# Patient Record
Sex: FEMALE | Race: BLACK OR AFRICAN AMERICAN | NOT HISPANIC OR LATINO | URBAN - METROPOLITAN AREA
[De-identification: names, ages, dates, MRNs, and addresses within clinical notes are randomized per-mention and may not be internally consistent; named-entity substitution may affect disease eponyms.]

---

## 2017-04-23 ENCOUNTER — INPATIENT (INPATIENT)
Facility: HOSPITAL | Age: 62
LOS: 2 days | Discharge: HOME | End: 2017-04-26
Attending: STUDENT IN AN ORGANIZED HEALTH CARE EDUCATION/TRAINING PROGRAM | Admitting: STUDENT IN AN ORGANIZED HEALTH CARE EDUCATION/TRAINING PROGRAM
Payer: MEDICARE

## 2017-04-23 VITALS
SYSTOLIC BLOOD PRESSURE: 136 MMHG | RESPIRATION RATE: 16 BRPM | DIASTOLIC BLOOD PRESSURE: 80 MMHG | HEART RATE: 80 BPM | TEMPERATURE: 98 F | OXYGEN SATURATION: 100 %

## 2017-04-23 DIAGNOSIS — Z90.710 ACQUIRED ABSENCE OF BOTH CERVIX AND UTERUS: Chronic | ICD-10-CM

## 2017-04-23 DIAGNOSIS — K92.2 GASTROINTESTINAL HEMORRHAGE, UNSPECIFIED: ICD-10-CM

## 2017-04-23 DIAGNOSIS — Z98.890 OTHER SPECIFIED POSTPROCEDURAL STATES: Chronic | ICD-10-CM

## 2017-04-23 DIAGNOSIS — Z90.89 ACQUIRED ABSENCE OF OTHER ORGANS: Chronic | ICD-10-CM

## 2017-04-23 LAB
ALBUMIN SERPL ELPH-MCNC: 4 G/DL — SIGNIFICANT CHANGE UP (ref 3.3–5)
ALP SERPL-CCNC: 83 U/L — SIGNIFICANT CHANGE UP (ref 40–120)
ALT FLD-CCNC: 16 U/L — SIGNIFICANT CHANGE UP (ref 4–33)
APPEARANCE UR: CLEAR — SIGNIFICANT CHANGE UP
AST SERPL-CCNC: 17 U/L — SIGNIFICANT CHANGE UP (ref 4–32)
BACTERIA # UR AUTO: SIGNIFICANT CHANGE UP
BASOPHILS # BLD AUTO: 0.03 K/UL — SIGNIFICANT CHANGE UP (ref 0–0.2)
BASOPHILS NFR BLD AUTO: 0.5 % — SIGNIFICANT CHANGE UP (ref 0–2)
BILIRUB SERPL-MCNC: 0.2 MG/DL — SIGNIFICANT CHANGE UP (ref 0.2–1.2)
BILIRUB UR-MCNC: NEGATIVE — SIGNIFICANT CHANGE UP
BLD GP AB SCN SERPL QL: NEGATIVE — SIGNIFICANT CHANGE UP
BLOOD UR QL VISUAL: HIGH
BUN SERPL-MCNC: 16 MG/DL — SIGNIFICANT CHANGE UP (ref 7–23)
CALCIUM SERPL-MCNC: 8.8 MG/DL — SIGNIFICANT CHANGE UP (ref 8.4–10.5)
CHLORIDE SERPL-SCNC: 103 MMOL/L — SIGNIFICANT CHANGE UP (ref 98–107)
CO2 SERPL-SCNC: 25 MMOL/L — SIGNIFICANT CHANGE UP (ref 22–31)
COLOR SPEC: SIGNIFICANT CHANGE UP
CREAT SERPL-MCNC: 0.93 MG/DL — SIGNIFICANT CHANGE UP (ref 0.5–1.3)
EOSINOPHIL # BLD AUTO: 0.1 K/UL — SIGNIFICANT CHANGE UP (ref 0–0.5)
EOSINOPHIL NFR BLD AUTO: 1.7 % — SIGNIFICANT CHANGE UP (ref 0–6)
GLUCOSE SERPL-MCNC: 150 MG/DL — HIGH (ref 70–99)
GLUCOSE UR-MCNC: NEGATIVE — SIGNIFICANT CHANGE UP
HCT VFR BLD CALC: 32.4 % — LOW (ref 34.5–45)
HCT VFR BLD CALC: 32.6 % — LOW (ref 34.5–45)
HCT VFR BLD CALC: 34.2 % — LOW (ref 34.5–45)
HGB BLD-MCNC: 10 G/DL — LOW (ref 11.5–15.5)
HGB BLD-MCNC: 10.3 G/DL — LOW (ref 11.5–15.5)
HGB BLD-MCNC: 10.7 G/DL — LOW (ref 11.5–15.5)
IMM GRANULOCYTES NFR BLD AUTO: 0.3 % — SIGNIFICANT CHANGE UP (ref 0–1.5)
KETONES UR-MCNC: NEGATIVE — SIGNIFICANT CHANGE UP
LEUKOCYTE ESTERASE UR-ACNC: HIGH
LYMPHOCYTES # BLD AUTO: 2.4 K/UL — SIGNIFICANT CHANGE UP (ref 1–3.3)
LYMPHOCYTES # BLD AUTO: 40.3 % — SIGNIFICANT CHANGE UP (ref 13–44)
MCHC RBC-ENTMCNC: 28 PG — SIGNIFICANT CHANGE UP (ref 27–34)
MCHC RBC-ENTMCNC: 28.5 PG — SIGNIFICANT CHANGE UP (ref 27–34)
MCHC RBC-ENTMCNC: 28.8 PG — SIGNIFICANT CHANGE UP (ref 27–34)
MCHC RBC-ENTMCNC: 30.9 % — LOW (ref 32–36)
MCHC RBC-ENTMCNC: 31.3 % — LOW (ref 32–36)
MCHC RBC-ENTMCNC: 31.6 % — LOW (ref 32–36)
MCV RBC AUTO: 90.8 FL — SIGNIFICANT CHANGE UP (ref 80–100)
MCV RBC AUTO: 91 FL — SIGNIFICANT CHANGE UP (ref 80–100)
MCV RBC AUTO: 91.1 FL — SIGNIFICANT CHANGE UP (ref 80–100)
MONOCYTES # BLD AUTO: 0.46 K/UL — SIGNIFICANT CHANGE UP (ref 0–0.9)
MONOCYTES NFR BLD AUTO: 7.7 % — SIGNIFICANT CHANGE UP (ref 2–14)
MUCOUS THREADS # UR AUTO: SIGNIFICANT CHANGE UP
NEUTROPHILS # BLD AUTO: 2.95 K/UL — SIGNIFICANT CHANGE UP (ref 1.8–7.4)
NEUTROPHILS NFR BLD AUTO: 49.5 % — SIGNIFICANT CHANGE UP (ref 43–77)
NITRITE UR-MCNC: NEGATIVE — SIGNIFICANT CHANGE UP
OB PNL STL: POSITIVE — SIGNIFICANT CHANGE UP
PH UR: 6 — SIGNIFICANT CHANGE UP (ref 4.6–8)
PLATELET # BLD AUTO: 206 K/UL — SIGNIFICANT CHANGE UP (ref 150–400)
PLATELET # BLD AUTO: 207 K/UL — SIGNIFICANT CHANGE UP (ref 150–400)
PLATELET # BLD AUTO: 210 K/UL — SIGNIFICANT CHANGE UP (ref 150–400)
PMV BLD: 10 FL — SIGNIFICANT CHANGE UP (ref 7–13)
PMV BLD: 9.8 FL — SIGNIFICANT CHANGE UP (ref 7–13)
PMV BLD: 9.9 FL — SIGNIFICANT CHANGE UP (ref 7–13)
POTASSIUM SERPL-MCNC: 3.4 MMOL/L — LOW (ref 3.5–5.3)
POTASSIUM SERPL-SCNC: 3.4 MMOL/L — LOW (ref 3.5–5.3)
PROT SERPL-MCNC: 7.5 G/DL — SIGNIFICANT CHANGE UP (ref 6–8.3)
PROT UR-MCNC: 10 — SIGNIFICANT CHANGE UP
RBC # BLD: 3.57 M/UL — LOW (ref 3.8–5.2)
RBC # BLD: 3.58 M/UL — LOW (ref 3.8–5.2)
RBC # BLD: 3.76 M/UL — LOW (ref 3.8–5.2)
RBC # FLD: 14.4 % — SIGNIFICANT CHANGE UP (ref 10.3–14.5)
RBC # FLD: 14.4 % — SIGNIFICANT CHANGE UP (ref 10.3–14.5)
RBC # FLD: 14.5 % — SIGNIFICANT CHANGE UP (ref 10.3–14.5)
RBC CASTS # UR COMP ASSIST: SIGNIFICANT CHANGE UP (ref 0–?)
RH IG SCN BLD-IMP: POSITIVE — SIGNIFICANT CHANGE UP
RH IG SCN BLD-IMP: POSITIVE — SIGNIFICANT CHANGE UP
SODIUM SERPL-SCNC: 142 MMOL/L — SIGNIFICANT CHANGE UP (ref 135–145)
SP GR SPEC: 1.01 — SIGNIFICANT CHANGE UP (ref 1–1.03)
SQUAMOUS # UR AUTO: SIGNIFICANT CHANGE UP
UROBILINOGEN FLD QL: NORMAL E.U. — SIGNIFICANT CHANGE UP (ref 0.1–0.2)
WBC # BLD: 5.96 K/UL — SIGNIFICANT CHANGE UP (ref 3.8–10.5)
WBC # BLD: 6.72 K/UL — SIGNIFICANT CHANGE UP (ref 3.8–10.5)
WBC # BLD: 7.61 K/UL — SIGNIFICANT CHANGE UP (ref 3.8–10.5)
WBC # FLD AUTO: 5.96 K/UL — SIGNIFICANT CHANGE UP (ref 3.8–10.5)
WBC # FLD AUTO: 6.72 K/UL — SIGNIFICANT CHANGE UP (ref 3.8–10.5)
WBC # FLD AUTO: 7.61 K/UL — SIGNIFICANT CHANGE UP (ref 3.8–10.5)
WBC UR QL: SIGNIFICANT CHANGE UP (ref 0–?)

## 2017-04-23 PROCEDURE — 86077 PHYS BLOOD BANK SERV XMATCH: CPT

## 2017-04-23 RX ORDER — SOD SULF/SODIUM/NAHCO3/KCL/PEG
1000 SOLUTION, RECONSTITUTED, ORAL ORAL ONCE
Qty: 0 | Refills: 0 | Status: COMPLETED | OUTPATIENT
Start: 2017-04-24 | End: 2017-04-24

## 2017-04-23 RX ORDER — LISINOPRIL 2.5 MG/1
40 TABLET ORAL DAILY
Qty: 0 | Refills: 0 | Status: DISCONTINUED | OUTPATIENT
Start: 2017-04-23 | End: 2017-04-26

## 2017-04-23 RX ORDER — SODIUM CHLORIDE 9 MG/ML
1000 INJECTION INTRAMUSCULAR; INTRAVENOUS; SUBCUTANEOUS
Qty: 0 | Refills: 0 | Status: DISCONTINUED | OUTPATIENT
Start: 2017-04-23 | End: 2017-04-25

## 2017-04-23 RX ORDER — SOD SULF/SODIUM/NAHCO3/KCL/PEG
1000 SOLUTION, RECONSTITUTED, ORAL ORAL ONCE
Qty: 0 | Refills: 0 | Status: COMPLETED | OUTPATIENT
Start: 2017-04-23 | End: 2017-04-23

## 2017-04-23 RX ORDER — LEVOTHYROXINE SODIUM 125 MCG
200 TABLET ORAL DAILY
Qty: 0 | Refills: 0 | Status: DISCONTINUED | OUTPATIENT
Start: 2017-04-23 | End: 2017-04-26

## 2017-04-23 RX ORDER — ACETAMINOPHEN 500 MG
650 TABLET ORAL EVERY 6 HOURS
Qty: 0 | Refills: 0 | Status: DISCONTINUED | OUTPATIENT
Start: 2017-04-23 | End: 2017-04-26

## 2017-04-23 RX ORDER — PANTOPRAZOLE SODIUM 20 MG/1
40 TABLET, DELAYED RELEASE ORAL
Qty: 0 | Refills: 0 | Status: DISCONTINUED | OUTPATIENT
Start: 2017-04-23 | End: 2017-04-26

## 2017-04-23 RX ORDER — AMLODIPINE BESYLATE 2.5 MG/1
10 TABLET ORAL DAILY
Qty: 0 | Refills: 0 | Status: DISCONTINUED | OUTPATIENT
Start: 2017-04-23 | End: 2017-04-26

## 2017-04-23 RX ADMIN — SODIUM CHLORIDE 50 MILLILITER(S): 9 INJECTION INTRAMUSCULAR; INTRAVENOUS; SUBCUTANEOUS at 22:25

## 2017-04-23 RX ADMIN — Medication 1000 MILLILITER(S): at 22:24

## 2017-04-23 RX ADMIN — Medication 10 MILLIGRAM(S): at 22:24

## 2017-04-23 RX ADMIN — SODIUM CHLORIDE 110 MILLILITER(S): 9 INJECTION INTRAMUSCULAR; INTRAVENOUS; SUBCUTANEOUS at 23:58

## 2017-04-23 RX ADMIN — SODIUM CHLORIDE 50 MILLILITER(S): 9 INJECTION INTRAMUSCULAR; INTRAVENOUS; SUBCUTANEOUS at 18:01

## 2017-04-23 NOTE — ED ADULT NURSE NOTE - ED STAT RN HANDOFF DETAILS
Report given to Isabella RN- Pt in bed in no distress- assisted to the bathroom reported no dizziness- Pt stable awaiting transportation.

## 2017-04-23 NOTE — ED PROVIDER NOTE - MEDICAL DECISION MAKING DETAILS
Navi: Concern for diverticular bleed. No clinical e/o inflammation. Check CBC. May need to transfuse.

## 2017-04-23 NOTE — H&P ADULT. - HISTORY OF PRESENT ILLNESS
61F HTN, hypothyroidism, GERD recurrent LGIB 4x over the past 3 years, last LGIB was Feb 2016. She presents today w LGIB which she states is much less than how it has presented in the past, only 4x BM that did not fill the bowl with blood, + some clots. Her previous episodes were associated with syncope and multiple blood transfusions. She does not have any dizziness, palpitation or CP. She only has colicky abdominal pain immediately before having a BM.    Last colonoscopy  by Dr. Magallanes (GI) 5/16- diverticulosis and EGD showed gastritis. 61F HTN, hypothyroidism, GERD recurrent LGIB 4x over the past 3 years, last LGIB was Feb 2016. She presents today w LGIB which she states is much less than how it has presented in the past, only 4x BM that did not fill the bowl with blood, + some clots. Her previous episodes were associated with syncope and multiple blood transfusions. She does not have any dizziness, palpitation or CP. She only has colicky abdominal pain immediately before having a BM.    Last colonoscopy  by Dr. Magallanes (GI) 5/16- diverticulosis and EGD showed gastritis.    Of note, she has an older CTA which showed possible bleeding from the terminal ileum.

## 2017-04-23 NOTE — H&P ADULT. - PMH
Diverticulosis    HTN (hypertension)    Hypothyroidism    Lower gastrointestinal bleeding  4x 2014- 2016

## 2017-04-23 NOTE — PATIENT PROFILE ADULT. - NS TRANSFER PATIENT BELONGINGS
yellow metal earring, 5 yellow metal bracelets, yellow metal wedding band/Jewelry/Money (specify)/Cell Phone/PDA (specify)/Other belongings/Clothing

## 2017-04-23 NOTE — H&P ADULT. - ASSESSMENT
61F HTN, hypothyroidism, GERD recurrent LGIB 4x over the past 3 years now w reccurrent LGIB  hemoglobin and Hct not significantly changed since May 2016 as per her out side records- Hg 10 HCt 31  - will admit for observation  - if any sign of brisk bleeding will obtain CTA for localization  - trend H/H  - Clears, today , if bleeding stops will advance diet.  - consider GI consult  d/w attending

## 2017-04-23 NOTE — ED PROVIDER NOTE - OBJECTIVE STATEMENT
Navi: 61 F, diverticulosis (never inflammed), hypothyroid (on Synthroid), pernicious anemia, h/o admissions for transfusion using warm, antigen-washed blood. P/w BRBPR x 3 today, lightheadedness last night.

## 2017-04-23 NOTE — ED ADULT NURSE NOTE - OBJECTIVE STATEMENT
Met Pt in bed- aox3 PMH GI bleed, divertic- HTN- Pt reports 2 bloody bowel movement today and 2 more once she arrived to ED- Pt denies palpitations, chest pain + for lightheadedness x 2 weeks on and off- Pt abd round soft not tender- Pt reports taking an average of 4 aleves a day due to back pain- Pt + red blood per rectum, + clots- Pt CM reading NSR- IV lock inititated labs collected and sent- :Left in bed awating MD evaluation

## 2017-04-24 LAB
BUN SERPL-MCNC: 10 MG/DL — SIGNIFICANT CHANGE UP (ref 7–23)
CALCIUM SERPL-MCNC: 8 MG/DL — LOW (ref 8.4–10.5)
CHLORIDE SERPL-SCNC: 104 MMOL/L — SIGNIFICANT CHANGE UP (ref 98–107)
CO2 SERPL-SCNC: 27 MMOL/L — SIGNIFICANT CHANGE UP (ref 22–31)
CREAT SERPL-MCNC: 0.79 MG/DL — SIGNIFICANT CHANGE UP (ref 0.5–1.3)
GLUCOSE SERPL-MCNC: 168 MG/DL — HIGH (ref 70–99)
HCT VFR BLD CALC: 28.7 % — LOW (ref 34.5–45)
HCT VFR BLD CALC: 29.2 % — LOW (ref 34.5–45)
HCT VFR BLD CALC: 31.8 % — LOW (ref 34.5–45)
HCT VFR BLD CALC: 35.4 % — SIGNIFICANT CHANGE UP (ref 34.5–45)
HGB BLD-MCNC: 11.2 G/DL — LOW (ref 11.5–15.5)
HGB BLD-MCNC: 9.1 G/DL — LOW (ref 11.5–15.5)
HGB BLD-MCNC: 9.2 G/DL — LOW (ref 11.5–15.5)
HGB BLD-MCNC: 9.9 G/DL — LOW (ref 11.5–15.5)
MAGNESIUM SERPL-MCNC: 1.8 MG/DL — SIGNIFICANT CHANGE UP (ref 1.6–2.6)
MCHC RBC-ENTMCNC: 28.3 PG — SIGNIFICANT CHANGE UP (ref 27–34)
MCHC RBC-ENTMCNC: 28.7 PG — SIGNIFICANT CHANGE UP (ref 27–34)
MCHC RBC-ENTMCNC: 28.8 PG — SIGNIFICANT CHANGE UP (ref 27–34)
MCHC RBC-ENTMCNC: 28.8 PG — SIGNIFICANT CHANGE UP (ref 27–34)
MCHC RBC-ENTMCNC: 31.1 % — LOW (ref 32–36)
MCHC RBC-ENTMCNC: 31.5 % — LOW (ref 32–36)
MCHC RBC-ENTMCNC: 31.6 % — LOW (ref 32–36)
MCHC RBC-ENTMCNC: 31.7 % — LOW (ref 32–36)
MCV RBC AUTO: 90.8 FL — SIGNIFICANT CHANGE UP (ref 80–100)
MCV RBC AUTO: 90.9 FL — SIGNIFICANT CHANGE UP (ref 80–100)
MCV RBC AUTO: 91 FL — SIGNIFICANT CHANGE UP (ref 80–100)
MCV RBC AUTO: 91 FL — SIGNIFICANT CHANGE UP (ref 80–100)
PHOSPHATE SERPL-MCNC: 1.8 MG/DL — LOW (ref 2.5–4.5)
PLATELET # BLD AUTO: 180 K/UL — SIGNIFICANT CHANGE UP (ref 150–400)
PLATELET # BLD AUTO: 184 K/UL — SIGNIFICANT CHANGE UP (ref 150–400)
PLATELET # BLD AUTO: 191 K/UL — SIGNIFICANT CHANGE UP (ref 150–400)
PLATELET # BLD AUTO: 193 K/UL — SIGNIFICANT CHANGE UP (ref 150–400)
PMV BLD: 10 FL — SIGNIFICANT CHANGE UP (ref 7–13)
PMV BLD: 9.5 FL — SIGNIFICANT CHANGE UP (ref 7–13)
PMV BLD: 9.6 FL — SIGNIFICANT CHANGE UP (ref 7–13)
PMV BLD: 9.9 FL — SIGNIFICANT CHANGE UP (ref 7–13)
POTASSIUM SERPL-MCNC: 3.7 MMOL/L — SIGNIFICANT CHANGE UP (ref 3.5–5.3)
POTASSIUM SERPL-SCNC: 3.7 MMOL/L — SIGNIFICANT CHANGE UP (ref 3.5–5.3)
RBC # BLD: 3.16 M/UL — LOW (ref 3.8–5.2)
RBC # BLD: 3.21 M/UL — LOW (ref 3.8–5.2)
RBC # BLD: 3.5 M/UL — LOW (ref 3.8–5.2)
RBC # BLD: 3.89 M/UL — SIGNIFICANT CHANGE UP (ref 3.8–5.2)
RBC # FLD: 14.4 % — SIGNIFICANT CHANGE UP (ref 10.3–14.5)
RBC # FLD: 14.5 % — SIGNIFICANT CHANGE UP (ref 10.3–14.5)
RBC # FLD: 14.5 % — SIGNIFICANT CHANGE UP (ref 10.3–14.5)
RBC # FLD: 14.6 % — HIGH (ref 10.3–14.5)
SODIUM SERPL-SCNC: 141 MMOL/L — SIGNIFICANT CHANGE UP (ref 135–145)
WBC # BLD: 12.24 K/UL — HIGH (ref 3.8–10.5)
WBC # BLD: 6.89 K/UL — SIGNIFICANT CHANGE UP (ref 3.8–10.5)
WBC # BLD: 7.42 K/UL — SIGNIFICANT CHANGE UP (ref 3.8–10.5)
WBC # BLD: 7.45 K/UL — SIGNIFICANT CHANGE UP (ref 3.8–10.5)
WBC # FLD AUTO: 12.24 K/UL — HIGH (ref 3.8–10.5)
WBC # FLD AUTO: 6.89 K/UL — SIGNIFICANT CHANGE UP (ref 3.8–10.5)
WBC # FLD AUTO: 7.42 K/UL — SIGNIFICANT CHANGE UP (ref 3.8–10.5)
WBC # FLD AUTO: 7.45 K/UL — SIGNIFICANT CHANGE UP (ref 3.8–10.5)

## 2017-04-24 PROCEDURE — 45382 COLONOSCOPY W/CONTROL BLEED: CPT | Mod: GC

## 2017-04-24 PROCEDURE — 99222 1ST HOSP IP/OBS MODERATE 55: CPT | Mod: 25,GC

## 2017-04-24 PROCEDURE — 74174 CTA ABD&PLVS W/CONTRAST: CPT | Mod: 26

## 2017-04-24 RX ORDER — MAGNESIUM SULFATE 500 MG/ML
2 VIAL (ML) INJECTION ONCE
Qty: 0 | Refills: 0 | Status: COMPLETED | OUTPATIENT
Start: 2017-04-24 | End: 2017-04-24

## 2017-04-24 RX ORDER — POTASSIUM PHOSPHATE, MONOBASIC POTASSIUM PHOSPHATE, DIBASIC 236; 224 MG/ML; MG/ML
15 INJECTION, SOLUTION INTRAVENOUS ONCE
Qty: 0 | Refills: 0 | Status: COMPLETED | OUTPATIENT
Start: 2017-04-24 | End: 2017-04-24

## 2017-04-24 RX ADMIN — Medication 10 MILLIGRAM(S): at 10:32

## 2017-04-24 RX ADMIN — SODIUM CHLORIDE 110 MILLILITER(S): 9 INJECTION INTRAMUSCULAR; INTRAVENOUS; SUBCUTANEOUS at 23:52

## 2017-04-24 RX ADMIN — Medication 50 GRAM(S): at 11:30

## 2017-04-24 RX ADMIN — PANTOPRAZOLE SODIUM 40 MILLIGRAM(S): 20 TABLET, DELAYED RELEASE ORAL at 06:56

## 2017-04-24 RX ADMIN — Medication 200 MICROGRAM(S): at 06:56

## 2017-04-24 RX ADMIN — POTASSIUM PHOSPHATE, MONOBASIC POTASSIUM PHOSPHATE, DIBASIC 62.5 MILLIMOLE(S): 236; 224 INJECTION, SOLUTION INTRAVENOUS at 13:05

## 2017-04-24 RX ADMIN — Medication 1000 MILLILITER(S): at 08:36

## 2017-04-24 NOTE — PROVIDER CONTACT NOTE (OTHER) - RECOMMENDATIONS
Wait for transfusion to complete to give the Doculax and Moviprep.
Another CBC before giving patient Doculax and Moviprep
11pm CBC was drawn; waiting for results. BM was bright red and mostly liquid.
PRBC infusion completed. Post transfusion CBC sent

## 2017-04-24 NOTE — PROVIDER CONTACT NOTE (OTHER) - ASSESSMENT
Currently asymptomatic
Patient states she currently is not feeling weak or dizzy.
Pt complaining of slight dizziness
pt asymptomatic, no dizziness, or diaphoresis.
VS stable
Asymptomatic

## 2017-04-24 NOTE — PROVIDER CONTACT NOTE (OTHER) - REASON
CBC results
Dark red bleeding per rectum
Pt had multiple bloody BMs
Vital Signs
Bloody Bowel Movement
Blood transfusion

## 2017-04-24 NOTE — PROVIDER CONTACT NOTE (OTHER) - DATE AND TIME:
24-Apr-2017 01:50
24-Apr-2017 03:10
24-Apr-2017 03:42
24-Apr-2017 06:00
24-Apr-2017 15:00
23-Apr-2017 23:53

## 2017-04-24 NOTE — PROVIDER CONTACT NOTE (OTHER) - SITUATION
patient had two bloody BM. One was 250cc and the other was 700cc
Pt needs blood warmer for transfusion to complete.
2am CBC results are completed. Hemoglobin dropped from 10.3 ot 9.2 and Hemacrit dropped from 32.6 to 29.2
BP on right arm 118/62 HR 80; on Left arm 110/68 HR 80
Pt had multiple bloody BMs
patient had X3 episodes of dark red bleeding per rectum.

## 2017-04-24 NOTE — PROVIDER CONTACT NOTE (OTHER) - ACTION/TREATMENT ORDERED:
Ok to wait to give Doculax and Moviprep
Hold Doculax and Moviprep for now. Please check Vital signs and if stable can give.
Ok will order 2am CBC
Will come assess patient. Hold Moviprep and Dulcolax for now. Patient is going to get a CT Angio and then can receive Moviprep and Doculax when she returns. Will notify RN is plan changes.
Will continue to monitor
Will increase fluids and keep monitoring,

## 2017-04-25 ENCOUNTER — TRANSCRIPTION ENCOUNTER (OUTPATIENT)
Age: 62
End: 2017-04-25

## 2017-04-25 LAB
BUN SERPL-MCNC: 5 MG/DL — LOW (ref 7–23)
CALCIUM SERPL-MCNC: 7.8 MG/DL — LOW (ref 8.4–10.5)
CHLORIDE SERPL-SCNC: 108 MMOL/L — HIGH (ref 98–107)
CO2 SERPL-SCNC: 25 MMOL/L — SIGNIFICANT CHANGE UP (ref 22–31)
CREAT SERPL-MCNC: 0.78 MG/DL — SIGNIFICANT CHANGE UP (ref 0.5–1.3)
GLUCOSE SERPL-MCNC: 127 MG/DL — HIGH (ref 70–99)
HCT VFR BLD CALC: 29.8 % — LOW (ref 34.5–45)
HCT VFR BLD CALC: 30.2 % — LOW (ref 34.5–45)
HGB BLD-MCNC: 9.4 G/DL — LOW (ref 11.5–15.5)
HGB BLD-MCNC: 9.6 G/DL — LOW (ref 11.5–15.5)
MAGNESIUM SERPL-MCNC: 2.2 MG/DL — SIGNIFICANT CHANGE UP (ref 1.6–2.6)
MCHC RBC-ENTMCNC: 28.2 PG — SIGNIFICANT CHANGE UP (ref 27–34)
MCHC RBC-ENTMCNC: 28.4 PG — SIGNIFICANT CHANGE UP (ref 27–34)
MCHC RBC-ENTMCNC: 31.5 % — LOW (ref 32–36)
MCHC RBC-ENTMCNC: 31.8 % — LOW (ref 32–36)
MCV RBC AUTO: 89.3 FL — SIGNIFICANT CHANGE UP (ref 80–100)
MCV RBC AUTO: 89.5 FL — SIGNIFICANT CHANGE UP (ref 80–100)
PHOSPHATE SERPL-MCNC: 2 MG/DL — LOW (ref 2.5–4.5)
PLATELET # BLD AUTO: 172 K/UL — SIGNIFICANT CHANGE UP (ref 150–400)
PLATELET # BLD AUTO: 186 K/UL — SIGNIFICANT CHANGE UP (ref 150–400)
PMV BLD: 9.4 FL — SIGNIFICANT CHANGE UP (ref 7–13)
PMV BLD: 9.4 FL — SIGNIFICANT CHANGE UP (ref 7–13)
POTASSIUM SERPL-MCNC: 3.3 MMOL/L — LOW (ref 3.5–5.3)
POTASSIUM SERPL-SCNC: 3.3 MMOL/L — LOW (ref 3.5–5.3)
RBC # BLD: 3.33 M/UL — LOW (ref 3.8–5.2)
RBC # BLD: 3.38 M/UL — LOW (ref 3.8–5.2)
RBC # FLD: 14.6 % — HIGH (ref 10.3–14.5)
RBC # FLD: 14.6 % — HIGH (ref 10.3–14.5)
SODIUM SERPL-SCNC: 143 MMOL/L — SIGNIFICANT CHANGE UP (ref 135–145)
WBC # BLD: 8.13 K/UL — SIGNIFICANT CHANGE UP (ref 3.8–10.5)
WBC # BLD: 9.22 K/UL — SIGNIFICANT CHANGE UP (ref 3.8–10.5)
WBC # FLD AUTO: 8.13 K/UL — SIGNIFICANT CHANGE UP (ref 3.8–10.5)
WBC # FLD AUTO: 9.22 K/UL — SIGNIFICANT CHANGE UP (ref 3.8–10.5)

## 2017-04-25 PROCEDURE — 99232 SBSQ HOSP IP/OBS MODERATE 35: CPT | Mod: GC

## 2017-04-25 PROCEDURE — 99222 1ST HOSP IP/OBS MODERATE 55: CPT

## 2017-04-25 RX ORDER — POTASSIUM CHLORIDE 20 MEQ
10 PACKET (EA) ORAL
Qty: 0 | Refills: 0 | Status: COMPLETED | OUTPATIENT
Start: 2017-04-25 | End: 2017-04-25

## 2017-04-25 RX ORDER — POTASSIUM PHOSPHATE, MONOBASIC POTASSIUM PHOSPHATE, DIBASIC 236; 224 MG/ML; MG/ML
15 INJECTION, SOLUTION INTRAVENOUS ONCE
Qty: 0 | Refills: 0 | Status: COMPLETED | OUTPATIENT
Start: 2017-04-25 | End: 2017-04-25

## 2017-04-25 RX ADMIN — Medication 100 MILLIEQUIVALENT(S): at 08:50

## 2017-04-25 RX ADMIN — POTASSIUM PHOSPHATE, MONOBASIC POTASSIUM PHOSPHATE, DIBASIC 62.5 MILLIMOLE(S): 236; 224 INJECTION, SOLUTION INTRAVENOUS at 15:04

## 2017-04-25 RX ADMIN — Medication 100 MILLIEQUIVALENT(S): at 10:52

## 2017-04-25 RX ADMIN — PANTOPRAZOLE SODIUM 40 MILLIGRAM(S): 20 TABLET, DELAYED RELEASE ORAL at 07:42

## 2017-04-25 RX ADMIN — SODIUM CHLORIDE 110 MILLILITER(S): 9 INJECTION INTRAMUSCULAR; INTRAVENOUS; SUBCUTANEOUS at 05:22

## 2017-04-25 RX ADMIN — AMLODIPINE BESYLATE 10 MILLIGRAM(S): 2.5 TABLET ORAL at 05:21

## 2017-04-25 RX ADMIN — LISINOPRIL 40 MILLIGRAM(S): 2.5 TABLET ORAL at 05:22

## 2017-04-25 RX ADMIN — Medication 200 MICROGRAM(S): at 05:22

## 2017-04-25 RX ADMIN — Medication 100 MILLIEQUIVALENT(S): at 13:13

## 2017-04-25 NOTE — DISCHARGE NOTE ADULT - ADDITIONAL INSTRUCTIONS
FOLLOW-UP: Please follow up with your primary care physician in one week regarding your hospitalization   Please follow up with outpatient gastroenterologist Dr. Magallanes or Dr. Granados within one week of discharge regarding hospitalization.  Please call Dr. Beach at [phone] to make an appointment in one week

## 2017-04-25 NOTE — DISCHARGE NOTE ADULT - HOSPITAL COURSE
61F HTN, hypothyroidism, GERD recurrent LGIB 4x over the past 3 years, last LGIB was Feb 2016. She presents today w LGIB which she states is much less than how it has presented in the past, only 4x BM that did not fill the bowl with blood, + some clots. Her previous episodes were associated with syncope and multiple blood transfusions. She does not have any dizziness, palpitation or CP. She only has colicky abdominal pain immediately before having a BM.    Last colonoscopy  by Dr. Magallanes (GI) 5/16- diverticulosis and EGD showed gastritis.    Of note, she has an older CTA which showed possible bleeding from the terminal ileum.  Patient was admitted to surgical floor. Serial CBCs obtained.   4/24: CTA revealed No active gastrointestinal contrast extravasation. Small pericardial effusion.    4/24: Patient received 1 unit PRBC , responded appropriately. Colonoscopy preformed by GI and found The examined portion of the ileum was normal. Diverticulosis in the entire examined colon. There was evidence of recent bleeding from the diverticular  opening. Injected. Clips (MR conditional) were placed. Blood in the transverse colon. No specimens collected.  4/25: CBC and vital signs remained stable. Electrolytes repleted. Diet was advanced as tolerated  4/26: Vital signs remain stable. Patient CBC stable. Stable for discharge home with outpatient follow up with gastroenterologist and Dr. Beach.

## 2017-04-25 NOTE — DISCHARGE NOTE ADULT - CARE PROVIDERS DIRECT ADDRESSES
[email or web address]

## 2017-04-25 NOTE — DISCHARGE NOTE ADULT - PLAN OF CARE
You had a colonoscopy with GI ACTIVITY: No heavy lifting or straining. Otherwise, you may return to your usual level of physical activity. If you are taking narcotic pain medication (such as Percocet) DO NOT drive a car, operate machinery or make important decisions.  DIET: Return to your usual diet.  NOTIFY YOUR SURGEON IF: You have any bleeding that does not stop  FOLLOW-UP: Please follow up with your primary care physician in one week regarding your hospitalization   Please follow up with outpatient gastroenterologist Dr. Magallanes or Dr. Granados within one week of discharge regarding hospitalization.  Please call Dr. Beach at [phone] to make an appointment in one week

## 2017-04-25 NOTE — DISCHARGE NOTE ADULT - PATIENT PORTAL LINK FT
“You can access the [facility] Patient Portal, offered by [facility], by registering with the following website: [email or web address]”

## 2017-04-25 NOTE — DISCHARGE NOTE ADULT - CARE PLAN
Goal:	You had a colonoscopy with GI  Instructions for follow-up, activity and diet:	ACTIVITY: No heavy lifting or straining. Otherwise, you may return to your usual level of physical activity. If you are taking narcotic pain medication (such as Percocet) DO NOT drive a car, operate machinery or make important decisions.  DIET: Return to your usual diet.  NOTIFY YOUR SURGEON IF: You have any bleeding that does not stop  FOLLOW-UP: Please follow up with your primary care physician in one week regarding your hospitalization   Please follow up with outpatient gastroenterologist Dr. Magallanes or Dr. Granados within one week of discharge regarding hospitalization.  Please call Dr. Beach at [phone] to make an appointment in one week Principal Discharge DX:	Lower GI bleed  Goal:	You had a colonoscopy with GI  Instructions for follow-up, activity and diet:	ACTIVITY: No heavy lifting or straining. Otherwise, you may return to your usual level of physical activity. If you are taking narcotic pain medication (such as Percocet) DO NOT drive a car, operate machinery or make important decisions.  DIET: Return to your usual diet.  NOTIFY YOUR SURGEON IF: You have any bleeding that does not stop  FOLLOW-UP: Please follow up with your primary care physician in one week regarding your hospitalization   Please follow up with outpatient gastroenterologist Dr. Magallanes or Dr. Granados within one week of discharge regarding hospitalization.  Please call Dr. Beach at [phone] to make an appointment in one week Principal Discharge DX:	Lower GI bleed  Goal:	You had a colonoscopy with GI  Instructions for follow-up, activity and diet:	ACTIVITY: No heavy lifting or straining. Otherwise, you may return to your usual level of physical activity. If you are taking narcotic pain medication (such as Percocet) DO NOT drive a car, operate machinery or make important decisions.  DIET: Return to your usual diet.  NOTIFY YOUR SURGEON IF: You have any bleeding that does not stop  FOLLOW-UP: Please follow up with your primary care physician in one week regarding your hospitalization   Please follow up with outpatient gastroenterologist Dr. Magallanes or Dr. Granados within one week of discharge regarding hospitalization.  Please call Dr. Beach at [phone] to make an appointment in one week

## 2017-04-25 NOTE — DISCHARGE NOTE ADULT - CARE PROVIDER_API CALL
Breezy Beach), ColonRectal Surgery; Surgery  [facility] [address] [place] [id number]  Phone: [phone]  Fax: [phone]    Chino Granados), Gastroenterology; Internal Medicine  [address] [place] [address]  Phone: [phone]  Fax: [phone]

## 2017-04-25 NOTE — DISCHARGE NOTE ADULT - MEDICATION SUMMARY - MEDICATIONS TO STOP TAKING
I will STOP taking the medications listed below when I get home from the hospital:    Aleve 220 mg oral capsule  -- 1 cap(s) by mouth every 12 hours, As Needed for back pain  -- pt takes 400 to 800 mg per day for the past two weeks

## 2017-04-25 NOTE — DISCHARGE NOTE ADULT - MEDICATION SUMMARY - MEDICATIONS TO TAKE
I will START or STAY ON the medications listed below when I get home from the hospital:    Tylenol 325 mg oral tablet  -- 2 tab(s) by mouth every 4 hours  -- for back pain  -- Indication: For pain    ramipril 10 mg oral capsule  -- 1 cap(s) by mouth once a day  -- Indication: For HTN (hypertension)    Norvasc 10 mg oral tablet  -- 1 tab(s) by mouth once a day  -- Indication: For HTN (hypertension)    Lasix 20 mg oral tablet  -- 1 tab(s) by mouth once a day  -- for leg swelling?  -- Indication: For HTN (hypertension)    ferrous sulfate 325 mg (65 mg elemental iron) oral delayed release tablet  -- 1 tab(s) by mouth once a day  -- Indication: For Supplement    omeprazole 40 mg oral delayed release capsule  -- 1 cap(s) by mouth once a day  -- Indication: For protonix    Synthroid 200 mcg (0.2 mg) oral tablet  -- 1 tab(s) by mouth once a day  -- Indication: For Hypothyroidism    cyanocobalamin 1000 mcg oral tablet  -- 1 tab(s) by mouth once a day  -- Indication: For supplement

## 2017-04-25 NOTE — DISCHARGE NOTE ADULT - OTHER SIGNIFICANT FINDINGS
61F HTN, hypothyroidism, GERD recurrent LGIB 4x over the past 3 years, last LGIB was Feb 2016. She presents today w LGIB which she states is much less than how it has presented in the past, only 4x BM that did not fill the bowl with blood, + some clots. Her previous episodes were associated with syncope and multiple blood transfusions. She does not have any dizziness, palpitation or CP. She only has colicky abdominal pain immediately before having a BM.    Last colonoscopy  by Dr. Magallanes (GI) 5/16- diverticulosis and EGD showed gastritis.    Of note, she has an older CTA which showed possible bleeding from the terminal ileum.    Patient was admitted to surgical floor. Serial CBCs obtained.   4/24: Patient received 1 unit PRBC , responded appropriately. Colonoscopy preformed by GI and found The examined portion of the ileum was normal. Diverticulosis in the entire examined colon. There was evidence of recent bleeding from the diverticular  opening. Injected. Clips (MR conditional) were placed.  Blood in the transverse colon. No specimens collected.  4/25: CBC and vital signs remained stable. Electrolytes repleted. Diet was advanced as tolerated as above

## 2017-04-26 VITALS
OXYGEN SATURATION: 98 % | RESPIRATION RATE: 18 BRPM | HEART RATE: 79 BPM | SYSTOLIC BLOOD PRESSURE: 121 MMHG | TEMPERATURE: 98 F | DIASTOLIC BLOOD PRESSURE: 69 MMHG

## 2017-04-26 LAB
BUN SERPL-MCNC: 10 MG/DL — SIGNIFICANT CHANGE UP (ref 7–23)
CALCIUM SERPL-MCNC: 8.2 MG/DL — LOW (ref 8.4–10.5)
CHLORIDE SERPL-SCNC: 107 MMOL/L — SIGNIFICANT CHANGE UP (ref 98–107)
CO2 SERPL-SCNC: 23 MMOL/L — SIGNIFICANT CHANGE UP (ref 22–31)
CREAT SERPL-MCNC: 0.92 MG/DL — SIGNIFICANT CHANGE UP (ref 0.5–1.3)
GLUCOSE SERPL-MCNC: 142 MG/DL — HIGH (ref 70–99)
HCT VFR BLD CALC: 29.4 % — LOW (ref 34.5–45)
HGB BLD-MCNC: 9.3 G/DL — LOW (ref 11.5–15.5)
MAGNESIUM SERPL-MCNC: 2.1 MG/DL — SIGNIFICANT CHANGE UP (ref 1.6–2.6)
MCHC RBC-ENTMCNC: 28.9 PG — SIGNIFICANT CHANGE UP (ref 27–34)
MCHC RBC-ENTMCNC: 31.6 % — LOW (ref 32–36)
MCV RBC AUTO: 91.3 FL — SIGNIFICANT CHANGE UP (ref 80–100)
PHOSPHATE SERPL-MCNC: 2.3 MG/DL — LOW (ref 2.5–4.5)
PLATELET # BLD AUTO: 192 K/UL — SIGNIFICANT CHANGE UP (ref 150–400)
PMV BLD: 9.7 FL — SIGNIFICANT CHANGE UP (ref 7–13)
POTASSIUM SERPL-MCNC: 3.4 MMOL/L — LOW (ref 3.5–5.3)
POTASSIUM SERPL-SCNC: 3.4 MMOL/L — LOW (ref 3.5–5.3)
RBC # BLD: 3.22 M/UL — LOW (ref 3.8–5.2)
RBC # FLD: 14.7 % — HIGH (ref 10.3–14.5)
SODIUM SERPL-SCNC: 141 MMOL/L — SIGNIFICANT CHANGE UP (ref 135–145)
WBC # BLD: 6.83 K/UL — SIGNIFICANT CHANGE UP (ref 3.8–10.5)
WBC # FLD AUTO: 6.83 K/UL — SIGNIFICANT CHANGE UP (ref 3.8–10.5)

## 2017-04-26 PROCEDURE — 99232 SBSQ HOSP IP/OBS MODERATE 35: CPT | Mod: GC

## 2017-04-26 PROCEDURE — 99232 SBSQ HOSP IP/OBS MODERATE 35: CPT

## 2017-04-26 RX ORDER — POTASSIUM PHOSPHATE, MONOBASIC POTASSIUM PHOSPHATE, DIBASIC 236; 224 MG/ML; MG/ML
15 INJECTION, SOLUTION INTRAVENOUS ONCE
Qty: 0 | Refills: 0 | Status: COMPLETED | OUTPATIENT
Start: 2017-04-26 | End: 2017-04-26

## 2017-04-26 RX ORDER — POTASSIUM CHLORIDE 20 MEQ
40 PACKET (EA) ORAL ONCE
Qty: 0 | Refills: 0 | Status: COMPLETED | OUTPATIENT
Start: 2017-04-26 | End: 2017-04-26

## 2017-04-26 RX ADMIN — PANTOPRAZOLE SODIUM 40 MILLIGRAM(S): 20 TABLET, DELAYED RELEASE ORAL at 06:07

## 2017-04-26 RX ADMIN — POTASSIUM PHOSPHATE, MONOBASIC POTASSIUM PHOSPHATE, DIBASIC 62.5 MILLIMOLE(S): 236; 224 INJECTION, SOLUTION INTRAVENOUS at 08:58

## 2017-04-26 RX ADMIN — Medication 650 MILLIGRAM(S): at 07:30

## 2017-04-26 RX ADMIN — Medication 650 MILLIGRAM(S): at 07:00

## 2017-04-26 RX ADMIN — LISINOPRIL 40 MILLIGRAM(S): 2.5 TABLET ORAL at 05:18

## 2017-04-26 RX ADMIN — AMLODIPINE BESYLATE 10 MILLIGRAM(S): 2.5 TABLET ORAL at 05:18

## 2017-04-26 RX ADMIN — Medication 40 MILLIEQUIVALENT(S): at 10:34

## 2017-04-26 RX ADMIN — Medication 200 MICROGRAM(S): at 05:18

## 2017-05-04 ENCOUNTER — APPOINTMENT (OUTPATIENT)
Dept: COLORECTAL SURGERY | Facility: CLINIC | Age: 62
End: 2017-05-04

## 2017-05-04 RX ORDER — OMEPRAZOLE 20 MG/1
20 CAPSULE, DELAYED RELEASE ORAL
Qty: 90 | Refills: 0 | Status: DISCONTINUED | COMMUNITY
Start: 2017-03-09

## 2017-05-04 RX ORDER — FAMOTIDINE 20 MG/1
20 TABLET, FILM COATED ORAL
Refills: 0 | Status: ACTIVE | COMMUNITY

## 2017-05-04 RX ORDER — CHLORHEXIDINE GLUCONATE 4 %
1000 LIQUID (ML) TOPICAL
Qty: 90 | Refills: 0 | Status: ACTIVE | COMMUNITY
Start: 2017-03-09

## 2017-05-04 RX ORDER — DIPHENHYDRAMINE HCL 25 MG
CAPSULE ORAL
Refills: 0 | Status: ACTIVE | COMMUNITY

## 2017-05-04 RX ORDER — BUSPIRONE HYDROCHLORIDE 10 MG/1
10 TABLET ORAL
Qty: 270 | Refills: 0 | Status: ACTIVE | COMMUNITY
Start: 2016-09-01

## 2017-05-04 RX ORDER — SIMVASTATIN 20 MG/1
20 TABLET, FILM COATED ORAL
Qty: 90 | Refills: 0 | Status: DISCONTINUED | COMMUNITY
Start: 2017-03-09

## 2017-05-04 RX ORDER — METHYLPREDNISOLONE 4 MG/1
4 TABLET ORAL
Refills: 0 | Status: ACTIVE | COMMUNITY

## 2018-07-28 ENCOUNTER — INPATIENT (INPATIENT)
Facility: HOSPITAL | Age: 63
LOS: 1 days | Discharge: HOME | End: 2018-07-30
Attending: SURGERY | Admitting: SURGERY
Payer: MEDICARE

## 2018-07-28 VITALS
TEMPERATURE: 98 F | HEART RATE: 85 BPM | OXYGEN SATURATION: 100 % | DIASTOLIC BLOOD PRESSURE: 100 MMHG | RESPIRATION RATE: 16 BRPM | SYSTOLIC BLOOD PRESSURE: 187 MMHG

## 2018-07-28 DIAGNOSIS — Z90.89 ACQUIRED ABSENCE OF OTHER ORGANS: Chronic | ICD-10-CM

## 2018-07-28 DIAGNOSIS — Z98.890 OTHER SPECIFIED POSTPROCEDURAL STATES: Chronic | ICD-10-CM

## 2018-07-28 DIAGNOSIS — K92.2 GASTROINTESTINAL HEMORRHAGE, UNSPECIFIED: ICD-10-CM

## 2018-07-28 DIAGNOSIS — Z90.710 ACQUIRED ABSENCE OF BOTH CERVIX AND UTERUS: Chronic | ICD-10-CM

## 2018-07-28 LAB
ALBUMIN SERPL ELPH-MCNC: 4.2 G/DL — SIGNIFICANT CHANGE UP (ref 3.3–5)
ALP SERPL-CCNC: 95 U/L — SIGNIFICANT CHANGE UP (ref 40–120)
ALT FLD-CCNC: 27 U/L — SIGNIFICANT CHANGE UP (ref 4–33)
APTT BLD: 22.7 SEC — LOW (ref 27.5–37.4)
AST SERPL-CCNC: 28 U/L — SIGNIFICANT CHANGE UP (ref 4–32)
BASE EXCESS BLDA CALC-SCNC: -0.6 MMOL/L — SIGNIFICANT CHANGE UP
BASOPHILS # BLD AUTO: 0.04 K/UL — SIGNIFICANT CHANGE UP (ref 0–0.2)
BASOPHILS NFR BLD AUTO: 0.6 % — SIGNIFICANT CHANGE UP (ref 0–2)
BILIRUB SERPL-MCNC: 0.4 MG/DL — SIGNIFICANT CHANGE UP (ref 0.2–1.2)
BLD GP AB SCN SERPL QL: NEGATIVE — SIGNIFICANT CHANGE UP
BLD GP AB SCN SERPL QL: NEGATIVE — SIGNIFICANT CHANGE UP
BUN SERPL-MCNC: 11 MG/DL — SIGNIFICANT CHANGE UP (ref 7–23)
BUN SERPL-MCNC: 15 MG/DL — SIGNIFICANT CHANGE UP (ref 7–23)
CA-I BLDA-SCNC: 1.02 MMOL/L — LOW (ref 1.15–1.29)
CALCIUM SERPL-MCNC: 7.4 MG/DL — LOW (ref 8.4–10.5)
CALCIUM SERPL-MCNC: 8.9 MG/DL — SIGNIFICANT CHANGE UP (ref 8.4–10.5)
CHLORIDE SERPL-SCNC: 105 MMOL/L — SIGNIFICANT CHANGE UP (ref 98–107)
CHLORIDE SERPL-SCNC: 95 MMOL/L — LOW (ref 98–107)
CO2 SERPL-SCNC: 22 MMOL/L — SIGNIFICANT CHANGE UP (ref 22–31)
CO2 SERPL-SCNC: 24 MMOL/L — SIGNIFICANT CHANGE UP (ref 22–31)
CREAT SERPL-MCNC: 0.88 MG/DL — SIGNIFICANT CHANGE UP (ref 0.5–1.3)
CREAT SERPL-MCNC: 1.1 MG/DL — SIGNIFICANT CHANGE UP (ref 0.5–1.3)
EOSINOPHIL # BLD AUTO: 0.15 K/UL — SIGNIFICANT CHANGE UP (ref 0–0.5)
EOSINOPHIL NFR BLD AUTO: 2.3 % — SIGNIFICANT CHANGE UP (ref 0–6)
GLUCOSE BLDA-MCNC: 234 MG/DL — HIGH (ref 70–99)
GLUCOSE SERPL-MCNC: 235 MG/DL — HIGH (ref 70–99)
GLUCOSE SERPL-MCNC: 390 MG/DL — HIGH (ref 70–99)
HCO3 BLDA-SCNC: 24 MMOL/L — SIGNIFICANT CHANGE UP (ref 22–26)
HCT VFR BLD CALC: 29.8 % — LOW (ref 34.5–45)
HCT VFR BLD CALC: 31.1 % — LOW (ref 34.5–45)
HCT VFR BLD CALC: 33 % — LOW (ref 34.5–45)
HCT VFR BLD CALC: 34.6 % — SIGNIFICANT CHANGE UP (ref 34.5–45)
HCT VFR BLDA CALC: 33.5 % — LOW (ref 34.5–46.5)
HGB BLD-MCNC: 10.2 G/DL — LOW (ref 11.5–15.5)
HGB BLD-MCNC: 10.7 G/DL — LOW (ref 11.5–15.5)
HGB BLD-MCNC: 10.8 G/DL — LOW (ref 11.5–15.5)
HGB BLD-MCNC: 9.4 G/DL — LOW (ref 11.5–15.5)
HGB BLDA-MCNC: 10.9 G/DL — LOW (ref 11.5–15.5)
IMM GRANULOCYTES # BLD AUTO: 0.02 # — SIGNIFICANT CHANGE UP
IMM GRANULOCYTES NFR BLD AUTO: 0.3 % — SIGNIFICANT CHANGE UP (ref 0–1.5)
INR BLD: 1 — SIGNIFICANT CHANGE UP (ref 0.88–1.17)
LACTATE BLDA-SCNC: 2.4 MMOL/L — HIGH (ref 0.5–2)
LYMPHOCYTES # BLD AUTO: 2.84 K/UL — SIGNIFICANT CHANGE UP (ref 1–3.3)
LYMPHOCYTES # BLD AUTO: 43.6 % — SIGNIFICANT CHANGE UP (ref 13–44)
MAGNESIUM SERPL-MCNC: 1.5 MG/DL — LOW (ref 1.6–2.6)
MCHC RBC-ENTMCNC: 28 PG — SIGNIFICANT CHANGE UP (ref 27–34)
MCHC RBC-ENTMCNC: 28.1 PG — SIGNIFICANT CHANGE UP (ref 27–34)
MCHC RBC-ENTMCNC: 28.2 PG — SIGNIFICANT CHANGE UP (ref 27–34)
MCHC RBC-ENTMCNC: 28.8 PG — SIGNIFICANT CHANGE UP (ref 27–34)
MCHC RBC-ENTMCNC: 30.9 % — LOW (ref 32–36)
MCHC RBC-ENTMCNC: 31.5 % — LOW (ref 32–36)
MCHC RBC-ENTMCNC: 32.7 % — SIGNIFICANT CHANGE UP (ref 32–36)
MCHC RBC-ENTMCNC: 32.8 % — SIGNIFICANT CHANGE UP (ref 32–36)
MCV RBC AUTO: 85.7 FL — SIGNIFICANT CHANGE UP (ref 80–100)
MCV RBC AUTO: 87.9 FL — SIGNIFICANT CHANGE UP (ref 80–100)
MCV RBC AUTO: 89.5 FL — SIGNIFICANT CHANGE UP (ref 80–100)
MCV RBC AUTO: 90.6 FL — SIGNIFICANT CHANGE UP (ref 80–100)
MONOCYTES # BLD AUTO: 0.46 K/UL — SIGNIFICANT CHANGE UP (ref 0–0.9)
MONOCYTES NFR BLD AUTO: 7.1 % — SIGNIFICANT CHANGE UP (ref 2–14)
NEUTROPHILS # BLD AUTO: 3 K/UL — SIGNIFICANT CHANGE UP (ref 1.8–7.4)
NEUTROPHILS NFR BLD AUTO: 46.1 % — SIGNIFICANT CHANGE UP (ref 43–77)
NRBC # FLD: 0 — SIGNIFICANT CHANGE UP
PCO2 BLDA: 40 MMHG — SIGNIFICANT CHANGE UP (ref 32–48)
PH BLDA: 7.39 PH — SIGNIFICANT CHANGE UP (ref 7.35–7.45)
PHOSPHATE SERPL-MCNC: 2.2 MG/DL — LOW (ref 2.5–4.5)
PLATELET # BLD AUTO: 142 K/UL — LOW (ref 150–400)
PLATELET # BLD AUTO: 156 K/UL — SIGNIFICANT CHANGE UP (ref 150–400)
PLATELET # BLD AUTO: 203 K/UL — SIGNIFICANT CHANGE UP (ref 150–400)
PLATELET # BLD AUTO: 205 K/UL — SIGNIFICANT CHANGE UP (ref 150–400)
PMV BLD: 10 FL — SIGNIFICANT CHANGE UP (ref 7–13)
PMV BLD: 10 FL — SIGNIFICANT CHANGE UP (ref 7–13)
PMV BLD: 10.1 FL — SIGNIFICANT CHANGE UP (ref 7–13)
PMV BLD: 10.7 FL — SIGNIFICANT CHANGE UP (ref 7–13)
PO2 BLDA: 113 MMHG — HIGH (ref 83–108)
POTASSIUM BLDA-SCNC: 3.5 MMOL/L — SIGNIFICANT CHANGE UP (ref 3.4–4.5)
POTASSIUM SERPL-MCNC: 3.5 MMOL/L — SIGNIFICANT CHANGE UP (ref 3.5–5.3)
POTASSIUM SERPL-MCNC: 3.6 MMOL/L — SIGNIFICANT CHANGE UP (ref 3.5–5.3)
POTASSIUM SERPL-SCNC: 3.5 MMOL/L — SIGNIFICANT CHANGE UP (ref 3.5–5.3)
POTASSIUM SERPL-SCNC: 3.6 MMOL/L — SIGNIFICANT CHANGE UP (ref 3.5–5.3)
PROT SERPL-MCNC: 7.9 G/DL — SIGNIFICANT CHANGE UP (ref 6–8.3)
PROTHROM AB SERPL-ACNC: 11.1 SEC — SIGNIFICANT CHANGE UP (ref 9.8–13.1)
RBC # BLD: 3.33 M/UL — LOW (ref 3.8–5.2)
RBC # BLD: 3.54 M/UL — LOW (ref 3.8–5.2)
RBC # BLD: 3.82 M/UL — SIGNIFICANT CHANGE UP (ref 3.8–5.2)
RBC # BLD: 3.85 M/UL — SIGNIFICANT CHANGE UP (ref 3.8–5.2)
RBC # FLD: 14.3 % — SIGNIFICANT CHANGE UP (ref 10.3–14.5)
RBC # FLD: 14.6 % — HIGH (ref 10.3–14.5)
RBC # FLD: 14.6 % — HIGH (ref 10.3–14.5)
RBC # FLD: 14.7 % — HIGH (ref 10.3–14.5)
RH IG SCN BLD-IMP: POSITIVE — SIGNIFICANT CHANGE UP
RH IG SCN BLD-IMP: POSITIVE — SIGNIFICANT CHANGE UP
SAO2 % BLDA: 98.7 % — SIGNIFICANT CHANGE UP (ref 95–99)
SODIUM BLDA-SCNC: 141 MMOL/L — SIGNIFICANT CHANGE UP (ref 136–146)
SODIUM SERPL-SCNC: 136 MMOL/L — SIGNIFICANT CHANGE UP (ref 135–145)
SODIUM SERPL-SCNC: 141 MMOL/L — SIGNIFICANT CHANGE UP (ref 135–145)
WBC # BLD: 6.04 K/UL — SIGNIFICANT CHANGE UP (ref 3.8–10.5)
WBC # BLD: 6.51 K/UL — SIGNIFICANT CHANGE UP (ref 3.8–10.5)
WBC # BLD: 8.35 K/UL — SIGNIFICANT CHANGE UP (ref 3.8–10.5)
WBC # BLD: 9.26 K/UL — SIGNIFICANT CHANGE UP (ref 3.8–10.5)
WBC # FLD AUTO: 6.04 K/UL — SIGNIFICANT CHANGE UP (ref 3.8–10.5)
WBC # FLD AUTO: 6.51 K/UL — SIGNIFICANT CHANGE UP (ref 3.8–10.5)
WBC # FLD AUTO: 8.35 K/UL — SIGNIFICANT CHANGE UP (ref 3.8–10.5)
WBC # FLD AUTO: 9.26 K/UL — SIGNIFICANT CHANGE UP (ref 3.8–10.5)

## 2018-07-28 PROCEDURE — 76937 US GUIDE VASCULAR ACCESS: CPT | Mod: 26

## 2018-07-28 PROCEDURE — 36620 INSERTION CATHETER ARTERY: CPT

## 2018-07-28 PROCEDURE — 99291 CRITICAL CARE FIRST HOUR: CPT

## 2018-07-28 PROCEDURE — 74174 CTA ABD&PLVS W/CONTRAST: CPT | Mod: 26

## 2018-07-28 RX ORDER — LEVOTHYROXINE SODIUM 125 MCG
200 TABLET ORAL DAILY
Qty: 0 | Refills: 0 | Status: DISCONTINUED | OUTPATIENT
Start: 2018-07-28 | End: 2018-07-28

## 2018-07-28 RX ORDER — CHLORHEXIDINE GLUCONATE 213 G/1000ML
1 SOLUTION TOPICAL
Qty: 0 | Refills: 0 | Status: DISCONTINUED | OUTPATIENT
Start: 2018-07-28 | End: 2018-07-30

## 2018-07-28 RX ORDER — MAGNESIUM SULFATE 500 MG/ML
2 VIAL (ML) INJECTION ONCE
Qty: 0 | Refills: 0 | Status: COMPLETED | OUTPATIENT
Start: 2018-07-28 | End: 2018-07-28

## 2018-07-28 RX ORDER — SODIUM CHLORIDE 9 MG/ML
1000 INJECTION INTRAMUSCULAR; INTRAVENOUS; SUBCUTANEOUS ONCE
Qty: 0 | Refills: 0 | Status: COMPLETED | OUTPATIENT
Start: 2018-07-28 | End: 2018-07-28

## 2018-07-28 RX ORDER — LEVOTHYROXINE SODIUM 125 MCG
100 TABLET ORAL AT BEDTIME
Qty: 0 | Refills: 0 | Status: DISCONTINUED | OUTPATIENT
Start: 2018-07-28 | End: 2018-07-29

## 2018-07-28 RX ORDER — PANTOPRAZOLE SODIUM 20 MG/1
40 TABLET, DELAYED RELEASE ORAL
Qty: 0 | Refills: 0 | Status: DISCONTINUED | OUTPATIENT
Start: 2018-07-28 | End: 2018-07-28

## 2018-07-28 RX ORDER — PANTOPRAZOLE SODIUM 20 MG/1
40 TABLET, DELAYED RELEASE ORAL DAILY
Qty: 0 | Refills: 0 | Status: DISCONTINUED | OUTPATIENT
Start: 2018-07-28 | End: 2018-07-29

## 2018-07-28 RX ORDER — POTASSIUM PHOSPHATE, MONOBASIC POTASSIUM PHOSPHATE, DIBASIC 236; 224 MG/ML; MG/ML
15 INJECTION, SOLUTION INTRAVENOUS ONCE
Qty: 0 | Refills: 0 | Status: COMPLETED | OUTPATIENT
Start: 2018-07-28 | End: 2018-07-28

## 2018-07-28 RX ORDER — POTASSIUM PHOSPHATE, MONOBASIC POTASSIUM PHOSPHATE, DIBASIC 236; 224 MG/ML; MG/ML
30 INJECTION, SOLUTION INTRAVENOUS ONCE
Qty: 0 | Refills: 0 | Status: DISCONTINUED | OUTPATIENT
Start: 2018-07-28 | End: 2018-07-28

## 2018-07-28 RX ORDER — ONDANSETRON 8 MG/1
4 TABLET, FILM COATED ORAL ONCE
Qty: 0 | Refills: 0 | Status: COMPLETED | OUTPATIENT
Start: 2018-07-28 | End: 2018-07-28

## 2018-07-28 RX ORDER — SODIUM CHLORIDE 9 MG/ML
1000 INJECTION, SOLUTION INTRAVENOUS
Qty: 0 | Refills: 0 | Status: DISCONTINUED | OUTPATIENT
Start: 2018-07-28 | End: 2018-07-29

## 2018-07-28 RX ORDER — POTASSIUM CHLORIDE 20 MEQ
10 PACKET (EA) ORAL ONCE
Qty: 0 | Refills: 0 | Status: COMPLETED | OUTPATIENT
Start: 2018-07-28 | End: 2018-07-28

## 2018-07-28 RX ADMIN — Medication 50 GRAM(S): at 16:59

## 2018-07-28 RX ADMIN — SODIUM CHLORIDE 1000 MILLILITER(S): 9 INJECTION INTRAMUSCULAR; INTRAVENOUS; SUBCUTANEOUS at 09:00

## 2018-07-28 RX ADMIN — CHLORHEXIDINE GLUCONATE 1 APPLICATION(S): 213 SOLUTION TOPICAL at 22:32

## 2018-07-28 RX ADMIN — SODIUM CHLORIDE 120 MILLILITER(S): 9 INJECTION, SOLUTION INTRAVENOUS at 15:31

## 2018-07-28 RX ADMIN — PANTOPRAZOLE SODIUM 40 MILLIGRAM(S): 20 TABLET, DELAYED RELEASE ORAL at 16:28

## 2018-07-28 RX ADMIN — SODIUM CHLORIDE 120 MILLILITER(S): 9 INJECTION, SOLUTION INTRAVENOUS at 21:03

## 2018-07-28 RX ADMIN — Medication 100 MICROGRAM(S): at 21:37

## 2018-07-28 RX ADMIN — Medication 100 MILLIEQUIVALENT(S): at 16:59

## 2018-07-28 RX ADMIN — POTASSIUM PHOSPHATE, MONOBASIC POTASSIUM PHOSPHATE, DIBASIC 62.5 MILLIMOLE(S): 236; 224 INJECTION, SOLUTION INTRAVENOUS at 18:33

## 2018-07-28 RX ADMIN — ONDANSETRON 4 MILLIGRAM(S): 8 TABLET, FILM COATED ORAL at 12:19

## 2018-07-28 RX ADMIN — SODIUM CHLORIDE 500 MILLILITER(S): 9 INJECTION INTRAMUSCULAR; INTRAVENOUS; SUBCUTANEOUS at 08:13

## 2018-07-28 NOTE — H&P ADULT - ASSESSMENT
63 yo F admitted with lower GI bleed, briefly bradycardic and altered in ED s/p multiple transfusions, admitted to SICU. CT angio showing active hemorrhage in the descending colon.    PLAN:  -admit to SICU  -consult IR, GI for intervention  -cont resuscitation, fluids and blood products as needed  -close hemodynamic monitoring  -Echocardiogram when stable

## 2018-07-28 NOTE — ED ADULT NURSE NOTE - OBJECTIVE STATEMENT
Pt. with hx of diverticulitis had 4 episode of bloody diarrhea this morning. c/o dizziness and near syncope today.  Pt. with hx of blood transfusion.

## 2018-07-28 NOTE — ED PROVIDER NOTE - NS ED MD EM SELECTION
Spine appears normal, range of motion is not limited, no muscle or joint tenderness
[address] Critical Care - 30 to 74 minutes

## 2018-07-28 NOTE — ED PROVIDER NOTE - PROGRESS NOTE DETAILS
Walker PGY5: Patient with additional large bloody BMs with clots. Asymptomatic. Repeat CBC sent, CT read pending. Surgery consulted given continued large bloody BMs. SURINDER Rosa: Spoke with radiology, patient has acute GI bleed in proximal descending colon. PRBCs x 2  units ordered, spoke with surgery and informed of results, awaiting to speak to Dr. Beach. Pt currently stable. patient with 10th bedpan full of blood with clots. becoming altered, with bradycardia into low 40's  placed on NRB crash cart to room and defib pads to patient. 3rd IV placed, 2L bolus saline initiated while O neg bld obtained from Bld Bnk and mass transfuser set up. ICU team called to bedside, sgy cx recalled to bedside. after 1 unit of blood and 2 L ivf patient responding to resuscitation. HR improved to 80's, mentating well, d/w Dr Beltre, will transfer to ICU. due to antibodies will wait on further blood administration until cross matched unless status deteriorates

## 2018-07-28 NOTE — PROCEDURE NOTE - NSPROCDETAILS_GEN_ALL_CORE
positive blood return obtained via catheter/location identified, draped/prepped, sterile technique used, needle inserted/introduced/connected to a pressurized flush line/sutured in place/ultrasound guidance/Seldinger technique/all materials/supplies accounted for at end of procedure
connected to a pressurized flush line/hemostasis with direct pressure, dressing applied/ultrasound guidance/positive blood return obtained via catheter/Seldinger technique/all materials/supplies accounted for at end of procedure/location identified, draped/prepped, sterile technique used, needle inserted/introduced/sutured in place

## 2018-07-28 NOTE — ED PROVIDER NOTE - ATTENDING CONTRIBUTION TO CARE
63 y/o F with h/o HTN, hypothyroidism, previous diverticular bleed requiring transfusion (Surgeon Dr Beach) not on any antiplatelet or anticoagulant therapy here with BRBPR x 3 tonight.  Sxs began around 3am.  Pt had 3 episodes of jarek blood per rectum, another episode here in ER.  No fever, chills, cp, sob, abd pain.  Pt currently reporting lightheadedness.  Obese, lying comfortably in stretcher, awake and alert, nontoxic.  VSS, hypertensive.  NCAT, no conjunctival pallor.  Lungs cta bl.  Cards nl S1/S2, RRR, no MRG.  Abd soft ntnd, jarek blood per rectum.  Sxs concerning for lower gib, likely diverticular in origin.  Plan for labs, incl h/h, type to eval for emergent need to transfusion, CTA r/o active bleed, admit.

## 2018-07-28 NOTE — CONSULT NOTE ADULT - ASSESSMENT
ASSESSMENT:  62y female, with a PMH of HTN, hypothyroidism, GERD, recurrent lower GI bleeds >5 episodes over the past 3 years necessitating multiple transfusions in the past), last colonoscopy by Dr Magallanes (5/16) showed diverticulosis and EGD showed gastritis, presents with multiple bloody bowel movements x 1 day.   In the ED, the patient continued to have multiple bloody bowel movements. She was transfused 1L crystalloid and 1u pRBC. She briefly became bradycardic and altered per ED team. Transfused another unit of pRBC and admitted to the SICU.    PLAN:    Neurologic:     Respiratory:     Cardiovascular:   - Hold home meds Ramipril and Norvasc for now    Gastrointestinal/Nutrition:   - NPO    Renal/Genitourinary:   - LR @ 120  - Simpson, for close UOP monitoring  - hypophosphatemia, hypomagnesemia, and borderline hypokalemia noted. s/p repletion of K+, mag, and phos.   -     Hematologic: lower GI bleed  - s/p 1L crystalloid and 1 unit PRBC in ED, and 1 unit PRBC in SICU  - F/u results of repeat CBC  - Transfuse as needed    Infectious Disease:   - No acute concerns    Tubes/Lines/Drains:   Left radial a-line, Tatiana, PIV    Endocrine: hypothyroidism  - Continue home med Synthroid 200mg qd    Disposition:     --------------------------------------------------------------------------------------    Critical Care Diagnoses: ASSESSMENT:  62y female, with a history of recurrent lower GI bleeds secondary to diverticulosis, presents with multiple bloody bowel movements. In the ED, the patient continued to have multiple bloody bowel movements, and despite being transfused 1L crystalloid and 1u pRBC in the ED, she became bradycardic. SICU was consulted for hemodynamic instability in the setting of an active bleed.    PLAN:    Neurologic:   - Avoid narcotics, avoid sedation  - Monitor for changes in mental status    Respiratory:   - Breathing comfortably, satting 100% on room air, no acute concerns    Cardiovascular: HTN  - A-line for close hemodynamic monitoring  - Hold home meds Ramipril and Norvasc for now since pt only had partial response to the 2 transfused PRBCs  - Repeat CBC 9pm  - H/H stable and pt continues to be hypertensive, consider restarting home meds    Gastrointestinal/Nutrition: lower GI bleed  - NPO  - Consult GI, request EGD/colonoscopy    Renal/Genitourinary:   - LR @ 120  - Simpson, for close UOP monitoring  - hypophosphatemia, hypomagnesemia, and borderline hypokalemia noted. s/p repletion of K+, mag, and phos  - F/u repeat CMP    Hematologic: lower GI bleed  - s/p 1L crystalloid and 1 unit PRBC in ED, and 1 unit PRBC in SICU  - F/u results of repeat CBC  - Transfuse as needed    Infectious Disease:   - No acute concerns    Tubes/Lines/Drains:   Left radial a-line, Simpson, PIV    Endocrine: hypothyroidism  - Continue home med Synthroid 200mg qd    Disposition: SICU

## 2018-07-28 NOTE — H&P ADULT - ATTENDING COMMENTS
I have personally interviewed and examined this patient, reviewed pertinent labs and imaging, and discussed the case with colleagues, residents, and physician assistants on B Team rounds.    Plan  to SICU  serial cbc, transfuse as needed  GI consult  IR consult - active extrav on CTA, if persistent bleeding may require IR embolization  NPO  mechanical dvt prophylaxis    The Acute Care Surgery (B Team) Attending Group Practice:  Dr. Endy Villanueva, Dr. Maicol Hirsch, Dr. Tyree Braxton, Dr. Bridgett Adamson, Dr. Zion Torres    urgent issues - spectra [id number] or [id number]  nonurgent issues - [phone]  patient appointments or afterhours - [phone]

## 2018-07-28 NOTE — ED ADULT TRIAGE NOTE - CHIEF COMPLAINT QUOTE
Pt walk in c/o x2 episode of Bloody Stool since 330am today. PMhx of Diverticulitis. Denies N V Diarrhea. No Blood Thinners

## 2018-07-28 NOTE — ED PROVIDER NOTE - MEDICAL DECISION MAKING DETAILS
Pt with BRBPR. Concern for lower GIB, likely diverticular bleed. Plan: labs, coags, type and screen, CT angio, anticipate admission for possible transfusion/GI workup.

## 2018-07-28 NOTE — H&P ADULT - NSHPPHYSICALEXAM_GEN_ALL_CORE
GEN: NAD, resting quietly  PULM: symmetric chest rise bilaterally, no increased WOB  CV: regular rate, peripheral pulses intact  ABD: soft, NTND  RECTAL: scant melenic stool, no masses palpated  EXTR: no cyanosis or edema, moving all extremities

## 2018-07-28 NOTE — H&P ADULT - HISTORY OF PRESENT ILLNESS
61 yo F with a PMH of GERD, HTN, diverticulosis, and multiple previous lower GI bleeds presents with multiple bloody bowel movements since this morning. Patient reports 9 in total. She reported an episode of light-headedness at home which spontaneously resolved. She denies any abdominal pain, fever, chills, N/V. She reports regular bowel habits before this episode.     In the ED, the patient continued to have multiple bloody bowel movements. She was transfused 1L crystalloid and 1u pRBC. She briefly became bradycardic and altered per ED team. Transfused another unit of pRBC and admitted to the SICU.

## 2018-07-28 NOTE — ED PROVIDER NOTE - PHYSICAL EXAMINATION
Gen: Well appearing, well nourished, awake, alert, oriented to person, place, time/situation and in no apparent distress.  ENMT: Airway patent. Moist mucous membranes.  Cardiac: Normal rate, regular rhythm.  Heart sounds S1, S2.  Respiratory: Breath sounds clear and equal bilaterally. No wheezes/rales/rhonchi.  Abdomen: Abdomen soft, non-distended, non-tender, no guarding.  Musculoskeletal: Atraumatic. No vascular compromise. No CVAT.  Neuro: Alert, follows commands. Speech is clear, fluent, and appropriate. Moving all extremities spontaneously.  Skin: Skin normal color for race, warm, dry and intact. No evidence of rash.

## 2018-07-28 NOTE — H&P ADULT - NSHPLABSRESULTS_GEN_ALL_CORE
CBC (07-28 @ 15:12)                              10.8<L>                         9.26    )----------------(  156        --    % Neutrophils, --    % Lymphocytes, ANC: --                                  33.0<L>              CBC (07-28 @ 10:25)                              9.4<L>                         6.04    )----------------(  203        --    % Neutrophils, --    % Lymphocytes, ANC: --                                  29.8<L>                BMP (07-28 @ 15:12)             141     |  105     |  11    		Ca++ --      Ca 7.4<L>             ---------------------------------( 235<H>		Mg 1.5<L>             3.6     |  22      |  0.88  			Ph 2.2<L>  BMP (07-28 @ 06:45)             136     |  95<L>   |  15    		Ca++ --      Ca 8.9                ---------------------------------( 390<H>		Mg --                 3.5     |  24      |  1.10  			Ph --        LFTs (07-28 @ 06:45)      TPro 7.9 / Alb 4.2 / TBili 0.4 / DBili -- / AST 28 / ALT 27 / AlkPhos 95    Coags (07-28 @ 06:45)  aPTT 22.7<L> / INR 1.00 / PT 11.1    ABG (07-28 @ 15:12)     7.39 / 40 / 113<H> / 24 / -0.6 / 98.7%     Lactate: 2.4<H>        IMAGING:  Acute GI bleed within the proximal descending colon with active   extravasation of contrast.  Colonic diverticulosis without evidence of acute diverticulitis.    Small to moderate pericardial effusion minimally more prominent than on   CT examination 4/24/2017.

## 2018-07-28 NOTE — ED PROVIDER NOTE - OBJECTIVE STATEMENT
62F h/o diverticulosis and lower GIB p/w BRBPR x 3 starting 3 hours ago. +mild dizziness. No CP, SOB, syncope, abd pain, N/V, or urinary symptoms. Pt has required blood transfusions for GIB in the past.  Surgeon: [place]

## 2018-07-28 NOTE — CONSULT NOTE ADULT - SUBJECTIVE AND OBJECTIVE BOX
SICU Consultation Note  =====================================================  HPI: 62y female, with a PMH of HTN, hypothyroidism, GERD, recurrent lower GI bleeds >5 episodes over the past 3 years necessitating multiple transfusions in the past)       Surgery Information  ***  Case Duration:      EBL:       IV Fluids:       Blood Products:         Complications:        HISTORY  62y Female  Allergies:   PAST MEDICAL & SURGICAL HISTORY:  Lower gastrointestinal bleeding: 4x 2014- 2016  HTN (hypertension)  Diverticulosis  Hypothyroidism  H/O abdominal hysterectomy  History of back surgery  History of tonsillectomy    FAMILY HISTORY:  No pertinent family history in first degree relatives      SOCIAL HISTORY:  ***    ADVANCE DIRECTIVES: Presumed Full Code  ***    REVIEW OF SYSTEMS:  ***  General: Non-Contributory  Skin/Breast: Non-Contributory  Ophthalmologic: Non-Contributory  ENMT: Non-Contributory  Respiratory and Thorax: Non-Contributory  Cardiovascular: Non-Contributory  Gastrointestinal: Non-Contributory  Genitourinary: Non-Contributory  Musculoskeletal: Non-Contributory  Neurological: Non-Contributory  Psychiatric: Non-Contributory  Hematology/Lymphatics: Non-Contributory  Endocrine: Non-Contributory  Allergic/Immunologic: Non-Contributory    HOME MEDICATIONS:  ***    CURRENT MEDICATIONS:   --------------------------------------------------------------------------------------  Neurologic Medications    Respiratory Medications    Cardiovascular Medications    Gastrointestinal Medications  lactated ringers. 1000 milliLiter(s) IV Continuous <Continuous>  magnesium sulfate  IVPB 2 Gram(s) IV Intermittent once  pantoprazole  Injectable 40 milliGRAM(s) IV Push daily  potassium phosphate IVPB 30 milliMole(s) IV Intermittent once    Genitourinary Medications    Hematologic/Oncologic Medications    Antimicrobial/Immunologic Medications    Endocrine/Metabolic Medications  levothyroxine Injectable 100 MICROGram(s) IV Push at bedtime    Topical/Other Medications  chlorhexidine 4% Liquid 1 Application(s) Topical <User Schedule>    --------------------------------------------------------------------------------------    VITAL SIGNS, INS/OUTS (last 24 hours):  --------------------------------------------------------------------------------------  ((Insert SICU Vitals / Is+Os here)) ***  --------------------------------------------------------------------------------------    EXAM  NEUROLOGY  RASS:   	GCS:    Exam: Normal, NAD, alert, oriented x 3, no focal deficits. ***    HEENT  Exam: Normocephalic, atraumatic.  EOMI ***    RESPIRATORY  Exam: Lungs clear to auscultation, Normal expansion/effort.  ***  Mechanical Ventilation:     CARDIOVASCULAR  Exam: S1, S2.  Regular rate and rhythm.  Peripheral edema  ***    GI/NUTRITION  Exam: Abdomen soft, Non-tender, Non-distended.  Gastrostomy / Jejunostomy / Nasogastric tube in place.  Colostomy / Ileostomy.  ***  Wound:   ***  Current Diet:  NPO ***    VASCULAR  Exam: Extremities warm, pink, well-perfused.  ***    MUSCULOSKELETAL  Exam: All extremities moving spontaneously without limitations.  ***    SKIN:  Exam: Good skin turgor, no skin breakdown.  ***    METABOLIC/FLUIDS/ELECTROLYTES  lactated ringers. 1000 milliLiter(s) IV Continuous <Continuous>  magnesium sulfate  IVPB 2 Gram(s) IV Intermittent once  potassium phosphate IVPB 30 milliMole(s) IV Intermittent once      HEMATOLOGIC  [x] DVT Prophylaxis:   Transfusions:	[] PRBC	[] Platelets		[] FFP	[] Cryoprecipitate    INFECTIOUS DISEASE  Antimicrobials/Immunologic Medications:    Day #  	of    ***    Tubes/Lines/Drains  ***  [x] Peripheral IV  [] Central Venous Line     	[] R	[] L	[] IJ	[] Fem	[] SC	Date Placed:   [] Arterial Line		[] R	[] L	[] Fem	[] Rad	[] Ax	Date Placed:   [] PICC:         	[] Midline		[] Mediport  [] Urinary Catheter		Date Placed:     LABS  --------------------------------------------------------------------------------------  ((Insert SICU Labs here))***  --------------------------------------------------------------------------------------    OTHER LABS    IMAGING RESULTS  Echo:   CT:   Xray:     ASSESSMENT:  62y Female ***    PLAN:  ***  Neurologic:   Respiratory:   Cardiovascular:   Gastrointestinal/Nutrition:   Renal/Genitourinary:   Hematologic:   Infectious Disease:   Tubes/Lines/Drains:   Endocrine:   Disposition:     --------------------------------------------------------------------------------------    Critical Care Diagnoses: SICU Consultation Note  =====================================================  HPI: 62y female, with a PMH of HTN, hypothyroidism, GERD, recurrent lower GI bleeds >5 episodes over the past 3 years necessitating multiple transfusions in the past), last colonoscopy by Dr Magallanes (5/16) showed diverticulosis and EGD showed gastritis, presents with multiple bloody bowel movements since this morning. Patient reports 9 in total. She reported an episode of light-headedness at home which spontaneously resolved. She denies any abdominal pain, fever, chills, N/V. She reports regular bowel habits before this episode.     In the ED, the patient continued to have multiple bloody bowel movements. She was transfused 1L crystalloid and 1u pRBC. She briefly became bradycardic and altered per ED team. Transfused another unit of pRBC and admitted to the SICU.         Surgery Information  ***  Case Duration:      EBL:       IV Fluids:       Blood Products:         Complications:        HISTORY  62y Female  Allergies:   PAST MEDICAL & SURGICAL HISTORY:  Lower gastrointestinal bleeding: 4x 2014- 2016  HTN (hypertension)  Diverticulosis  Hypothyroidism  H/O abdominal hysterectomy  History of back surgery  History of tonsillectomy    FAMILY HISTORY:  No pertinent family history in first degree relatives      SOCIAL HISTORY:  ***    ADVANCE DIRECTIVES: Presumed Full Code  ***    REVIEW OF SYSTEMS:  ***  General: Non-Contributory  Skin/Breast: Non-Contributory  Ophthalmologic: Non-Contributory  ENMT: Non-Contributory  Respiratory and Thorax: Non-Contributory  Cardiovascular: Non-Contributory  Gastrointestinal: Non-Contributory  Genitourinary: Non-Contributory  Musculoskeletal: Non-Contributory  Neurological: Non-Contributory  Psychiatric: Non-Contributory  Hematology/Lymphatics: Non-Contributory  Endocrine: Non-Contributory  Allergic/Immunologic: Non-Contributory    HOME MEDICATIONS:  ***    CURRENT MEDICATIONS:   --------------------------------------------------------------------------------------  Neurologic Medications    Respiratory Medications    Cardiovascular Medications    Gastrointestinal Medications  lactated ringers. 1000 milliLiter(s) IV Continuous <Continuous>  magnesium sulfate  IVPB 2 Gram(s) IV Intermittent once  pantoprazole  Injectable 40 milliGRAM(s) IV Push daily  potassium phosphate IVPB 30 milliMole(s) IV Intermittent once    Genitourinary Medications    Hematologic/Oncologic Medications    Antimicrobial/Immunologic Medications    Endocrine/Metabolic Medications  levothyroxine Injectable 100 MICROGram(s) IV Push at bedtime    Topical/Other Medications  chlorhexidine 4% Liquid 1 Application(s) Topical <User Schedule>    --------------------------------------------------------------------------------------    VITAL SIGNS, INS/OUTS (last 24 hours):  --------------------------------------------------------------------------------------  ((Insert SICU Vitals / Is+Os here)) ***  --------------------------------------------------------------------------------------    EXAM  NEUROLOGY  RASS:   	GCS:    Exam: Normal, NAD, alert, oriented x 3, no focal deficits. ***    HEENT  Exam: Normocephalic, atraumatic.  EOMI ***    RESPIRATORY  Exam: Lungs clear to auscultation, Normal expansion/effort.  ***  Mechanical Ventilation:     CARDIOVASCULAR  Exam: S1, S2.  Regular rate and rhythm.  Peripheral edema  ***    GI/NUTRITION  Exam: Abdomen soft, Non-tender, Non-distended.  Gastrostomy / Jejunostomy / Nasogastric tube in place.  Colostomy / Ileostomy.  ***  Wound:   ***  Current Diet:  NPO ***    VASCULAR  Exam: Extremities warm, pink, well-perfused.  ***    MUSCULOSKELETAL  Exam: All extremities moving spontaneously without limitations.  ***    SKIN:  Exam: Good skin turgor, no skin breakdown.  ***    METABOLIC/FLUIDS/ELECTROLYTES  lactated ringers. 1000 milliLiter(s) IV Continuous <Continuous>  magnesium sulfate  IVPB 2 Gram(s) IV Intermittent once  potassium phosphate IVPB 30 milliMole(s) IV Intermittent once      HEMATOLOGIC  [x] DVT Prophylaxis:   Transfusions:	[] PRBC	[] Platelets		[] FFP	[] Cryoprecipitate    INFECTIOUS DISEASE  Antimicrobials/Immunologic Medications:    Day #  	of    ***    Tubes/Lines/Drains  ***  [x] Peripheral IV  [] Central Venous Line     	[] R	[] L	[] IJ	[] Fem	[] SC	Date Placed:   [] Arterial Line		[] R	[] L	[] Fem	[] Rad	[] Ax	Date Placed:   [] PICC:         	[] Midline		[] Mediport  [] Urinary Catheter		Date Placed:     LABS  --------------------------------------------------------------------------------------  ((Insert SICU Labs here))***  --------------------------------------------------------------------------------------    OTHER LABS    IMAGING RESULTS  Echo:   CT:   Xray:     ASSESSMENT:  62y Female ***    PLAN:  ***  Neurologic:   Respiratory:   Cardiovascular:   Gastrointestinal/Nutrition:   Renal/Genitourinary:   Hematologic:   Infectious Disease:   Tubes/Lines/Drains:   Endocrine:   Disposition:     --------------------------------------------------------------------------------------    Critical Care Diagnoses: SICU Consultation Note  =====================================================  HPI: 62y female, with a PMH of HTN, hypothyroidism, GERD, recurrent lower GI bleeds >5 episodes over the past 3 years necessitating multiple transfusions in the past), last colonoscopy by Dr Magallanes (5/16) showed diverticulosis and EGD showed gastritis, presents with multiple bloody bowel movements since this morning. Patient reports 9 in total. She reported an episode of light-headedness at home which spontaneously resolved. She denies any abdominal pain, fever, chills, N/V. She reports regular bowel habits before this episode.     In the ED, the patient continued to have multiple bloody bowel movements. She was transfused 1L crystalloid and 1u pRBC. She briefly became bradycardic and altered per ED team. Admitted to the SICU for hemodynamic monitoring.      PAST MEDICAL & SURGICAL HISTORY:  Lower gastrointestinal bleeding: 4x 2014- 2016  HTN (hypertension)  Diverticulosis  Hypothyroidism  H/O abdominal hysterectomy  History of back surgery  History of tonsillectomy    FAMILY HISTORY:  No pertinent family history in first degree relatives      ADVANCE DIRECTIVES: Presumed Full Code  ***    REVIEW OF SYSTEMS:  ***  General: In no acute distress. Fatigued. Denies fever, chills.  HEENT: Feels light-headed  Respiratory and Thorax: Denies shortness of breath.  Cardiovascular: Denies chest pain, palpitations  Gastrointestinal: Endorses crampy abdominal pain prior to bowel movement  Genitourinary: Non-Contributory  Musculoskeletal: Non-Contributory  Neurological: Non-Contributory  Hematology/Lymphatics: Non-Contributory  Endocrine: Pt reports hypothyroidism well controlled with home med synthroid      HOME MEDS: (confirmed with pt, 7/28)  Synthroid 200mcg PO qd  Cyanocobalamin 1000 mcg PO qd  Ferrous Sulfate 325mg PO qd  Ramipril 10mg PO qd  Norvasc 10mg PO qd      CURRENT MEDICATIONS:   Neurologic Medications    Respiratory Medications    Cardiovascular Medications    Gastrointestinal Medications  lactated ringers. 1000 milliLiter(s) IV Continuous <Continuous>  magnesium sulfate  IVPB 2 Gram(s) IV Intermittent once  pantoprazole  Injectable 40 milliGRAM(s) IV Push daily  potassium phosphate IVPB 30 milliMole(s) IV Intermittent once    Genitourinary Medications    Hematologic/Oncologic Medications    Antimicrobial/Immunologic Medications    Endocrine/Metabolic Medications  levothyroxine Injectable 100 MICROGram(s) IV Push at bedtime    Topical/Other Medications  chlorhexidine 4% Liquid 1 Application(s) Topical <User Schedule>    --------------------------------------------------------------------------------------  VITAL SIGNS (last 24hrs):  T(F): 97.3 (28 Jul 2018 16:00), Max: 97.8 (28 Jul 2018 05:17)  HR: 80 (28 Jul 2018 17:00) (44 - 87)  BP: 136/81 (28 Jul 2018 14:30) (114/97 - 187/100)  BP(mean): 92 (28 Jul 2018 14:30) (82 - 92)  RR: 20 (28 Jul 2018 17:00) (16 - 24)  SpO2: 100% (28 Jul 2018 17:00) (97% - 100%)  --------------------------------------------------------------------------------------    EXAM  NEUROLOGY  Exam: Normal, NAD, alert, oriented x 3, no focal deficits.     HEENT  Exam: Normocephalic, atraumatic.  EOMI     RESPIRATORY  Exam: Lungs clear to auscultation, Normal expansion/effort.  Sat 100% on room air.    CARDIOVASCULAR  Exam: Regular rate and rhythm.     GI/NUTRITION  Exam: Abdomen soft, Non-tender, Non-distended.      VASCULAR  Exam: Distal upper extremities cool, palms pale.    MUSCULOSKELETAL  Exam: All extremities moving spontaneously without limitations.      SKIN:  Exam: Good skin turgor, no skin breakdown.      METABOLIC/FLUIDS/ELECTROLYTES  lactated ringers. 1000 milliLiter(s) IV Continuous <Continuous>  magnesium sulfate  IVPB 2 Gram(s) IV Intermittent once  potassium phosphate IVPB 30 milliMole(s) IV Intermittent once      HEMATOLOGIC  [x] DVT Prophylaxis:   Transfusions:	[x] PRBC	[] Platelets		[] FFP	[] Cryoprecipitate        Tubes/Lines/Drains  ***  [x] Peripheral IV  [] Central Venous Line     	[] R	[] L	[] IJ	[] Fem	[] SC	Date Placed:   [x] Arterial Line		[] R	[x] L	[] Fem	[x] Rad	[] Ax	Date Placed:   [] PICC:         	[] Midline		[] Mediport  [] Urinary Catheter		Date Placed:

## 2018-07-29 LAB
APTT BLD: 25 SEC — LOW (ref 27.5–37.4)
BUN SERPL-MCNC: 8 MG/DL — SIGNIFICANT CHANGE UP (ref 7–23)
CA-I BLD-SCNC: 1 MMOL/L — LOW (ref 1.03–1.23)
CALCIUM SERPL-MCNC: 7.4 MG/DL — LOW (ref 8.4–10.5)
CHLORIDE SERPL-SCNC: 107 MMOL/L — SIGNIFICANT CHANGE UP (ref 98–107)
CO2 SERPL-SCNC: 26 MMOL/L — SIGNIFICANT CHANGE UP (ref 22–31)
CREAT SERPL-MCNC: 0.91 MG/DL — SIGNIFICANT CHANGE UP (ref 0.5–1.3)
GLUCOSE SERPL-MCNC: 166 MG/DL — HIGH (ref 70–99)
HCT VFR BLD CALC: 29.9 % — LOW (ref 34.5–45)
HGB BLD-MCNC: 9.7 G/DL — LOW (ref 11.5–15.5)
INR BLD: 1.08 — SIGNIFICANT CHANGE UP (ref 0.88–1.17)
LACTATE SERPL-SCNC: 1.4 MMOL/L — SIGNIFICANT CHANGE UP (ref 0.5–2)
MAGNESIUM SERPL-MCNC: 2 MG/DL — SIGNIFICANT CHANGE UP (ref 1.6–2.6)
MCHC RBC-ENTMCNC: 28 PG — SIGNIFICANT CHANGE UP (ref 27–34)
MCHC RBC-ENTMCNC: 32.4 % — SIGNIFICANT CHANGE UP (ref 32–36)
MCV RBC AUTO: 86.4 FL — SIGNIFICANT CHANGE UP (ref 80–100)
NRBC # FLD: 0 — SIGNIFICANT CHANGE UP
PHOSPHATE SERPL-MCNC: 1.8 MG/DL — LOW (ref 2.5–4.5)
PLATELET # BLD AUTO: 140 K/UL — LOW (ref 150–400)
PMV BLD: 10.3 FL — SIGNIFICANT CHANGE UP (ref 7–13)
POTASSIUM SERPL-MCNC: 3.4 MMOL/L — LOW (ref 3.5–5.3)
POTASSIUM SERPL-SCNC: 3.4 MMOL/L — LOW (ref 3.5–5.3)
PROTHROM AB SERPL-ACNC: 12.4 SEC — SIGNIFICANT CHANGE UP (ref 9.8–13.1)
RBC # BLD: 3.46 M/UL — LOW (ref 3.8–5.2)
RBC # FLD: 15 % — HIGH (ref 10.3–14.5)
SODIUM SERPL-SCNC: 142 MMOL/L — SIGNIFICANT CHANGE UP (ref 135–145)
WBC # BLD: 7.14 K/UL — SIGNIFICANT CHANGE UP (ref 3.8–10.5)
WBC # FLD AUTO: 7.14 K/UL — SIGNIFICANT CHANGE UP (ref 3.8–10.5)

## 2018-07-29 PROCEDURE — 99233 SBSQ HOSP IP/OBS HIGH 50: CPT | Mod: GC

## 2018-07-29 RX ORDER — LISINOPRIL 2.5 MG/1
40 TABLET ORAL DAILY
Qty: 0 | Refills: 0 | Status: DISCONTINUED | OUTPATIENT
Start: 2018-07-29 | End: 2018-07-30

## 2018-07-29 RX ORDER — POTASSIUM CHLORIDE 20 MEQ
10 PACKET (EA) ORAL
Qty: 0 | Refills: 0 | Status: COMPLETED | OUTPATIENT
Start: 2018-07-29 | End: 2018-07-29

## 2018-07-29 RX ORDER — AMLODIPINE BESYLATE 2.5 MG/1
10 TABLET ORAL DAILY
Qty: 0 | Refills: 0 | Status: DISCONTINUED | OUTPATIENT
Start: 2018-07-29 | End: 2018-07-30

## 2018-07-29 RX ORDER — PANTOPRAZOLE SODIUM 20 MG/1
40 TABLET, DELAYED RELEASE ORAL
Qty: 0 | Refills: 0 | Status: DISCONTINUED | OUTPATIENT
Start: 2018-07-29 | End: 2018-07-30

## 2018-07-29 RX ORDER — LEVOTHYROXINE SODIUM 125 MCG
200 TABLET ORAL DAILY
Qty: 0 | Refills: 0 | Status: DISCONTINUED | OUTPATIENT
Start: 2018-07-29 | End: 2018-07-30

## 2018-07-29 RX ORDER — ENOXAPARIN SODIUM 100 MG/ML
40 INJECTION SUBCUTANEOUS DAILY
Qty: 0 | Refills: 0 | Status: DISCONTINUED | OUTPATIENT
Start: 2018-07-29 | End: 2018-07-30

## 2018-07-29 RX ADMIN — CHLORHEXIDINE GLUCONATE 1 APPLICATION(S): 213 SOLUTION TOPICAL at 21:38

## 2018-07-29 RX ADMIN — AMLODIPINE BESYLATE 10 MILLIGRAM(S): 2.5 TABLET ORAL at 12:03

## 2018-07-29 RX ADMIN — Medication 100 MILLIEQUIVALENT(S): at 08:30

## 2018-07-29 RX ADMIN — Medication 100 MILLIEQUIVALENT(S): at 06:00

## 2018-07-29 RX ADMIN — Medication 100 MILLIEQUIVALENT(S): at 09:34

## 2018-07-29 RX ADMIN — Medication 63.75 MILLIMOLE(S): at 06:14

## 2018-07-29 RX ADMIN — ENOXAPARIN SODIUM 40 MILLIGRAM(S): 100 INJECTION SUBCUTANEOUS at 12:03

## 2018-07-29 RX ADMIN — Medication 200 MICROGRAM(S): at 12:03

## 2018-07-29 RX ADMIN — PANTOPRAZOLE SODIUM 40 MILLIGRAM(S): 20 TABLET, DELAYED RELEASE ORAL at 12:03

## 2018-07-29 RX ADMIN — LISINOPRIL 40 MILLIGRAM(S): 2.5 TABLET ORAL at 12:00

## 2018-07-29 NOTE — PROGRESS NOTE ADULT - ATTENDING COMMENTS
Current issues:  K92.1 Hematochezia   - no further bloody BMs overnight.  HD stable.  H/H stable  - awaiting GI eval  - advancing to [facility]  D62 Acute posthemorrhagic anemia   - as above  K21.9 Gastroesophageal reflux disease, esophagitis presence not specified   - on protonix  I10 Essential hypertension   - holding in setting of acute GI bleed  Z91.89 At risk for malnutrition   - advancing to [facility]

## 2018-07-29 NOTE — CONSULT NOTE ADULT - SUBJECTIVE AND OBJECTIVE BOX
Chief Complaint:  Patient is a 62y old  Female who presents with a chief complaint of Lower GI bleed (28 Jul 2018 15:47)      HPI:  63yo F with PMH of HTN, hypothyroidism, GERD, diverticulosis, recurrent lower GI bleeds >5 episodes over the past 3 years necessitating multiple transfusions in the past) now presenting with BRBpR starting on Friday morning. Last bloody BM was yesterday.     As per EMR, last colonoscopy by Dr Magallanes (5/16/18) showed diverticulosis and EGD showed gastritis. As per patient she has 9 episodes of bloody BMs at home. She reported an episode of light-headedness at home which spontaneously resolved. She denies any abdominal pain, fever, chills, N/V.     As per EMR, patient had colonscopy done 4/2017 for hematochezia.     Impression:          - The examined portion of the ileum was normal.                       - Diverticulosis in the entire examined colon. There was                        evidence of recent bleeding from the diverticular                        opening. Injected. Clips (MR conditional) were placed.                       - Blood in the transverse colon.                       - No specimens collected.    In the ED, the patient continued to have multiple bloody bowel movements. Vitals stable. Hb of 10.7 on admission which went to 9.4 then transfused 1 unit and went to 10.8. Patient's baseline Hb has been 9-10 based on labs in 4/2017.     Last bloody BM was yesterday. Vitals have remained stable and Hb stable.      Allergies:  No Known Allergies      Home Medications:    Hospital Medications:  amLODIPine   Tablet 10 milliGRAM(s) Oral daily  chlorhexidine 4% Liquid 1 Application(s) Topical <User Schedule>  enoxaparin Injectable 40 milliGRAM(s) SubCutaneous daily  levothyroxine 200 MICROGram(s) Oral daily  lisinopril 40 milliGRAM(s) Oral daily  pantoprazole    Tablet 40 milliGRAM(s) Oral before breakfast      PMHX/PSHX:  Lower gastrointestinal bleeding  HTN (hypertension)  Diverticulosis  Pernicious anemia  Hypothyroidism  H/O abdominal hysterectomy  History of back surgery  History of tonsillectomy  No significant past surgical history      Family history:  No pertinent family history in first degree relatives      Social History:     ROS:     General:  No wt loss, fevers, chills, night sweats, fatigue,   Eyes:  Good vision, no reported pain  ENT:  No sore throat, pain, runny nose, dysphagia  CV:  No pain, palpitations, hypo/hypertension  Resp:  No dyspnea, cough, tachypnea, wheezing  GI:  See HPI  :  No pain, bleeding, incontinence, nocturia  Muscle:  No pain, weakness  Neuro:  No weakness, tingling, memory problems  Psych:  No fatigue, insomnia, mood problems, depression  Endocrine:  No polyuria, polydipsia, cold/heat intolerance  Heme:  No petechiae, ecchymosis, easy bruisability  Skin:  No rash, edema      PHYSICAL EXAM:     GENERAL:  Appears stated age, well-groomed, well-nourished, no distress  HEENT:  NC/AT,  conjunctivae clear and pink,  no JVD  CHEST:  Full & symmetric excursion, no increased effort, breath sounds clear  HEART:  Regular rhythm, S1, S2, no murmur/rub/S3/S4, no abdominal bruit, no edema  ABDOMEN:  Soft, non-tender, non-distended, normoactive bowel sounds,  no masses ,  EXTREMITIES:  no cyanosis,clubbing or edema  SKIN:  No rash/erythema/ecchymoses/petechiae/wounds/abscess/warm/dry  NEURO:  Alert, oriented    Vital Signs:  Vital Signs Last 24 Hrs  T(C): 36.9 (29 Jul 2018 16:00), Max: 36.9 (29 Jul 2018 12:00)  T(F): 98.4 (29 Jul 2018 16:00), Max: 98.5 (29 Jul 2018 12:00)  HR: 86 (29 Jul 2018 18:00) (69 - 86)  BP: 136/77 (29 Jul 2018 08:00) (125/70 - 138/78)  BP(mean): 92 (29 Jul 2018 08:00) (82 - 93)  RR: 23 (29 Jul 2018 18:00) (3 - 28)  SpO2: 100% (29 Jul 2018 18:00) (99% - 100%)  Daily     Daily     LABS:                        9.7    7.14  )-----------( 140      ( 29 Jul 2018 03:00 )             29.9     07-29    142  |  107  |  8   ----------------------------<  166<H>  3.4<L>   |  26  |  0.91    Ca    7.4<L>      29 Jul 2018 03:00  Phos  1.8     07-29  Mg     2.0     07-29    TPro  7.9  /  Alb  4.2  /  TBili  0.4  /  DBili  x   /  AST  28  /  ALT  27  /  AlkPhos  95  07-28    LIVER FUNCTIONS - ( 28 Jul 2018 06:45 )  Alb: 4.2 g/dL / Pro: 7.9 g/dL / ALK PHOS: 95 u/L / ALT: 27 u/L / AST: 28 u/L / GGT: x           PT/INR - ( 29 Jul 2018 03:00 )   PT: 12.4 SEC;   INR: 1.08          PTT - ( 29 Jul 2018 03:00 )  PTT:25.0 SEC        Imaging: Chief Complaint:  Patient is a 62y old  Female who presents with a chief complaint of Lower GI bleed (28 Jul 2018 15:47)      HPI:  63yo F with PMH of HTN, hypothyroidism, GERD, diverticulosis, recurrent lower GI bleeds >5 episodes over the past 3 years necessitating multiple transfusions in the past) now presenting with BRBpR starting on Friday morning. Last bloody BM was Saturday at 2PM.     As per patient, she was hospitalized at an OSH last year for GI bleed and had an EGD and colonsocopy by Dr Magallanes (in early 2017) showed diverticulosis and EGD showed gastritis. Following this, she had another hospitalization for GI bleed at [facility] and had another colonoscopy which showed pan diverticulosis with two suspicious bleeding diverticula in the transverse colon s/p 2 clips. As per patient, most of her prior GI bleeds have resolved on their own with no intervention needed during colonoscopy.    Prior to this admission, as per patient she has 9 episodes of bloody BMs at home. She reported an episode of light-headedness at home which spontaneously resolved. She denies any abdominal pain, fever, chills, N/V.     As per patient, she saw Dr. Beach last year for discussion for possible colonic resection. Given the location of her diverticulosis, he told her he would need to resect all of her colon with temporary ostomy. Patient says that she is interested in the surgery because she is tired of her repeated GI bleeds. She is not interested in a colonoscopy at this time because she feels it would be low-yield and is not a permanent fix.    In the ED, the patient continued to have multiple bloody bowel movements. Vitals stable. Hb of 10.7 on admission which went to 9.4 then transfused 1 unit and went to 10.8. Patient's baseline Hb has been 9-10 based on labs in 4/2017. CTAP was done on admission which showed active extravasation of contrast from the proximal descending colon.  Last bloody BM was Saturday at 2PM. Vitals have remained stable and Hb stable.      Allergies:  No Known Allergies      Home Medications:    Hospital Medications:  amLODIPine   Tablet 10 milliGRAM(s) Oral daily  chlorhexidine 4% Liquid 1 Application(s) Topical <User Schedule>  enoxaparin Injectable 40 milliGRAM(s) SubCutaneous daily  levothyroxine 200 MICROGram(s) Oral daily  lisinopril 40 milliGRAM(s) Oral daily  pantoprazole    Tablet 40 milliGRAM(s) Oral before breakfast      PMHX/PSHX:  Lower gastrointestinal bleeding  HTN (hypertension)  Diverticulosis  Pernicious anemia  Hypothyroidism  H/O abdominal hysterectomy  History of back surgery  History of tonsillectomy  No significant past surgical history      Family history:  No pertinent family history in first degree relatives      Social History:     ROS:     General:  No wt loss, fevers, chills, night sweats, fatigue,   Eyes:  Good vision, no reported pain  ENT:  No sore throat, pain, runny nose, dysphagia  CV:  No pain, palpitations, hypo/hypertension  Resp:  No dyspnea, cough, tachypnea, wheezing  GI:  See HPI  :  No pain, bleeding, incontinence, nocturia  Muscle:  No pain, weakness  Neuro:  No weakness, tingling, memory problems  Psych:  No fatigue, insomnia, mood problems, depression  Endocrine:  No polyuria, polydipsia, cold/heat intolerance  Heme:  No petechiae, ecchymosis, easy bruisability  Skin:  No rash, edema      PHYSICAL EXAM:     GENERAL:  Appears stated age, well-groomed, well-nourished, no distress  HEENT:  NC/AT,  conjunctivae clear and pink,  no JVD  CHEST:  Full & symmetric excursion, no increased effort, breath sounds clear  HEART:  Regular rhythm, S1, S2, no murmur/rub/S3/S4, no abdominal bruit, no edema  ABDOMEN:  Soft, non-tender, non-distended, normoactive bowel sounds,  no masses ,  EXTREMITIES:  no cyanosis,clubbing or edema  SKIN:  No rash/erythema/ecchymoses/petechiae/wounds/abscess/warm/dry  NEURO:  Alert, oriented    Vital Signs:  Vital Signs Last 24 Hrs  T(C): 36.9 (29 Jul 2018 16:00), Max: 36.9 (29 Jul 2018 12:00)  T(F): 98.4 (29 Jul 2018 16:00), Max: 98.5 (29 Jul 2018 12:00)  HR: 86 (29 Jul 2018 18:00) (69 - 86)  BP: 136/77 (29 Jul 2018 08:00) (125/70 - 138/78)  BP(mean): 92 (29 Jul 2018 08:00) (82 - 93)  RR: 23 (29 Jul 2018 18:00) (3 - 28)  SpO2: 100% (29 Jul 2018 18:00) (99% - 100%)  Daily     Daily     LABS:                        9.7    7.14  )-----------( 140      ( 29 Jul 2018 03:00 )             29.9     07-29    142  |  107  |  8   ----------------------------<  166<H>  3.4<L>   |  26  |  0.91    Ca    7.4<L>      29 Jul 2018 03:00  Phos  1.8     07-29  Mg     2.0     07-29    TPro  7.9  /  Alb  4.2  /  TBili  0.4  /  DBili  x   /  AST  28  /  ALT  27  /  AlkPhos  95  07-28    LIVER FUNCTIONS - ( 28 Jul 2018 06:45 )  Alb: 4.2 g/dL / Pro: 7.9 g/dL / ALK PHOS: 95 u/L / ALT: 27 u/L / AST: 28 u/L / GGT: x           PT/INR - ( 29 Jul 2018 03:00 )   PT: 12.4 SEC;   INR: 1.08          PTT - ( 29 Jul 2018 03:00 )  PTT:25.0 SEC        Imaging:      < from: CT Angio Abdomen and Pelvis w/ IV Cont (07.28.18 @ 09:53) >    FINDINGS:    LOWER CHEST: Small to moderate pericardial effusion, minimally more   prominent from April 24, 2017. Cardiomegaly. Bibasilar atelectasis.    LIVER: Within normal limits.  BILE DUCTS: Normalcaliber.  GALLBLADDER: Within normal limits.  SPLEEN: Within normal limits.  PANCREAS: Within normal limits.  ADRENALS: Within normal limits.  KIDNEYS/URETERS: Nonobstructing 4 mm left lower pole calculus (2, 44). No   hydronephrosis.    BLADDER: Within normal limits.  REPRODUCTIVE ORGANS: Status post hysterectomy. No adnexal masses.    BOWEL: On the arterial phase there is extravasation of contrast into the   proximal descending colon, with pooling of contrast on the delayed portal   venous phase (5, 45).  No bowel obstruction.   Colonic diverticulosis without evidence of acute diverticulitis.  PERITONEUM: No ascites.  VESSELS:  Atherosclerotic calcifications of the aorta and its branches.  RETROPERITONEUM: No lymphadenopathy.    ABDOMINAL WALL: Small fat-containing periumbilical hernia..  BONES: Status post L4-S1 spinal fixation with orthopedic hardware in   place.    IMPRESSION:   Acute GI bleed within the proximal descending colon with active   extravasation of contrast.  Colonic diverticulosis without evidence of acute diverticulitis.    Small to moderate pericardial effusion minimally more prominent than on   CT examination 4/24/2017.    < end of copied text >

## 2018-07-29 NOTE — CONSULT NOTE ADULT - ATTENDING COMMENTS
Agree with above. Plan for surgical management.
The patient is a critical care patient with hemodynamic and metabolic instability in SICU.  I have personally interviewed and examined this patient, reviewed labs and x-rays, discussed with other consultants, House staff and PA's.  I spent  60   minutes  in total providing critical care for the diagnoses, assessment and plan above.  These diagnoses are unrelated to the surgical procedure noted above.  Time involved in performance of separately billable procedures was not counted toward my critical care time.  There is no overlap.    Current Issues:  K92.1 Hematochezia   - localized to proximal descending colon on CTA.    - IR aware.  plan for ongoing monitoring/resuscitation for now, consult IR if persists beyond 4 units PRBCs  D62 Acute posthemorrhagic anemia   - as above  R00.1 Bradycardia   - while in ER had transient episode of bradycardia and reported altered mental status. likely vasovagal event, worsened by acute anemia.  Patient has otherwise remained stable hemodynamically  K21.9 Gastroesophageal reflux disease, esophagitis presence not specified   - continue home PPI.  Unlikely is contributing to current current bleeding   I10 Essential hypertension   - holding anti-hypertensives in setting of acute GI bleed  Z91.89 At risk for malnutrition   - NPO in setting of GI bleed

## 2018-07-29 NOTE — CHART NOTE - NSCHARTNOTEFT_GEN_A_CORE
61 yo F admitted with lower GI bleed, briefly bradycardic and altered in ED s/p multiple transfusions, admitted to SICU. CT angio showing active hemorrhage in the descending colon.   HD#: 1d    INTERVAL EVENTS:   No bloody BMs  Transferred to floor from SICU    ---------------------------------------------------------------------------------------------   VITALS  T(C): 36.7 (07-29-18 @ 20:00), Max: 36.9 (07-29-18 @ 12:00)  HR: 82 (07-29-18 @ 20:00) (69 - 86)  BP: 138/74 (07-29-18 @ 20:00) (125/70 - 138/78)  RR: 23 (07-29-18 @ 20:00) (3 - 28)  SpO2: 100% (07-29-18 @ 20:00) (99% - 100%)  CAPILLARY BLOOD GLUCOSE          Is/Os    07-28 @ 07:01  -  07-29 @ 07:00  --------------------------------------------------------  IN:    IV PiggyBack: 500 mL    lactated ringers.: 2040 mL    PRBCs (Packed Red Blood Cells): 300 mL  Total IN: 2840 mL    OUT:    Voided: 2725 mL  Total OUT: 2725 mL    Total NET: 115 mL      07-29 @ 07:01  -  07-29 @ 22:20  --------------------------------------------------------  IN:    IV PiggyBack: 300 mL    lactated ringers.: 300 mL  Total IN: 600 mL    OUT:    Voided: 1200 mL  Total OUT: 1200 mL    Total NET: -600 mL  ---------------------------------------------------------------------------------------------   MEDICATIONS (STANDING): amLODIPine   Tablet 10 milliGRAM(s) Oral daily  enoxaparin Injectable 40 milliGRAM(s) SubCutaneous daily  levothyroxine 200 MICROGram(s) Oral daily  lisinopril 40 milliGRAM(s) Oral daily  pantoprazole    Tablet 40 milliGRAM(s) Oral before breakfast    ---------------------------------------------------------------------------------------------   LABS  CBC (07-29 @ 03:00)                              9.7<L>                         7.14    )----------------(  140<L>     --    % Neutrophils, --    % Lymphocytes, ANC: --                                  29.9<L>  CBC (07-28 @ 21:35)                              10.2<L>                         8.35    )----------------(  142<L>     --    % Neutrophils, --    % Lymphocytes, ANC: --                                  31.1<L>    BMP (07-29 @ 03:00)             142     |  107     |  8     		Ca++ 1.00<L>  Ca 7.4<L>             ---------------------------------( 166<H>		Mg 2.0                3.4<L>  |  26      |  0.91  			Ph 1.8<L>  BMP (07-28 @ 15:12)             141     |  105     |  11    		Ca++ --      Ca 7.4<L>             ---------------------------------( 235<H>		Mg 1.5<L>             3.6     |  22      |  0.88  			Ph 2.2<L>      Coags (07-29 @ 03:00)  aPTT 25.0<L> / INR 1.08 / PT 12.4      ABG (07-29 @ 03:00)      /  /  /  /  / %     Lactate:  1.4  ABG (07-28 @ 15:12)     7.39 / 40 / 113<H> / 24 / -0.6 / 98.7%     Lactate: 2.4<H>   ---------------------------------------------------------------------------------------------       ASSESSMENT  61 yo F admitted with lower GI bleed, briefly bradycardic and altered in ED s/p multiple transfusions, admitted to SICU. CT angio showing active hemorrhage in the descending colon. Pt stable and now transferred to floor.    PLAN  - c/w cld  - Pain control with PO medications.    - Continue home medications  - OOB, ambulate as tolerated  - VTE ppx 63 yo F admitted with lower GI bleed, briefly bradycardic and altered in ED s/p multiple transfusions, admitted to SICU. CT angio showing active hemorrhage in the descending colon.   HD#: 1d    INTERVAL EVENTS:   No bloody BMs reported  Transferred to floor from SICU    SUBJECTIVE:  Pt seen and examined at bedside.  Denies nausea, vomiting.   Denies abdominal or cp, or sob, or dizziness.  No longer reporting bloody BMs.    PHYSICAL EXAM:  General: A&Ox3  HEENT: EOMI, NCAT  Neck: supple  Resp: good effort  Abdomen: soft, nt, mildly distended, no guarding/rebound tenderness, no masses palpated.  Extremities: moving all extremities    ---------------------------------------------------------------------------------------------   VITALS  T(C): 36.7 (07-29-18 @ 20:00), Max: 36.9 (07-29-18 @ 12:00)  HR: 82 (07-29-18 @ 20:00) (69 - 86)  BP: 138/74 (07-29-18 @ 20:00) (125/70 - 138/78)  RR: 23 (07-29-18 @ 20:00) (3 - 28)  SpO2: 100% (07-29-18 @ 20:00) (99% - 100%)  CAPILLARY BLOOD GLUCOSE          Is/Os    07-28 @ 07:01  -  07-29 @ 07:00  --------------------------------------------------------  IN:    IV PiggyBack: 500 mL    lactated ringers.: 2040 mL    PRBCs (Packed Red Blood Cells): 300 mL  Total IN: 2840 mL    OUT:    Voided: 2725 mL  Total OUT: 2725 mL    Total NET: 115 mL      07-29 @ 07:01  -  07-29 @ 22:20  --------------------------------------------------------  IN:    IV PiggyBack: 300 mL    lactated ringers.: 300 mL  Total IN: 600 mL    OUT:    Voided: 1200 mL  Total OUT: 1200 mL    Total NET: -600 mL  ---------------------------------------------------------------------------------------------   MEDICATIONS (STANDING): amLODIPine   Tablet 10 milliGRAM(s) Oral daily  enoxaparin Injectable 40 milliGRAM(s) SubCutaneous daily  levothyroxine 200 MICROGram(s) Oral daily  lisinopril 40 milliGRAM(s) Oral daily  pantoprazole    Tablet 40 milliGRAM(s) Oral before breakfast    ---------------------------------------------------------------------------------------------   LABS  CBC (07-29 @ 03:00)                              9.7<L>                         7.14    )----------------(  140<L>     --    % Neutrophils, --    % Lymphocytes, ANC: --                                  29.9<L>  CBC (07-28 @ 21:35)                              10.2<L>                         8.35    )----------------(  142<L>     --    % Neutrophils, --    % Lymphocytes, ANC: --                                  31.1<L>    BMP (07-29 @ 03:00)             142     |  107     |  8     		Ca++ 1.00<L>  Ca 7.4<L>             ---------------------------------( 166<H>		Mg 2.0                3.4<L>  |  26      |  0.91  			Ph 1.8<L>  BMP (07-28 @ 15:12)             141     |  105     |  11    		Ca++ --      Ca 7.4<L>             ---------------------------------( 235<H>		Mg 1.5<L>             3.6     |  22      |  0.88  			Ph 2.2<L>      Coags (07-29 @ 03:00)  aPTT 25.0<L> / INR 1.08 / PT 12.4      ABG (07-29 @ 03:00)      /  /  /  /  / %     Lactate:  1.4  ABG (07-28 @ 15:12)     7.39 / 40 / 113<H> / 24 / -0.6 / 98.7%     Lactate: 2.4<H>   ---------------------------------------------------------------------------------------------       ASSESSMENT  63 yo F admitted with lower GI bleed, briefly bradycardic and altered in ED s/p multiple transfusions, admitted to SICU. CT angio showing active hemorrhage in the descending colon. Pt stable and now transferred to floor.    PLAN  - c/w cld  - Pain control with PO medications.    - Continue home medications  - OOB, ambulate as tolerated  - VTE ppx

## 2018-07-29 NOTE — PROGRESS NOTE ADULT - ASSESSMENT
63 yo F admitted with lower GI bleed, briefly bradycardic and altered in ED s/p multiple transfusions, admitted to SICU. CT angio showing active hemorrhage in the descending colon. Pt now stable in SICU w/o further episodes of bleeding.     PLAN:  -consult IR, GI for intervention  -cont resuscitation, fluids and blood products as needed  -close hemodynamic monitoring  -Echocardiogram when stable  - care per sicu    B surgery  [id number]

## 2018-07-29 NOTE — CONSULT NOTE ADULT - ASSESSMENT
63yo F with PMH of HTN, hypothyroidism, GERD, diverticulosis, recurrent lower GI bleeds >5 episodes over the past 3 years necessitating multiple transfusions in the past) now presenting with BRBpR starting on Friday morning. Last bloody BM was yesterday.    1)BRBpR  DDx: Diverticulosis vs. hemorrhoids vs. AVM vs. polyp vs. malignancy  Hb stable during admission at baseline of 9-10. 1 u PRBC given.  2) GERD  3) Hypothryoidsm  4) HTN    - 61yo F with PMH of HTN, hypothyroidism, GERD, diverticulosis, recurrent lower GI bleeds >5 episodes over the past 3 years necessitating multiple transfusions in the past) now presenting with BRBpR starting on Friday morning. Last bloody BM was yesterday.    1)BRBpR  DDx: Diverticulosis (most likely) vs. hemorrhoids vs. AVM vs. polyp vs. malignancy  CTAP showing active extravasation at the proximal descending colon  Hb stable during admission at baseline of 9-10. 1 u PRBC given.  Last bloody BM on saturday at 2:30PM.  2) GERD  3) Hypothryoidsm  4) HTN    -patient has had no additional episodes of GI bleed since Saturday. She is not interested in a colonoscopy at this time given it's low yield  - would recommend Surgery consult (Dr. Beach) for discussion of possible surgical resection (patient is interested)  - daily CBC and transfuse for Hb <7  - monitor for additional bloody BMs  - rest of plan as per primary team  - please call back if patient continues to bleed or for any additional questions    We will sign off.

## 2018-07-29 NOTE — PROGRESS NOTE ADULT - ASSESSMENT
ASSESSMENT:  62y female, with a history of recurrent lower GI bleeds secondary to diverticulosis, presents with multiple bloody bowel movements. In the ED, the patient continued to have multiple bloody bowel movements, and despite being transfused 1L crystalloid and 1u pRBC in the ED, she became bradycardic. SICU was consulted for hemodynamic instability in the setting of an active bleed. No wwill stable H/H     PLAN:    Neurologic:   - Avoid narcotics, avoid sedation  - Monitor for changes in mental status    Respiratory:   - Breathing comfortably, satting 100% on room air, no acute concerns    Cardiovascular: HTN  - A-line for close hemodynamic monitoring  - Hold home meds Ramipril and Norvasc for now since pt only had partial response to the 2 transfused PRBCs  - H/H stable and pt continues to be hypertensive, consider restarting home meds    Gastrointestinal/Nutrition: lower GI bleed  - NPO  - Consult GI, request EGD/colonoscopy    Renal/Genitourinary:   - LR @ 120  - Simpson, for close UOP monitoring  - hypophosphatemia, hypomagnesemia, and borderline hypokalemia noted. s/p repletion of K+, mag, and phos  - F/u repeat CMP    Hematologic: lower GI bleed  - F/u results of repeat CBC  - Transfuse PRBC    Infectious Disease:   - No acute concerns    Tubes/Lines/Drains:   Left radial a-line, Simpson, PIV    Endocrine: hypothyroidism  - Continue home med Synthroid 200mg qd    Disposition: SICU ASSESSMENT:  62y female, with a history of recurrent lower GI bleeds secondary to diverticulosis, presents with multiple bloody bowel movements. In the ED, the patient continued to have multiple bloody bowel movements, and despite being transfused 1L crystalloid and 1u pRBC in the ED, she became bradycardic. SICU was consulted for hemodynamic instability in the setting of an active bleed. Now with stable H/H     PLAN:    Neurologic:   - Avoid narcotics, avoid sedation  - Monitor for changes in mental status    Respiratory:   - Breathing comfortably, satting 100% on room air, no acute concerns    Cardiovascular: HTN  - A-line for close hemodynamic monitoring  - Hold home meds Ramipril and Norvasc for now since pt only had partial response to the 2 transfused PRBCs  - H/H stable and pt continues to be hypertensive, consider restarting home meds    Gastrointestinal/Nutrition: lower GI bleed  - clear liquid diet  - continue Protonix  - Consult GI, request EGD/colonoscopy    Renal/Genitourinary:   - D/c IVF given pt on CLD  - Tatiana, for close UOP monitoring  - hypophosphatemia, hypomagnesemia, and borderline hypokalemia noted. s/p repletion of K+, mag, and phos  - F/u CMP    Hematologic: lower GI bleed  - F/u results of repeat CBC  - Transfuse PRBC    Infectious Disease:   - No acute concerns    Tubes/Lines/Drains:   PIV  - Will remove L radial arterial line today    Endocrine: hypothyroidism  - Continue home med Synthroid 200mg qd    Disposition: SICU

## 2018-07-30 ENCOUNTER — TRANSCRIPTION ENCOUNTER (OUTPATIENT)
Age: 63
End: 2018-07-30

## 2018-07-30 VITALS
DIASTOLIC BLOOD PRESSURE: 72 MMHG | TEMPERATURE: 98 F | HEART RATE: 75 BPM | SYSTOLIC BLOOD PRESSURE: 105 MMHG | RESPIRATION RATE: 16 BRPM | OXYGEN SATURATION: 98 %

## 2018-07-30 LAB
BUN SERPL-MCNC: 9 MG/DL — SIGNIFICANT CHANGE UP (ref 7–23)
CALCIUM SERPL-MCNC: 7.8 MG/DL — LOW (ref 8.4–10.5)
CHLORIDE SERPL-SCNC: 98 MMOL/L — SIGNIFICANT CHANGE UP (ref 98–107)
CO2 SERPL-SCNC: 27 MMOL/L — SIGNIFICANT CHANGE UP (ref 22–31)
CREAT SERPL-MCNC: 0.99 MG/DL — SIGNIFICANT CHANGE UP (ref 0.5–1.3)
GLUCOSE SERPL-MCNC: 278 MG/DL — HIGH (ref 70–99)
HCT VFR BLD CALC: 31.9 % — LOW (ref 34.5–45)
HGB BLD-MCNC: 10.4 G/DL — LOW (ref 11.5–15.5)
MAGNESIUM SERPL-MCNC: 2 MG/DL — SIGNIFICANT CHANGE UP (ref 1.6–2.6)
MCHC RBC-ENTMCNC: 28.3 PG — SIGNIFICANT CHANGE UP (ref 27–34)
MCHC RBC-ENTMCNC: 32.6 % — SIGNIFICANT CHANGE UP (ref 32–36)
MCV RBC AUTO: 86.7 FL — SIGNIFICANT CHANGE UP (ref 80–100)
NRBC # FLD: 0 — SIGNIFICANT CHANGE UP
PHOSPHATE SERPL-MCNC: 1.7 MG/DL — LOW (ref 2.5–4.5)
PLATELET # BLD AUTO: 160 K/UL — SIGNIFICANT CHANGE UP (ref 150–400)
PMV BLD: 10.5 FL — SIGNIFICANT CHANGE UP (ref 7–13)
POTASSIUM SERPL-MCNC: 3.3 MMOL/L — LOW (ref 3.5–5.3)
POTASSIUM SERPL-SCNC: 3.3 MMOL/L — LOW (ref 3.5–5.3)
RBC # BLD: 3.68 M/UL — LOW (ref 3.8–5.2)
RBC # FLD: 14.9 % — HIGH (ref 10.3–14.5)
SODIUM SERPL-SCNC: 138 MMOL/L — SIGNIFICANT CHANGE UP (ref 135–145)
WBC # BLD: 7.9 K/UL — SIGNIFICANT CHANGE UP (ref 3.8–10.5)
WBC # FLD AUTO: 7.9 K/UL — SIGNIFICANT CHANGE UP (ref 3.8–10.5)

## 2018-07-30 PROCEDURE — 99222 1ST HOSP IP/OBS MODERATE 55: CPT | Mod: GC

## 2018-07-30 RX ORDER — SODIUM,POTASSIUM PHOSPHATES 278-250MG
1 POWDER IN PACKET (EA) ORAL ONCE
Qty: 0 | Refills: 0 | Status: COMPLETED | OUTPATIENT
Start: 2018-07-30 | End: 2018-07-30

## 2018-07-30 RX ORDER — POTASSIUM CHLORIDE 20 MEQ
40 PACKET (EA) ORAL EVERY 4 HOURS
Qty: 0 | Refills: 0 | Status: COMPLETED | OUTPATIENT
Start: 2018-07-30 | End: 2018-07-30

## 2018-07-30 RX ADMIN — Medication 1 TABLET(S): at 13:18

## 2018-07-30 RX ADMIN — Medication 40 MILLIEQUIVALENT(S): at 13:18

## 2018-07-30 RX ADMIN — Medication 40 MILLIEQUIVALENT(S): at 17:07

## 2018-07-30 RX ADMIN — AMLODIPINE BESYLATE 10 MILLIGRAM(S): 2.5 TABLET ORAL at 06:07

## 2018-07-30 RX ADMIN — Medication 200 MICROGRAM(S): at 06:07

## 2018-07-30 RX ADMIN — PANTOPRAZOLE SODIUM 40 MILLIGRAM(S): 20 TABLET, DELAYED RELEASE ORAL at 06:07

## 2018-07-30 RX ADMIN — LISINOPRIL 40 MILLIGRAM(S): 2.5 TABLET ORAL at 06:07

## 2018-07-30 NOTE — DISCHARGE NOTE ADULT - CARE PROVIDER_API CALL
Breezy Beach), ColonRectal Surgery; Surgery  [facility]  [address] [place] [address]  Phone: [phone]  Fax: [phone]    Bridgett Adamson (MD), Surgery  [address] [facility] [address], [place] [address]  Phone: [phone]  Fax: [phone]

## 2018-07-30 NOTE — DISCHARGE NOTE ADULT - PLAN OF CARE
Hemoglobin and hematocrit monitored Please follow up with Colorectal surgeon, Dr. Beach within one week of discharge.   DIET: Return to your usual diet.  NOTIFY YOUR SURGEON IF: You have any bleeding that does not stop, any pus draining from your wound(s), any fever (over 100.4 F) or chills, persistent nausea/vomiting, persistent diarrhea, or if your pain is not controlled on your discharge pain medications.  FOLLOW-UP: Please follow up with your primary care physician in one week regarding your hospitalization

## 2018-07-30 NOTE — DISCHARGE NOTE ADULT - CARE PLAN
Principal Discharge DX:	Acute GI bleeding  Goal:	Hemoglobin and hematocrit monitored  Assessment and plan of treatment:	Please follow up with Colorectal surgeon, Dr. Beach within one week of discharge.   DIET: Return to your usual diet.  NOTIFY YOUR SURGEON IF: You have any bleeding that does not stop, any pus draining from your wound(s), any fever (over 100.4 F) or chills, persistent nausea/vomiting, persistent diarrhea, or if your pain is not controlled on your discharge pain medications.  FOLLOW-UP: Please follow up with your primary care physician in one week regarding your hospitalization

## 2018-07-30 NOTE — DISCHARGE NOTE ADULT - PATIENT PORTAL LINK FT
You can access the [facility] Patient Portal, offered by [facility], by registering with the following website: [email or web address]

## 2018-07-30 NOTE — DISCHARGE NOTE ADULT - MEDICATION SUMMARY - MEDICATIONS TO TAKE
I will START or STAY ON the medications listed below when I get home from the hospital:    Tylenol 325 mg oral tablet  -- 2 tab(s) by mouth every 4 hours  -- for back pain  -- Indication: For As needed for mild pain    ramipril 10 mg oral capsule  -- 1 cap(s) by mouth once a day  -- Indication: For HTN    Norvasc 10 mg oral tablet  -- 1 tab(s) by mouth once a day  -- Indication: For HTN    Lasix 20 mg oral tablet  -- 1 tab(s) by mouth once a day  -- for leg swelling?  -- Indication: For HTN    ferrous sulfate 325 mg (65 mg elemental iron) oral delayed release tablet  -- 1 tab(s) by mouth once a day  -- Indication: For Supplement    omeprazole 40 mg oral delayed release capsule  -- 1 cap(s) by mouth once a day  -- Indication: For protonix    Synthroid 200 mcg (0.2 mg) oral tablet  -- 1 tab(s) by mouth once a day  -- Indication: For hypothyroid    cyanocobalamin 1000 mcg oral tablet  -- 1 tab(s) by mouth once a day  -- Indication: For supplement

## 2018-07-30 NOTE — PROGRESS NOTE ADULT - ASSESSMENT
61 yo F admitted with lower GI bleed, briefly bradycardic and altered in ED s/p multiple transfusions, admitted to SICU. CT angio showing active hemorrhage in the descending colon. Pt now stable in on floor w/o further episodes of bleeding.     PLAN:  -consult IR, GI for intervention  -cont resuscitation, fluids and blood products as needed  -close hemodynamic monitoring  - Abd pain improved, dispo pending GI clearance       B surgery  [id number]

## 2018-07-30 NOTE — DISCHARGE NOTE ADULT - HOSPITAL COURSE
2 yo F with a PMH of GERD, HTN, diverticulosis, and multiple previous lower GI bleeds presents with multiple bloody bowel movements since this morning. Patient reports 9 in total. She reported an episode of light-headedness at home which spontaneously resolved. She denies any abdominal pain, fever, chills, N/V. She reports regular bowel habits before this episode.     In the ED, the patient continued to have multiple bloody bowel movements. She was transfused 1L crystalloid and 1u pRBC. She briefly became bradycardic and altered per ED team. Transfused another unit of pRBC and admitted to the SICU.    7/28 CTA of the abdomen and pelvis showed Acute GI bleed within the proximal descending colon with active extravasation of contrast. Colonic diverticulosis without evidence of acute diverticulitis.Small to moderate pericardial effusion minimally more prominent than on CT examination 4/24/2017.    Serial CBCs preformed. Hemoglobin and hematocrit remained stable. Gastroenterology consulted and recommending outpatient surgical follow up. Patient has had no acute bleeding episodes since Saturday and will be low yield for colonoscopy.  Patient transferred from to SICU to surgical floor once deemed stable. During hospital course patients diet was slowly advanced as tolerated.  At this time, pt is tolerating a regular diet, ambulating and voiding.  Pt is stable for discharge as per the attending at this time.

## 2018-07-30 NOTE — PROGRESS NOTE ADULT - SUBJECTIVE AND OBJECTIVE BOX
GENERAL SURGERY DAILY PROGRESS NOTE:     Subjective:  Pt seen and examined during morning rounds. Pt improved this morning. Did not require any tranfusions since 2 overnight. Denies pain, denies n/v. Reports last colonoscopy in May and showed diverticulosis.     Objective:  NAD, awake and alert  Respirations nonlabored  Abdomen soft, nontender, nondistended  No guarding or rebound tenderness      MEDICATIONS  (STANDING):  amLODIPine   Tablet 10 milliGRAM(s) Oral daily  chlorhexidine 4% Liquid 1 Application(s) Topical <User Schedule>  enoxaparin Injectable 40 milliGRAM(s) SubCutaneous daily  levothyroxine 200 MICROGram(s) Oral daily  lisinopril 40 milliGRAM(s) Oral daily  pantoprazole    Tablet 40 milliGRAM(s) Oral before breakfast    MEDICATIONS  (PRN):      Vital Signs Last 24 Hrs  T(C): 36.9 (29 Jul 2018 12:00), Max: 36.9 (29 Jul 2018 12:00)  T(F): 98.5 (29 Jul 2018 12:00), Max: 98.5 (29 Jul 2018 12:00)  HR: 86 (29 Jul 2018 12:00) (44 - 86)  BP: 136/77 (29 Jul 2018 08:00) (114/97 - 138/78)  BP(mean): 92 (29 Jul 2018 08:00) (82 - 93)  RR: 24 (29 Jul 2018 12:00) (3 - 26)  SpO2: 99% (29 Jul 2018 12:00) (97% - 100%)    I&O's Detail    28 Jul 2018 07:01  -  29 Jul 2018 07:00  --------------------------------------------------------  IN:    IV PiggyBack: 500 mL    lactated ringers.: 2040 mL    PRBCs (Packed Red Blood Cells): 300 mL  Total IN: 2840 mL    OUT:    Voided: 2725 mL  Total OUT: 2725 mL    Total NET: 115 mL      29 Jul 2018 07:01  -  29 Jul 2018 12:11  --------------------------------------------------------  IN:    IV PiggyBack: 300 mL    lactated ringers.: 300 mL  Total IN: 600 mL    OUT:    Voided: 1200 mL  Total OUT: 1200 mL    Total NET: -600 mL          Daily     Daily     LABS:                        9.7    7.14  )-----------( 140      ( 29 Jul 2018 03:00 )             29.9     07-29    142  |  107  |  8   ----------------------------<  166<H>  3.4<L>   |  26  |  0.91    Ca    7.4<L>      29 Jul 2018 03:00  Phos  1.8     07-29  Mg     2.0     07-29    TPro  7.9  /  Alb  4.2  /  TBili  0.4  /  DBili  x   /  AST  28  /  ALT  27  /  AlkPhos  95  07-28    PT/INR - ( 29 Jul 2018 03:00 )   PT: 12.4 SEC;   INR: 1.08          PTT - ( 29 Jul 2018 03:00 )  PTT:25.0 SEC      RADIOLOGY & ADDITIONAL STUDIES:
SUBJECTIVE:    Doing well.   No overnight events.   Denies abd pain denies NV     OBJECTIVE:     NAD, awake and alert  Respirations nonlabored  Abdomen soft, nontender, nondistended  No guarding, no rebound.       ** VITAL SIGNS / I&O's **    T(C): 36.9 (07-30-18 @ 13:33), Max: 37.1 (07-30-18 @ 10:10)  T(F): 98.4 (07-30-18 @ 13:33), Max: 98.8 (07-30-18 @ 10:10)  HR: 75 (07-30-18 @ 13:33) (74 - 88)  BP: 105/72 (07-30-18 @ 13:33) (105/72 - 153/98)  RR: 16 (07-30-18 @ 13:33) (16 - 25)  SpO2: 98% (07-30-18 @ 13:33) (96% - 100%)      29 Jul 2018 07:01  -  30 Jul 2018 07:00  --------------------------------------------------------  IN:    IV PiggyBack: 300 mL    lactated ringers.: 300 mL    Oral Fluid: 80 mL  Total IN: 680 mL    OUT:    Voided: 1725 mL  Total OUT: 1725 mL    Total NET: -1045 mL      30 Jul 2018 07:01  -  30 Jul 2018 14:55  --------------------------------------------------------  IN:  Total IN: 0 mL    OUT:    Voided: 350 mL  Total OUT: 350 mL    Total NET: -350 mL                ** LABS **                 10.4   7.90   )----------(  160       ( 30 Jul 2018 06:30 )               31.9      138    |  98     |  9      ----------------------------<  278        ( 30 Jul 2018 06:30 )  3.3     |  27     |  0.99     Ca    7.8        ( 30 Jul 2018 06:30 )  Phos  1.7       ( 30 Jul 2018 06:30 )  Mg     2.0       ( 30 Jul 2018 06:30 )          CAPILLARY BLOOD GLUCOSE
SICU AM Progress  Note  =====================================================      Overnight Events:    From yesterday pt received a  total of  1 L crystalloid , 1 PRBC( in ED) , 1 PRBC ( in SICU) .  Repeat H/H :  10.8/33 ----> 10.2/ 31.1. Pt had no more blood y bowel movements overnight .         HPI: 62y female, with a PMH of HTN, hypothyroidism, GERD, recurrent lower GI bleeds >5 episodes over the past 3 years necessitating multiple transfusions in the past), last colonoscopy by Dr Magallanes (5/16) showed diverticulosis and EGD showed gastritis, presents with multiple bloody bowel movements since this morning. Patient reports 9 in total. She reported an episode of light-headedness at home which spontaneously resolved. She denies any abdominal pain, fever, chills, N/V. She reports regular bowel habits before this episode.     In the ED, the patient continued to have multiple bloody bowel movements. She was transfused 1L crystalloid and 1u pRBC. She briefly became bradycardic and altered per ED team. Admitted to the SICU for hemodynamic monitoring.      PAST MEDICAL & SURGICAL HISTORY:  Lower gastrointestinal bleeding: 4x 2014- 2016  HTN (hypertension)  Diverticulosis  Hypothyroidism  H/O abdominal hysterectomy  History of back surgery  History of tonsillectomy    FAMILY HISTORY:  No pertinent family history in first degree relatives      ADVANCE DIRECTIVES: Presumed Full Code  ***    REVIEW OF SYSTEMS:  ***  General: In no acute distress. Fatigued. Denies fever, chills.  HEENT: Feels light-headed  Respiratory and Thorax: Denies shortness of breath.  Cardiovascular: Denies chest pain, palpitations  Gastrointestinal: Endorses crampy abdominal pain prior to bowel movement  Genitourinary: Non-Contributory  Musculoskeletal: Non-Contributory  Neurological: Non-Contributory  Hematology/Lymphatics: Non-Contributory  Endocrine: Pt reports hypothyroidism well controlled with home med synthroid      HOME MEDS: (confirmed with pt, 7/28)  Synthroid 200mcg PO qd  Cyanocobalamin 1000 mcg PO qd  Ferrous Sulfate 325mg PO qd  Ramipril 10mg PO qd  Norvasc 10mg PO qd      CURRENT MEDICATIONS:   Neurologic Medications    Respiratory Medications    Cardiovascular Medications    Gastrointestinal Medications  lactated ringers. 1000 milliLiter(s) IV Continuous <Continuous>  magnesium sulfate  IVPB 2 Gram(s) IV Intermittent once  pantoprazole  Injectable 40 milliGRAM(s) IV Push daily  potassium phosphate IVPB 30 milliMole(s) IV Intermittent once    Genitourinary Medications    Hematologic/Oncologic Medications    Antimicrobial/Immunologic Medications    Endocrine/Metabolic Medications  levothyroxine Injectable 100 MICROGram(s) IV Push at bedtime    Topical/Other Medications  chlorhexidine 4% Liquid 1 Application(s) Topical <User Schedule>    --------------------------------------------------------------------------------------  VITAL SIGNS (last 24hrs):    T(C): 36.1 (07-29-18 @ 00:00), Max: 36.6 (07-28-18 @ 05:17)  HR: 75 (07-29-18 @ 00:00) (44 - 87)  BP: 132/78 (07-28-18 @ 19:00) (114/97 - 187/100)  BP(mean): 90 (07-28-18 @ 19:00) (82 - 92)  ABP: 109/85 (07-29-18 @ 00:00) (107/78 - 165/79)  ABP(mean): 95 (07-29-18 @ 00:00) (92 - 117)  RR: 15 (07-29-18 @ 00:00) (15 - 24)  SpO2: 100% (07-29-18 @ 00:00) (97% - 100%)  Wt(kg): --  CVP(mm Hg): --  CI: --  CAPILLARY BLOOD GLUCOSE       N/A      07-28 @ 07:01  -  07-29 @ 00:50  --------------------------------------------------------  IN:    IV PiggyBack: 150 mL    lactated ringers.: 1200 mL    PRBCs (Packed Red Blood Cells): 300 mL  Total IN: 1650 mL    OUT:    Voided: 1325 mL  Total OUT: 1325 mL    Total NET: 325 mL          --------------------------------------------------------------------------------------    EXAM  NEUROLOGY  Exam: Normal, NAD, alert, oriented x 3, no focal deficits.     HEENT  Exam: Normocephalic, atraumatic.  EOMI     RESPIRATORY  Exam: Lungs clear to auscultation, Normal expansion/effort.  Sat 100% on room air.    CARDIOVASCULAR  Exam: Regular rate and rhythm.     GI/NUTRITION  Exam: Abdomen soft, Non-tender, Non-distended.      VASCULAR  Exam: Distal upper extremities cool, palms pale.    MUSCULOSKELETAL  Exam: All extremities moving spontaneously without limitations.      SKIN:  Exam: Good skin turgor, no skin breakdown.      METABOLIC/FLUIDS/ELECTROLYTES  lactated ringers. 1000 milliLiter(s) IV Continuous <Continuous>  magnesium sulfate  IVPB 2 Gram(s) IV Intermittent once  potassium phosphate IVPB 30 milliMole(s) IV Intermittent once      HEMATOLOGIC  [x] DVT Prophylaxis:   Transfusions:	[x] PRBC	[] Platelets		[] FFP	[] Cryoprecipitate        Tubes/Lines/Drains  ***  [x] Peripheral IV  [] Central Venous Line     	[] R	[] L	[] IJ	[] Fem	[] SC	Date Placed:   [x] Arterial Line		[] R	[x] L	[] Fem	[x] Rad	[] Ax	Date Placed:   [] PICC:         	[] Midline		[] Mediport  [] Urinary Catheter		Date Placed:

## 2018-08-28 ENCOUNTER — APPOINTMENT (OUTPATIENT)
Dept: COLORECTAL SURGERY | Facility: CLINIC | Age: 63
End: 2018-08-28
Payer: MEDICARE

## 2018-08-28 PROCEDURE — 99213 OFFICE O/P EST LOW 20 MIN: CPT

## 2018-08-28 RX ORDER — GLIMEPIRIDE 2 MG/1
2 TABLET ORAL
Qty: 90 | Refills: 0 | Status: DISCONTINUED | COMMUNITY
Start: 2018-05-10

## 2018-08-28 RX ORDER — TRAZODONE HYDROCHLORIDE 100 MG/1
100 TABLET ORAL
Qty: 90 | Refills: 0 | Status: DISCONTINUED | COMMUNITY
Start: 2018-08-06

## 2018-08-28 RX ORDER — SIMVASTATIN 40 MG/1
40 TABLET, FILM COATED ORAL
Qty: 90 | Refills: 0 | Status: ACTIVE | COMMUNITY
Start: 2018-05-10

## 2018-08-28 RX ORDER — METFORMIN HYDROCHLORIDE 500 MG/1
500 TABLET, COATED ORAL
Qty: 180 | Refills: 0 | Status: DISCONTINUED | COMMUNITY
Start: 2018-05-10

## 2018-09-11 ENCOUNTER — APPOINTMENT (OUTPATIENT)
Dept: COLORECTAL SURGERY | Facility: CLINIC | Age: 63
End: 2018-09-11
Payer: MEDICARE

## 2018-09-11 PROCEDURE — 99213 OFFICE O/P EST LOW 20 MIN: CPT

## 2019-03-15 ENCOUNTER — TRANSCRIPTION ENCOUNTER (OUTPATIENT)
Age: 64
End: 2019-03-15

## 2019-03-15 ENCOUNTER — INPATIENT (INPATIENT)
Facility: HOSPITAL | Age: 64
LOS: 3 days | Discharge: HOME | End: 2019-03-19
Attending: UROLOGY | Admitting: UROLOGY
Payer: MEDICARE

## 2019-03-15 VITALS
RESPIRATION RATE: 20 BRPM | DIASTOLIC BLOOD PRESSURE: 115 MMHG | SYSTOLIC BLOOD PRESSURE: 187 MMHG | TEMPERATURE: 98 F | HEART RATE: 56 BPM | OXYGEN SATURATION: 100 %

## 2019-03-15 DIAGNOSIS — A41.9 SEPSIS, UNSPECIFIED ORGANISM: ICD-10-CM

## 2019-03-15 DIAGNOSIS — Z90.89 ACQUIRED ABSENCE OF OTHER ORGANS: Chronic | ICD-10-CM

## 2019-03-15 DIAGNOSIS — Z98.890 OTHER SPECIFIED POSTPROCEDURAL STATES: Chronic | ICD-10-CM

## 2019-03-15 DIAGNOSIS — Z90.710 ACQUIRED ABSENCE OF BOTH CERVIX AND UTERUS: Chronic | ICD-10-CM

## 2019-03-15 LAB
ALBUMIN SERPL ELPH-MCNC: 4.7 G/DL — SIGNIFICANT CHANGE UP (ref 3.3–5)
ALP SERPL-CCNC: 98 U/L — SIGNIFICANT CHANGE UP (ref 40–120)
ALT FLD-CCNC: 29 U/L — SIGNIFICANT CHANGE UP (ref 4–33)
ANION GAP SERPL CALC-SCNC: 17 MMO/L — HIGH (ref 7–14)
APPEARANCE UR: CLEAR — SIGNIFICANT CHANGE UP
APTT BLD: 21.4 SEC — LOW (ref 27.5–36.3)
AST SERPL-CCNC: 40 U/L — HIGH (ref 4–32)
BACTERIA # UR AUTO: SIGNIFICANT CHANGE UP
BASE EXCESS BLDV CALC-SCNC: 2.2 MMOL/L — SIGNIFICANT CHANGE UP
BASOPHILS # BLD AUTO: 0.04 K/UL — SIGNIFICANT CHANGE UP (ref 0–0.2)
BASOPHILS NFR BLD AUTO: 0.3 % — SIGNIFICANT CHANGE UP (ref 0–2)
BILIRUB SERPL-MCNC: 0.7 MG/DL — SIGNIFICANT CHANGE UP (ref 0.2–1.2)
BILIRUB UR-MCNC: NEGATIVE — SIGNIFICANT CHANGE UP
BLOOD GAS VENOUS - CREATININE: 1.53 MG/DL — HIGH (ref 0.5–1.3)
BLOOD UR QL VISUAL: HIGH
BUN SERPL-MCNC: 14 MG/DL — SIGNIFICANT CHANGE UP (ref 7–23)
CALCIUM SERPL-MCNC: 10.2 MG/DL — SIGNIFICANT CHANGE UP (ref 8.4–10.5)
CHLORIDE BLDV-SCNC: 102 MMOL/L — SIGNIFICANT CHANGE UP (ref 96–108)
CHLORIDE SERPL-SCNC: 95 MMOL/L — LOW (ref 98–107)
CO2 SERPL-SCNC: 23 MMOL/L — SIGNIFICANT CHANGE UP (ref 22–31)
COLOR SPEC: SIGNIFICANT CHANGE UP
CREAT SERPL-MCNC: 1.29 MG/DL — SIGNIFICANT CHANGE UP (ref 0.5–1.3)
EOSINOPHIL # BLD AUTO: 0.02 K/UL — SIGNIFICANT CHANGE UP (ref 0–0.5)
EOSINOPHIL NFR BLD AUTO: 0.2 % — SIGNIFICANT CHANGE UP (ref 0–6)
GAS PNL BLDV: 133 MMOL/L — LOW (ref 136–146)
GLUCOSE BLDV-MCNC: 240 — HIGH (ref 70–99)
GLUCOSE SERPL-MCNC: 256 MG/DL — HIGH (ref 70–99)
GLUCOSE UR-MCNC: 300 — HIGH
HCO3 BLDV-SCNC: 24 MMOL/L — SIGNIFICANT CHANGE UP (ref 20–27)
HCT VFR BLD CALC: 40.6 % — SIGNIFICANT CHANGE UP (ref 34.5–45)
HCT VFR BLDV CALC: 37.4 % — SIGNIFICANT CHANGE UP (ref 34.5–45)
HGB BLD-MCNC: 12.6 G/DL — SIGNIFICANT CHANGE UP (ref 11.5–15.5)
HGB BLDV-MCNC: 12.1 G/DL — SIGNIFICANT CHANGE UP (ref 11.5–15.5)
HYALINE CASTS # UR AUTO: NEGATIVE — SIGNIFICANT CHANGE UP
IMM GRANULOCYTES NFR BLD AUTO: 0.5 % — SIGNIFICANT CHANGE UP (ref 0–1.5)
INR BLD: 1.09 — SIGNIFICANT CHANGE UP (ref 0.88–1.17)
KETONES UR-MCNC: NEGATIVE — SIGNIFICANT CHANGE UP
LACTATE BLDV-MCNC: 5.4 MMOL/L — CRITICAL HIGH (ref 0.5–2)
LEUKOCYTE ESTERASE UR-ACNC: NEGATIVE — SIGNIFICANT CHANGE UP
LIDOCAIN IGE QN: 20.4 U/L — SIGNIFICANT CHANGE UP (ref 7–60)
LYMPHOCYTES # BLD AUTO: 1.08 K/UL — SIGNIFICANT CHANGE UP (ref 1–3.3)
LYMPHOCYTES # BLD AUTO: 8.5 % — LOW (ref 13–44)
MCHC RBC-ENTMCNC: 27.9 PG — SIGNIFICANT CHANGE UP (ref 27–34)
MCHC RBC-ENTMCNC: 31 % — LOW (ref 32–36)
MCV RBC AUTO: 89.8 FL — SIGNIFICANT CHANGE UP (ref 80–100)
MONOCYTES # BLD AUTO: 0.13 K/UL — SIGNIFICANT CHANGE UP (ref 0–0.9)
MONOCYTES NFR BLD AUTO: 1 % — LOW (ref 2–14)
NEUTROPHILS # BLD AUTO: 11.35 K/UL — HIGH (ref 1.8–7.4)
NEUTROPHILS NFR BLD AUTO: 89.5 % — HIGH (ref 43–77)
NITRITE UR-MCNC: NEGATIVE — SIGNIFICANT CHANGE UP
NRBC # FLD: 0 K/UL — LOW (ref 25–125)
PCO2 BLDV: 62 MMHG — HIGH (ref 41–51)
PH BLDV: 7.28 PH — LOW (ref 7.32–7.43)
PH UR: 7 — SIGNIFICANT CHANGE UP (ref 5–8)
PLATELET # BLD AUTO: 221 K/UL — SIGNIFICANT CHANGE UP (ref 150–400)
PMV BLD: 9.7 FL — SIGNIFICANT CHANGE UP (ref 7–13)
PO2 BLDV: < 24 MMHG — LOW (ref 35–40)
POTASSIUM BLDV-SCNC: 3.1 MMOL/L — LOW (ref 3.4–4.5)
POTASSIUM SERPL-MCNC: 5 MMOL/L — SIGNIFICANT CHANGE UP (ref 3.5–5.3)
POTASSIUM SERPL-SCNC: 5 MMOL/L — SIGNIFICANT CHANGE UP (ref 3.5–5.3)
PROT SERPL-MCNC: 8.7 G/DL — HIGH (ref 6–8.3)
PROT UR-MCNC: 20 — SIGNIFICANT CHANGE UP
PROTHROM AB SERPL-ACNC: 12.5 SEC — SIGNIFICANT CHANGE UP (ref 9.8–13.1)
RBC # BLD: 4.52 M/UL — SIGNIFICANT CHANGE UP (ref 3.8–5.2)
RBC # FLD: 13.8 % — SIGNIFICANT CHANGE UP (ref 10.3–14.5)
RBC CASTS # UR COMP ASSIST: HIGH (ref 0–?)
SAO2 % BLDV: 19.3 % — LOW (ref 60–85)
SODIUM SERPL-SCNC: 135 MMOL/L — SIGNIFICANT CHANGE UP (ref 135–145)
SP GR SPEC: 1.01 — SIGNIFICANT CHANGE UP (ref 1–1.04)
SQUAMOUS # UR AUTO: SIGNIFICANT CHANGE UP
UROBILINOGEN FLD QL: NORMAL — SIGNIFICANT CHANGE UP
WBC # BLD: 12.68 K/UL — HIGH (ref 3.8–10.5)
WBC # FLD AUTO: 12.68 K/UL — HIGH (ref 3.8–10.5)
WBC UR QL: HIGH (ref 0–?)

## 2019-03-15 PROCEDURE — 99223 1ST HOSP IP/OBS HIGH 75: CPT | Mod: 57

## 2019-03-15 PROCEDURE — 74177 CT ABD & PELVIS W/CONTRAST: CPT | Mod: 26

## 2019-03-15 RX ORDER — SODIUM CHLORIDE 9 MG/ML
1000 INJECTION INTRAMUSCULAR; INTRAVENOUS; SUBCUTANEOUS ONCE
Qty: 0 | Refills: 0 | Status: COMPLETED | OUTPATIENT
Start: 2019-03-15 | End: 2019-03-15

## 2019-03-15 RX ORDER — MORPHINE SULFATE 50 MG/1
4 CAPSULE, EXTENDED RELEASE ORAL ONCE
Qty: 0 | Refills: 0 | Status: DISCONTINUED | OUTPATIENT
Start: 2019-03-15 | End: 2019-03-15

## 2019-03-15 RX ORDER — LEVOTHYROXINE SODIUM 125 MCG
200 TABLET ORAL DAILY
Qty: 0 | Refills: 0 | Status: DISCONTINUED | OUTPATIENT
Start: 2019-03-15 | End: 2019-03-17

## 2019-03-15 RX ORDER — ONDANSETRON 8 MG/1
4 TABLET, FILM COATED ORAL ONCE
Qty: 0 | Refills: 0 | Status: COMPLETED | OUTPATIENT
Start: 2019-03-15 | End: 2019-03-15

## 2019-03-15 RX ORDER — METOCLOPRAMIDE HCL 10 MG
10 TABLET ORAL ONCE
Qty: 0 | Refills: 0 | Status: DISCONTINUED | OUTPATIENT
Start: 2019-03-16 | End: 2019-03-17

## 2019-03-15 RX ORDER — ONDANSETRON 8 MG/1
4 TABLET, FILM COATED ORAL ONCE
Qty: 0 | Refills: 0 | Status: DISCONTINUED | OUTPATIENT
Start: 2019-03-16 | End: 2019-03-17

## 2019-03-15 RX ORDER — HYDROMORPHONE HYDROCHLORIDE 2 MG/ML
0.5 INJECTION INTRAMUSCULAR; INTRAVENOUS; SUBCUTANEOUS ONCE
Qty: 0 | Refills: 0 | Status: DISCONTINUED | OUTPATIENT
Start: 2019-03-15 | End: 2019-03-15

## 2019-03-15 RX ORDER — HYDROMORPHONE HYDROCHLORIDE 2 MG/ML
0.5 INJECTION INTRAMUSCULAR; INTRAVENOUS; SUBCUTANEOUS
Qty: 0 | Refills: 0 | Status: DISCONTINUED | OUTPATIENT
Start: 2019-03-16 | End: 2019-03-17

## 2019-03-15 RX ORDER — HYDROMORPHONE HYDROCHLORIDE 2 MG/ML
1 INJECTION INTRAMUSCULAR; INTRAVENOUS; SUBCUTANEOUS
Qty: 0 | Refills: 0 | Status: DISCONTINUED | OUTPATIENT
Start: 2019-03-16 | End: 2019-03-17

## 2019-03-15 RX ORDER — CEFTRIAXONE 500 MG/1
1 INJECTION, POWDER, FOR SOLUTION INTRAMUSCULAR; INTRAVENOUS ONCE
Qty: 0 | Refills: 0 | Status: COMPLETED | OUTPATIENT
Start: 2019-03-15 | End: 2019-03-15

## 2019-03-15 RX ORDER — KETOROLAC TROMETHAMINE 30 MG/ML
30 SYRINGE (ML) INJECTION ONCE
Qty: 0 | Refills: 0 | Status: DISCONTINUED | OUTPATIENT
Start: 2019-03-15 | End: 2019-03-15

## 2019-03-15 RX ORDER — ACETAMINOPHEN 500 MG
1000 TABLET ORAL ONCE
Qty: 0 | Refills: 0 | Status: COMPLETED | OUTPATIENT
Start: 2019-03-15 | End: 2019-03-15

## 2019-03-15 RX ADMIN — SODIUM CHLORIDE 1000 MILLILITER(S): 9 INJECTION INTRAMUSCULAR; INTRAVENOUS; SUBCUTANEOUS at 22:49

## 2019-03-15 RX ADMIN — CEFTRIAXONE 100 GRAM(S): 500 INJECTION, POWDER, FOR SOLUTION INTRAMUSCULAR; INTRAVENOUS at 21:15

## 2019-03-15 RX ADMIN — ONDANSETRON 4 MILLIGRAM(S): 8 TABLET, FILM COATED ORAL at 15:29

## 2019-03-15 RX ADMIN — SODIUM CHLORIDE 1000 MILLILITER(S): 9 INJECTION INTRAMUSCULAR; INTRAVENOUS; SUBCUTANEOUS at 21:12

## 2019-03-15 RX ADMIN — Medication 400 MILLIGRAM(S): at 22:31

## 2019-03-15 RX ADMIN — SODIUM CHLORIDE 1000 MILLILITER(S): 9 INJECTION INTRAMUSCULAR; INTRAVENOUS; SUBCUTANEOUS at 19:11

## 2019-03-15 RX ADMIN — SODIUM CHLORIDE 1000 MILLILITER(S): 9 INJECTION INTRAMUSCULAR; INTRAVENOUS; SUBCUTANEOUS at 15:35

## 2019-03-15 RX ADMIN — Medication 30 MILLIGRAM(S): at 19:10

## 2019-03-15 RX ADMIN — ONDANSETRON 4 MILLIGRAM(S): 8 TABLET, FILM COATED ORAL at 22:31

## 2019-03-15 RX ADMIN — MORPHINE SULFATE 4 MILLIGRAM(S): 50 CAPSULE, EXTENDED RELEASE ORAL at 15:26

## 2019-03-15 RX ADMIN — MORPHINE SULFATE 4 MILLIGRAM(S): 50 CAPSULE, EXTENDED RELEASE ORAL at 16:53

## 2019-03-15 RX ADMIN — HYDROMORPHONE HYDROCHLORIDE 0.5 MILLIGRAM(S): 2 INJECTION INTRAMUSCULAR; INTRAVENOUS; SUBCUTANEOUS at 18:05

## 2019-03-15 NOTE — ED PROVIDER NOTE - NEURO NEGATIVE STATEMENT, MLM
- - -
no loss of consciousness, no gait abnormality, no headache, no sensory deficits, and no weakness.

## 2019-03-15 NOTE — ED PROVIDER NOTE - ATTENDING CONTRIBUTION TO CARE
63F h/o diverticulitis with h/o diverticular bleed, HTN, prior hysterectomy presents with LLQ abd pain. Pain since last night, now more severe. No rectal bleeding. Reports vomiting x2. Has not passed gas since last night. On exam uncomfortable appearing in pain, not toxic, mmm, rrr, lungs clear, abd + LLQ ttp, no rebound or guarding, 2+ pulses, no edema, no rash, alert, speech clear. Plan for labs, UA, CT abd w kidney stone. Plan for pain control.

## 2019-03-15 NOTE — ED PROVIDER NOTE - CLINICAL SUMMARY MEDICAL DECISION MAKING FREE TEXT BOX
63 year old female with a PMHx of HTN, diverticulitis complicated by lower GI bleed PSHx of [place] pw LLQ pain that began this morning at 10:30.  Assessment: LLQ tenderness associated with vomiting  Plan: CT abd/pelvis r/o diverticulitis flair

## 2019-03-15 NOTE — H&P ADULT - NSICDXPASTSURGICALHX_GEN_ALL_CORE_FT
PAST SURGICAL HISTORY:  H/O abdominal hysterectomy     History of back surgery     History of tonsillectomy

## 2019-03-15 NOTE — ED ADULT NURSE NOTE - OBJECTIVE STATEMENT
Patient endorsing LLQ abdominal pain since 1000 after walking to window. Denies eating. Denies hx of pain. 2 episodes NBNB emesis. Last BM today. Denies dysuria, fevers/chills. Hx Diverticulosis. IV placed #20g RAC. Family at bedside

## 2019-03-15 NOTE — ED PROVIDER NOTE - OBJECTIVE STATEMENT
63 year old female with a PMHx of HTN, diverticulitis complicated by lower GI bleed PSHx of [place] pw LLQ pain that began this morning at 10:30. Pain is constant, no relief with Tylenol (last at 10am), severe, associated with nausea and 2 episodes of NB/NB vomiting and subjective chills. Endorses that she had soft bowel movements. Previous episodes of diverticulitis had no pain but painless hematochezia requiring transfusion. Denies CP, SOB, dysuria, hematuria, melena, hematochezia, diarrhea.

## 2019-03-15 NOTE — H&P ADULT - NSICDXPROBLEM_GEN_ALL_CORE_FT
PROBLEM DIAGNOSES  Problem: Ureteral stone with hydronephrosis  Assessment and Plan: -Emergent OR for ureteral stent placement  -NPO  -Continue IVF  -Continue Ceftriaxone  -Follow up urine and blood cultures collected in the ED  -Strict I+Os  -analgesia prn  -antiemetics prn  -DVT prophylaxis  -Encourage OOB when able  -Incetive spirometry  -Supportive care

## 2019-03-15 NOTE — ED ADULT NURSE REASSESSMENT NOTE - NS ED NURSE REASSESS COMMENT FT1
Pt awaiting CT with IV contrast, report received from RN break coverage. New #20g placed into right hand, PT/INR unable to be obtained, MD aware. Patient pending CT, given additional 4mg of morphine for pain relief.

## 2019-03-15 NOTE — ED ADULT NURSE NOTE - NSIMPLEMENTINTERV_GEN_ALL_ED
Implemented All Universal Safety Interventions:  [place] to call system. Call bell, personal items and telephone within reach. Instruct patient to call for assistance. Room bathroom lighting operational. Non-slip footwear when patient is off stretcher. Physically safe environment: no spills, clutter or unnecessary equipment. Stretcher in lowest position, wheels locked, appropriate side rails in place.

## 2019-03-15 NOTE — H&P ADULT - ASSESSMENT
Admit for septic stone    -OR 63 year old female with a medical history significant for Hypothyroid, Diverticulitis, h/o GIB HTN, presenting with LLQ pain, nausea and vomiting, tachycardic and febrile with a lactate of 5.4 and worsening lethargy, with a 4mm Left ureteral stone and hydro, admitted for a septic stone.

## 2019-03-15 NOTE — H&P ADULT - NSHPLABSRESULTS_GEN_ALL_CORE
12.6   12.68 )-----------( 221      ( 15 Mar 2019 15:34 )             40.6     03-15    135  |  95<L>  |  14  ----------------------------<  256<H>  5.0   |  23  |  1.29    Ca    10.2      15 Mar 2019 15:34    TPro  8.7<H>  /  Alb  4.7  /  TBili  0.7  /  DBili  x   /  AST  40<H>  /  ALT  29  /  AlkPhos  98  03-15    Blood Gas Venous - Lactate: 5.4: Please note updated reference range. mmol/L (03.15.19 @ 20:23)    Urinalysis (03.15.19 @ 18:00)    Color: LIGHT YELLOW    Urine Appearance: CLEAR    Glucose: 300    Bilirubin: NEGATIVE    Ketone - Urine: NEGATIVE    Specific Gravity: 1.013    Blood: MODERATE    pH - Urine: 7.0    Protein, Urine: 20    Urobilinogen: NORMAL    Nitrite: NEGATIVE    Leukocyte Esterase Concentration: NEGATIVE    Red Blood Cell - Urine: 26-50    White Blood Cell - Urine: 11-25    Hyaline Casts: NEGATIVE    Bacteria: FEW    Squamous Epithelial: OCC      < end of copied text >      < from: CT Abdomen and Pelvis w/ IV Cont (03.15.19 @ 18:40) >    IMPRESSION:    A 4 mm proximal left ureteral calculus with mild associated   hydronephrosis.     An additional punctate nonobstructing right renal calculus.

## 2019-03-15 NOTE — H&P ADULT - HISTORY OF PRESENT ILLNESS
This is a 63 year old female with a medical history significant for Hypothyroid, Diverticulitis, h/o GIB presenting with septic stone This is a 63 year old female with a medical history significant for Hypothyroid, Diverticulitis, h/o GIB HTN, presenting with one day of left lower quadrant abdominal pain and nausea.  Patient reports symptoms occurred acutely and pain is sharp.  Pain is localized to the left lower quadrant, denies flank or back pain.  Reports nausea throughout the day and 2 episodes of emesis.  She denies fevers or chills at home.  Denies urinary symptoms including dysuria, hematuria, increase in urgency or frequency or retention.  Denies prior history of stones.  In the ED, she is tachycardic to 115, febrile to 102.8 rectally with a WBC of 12.6, lactate of 5.4, creatinine is 1.29 and UA is negative.  CT reveals a 4mm L ureteral stone with mild hydro.

## 2019-03-15 NOTE — H&P ADULT - NSHPPHYSICALEXAM_GEN_ALL_CORE
Vital Signs Last 24 Hrs  T(C): 38 (15 Mar 2019 22:33), Max: 39.3 (15 Mar 2019 21:29)  T(F): 100.4 (15 Mar 2019 22:33), Max: 102.8 (15 Mar 2019 21:29)  HR: 115 (15 Mar 2019 22:33) (56 - 115)  BP: 187/89 (15 Mar 2019 22:33) (134/66 - 187/115)  BP(mean): --  RR: 22 (15 Mar 2019 22:33) (20 - 22)  SpO2: 99% (15 Mar 2019 22:33) (99% - 100%)  GENERAL: ill appearing, A+Ox3 initially, becoming more lethargic  LUNGS: clear  HEART: regular  ABDOMEN: distended, soft, minimal LLQ tenderness, no flank tenderness, no CVAT  EXTREMITIES: no tenderness, no edema

## 2019-03-15 NOTE — H&P ADULT - NSHPSOCIALHISTORY_GEN_ALL_CORE
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

## 2019-03-15 NOTE — H&P ADULT - ATTENDING COMMENTS
Patient seen and examined. Agree with assessment and plan.  Patient with left obstructive pyelonephritis.    NPO  Emergent left stent placement  IV anabiotics.

## 2019-03-16 DIAGNOSIS — N13.2 HYDRONEPHROSIS WITH RENAL AND URETERAL CALCULOUS OBSTRUCTION: ICD-10-CM

## 2019-03-16 LAB
ALBUMIN SERPL ELPH-MCNC: 3.2 G/DL — LOW (ref 3.3–5)
ALP SERPL-CCNC: 63 U/L — SIGNIFICANT CHANGE UP (ref 40–120)
ALT FLD-CCNC: 19 U/L — SIGNIFICANT CHANGE UP (ref 4–33)
ANION GAP SERPL CALC-SCNC: 18 MMO/L — HIGH (ref 7–14)
AST SERPL-CCNC: 26 U/L — SIGNIFICANT CHANGE UP (ref 4–32)
BASOPHILS # BLD AUTO: 0.03 K/UL — SIGNIFICANT CHANGE UP (ref 0–0.2)
BASOPHILS NFR BLD AUTO: 0.2 % — SIGNIFICANT CHANGE UP (ref 0–2)
BASOPHILS NFR SPEC: 0 % — SIGNIFICANT CHANGE UP (ref 0–2)
BILIRUB SERPL-MCNC: 0.5 MG/DL — SIGNIFICANT CHANGE UP (ref 0.2–1.2)
BLASTS # FLD: 0 % — SIGNIFICANT CHANGE UP (ref 0–0)
BUN SERPL-MCNC: 16 MG/DL — SIGNIFICANT CHANGE UP (ref 7–23)
CALCIUM SERPL-MCNC: 7.7 MG/DL — LOW (ref 8.4–10.5)
CHLORIDE SERPL-SCNC: 101 MMOL/L — SIGNIFICANT CHANGE UP (ref 98–107)
CO2 SERPL-SCNC: 19 MMOL/L — LOW (ref 22–31)
CREAT SERPL-MCNC: 2.25 MG/DL — HIGH (ref 0.5–1.3)
EOSINOPHIL # BLD AUTO: 0.02 K/UL — SIGNIFICANT CHANGE UP (ref 0–0.5)
EOSINOPHIL NFR BLD AUTO: 0.1 % — SIGNIFICANT CHANGE UP (ref 0–6)
EOSINOPHIL NFR FLD: 0.9 % — SIGNIFICANT CHANGE UP (ref 0–6)
GLUCOSE SERPL-MCNC: 184 MG/DL — HIGH (ref 70–99)
HCT VFR BLD CALC: 29.5 % — LOW (ref 34.5–45)
HGB BLD-MCNC: 9.1 G/DL — LOW (ref 11.5–15.5)
IMM GRANULOCYTES NFR BLD AUTO: 1.6 % — HIGH (ref 0–1.5)
LACTATE SERPL-SCNC: 5.9 MMOL/L — CRITICAL HIGH (ref 0.5–2)
LYMPHOCYTES # BLD AUTO: 0.66 K/UL — LOW (ref 1–3.3)
LYMPHOCYTES # BLD AUTO: 4.5 % — LOW (ref 13–44)
LYMPHOCYTES NFR SPEC AUTO: 4.3 % — LOW (ref 13–44)
MAGNESIUM SERPL-MCNC: 1.2 MG/DL — LOW (ref 1.6–2.6)
MCHC RBC-ENTMCNC: 28.5 PG — SIGNIFICANT CHANGE UP (ref 27–34)
MCHC RBC-ENTMCNC: 30.8 % — LOW (ref 32–36)
MCV RBC AUTO: 92.5 FL — SIGNIFICANT CHANGE UP (ref 80–100)
METAMYELOCYTES # FLD: 4.3 % — HIGH (ref 0–1)
MONOCYTES # BLD AUTO: 0.32 K/UL — SIGNIFICANT CHANGE UP (ref 0–0.9)
MONOCYTES NFR BLD AUTO: 2.2 % — SIGNIFICANT CHANGE UP (ref 2–14)
MONOCYTES NFR BLD: 0.9 % — LOW (ref 2–9)
MYELOCYTES NFR BLD: 1.7 % — HIGH (ref 0–0)
NEUTROPHIL AB SER-ACNC: 66.1 % — SIGNIFICANT CHANGE UP (ref 43–77)
NEUTROPHILS # BLD AUTO: 13.26 K/UL — HIGH (ref 1.8–7.4)
NEUTROPHILS NFR BLD AUTO: 91.4 % — HIGH (ref 43–77)
NEUTS BAND # BLD: 20.9 % — HIGH (ref 0–6)
NRBC # FLD: 0 K/UL — LOW (ref 25–125)
OTHER - HEMATOLOGY %: 0 — SIGNIFICANT CHANGE UP
PHOSPHATE SERPL-MCNC: 1.6 MG/DL — LOW (ref 2.5–4.5)
PLATELET # BLD AUTO: 144 K/UL — LOW (ref 150–400)
PLATELET COUNT - ESTIMATE: SIGNIFICANT CHANGE UP
PMV BLD: 10.4 FL — SIGNIFICANT CHANGE UP (ref 7–13)
POTASSIUM SERPL-MCNC: 3.2 MMOL/L — LOW (ref 3.5–5.3)
POTASSIUM SERPL-SCNC: 3.2 MMOL/L — LOW (ref 3.5–5.3)
PROMYELOCYTES # FLD: 0 % — SIGNIFICANT CHANGE UP (ref 0–0)
PROT SERPL-MCNC: 6.2 G/DL — SIGNIFICANT CHANGE UP (ref 6–8.3)
RBC # BLD: 3.19 M/UL — LOW (ref 3.8–5.2)
RBC # FLD: 14.3 % — SIGNIFICANT CHANGE UP (ref 10.3–14.5)
SODIUM SERPL-SCNC: 138 MMOL/L — SIGNIFICANT CHANGE UP (ref 135–145)
SPECIMEN SOURCE: SIGNIFICANT CHANGE UP
SPECIMEN SOURCE: SIGNIFICANT CHANGE UP
VARIANT LYMPHS # BLD: 0.9 % — SIGNIFICANT CHANGE UP
WBC # BLD: 14.52 K/UL — HIGH (ref 3.8–10.5)
WBC # FLD AUTO: 14.52 K/UL — HIGH (ref 3.8–10.5)

## 2019-03-16 PROCEDURE — 99291 CRITICAL CARE FIRST HOUR: CPT

## 2019-03-16 PROCEDURE — 99231 SBSQ HOSP IP/OBS SF/LOW 25: CPT | Mod: 25

## 2019-03-16 PROCEDURE — 52332 CYSTOSCOPY AND TREATMENT: CPT | Mod: LT

## 2019-03-16 RX ORDER — SODIUM CHLORIDE 9 MG/ML
1000 INJECTION, SOLUTION INTRAVENOUS
Qty: 0 | Refills: 0 | Status: DISCONTINUED | OUTPATIENT
Start: 2019-03-16 | End: 2019-03-17

## 2019-03-16 RX ORDER — ACETAMINOPHEN 500 MG
650 TABLET ORAL EVERY 6 HOURS
Qty: 0 | Refills: 0 | Status: DISCONTINUED | OUTPATIENT
Start: 2019-03-16 | End: 2019-03-18

## 2019-03-16 RX ORDER — HYDROMORPHONE HYDROCHLORIDE 2 MG/ML
0.5 INJECTION INTRAMUSCULAR; INTRAVENOUS; SUBCUTANEOUS
Qty: 0 | Refills: 0 | Status: DISCONTINUED | OUTPATIENT
Start: 2019-03-16 | End: 2019-03-18

## 2019-03-16 RX ORDER — MAGNESIUM SULFATE 500 MG/ML
2 VIAL (ML) INJECTION ONCE
Qty: 0 | Refills: 0 | Status: COMPLETED | OUTPATIENT
Start: 2019-03-16 | End: 2019-03-16

## 2019-03-16 RX ORDER — POTASSIUM PHOSPHATE, MONOBASIC POTASSIUM PHOSPHATE, DIBASIC 236; 224 MG/ML; MG/ML
15 INJECTION, SOLUTION INTRAVENOUS ONCE
Qty: 0 | Refills: 0 | Status: COMPLETED | OUTPATIENT
Start: 2019-03-16 | End: 2019-03-16

## 2019-03-16 RX ORDER — CEFTRIAXONE 500 MG/1
1 INJECTION, POWDER, FOR SOLUTION INTRAMUSCULAR; INTRAVENOUS EVERY 24 HOURS
Qty: 0 | Refills: 0 | Status: DISCONTINUED | OUTPATIENT
Start: 2019-03-16 | End: 2019-03-19

## 2019-03-16 RX ORDER — ACETAMINOPHEN 500 MG
1000 TABLET ORAL ONCE
Qty: 0 | Refills: 0 | Status: COMPLETED | OUTPATIENT
Start: 2019-03-16 | End: 2019-03-16

## 2019-03-16 RX ORDER — HEPARIN SODIUM 5000 [USP'U]/ML
5000 INJECTION INTRAVENOUS; SUBCUTANEOUS EVERY 8 HOURS
Qty: 0 | Refills: 0 | Status: DISCONTINUED | OUTPATIENT
Start: 2019-03-16 | End: 2019-03-19

## 2019-03-16 RX ORDER — SODIUM CHLORIDE 9 MG/ML
1000 INJECTION, SOLUTION INTRAVENOUS ONCE
Qty: 0 | Refills: 0 | Status: COMPLETED | OUTPATIENT
Start: 2019-03-16 | End: 2019-03-16

## 2019-03-16 RX ADMIN — SODIUM CHLORIDE 125 MILLILITER(S): 9 INJECTION, SOLUTION INTRAVENOUS at 04:30

## 2019-03-16 RX ADMIN — POTASSIUM PHOSPHATE, MONOBASIC POTASSIUM PHOSPHATE, DIBASIC 62.5 MILLIMOLE(S): 236; 224 INJECTION, SOLUTION INTRAVENOUS at 08:18

## 2019-03-16 RX ADMIN — SODIUM CHLORIDE 125 MILLILITER(S): 9 INJECTION, SOLUTION INTRAVENOUS at 00:35

## 2019-03-16 RX ADMIN — HEPARIN SODIUM 5000 UNIT(S): 5000 INJECTION INTRAVENOUS; SUBCUTANEOUS at 06:58

## 2019-03-16 RX ADMIN — HYDROMORPHONE HYDROCHLORIDE 0.5 MILLIGRAM(S): 2 INJECTION INTRAMUSCULAR; INTRAVENOUS; SUBCUTANEOUS at 10:20

## 2019-03-16 RX ADMIN — HYDROMORPHONE HYDROCHLORIDE 0.5 MILLIGRAM(S): 2 INJECTION INTRAMUSCULAR; INTRAVENOUS; SUBCUTANEOUS at 10:35

## 2019-03-16 RX ADMIN — HYDROMORPHONE HYDROCHLORIDE 0.5 MILLIGRAM(S): 2 INJECTION INTRAMUSCULAR; INTRAVENOUS; SUBCUTANEOUS at 22:00

## 2019-03-16 RX ADMIN — HYDROMORPHONE HYDROCHLORIDE 0.5 MILLIGRAM(S): 2 INJECTION INTRAMUSCULAR; INTRAVENOUS; SUBCUTANEOUS at 05:01

## 2019-03-16 RX ADMIN — Medication 1000 MILLIGRAM(S): at 18:37

## 2019-03-16 RX ADMIN — HEPARIN SODIUM 5000 UNIT(S): 5000 INJECTION INTRAVENOUS; SUBCUTANEOUS at 14:19

## 2019-03-16 RX ADMIN — Medication 200 MICROGRAM(S): at 06:59

## 2019-03-16 RX ADMIN — Medication 50 GRAM(S): at 07:26

## 2019-03-16 RX ADMIN — HYDROMORPHONE HYDROCHLORIDE 0.5 MILLIGRAM(S): 2 INJECTION INTRAMUSCULAR; INTRAVENOUS; SUBCUTANEOUS at 04:41

## 2019-03-16 RX ADMIN — HEPARIN SODIUM 5000 UNIT(S): 5000 INJECTION INTRAVENOUS; SUBCUTANEOUS at 22:00

## 2019-03-16 RX ADMIN — Medication 400 MILLIGRAM(S): at 17:56

## 2019-03-16 RX ADMIN — SODIUM CHLORIDE 1000 MILLILITER(S): 9 INJECTION, SOLUTION INTRAVENOUS at 03:05

## 2019-03-16 RX ADMIN — HYDROMORPHONE HYDROCHLORIDE 0.5 MILLIGRAM(S): 2 INJECTION INTRAMUSCULAR; INTRAVENOUS; SUBCUTANEOUS at 23:04

## 2019-03-16 RX ADMIN — CEFTRIAXONE 100 GRAM(S): 500 INJECTION, POWDER, FOR SOLUTION INTRAMUSCULAR; INTRAVENOUS at 21:00

## 2019-03-16 NOTE — CONSULT NOTE ADULT - SUBJECTIVE AND OBJECTIVE BOX
SICU Consultation Note  =====================================================  HPI: 63y female  with medical history significant for Hypothyroid, Diverticulitis, h/o GIB HTN, presenting with one day of left lower quadrant abdominal pain and nausea.  Patient reports symptoms occurred acutely and pain is sharp.  Pain is localized to the left lower quadrant, denies flank or back pain.  Reports nausea throughout the day and 2 episodes of emesis.  She denies fevers or chills at home.  Denies urinary symptoms including dysuria, hematuria, increase in urgency or frequency or retention.  Denies prior history of stones.  In the ED, she is tachycardic to 115, febrile to 102.8 rectally with a WBC of 12.6, lactate of 5.4, creatinine is 1.29 and UA is negative.  CT reveals a 4mm L ureteral stone with mild hydronephrosis. S/p L ureteral stent placement, hypotensive in OR no pressors required.       Surgery Information  Left ureteral stent placement   Case Duration:   30 mins   EBL:   5 cc    IV Fluids:  1L     Blood Products:         Complications:  Hypotensive 80s/40s, responded well to IVF      HISTORY  63yFemale  Allergies:   PAST MEDICAL & SURGICAL HISTORY:  Lower gastrointestinal bleeding: 4x 2014- 2016  HTN (hypertension)  Diverticulosis  Hypothyroidism  H/O abdominal hysterectomy  History of back surgery  History of tonsillectomy    FAMILY HISTORY:  No pertinent family history in first degree relatives    ADVANCE DIRECTIVES: Presumed Full Code     REVIEW OF SYSTEMS:   General: Non-Contributory  Skin/Breast: Non-Contributory  Ophthalmologic: Non-Contributory  ENMT: Non-Contributory  Respiratory and Thorax: Non-Contributory  Cardiovascular: Non-Contributory  Gastrointestinal: Non-Contributory  Genitourinary: Non-Contributory  Musculoskeletal: Non-Contributory  Neurological: Non-Contributory  Psychiatric: Non-Contributory  Hematology/Lymphatics: Non-Contributory  Endocrine: Non-Contributory  Allergic/Immunologic: Non-Contributory      CURRENT MEDICATIONS:   --------------------------------------------------------------------------------------  Neurologic Medications  acetaminophen   Tablet .. 650 milliGRAM(s) Oral every 6 hours PRN Temp greater or equal to 38C (100.4F), Mild Pain (1 - 3)  HYDROmorphone  Injectable 0.5 milliGRAM(s) IV Push every 10 minutes PRN Moderate Pain (4 - 6)  HYDROmorphone  Injectable 1 milliGRAM(s) IV Push every 10 minutes PRN Severe Pain (7 - 10)  metoclopramide Injectable 10 milliGRAM(s) IV Push once PRN Nausea and/or Vomiting  ondansetron Injectable 4 milliGRAM(s) IV Push once PRN Nausea and/or Vomiting    Respiratory Medications    Cardiovascular Medications    Gastrointestinal Medications  lactated ringers. 1000 milliLiter(s) IV Continuous <Continuous>    Genitourinary Medications    Hematologic/Oncologic Medications  heparin  Injectable 5000 Unit(s) SubCutaneous every 8 hours    Antimicrobial/Immunologic Medications  cefTRIAXone   IVPB 1 Gram(s) IV Intermittent every 24 hours    Endocrine/Metabolic Medications  levothyroxine 200 MICROGram(s) Oral daily    Topical/Other Medications    --------------------------------------------------------------------------------------    VITAL SIGNS, INS/OUTS (last 24 hours):  --------------------------------------------------------------------------------------  T(C): 37.6 (19 @ 00:25), Max: 39.3 (03-15-19 @ 21:29)  HR: 101 (19 @ 01:00) (56 - 115)  BP: 107/57 (03-16-19 @ 00:45) (107/57 - 187/115)  BP(mean): 68 (19 @ 00:45) (68 - 77)  RR: 16 (19 @ 01:00) (16 - 26)  SpO2: 95% (19 @ 01:00) (95% - 100%)  CAPILLARY BLOOD GLUCOSE  --------------------------------------------------------------------------------------    EXAM:  NEUROLOGY  Exam: Normal, NAD, alert, oriented x 3, no focal deficits. PERRLA     RESPIRATORY  Exam: Lungs clear to auscultation, Normal expansion/effort.     CARDIOVASCULAR  Exam: S1, S2.  Regular rate and rhythm.  Peripheral edema   Cardiac Rhythm: Normal Sinus Rhythm    GI/NUTRITION  Exam: Abdomen soft, Non-tender, Non-distended.    Current Diet:  NPO    METABOLIC/FLUIDS/ELECTROLYTES  lactated ringers. 1000 milliLiter(s) IV Continuous <Continuous>      HEMATOLOGIC  [x] DVT Prophylaxis: heparin  Injectable 5000 Unit(s) SubCutaneous every 8 hours    Transfusions:	[] PRBC	[] Platelets		[] FFP	[] Cryoprecipitate    INFECTIOUS DISEASE  Antimicrobials/Immunologic Medications:  cefTRIAXone   IVPB 1 Gram(s) IV Intermittent every 24 hours    Day #  of   ****    Tubes/Lines/Drains  ***  [x] Peripheral IV  [] Central Venous Line     	[] R	[] L	[] IJ	[] Fem	[] SC	Date Placed:   [] Arterial Line		[] R	[] L	[] Fem	[] Rad	[] Ax	Date Placed:   [] PICC:         	[] Midline		[] Mediport  [x] Urinary Catheter		Date Placed:     VASCULAR  Exam: Extremities warm, pink, well-perfused.     MUSCULOSKELETAL  Exam: All extremities moving spontaneously without limitations.      SKIN:  Exam: Good skin turgor, no skin breakdown.        LABS  --------------------------------------------------------------------------------------  CBC (03-15 @ 15:34)                              12.6                           12.68<H>  )----------------(  221        89.5<H>% Neutrophils, 8.5<L>% Lymphocytes, ANC: 11.35<H>                              40.6      BMP (03-15 @ 15:34)             135     |  95<L>   |  14    		Ca++ --      Ca 10.2               ---------------------------------( 256<H>		Mg --                 5.0     |  23      |  1.29  			Ph --        LFTs (03-15 @ 15:34)      TPro 8.7<H> / Alb 4.7 / TBili 0.7 / DBili -- / AST 40<H> / ALT 29 / AlkPhos 98    Coags (03-15 @ 22:30)  aPTT 21.4<L> / INR 1.09 / PT 12.5      ABG (03-15 @ 20:23)      /  /  /  /  / %     Lactate:   5.4<HH>    VBG (03-15 @ 20:23)     7.28<L> / 62<H> / < 24<L> / 24 / 2.2 / 19.3<L>%    Urinalysis (03-15 @ 18:00):     Color: LIGHT YELLOW / Appearance: CLEAR / S.013 / pH: 7.0 / Gluc: 300<H> / Ketones: NEGATIVE / Bili: NEGATIVE / Urobili: NORMAL / Protein :20 / Nitrites: NEGATIVE / Leuk.Est: NEGATIVE / RBC: 26-50<H> / WBC: 11-25<H> / Sq Epi: OCC / Non Sq Epi:  / Bacteria FEW         --------------------------------------------------------------------------------------    OTHER LABS    IMAGING RESULTS

## 2019-03-16 NOTE — PROVIDER CONTACT NOTE (OTHER) - ACTION/TREATMENT ORDERED:
Dr. Karen VITAL notified. 2 liters nasal canula applied, pt denies shortness of breathe at this time. resp  rate of 22 sat 100 percent. per team no labs needed at this time. continue to monitor.

## 2019-03-16 NOTE — PROGRESS NOTE ADULT - SUBJECTIVE AND OBJECTIVE BOX
Note    Post op Check    s/p L ureteral stent placement  EBL 0ml  Hypotensive and tachycardic per and postoperatively   SICU consult requested    Pt seen and examined without complaints, reporting pain is better controlled, denies further nausea, denies fevers/chills.    Vital Signs Last 24 Hrs  T(C): 37.6 (16 Mar 2019 00:25), Max: 39.3 (15 Mar 2019 21:29)  T(F): 99.7 (16 Mar 2019 00:25), Max: 102.8 (15 Mar 2019 21:29)  HR: 105 (16 Mar 2019 03:15) (56 - 115)  BP: 94/53 (16 Mar 2019 03:15) (88/52 - 187/115)  BP(mean): 62 (16 Mar 2019 03:15) (55 - 77)  RR: 20 (16 Mar 2019 03:15) (16 - 26)  SpO2: 98% (16 Mar 2019 03:15) (94% - 100%)    I&O's Summary    15 Mar 2019 07:01  -  16 Mar 2019 03:35  --------------------------------------------------------  IN: 1375 mL / OUT: 255 mL / NET: 1120 mL    Perla: clear yellow urine, 255    PHYSICAL EXAM:   Constitutional: awake and alert, oriented x3, no acute distress    Respiratory: clear    Cardiovascular: regular    Gastrointestinal: soft, nontender    Genitourinary: perla in place, draining well, clear yellow    Extremities: venodynes in place

## 2019-03-16 NOTE — CONSULT NOTE ADULT - ASSESSMENT
63y Female    PLAN:   Neurologic:   Tylenol q6 PRN for pain  IVP dilaudid PRN for pain    Respiratory:   Satting well on RA    Cardiovascular:   BP stable   Continue to monitor in PACU  No pressor requirements at this time    Gastrointestinal/Nutrition:   NPO   Zofran PRN nausea    Renal/Genitourinary:   Replete electrolytes as needed  Simpson   Strict Is and Os    Hematologic:   H/H stable   follow up morning labs    Infectious Disease:   +UTI   Ceftriaxone  f/u urine cultures    Endocrine:   No acute issues    Disposition: Monitor in PACU overnight, Floor in am    --------------------------------------------------------------------------------------    Critical Care Diagnoses: Hypotension, urosepsis 63y Female s/p cystoscopy, placement of L ureteral stent now with urosepsis    PLAN:   Neurologic:   Tylenol q6 PRN for pain  IVP dilaudid PRN for pain    Respiratory:   Satting well on RA    Cardiovascular:   BP stable, was intermittently hypotensive  Continue to monitor in PACU  No pressor requirements at this time  -lactate not clearing, currently > 5    Gastrointestinal/Nutrition:   NPO   Zofran PRN nausea    Renal/Genitourinary:   Replete electrolytes as needed  Simpson   Strict Is and Os    Hematologic:   H/H stable   follow up morning labs    Infectious Disease:   +UTI   Ceftriaxone  f/u urine cultures    Endocrine:   No acute issues    Disposition: Monitor in PACU overnight, Floor in am    --------------------------------------------------------------------------------------    Critical Care Diagnoses: Hypotension, urosepsis

## 2019-03-16 NOTE — CONSULT NOTE ADULT - ATTENDING COMMENTS
Critical Care Dx    Urosepsis  -s/p ureteral stent placement  -IV abx per urology  -trend lactate - persistent elevation i/s/o resuscitation     Hypothyroidism  -restart home meds    Hypokalemia  -replete K, Magnesium  -trends labs    Acute Kidney Injury, postobstructive   -trend u/o, creatinine  HTN  -hold home meds for now and await normal BP    The patient is a critical care patient with life threatening hemodynamic instability in SICU.  I have personally interviewed and examined the patient, reviewed data and laboratory tests/x-rays and all pertinent electronic images.  The SICU team has a constant risk benefit analyzes discussion with the primary team, all consultants, House Staff and PA's on all decisions.  These diagnoses are unrelated to the surgical procedure noted.  Time involved in performance of separately billable procedures was not counted toward my critical care time. There is no overlap.    I spent 65 minutes in total providing critical care for the diagnoses, assessment and plan above.      I was physically present for the key portions of the evaluation and management (E/M) service provided.  I agree with the above history, physical, and plan which I have reviewed and edited where appropriate.     Zion Torres MD  Acute and Critical Care Surgery Critical Care Dx    Sepsis associated hypotension: Urosepsis  -s/p ureteral stent placement  -IV abx per urology  -trend lactate - persistent elevation i/s/o resuscitation     Hypothyroidism  -restart home meds    Hypokalemia  -replete K, Magnesium  -trends labs    Acute Kidney Injury, postobstructive   -trend u/o, creatinine  HTN  -hold home meds for now and await normal BP    The patient is a critical care patient with life threatening hemodynamic instability in SICU.  I have personally interviewed and examined the patient, reviewed data and laboratory tests/x-rays and all pertinent electronic images.  The SICU team has a constant risk benefit analyzes discussion with the primary team, all consultants, House Staff and PA's on all decisions.  These diagnoses are unrelated to the surgical procedure noted.  Time involved in performance of separately billable procedures was not counted toward my critical care time. There is no overlap.    I spent 65 minutes in total providing critical care for the diagnoses, assessment and plan above.      I was physically present for the key portions of the evaluation and management (E/M) service provided.  I agree with the above history, physical, and plan which I have reviewed and edited where appropriate.     Zion Torres MD  Acute and Critical Care Surgery

## 2019-03-16 NOTE — PROGRESS NOTE ADULT - ASSESSMENT
63 year old female s/p L ureteral stent placement for a septic stone, hypotensive and tachycardic postop, SICU consulted, currently stable.     -Strict I&O's  -Analgesia prn  -Antiemetics prn  -Continue IVF, s/p recent 1L bolus  -Continue Ceftriaxone  -DVT prophylaxis  -Incentive spirometry  -Appreciate SICU and PACU management  -OOB  -Regular diet  -Follow up urine and blood cultures  -AM labs

## 2019-03-17 LAB
ANION GAP SERPL CALC-SCNC: 16 MMO/L — HIGH (ref 7–14)
BUN SERPL-MCNC: 25 MG/DL — HIGH (ref 7–23)
CA-I BLD-SCNC: 0.97 MMOL/L — LOW (ref 1.03–1.23)
CALCIUM SERPL-MCNC: 7.7 MG/DL — LOW (ref 8.4–10.5)
CHLORIDE SERPL-SCNC: 102 MMOL/L — SIGNIFICANT CHANGE UP (ref 98–107)
CO2 SERPL-SCNC: 17 MMOL/L — LOW (ref 22–31)
CREAT SERPL-MCNC: 2.33 MG/DL — HIGH (ref 0.5–1.3)
GLUCOSE SERPL-MCNC: 95 MG/DL — SIGNIFICANT CHANGE UP (ref 70–99)
HCT VFR BLD CALC: 34.7 % — SIGNIFICANT CHANGE UP (ref 34.5–45)
HGB BLD-MCNC: 10.6 G/DL — LOW (ref 11.5–15.5)
LACTATE SERPL-SCNC: 3 MMOL/L — HIGH (ref 0.5–2)
MAGNESIUM SERPL-MCNC: 1.6 MG/DL — SIGNIFICANT CHANGE UP (ref 1.6–2.6)
MCHC RBC-ENTMCNC: 28.4 PG — SIGNIFICANT CHANGE UP (ref 27–34)
MCHC RBC-ENTMCNC: 30.5 % — LOW (ref 32–36)
MCV RBC AUTO: 93 FL — SIGNIFICANT CHANGE UP (ref 80–100)
NRBC # FLD: 0 K/UL — LOW (ref 25–125)
PHOSPHATE SERPL-MCNC: 3.4 MG/DL — SIGNIFICANT CHANGE UP (ref 2.5–4.5)
PLATELET # BLD AUTO: 150 K/UL — SIGNIFICANT CHANGE UP (ref 150–400)
PMV BLD: 10.3 FL — SIGNIFICANT CHANGE UP (ref 7–13)
POTASSIUM SERPL-MCNC: 4.4 MMOL/L — SIGNIFICANT CHANGE UP (ref 3.5–5.3)
POTASSIUM SERPL-SCNC: 4.4 MMOL/L — SIGNIFICANT CHANGE UP (ref 3.5–5.3)
RBC # BLD: 3.73 M/UL — LOW (ref 3.8–5.2)
RBC # FLD: 14.7 % — HIGH (ref 10.3–14.5)
SODIUM SERPL-SCNC: 135 MMOL/L — SIGNIFICANT CHANGE UP (ref 135–145)
SPECIMEN SOURCE: SIGNIFICANT CHANGE UP
SPECIMEN SOURCE: SIGNIFICANT CHANGE UP
WBC # BLD: 22.83 K/UL — HIGH (ref 3.8–10.5)
WBC # FLD AUTO: 22.83 K/UL — HIGH (ref 3.8–10.5)

## 2019-03-17 PROCEDURE — 99232 SBSQ HOSP IP/OBS MODERATE 35: CPT | Mod: GC

## 2019-03-17 PROCEDURE — 99231 SBSQ HOSP IP/OBS SF/LOW 25: CPT

## 2019-03-17 RX ORDER — MAGNESIUM SULFATE 500 MG/ML
1 VIAL (ML) INJECTION ONCE
Qty: 0 | Refills: 0 | Status: DISCONTINUED | OUTPATIENT
Start: 2019-03-17 | End: 2019-03-17

## 2019-03-17 RX ORDER — SODIUM CHLORIDE 9 MG/ML
1000 INJECTION INTRAMUSCULAR; INTRAVENOUS; SUBCUTANEOUS
Qty: 0 | Refills: 0 | Status: DISCONTINUED | OUTPATIENT
Start: 2019-03-17 | End: 2019-03-19

## 2019-03-17 RX ORDER — MAGNESIUM SULFATE 500 MG/ML
2 VIAL (ML) INJECTION ONCE
Qty: 0 | Refills: 0 | Status: COMPLETED | OUTPATIENT
Start: 2019-03-17 | End: 2019-03-17

## 2019-03-17 RX ORDER — LEVOTHYROXINE SODIUM 125 MCG
100 TABLET ORAL AT BEDTIME
Qty: 0 | Refills: 0 | Status: DISCONTINUED | OUTPATIENT
Start: 2019-03-17 | End: 2019-03-18

## 2019-03-17 RX ADMIN — HYDROMORPHONE HYDROCHLORIDE 0.5 MILLIGRAM(S): 2 INJECTION INTRAMUSCULAR; INTRAVENOUS; SUBCUTANEOUS at 13:40

## 2019-03-17 RX ADMIN — HYDROMORPHONE HYDROCHLORIDE 0.5 MILLIGRAM(S): 2 INJECTION INTRAMUSCULAR; INTRAVENOUS; SUBCUTANEOUS at 07:15

## 2019-03-17 RX ADMIN — HYDROMORPHONE HYDROCHLORIDE 0.5 MILLIGRAM(S): 2 INJECTION INTRAMUSCULAR; INTRAVENOUS; SUBCUTANEOUS at 04:00

## 2019-03-17 RX ADMIN — HEPARIN SODIUM 5000 UNIT(S): 5000 INJECTION INTRAVENOUS; SUBCUTANEOUS at 20:52

## 2019-03-17 RX ADMIN — HYDROMORPHONE HYDROCHLORIDE 0.5 MILLIGRAM(S): 2 INJECTION INTRAMUSCULAR; INTRAVENOUS; SUBCUTANEOUS at 03:45

## 2019-03-17 RX ADMIN — HYDROMORPHONE HYDROCHLORIDE 0.5 MILLIGRAM(S): 2 INJECTION INTRAMUSCULAR; INTRAVENOUS; SUBCUTANEOUS at 13:24

## 2019-03-17 RX ADMIN — HYDROMORPHONE HYDROCHLORIDE 0.5 MILLIGRAM(S): 2 INJECTION INTRAMUSCULAR; INTRAVENOUS; SUBCUTANEOUS at 20:52

## 2019-03-17 RX ADMIN — HYDROMORPHONE HYDROCHLORIDE 0.5 MILLIGRAM(S): 2 INJECTION INTRAMUSCULAR; INTRAVENOUS; SUBCUTANEOUS at 21:23

## 2019-03-17 RX ADMIN — Medication 50 GRAM(S): at 05:17

## 2019-03-17 RX ADMIN — HEPARIN SODIUM 5000 UNIT(S): 5000 INJECTION INTRAVENOUS; SUBCUTANEOUS at 05:17

## 2019-03-17 RX ADMIN — HEPARIN SODIUM 5000 UNIT(S): 5000 INJECTION INTRAVENOUS; SUBCUTANEOUS at 13:25

## 2019-03-17 RX ADMIN — HYDROMORPHONE HYDROCHLORIDE 0.5 MILLIGRAM(S): 2 INJECTION INTRAMUSCULAR; INTRAVENOUS; SUBCUTANEOUS at 07:30

## 2019-03-17 RX ADMIN — CEFTRIAXONE 100 GRAM(S): 500 INJECTION, POWDER, FOR SOLUTION INTRAMUSCULAR; INTRAVENOUS at 20:53

## 2019-03-17 RX ADMIN — Medication 200 MICROGRAM(S): at 05:17

## 2019-03-17 NOTE — PROGRESS NOTE ADULT - ASSESSMENT
63 year old female s/p L ureteral stent placement for a septic stone, hypotensive and tachycardic postop, SICU consulted, currently stable.     - Check renal u/s to evaluate for left hydronephrosis.  - Monitor BP  - Trend fevers  - Continue ABX  - Follow up blood and urine cultures  - OOB and ambulate  - Likely to be transferred to floor today.

## 2019-03-17 NOTE — PROGRESS NOTE ADULT - ASSESSMENT
63y Female PMH HTN, hypothyroid, diverticulitis, hx of GIB now s/p cystoscopy, placement of L ureteral stent now with urosepsis    PLAN:   Neurologic:   Tylenol q6 PRN for pain  IVP dilaudid PRN for pain    Respiratory:   Satting well on RA    Cardiovascular:   BP stable,  No pressor requirements at this time  -Cont. to monitor lactate    Gastrointestinal/Nutrition:   Tolerating regular diet    Renal/Genitourinary:   Replete electrolytes as needed  Simpson   Strict Is and Os    Hematologic:   H/H stable   follow up morning labs    Infectious Disease:   -Cont CTX  -F/U urine cx.  Blood cultures negative.  -Febrile and tachycardic.  Tylenol PRN for fevers.    Endocrine:   Cont synthroid    Disposition: SICU 63y Female PMH HTN, hypothyroid, diverticulitis, hx of GIB now s/p cystoscopy, placement of L ureteral stent now with urosepsis    PLAN:   Neurologic:   - Tylenol Q6 PRN  - .5-dilaudid PRN for pain    Respiratory:   - WAI  - satting well RA    Cardiovascular:   BP stable,  No pressor requirements at this time  -Cont. to monitor lactate- downtrending     Gastrointestinal/Nutrition:   - NPO this am, may require further     Renal/Genitourinary:   Replete electrolytes as needed  Simpson   Strict Is and Os  f/u renal u/s   trend Bun/Cr    Hematologic:   H/H stable   follow up morning labs    Infectious Disease:   -Cont CTX  -F/U urine cx.  Blood cultures negative.  - Tylenol PRN for fevers.    Endocrine:   Cont synthroid    Disposition: Floors

## 2019-03-17 NOTE — PROGRESS NOTE ADULT - SUBJECTIVE AND OBJECTIVE BOX
Subjective:  Patient well this AM. Endorses colic pain on left side. BP stable without pressors.    Objectives:  T(C): 36.9 (19 @ 04:00), Max: 37.3 (19 @ 21:00)  HR: 92 (19 @ 07:00) (90 - 98)  BP: 87/58 (19 @ 07:00) (87/58 - 128/75)  RR: 17 (19 @ 07:00) (14 - 26)  SpO2: 99% (19 @ 07:00) (95% - 100%)  Wt(kg): --     @ 07:01  -   @ 07:00  --------------------------------------------------------  IN:    IV PiggyBack: 350 mL    lactated ringers.: 2250 mL    Oral Fluid: 1010 mL  Total IN: 3610 mL    OUT:    Indwelling Catheter - Urethral: 1680 mL  Total OUT: 1680 mL    Total NET: 1930 mL       @ 07:01  -   @ 07:42  --------------------------------------------------------  IN:  Total IN: 0 mL    OUT:    Indwelling Catheter - Urethral: 135 mL  Total OUT: 135 mL    Total NET: -135 mL          Physcial Exam  GENERAL: NAD, well-developed  ABDOMEN: Soft, Nontender, Nondistended  GENITOURINARY: perla draining clear yellow urine.  PSYCH: AAOx3    LABS:                        10.6   22.83 )-----------( 150      ( 17 Mar 2019 02:40 )             34.7         135  |  102  |  25<H>  ----------------------------<  95  4.4   |  17<L>  |  2.33<H>    Ca    7.7<L>      17 Mar 2019 02:40  Phos  3.4     03-17  Mg     1.6     03-17    TPro  6.2  /  Alb  3.2<L>  /  TBili  0.5  /  DBili  x   /  AST  26  /  ALT  19  /  AlkPhos  63  03-16    CAPILLARY BLOOD GLUCOSE        PT/INR - ( 15 Mar 2019 22:30 )   PT: 12.5 SEC;   INR: 1.09          PTT - ( 15 Mar 2019 22:30 )  PTT:21.4 SEC  CAPILLARY BLOOD GLUCOSE        Urinalysis Basic - ( 15 Mar 2019 18:00 )    Color: LIGHT YELLOW / Appearance: CLEAR / S.013 / pH: 7.0  Gluc: 300 / Ketone: NEGATIVE  / Bili: NEGATIVE / Urobili: NORMAL   Blood: MODERATE / Protein: 20 / Nitrite: NEGATIVE   Leuk Esterase: NEGATIVE / RBC: 26-50 / WBC 11-25   Sq Epi: OCC / Non Sq Epi: x / Bacteria: FEW

## 2019-03-17 NOTE — PROGRESS NOTE ADULT - SUBJECTIVE AND OBJECTIVE BOX
HISTORY  63y Female PMH HTN, hypothyroid, diverticulitis, hx of GIB now s/p cystoscopy, placement of L ureteral stent now with urosepsis    24 HOUR EVENTS:    Stable and tolerating PO diet, but spiking fevers with some increase in respiratory rate.  Dilaudid given for abdominal pain.    SUBJECTIVE/ROS:  [ ] A ten-point review of systems was otherwise negative except as noted.  [ ] Due to altered mental status/intubation, subjective information were not able to be obtained from the patient. History was obtained, to the extent possible, from review of the chart and collateral sources of information.      NEURO  RASS:     GCS:     [facility] ICU:  Exam:   Meds: acetaminophen   Tablet .. 650 milliGRAM(s) Oral every 6 hours PRN Temp greater or equal to 38C (100.4F), Mild Pain (1 - 3)  HYDROmorphone  Injectable 0.5 milliGRAM(s) IV Push every 2 hours PRN Severe Pain (7 - 10)  HYDROmorphone  Injectable 0.5 milliGRAM(s) IV Push every 10 minutes PRN Moderate Pain (4 - 6)  HYDROmorphone  Injectable 1 milliGRAM(s) IV Push every 10 minutes PRN Severe Pain (7 - 10)  metoclopramide Injectable 10 milliGRAM(s) IV Push once PRN Nausea and/or Vomiting  ondansetron Injectable 4 milliGRAM(s) IV Push once PRN Nausea and/or Vomiting    [x] Adequacy of sedation and pain control has been assessed and adjusted      RESPIRATORY  RR: 16 (03-17-19 @ 00:00) (14 - 26)  SpO2: 100% (03-17-19 @ 00:00) (94% - 100%)  Wt(kg): --  Exam: unlabored, clear to auscultation bilaterally  Mechanical Ventilation:   ABG - ( 16 Mar 2019 07:25 )  pH: x     /  pCO2: x     /  pO2: x     / HCO3: x     / Base Excess: x     /  SaO2: x       Lactate: 5.9              [ ] Extubation Readiness Assessed  Meds:       CARDIOVASCULAR  HR: 93 (03-17-19 @ 00:00) (93 - 109)  BP: 98/55 (03-17-19 @ 00:00) (83/50 - 151/81)  BP(mean): 64 (03-17-19 @ 00:00) (55 - 97)  ABP: --  ABP(mean): --  Wt(kg): --  CVP(cm H2O): --  VBG - ( 15 Mar 2019 20:23 )  pH: 7.28  /  pCO2: 62    /  pO2: < 24  / HCO3: 24    / Base Excess: 2.2   /  SaO2: 19.3   Lactate: 5.4              Lactate, Blood: 5.9 mmol/L (03-16 @ 07:25)    Exam:   Cardiac Rhythm: SR  Perfusion     [ x]Adequate   [ ]Inadequate  Mentation   [x ]Normal       [ ]Reduced  Extremities  [x ]Warm         [ ]Cool  Volume Status [ ]Hypervolemic [ x]Euvolemic [ ]Hypovolemic  Meds:       GI/NUTRITION  Exam:  Diet:  Meds:     GENITOURINARY  I&O's Detail    03-15 @ 07:01  -  03-16 @ 07:00  --------------------------------------------------------  IN:    Lactated Ringers IV Bolus: 1000 mL    lactated ringers.: 1000 mL    Oral Fluid: 180 mL  Total IN: 2180 mL    OUT:    Indwelling Catheter - Urethral: 415 mL  Total OUT: 415 mL    Total NET: 1765 mL      03-16 @ 07:01  -  03-17 @ 00:38  --------------------------------------------------------  IN:    IV PiggyBack: 300 mL    lactated ringers.: 2125 mL    Oral Fluid: 770 mL  Total IN: 3195 mL    OUT:    Indwelling Catheter - Urethral: 1165 mL  Total OUT: 1165 mL    Total NET: 2030 mL          03-16    138  |  101  |  16  ----------------------------<  184<H>  3.2<L>   |  19<L>  |  2.25<H>    Ca    7.7<L>      16 Mar 2019 05:00  Phos  1.6     03-16  Mg     1.2     03-16    TPro  6.2  /  Alb  3.2<L>  /  TBili  0.5  /  DBili  x   /  AST  26  /  ALT  19  /  AlkPhos  63  03-16    [ ] Simpson catheter, indication: N/A  Meds: lactated ringers. 1000 milliLiter(s) IV Continuous <Continuous>        HEMATOLOGIC  Meds: heparin  Injectable 5000 Unit(s) SubCutaneous every 8 hours    [x] VTE Prophylaxis                        9.1    14.52 )-----------( 144      ( 16 Mar 2019 05:00 )             29.5     PT/INR - ( 15 Mar 2019 22:30 )   PT: 12.5 SEC;   INR: 1.09          PTT - ( 15 Mar 2019 22:30 )  PTT:21.4 SEC  Transfusion     [ ] PRBC   [ ] Platelets   [ ] FFP   [ ] Cryoprecipitate      INFECTIOUS DISEASES  T(C): 36.7 (03-16-19 @ 23:00), Max: 38.9 (03-16-19 @ 17:30)  Wt(kg): --  WBC Count: 14.52 K/uL (03-16 @ 05:00)    Recent Cultures:  Specimen Source: BLOOD PERIPHERAL, 03-15 @ 23:55; Results --; Gram Stain: --; Organism: --  Specimen Source: BLOOD VENOUS, 03-15 @ 23:01; Results --; Gram Stain: --; Organism: --    Meds: cefTRIAXone   IVPB 1 Gram(s) IV Intermittent every 24 hours        ENDOCRINE  Capillary Blood Glucose    Meds: levothyroxine 200 MICROGram(s) Oral daily        ACCESS DEVICES:  [ ] Peripheral IV  [ ] Central Venous Line	[ ] R	[ ] L	[ ] IJ	[ ] Fem	[ ] SC	Placed:   [ ] Arterial Line		[ ] R	[ ] L	[ ] Fem	[ ] Rad	[ ] Ax	Placed:   [ ] PICC:					[ ] Mediport  [ ] Urinary Catheter, Date Placed:   [ ] Necessity of urinary, arterial, and venous catheters discussed    OTHER MEDICATIONS:      CODE STATUS:     IMAGING:

## 2019-03-18 DIAGNOSIS — E87.6 HYPOKALEMIA: ICD-10-CM

## 2019-03-18 DIAGNOSIS — N17.9 ACUTE KIDNEY FAILURE, UNSPECIFIED: ICD-10-CM

## 2019-03-18 DIAGNOSIS — Z29.9 ENCOUNTER FOR PROPHYLACTIC MEASURES, UNSPECIFIED: ICD-10-CM

## 2019-03-18 LAB
-  AMIKACIN: SIGNIFICANT CHANGE UP
-  AMIKACIN: SIGNIFICANT CHANGE UP
-  AMPICILLIN/SULBACTAM: SIGNIFICANT CHANGE UP
-  AMPICILLIN/SULBACTAM: SIGNIFICANT CHANGE UP
-  AMPICILLIN: SIGNIFICANT CHANGE UP
-  AMPICILLIN: SIGNIFICANT CHANGE UP
-  AZTREONAM: SIGNIFICANT CHANGE UP
-  AZTREONAM: SIGNIFICANT CHANGE UP
-  CEFAZOLIN: SIGNIFICANT CHANGE UP
-  CEFAZOLIN: SIGNIFICANT CHANGE UP
-  CEFEPIME: SIGNIFICANT CHANGE UP
-  CEFEPIME: SIGNIFICANT CHANGE UP
-  CEFOXITIN: SIGNIFICANT CHANGE UP
-  CEFOXITIN: SIGNIFICANT CHANGE UP
-  CEFTAZIDIME: SIGNIFICANT CHANGE UP
-  CEFTAZIDIME: SIGNIFICANT CHANGE UP
-  CEFTRIAXONE: SIGNIFICANT CHANGE UP
-  CEFTRIAXONE: SIGNIFICANT CHANGE UP
-  CIPROFLOXACIN: SIGNIFICANT CHANGE UP
-  CIPROFLOXACIN: SIGNIFICANT CHANGE UP
-  ERTAPENEM: SIGNIFICANT CHANGE UP
-  ERTAPENEM: SIGNIFICANT CHANGE UP
-  GENTAMICIN: SIGNIFICANT CHANGE UP
-  GENTAMICIN: SIGNIFICANT CHANGE UP
-  IMIPENEM: SIGNIFICANT CHANGE UP
-  IMIPENEM: SIGNIFICANT CHANGE UP
-  LEVOFLOXACIN: SIGNIFICANT CHANGE UP
-  LEVOFLOXACIN: SIGNIFICANT CHANGE UP
-  MEROPENEM: SIGNIFICANT CHANGE UP
-  MEROPENEM: SIGNIFICANT CHANGE UP
-  NITROFURANTOIN: SIGNIFICANT CHANGE UP
-  PIPERACILLIN/TAZOBACTAM: SIGNIFICANT CHANGE UP
-  PIPERACILLIN/TAZOBACTAM: SIGNIFICANT CHANGE UP
-  TIGECYCLINE: SIGNIFICANT CHANGE UP
-  TIGECYCLINE: SIGNIFICANT CHANGE UP
-  TOBRAMYCIN: SIGNIFICANT CHANGE UP
-  TOBRAMYCIN: SIGNIFICANT CHANGE UP
-  TRIMETHOPRIM/SULFAMETHOXAZOLE: SIGNIFICANT CHANGE UP
-  TRIMETHOPRIM/SULFAMETHOXAZOLE: SIGNIFICANT CHANGE UP
ANION GAP SERPL CALC-SCNC: 12 MMO/L — SIGNIFICANT CHANGE UP (ref 7–14)
BACTERIA UR CULT: SIGNIFICANT CHANGE UP
BACTERIA UR CULT: SIGNIFICANT CHANGE UP
BASE EXCESS BLDV CALC-SCNC: 5.4 MMOL/L — SIGNIFICANT CHANGE UP
BLOOD GAS VENOUS - CREATININE: SIGNIFICANT CHANGE UP MG/DL (ref 0.5–1.3)
BUN SERPL-MCNC: 20 MG/DL — SIGNIFICANT CHANGE UP (ref 7–23)
CALCIUM SERPL-MCNC: 8.1 MG/DL — LOW (ref 8.4–10.5)
CHLORIDE BLDV-SCNC: 98 MMOL/L — SIGNIFICANT CHANGE UP (ref 96–108)
CHLORIDE SERPL-SCNC: 105 MMOL/L — SIGNIFICANT CHANGE UP (ref 98–107)
CO2 SERPL-SCNC: 22 MMOL/L — SIGNIFICANT CHANGE UP (ref 22–31)
CREAT SERPL-MCNC: 1.57 MG/DL — HIGH (ref 0.5–1.3)
GAS PNL BLDV: 135 MMOL/L — LOW (ref 136–146)
GLUCOSE BLDV-MCNC: 264 — HIGH (ref 70–99)
GLUCOSE SERPL-MCNC: 102 MG/DL — HIGH (ref 70–99)
HCO3 BLDV-SCNC: 27 MMOL/L — SIGNIFICANT CHANGE UP (ref 20–27)
HCT VFR BLD CALC: 31.1 % — LOW (ref 34.5–45)
HCT VFR BLDV CALC: 36.6 % — SIGNIFICANT CHANGE UP (ref 34.5–45)
HGB BLD-MCNC: 9.7 G/DL — LOW (ref 11.5–15.5)
HGB BLDV-MCNC: 11.9 G/DL — SIGNIFICANT CHANGE UP (ref 11.5–15.5)
LACTATE BLDV-MCNC: 2.9 MMOL/L — HIGH (ref 0.5–2)
MCHC RBC-ENTMCNC: 28 PG — SIGNIFICANT CHANGE UP (ref 27–34)
MCHC RBC-ENTMCNC: 31.2 % — LOW (ref 32–36)
MCV RBC AUTO: 89.6 FL — SIGNIFICANT CHANGE UP (ref 80–100)
METHOD TYPE: SIGNIFICANT CHANGE UP
METHOD TYPE: SIGNIFICANT CHANGE UP
NRBC # FLD: 0 K/UL — LOW (ref 25–125)
ORGANISM # SPEC MICROSCOPIC CNT: SIGNIFICANT CHANGE UP
PCO2 BLDV: 53 MMHG — HIGH (ref 41–51)
PH BLDV: 7.38 PH — SIGNIFICANT CHANGE UP (ref 7.32–7.43)
PLATELET # BLD AUTO: 152 K/UL — SIGNIFICANT CHANGE UP (ref 150–400)
PMV BLD: 10.9 FL — SIGNIFICANT CHANGE UP (ref 7–13)
PO2 BLDV: 23 MMHG — LOW (ref 35–40)
POTASSIUM BLDV-SCNC: 4.9 MMOL/L — HIGH (ref 3.4–4.5)
POTASSIUM SERPL-MCNC: 3.4 MMOL/L — LOW (ref 3.5–5.3)
POTASSIUM SERPL-SCNC: 3.4 MMOL/L — LOW (ref 3.5–5.3)
RBC # BLD: 3.47 M/UL — LOW (ref 3.8–5.2)
RBC # FLD: 14.6 % — HIGH (ref 10.3–14.5)
SAO2 % BLDV: 35.8 % — LOW (ref 60–85)
SODIUM SERPL-SCNC: 139 MMOL/L — SIGNIFICANT CHANGE UP (ref 135–145)
WBC # BLD: 22.6 K/UL — HIGH (ref 3.8–10.5)
WBC # FLD AUTO: 22.6 K/UL — HIGH (ref 3.8–10.5)

## 2019-03-18 PROCEDURE — 99223 1ST HOSP IP/OBS HIGH 75: CPT

## 2019-03-18 PROCEDURE — 76770 US EXAM ABDO BACK WALL COMP: CPT | Mod: 26

## 2019-03-18 RX ORDER — LEVOTHYROXINE SODIUM 125 MCG
200 TABLET ORAL DAILY
Qty: 0 | Refills: 0 | Status: DISCONTINUED | OUTPATIENT
Start: 2019-03-18 | End: 2019-03-19

## 2019-03-18 RX ORDER — MAGNESIUM SULFATE 500 MG/ML
2 VIAL (ML) INJECTION ONCE
Qty: 0 | Refills: 0 | Status: COMPLETED | OUTPATIENT
Start: 2019-03-18 | End: 2019-03-18

## 2019-03-18 RX ORDER — ACETAMINOPHEN 500 MG
1000 TABLET ORAL ONCE
Qty: 0 | Refills: 0 | Status: DISCONTINUED | OUTPATIENT
Start: 2019-03-18 | End: 2019-03-18

## 2019-03-18 RX ORDER — OXYCODONE HYDROCHLORIDE 5 MG/1
5 TABLET ORAL EVERY 6 HOURS
Qty: 0 | Refills: 0 | Status: DISCONTINUED | OUTPATIENT
Start: 2019-03-18 | End: 2019-03-19

## 2019-03-18 RX ORDER — POTASSIUM CHLORIDE 20 MEQ
40 PACKET (EA) ORAL ONCE
Qty: 0 | Refills: 0 | Status: COMPLETED | OUTPATIENT
Start: 2019-03-18 | End: 2019-03-18

## 2019-03-18 RX ORDER — ACETAMINOPHEN 500 MG
650 TABLET ORAL EVERY 6 HOURS
Qty: 0 | Refills: 0 | Status: DISCONTINUED | OUTPATIENT
Start: 2019-03-18 | End: 2019-03-19

## 2019-03-18 RX ORDER — MORPHINE SULFATE 50 MG/1
2 CAPSULE, EXTENDED RELEASE ORAL EVERY 4 HOURS
Qty: 0 | Refills: 0 | Status: DISCONTINUED | OUTPATIENT
Start: 2019-03-18 | End: 2019-03-19

## 2019-03-18 RX ADMIN — MORPHINE SULFATE 2 MILLIGRAM(S): 50 CAPSULE, EXTENDED RELEASE ORAL at 14:03

## 2019-03-18 RX ADMIN — MORPHINE SULFATE 2 MILLIGRAM(S): 50 CAPSULE, EXTENDED RELEASE ORAL at 13:48

## 2019-03-18 RX ADMIN — SODIUM CHLORIDE 75 MILLILITER(S): 9 INJECTION INTRAMUSCULAR; INTRAVENOUS; SUBCUTANEOUS at 01:54

## 2019-03-18 RX ADMIN — Medication 200 MICROGRAM(S): at 06:59

## 2019-03-18 RX ADMIN — HYDROMORPHONE HYDROCHLORIDE 0.5 MILLIGRAM(S): 2 INJECTION INTRAMUSCULAR; INTRAVENOUS; SUBCUTANEOUS at 01:53

## 2019-03-18 RX ADMIN — Medication 40 MILLIEQUIVALENT(S): at 07:18

## 2019-03-18 RX ADMIN — CEFTRIAXONE 100 GRAM(S): 500 INJECTION, POWDER, FOR SOLUTION INTRAMUSCULAR; INTRAVENOUS at 20:27

## 2019-03-18 RX ADMIN — HEPARIN SODIUM 5000 UNIT(S): 5000 INJECTION INTRAVENOUS; SUBCUTANEOUS at 20:27

## 2019-03-18 RX ADMIN — Medication 50 GRAM(S): at 08:05

## 2019-03-18 RX ADMIN — Medication 650 MILLIGRAM(S): at 20:57

## 2019-03-18 RX ADMIN — Medication 650 MILLIGRAM(S): at 20:16

## 2019-03-18 RX ADMIN — HEPARIN SODIUM 5000 UNIT(S): 5000 INJECTION INTRAVENOUS; SUBCUTANEOUS at 06:03

## 2019-03-18 RX ADMIN — HYDROMORPHONE HYDROCHLORIDE 0.5 MILLIGRAM(S): 2 INJECTION INTRAMUSCULAR; INTRAVENOUS; SUBCUTANEOUS at 02:27

## 2019-03-18 NOTE — CONSULT NOTE ADULT - SUBJECTIVE AND OBJECTIVE BOX
CHIEF COMPLAINT: Patient is a 63y old  Female who presents with a chief complaint of Septic stone (18 Mar 2019 07:32)    HPI: This is a 63 year old female with a medical history significant for Hypothyroid, Diverticulitis, h/o GIB HTN, presenting with one day of left lower quadrant abdominal pain and nausea.  Patient reports symptoms occurred acutely and pain is sharp.  Pain is localized to the left lower quadrant, denies flank or back pain.  Reports nausea throughout the day and 2 episodes of emesis.  She denies fevers or chills at home.  Denies urinary symptoms including dysuria, hematuria, increase in urgency or frequency or retention.  Denies prior history of stones.  In the ED, she is tachycardic to 115, febrile to 102.8 rectally with a WBC of 12.6, lactate of 5.4, creatinine is 1.29 and UA is negative.  CT reveals a 4mm L ureteral stone with mild hydro. (15 Mar 2019 22:44)    Patient was admitted to the SICU post-op for left ureteral stent placement for septic shock (hypotensive and tachycardic). Patient is now back on the floors. Patient reports feeling better and her chills has lessened. She denies any lightheadedness or SOB.       Allergies: No Known Allergies    HOME MEDICATIONS: [x] Reviewed    MEDICATIONS  (STANDING):  cefTRIAXone   IVPB 1 Gram(s) IV Intermittent every 24 hours  heparin  Injectable 5000 Unit(s) SubCutaneous every 8 hours  levothyroxine 200 MICROGram(s) Oral daily  sodium chloride 0.9%. 1000 milliLiter(s) (75 mL/Hr) IV Continuous <Continuous>    MEDICATIONS  (PRN):  acetaminophen   Tablet .. 650 milliGRAM(s) Oral every 6 hours PRN Mild Pain (1 - 3)  morphine  - Injectable 2 milliGRAM(s) IV Push every 4 hours PRN Severe Pain (7 - 10)  oxyCODONE    IR 5 milliGRAM(s) Oral every 6 hours PRN Moderate Pain (4 - 6)      PAST MEDICAL & SURGICAL HISTORY:  Lower gastrointestinal bleeding: 4x 2014- 2016  HTN (hypertension)  Diverticulosis  Hypothyroidism  H/O abdominal hysterectomy  History of back surgery  History of tonsillectomy    SOCIAL HISTORY:  Lives with daughter Michelle: [phone]  Director of emergency shelter  Denies smoking, alcohol, drug use    FAMILY HISTORY:  No pertinent family history in first degree relatives  [x] No pertinent family history in first degree relatives     REVIEW OF SYSTEMS:  [x] All other ROS negative     Vital Signs Last 24 Hrs  T(C): 37.1 (18 Mar 2019 13:53), Max: 37.3 (17 Mar 2019 20:45)  T(F): 98.7 (18 Mar 2019 13:53), Max: 99.1 (17 Mar 2019 20:45)  HR: 90 (18 Mar 2019 13:53) (62 - 100)  BP: 170/68 (18 Mar 2019 13:53) (127/74 - 170/68)  BP(mean): --  RR: 17 (18 Mar 2019 13:53) (16 - 17)  SpO2: 99% (18 Mar 2019 13:53) (96% - 100%)    PHYSICAL EXAM:  GENERAL: NAD, well-developed  HEAD:  Atraumatic, Normocephalic  EYES: EOMI, PERRLA, conjunctiva and sclera clear  ENMT: Moist mucous membranes  NECK: Supple, No JVD  RESPIRATORY: Clear to auscultation bilaterally; No rales, rhonchi, wheezing, or rubs  CARDIOVASCULAR: Regular rate and rhythm; No murmurs, rubs, or gallops  GASTROINTESTINAL: Soft, Nontender, Nondistended; Bowel sounds present  GENITOURINARY: perla  EXTREMITIES:  2+ Peripheral Pulses, No clubbing, cyanosis, or edema  SKIN: Dressing C/D/I    LABS:                        9.7    22.60 )-----------( 152      ( 18 Mar 2019 05:58 )             31.1     03-18    139  |  105  |  20  ----------------------------<  102<H>  3.4<L>   |  22  |  1.57<H>    Ca    8.1<L>      18 Mar 2019 05:58  Phos  3.4     03-17  Mg     1.6     03-17      Culture - Urine (03.16.19 @ 03:38)    -  Ampicillin: R >16 BLANKA    -  Ampicillin/Sulbactam: I 16/8 BLANKA    -  Amikacin: S <=8 BLANKA    -  Aztreonam: S <=4 BLANKA    -  Cefazolin: S <=2 BLANKA    -  Cefepime: S <=2 BLANKA    -  Cefoxitin: S <=4 BLANKA    -  Ceftazidime: S <=1 BLANKA    -  Ceftriaxone: S <=1 BLANKA    -  Ciprofloxacin: S <=0.5 BLANKA    -  Ertapenem: S <=0.5 BLANKA    -  Gentamicin: S <=1 BLANKA    -  Imipenem: S <=1 BLANKA    -  Levofloxacin: S <=1 BLANKA    -  Meropenem: S <=1 BLANKA    -  Piperacillin/Tazobactam: S <=8 BLANKA    -  Tigecycline: S <=1 BLANKA    -  Tobramycin: S <=2 BLANKA    -  Trimethoprim/Sulfamethoxazole: S <=0.5/9.5 BLANKA    Culture - Urine:   COLONY COUNT: MODERATE    Specimen Source: KIDNEY    Organism Identification: Escherichia coli    Organism: Escherichia coli    Method Type: NEGATIVE BLANKA 43      RADIOLOGY & ADDITIONAL STUDIES:  Imaging:   Personally Reviewed:  [x] YES   < from: CT Abdomen and Pelvis w/ IV Cont (03.15.19 @ 18:40) >  IMPRESSION:  A 4 mm proximal left ureteral calculus with mild associated   hydronephrosis.   An additional punctate nonobstructing right renal calculus.  < end of copied text >    [ ] Consultant(s) Notes Reviewed  [x] Care Discussed with Consultants/Other Providers: Urology PA - discussed repletion of potassium

## 2019-03-18 NOTE — PROGRESS NOTE ADULT - ASSESSMENT
63 year old female s/p L ureteral stent placement for a septic stone, hypotensive and tachycardic postop, SICU consulted, currently stable, creatinine much improved    - Check renal u/s to evaluate for left hydronephrosis  - NPO, IVF pending results of u/s  - Monitor VS  - Continue ceftriaxone  - Continue perla, pending results of US  - hypokalemia, replete potassium and magnesium  - Follow up final blood and urine cultures  - OOB and ambulate

## 2019-03-18 NOTE — CONSULT NOTE ADULT - ASSESSMENT
63F h/o Hypothyroid, Diverticulitis, GIB, HTN , presenting with one day of left lower quadrant abdominal pain and nausea 2/2 ureteral stone now s/p cystoscopy with placement of L ureteral stent course complication by sepsis 2/2 UTI with HELEN.    Sepsis [A41.9]  -patient with on sepsis (HR>90 and WBC>11) but overall clinically improving; holding BP and mentating well  -lactate trending down, will continue to trend  -c/w ceftriaxone IV   -BCx negative x2 from 3/15  -c/w IVF with NS @ 75cc/hr    Ureteral stone with hydronephrosis [N13.2]  -s/p left ureteral stent placement by urology  -plan per urology  -Pain well controlled; continue management and pain control per urology recs with morphine IV prn    Hypokalemia [E87.6]  -will replete potassium chloride and monitor electrolytes    HELEN (acute kidney injury) [N17.9]  -creatinine slowly trending back down  -will f/u renal ultrasound prior to d/c'ing perla    HTN (hypertension) [I10]  -holding ramipril given HELEN and ongoing sepsis    Hypothyroidism [E03.9]  -c/w synthroid    Preventive measure [Z29.9]  -will continue with heparin subq tid for DVT prophylaxis

## 2019-03-18 NOTE — PROGRESS NOTE ADULT - ATTENDING COMMENTS
Patient seen and examined. Agree with assessment and plan.  Continue IV Abx  Follow cultures
Patient seen and examined. Agree with assessment and plan.  Followup culture  repeat renal US
Patient seen and examined. Agree with assessment and plan.  Trend temperature  Creatinine improved  Repeat renal US
Critical Care Dx    Sepsis associated hypotension: Urosepsis  -s/p ureteral stent placement  -IV abx per urology  -trend lactate - currently trending down    Hypothyroidism  -restart home meds    Acute Kidney Injury, postobstructive   -trend u/o, creatinine  HTN  -home meds for now     I have personally interviewed and examined the patient, reviewed data and laboratory tests/x-rays and all pertinent electronic images.  The SICU team has a constant risk benefit analyzes discussion with the primary team, all consultants, House Staff and PA's on all decisions.  These diagnoses are unrelated to the surgical procedure noted.  Time involved in performance of separately billable procedures was not counted toward my critical care time. There is no overlap.    I spent 35 minutes in total providing critical care for the diagnoses, assessment and plan above.      I was physically present for the key portions of the evaluation and management (E/M) service provided.  I agree with the above history, physical, and plan which I have reviewed and edited where appropriate.     Zion Torres MD  Acute and Critical Care Surgery .

## 2019-03-18 NOTE — PROGRESS NOTE ADULT - SUBJECTIVE AND OBJECTIVE BOX
Overnight events:  Pt remained afebrile    Subjective:  Pt c/o slight right flank pain this am, no N/V    Objective:    Vital signs  T(C): , Max: 37.3 (03-17-19 @ 20:45)  HR: 87 (03-18-19 @ 05:56)  BP: 134/75 (03-18-19 @ 05:56)  SpO2: 99% (03-18-19 @ 05:56)  Wt(kg): --    Output   Perla: 900            Gen: NAD  Abd: soft, nontender  : perla secured    Labs                        9.7    22.60 )-----------( 152      ( 18 Mar 2019 05:58 )             31.1     18 Mar 2019 05:58    139    |  105    |  20     ----------------------------<  102    3.4     |  22     |  1.57     Ca    8.1        18 Mar 2019 05:58  Phos  3.4       17 Mar 2019 02:40  Mg     1.6       17 Mar 2019 02:40        Urine Cx:   Culture - Urine (03.16.19 @ 03:38)    Culture - Urine:   EC^Escherichia coli  COLONY COUNT: MODERATE    Specimen Source: KIDNEY  Culture - Urine (03.15.19 @ 18:24)    Culture - Urine:   EC^Escherichia coli  COLONY COUNT: > = 100,000 CFU/ML  STCN^Staphylococcus sp.,coag neg  COLONY COUNT: 10,000-49,000 CFU/mL    Specimen Source: URINE MIDSTREAM    Blood Cx:   Culture - Blood (03.15.19 @ 23:55)    Culture - Blood:   NO ORGANISMS ISOLATED  NO ORGANISMS ISOLATED AT 48 HRS.    Specimen Source: BLOOD PERIPHERAL

## 2019-03-19 ENCOUNTER — TRANSCRIPTION ENCOUNTER (OUTPATIENT)
Age: 64
End: 2019-03-19

## 2019-03-19 VITALS
DIASTOLIC BLOOD PRESSURE: 83 MMHG | RESPIRATION RATE: 18 BRPM | OXYGEN SATURATION: 99 % | TEMPERATURE: 98 F | HEART RATE: 87 BPM | SYSTOLIC BLOOD PRESSURE: 146 MMHG

## 2019-03-19 LAB
ANION GAP SERPL CALC-SCNC: 14 MMO/L — SIGNIFICANT CHANGE UP (ref 7–14)
BUN SERPL-MCNC: 10 MG/DL — SIGNIFICANT CHANGE UP (ref 7–23)
CALCIUM SERPL-MCNC: 8.4 MG/DL — SIGNIFICANT CHANGE UP (ref 8.4–10.5)
CHLORIDE SERPL-SCNC: 101 MMOL/L — SIGNIFICANT CHANGE UP (ref 98–107)
CO2 SERPL-SCNC: 23 MMOL/L — SIGNIFICANT CHANGE UP (ref 22–31)
CREAT SERPL-MCNC: 1.08 MG/DL — SIGNIFICANT CHANGE UP (ref 0.5–1.3)
GLUCOSE SERPL-MCNC: 185 MG/DL — HIGH (ref 70–99)
HCT VFR BLD CALC: 34.9 % — SIGNIFICANT CHANGE UP (ref 34.5–45)
HGB BLD-MCNC: 11 G/DL — LOW (ref 11.5–15.5)
MAGNESIUM SERPL-MCNC: 2.1 MG/DL — SIGNIFICANT CHANGE UP (ref 1.6–2.6)
MCHC RBC-ENTMCNC: 28.1 PG — SIGNIFICANT CHANGE UP (ref 27–34)
MCHC RBC-ENTMCNC: 31.5 % — LOW (ref 32–36)
MCV RBC AUTO: 89.3 FL — SIGNIFICANT CHANGE UP (ref 80–100)
NRBC # FLD: 0 K/UL — LOW (ref 25–125)
PLATELET # BLD AUTO: 169 K/UL — SIGNIFICANT CHANGE UP (ref 150–400)
PMV BLD: 11.1 FL — SIGNIFICANT CHANGE UP (ref 7–13)
POTASSIUM SERPL-MCNC: 3.7 MMOL/L — SIGNIFICANT CHANGE UP (ref 3.5–5.3)
POTASSIUM SERPL-SCNC: 3.7 MMOL/L — SIGNIFICANT CHANGE UP (ref 3.5–5.3)
RBC # BLD: 3.91 M/UL — SIGNIFICANT CHANGE UP (ref 3.8–5.2)
RBC # FLD: 14.5 % — SIGNIFICANT CHANGE UP (ref 10.3–14.5)
SODIUM SERPL-SCNC: 138 MMOL/L — SIGNIFICANT CHANGE UP (ref 135–145)
WBC # BLD: 17.37 K/UL — HIGH (ref 3.8–10.5)
WBC # FLD AUTO: 17.37 K/UL — HIGH (ref 3.8–10.5)

## 2019-03-19 PROCEDURE — 99233 SBSQ HOSP IP/OBS HIGH 50: CPT

## 2019-03-19 RX ORDER — OXYCODONE HYDROCHLORIDE 5 MG/1
10 TABLET ORAL EVERY 4 HOURS
Qty: 0 | Refills: 0 | Status: DISCONTINUED | OUTPATIENT
Start: 2019-03-19 | End: 2019-03-19

## 2019-03-19 RX ORDER — OXYCODONE HYDROCHLORIDE 5 MG/1
1 TABLET ORAL
Qty: 12 | Refills: 0
Start: 2019-03-19 | End: 2019-03-21

## 2019-03-19 RX ORDER — AZTREONAM 2 G
1 VIAL (EA) INJECTION
Qty: 14 | Refills: 0
Start: 2019-03-19 | End: 2019-03-25

## 2019-03-19 RX ADMIN — MORPHINE SULFATE 2 MILLIGRAM(S): 50 CAPSULE, EXTENDED RELEASE ORAL at 04:48

## 2019-03-19 RX ADMIN — OXYCODONE HYDROCHLORIDE 5 MILLIGRAM(S): 5 TABLET ORAL at 11:57

## 2019-03-19 RX ADMIN — HEPARIN SODIUM 5000 UNIT(S): 5000 INJECTION INTRAVENOUS; SUBCUTANEOUS at 04:48

## 2019-03-19 RX ADMIN — Medication 200 MICROGRAM(S): at 04:47

## 2019-03-19 RX ADMIN — OXYCODONE HYDROCHLORIDE 5 MILLIGRAM(S): 5 TABLET ORAL at 12:20

## 2019-03-19 RX ADMIN — MORPHINE SULFATE 2 MILLIGRAM(S): 50 CAPSULE, EXTENDED RELEASE ORAL at 05:30

## 2019-03-19 NOTE — DISCHARGE NOTE PROVIDER - CARE PROVIDER_API CALL
Bridger Boucher)  Urology  [address] [place] [address], [place] [address]  Phone: [phone]  Fax: [phone]  Follow Up Time: 2 weeks

## 2019-03-19 NOTE — DISCHARGE NOTE PROVIDER - HOSPITAL COURSE
Pt admitted with 4mmL. prox stone, fever; went to OR for stent; sent to SICU for observation; progressed well; growing E. coli/S. aureus in urine, blood neg; transferred to floor on ceftriaxone; home today on bactrim; f/u in office

## 2019-03-19 NOTE — DISCHARGE NOTE PROVIDER - NSDCCPCAREPLAN_GEN_ALL_CORE_FT
PRINCIPAL DISCHARGE DIAGNOSIS  Diagnosis: Sepsis  Assessment and Plan of Treatment: Drink plenty of fluids; take tylenol, motrin for pain; oxycodone for more severe pain; avoid constipation with stool softener, fiber; call office for f/u appt to remove stent/stone      SECONDARY DISCHARGE DIAGNOSES  Diagnosis: Renal stone  Assessment and Plan of Treatment:

## 2019-03-19 NOTE — PROGRESS NOTE ADULT - ASSESSMENT
63 year old female s/p L ureteral stent placement for a septic stone, hypotensive and tachycardic postop, SICU consulted, currently stable, creatinine much improved      check cultures  cont Ceftriaxone  change pain meds   poss home later

## 2019-03-19 NOTE — DISCHARGE NOTE NURSING/CASE MANAGEMENT/SOCIAL WORK - NSDCPNINST_GEN_ALL_CORE
Call MD with any signs of infection i.e. fever/shaking chills, cloudy foul-smelling urine, or with signs of bleeding, or persistent nausea, or with increased unrelieved flank pain. Continue to drink plenty of fluids and avoid straining as well as constipation which may be a side effect from taking narcotic pain meds. Follow-up with MD in office for post-op check as well as with PMD as advised for continuity of care.

## 2019-03-19 NOTE — PROGRESS NOTE ADULT - SUBJECTIVE AND OBJECTIVE BOX
CHIEF COMPLAINT: f/u septic stone    SUBJECTIVE / OVERNIGHT EVENTS: Patient seen and examined. No acute events overnight. Pain well controlled and patient without any complaints.    MEDICATIONS  (STANDING):  cefTRIAXone   IVPB 1 Gram(s) IV Intermittent every 24 hours  heparin  Injectable 5000 Unit(s) SubCutaneous every 8 hours  levothyroxine 200 MICROGram(s) Oral daily  sodium chloride 0.9%. 1000 milliLiter(s) (75 mL/Hr) IV Continuous <Continuous>    MEDICATIONS  (PRN):  acetaminophen   Tablet .. 650 milliGRAM(s) Oral every 6 hours PRN Mild Pain (1 - 3)  oxyCODONE    IR 5 milliGRAM(s) Oral every 6 hours PRN Moderate Pain (4 - 6)  oxyCODONE    IR 10 milliGRAM(s) Oral every 4 hours PRN Severe Pain (7 - 10)    VITALS:  T(F): 98.2 (03-19-19 @ 10:02), Max: 99.2 (03-18-19 @ 17:26)  HR: 87 (03-19-19 @ 10:02) (87 - 97)  BP: 146/83 (03-19-19 @ 10:02) (141/75 - 173/90)  RR: 18 (03-19-19 @ 10:02) (17 - 18)  SpO2: 99% (03-19-19 @ 10:02)    PHYSICAL EXAM:  GENERAL: NAD, well-developed  HEAD:  Atraumatic, Normocephalic  EYES: EOMI, PERRLA, conjunctiva and sclera clear  ENMT: Moist mucous membranes  NECK: Supple, No JVD  RESPIRATORY: Clear to auscultation bilaterally; No rales, rhonchi, wheezing, or rubs  CARDIOVASCULAR: Regular rate and rhythm; No murmurs, rubs, or gallops  GASTROINTESTINAL: Soft, Nontender, Nondistended; Bowel sounds present  EXTREMITIES:  2+ Peripheral Pulses, No clubbing, cyanosis, or edema  SKIN: Dressing C/D/I    LABS:              11.0                 138  | 23   | 10           17.37 >-----------< 169     ------------------------< 185                   34.9                 3.7  | 101  | 1.08                                         Ca 8.4   Mg 2.1   Ph x        MICROBIOLOGY:  Blood culture  NO ORGANISMS ISOLATED  NO ORGANISMS ISOLATED AT 72 HRS.03-15 @ 23:55    Blood culture  NO ORGANISMS ISOLATED  NO ORGANISMS ISOLATED AT 72 HRS.03-15 @ 23:01    Urine Culture  COLONY COUNT: [id number] @ 03:38  Urine Culture  COLONY COUNT: > = 100,000 CFU/ML03-15 @ 18:24    Culture - Urine (03.16.19 @ 03:38)    -  Ampicillin: R >16 BLANKA    -  Ampicillin/Sulbactam: I 16/8 BLANKA    -  Amikacin: S <=8 BLANKA    -  Aztreonam: S <=4 BLANKA    -  Cefazolin: S <=2 BLANKA    -  Cefepime: S <=2 BLANKA    -  Cefoxitin: S <=4 BLANKA    -  Ceftazidime: S <=1 BLANKA    -  Ceftriaxone: S <=1 BLANKA    -  Ciprofloxacin: S <=0.5 BLANKA    -  Ertapenem: S <=0.5 BLANKA    -  Gentamicin: S <=1 BLANKA    -  Imipenem: S <=1 BLANKA    -  Levofloxacin: S <=1 BLANKA    -  Meropenem: S <=1 BLANKA    -  Piperacillin/Tazobactam: S <=8 BLANKA    -  Tigecycline: S <=1 BLANKA    -  Tobramycin: S <=2 BLANKA    -  Trimethoprim/Sulfamethoxazole: S <=0.5/9.5 BLANKA    Culture - Urine:   COLONY COUNT: MODERATE    Specimen Source: KIDNEY    Organism Identification: Escherichia coli    Organism: Escherichia coli    Method Type: NEGATIVE BLANKA 43    RADIOLOGY & ADDITIONAL TESTS:  Imaging Personally Reviewed: [x] Yes  < from: US Kidney and Bladder (03.18.19 @ 14:57) >  IMPRESSION:     Normal renal ultrasound.    < end of copied text >    [ ] Consultant(s) Notes Reviewed:  [x] Care Discussed with Consultants/Other Providers: Urology PA - discussed disposition

## 2019-03-19 NOTE — PROGRESS NOTE ADULT - ASSESSMENT
63F h/o Hypothyroid, Diverticulitis, GIB, HTN , presenting with one day of left lower quadrant abdominal pain and nausea 2/2 ureteral stone now s/p cystoscopy with placement of L ureteral stent course complication by sepsis 2/2 UTI with HELEN.    Sepsis [A41.9]  -patient with on sepsis (HR>90 and WBC>11) but overall clinically improving; holding BP and mentating well  -lactate trending down, will continue to trend  -per urology can d/c on bactrim  -BCx negative x2 from 3/15    Ureteral stone with hydronephrosis [N13.2]  -s/p left ureteral stent placement by urology  -plan per urology  -Pain well controlled; continue management and pain control per urology recs with morphine IV prn    Hypokalemia [E87.6]  -repleted    HELEN (acute kidney injury) [N17.9]  -creatinine back to normal   -renal US reviewed and WNL    HTN (hypertension) [I10]  -can resume home dose ramipril    Hypothyroidism [E03.9]  -c/w synthroid    Preventive measure [Z29.9]  -will continue with heparin subq tid for DVT prophylaxis

## 2019-03-19 NOTE — DISCHARGE NOTE NURSING/CASE MANAGEMENT/SOCIAL WORK - NSDCDPATPORTLINK_GEN_ALL_CORE
You can access the [email or web address] Patient Portal, offered by [facility], by registering with the following website: [email or web address]

## 2019-03-20 LAB
BACTERIA BLD CULT: SIGNIFICANT CHANGE UP
BACTERIA BLD CULT: SIGNIFICANT CHANGE UP

## 2019-04-04 ENCOUNTER — APPOINTMENT (OUTPATIENT)
Dept: UROLOGY | Facility: CLINIC | Age: 64
End: 2019-04-04
Payer: MEDICARE

## 2019-04-04 VITALS
SYSTOLIC BLOOD PRESSURE: 156 MMHG | RESPIRATION RATE: 16 BRPM | DIASTOLIC BLOOD PRESSURE: 78 MMHG | HEART RATE: 86 BPM | TEMPERATURE: 98.2 F

## 2019-04-04 PROCEDURE — 99214 OFFICE O/P EST MOD 30 MIN: CPT

## 2019-04-04 NOTE — ADDENDUM
[FreeTextEntry1] : Entered by Abeba Pete, acting as scribe for Dr. Boucher. \par \par The documentation recorded by the scribe accurately reflects the service I personally performed and the decisions made by me.\par

## 2019-04-04 NOTE — LETTER BODY
[FreeTextEntry1] : Venice Ruggiero MD. \par [address]\[place] [address]\par [phone]\par \par Dear Dr. Ruggiero, \par \par Reason for Visit: Left ureteral stone. \par \par This is a 63 -year-old woman with chronic  hematochezia requiring blood transfusions and diverticulitis, presenting with a left ureteral stone. Patient was previously evaluated by me at [facility] last month. She was found to have a 4 mm septic stone. Her workup demonstrated E.coli on urine culture. She then underwent a left stent placement. Patient returns today for follow up, accompanied by her daughter. Since her discharge, she complains of pain with urinary urgency. Patient also reports minimal gross hematuria. She has some confusion occasionally. Patient denies any dysuria or urinary incontinence. The patient denies any aggravating or relieving factors. The patient denies any interference of function. The patient is entirely asymptomatic. All other review of systems are negative. She has no cancer in her family medical history. She has previous surgical history. Past medical history, family history and social history were inquired and were non contributory to current condition. The patient does not use tobacco or drink alcohol. Medications and allergies were reviewed.\par \par On examination, the patient is a healthy-appearing woman in no acute distress. She is alert and oriented and follows commands. She  has normal mood and affect. She is normocephalic. Oral no thrush. Neck is supple. Respirations are unlabored. Abdomen is soft and nontender. Liver is nonpalpable. Bladder is nonpalpable. No CVA tenderness. Neurologically she is grossly intact. No peripheral edema. Skin without gross abnormality.\par \par Assessment: Left ureteral stone. \par \par I counseled the patient. She is doing well since discharge. Her pain and mild hematuria is normal, consistent with stent. She will obtain urine culture today to evaluate for infection. Patient is scheduled for a left ureteroscopy, laser lithotripsy, stone extraction, and stent placement to remove her left ureteral stone.  I counseled the patient regarding the procedure. The risks and benefits were discussed. Alternatives were given. I answered the patient questions. The patient will take the necessary preparations for the procedure. She will obtain PTT, BMP, CBC, and PT and INR today to ensure stability. The patient will follow-up as directed and will contact me with any questions or concerns. Thank you for the opportunity to participate in the care of this patient. I'll keep you updated on her progress.\par \par Plan: PTT. BMP. CBC. PT and INR. Urine culture. Left ureteroscopy, laser lithotripsy, stone extraction, and stent placement. Followup as directed.

## 2019-04-05 LAB
ANION GAP SERPL CALC-SCNC: 12 MMOL/L
APTT BLD: 32.2 SEC
BACTERIA UR CULT: NORMAL
BASOPHILS # BLD AUTO: 0.08 K/UL
BASOPHILS NFR BLD AUTO: 1.4 %
BUN SERPL-MCNC: 20 MG/DL
CALCIUM SERPL-MCNC: 9.1 MG/DL
CHLORIDE SERPL-SCNC: 102 MMOL/L
CO2 SERPL-SCNC: 27 MMOL/L
CREAT SERPL-MCNC: 1.26 MG/DL
EOSINOPHIL # BLD AUTO: 0.17 K/UL
EOSINOPHIL NFR BLD AUTO: 3 %
GLUCOSE SERPL-MCNC: 183 MG/DL
HCT VFR BLD CALC: 33.7 %
HGB BLD-MCNC: 9.9 G/DL
IMM GRANULOCYTES NFR BLD AUTO: 0.2 %
INR PPP: 1.03 RATIO
LYMPHOCYTES # BLD AUTO: 2.34 K/UL
LYMPHOCYTES NFR BLD AUTO: 41.6 %
MAN DIFF?: NORMAL
MCHC RBC-ENTMCNC: 28.1 PG
MCHC RBC-ENTMCNC: 29.4 GM/DL
MCV RBC AUTO: 95.7 FL
MONOCYTES # BLD AUTO: 0.47 K/UL
MONOCYTES NFR BLD AUTO: 8.4 %
NEUTROPHILS # BLD AUTO: 2.55 K/UL
NEUTROPHILS NFR BLD AUTO: 45.4 %
PLATELET # BLD AUTO: 421 K/UL
POTASSIUM SERPL-SCNC: 3.8 MMOL/L
PT BLD: 11.7 SEC
RBC # BLD: 3.52 M/UL
RBC # FLD: 14.4 %
SODIUM SERPL-SCNC: 141 MMOL/L
WBC # FLD AUTO: 5.62 K/UL

## 2019-04-25 ENCOUNTER — OUTPATIENT (OUTPATIENT)
Dept: OUTPATIENT SERVICES | Facility: HOSPITAL | Age: 64
LOS: 1 days | End: 2019-04-25
Payer: COMMERCIAL

## 2019-04-25 VITALS
WEIGHT: 205.03 LBS | RESPIRATION RATE: 16 BRPM | OXYGEN SATURATION: 100 % | DIASTOLIC BLOOD PRESSURE: 88 MMHG | HEART RATE: 75 BPM | TEMPERATURE: 98 F | SYSTOLIC BLOOD PRESSURE: 150 MMHG | HEIGHT: 63.5 IN

## 2019-04-25 DIAGNOSIS — K57.31 DIVERTICULOSIS OF LARGE INTESTINE WITHOUT PERFORATION OR ABSCESS WITH BLEEDING: ICD-10-CM

## 2019-04-25 DIAGNOSIS — N20.1 CALCULUS OF URETER: ICD-10-CM

## 2019-04-25 DIAGNOSIS — Z01.818 ENCOUNTER FOR OTHER PREPROCEDURAL EXAMINATION: ICD-10-CM

## 2019-04-25 DIAGNOSIS — N20.0 CALCULUS OF KIDNEY: ICD-10-CM

## 2019-04-25 DIAGNOSIS — Z90.89 ACQUIRED ABSENCE OF OTHER ORGANS: Chronic | ICD-10-CM

## 2019-04-25 DIAGNOSIS — K92.2 GASTROINTESTINAL HEMORRHAGE, UNSPECIFIED: ICD-10-CM

## 2019-04-25 DIAGNOSIS — G47.33 OBSTRUCTIVE SLEEP APNEA (ADULT) (PEDIATRIC): ICD-10-CM

## 2019-04-25 DIAGNOSIS — Z90.710 ACQUIRED ABSENCE OF BOTH CERVIX AND UTERUS: Chronic | ICD-10-CM

## 2019-04-25 DIAGNOSIS — E11.9 TYPE 2 DIABETES MELLITUS WITHOUT COMPLICATIONS: ICD-10-CM

## 2019-04-25 DIAGNOSIS — Z98.890 OTHER SPECIFIED POSTPROCEDURAL STATES: Chronic | ICD-10-CM

## 2019-04-25 DIAGNOSIS — I10 ESSENTIAL (PRIMARY) HYPERTENSION: ICD-10-CM

## 2019-04-25 DIAGNOSIS — Z00.00 ENCOUNTER FOR GENERAL ADULT MEDICAL EXAMINATION WITHOUT ABNORMAL FINDINGS: ICD-10-CM

## 2019-04-25 LAB — HBA1C BLD-MCNC: 8.2 % — HIGH (ref 4–5.6)

## 2019-04-25 RX ORDER — RAMIPRIL 5 MG
1 CAPSULE ORAL
Qty: 0 | Refills: 0 | COMMUNITY

## 2019-04-25 NOTE — H&P PST ADULT - HISTORY OF PRESENT ILLNESS
This is a 63 year old female with a medical history significant for Hypothyroid, Diverticulitis, h/o GIB HTN, presenting with one day of left lower quadrant abdominal pain and nausea.  Patient reports symptoms occurred acutely and pain is sharp.  Pain is localized to the left lower quadrant, denies flank or back pain.  Reports nausea throughout the day and 2 episodes of emesis.  She denies fevers or chills at home.  Denies urinary symptoms including dysuria, hematuria, increase in urgency or frequency or retention.  Denies prior history of stones.  In the ED, she is tachycardic to 115, febrile to 102.8 rectally with a WBC of 12.6, lactate of 5.4, creatinine is 1.29 and UA is negative.  CT reveals a 4mm L ureteral stone with mild hydro. This is a 63 year old female, R.N., with PMH: + SOLOMON: non-compliant with CPAP, HTN, HLD, Type 2 Diabetes, Hypothyroidism, Pernicious Anemia. s/p Multiple episodes Diverticulosis with Hemorrhage: last episode 5/2018.  Current dx: Left Ureteral Stone. s/p  (3/2019): placement Left Ureteral Stent. Now scheduled: Cystoscopy/ Left Ureteroscopy with Stone Extraction/ laser Lithotripsy/ Left Stent Placement.

## 2019-04-25 NOTE — H&P PST ADULT - NSICDXPROBLEM_GEN_ALL_CORE_FT
PROBLEM DIAGNOSES  Problem: Left ureteral stone  Assessment and Plan: Cystoscopy/ Left Ureteroscopy with Stone Extraction/ Laser Lithotripsy/ Left Stent Placement    * preop Medical evaluation done: on chart    Problem: SOLOMON (obstructive sleep apnea)  Assessment and Plan: Observe SOLOMON precautions    Problem: HTN (hypertension)  Assessment and Plan: Continue meds    Problem: Type 2 diabetes mellitus  Assessment and Plan: Continue monitoring. Fingerstick glucose preop day of surgery

## 2019-04-25 NOTE — H&P PST ADULT - NSICDXPASTMEDICALHX_GEN_ALL_CORE_FT
PAST MEDICAL HISTORY:  Diverticulosis     HTN (hypertension) -holding ramipril given HELEN and ongoing sepsis    Hypothyroidism -c/w synthroid    Lower gastrointestinal bleeding 4x 2014- 2016 PAST MEDICAL HISTORY:  Diverticulosis     Fibroid uterus '94  surgically treated    Heart murmur mild    HTN (hypertension)     Hypothyroidism     Left ureteral stone     Lower gastrointestinal bleeding Multipel times since '2014: last episode 5/2018    Lumbar herniated disc ' 2010  surgically treated    Multiparity     SOLOMON (obstructive sleep apnea) non-compliant with CPAP    Pernicious anemia     Type 2 diabetes mellitus

## 2019-04-25 NOTE — H&P PST ADULT - NSICDXPASTSURGICALHX_GEN_ALL_CORE_FT
PAST SURGICAL HISTORY:  H/O abdominal hysterectomy     History of back surgery     History of tonsillectomy PAST SURGICAL HISTORY:  History of tonsillectomy age 14    S/P lumbar laminectomy ' 2010  with Fusion    S/P WESTLEY (total abdominal hysterectomy) ' 94  Laproscopic.  benign

## 2019-04-26 PROBLEM — E03.9 HYPOTHYROIDISM, UNSPECIFIED: Chronic | Status: ACTIVE | Noted: 2017-04-23

## 2019-04-26 PROBLEM — I10 ESSENTIAL (PRIMARY) HYPERTENSION: Chronic | Status: ACTIVE | Noted: 2017-04-23

## 2019-04-26 PROBLEM — M51.26 OTHER INTERVERTEBRAL DISC DISPLACEMENT, LUMBAR REGION: Chronic | Status: ACTIVE | Noted: 2019-04-25

## 2019-04-26 PROBLEM — D25.9 LEIOMYOMA OF UTERUS, UNSPECIFIED: Chronic | Status: ACTIVE | Noted: 2019-04-25

## 2019-04-26 PROBLEM — G47.33 OBSTRUCTIVE SLEEP APNEA (ADULT) (PEDIATRIC): Chronic | Status: ACTIVE | Noted: 2019-04-25

## 2019-04-26 PROBLEM — R01.1 CARDIAC MURMUR, UNSPECIFIED: Chronic | Status: ACTIVE | Noted: 2019-04-25

## 2019-04-26 PROBLEM — K92.2 GASTROINTESTINAL HEMORRHAGE, UNSPECIFIED: Chronic | Status: ACTIVE | Noted: 2017-04-23

## 2019-04-26 PROBLEM — K57.90 DIVERTICULOSIS OF INTESTINE, PART UNSPECIFIED, WITHOUT PERFORATION OR ABSCESS WITHOUT BLEEDING: Chronic | Status: ACTIVE | Noted: 2017-04-23

## 2019-05-08 ENCOUNTER — APPOINTMENT (OUTPATIENT)
Dept: UROLOGY | Facility: HOSPITAL | Age: 64
End: 2019-05-08

## 2019-05-29 PROBLEM — Z64.1 PROBLEMS RELATED TO MULTIPARITY: Chronic | Status: ACTIVE | Noted: 2019-04-25

## 2019-05-29 PROBLEM — D51.0 VITAMIN B12 DEFICIENCY ANEMIA DUE TO INTRINSIC FACTOR DEFICIENCY: Chronic | Status: ACTIVE | Noted: 2019-04-25

## 2019-05-29 PROBLEM — E11.9 TYPE 2 DIABETES MELLITUS WITHOUT COMPLICATIONS: Chronic | Status: ACTIVE | Noted: 2019-04-25

## 2019-05-29 PROBLEM — N20.1 CALCULUS OF URETER: Chronic | Status: ACTIVE | Noted: 2019-04-25

## 2019-06-05 ENCOUNTER — OUTPATIENT (OUTPATIENT)
Dept: OUTPATIENT SERVICES | Facility: HOSPITAL | Age: 64
LOS: 1 days | End: 2019-06-05
Payer: COMMERCIAL

## 2019-06-05 VITALS
SYSTOLIC BLOOD PRESSURE: 168 MMHG | HEART RATE: 80 BPM | WEIGHT: 203.93 LBS | RESPIRATION RATE: 43 BRPM | OXYGEN SATURATION: 99 % | HEIGHT: 63.5 IN | TEMPERATURE: 98 F | DIASTOLIC BLOOD PRESSURE: 90 MMHG

## 2019-06-05 DIAGNOSIS — Z98.890 OTHER SPECIFIED POSTPROCEDURAL STATES: Chronic | ICD-10-CM

## 2019-06-05 DIAGNOSIS — Z01.818 ENCOUNTER FOR OTHER PREPROCEDURAL EXAMINATION: ICD-10-CM

## 2019-06-05 DIAGNOSIS — Z00.00 ENCOUNTER FOR GENERAL ADULT MEDICAL EXAMINATION WITHOUT ABNORMAL FINDINGS: ICD-10-CM

## 2019-06-05 DIAGNOSIS — K57.31 DIVERTICULOSIS OF LARGE INTESTINE WITHOUT PERFORATION OR ABSCESS WITH BLEEDING: ICD-10-CM

## 2019-06-05 DIAGNOSIS — Z90.710 ACQUIRED ABSENCE OF BOTH CERVIX AND UTERUS: Chronic | ICD-10-CM

## 2019-06-05 DIAGNOSIS — G47.33 OBSTRUCTIVE SLEEP APNEA (ADULT) (PEDIATRIC): ICD-10-CM

## 2019-06-05 DIAGNOSIS — I10 ESSENTIAL (PRIMARY) HYPERTENSION: ICD-10-CM

## 2019-06-05 DIAGNOSIS — Z90.89 ACQUIRED ABSENCE OF OTHER ORGANS: Chronic | ICD-10-CM

## 2019-06-05 DIAGNOSIS — K92.2 GASTROINTESTINAL HEMORRHAGE, UNSPECIFIED: ICD-10-CM

## 2019-06-05 DIAGNOSIS — N20.1 CALCULUS OF URETER: ICD-10-CM

## 2019-06-05 DIAGNOSIS — N20.0 CALCULUS OF KIDNEY: ICD-10-CM

## 2019-06-05 DIAGNOSIS — E11.9 TYPE 2 DIABETES MELLITUS WITHOUT COMPLICATIONS: ICD-10-CM

## 2019-06-05 DIAGNOSIS — D51.0 VITAMIN B12 DEFICIENCY ANEMIA DUE TO INTRINSIC FACTOR DEFICIENCY: ICD-10-CM

## 2019-06-05 PROCEDURE — G0463: CPT

## 2019-06-05 PROCEDURE — 87186 SC STD MICRODIL/AGAR DIL: CPT

## 2019-06-05 PROCEDURE — 87086 URINE CULTURE/COLONY COUNT: CPT

## 2019-06-05 RX ORDER — CEFAZOLIN SODIUM 1 G
2000 VIAL (EA) INJECTION ONCE
Refills: 0 | Status: DISCONTINUED | OUTPATIENT
Start: 2019-06-12 | End: 2019-06-27

## 2019-06-05 NOTE — H&P PST ADULT - HISTORY OF PRESENT ILLNESS
Mrs. Chinchilla is a 63 year old woman with PMH HTN, HLD, Hypothyroid, recently dx with T2DM, Pernicious anemia, diverticulosis, GI bleed most recent 5/18, and left kidney stone s/p stent placement who is now scheduled for left ureteroscopy, laser lithotripsy, stone extraction and stent placement.  She denies hematuria at this time but does c/o frequency and nocturia since perla removal in February 2019. Mrs. Chinchilla is a 63 year old woman with PMH HTN, HLD, Hypothyroid, recently dx with T2DM, SOLOMON not consistent with using CPAP, Pernicious anemia, diverticulosis, GI bleed most recent 5/18, and left kidney stone s/p stent placement who is now scheduled for left ureteroscopy, laser lithotripsy, stone extraction and stent placement.  She denies hematuria at this time but does c/o frequency and nocturia since perla removal in February 2019.

## 2019-06-05 NOTE — H&P PST ADULT - NSICDXPASTSURGICALHX_GEN_ALL_CORE_FT
PAST SURGICAL HISTORY:  History of tonsillectomy age 14    S/P lumbar laminectomy ' 2010  with Fusion    S/P WESTLEY (total abdominal hysterectomy) ' 94  Laproscopic.  benign

## 2019-06-05 NOTE — H&P PST ADULT - NSICDXPASTMEDICALHX_GEN_ALL_CORE_FT
PAST MEDICAL HISTORY:  Diverticulosis     Fibroid uterus '94  surgically treated    Heart murmur mild    HTN (hypertension)     Hypothyroidism     Left ureteral stone     Lower gastrointestinal bleeding Multipel times since '2014: last episode 5/2018    Lumbar herniated disc ' 2010  surgically treated    Multiparity     SOLOMON (obstructive sleep apnea) non-compliant with CPAP    Pernicious anemia     Type 2 diabetes mellitus

## 2019-06-05 NOTE — H&P PST ADULT - NSICDXPROBLEM_GEN_ALL_CORE_FT
PROBLEM DIAGNOSES  Problem: Calculus of left ureter  Assessment and Plan: Scheduled for Left ureteroscopy, laser lithotripsy, stone extraction, stent placement.  Urine cx pending.  Preop instructions given.    Problem: HTN (hypertension)  Assessment and Plan: Instructed to continue BP medications as prescribed, to take DOS with small sips of water.  Medical eval done 5/23/19    Problem: Type 2 diabetes mellitus  Assessment and Plan: A1c 8.2 (4/12)  FS upon admission to [facility].  Labs pending from PCP  Glimeperide last dose 6/11 and Metformin last dose 6/10 pm dose    Problem: SOLOMON (obstructive sleep apnea)  Assessment and Plan: SOLOMON precautions.  OR notified    Problem: Pernicious anemia  Assessment and Plan: Awaiting CBC results from PCP

## 2019-06-05 NOTE — H&P PST ADULT - NEGATIVE ALLERGY TYPES
no reactions to medicines/no reactions to food/no reactions to animals/no outdoor environmental allergies

## 2019-06-05 NOTE — H&P PST ADULT - ATTENDING COMMENTS
Patient with previous urosepsis and left ureteral calculus s/p left ureteral stent placement. Patient wishes to proceed with left ureteroscopy, laser lithotripsy, stone extraction, stent placement. Informed consent was obtained. Alternatives were given. Risks including but not limited to bleeding, infection, risk of anesthesia, pain, failure to cure, negative ureteroscopy, stone migration, need for future procedure, and injury to surrounding structures were discussed. Patient wishes to proceed with procedure. Patient has been taking bactrim for the positive urine culture.

## 2019-06-07 LAB
-  AMIKACIN: SIGNIFICANT CHANGE UP
-  AMPICILLIN/SULBACTAM: SIGNIFICANT CHANGE UP
-  AMPICILLIN: SIGNIFICANT CHANGE UP
-  AZTREONAM: SIGNIFICANT CHANGE UP
-  CEFEPIME: SIGNIFICANT CHANGE UP
-  CEFOXITIN: SIGNIFICANT CHANGE UP
-  CEFTRIAXONE: SIGNIFICANT CHANGE UP
-  CIPROFLOXACIN: SIGNIFICANT CHANGE UP
-  ERTAPENEM: SIGNIFICANT CHANGE UP
-  GENTAMICIN: SIGNIFICANT CHANGE UP
-  IMIPENEM: SIGNIFICANT CHANGE UP
-  LEVOFLOXACIN: SIGNIFICANT CHANGE UP
-  MEROPENEM: SIGNIFICANT CHANGE UP
-  NITROFURANTOIN: SIGNIFICANT CHANGE UP
-  PIPERACILLIN/TAZOBACTAM: SIGNIFICANT CHANGE UP
-  TIGECYCLINE: SIGNIFICANT CHANGE UP
-  TOBRAMYCIN: SIGNIFICANT CHANGE UP
-  TRIMETHOPRIM/SULFAMETHOXAZOLE: SIGNIFICANT CHANGE UP
CULTURE RESULTS: SIGNIFICANT CHANGE UP
METHOD TYPE: SIGNIFICANT CHANGE UP
ORGANISM # SPEC MICROSCOPIC CNT: SIGNIFICANT CHANGE UP
ORGANISM # SPEC MICROSCOPIC CNT: SIGNIFICANT CHANGE UP
SPECIMEN SOURCE: SIGNIFICANT CHANGE UP

## 2019-06-08 RX ORDER — SULFAMETHOXAZOLE AND TRIMETHOPRIM 800; 160 MG/1; MG/1
800-160 TABLET ORAL
Qty: 10 | Refills: 0 | Status: ACTIVE | COMMUNITY
Start: 2019-06-08 | End: 1900-01-01

## 2019-06-11 ENCOUNTER — TRANSCRIPTION ENCOUNTER (OUTPATIENT)
Age: 64
End: 2019-06-11

## 2019-06-12 ENCOUNTER — APPOINTMENT (OUTPATIENT)
Dept: UROLOGY | Facility: HOSPITAL | Age: 64
End: 2019-06-12

## 2019-06-12 ENCOUNTER — OUTPATIENT (OUTPATIENT)
Dept: OUTPATIENT SERVICES | Facility: HOSPITAL | Age: 64
LOS: 1 days | End: 2019-06-12
Payer: COMMERCIAL

## 2019-06-12 ENCOUNTER — RESULT REVIEW (OUTPATIENT)
Age: 64
End: 2019-06-12

## 2019-06-12 VITALS
RESPIRATION RATE: 18 BRPM | HEART RATE: 69 BPM | TEMPERATURE: 97 F | OXYGEN SATURATION: 100 % | DIASTOLIC BLOOD PRESSURE: 93 MMHG | SYSTOLIC BLOOD PRESSURE: 170 MMHG

## 2019-06-12 VITALS
SYSTOLIC BLOOD PRESSURE: 137 MMHG | HEART RATE: 77 BPM | WEIGHT: 203.93 LBS | DIASTOLIC BLOOD PRESSURE: 84 MMHG | OXYGEN SATURATION: 99 % | HEIGHT: 63 IN | RESPIRATION RATE: 19 BRPM | TEMPERATURE: 98 F

## 2019-06-12 DIAGNOSIS — Z00.00 ENCOUNTER FOR GENERAL ADULT MEDICAL EXAMINATION WITHOUT ABNORMAL FINDINGS: ICD-10-CM

## 2019-06-12 DIAGNOSIS — N20.0 CALCULUS OF KIDNEY: ICD-10-CM

## 2019-06-12 DIAGNOSIS — K57.31 DIVERTICULOSIS OF LARGE INTESTINE WITHOUT PERFORATION OR ABSCESS WITH BLEEDING: ICD-10-CM

## 2019-06-12 DIAGNOSIS — Z90.710 ACQUIRED ABSENCE OF BOTH CERVIX AND UTERUS: Chronic | ICD-10-CM

## 2019-06-12 DIAGNOSIS — I10 ESSENTIAL (PRIMARY) HYPERTENSION: ICD-10-CM

## 2019-06-12 DIAGNOSIS — E11.9 TYPE 2 DIABETES MELLITUS WITHOUT COMPLICATIONS: ICD-10-CM

## 2019-06-12 DIAGNOSIS — Z90.89 ACQUIRED ABSENCE OF OTHER ORGANS: Chronic | ICD-10-CM

## 2019-06-12 DIAGNOSIS — Z98.890 OTHER SPECIFIED POSTPROCEDURAL STATES: Chronic | ICD-10-CM

## 2019-06-12 DIAGNOSIS — N20.1 CALCULUS OF URETER: ICD-10-CM

## 2019-06-12 DIAGNOSIS — K92.2 GASTROINTESTINAL HEMORRHAGE, UNSPECIFIED: ICD-10-CM

## 2019-06-12 LAB — GLUCOSE BLDC GLUCOMTR-MCNC: 72 MG/DL — SIGNIFICANT CHANGE UP (ref 70–99)

## 2019-06-12 PROCEDURE — 88300 SURGICAL PATH GROSS: CPT

## 2019-06-12 PROCEDURE — 74420 UROGRAPHY RTRGR +-KUB: CPT | Mod: 26

## 2019-06-12 PROCEDURE — 83036 HEMOGLOBIN GLYCOSYLATED A1C: CPT

## 2019-06-12 PROCEDURE — G0463: CPT

## 2019-06-12 PROCEDURE — 88300 SURGICAL PATH GROSS: CPT | Mod: 26

## 2019-06-12 PROCEDURE — 52352 CYSTOURETERO W/STONE REMOVE: CPT | Mod: LT

## 2019-06-12 RX ORDER — SODIUM CHLORIDE 9 MG/ML
1000 INJECTION, SOLUTION INTRAVENOUS
Refills: 0 | Status: DISCONTINUED | OUTPATIENT
Start: 2019-06-12 | End: 2019-06-27

## 2019-06-12 RX ORDER — CEFAZOLIN SODIUM 1 G
2000 VIAL (EA) INJECTION ONCE
Refills: 0 | Status: DISCONTINUED | OUTPATIENT
Start: 2019-06-12 | End: 2019-06-12

## 2019-06-12 RX ORDER — SODIUM CHLORIDE 9 MG/ML
3 INJECTION INTRAMUSCULAR; INTRAVENOUS; SUBCUTANEOUS EVERY 8 HOURS
Refills: 0 | Status: DISCONTINUED | OUTPATIENT
Start: 2019-06-12 | End: 2019-06-12

## 2019-06-12 RX ORDER — TAMSULOSIN HYDROCHLORIDE 0.4 MG/1
1 CAPSULE ORAL
Qty: 30 | Refills: 0
Start: 2019-06-12 | End: 2019-07-11

## 2019-06-12 RX ORDER — LABETALOL HCL 100 MG
10 TABLET ORAL ONCE
Refills: 0 | Status: DISCONTINUED | OUTPATIENT
Start: 2019-06-12 | End: 2019-06-12

## 2019-06-12 RX ORDER — ONDANSETRON 8 MG/1
4 TABLET, FILM COATED ORAL ONCE
Refills: 0 | Status: COMPLETED | OUTPATIENT
Start: 2019-06-12 | End: 2019-06-12

## 2019-06-12 RX ORDER — HYDROMORPHONE HYDROCHLORIDE 2 MG/ML
0.25 INJECTION INTRAMUSCULAR; INTRAVENOUS; SUBCUTANEOUS
Refills: 0 | Status: DISCONTINUED | OUTPATIENT
Start: 2019-06-12 | End: 2019-06-12

## 2019-06-12 RX ORDER — LIDOCAINE HCL 20 MG/ML
0.2 VIAL (ML) INJECTION ONCE
Refills: 0 | Status: DISCONTINUED | OUTPATIENT
Start: 2019-06-12 | End: 2019-06-12

## 2019-06-12 RX ADMIN — ONDANSETRON 4 MILLIGRAM(S): 8 TABLET, FILM COATED ORAL at 09:17

## 2019-06-12 RX ADMIN — SODIUM CHLORIDE 125 MILLILITER(S): 9 INJECTION, SOLUTION INTRAVENOUS at 09:19

## 2019-06-12 NOTE — ASU PATIENT PROFILE, ADULT - PMH
Diverticulosis    Fibroid uterus  '94  surgically treated  Heart murmur  mild  HTN (hypertension)    Hypothyroidism    Left ureteral stone    Lower gastrointestinal bleeding  Multipel times since '2014: last episode 5/2018  Lumbar herniated disc  ' 2010  surgically treated  Multiparity    SOLOMON (obstructive sleep apnea)  non-compliant with CPAP  Pernicious anemia    Type 2 diabetes mellitus

## 2019-06-12 NOTE — ASU DISCHARGE PLAN (ADULT/PEDIATRIC) - CARE PROVIDER_API CALL
Bridger Boucher)  Urology  [address] [place] [address], [place] [address]  Phone: [phone]  Fax: [phone]  Follow Up Time:

## 2019-06-12 NOTE — BRIEF OPERATIVE NOTE - NSICDXBRIEFPOSTOP_GEN_ALL_CORE_FT
POST-OP DIAGNOSIS:  Left nephrolithiasis 12-Jun-2019 09:27:39  Colten Hyman  Left nephrolithiasis 12-Jun-2019 09:27:21  Colten Hyman

## 2019-06-12 NOTE — PRE-ANESTHESIA EVALUATION ADULT - NSANTHAIRWAYFT_ENT_ALL_CORE
Has had prior surgeries with no airway issues  FROM  TMD> 3FB Has had prior surgeries with no airway issues  upper denture partial  right central lower incisor loose  FROM  TMD> 3FB

## 2019-06-12 NOTE — ASU DISCHARGE PLAN (ADULT/PEDIATRIC) - ASU DC SPECIAL INSTRUCTIONSFT
You may have intermittent pink tinged urine and slight flank pain when you urinate.  This is normal and due to the stent in your ureter.   If your urine becomes bright red or with clots, please call the office. Take flomax daily for stent discomfort.     The stent is temporary, and must be eventually removed or exchanged. The stent may cause problems with infection and kidney damage if left in place greater than 3 months.    Please call the office for stent removal in one week. You will have an office cystoscopy. Please call [phone] for appointment    Take Antibiotic prescribed for 2 additional days. If you have remaining from prior prescription no need to  this new one.

## 2019-06-12 NOTE — ASU PATIENT PROFILE, ADULT - PSH
History of tonsillectomy  age 14  S/P lumbar laminectomy  ' 2010  with Fusion  S/P WESTLEY (total abdominal hysterectomy)  ' 89  Laproscopic.  benign

## 2019-06-17 LAB — COMPN STONE: SIGNIFICANT CHANGE UP

## 2019-06-20 ENCOUNTER — OUTPATIENT (OUTPATIENT)
Dept: OUTPATIENT SERVICES | Facility: HOSPITAL | Age: 64
LOS: 1 days | End: 2019-06-20
Payer: COMMERCIAL

## 2019-06-20 ENCOUNTER — APPOINTMENT (OUTPATIENT)
Dept: UROLOGY | Facility: CLINIC | Age: 64
End: 2019-06-20
Payer: MEDICARE

## 2019-06-20 VITALS
SYSTOLIC BLOOD PRESSURE: 190 MMHG | TEMPERATURE: 98.2 F | RESPIRATION RATE: 16 BRPM | DIASTOLIC BLOOD PRESSURE: 100 MMHG | HEART RATE: 93 BPM

## 2019-06-20 DIAGNOSIS — Z90.89 ACQUIRED ABSENCE OF OTHER ORGANS: Chronic | ICD-10-CM

## 2019-06-20 DIAGNOSIS — R35.0 FREQUENCY OF MICTURITION: ICD-10-CM

## 2019-06-20 DIAGNOSIS — Z98.890 OTHER SPECIFIED POSTPROCEDURAL STATES: Chronic | ICD-10-CM

## 2019-06-20 DIAGNOSIS — Z90.710 ACQUIRED ABSENCE OF BOTH CERVIX AND UTERUS: Chronic | ICD-10-CM

## 2019-06-20 PROCEDURE — C2617: CPT

## 2019-06-20 PROCEDURE — C1769: CPT

## 2019-06-20 PROCEDURE — 52352 CYSTOURETERO W/STONE REMOVE: CPT | Mod: LT

## 2019-06-20 PROCEDURE — 82365 CALCULUS SPECTROSCOPY: CPT

## 2019-06-20 PROCEDURE — 52310 CYSTOSCOPY AND TREATMENT: CPT

## 2019-06-20 PROCEDURE — 76000 FLUOROSCOPY <1 HR PHYS/QHP: CPT

## 2019-06-20 PROCEDURE — C1889: CPT

## 2019-06-20 PROCEDURE — 82962 GLUCOSE BLOOD TEST: CPT

## 2019-06-20 PROCEDURE — 52332 CYSTOSCOPY AND TREATMENT: CPT | Mod: LT

## 2019-06-20 PROCEDURE — C1758: CPT

## 2019-06-21 LAB — SURGICAL PATHOLOGY STUDY: SIGNIFICANT CHANGE UP

## 2019-06-22 DIAGNOSIS — N20.1 CALCULUS OF URETER: ICD-10-CM

## 2019-07-22 ENCOUNTER — OUTPATIENT (OUTPATIENT)
Dept: OUTPATIENT SERVICES | Facility: HOSPITAL | Age: 64
LOS: 1 days | End: 2019-07-22
Payer: COMMERCIAL

## 2019-07-22 ENCOUNTER — APPOINTMENT (OUTPATIENT)
Dept: UROLOGY | Facility: CLINIC | Age: 64
End: 2019-07-22
Payer: MEDICARE

## 2019-07-22 DIAGNOSIS — Z90.89 ACQUIRED ABSENCE OF OTHER ORGANS: Chronic | ICD-10-CM

## 2019-07-22 DIAGNOSIS — R35.0 FREQUENCY OF MICTURITION: ICD-10-CM

## 2019-07-22 DIAGNOSIS — Z98.890 OTHER SPECIFIED POSTPROCEDURAL STATES: Chronic | ICD-10-CM

## 2019-07-22 DIAGNOSIS — Z90.710 ACQUIRED ABSENCE OF BOTH CERVIX AND UTERUS: Chronic | ICD-10-CM

## 2019-07-22 PROCEDURE — 76775 US EXAM ABDO BACK WALL LIM: CPT

## 2019-07-22 PROCEDURE — 76775 US EXAM ABDO BACK WALL LIM: CPT | Mod: 26

## 2019-07-22 PROCEDURE — 99213 OFFICE O/P EST LOW 20 MIN: CPT | Mod: 25

## 2019-07-22 NOTE — ADDENDUM
[FreeTextEntry1] : Entered by Jose oLpez, acting as scribe for Dr. Bridger Boucher.\par \par The documentation recorded by the scribe accurately reflects the service I personally performed and the decisions made by me.

## 2019-07-22 NOTE — LETTER BODY
[FreeTextEntry1] : Venice Ruggiero MD. \par [address]\[place] [address]\par [phone]\par \par Dear Dr. Ruggiero, \par \par Reason for Visit: Left ureteral stone. \par \par This is a 63 year-old woman with chronic hematochezia requiring blood transfusions and diverticulitis, presenting with a left ureteral stone. She was found to have a 4 mm septic stone. Her workup demonstrated E.coli on urine culture. She then underwent a left stent placement. Patient returns today for follow up,renal ultrasound. Since her last visit, she denies any changes in health. She complains of pain in her right side, which may be muscular in nature. Patient denies any dysuria or urinary incontinence. The patient is entirely asymptomatic. All other review of systems are negative. She has no cancer in her family medical history. She has previous surgical history. Past medical history, family history and social history were inquired and were non contributory to current condition. The patient does not use tobacco or drink alcohol. Medications and allergies were reviewed.\par \par On examination, the patient is a healthy-appearing woman in no acute distress. She is alert and oriented and follows commands. She has normal mood and affect. She is normocephalic. Oral no thrush. Neck is supple. Respirations are unlabored. Abdomen is soft and nontender. Liver is nonpalpable. Bladder is nonpalpable. No CVA tenderness. Neurologically she is grossly intact. No peripheral edema. Skin without gross abnormality.\par \par I personally reviewed renal ultrasound with the patient today. Images demonstrated no evidence of stones, solid masses or hydronephrosis. \par \par Assessment: Left ureteral stone, resolved. \par \par I counseled the patient. She is doing well. Her renal ultrasound today demonstrated no renal stones or blockage. I encouraged patient to maintain a low-protein low-sodium diet. I also encouraged hydration to prevent stone formation. I recommend she follow up in 6 months for repeat renal ultrasound to ensure stability. Risks and alternatives were discussed. I answered the patient questions. The patient will follow-up as directed and will contact me with any questions or concerns. Thank you for the opportunity to participate in the care of this patient. I'll keep you updated on her progress.\par \par Plan: Stone diet. Follow up in 6 months for renal ultrasound.

## 2019-07-24 DIAGNOSIS — N20.1 CALCULUS OF URETER: ICD-10-CM

## 2020-01-13 ENCOUNTER — OUTPATIENT (OUTPATIENT)
Dept: OUTPATIENT SERVICES | Facility: HOSPITAL | Age: 65
LOS: 1 days | End: 2020-01-13
Payer: MEDICARE

## 2020-01-13 ENCOUNTER — APPOINTMENT (OUTPATIENT)
Dept: UROLOGY | Facility: CLINIC | Age: 65
End: 2020-01-13
Payer: SELF-PAY

## 2020-01-13 DIAGNOSIS — Z90.89 ACQUIRED ABSENCE OF OTHER ORGANS: Chronic | ICD-10-CM

## 2020-01-13 DIAGNOSIS — Z98.890 OTHER SPECIFIED POSTPROCEDURAL STATES: Chronic | ICD-10-CM

## 2020-01-13 DIAGNOSIS — Z90.710 ACQUIRED ABSENCE OF BOTH CERVIX AND UTERUS: Chronic | ICD-10-CM

## 2020-01-13 PROCEDURE — 76775 US EXAM ABDO BACK WALL LIM: CPT

## 2020-01-13 PROCEDURE — 99214 OFFICE O/P EST MOD 30 MIN: CPT | Mod: 25

## 2020-01-13 PROCEDURE — 76775 US EXAM ABDO BACK WALL LIM: CPT | Mod: 26

## 2020-01-13 RX ORDER — TROSPIUM CHLORIDE 60 MG/1
60 CAPSULE, EXTENDED RELEASE ORAL DAILY
Qty: 30 | Refills: 3 | Status: ACTIVE | COMMUNITY
Start: 2020-01-13 | End: 1900-01-01

## 2020-01-13 NOTE — ADDENDUM
[FreeTextEntry1] : Entered by Jorge Luis Edwards, acting as scribe for Dr. Bridger Boucher.\par \par The documentation recorded by the scribe accurately reflects the service I personally performed and the decisions made by me.

## 2020-01-13 NOTE — LETTER BODY
[FreeTextEntry1] : Venice Ruggiero MD. \par [address]\[place] [address]\par [phone]\par \par Dear Dr. Ruggiero, \par \par Reason for Visit: Left ureteral stone. Urinary frequency.\par \par This is a 63 year-old woman with diverticulitis and chronic hematochezia requiring blood transfusions, presenting for previous left ureteral stone. She was found to have a 4 mm septic stone and underwent a left stent placement. Her workup demonstrated E.coli on urine culture. Her ultrasound last year was unremarkable. Patient returns today for follow up renal ultrasound. Since her last visit, she reports urinary frequency every 1-2 hours and stress incontinence. She wears incontinence pads. She continues to complain of pain in her right side, which may be muscular in nature. She denies history of Glaucoma. Patient denies any dysuria or urinary incontinence.  All other review of systems are negative. Past medical history, surgical history, family history and social history were unchanged. Medications and allergies were reviewed.\par \par On examination, the patient is a healthy-appearing woman in no acute distress. She is alert and oriented and follows commands. She has normal mood and affect. She is normocephalic. Oral no thrush. Neck is supple. Respirations are unlabored. Abdomen is soft and nontender. Liver is nonpalpable. Bladder is nonpalpable. No CVA tenderness. Neurologically she is grossly intact. No peripheral edema. Skin without gross abnormality.\par \par I personally reviewed renal ultrasound with the patient today. Images demonstrated a right  simple cyst measuring 1.0 cm. Both kidneys are normal in size and echogenicity without stones,  hydronephrosis or solid masses visualized. \par \par Assessment: Left ureteral stone, resolved. Urinary frequency. Stress incontinence.\par \par I counseled the patient. Ultrasound today was unremarkable for any stones or hydronephrosis. In terms of her new onset urinary frequency and stress incontinence, I discussed the various etiologies of her symptoms. I counseled the patient on her treatment options. I recommend she try Trospium Chloride 60 mg to relieve her symptoms. I discussed the potential side effects of the medication. I counseled the patient on its use and side effects. If the patient develops any side effects, the patient will discontinue the medication and contact me. If her symptoms persist, I recommended the patient consider consulting my partner, Dr. Gayle, for further evaluation. Risks and alternatives were discussed. I answered the patient questions. The patient will follow-up as directed and will contact me with any questions or concerns. Thank you for the opportunity to participate in the care of this patient. I'll keep you updated on her progress.\par \par Plan: Trial of Trospium Chloride 60 mg. Follow up in 1 year.

## 2020-01-13 NOTE — PACU DISCHARGE NOTE - NAUSEA/VOMITING:
S: Ysabel Ruth 61 y.o. female  here for establishment of care. Chief complaint is cough x 2 weeks. She is a smoker  Seen @  10 days prior to assessment for bronchitis and treated with Doxycycline. Minimal improvement. Still tightness and cough. No other associated symptoms  O: VS: /61   Pulse 68   Temp 97.4 °F (36.3 °C) (Oral)   Ht 5' 8\" (1.727 m)   Wt 96 lb 9.6 oz (43.8 kg)   SpO2 96%   Breastfeeding? No   BMI 14.69 kg/m²    General: NAD   CV:  RRR, no gallops, rubs, or murmurs   Resp: CTAB no R/R/W. Decreased breath sounds   Abd:  Soft, nontender, no masses    Ext:  no C/C/E    Assessment / Plan:      Valencia Maravilla was seen today for new patient. Diagnoses and all orders for this visit:    Establishing care with new doctor, encounter for         Bronchitis: Zpak; PRN albuterol inhaler      No follow-ups on file. F/U 2 weeks to complete establishment of care    Attending Physician Statement  I have discussed the case, including pertinent history and exam findings with the resident. I agree with the documented assessment and plan.          Amaya Huertas MD None

## 2020-01-22 DIAGNOSIS — N20.0 CALCULUS OF KIDNEY: ICD-10-CM

## 2020-01-22 DIAGNOSIS — K92.2 GASTROINTESTINAL HEMORRHAGE, UNSPECIFIED: ICD-10-CM

## 2020-01-22 DIAGNOSIS — K57.31 DIVERTICULOSIS OF LARGE INTESTINE WITHOUT PERFORATION OR ABSCESS WITH BLEEDING: ICD-10-CM

## 2020-07-13 NOTE — ED PROVIDER NOTE - CARDIOVASCULAR NEGATIVE STATEMENT, MLM
Dr. Colleen Santiago Pt is currently at lab and would like Lipid panel ordered. Please advise. Order pending. Last lipid panel was 6/18/2019. Kimberli informed Dr. Colleen Santiago starts at 1pm and we will call her once she is in.  Borck Rdz stsara she will draw lipid and wi normal rate and rhythm, no chest pain and no edema.

## 2021-01-07 ENCOUNTER — APPOINTMENT (OUTPATIENT)
Dept: UROLOGY | Facility: CLINIC | Age: 66
End: 2021-01-07
Payer: MEDICARE

## 2021-01-07 ENCOUNTER — OUTPATIENT (OUTPATIENT)
Dept: OUTPATIENT SERVICES | Facility: HOSPITAL | Age: 66
LOS: 1 days | End: 2021-01-07
Payer: COMMERCIAL

## 2021-01-07 DIAGNOSIS — Z90.710 ACQUIRED ABSENCE OF BOTH CERVIX AND UTERUS: Chronic | ICD-10-CM

## 2021-01-07 DIAGNOSIS — Z90.89 ACQUIRED ABSENCE OF OTHER ORGANS: Chronic | ICD-10-CM

## 2021-01-07 DIAGNOSIS — R35.0 FREQUENCY OF MICTURITION: ICD-10-CM

## 2021-01-07 DIAGNOSIS — Z98.890 OTHER SPECIFIED POSTPROCEDURAL STATES: Chronic | ICD-10-CM

## 2021-01-07 PROCEDURE — 76775 US EXAM ABDO BACK WALL LIM: CPT | Mod: 26

## 2021-01-07 PROCEDURE — 99072 ADDL SUPL MATRL&STAF TM PHE: CPT

## 2021-01-07 PROCEDURE — 99214 OFFICE O/P EST MOD 30 MIN: CPT | Mod: 25

## 2021-01-07 PROCEDURE — 76775 US EXAM ABDO BACK WALL LIM: CPT

## 2021-01-07 PROCEDURE — 99213 OFFICE O/P EST LOW 20 MIN: CPT | Mod: 25

## 2021-01-07 RX ORDER — OXYBUTYNIN CHLORIDE 5 MG/1
5 TABLET, EXTENDED RELEASE ORAL
Qty: 30 | Refills: 3 | Status: ACTIVE | COMMUNITY
Start: 2021-01-07 | End: 1900-01-01

## 2021-01-07 RX ORDER — UNDERPADS 23" X 36"
EACH MISCELLANEOUS
Qty: 1 | Refills: 0 | Status: ACTIVE | OUTPATIENT
Start: 2021-01-07

## 2021-01-07 NOTE — ADDENDUM
[FreeTextEntry1] : Entered by Low Cox, acting as scribe for Dr. Bridger Boucher.\par \par The documentation recorded by the scribe accurately reflects the service I personally performed and the decisions made by me.\par

## 2021-01-07 NOTE — LETTER BODY
[FreeTextEntry1] : Venice Ruggiero MD. \par [address]\[place] [address]\par [phone]\par \par Dear Dr. Ruggiero, \par \par Reason for Visit: Previous left ureteral stone. Urinary frequency. Urge incontinence.\par \par This is a 65 year-old woman with diverticulosis, chronic hematochezia requiring blood transfusions, and previous left ureteral stone, presenting with urinary frequency and  urge incontinence. She was previously found to have a 4 mm septic stone and underwent a left stent placement. Her workup demonstrated E.coli on urine culture. Her ultrasound in July 2019 was unremarkable. Her renal ultrasound in January 2020 demonstrated a right simple cyst, but was otherwise unremarkable. She returns today for follow-up renal ultrasound. Since her last visit, the patient reports trying Trospium chloride 60 mg. However, the medication did not improve her urinary frequency or her urge incontinence. The patient reports discontinuing the medication. She continues to wear incontinence pads. She denies any history of glaucoma. Patient denies any dysuria or hematuria. All other review of systems are negative. Past medical history, surgical history, family history and social history were unchanged. Medications and allergies were reviewed.\par \par On examination, the patient is a healthy-appearing woman in no acute distress. She is alert and oriented and follows commands. She has normal mood and affect. She is normocephalic. Oral no thrush. Neck is supple. Respirations are unlabored. Abdomen is soft and nontender. Liver is nonpalpable. Bladder is nonpalpable. No CVA tenderness. Neurologically she is grossly intact. No peripheral edema. Skin without gross abnormality.\par \par I personally reviewed ultrasound images with the patient today and images demonstrated duplicated collecting system in the right kidney. Both kidneys are normal in size and echogenicity without stones, hydronephrosis or solid masses visualized.\par \par Assessment: Urinary frequency. Urge incontinence.\par \par I counseled the patient. I reassured the patient. Her renal ultrasound today demonstrated no evidence of stones or hydronephrosis. In terms of her urinary frequency and urge incontinence, I recommended the patient begin a trial of Oxybutynin 5 mg. She denies any history of glaucoma. I discussed the potential side effects of the medication. I counseled the patient on its use and side effects. If the patient develops any side effects, the patient will discontinue the medication and contact me. Risks and alternatives were discussed. I answered the patient questions. The patient will follow-up as directed and will contact me with any questions or concerns. Thank you for the opportunity to participate in the care of Ms. CASTRO. I will keep you updated on her progress.\par \par Plan: Trial of Oxybutynin 5 mg. Follow-up as directed.

## 2021-01-12 DIAGNOSIS — N20.1 CALCULUS OF URETER: ICD-10-CM

## 2021-01-12 DIAGNOSIS — K92.2 GASTROINTESTINAL HEMORRHAGE, UNSPECIFIED: ICD-10-CM

## 2021-01-12 DIAGNOSIS — N20.0 CALCULUS OF KIDNEY: ICD-10-CM

## 2021-01-12 DIAGNOSIS — K57.31 DIVERTICULOSIS OF LARGE INTESTINE WITHOUT PERFORATION OR ABSCESS WITH BLEEDING: ICD-10-CM

## 2021-01-21 ENCOUNTER — RX CHANGE (OUTPATIENT)
Age: 66
End: 2021-01-21

## 2021-03-25 ENCOUNTER — INPATIENT (INPATIENT)
Facility: HOSPITAL | Age: 66
LOS: 9 days | Discharge: HOME | End: 2021-04-04
Attending: STUDENT IN AN ORGANIZED HEALTH CARE EDUCATION/TRAINING PROGRAM | Admitting: STUDENT IN AN ORGANIZED HEALTH CARE EDUCATION/TRAINING PROGRAM
Payer: MEDICARE

## 2021-03-25 VITALS
OXYGEN SATURATION: 100 % | DIASTOLIC BLOOD PRESSURE: 93 MMHG | HEIGHT: 63 IN | HEART RATE: 96 BPM | TEMPERATURE: 98 F | RESPIRATION RATE: 18 BRPM | SYSTOLIC BLOOD PRESSURE: 158 MMHG

## 2021-03-25 DIAGNOSIS — Z90.89 ACQUIRED ABSENCE OF OTHER ORGANS: Chronic | ICD-10-CM

## 2021-03-25 DIAGNOSIS — Z90.710 ACQUIRED ABSENCE OF BOTH CERVIX AND UTERUS: Chronic | ICD-10-CM

## 2021-03-25 DIAGNOSIS — Z98.890 OTHER SPECIFIED POSTPROCEDURAL STATES: Chronic | ICD-10-CM

## 2021-03-25 DIAGNOSIS — K92.2 GASTROINTESTINAL HEMORRHAGE, UNSPECIFIED: ICD-10-CM

## 2021-03-25 LAB
ALBUMIN SERPL ELPH-MCNC: 4.3 G/DL — SIGNIFICANT CHANGE UP (ref 3.3–5)
ALP SERPL-CCNC: 65 U/L — SIGNIFICANT CHANGE UP (ref 40–120)
ALT FLD-CCNC: 26 U/L — SIGNIFICANT CHANGE UP (ref 4–33)
ANION GAP SERPL CALC-SCNC: 15 MMOL/L — HIGH (ref 7–14)
APTT BLD: 32.5 SEC — SIGNIFICANT CHANGE UP (ref 27–36.3)
AST SERPL-CCNC: 28 U/L — SIGNIFICANT CHANGE UP (ref 4–32)
BASOPHILS # BLD AUTO: 0.05 K/UL — SIGNIFICANT CHANGE UP (ref 0–0.2)
BASOPHILS NFR BLD AUTO: 0.8 % — SIGNIFICANT CHANGE UP (ref 0–2)
BILIRUB SERPL-MCNC: 0.4 MG/DL — SIGNIFICANT CHANGE UP (ref 0.2–1.2)
BLD GP AB SCN SERPL QL: NEGATIVE — SIGNIFICANT CHANGE UP
BUN SERPL-MCNC: 28 MG/DL — HIGH (ref 7–23)
CALCIUM SERPL-MCNC: 8.7 MG/DL — SIGNIFICANT CHANGE UP (ref 8.4–10.5)
CHLORIDE SERPL-SCNC: 101 MMOL/L — SIGNIFICANT CHANGE UP (ref 98–107)
CO2 SERPL-SCNC: 24 MMOL/L — SIGNIFICANT CHANGE UP (ref 22–31)
CREAT SERPL-MCNC: 1.34 MG/DL — HIGH (ref 0.5–1.3)
EOSINOPHIL # BLD AUTO: 0.08 K/UL — SIGNIFICANT CHANGE UP (ref 0–0.5)
EOSINOPHIL NFR BLD AUTO: 1.3 % — SIGNIFICANT CHANGE UP (ref 0–6)
GLUCOSE BLDC GLUCOMTR-MCNC: 125 MG/DL — HIGH (ref 70–99)
GLUCOSE BLDC GLUCOMTR-MCNC: 165 MG/DL — HIGH (ref 70–99)
GLUCOSE BLDC GLUCOMTR-MCNC: 226 MG/DL — HIGH (ref 70–99)
GLUCOSE SERPL-MCNC: 298 MG/DL — HIGH (ref 70–99)
HCT VFR BLD CALC: 26 % — LOW (ref 34.5–45)
HCT VFR BLD CALC: 28.3 % — LOW (ref 34.5–45)
HCT VFR BLD CALC: 29.2 % — LOW (ref 34.5–45)
HCT VFR BLD CALC: 32.7 % — LOW (ref 34.5–45)
HGB BLD-MCNC: 10.7 G/DL — LOW (ref 11.5–15.5)
HGB BLD-MCNC: 8.3 G/DL — LOW (ref 11.5–15.5)
HGB BLD-MCNC: 9.1 G/DL — LOW (ref 11.5–15.5)
HGB BLD-MCNC: 9.3 G/DL — LOW (ref 11.5–15.5)
IANC: 3.56 K/UL — SIGNIFICANT CHANGE UP (ref 1.5–8.5)
IMM GRANULOCYTES NFR BLD AUTO: 0.3 % — SIGNIFICANT CHANGE UP (ref 0–1.5)
INR BLD: 1.09 RATIO — SIGNIFICANT CHANGE UP (ref 0.88–1.16)
LYMPHOCYTES # BLD AUTO: 2.25 K/UL — SIGNIFICANT CHANGE UP (ref 1–3.3)
LYMPHOCYTES # BLD AUTO: 35.2 % — SIGNIFICANT CHANGE UP (ref 13–44)
MCHC RBC-ENTMCNC: 27.6 PG — SIGNIFICANT CHANGE UP (ref 27–34)
MCHC RBC-ENTMCNC: 27.8 PG — SIGNIFICANT CHANGE UP (ref 27–34)
MCHC RBC-ENTMCNC: 28.6 PG — SIGNIFICANT CHANGE UP (ref 27–34)
MCHC RBC-ENTMCNC: 30.4 PG — SIGNIFICANT CHANGE UP (ref 27–34)
MCHC RBC-ENTMCNC: 31.8 GM/DL — LOW (ref 32–36)
MCHC RBC-ENTMCNC: 31.9 GM/DL — LOW (ref 32–36)
MCHC RBC-ENTMCNC: 32.2 GM/DL — SIGNIFICANT CHANGE UP (ref 32–36)
MCHC RBC-ENTMCNC: 32.7 GM/DL — SIGNIFICANT CHANGE UP (ref 32–36)
MCV RBC AUTO: 84.3 FL — SIGNIFICANT CHANGE UP (ref 80–100)
MCV RBC AUTO: 87.2 FL — SIGNIFICANT CHANGE UP (ref 80–100)
MCV RBC AUTO: 89.7 FL — SIGNIFICANT CHANGE UP (ref 80–100)
MCV RBC AUTO: 94.6 FL — SIGNIFICANT CHANGE UP (ref 80–100)
MONOCYTES # BLD AUTO: 0.43 K/UL — SIGNIFICANT CHANGE UP (ref 0–0.9)
MONOCYTES NFR BLD AUTO: 6.7 % — SIGNIFICANT CHANGE UP (ref 2–14)
NEUTROPHILS # BLD AUTO: 3.56 K/UL — SIGNIFICANT CHANGE UP (ref 1.8–7.4)
NEUTROPHILS NFR BLD AUTO: 55.7 % — SIGNIFICANT CHANGE UP (ref 43–77)
NRBC # BLD: 0 /100 WBCS — SIGNIFICANT CHANGE UP
NRBC # FLD: 0 K/UL — SIGNIFICANT CHANGE UP
PLATELET # BLD AUTO: 108 K/UL — LOW (ref 150–400)
PLATELET # BLD AUTO: 121 K/UL — LOW (ref 150–400)
PLATELET # BLD AUTO: 148 K/UL — LOW (ref 150–400)
PLATELET # BLD AUTO: 198 K/UL — SIGNIFICANT CHANGE UP (ref 150–400)
POTASSIUM SERPL-MCNC: 3.5 MMOL/L — SIGNIFICANT CHANGE UP (ref 3.5–5.3)
POTASSIUM SERPL-SCNC: 3.5 MMOL/L — SIGNIFICANT CHANGE UP (ref 3.5–5.3)
PROT SERPL-MCNC: 7.3 G/DL — SIGNIFICANT CHANGE UP (ref 6–8.3)
PROTHROM AB SERPL-ACNC: 12.4 SEC — SIGNIFICANT CHANGE UP (ref 10.6–13.6)
RBC # BLD: 2.9 M/UL — LOW (ref 3.8–5.2)
RBC # BLD: 2.99 M/UL — LOW (ref 3.8–5.2)
RBC # BLD: 3.35 M/UL — LOW (ref 3.8–5.2)
RBC # BLD: 3.88 M/UL — SIGNIFICANT CHANGE UP (ref 3.8–5.2)
RBC # FLD: 13.9 % — SIGNIFICANT CHANGE UP (ref 10.3–14.5)
RBC # FLD: 15.9 % — HIGH (ref 10.3–14.5)
RBC # FLD: 16.9 % — HIGH (ref 10.3–14.5)
RBC # FLD: 17.3 % — HIGH (ref 10.3–14.5)
RH IG SCN BLD-IMP: POSITIVE — SIGNIFICANT CHANGE UP
SARS-COV-2 RNA SPEC QL NAA+PROBE: SIGNIFICANT CHANGE UP
SODIUM SERPL-SCNC: 140 MMOL/L — SIGNIFICANT CHANGE UP (ref 135–145)
WBC # BLD: 6.05 K/UL — SIGNIFICANT CHANGE UP (ref 3.8–10.5)
WBC # BLD: 6.12 K/UL — SIGNIFICANT CHANGE UP (ref 3.8–10.5)
WBC # BLD: 6.39 K/UL — SIGNIFICANT CHANGE UP (ref 3.8–10.5)
WBC # BLD: 7.14 K/UL — SIGNIFICANT CHANGE UP (ref 3.8–10.5)
WBC # FLD AUTO: 6.05 K/UL — SIGNIFICANT CHANGE UP (ref 3.8–10.5)
WBC # FLD AUTO: 6.12 K/UL — SIGNIFICANT CHANGE UP (ref 3.8–10.5)
WBC # FLD AUTO: 6.39 K/UL — SIGNIFICANT CHANGE UP (ref 3.8–10.5)
WBC # FLD AUTO: 7.14 K/UL — SIGNIFICANT CHANGE UP (ref 3.8–10.5)

## 2021-03-25 PROCEDURE — 99285 EMERGENCY DEPT VISIT HI MDM: CPT

## 2021-03-25 PROCEDURE — 99223 1ST HOSP IP/OBS HIGH 75: CPT | Mod: GC

## 2021-03-25 PROCEDURE — 99222 1ST HOSP IP/OBS MODERATE 55: CPT | Mod: GC

## 2021-03-25 PROCEDURE — 99291 CRITICAL CARE FIRST HOUR: CPT | Mod: 25

## 2021-03-25 PROCEDURE — 74174 CTA ABD&PLVS W/CONTRAST: CPT | Mod: 26

## 2021-03-25 RX ORDER — DIPHENHYDRAMINE HCL 50 MG
25 CAPSULE ORAL ONCE
Refills: 0 | Status: COMPLETED | OUTPATIENT
Start: 2021-03-25 | End: 2021-03-25

## 2021-03-25 RX ORDER — SODIUM CHLORIDE 9 MG/ML
1000 INJECTION, SOLUTION INTRAVENOUS
Refills: 0 | Status: DISCONTINUED | OUTPATIENT
Start: 2021-03-25 | End: 2021-03-25

## 2021-03-25 RX ORDER — GLIMEPIRIDE 1 MG
1 TABLET ORAL
Qty: 0 | Refills: 0 | DISCHARGE

## 2021-03-25 RX ORDER — PANTOPRAZOLE SODIUM 20 MG/1
40 TABLET, DELAYED RELEASE ORAL
Refills: 0 | Status: DISCONTINUED | OUTPATIENT
Start: 2021-03-26 | End: 2021-03-29

## 2021-03-25 RX ORDER — GLUCAGON INJECTION, SOLUTION 0.5 MG/.1ML
1 INJECTION, SOLUTION SUBCUTANEOUS ONCE
Refills: 0 | Status: DISCONTINUED | OUTPATIENT
Start: 2021-03-25 | End: 2021-04-04

## 2021-03-25 RX ORDER — SODIUM CHLORIDE 9 MG/ML
1000 INJECTION, SOLUTION INTRAVENOUS
Refills: 0 | Status: DISCONTINUED | OUTPATIENT
Start: 2021-03-25 | End: 2021-03-26

## 2021-03-25 RX ORDER — DEXTROSE 50 % IN WATER 50 %
25 SYRINGE (ML) INTRAVENOUS ONCE
Refills: 0 | Status: DISCONTINUED | OUTPATIENT
Start: 2021-03-25 | End: 2021-04-04

## 2021-03-25 RX ORDER — DEXTROSE 50 % IN WATER 50 %
15 SYRINGE (ML) INTRAVENOUS ONCE
Refills: 0 | Status: DISCONTINUED | OUTPATIENT
Start: 2021-03-25 | End: 2021-03-25

## 2021-03-25 RX ORDER — LEVOTHYROXINE SODIUM 125 MCG
140 TABLET ORAL AT BEDTIME
Refills: 0 | Status: DISCONTINUED | OUTPATIENT
Start: 2021-03-26 | End: 2021-03-29

## 2021-03-25 RX ORDER — INSULIN LISPRO 100/ML
VIAL (ML) SUBCUTANEOUS EVERY 6 HOURS
Refills: 0 | Status: DISCONTINUED | OUTPATIENT
Start: 2021-03-25 | End: 2021-03-29

## 2021-03-25 RX ORDER — DEXTROSE 50 % IN WATER 50 %
25 SYRINGE (ML) INTRAVENOUS ONCE
Refills: 0 | Status: DISCONTINUED | OUTPATIENT
Start: 2021-03-25 | End: 2021-03-29

## 2021-03-25 RX ORDER — METFORMIN HYDROCHLORIDE 850 MG/1
1 TABLET ORAL
Qty: 0 | Refills: 0 | DISCHARGE

## 2021-03-25 RX ORDER — SODIUM CHLORIDE 9 MG/ML
1000 INJECTION INTRAMUSCULAR; INTRAVENOUS; SUBCUTANEOUS ONCE
Refills: 0 | Status: COMPLETED | OUTPATIENT
Start: 2021-03-25 | End: 2021-03-25

## 2021-03-25 RX ORDER — INFLUENZA VIRUS VACCINE 15; 15; 15; 15 UG/.5ML; UG/.5ML; UG/.5ML; UG/.5ML
0.5 SUSPENSION INTRAMUSCULAR ONCE
Refills: 0 | Status: DISCONTINUED | OUTPATIENT
Start: 2021-03-25 | End: 2021-04-04

## 2021-03-25 RX ORDER — DEXTROSE 50 % IN WATER 50 %
12.5 SYRINGE (ML) INTRAVENOUS ONCE
Refills: 0 | Status: DISCONTINUED | OUTPATIENT
Start: 2021-03-25 | End: 2021-03-29

## 2021-03-25 RX ADMIN — Medication 2: at 12:33

## 2021-03-25 RX ADMIN — SODIUM CHLORIDE 125 MILLILITER(S): 9 INJECTION, SOLUTION INTRAVENOUS at 16:35

## 2021-03-25 RX ADMIN — Medication 1: at 17:38

## 2021-03-25 RX ADMIN — Medication 25 MILLIGRAM(S): at 16:40

## 2021-03-25 RX ADMIN — SODIUM CHLORIDE 1000 MILLILITER(S): 9 INJECTION INTRAMUSCULAR; INTRAVENOUS; SUBCUTANEOUS at 07:45

## 2021-03-25 NOTE — H&P ADULT - ATTENDING COMMENTS
Diverticular GI bleed  -SICU admission  -transfuse PRBC  -IR evaluation  -GI consult  -serial hct  -will continue to monitor closely

## 2021-03-25 NOTE — CONSULT NOTE ADULT - ATTENDING COMMENTS
Recurrent episode hematochezia. Stereotypical relative to prior 4 episodes attributed to diverticular hemorrhage. Current CT-active extravasation proximal transverse colon region. Consistent with recurrent diverticular hemorrhage. Monitor serial CBC in conjunction with IV hydration and resuscitation.. In event of rebleed advised IR evaluation and intervention as indicated.
gastrointestinal hemorrhage with anemia, hypovolemia and hypotension requiring active blood resuscitation and coordination of care between surgical, GI and interventional radiology services

## 2021-03-25 NOTE — ED ADULT NURSE NOTE - OBJECTIVE STATEMENT
Received patient to room 11, A&OX4, ambulatory. Pt. c/o having 2 episodes of BRBPR that started this morning. Patient state she has a history of diverticulosis and states that she has no discomfort but is experiencing lightheadedness. Patient denies fevers, chills, chest pain, SOB, abdominal pain, n/v. Respirations appear unlabored. 20 G IV established to L AC. Labs drawn and sent. Comfort measures in place. Awaiting further orders. Will continue to monitor.

## 2021-03-25 NOTE — CONSULT NOTE ADULT - ASSESSMENT
65 year old female with PMH of diverticulosis w/ multiple admissions for BRBPR (previously seen by Dr. Beach), herniated lumbar disc s/p lumbar laminectomy 2010, HTN, hypothyroidism, HLD, pernicious anemia, anxiety, GERD, prediabetes, SOLOMON, and ureteral stone presents to the emergency department for bright red blood per rectum since 3am on 3/25. SICU consulted for active diverticular bleed.     -diverticular bleed   does not require SICU at this time   continue to monitor H/H, transfuse if hemoglobin <7  vitals stable at this time, continue to monitor  as per surgery team, consider IR or GI intervention at this time   no antihypertensives at this time   Protonix PO    -type 2 diabetes   insulin sliding scale   d/c glipizide     -hypothyroidism  check TFT then consider starting home synthroid, as patient has not seen         , hypothyroidism, HLD, pernicious anemia, anxiety, GERD, prediabetes, SOLOMON, and ureteral stone       65 year old female with PMH of diverticulosis w/ multiple admissions for BRBPR (previously seen by Dr. Beach), herniated lumbar disc s/p lumbar laminectomy 2010, HTN, hypothyroidism, HLD, pernicious anemia, anxiety, GERD, prediabetes, SOLOMON, and ureteral stone presents to the emergency department for bright red blood per rectum since 3am on 3/25. SICU consulted for active diverticular bleed.      -diverticular bleed   does not require SICU at this time   continue to monitor H/H, transfuse if hemoglobin <7  vitals stable at this time, continue to monitor  as per surgery team, consider IR or GI intervention at this time   no antihypertensives at this time   Protonix PO    -type 2 diabetes   insulin sliding scale   d/c glipizide     -hypothyroidism  check TFT then consider starting home synthroid, as patient has not seen         , hypothyroidism, HLD, pernicious anemia, anxiety, GERD, prediabetes, SOLOMON, and ureteral stone       65 year old female with PMH of diverticulosis w/ multiple admissions for BRBPR (previously seen by Dr. Beach), herniated lumbar disc s/p lumbar laminectomy 2010, HTN, hypothyroidism, HLD, pernicious anemia, anxiety, GERD, prediabetes, SOLOMON, and ureteral stone presents to the emergency department for bright red blood per rectum since 3am on 3/25. SICU consulted for active diverticular bleed at hepatic flexure and proximal transverse colon seen on CTA. IR consulted for source control, SICU consulted for hemodynamic monitoring.    Neurologic:  -AOx3, no sedation   -pain control IV tylenol PRN    Respiratory  -no active issues  -continue to monitor   -h/o SOLOMON- patient has CPAP at home, but has not needed in years     Cardiovascular  -2U pRBC in ED, hypotensive in ED bp 95/53  -cardiac monitor  -hold antihypertensives for now   -continue home simvastatin     Gastrointestinal/nutrition:  -diverticular bleed with active extravasation  -IR consulted, recommend resuscitation with 4U pRBC and repeat CBC  -surgery consulted, no intervention at this time as patient has pandiverticulosis and would require total colectomy   -Protonix PO  -NPO for possible intervention for source control      /Renal   -s/p 1L NS bolus in ED  -LR at 125ml/hr   -monitor electrolytes  -monitor I and O's      Hematologic  -H/H  9.1/28.3 CBC q6hrs  -continue to monitor H/H, transfuse if hgb<7   -no anticoagulation  -SCD for DVT prophylaxis    Infectious disease  -afebrile   -WBC 6.39  -will monitor for fevers    Endocrine   -hypothyroidism-check TSH, consider starting home synthroid as patient has not seen PCP since 2018   -type 2 DM- A1C, insulin sliding scale, d/c glipizide     Dispo  SICU

## 2021-03-25 NOTE — PROGRESS NOTE ADULT - SUBJECTIVE AND OBJECTIVE BOX
Patient with ongoing lower GI bleed and found to have active extravasation on CTA. IR consulted, patient hemodynamically stable will hold off on angiogram and embolization and GI planning for scope in am.  N mentating well, no pain  resp on room air  cv, hemodynamically stable, monitor q6 cbc, no signs of hemodynamic instability, transfused 4 units prbc thus far  gi npo, plan for scope in am  gu/renal monitor uop and electrolytes  heme transfused 4 units with partial response, localization and therapeutic intervention with gi and colonoscopy in am  id no changes  endo no changes    The patient is a critical care patient with life threatening hemodynamic and metabolic instability in SICU.  I have personally interviewed when possible and examined the patient, reviewed data and laboratory tests/x-rays and all pertinent electronic images.  I was physically present for the key portions of the evaluation and management (E/M) service provided.   The SICU team has a constant risk benefit analyzes discussion with the primary team, all consultants, House Staff and PA's on all decisions.  These diagnoses are unrelated to the surgical procedure noted above.  I meet with family if needed to get further history, discuss the case and make care decisions for this patient who might not be able to participate.  Time involved in performance of separately billable procedures was not counted toward my critical care time. There is no overlap.  I spent 55-75 minutes ( 0800Hrs-0915Hrs in AM/ 1600Hrs-1715Hrs in PM, or other time indicated) of critical care time for the diagnoses, assessment, plan and interventions.  This time excludes time spent on separate procedures and teaching.

## 2021-03-25 NOTE — ED ADULT NURSE REASSESSMENT NOTE - NS ED NURSE REASSESS COMMENT FT1
pt awake and alert x 4 . first unit of PRBc released awaiting for blood.  Pt denies sob or chest pain . Pt reports no abdominal pain.  pt states feels having light headiness and reports red blood after using bathroom.

## 2021-03-25 NOTE — CONSULT NOTE ADULT - ASSESSMENT
ASSESSMENT:  66 y/o female with history of diverticulosis (s/p multiple admissions, no operative intervention as they have pandiverticulosis and would require total abdominal colectomy), herniated lumbar disc (s/p plate), SOLOMON, prediabetes (on oral agent) presents with BRBPR last night concerning for recurrent diverticular bleed. She is getting 2U PRBC in ED and is pending CTA abdomen and pelvis.      PLAN:    - Plan to follow imaging

## 2021-03-25 NOTE — ED ADULT NURSE REASSESSMENT NOTE - NS ED NURSE REASSESS COMMENT FT1
first unit of blood given  . pt noted to have a bloody bowel movement.  pt co feeling light headed. NSR on cardiac monitor.

## 2021-03-25 NOTE — CONSULT NOTE ADULT - SUBJECTIVE AND OBJECTIVE BOX
Chief Complaint:  Patient is a 65y old  Female who presents with a chief complaint of Diverticular Bleed (25 Mar 2021 11:33)      HPI:  64yo F with PMH of HTN, hypothyroidism, GERD, diverticulosis, recurrent lower GI bleeds >5 episodes [id number] necessitating multiple transfusions in the past) now presenting with BRBpR starting this morning.      As per patient, she was hospitalized at 2017  for GI bleed and had an EGD and colonsocopy by Dr Magallanes (in early 2017) showed diverticulosis and EGD showed gastritis. Following this, she had another hospitalization for GI bleed at [facility] and had another colonoscopy which showed pan diverticulosis with two suspicious bleeding diverticula in the transverse colon s/p 2 clips. As per patient, most of her prior GI bleeds have resolved on their own with no intervention needed during colonoscopy. Has seen Dr. Beach (colorectal surgery) as outpatient, and recommendation given pandiverticulosis was for total colectomy requiring ostomy, however patient refused at the time.     She now presents w/ 1d of painless hematochezia, 7+ times since early this morning, blood + clots, no pain. No nausea/vomiting. No antiplatelet or anticoagulation.     On presentation to the ED, patient initially HDS, with , Hg 9.1 (near baseline from 2019). Patient underwent CTA A/P showing active extravasation in proximal transverse colon. Seen by IR, recommended transfusions and resusitation. Patient then became hypotensive (SBP 50s). Seen by SICU and admitted to SICU. Now s/p 2u pRBC w/ improved blood pressures.     Last colonoscopy 2019.         Allergies:  No Known Allergies  oxycodone (Other)      Home Medications:    Hospital Medications:  dextrose 50% Injectable 25 Gram(s) IV Push once  dextrose 50% Injectable 12.5 Gram(s) IV Push once  dextrose 50% Injectable 25 Gram(s) IV Push once  glucagon  Injectable 1 milliGRAM(s) IntraMuscular once  insulin lispro (ADMELOG) corrective regimen sliding scale   SubCutaneous every 6 hours  lactated ringers. 1000 milliLiter(s) IV Continuous <Continuous>      PMHX/PSHX:  Heart murmur    Multiparity    Lumbar herniated disc    Fibroid uterus    Left ureteral stone    Type 2 diabetes mellitus    SOLOMON (obstructive sleep apnea)    Pernicious anemia    Lower gastrointestinal bleeding    HTN (hypertension)    Diverticulosis    Pernicious anemia    Hypothyroidism    S/P lumbar laminectomy    S/P WESTLEY (total abdominal hysterectomy)    H/O abdominal hysterectomy    History of back surgery    History of tonsillectomy    No significant past surgical history        Family history:  Family history of throat cancer    Family history of gastric cancer    No pertinent family history in first degree relatives        Denies family history of colon cancer/polyps, stomach cancer/polyps, pancreatic cancer/masses, liver cancer/disease, ovarian cancer and endometrial cancer.    Social History:   Tob: Denies  EtOH: Denies  Illicit Drugs: Denies    ROS:     General:  No wt loss, fevers, chills, night sweats, fatigue  Eyes:  Good vision, no reported pain  ENT:  No sore throat, pain, runny nose, dysphagia  CV:  No pain, palpitations, hypo/hypertension  Pulm:  No dyspnea, cough, tachypnea, wheezing  GI:  see HPI  :  No pain, bleeding, incontinence, nocturia  Muscle:  No pain, weakness  Neuro:  No weakness, tingling, memory problems  Psych:  No fatigue, insomnia, mood problems, depression  Endocrine:  No polyuria, polydipsia, cold/heat intolerance  Heme:  No petechiae, ecchymosis, easy bruisability  Skin:  No rash, tattoos, scars, edema    PHYSICAL EXAM:     GENERAL:  No acute distress  HEENT:  NCAT, no scleral icterus   CHEST:  no respiratory distress  HEART:  Regular rate and rhythm  ABDOMEN:  Soft, non-tender, non-distended, normoactive bowel sounds,  no masses, no hepato-splenomegaly, no signs of chronic liver disease  EXTREMITIES: No edema  SKIN:  No rash/erythema/ecchymoses/petechiae/wounds/abscess/warm/dry  NEURO:  Alert and oriented x 3, no asterixis    Vital Signs:  Vital Signs Last 24 Hrs  T(C): 36.3 (25 Mar 2021 12:43), Max: 36.7 (25 Mar 2021 10:05)  T(F): 97.4 (25 Mar 2021 12:43), Max: 98.1 (25 Mar 2021 12:37)  HR: 81 (25 Mar 2021 12:43) (50 - 98)  BP: 145/87 (25 Mar 2021 12:43) (66/54 - 177/100)  BP(mean): --  RR: 18 (25 Mar 2021 12:09) (15 - 18)  SpO2: 100% (25 Mar 2021 12:09) (100% - 100%)  Daily Height in cm: 160.02 (25 Mar 2021 05:24)    Daily     LABS:                        9.1    6.39  )-----------( 198      ( 25 Mar 2021 06:56 )             28.3     Mean Cell Volume: 94.6 fL (03-25-21 @ 06:56)    03-25    140  |  101  |  28<H>  ----------------------------<  298<H>  3.5   |  24  |  1.34<H>    Ca    8.7      25 Mar 2021 06:56    TPro  7.3  /  Alb  4.3  /  TBili  0.4  /  DBili  x   /  AST  28  /  ALT  26  /  AlkPhos  65  03-25    LIVER FUNCTIONS - ( 25 Mar 2021 06:56 )  Alb: 4.3 g/dL / Pro: 7.3 g/dL / ALK PHOS: 65 U/L / ALT: 26 U/L / AST: 28 U/L / GGT: x           PT/INR - ( 25 Mar 2021 06:56 )   PT: 12.4 sec;   INR: 1.09 ratio         PTT - ( 25 Mar 2021 06:56 )  PTT:32.5 sec                            9.1    6.39  )-----------( 198      ( 25 Mar 2021 06:56 )             28.3       Imaging:  CT A{  FINDINGS:  LOWER CHEST: Small pericardial effusion, without significant change. Left lower lobe linear atelectasis.    LIVER:Within normal limits.  BILE DUCTS: Normal caliber.  GALLBLADDER: Within normal limits.  SPLEEN: Within normal limits.  PANCREAS: Within normal limits.  ADRENALS: Within normal limits.  KIDNEYS/URETERS: Subcentimeter parenchymal hypodensities, too small to characterize. No hydronephrosis. 2 mm nonobstructing stone in the upper pole of right kidney.    BLADDER: Within normal limits.  REPRODUCTIVE ORGANS: Hysterectomy.    BOWEL: Small fat-containing hiatal hernia. No bowel obstruction. Appendix is normal. Colonic diverticulosis without diverticulitis. There is intraluminal contrast extravasation within the proximal transverse colon which increases in size on the delayed images and is consistent with active bleeding (605-28, 607-27).  PERITONEUM:No ascites.  VESSELS: Within normal limits.  RETROPERITONEUM/LYMPH NODES: No lymphadenopathy.  ABDOMINAL WALL: Small fat-containing umbilical hernia.  BONES: Degenerative changes. L4-S1 posterior fusion hardware.    IMPRESSION:    Small pericardial effusion, unchanged.  Active bleeding at the hepatic flexure and proximal transverse colon.

## 2021-03-25 NOTE — ED ADULT TRIAGE NOTE - CHIEF COMPLAINT QUOTE
pt c/o having BRBPR starting 1 hour ago. pt denies abdominal pain ,n/v/d, fevers, chills, dizziness ,lightheadedness, chest pain, sob. PMH diverticulitis

## 2021-03-25 NOTE — CONSULT NOTE ADULT - SUBJECTIVE AND OBJECTIVE BOX
CC: Patient is a 65y old  Female who presents with a chief complaint of     HPI:      PMH  Heart murmur  Multiparity  Lumbar herniated disc  Fibroid uterus  ureteral stone  Type 2 diabetes mellitus  SOLOMON (obstructive sleep apnea)  Pernicious anemia  Lower gastrointestinal bleeding  HTN (hypertension)  Diverticulosis  Pernicious anemia  Hypothyroidism      PSH  S/P lumbar laminectomy  S/P WESTLEY (total abdominal hysterectomy)  H/O abdominal hysterectomy  History of back surgery  History of tonsillectomy  No significant past surgical history      MEDS    Home Medications:  ALPRAZolam 0.25 mg oral tablet: 1 tab(s) orally 3 times a day, As Needed (25 Mar 2021 11:17)  ferrous sulfate 325 mg (65 mg elemental iron) oral delayed release tablet: 1 tab(s) orally once a day (25 Mar 2021 11:17)  glipiZIDE 10 mg oral tablet: 1 tab(s) orally 2 times a day (25 Mar 2021 11:17)  NexIUM 20 mg oral delayed release capsule: 1 cap(s) orally once a day (25 Mar 2021 11:17)  ramipril 10 mg oral capsule: 1 cap(s) orally 2 times a day (25 Mar 2021 11:17)  simvastatin 40 mg oral tablet: 1 tab(s) orally once a day (at bedtime) (25 Mar 2021 11:17)  Synthroid 200 mcg (0.2 mg) oral tablet: 1 tab(s) orally once a day (25 Mar 2021 11:17)    Allergies    No Known Allergies    Intolerances    oxycodone (Other)      Social  Social History:        Physical Exam  T(C): 36.7 (03-25-21 @ 10:05), Max: 36.7 (03-25-21 @ 10:05)  HR: 88 (03-25-21 @ 10:05) (75 - 96)  BP: 172/94 (03-25-21 @ 10:05) (153/86 - 177/100)  RR: 18 (03-25-21 @ 10:05) (15 - 18)  SpO2: 100% (03-25-21 @ 10:05) (100% - 100%)  Wt(kg): --  Tmax: T(C): , Max: 36.7 (03-25-21 @ 10:05)  Wt(kg): --    Gen: NAD  HEENT: normocephalic, atraumatic, no scleral icterus  CV: S1, S2, RRR  Pulm: CTA B/L  Abd: Soft, ND, NTP, no rebound, no guarding, no palpable organomegaly/masses  Ext: warm, no edema, palp dp/pt      Labs:                        9.1    6.39  )-----------( 198      ( 25 Mar 2021 06:56 )             28.3     03-25    140  |  101  |  28<H>  ----------------------------<  298<H>  3.5   |  24  |  1.34<H>    Ca    8.7      25 Mar 2021 06:56    TPro  7.3  /  Alb  4.3  /  TBili  0.4  /  DBili  x   /  AST  28  /  ALT  26  /  AlkPhos  65  03-25    PT/INR - ( 25 Mar 2021 06:56 )   PT: 12.4 sec;   INR: 1.09 ratio         PTT - ( 25 Mar 2021 06:56 )  PTT:32.5 sec      Imaging  EXAM:  CT ANGIO ABD PELV (W)AW IC        PROCEDURE DATE:  Mar 25 2021     INTERPRETATION:  CLINICAL INFORMATION: History of colonic diverticulosis. Gastrointestinal bleeding.  ADDITIONAL CLINICAL INFORMATION: Other, Non-specified  COMPARISON: CT abdomen/pelvis from 3/15/2019.    CONTRAST/COMPLICATIONS:  IV Contrast: Omnipaque 350  90 cc administered   10 cc discarded  Oral Contrast: NONE  Complications: None reported at time of study completion    PROCEDURE:  CT of the Abdomen and Pelvis was performed.  Precontrast, Arterial and Delayed phases were performed.  Sagittal and coronal reformats were performed.    FINDINGS:  LOWER CHEST: Small pericardial effusion, without significant change. Left lower lobe linear atelectasis.    LIVER: Within normal limits.  BILE DUCTS: Normal caliber.  GALLBLADDER: Within normal limits.  SPLEEN: Within normal limits.  PANCREAS: Within normal limits.  ADRENALS: Within normal limits.  KIDNEYS/URETERS: Subcentimeter parenchymal hypodensities, too small to characterize. No hydronephrosis. 2 mm nonobstructing stone in the upper pole of right kidney.    BLADDER: Within normal limits.  REPRODUCTIVE ORGANS: Hysterectomy.    BOWEL: Small fat-containing hiatal hernia. No bowel obstruction. Appendix is normal. Colonic diverticulosis without diverticulitis. There is intraluminal contrast extravasation within the proximal transverse colon which increases in size on the delayed images and is consistent with active bleeding (605-28, 607-27).  PERITONEUM: No ascites.  VESSELS: Within normal limits.  RETROPERITONEUM/LYMPH NODES: No lymphadenopathy.  ABDOMINAL WALL: Small fat-containing umbilical hernia.  BONES: Degenerative changes. L4-S1 posterior fusion hardware.    IMPRESSION:  Small pericardial effusion, unchanged.  Active bleeding at the hepatic flexure and proximal transverse colon.  These findings were discussed with Dr. Mayo at 3/25/2021 10:25 AM by Dr. Cuellar of Radiology with read back confirmation.      PAIGE CUELLAR MD; Resident Radiology  This document has been electronically signed.  ERIN WALTER MD; Attending Radiologist  This document has been electronically signed. Mar 25 2021 10:47AM   CC: Patient is a 65y old  Female who presents with a chief complaint of bright red blood per rectum since 3am.    HPI: 65 year old female with PMH of diverticulosis w/ multiple admissions for BRBPR (previously seen by Dr. Beach), herniated lumbar disc s/p lumbar laminectomy 2010, HTN, hypothyroidism, HLD, pernicious anemia, anxiety, GERD, prediabetes, SOLOMON, and ureteral stone presents to the emergency department for bright red blood per rectum since 3am on 3/25. Patient states she woke up this morning to urinate and smelt blood. Patient states she was hemorrhaging from my rectum", with BRPBR and quarter-sized clots. Associated lightheadedness when she sits up, that only occurred once this morning. Has had further episode of BRBPR this am. Denies chest pain, palpitations, melena, hematemesis, hemoptysis, hematuria, nausea, diarrhea, constipation, abdominal pain, changes in bowel habits, LOC.   Patient does not take any blood thinners.   Of note, patient has been admitted several other times for diverticulosis with BRBRP, most recently in 2018. Patient states that she most recently saw Dr. Beach in 2018, and was told that no further intervention was needed at that time, as she had not had any futher bleeding, and that should she need surgery, she would need a total colectomy and ostomy placement. Patient states that she has not had any further episodes of BRBPR since last admission.   In the ED, patient is being actively resuscitated with 2U pRBC.       PMH  Heart murmur  Multiparity  Lumbar herniated disc  Fibroid uterus  ureteral stone  Type 2 diabetes mellitus  SOLOMON (obstructive sleep apnea)  Pernicious anemia  Lower gastrointestinal bleeding  HTN (hypertension)  Diverticulosis  Pernicious anemia  Hypothyroidism  HLD  GERD    PSH  S/P lumbar laminectomy  S/P WESTLEY (total abdominal hysterectomy)  s/p ureteral stent placement  History of tonsillectomy    MEDS    Home Medications:  ALPRAZolam 0.25 mg oral tablet: 1 tab(s) orally 3 times a day, As Needed (25 Mar 2021 11:17)  ferrous sulfate 325 mg (65 mg elemental iron) oral delayed release tablet: 1 tab(s) orally once a day (25 Mar 2021 11:17)  glipiZIDE 10 mg oral tablet: 1 tab(s) orally 2 times a day (25 Mar 2021 11:17)  NexIUM 20 mg oral delayed release capsule: 1 cap(s) orally once a day (25 Mar 2021 11:17)  ramipril 10 mg oral capsule: 1 cap(s) orally 2 times a day (25 Mar 2021 11:17)  simvastatin 40 mg oral tablet: 1 tab(s) orally once a day (at bedtime) (25 Mar 2021 11:17)  Synthroid 200 mcg (0.2 mg) oral tablet: 1 tab(s) orally once a day (25 Mar 2021 11:17)    Allergies  No Known Allergies    Intolerances  oxycodone (Other)- itching       Social  Social History:  Lives with daughter and son in [place]. Has 5 children. Denies smoking, alcohol use, drug use. Is a retired nurse.    Physical Exam  T(C): 36.7 (03-25-21 @ 10:05), Max: 36.7 (03-25-21 @ 10:05)  HR: 88 (03-25-21 @ 10:05) (75 - 96)  BP: 172/94 (03-25-21 @ 10:05) (153/86 - 177/100)  RR: 18 (03-25-21 @ 10:05) (15 - 18)  SpO2: 100% (03-25-21 @ 10:05) (100% - 100%)  Wt(kg): --  Tmax: T(C): , Max: 36.7 (03-25-21 @ 10:05)  Wt(kg): --    Gen: no acute distress, well nourished, well groomed  HEENT: normocephalic, atraumatic, PERRL, EOMI, no discharge from nose, mucosa moist  CV: S1, S2 present with RRR, no murmur, gallops or rubs   Pulm: breath sounds equal b/l, no rales, rhonchi, wheeze  Abd: Soft, ND, NTP, no rebound, no guarding, no palpable organomegaly/masses  Ext: warm, no edema, palable dp/pt b/l, no calf tenderness b/l  Neuro: CN II-XII grossly intact, sensation grossly intact, motor grossly intact      Labs:                        9.1    6.39  )-----------( 198      ( 25 Mar 2021 06:56 )             28.3     03-25    140  |  101  |  28<H>  ----------------------------<  298<H>  3.5   |  24  |  1.34<H>    Ca    8.7      25 Mar 2021 06:56    TPro  7.3  /  Alb  4.3  /  TBili  0.4  /  DBili  x   /  AST  28  /  ALT  26  /  AlkPhos  65  03-25    PT/INR - ( 25 Mar 2021 06:56 )   PT: 12.4 sec;   INR: 1.09 ratio         PTT - ( 25 Mar 2021 06:56 )  PTT:32.5 sec      Imaging  EXAM:  CT ANGIO ABD PELV (W)AW IC        PROCEDURE DATE:  Mar 25 2021     INTERPRETATION:  CLINICAL INFORMATION: History of colonic diverticulosis. Gastrointestinal bleeding.  ADDITIONAL CLINICAL INFORMATION: Other, Non-specified  COMPARISON: CT abdomen/pelvis from 3/15/2019.    CONTRAST/COMPLICATIONS:  IV Contrast: Omnipaque 350  90 cc administered   10 cc discarded  Oral Contrast: NONE  Complications: None reported at time of study completion    PROCEDURE:  CT of the Abdomen and Pelvis was performed.  Precontrast, Arterial and Delayed phases were performed.  Sagittal and coronal reformats were performed.    FINDINGS:  LOWER CHEST: Small pericardial effusion, without significant change. Left lower lobe linear atelectasis.    LIVER: Within normal limits.  BILE DUCTS: Normal caliber.  GALLBLADDER: Within normal limits.  SPLEEN: Within normal limits.  PANCREAS: Within normal limits.  ADRENALS: Within normal limits.  KIDNEYS/URETERS: Subcentimeter parenchymal hypodensities, too small to characterize. No hydronephrosis. 2 mm nonobstructing stone in the upper pole of right kidney.    BLADDER: Within normal limits.  REPRODUCTIVE ORGANS: Hysterectomy.    BOWEL: Small fat-containing hiatal hernia. No bowel obstruction. Appendix is normal. Colonic diverticulosis without diverticulitis. There is intraluminal contrast extravasation within the proximal transverse colon which increases in size on the delayed images and is consistent with active bleeding (605-28, 607-27).  PERITONEUM: No ascites.  VESSELS: Within normal limits.  RETROPERITONEUM/LYMPH NODES: No lymphadenopathy.  ABDOMINAL WALL: Small fat-containing umbilical hernia.  BONES: Degenerative changes. L4-S1 posterior fusion hardware.    IMPRESSION:  Small pericardial effusion, unchanged.  Active bleeding at the hepatic flexure and proximal transverse colon.  These findings were discussed with Dr. Mayo at 3/25/2021 10:25 AM by Dr. Cuellar of Radiology with read back confirmation.      PAIGE CUELLAR MD; Resident Radiology  This document has been electronically signed.  ERIN WALTER MD; Attending Radiologist  This document has been electronically signed. Mar 25 2021 10:47AM   CC: Patient is a 65y old  Female who presents with a chief complaint of bright red blood per rectum since 3am.    HPI: 65 year old female with PMH of diverticulosis w/ multiple admissions for BRBPR (previously seen by Dr. Beach), herniated lumbar disc s/p lumbar laminectomy 2010, HTN, hypothyroidism, HLD, pernicious anemia, anxiety, GERD, prediabetes, SOLOMON, and ureteral stone presents to the emergency department for bright red blood per rectum since 3am on 3/25. Patient states she woke up this morning to urinate and smelt blood. Patient states she was hemorrhaging from my rectum", with BRPBR and quarter-sized clots. Associated lightheadedness when she sits up, that only occurred once this morning. Has had further episode of BRBPR this am. Denies chest pain, palpitations, melena, hematemesis, hemoptysis, hematuria, nausea, diarrhea, constipation, abdominal pain, changes in bowel habits, LOC.   Patient does not take any blood thinners.   Of note, patient has been admitted several other times for diverticulosis with BRBRP, most recently in 2018. Patient states that she most recently saw Dr. Beach in 2018, and was told that no further intervention was needed at that time, as she had not had any futher bleeding, and that should she need surgery, she would need a total colectomy and ostomy placement. Patient states that she has not had any further episodes of BRBPR since last admission.   In the ED, patient is being actively resuscitated with 2U pRBC. BP now 95/53, HR 82      PMH  Heart murmur  Multiparity  Lumbar herniated disc  Fibroid uterus  ureteral stone  Type 2 diabetes mellitus  SOLOMON (obstructive sleep apnea)  Pernicious anemia  Lower gastrointestinal bleeding  HTN (hypertension)  Diverticulosis  Pernicious anemia  Hypothyroidism  HLD  GERD    PSH  S/P lumbar laminectomy  S/P WESTLEY (total abdominal hysterectomy)  s/p ureteral stent placement  History of tonsillectomy    MEDS    Home Medications:  ALPRAZolam 0.25 mg oral tablet: 1 tab(s) orally 3 times a day, As Needed (25 Mar 2021 11:17)  ferrous sulfate 325 mg (65 mg elemental iron) oral delayed release tablet: 1 tab(s) orally once a day (25 Mar 2021 11:17)  glipiZIDE 10 mg oral tablet: 1 tab(s) orally 2 times a day (25 Mar 2021 11:17)  NexIUM 20 mg oral delayed release capsule: 1 cap(s) orally once a day (25 Mar 2021 11:17)  ramipril 10 mg oral capsule: 1 cap(s) orally 2 times a day (25 Mar 2021 11:17)  simvastatin 40 mg oral tablet: 1 tab(s) orally once a day (at bedtime) (25 Mar 2021 11:17)  Synthroid 200 mcg (0.2 mg) oral tablet: 1 tab(s) orally once a day (25 Mar 2021 11:17)    Allergies  No Known Allergies    Intolerances  oxycodone (Other)- itching       Social  Social History:  Lives with daughter and son in [place]. Has 5 children. Denies smoking, alcohol use, drug use. Is a retired nurse.    Physical Exam  T(C): 36.7 (03-25-21 @ 10:05), Max: 36.7 (03-25-21 @ 10:05)  HR: 88 (03-25-21 @ 10:05) (75 - 96)  BP: 172/94 (03-25-21 @ 10:05) (153/86 - 177/100)  RR: 18 (03-25-21 @ 10:05) (15 - 18)  SpO2: 100% (03-25-21 @ 10:05) (100% - 100%)  Wt(kg): --  Tmax: T(C): , Max: 36.7 (03-25-21 @ 10:05)  Wt(kg): --    Gen: no acute distress, well nourished, well groomed  HEENT: normocephalic, atraumatic, PERRL, EOMI, no discharge from nose, mucosa moist  CV: S1, S2 present with RRR, no murmur, gallops or rubs   Pulm: breath sounds equal b/l, no rales, rhonchi, wheeze  Abd: Soft, ND, NTP, no rebound, no guarding, no palpable organomegaly/masses  Ext: warm, no edema, palable dp/pt b/l, no calf tenderness b/l  Neuro: CN II-XII grossly intact, sensation grossly intact, motor grossly intact      Labs:                        9.1    6.39  )-----------( 198      ( 25 Mar 2021 06:56 )             28.3     03-25    140  |  101  |  28<H>  ----------------------------<  298<H>  3.5   |  24  |  1.34<H>    Ca    8.7      25 Mar 2021 06:56    TPro  7.3  /  Alb  4.3  /  TBili  0.4  /  DBili  x   /  AST  28  /  ALT  26  /  AlkPhos  65  03-25    PT/INR - ( 25 Mar 2021 06:56 )   PT: 12.4 sec;   INR: 1.09 ratio         PTT - ( 25 Mar 2021 06:56 )  PTT:32.5 sec      Imaging  EXAM:  CT ANGIO ABD PELV (W)AW IC        PROCEDURE DATE:  Mar 25 2021     INTERPRETATION:  CLINICAL INFORMATION: History of colonic diverticulosis. Gastrointestinal bleeding.  ADDITIONAL CLINICAL INFORMATION: Other, Non-specified  COMPARISON: CT abdomen/pelvis from 3/15/2019.    CONTRAST/COMPLICATIONS:  IV Contrast: Omnipaque 350  90 cc administered   10 cc discarded  Oral Contrast: NONE  Complications: None reported at time of study completion    PROCEDURE:  CT of the Abdomen and Pelvis was performed.  Precontrast, Arterial and Delayed phases were performed.  Sagittal and coronal reformats were performed.    FINDINGS:  LOWER CHEST: Small pericardial effusion, without significant change. Left lower lobe linear atelectasis.    LIVER: Within normal limits.  BILE DUCTS: Normal caliber.  GALLBLADDER: Within normal limits.  SPLEEN: Within normal limits.  PANCREAS: Within normal limits.  ADRENALS: Within normal limits.  KIDNEYS/URETERS: Subcentimeter parenchymal hypodensities, too small to characterize. No hydronephrosis. 2 mm nonobstructing stone in the upper pole of right kidney.    BLADDER: Within normal limits.  REPRODUCTIVE ORGANS: Hysterectomy.    BOWEL: Small fat-containing hiatal hernia. No bowel obstruction. Appendix is normal. Colonic diverticulosis without diverticulitis. There is intraluminal contrast extravasation within the proximal transverse colon which increases in size on the delayed images and is consistent with active bleeding (605-28, 607-27).  PERITONEUM: No ascites.  VESSELS: Within normal limits.  RETROPERITONEUM/LYMPH NODES: No lymphadenopathy.  ABDOMINAL WALL: Small fat-containing umbilical hernia.  BONES: Degenerative changes. L4-S1 posterior fusion hardware.    IMPRESSION:  Small pericardial effusion, unchanged.  Active bleeding at the hepatic flexure and proximal transverse colon.  These findings were discussed with Dr. Mayo at 3/25/2021 10:25 AM by Dr. Cuellar of Radiology with read back confirmation.      PAIGE CUELLAR MD; Resident Radiology  This document has been electronically signed.  ERIN WALTER MD; Attending Radiologist  This document has been electronically signed. Mar 25 2021 10:47AM

## 2021-03-25 NOTE — H&P ADULT - NSICDXFAMILYHX_GEN_ALL_CORE_FT
FAMILY HISTORY:  Family history of gastric cancer, Mother  Family history of throat cancer, Sister

## 2021-03-25 NOTE — H&P ADULT - NSHPLABSRESULTS_GEN_ALL_CORE
9.1    6.39  )-----------( 198      ( 25 Mar 2021 06:56 )             28.3     03-25    140  |  101  |  28<H>  ----------------------------<  298<H>  3.5   |  24  |  1.34<H>    Ca    8.7      25 Mar 2021 06:56    TPro  7.3  /  Alb  4.3  /  TBili  0.4  /  DBili  x   /  AST  28  /  ALT  26  /  AlkPhos  65  03-25    PT/INR - ( 25 Mar 2021 06:56 )   PT: 12.4 sec;   INR: 1.09 ratio         PTT - ( 25 Mar 2021 06:56 )  PTT:32.5 sec    CAPILLARY BLOOD GLUCOSE        LIVER FUNCTIONS - ( 25 Mar 2021 06:56 )  Alb: 4.3 g/dL / Pro: 7.3 g/dL / ALK PHOS: 65 U/L / ALT: 26 U/L / AST: 28 U/L / GGT: x        IMAGING:    < from: CT Angio Abdomen and Pelvis w/ IV Cont (03.25.21 @ 09:24) >    FINDINGS:  LOWER CHEST: Small pericardial effusion, without significant change. Left lower lobe linear atelectasis.    LIVER:Within normal limits.  BILE DUCTS: Normal caliber.  GALLBLADDER: Within normal limits.  SPLEEN: Within normal limits.  PANCREAS: Within normal limits.  ADRENALS: Within normal limits.  KIDNEYS/URETERS: Subcentimeter parenchymal hypodensities, too small to characterize. No hydronephrosis. 2 mm nonobstructing stone in the upper pole of right kidney.    BLADDER: Within normal limits.  REPRODUCTIVE ORGANS: Hysterectomy.    BOWEL: Small fat-containing hiatal hernia. No bowel obstruction. Appendix is normal. Colonic diverticulosis without diverticulitis. There is intraluminal contrast extravasation within the proximal transverse colon which increases in size on the delayed images and is consistent with active bleeding (605-28, 607-27).  PERITONEUM:No ascites.  VESSELS: Within normal limits.  RETROPERITONEUM/LYMPH NODES: No lymphadenopathy.  ABDOMINAL WALL: Small fat-containing umbilical hernia.  BONES: Degenerative changes. L4-S1 posterior fusion hardware.    IMPRESSION:    Small pericardial effusion, unchanged.  Active bleeding at the hepatic flexure and proximal transverse colon.    < end of copied text >

## 2021-03-25 NOTE — CONSULT NOTE ADULT - SUBJECTIVE AND OBJECTIVE BOX
Interventional Radiology    HPI: 64 y/o female with history of diverticulosis (s/p multiple admissions, no operative intervention as they have pandiverticulosis and would require total abdominal colectomy), herniated lumbar disc (s/p plate), SOLOMON, prediabetes (on oral agent) presents with BRBPR last night. She says this episode is similar to her previous diverticular bleeds. She feel slightly lightheaded but denies fever, chills, abdominal pain, N/V, weakness, or numbness. She is still having bloody bowel movements in ED and planned to get 2U PRBC. She also had CTA abdomen showing active extravasation at hepatic flexure.     Allergies:   Medications (Abx/Cardiac/Anticoagulation/Blood Products)      Data:  160  T(C): 36.7  HR: 88  BP: 172/94  RR: 18  SpO2: 100%    -WBC 6.39 / HgB 9.1 / Hct 28.3 / Plt 198  -Na 140 / Cl 101 / BUN 28 / Glucose 298  -K 3.5 / CO2 24 / Cr 1.34  -ALT 26 / Alk Phos 65 / T.Bili 0.4  -INR 1.09 / PTT 32.5      Radiology:     Assessment/Plan:     --CTA images reviewed   --Hgb at 9.1, receving pRBC as per SICU.   - Trend CBC, transfuse PRN  -Patient HD stable at this time  -No acute IR intervention at this time  -Please page back if pt HD unstable.

## 2021-03-25 NOTE — ED PROVIDER NOTE - PROGRESS NOTE DETAILS
Stephen, PGY2 - Received pt as sign-out. H/o diverticular bleeds requiring transfusion (no surgical interventions), follows with surgery Dr. Beach. Pt reevaluated at bedside, bedpan with large amount of bright blood. Pt states it is her 5th episode since 03:00. HDS, c/o some lightheadedness otherwise asymptomatic. Pt ordered for 2u prbc, d/w surgery, to eval pt, anticipate conservative tx. Stephen, PGY2 - Radiology reporting active GIB in transverse colon, surgery aware, who d/w SICU, SICU to eval pt. Pt reevaluated at bedside, mentating well, HDS, prbc unit in progress. Stephen, PGY2 - Pt rejected by [facility], d/w surgery, can admit to floors at this time. Stephen, PGY2 - Pt became hypotensive to SBP 50, slightly altered. Improved to SBP>100 s/p stat ivf bolus, placed on NRB. D/w SICU/surgery, pt accepted to SICU. 2nd unit prbc now in progress.

## 2021-03-25 NOTE — CONSULT NOTE ADULT - SUBJECTIVE AND OBJECTIVE BOX
General Surgery Consult Note  Attending: Dr. Beach  Service: A Team Surgery    HPI:  64 y/o female with history of diverticulosis (s/p multiple admissions, no operative intervention as they have pandiverticulosis and would require total abdominal colectomy), herniated lumbar disc (s/p plate), SOLOMON, prediabetes (on oral agent) presents with BRBPR last night. She says this episode is similar to her previous diverticular bleeds. She feel slightly lightheaded but denies fever, chills, abdominal pain, N/V, weakness, or numbness. She is still having bloody bowel movements in ED and planned to get 2U PRBC. She is pending CTA abdomen to localize bleeding.       PAST MEDICAL & SURGICAL HISTORY:  Heart murmur  mild    Multiparity    Lumbar herniated disc  &#x27; 2010  surgically treated    Fibroid uterus  &#x27;94  surgically treated    Left ureteral stone    Type 2 diabetes mellitus    SOLOMON (obstructive sleep apnea)  non-compliant with CPAP    Pernicious anemia    Lower gastrointestinal bleeding  Multipel times since &#x27;2014: last episode 5/2018    HTN (hypertension)    Diverticulosis    Hypothyroidism    S/P lumbar laminectomy  &#x27; 2010  with Fusion    S/P WESTLEY (total abdominal hysterectomy)  &#x27; 94  Laproscopic.  benign    History of tonsillectomy  age 14        ALLERGIES:  Allergies    No Known Allergies    Intolerances    oxycodone (Other)      SOCIAL HISTORY:  Denies tobacco, alcohol, or illicit drug use  Lives with family  Independent    FAMILY HISTORY:  Family history of throat cancer  Sister    Family history of gastric cancer  Mother        PHYSICAL EXAM:  General: NAD, resting comfortably  HEENT: NC/AT, normal hearing  Pulmonary: normal resp effort  Cardiovascular: RRR  Abdominal: soft, ND/NT  Extremities: WWP, normal strength, no clubbing/cyanosis/edema  Neuro: A/O x 3, normal sensation, no focal deficits  Pulses: palpable distal pulses    VITAL SIGNS:  Vital Signs Last 24 Hrs  T(C): 36.6 (25 Mar 2021 05:24), Max: 36.6 (25 Mar 2021 05:24)  T(F): 97.8 (25 Mar 2021 05:24), Max: 97.8 (25 Mar 2021 05:24)  HR: 84 (25 Mar 2021 07:50) (84 - 96)  BP: 153/86 (25 Mar 2021 07:50) (153/86 - 158/93)  BP(mean): --  RR: 15 (25 Mar 2021 07:50) (15 - 18)  SpO2: 100% (25 Mar 2021 07:50) (100% - 100%)    I&O's Summary      LABS:                        9.1    6.39  )-----------( 198      ( 25 Mar 2021 06:56 )             28.3     03-25    140  |  101  |  28<H>  ----------------------------<  298<H>  3.5   |  24  |  1.34<H>    Ca    8.7      25 Mar 2021 06:56    TPro  7.3  /  Alb  4.3  /  TBili  0.4  /  DBili  x   /  AST  28  /  ALT  26  /  AlkPhos  65  03-25    PT/INR - ( 25 Mar 2021 06:56 )   PT: 12.4 sec;   INR: 1.09 ratio         PTT - ( 25 Mar 2021 06:56 )  PTT:32.5 sec    CAPILLARY BLOOD GLUCOSE        LIVER FUNCTIONS - ( 25 Mar 2021 06:56 )  Alb: 4.3 g/dL / Pro: 7.3 g/dL / ALK PHOS: 65 U/L / ALT: 26 U/L / AST: 28 U/L / GGT: x             CULTURES:      RADIOLOGY & ADDITIONAL STUDIES:    CTA Abdomen and Pelvis pending

## 2021-03-25 NOTE — ED PROVIDER NOTE - OBJECTIVE STATEMENT
65 to with history of diverticulosis and HTN presenting with 3 episodes of BRBPR (2 at home and one in the ED) starting this evening.  There is no associated pain but there is some lightheadedness.  Pt reprots having 3 other episodes of diverticular bleeds that have required transfusions and admission (all have stopped spontaneously and not required intervention).  Follows with Dr Beach (has not seen him this year due to COVID).

## 2021-03-25 NOTE — H&P ADULT - NSHPPHYSICALEXAM_GEN_ALL_CORE
General: NAD, resting comfortably  HEENT: NC/AT, normal hearing  Pulmonary: normal resp effort  Cardiovascular: RRR  Abdominal: soft, ND/NT  Extremities: WWP, normal strength, no clubbing/cyanosis/edema  Neuro: A/O x 3, normal sensation, no focal deficits  Pulses: palpable distal pulses

## 2021-03-25 NOTE — H&P ADULT - HISTORY OF PRESENT ILLNESS
66 y/o female with history of diverticulosis (s/p multiple admissions, no operative intervention as they have pandiverticulosis and would require total abdominal colectomy), herniated lumbar disc (s/p plate), SOLOMON, prediabetes (on oral agent) presents with BRBPR last night. She says this episode is similar to her previous diverticular bleeds. She feel slightly lightheaded but denies fever, chills, abdominal pain, N/V, weakness, or numbness. She is still having bloody bowel movements in ED and planned to get 2U PRBC. She also had CTA abdomen showing active extravasation at hepatic flexure.

## 2021-03-25 NOTE — CONSULT NOTE ADULT - ASSESSMENT
Impression:  64 yo F w/ PMHx HTN, hypothyroid, GERD and recurrent episodes of hematochezia (attributed to diverticular bleed, last colonoscopy 2019) p/w painless hematochezia, CTA showing active extravasation in proximal transverse colon, course complicated by hypotension    # hematochezia - suspect diverticular bleed given previous endoscopic evaluation confirming pandiverticulosis, and CTA showing active extravasation. Patient requires resuscitation, and likely will benefit from IR intervention for embolization if ongoing bleeding. If unable to perform embolization, and hemodynamic stability, can plan for colonoscopy tomorrow    Recommendations:  - trend CBC, transfuse prn  - NPO  - f/u IR recommendations  - colorectal surgery recs  - no plans for colonoscopy at this point, will f/u IR intervention    GI will continue to follow.     Carrington Guardado, PGY4  Gastroenterology Fellow  Available on Microsoft Teams  [phone] ([employer] Short Range Pager)  [phone] (Long Range Pager)    After 5pm, please contact the on-call GI fellow. [phone]

## 2021-03-25 NOTE — H&P ADULT - ASSESSMENT
ASSESSMENT:  64 y/o female with history of diverticulosis (s/p multiple admissions, no operative intervention as they have pandiverticulosis and would require total abdominal colectomy), herniated lumbar disc (s/p plate), SOLOMON, prediabetes (on oral agent) presents with BRBPR last night concerning for recurrent diverticular bleed. She is getting 2U PRBC in ED and CTA shows active extravasation at the hepatic flexure.       PLAN:    - Discussed with Dr. Beach  - Admit to Dr. Beach and A Team surgery  - SICU consult for active diverticular bleed  - Consult GI and IR for possible intervention  - CBC q6h, transfuse PRBC as needed  - Monitor for bloody bowel movements  - Hold DVT ppx   - Continue home synthroid and ppi IV, restart other home meds as able      [id number]

## 2021-03-25 NOTE — ED PROVIDER NOTE - CLINICAL SUMMARY MEDICAL DECISION MAKING FREE TEXT BOX
64 yo female with history of diverticulosis and HTN presenting with lower GI bleeding with BRBPR.  Confirmation with triage nurse was a large amount of gross blood in the toilet.  Presentation is consistent with a diverticular bleed.  Will get CT scan to see if significant bleeding source can be identified.  Will type and screen patient at this time as well to prepare for likely transfusion.  Surgery consult as patient is followed by Dr Beach.

## 2021-03-26 LAB
ANION GAP SERPL CALC-SCNC: 9 MMOL/L — SIGNIFICANT CHANGE UP (ref 7–14)
APTT BLD: 27.8 SEC — SIGNIFICANT CHANGE UP (ref 27–36.3)
APTT BLD: 29.4 SEC — SIGNIFICANT CHANGE UP (ref 27–36.3)
BLD GP AB SCN SERPL QL: NEGATIVE — SIGNIFICANT CHANGE UP
BUN SERPL-MCNC: 23 MG/DL — SIGNIFICANT CHANGE UP (ref 7–23)
CALCIUM SERPL-MCNC: 7.9 MG/DL — LOW (ref 8.4–10.5)
CHLORIDE SERPL-SCNC: 107 MMOL/L — SIGNIFICANT CHANGE UP (ref 98–107)
CO2 SERPL-SCNC: 26 MMOL/L — SIGNIFICANT CHANGE UP (ref 22–31)
COVID-19 SPIKE DOMAIN AB INTERP: POSITIVE
COVID-19 SPIKE DOMAIN ANTIBODY RESULT: 8.81 U/ML — HIGH
CREAT SERPL-MCNC: 1.15 MG/DL — SIGNIFICANT CHANGE UP (ref 0.5–1.3)
GLUCOSE BLDC GLUCOMTR-MCNC: 107 MG/DL — HIGH (ref 70–99)
GLUCOSE BLDC GLUCOMTR-MCNC: 134 MG/DL — HIGH (ref 70–99)
GLUCOSE BLDC GLUCOMTR-MCNC: 150 MG/DL — HIGH (ref 70–99)
GLUCOSE BLDC GLUCOMTR-MCNC: 152 MG/DL — HIGH (ref 70–99)
GLUCOSE SERPL-MCNC: 126 MG/DL — HIGH (ref 70–99)
HCT VFR BLD CALC: 29 % — LOW (ref 34.5–45)
HCT VFR BLD CALC: 31.1 % — LOW (ref 34.5–45)
HCT VFR BLD CALC: 32.3 % — LOW (ref 34.5–45)
HGB BLD-MCNC: 10.3 G/DL — LOW (ref 11.5–15.5)
HGB BLD-MCNC: 10.8 G/DL — LOW (ref 11.5–15.5)
HGB BLD-MCNC: 9.5 G/DL — LOW (ref 11.5–15.5)
INR BLD: 1.18 RATIO — HIGH (ref 0.88–1.16)
INR BLD: 1.21 RATIO — HIGH (ref 0.88–1.16)
MAGNESIUM SERPL-MCNC: 1.7 MG/DL — SIGNIFICANT CHANGE UP (ref 1.6–2.6)
MCHC RBC-ENTMCNC: 27.7 PG — SIGNIFICANT CHANGE UP (ref 27–34)
MCHC RBC-ENTMCNC: 27.9 PG — SIGNIFICANT CHANGE UP (ref 27–34)
MCHC RBC-ENTMCNC: 28.7 PG — SIGNIFICANT CHANGE UP (ref 27–34)
MCHC RBC-ENTMCNC: 32.8 GM/DL — SIGNIFICANT CHANGE UP (ref 32–36)
MCHC RBC-ENTMCNC: 33.1 GM/DL — SIGNIFICANT CHANGE UP (ref 32–36)
MCHC RBC-ENTMCNC: 33.4 GM/DL — SIGNIFICANT CHANGE UP (ref 32–36)
MCV RBC AUTO: 83.6 FL — SIGNIFICANT CHANGE UP (ref 80–100)
MCV RBC AUTO: 85 FL — SIGNIFICANT CHANGE UP (ref 80–100)
MCV RBC AUTO: 85.9 FL — SIGNIFICANT CHANGE UP (ref 80–100)
NRBC # BLD: 0 /100 WBCS — SIGNIFICANT CHANGE UP
NRBC # FLD: 0 K/UL — SIGNIFICANT CHANGE UP
PHOSPHATE SERPL-MCNC: 2.1 MG/DL — LOW (ref 2.5–4.5)
PLATELET # BLD AUTO: 115 K/UL — LOW (ref 150–400)
PLATELET # BLD AUTO: 126 K/UL — LOW (ref 150–400)
PLATELET # BLD AUTO: 127 K/UL — LOW (ref 150–400)
POTASSIUM SERPL-MCNC: 3.1 MMOL/L — LOW (ref 3.5–5.3)
POTASSIUM SERPL-SCNC: 3.1 MMOL/L — LOW (ref 3.5–5.3)
PROTHROM AB SERPL-ACNC: 13.4 SEC — SIGNIFICANT CHANGE UP (ref 10.6–13.6)
PROTHROM AB SERPL-ACNC: 13.7 SEC — HIGH (ref 10.6–13.6)
RBC # BLD: 3.41 M/UL — LOW (ref 3.8–5.2)
RBC # BLD: 3.72 M/UL — LOW (ref 3.8–5.2)
RBC # BLD: 3.76 M/UL — LOW (ref 3.8–5.2)
RBC # FLD: 17.2 % — HIGH (ref 10.3–14.5)
RBC # FLD: 17.4 % — HIGH (ref 10.3–14.5)
RBC # FLD: 17.6 % — HIGH (ref 10.3–14.5)
RH IG SCN BLD-IMP: POSITIVE — SIGNIFICANT CHANGE UP
SARS-COV-2 IGG+IGM SERPL QL IA: 8.81 U/ML — HIGH
SARS-COV-2 IGG+IGM SERPL QL IA: POSITIVE
SODIUM SERPL-SCNC: 142 MMOL/L — SIGNIFICANT CHANGE UP (ref 135–145)
WBC # BLD: 7.13 K/UL — SIGNIFICANT CHANGE UP (ref 3.8–10.5)
WBC # BLD: 7.45 K/UL — SIGNIFICANT CHANGE UP (ref 3.8–10.5)
WBC # BLD: 8.29 K/UL — SIGNIFICANT CHANGE UP (ref 3.8–10.5)
WBC # FLD AUTO: 7.13 K/UL — SIGNIFICANT CHANGE UP (ref 3.8–10.5)
WBC # FLD AUTO: 7.45 K/UL — SIGNIFICANT CHANGE UP (ref 3.8–10.5)
WBC # FLD AUTO: 8.29 K/UL — SIGNIFICANT CHANGE UP (ref 3.8–10.5)

## 2021-03-26 PROCEDURE — 99232 SBSQ HOSP IP/OBS MODERATE 35: CPT | Mod: GC

## 2021-03-26 PROCEDURE — 99231 SBSQ HOSP IP/OBS SF/LOW 25: CPT | Mod: GC

## 2021-03-26 RX ORDER — MAGNESIUM SULFATE 500 MG/ML
2 VIAL (ML) INJECTION ONCE
Refills: 0 | Status: COMPLETED | OUTPATIENT
Start: 2021-03-26 | End: 2021-03-26

## 2021-03-26 RX ORDER — POTASSIUM PHOSPHATE, MONOBASIC POTASSIUM PHOSPHATE, DIBASIC 236; 224 MG/ML; MG/ML
30 INJECTION, SOLUTION INTRAVENOUS ONCE
Refills: 0 | Status: COMPLETED | OUTPATIENT
Start: 2021-03-26 | End: 2021-03-26

## 2021-03-26 RX ORDER — SODIUM CHLORIDE 9 MG/ML
1000 INJECTION, SOLUTION INTRAVENOUS
Refills: 0 | Status: DISCONTINUED | OUTPATIENT
Start: 2021-03-26 | End: 2021-03-27

## 2021-03-26 RX ORDER — CALCIUM GLUCONATE 100 MG/ML
2 VIAL (ML) INTRAVENOUS ONCE
Refills: 0 | Status: COMPLETED | OUTPATIENT
Start: 2021-03-26 | End: 2021-03-26

## 2021-03-26 RX ADMIN — PANTOPRAZOLE SODIUM 40 MILLIGRAM(S): 20 TABLET, DELAYED RELEASE ORAL at 18:24

## 2021-03-26 RX ADMIN — SODIUM CHLORIDE 125 MILLILITER(S): 9 INJECTION, SOLUTION INTRAVENOUS at 03:02

## 2021-03-26 RX ADMIN — Medication 50 GRAM(S): at 03:02

## 2021-03-26 RX ADMIN — Medication 140 MICROGRAM(S): at 22:42

## 2021-03-26 RX ADMIN — PANTOPRAZOLE SODIUM 40 MILLIGRAM(S): 20 TABLET, DELAYED RELEASE ORAL at 06:07

## 2021-03-26 RX ADMIN — Medication 200 GRAM(S): at 03:02

## 2021-03-26 RX ADMIN — POTASSIUM PHOSPHATE, MONOBASIC POTASSIUM PHOSPHATE, DIBASIC 83.33 MILLIMOLE(S): 236; 224 INJECTION, SOLUTION INTRAVENOUS at 03:55

## 2021-03-26 RX ADMIN — SODIUM CHLORIDE 75 MILLILITER(S): 9 INJECTION, SOLUTION INTRAVENOUS at 18:24

## 2021-03-26 RX ADMIN — Medication 1: at 13:23

## 2021-03-26 NOTE — PROGRESS NOTE ADULT - ASSESSMENT
65F with history of diverticulosis (s/p multiple admissions, no operative intervention as they have pandiverticulosis and would require total abdominal colectomy), herniated lumbar disc (s/p plate), SOLOMON, prediabetes (on oral agent) presents with BRBPR last night concerning for recurrent diverticular bleed. CTA shows active extravasation at the hepatic flexure sp 4uPRBCs over 24hr.       PLAN:    - Consult GI and IR for possible intervention  - CBC q6h, transfuse PRBC as needed  - Monitor for bloody bowel movements  - Hold DVT ppx   - Continue home synthroid and ppi IV, restart other home meds as able  - Care per SICU    Surgery Team A  [id number] 65F with history of diverticulosis (s/p multiple admissions, no operative intervention as they have pandiverticulosis and would require total abdominal colectomy), herniated lumbar disc (s/p plate), SOLOMON, prediabetes (on oral agent) presents with BRBPR concerning for recurrent diverticular bleed and CTA showing active extravasation at the hepatic flexure sp 4uPRBCs, currently hemodynamically stable in the SICU.      PLAN:  - Appreciate GI consult: no plan for intervention if patient HD stable  - IR consult: no plan for intervention unless patient becomes unstable  - CBC q6h, transfuse PRBC as needed  - Monitor for bloody bowel movements  - Insert perla for strict I&Os  - Hold DVT ppx   - Continue home synthroid and ppi IV, restart other home meds as able  - Care per SICU    Surgery Team A  [id number]

## 2021-03-26 NOTE — PROGRESS NOTE ADULT - SUBJECTIVE AND OBJECTIVE BOX
Surgery Progress Note    SUBJECTIVE: No acute events overnight. Over 24hrs, pt sp 4uPRBCs. Pt seen and examined at bedside. Patient comfortable. No nausea, vomiting, diarrhea. Pain is controlled. +Flatus/+BM. Tolerating diet.    Vital Signs Last 24 Hrs  T(C): 36.3 (26 Mar 2021 00:00), Max: 36.7 (25 Mar 2021 10:05)  T(F): 97.4 (26 Mar 2021 00:00), Max: 98.1 (25 Mar 2021 12:37)  HR: 67 (26 Mar 2021 00:00) (50 - 98)  BP: 135/80 (26 Mar 2021 00:00) (66/54 - 177/100)  BP(mean): 91 (26 Mar 2021 00:00) (91 - 109)  RR: 16 (26 Mar 2021 00:00) (15 - 25)  SpO2: 100% (26 Mar 2021 00:00) (99% - 100%)    Physical Exam:  General Appearance: Appears well, in no acute distress, awake, alert, and oriented x3.  Respiratory: No labored breathing  CV: Pulse regularly present  Abdomen: Soft, nontender, nondistended w/o rebound tenderness or guarding.   Extremities: Warm and well perfused, moving spontaneously    LABS:                        10.7   7.14  )-----------( 121      ( 25 Mar 2021 23:06 )             32.7     03-25    140  |  101  |  28<H>  ----------------------------<  298<H>  3.5   |  24  |  1.34<H>    Ca    8.7      25 Mar 2021 06:56    TPro  7.3  /  Alb  4.3  /  TBili  0.4  /  DBili  x   /  AST  28  /  ALT  26  /  AlkPhos  65  03-25    PT/INR - ( 25 Mar 2021 06:56 )   PT: 12.4 sec;   INR: 1.09 ratio         PTT - ( 25 Mar 2021 06:56 )  PTT:32.5 sec      INs and OUTs:    03-25-21 @ 07:01  -  03-26-21 @ 00:40  --------------------------------------------------------  IN: 1725 mL / OUT: 0 mL / NET: 1725 mL        Medications:  MEDICATIONS  (STANDING):  dextrose 50% Injectable 25 Gram(s) IV Push once  dextrose 50% Injectable 12.5 Gram(s) IV Push once  dextrose 50% Injectable 25 Gram(s) IV Push once  glucagon  Injectable 1 milliGRAM(s) IntraMuscular once  influenza   Vaccine 0.5 milliLiter(s) IntraMuscular once  insulin lispro (ADMELOG) corrective regimen sliding scale   SubCutaneous every 6 hours  lactated ringers. 1000 milliLiter(s) (125 mL/Hr) IV Continuous <Continuous>  levothyroxine Injectable 140 MICROGram(s) IV Push at bedtime  pantoprazole  Injectable 40 milliGRAM(s) IV Push two times a day    MEDICATIONS  (PRN):   Surgery Progress Note    24hr:   - Transfused total 4u pRBC  - GI consulted  - IR consulted    SUBJECTIVE: No acute events overnight. Pt seen and examined at bedside. Patient comfortable. Reports on small bloody BM overnight. No nausea, vomiting, diarrhea. Pain is controlled. +Flatus/+BM. Tolerating diet.    Vital Signs Last 24 Hrs  T(C): 36.3 (26 Mar 2021 00:00), Max: 36.7 (25 Mar 2021 10:05)  T(F): 97.4 (26 Mar 2021 00:00), Max: 98.1 (25 Mar 2021 12:37)  HR: 67 (26 Mar 2021 00:00) (50 - 98)  BP: 135/80 (26 Mar 2021 00:00) (66/54 - 177/100)  BP(mean): 91 (26 Mar 2021 00:00) (91 - 109)  RR: 16 (26 Mar 2021 00:00) (15 - 25)  SpO2: 100% (26 Mar 2021 00:00) (99% - 100%)    Physical Exam:  General Appearance: Appears well, in no acute distress, awake, alert, and oriented x3.  Respiratory: No labored breathing  CV: Pulse regularly present  Abdomen: Soft, nontender, nondistended w/o rebound tenderness or guarding.   Extremities: Warm and well perfused, moving spontaneously    LABS:                        10.7   7.14  )-----------( 121      ( 25 Mar 2021 23:06 )             32.7     03-25    140  |  101  |  28<H>  ----------------------------<  298<H>  3.5   |  24  |  1.34<H>    Ca    8.7      25 Mar 2021 06:56    TPro  7.3  /  Alb  4.3  /  TBili  0.4  /  DBili  x   /  AST  28  /  ALT  26  /  AlkPhos  65  03-25    PT/INR - ( 25 Mar 2021 06:56 )   PT: 12.4 sec;   INR: 1.09 ratio         PTT - ( 25 Mar 2021 06:56 )  PTT:32.5 sec      INs and OUTs:    03-25-21 @ 07:01  -  03-26-21 @ 00:40  --------------------------------------------------------  IN: 1725 mL / OUT: 0 mL / NET: 1725 mL

## 2021-03-26 NOTE — CHART NOTE - NSCHARTNOTEFT_GEN_A_CORE
SICU to Floor Transfer note    65F with recurrent diverticular bleed s/p 4uPRBCs, currently hemodynamically stable transferred to the floor from SICU.    Subjective:  Patient resting comfortably in bed and denies any pain at this moment. The patient reports that she has not had any addition BRBPR. She did not endorse any nausea, vomiting, shortness of breath or chest pain.      Vital Signs Last 24 Hrs  T(C): 36.4 (26 Mar 2021 13:24), Max: 36.6 (26 Mar 2021 11:15)  T(F): 97.5 (26 Mar 2021 13:24), Max: 97.9 (26 Mar 2021 11:15)  HR: 70 (26 Mar 2021 13:24) (65 - 87)  BP: 153/94 (26 Mar 2021 13:24) (121/63 - 164/94)  BP(mean): 115 (26 Mar 2021 10:00) (76 - 115)  RR: 17 (26 Mar 2021 13:24) (11 - 25)  SpO2: 99% (26 Mar 2021 13:24) (99% - 100%)    I&O's Detail    25 Mar 2021 07:01  -  26 Mar 2021 07:00  --------------------------------------------------------  IN:    IV PiggyBack: 650 mL    Lactated Ringers: 2125 mL    PRBCs (Packed Red Blood Cells): 600 mL  Total IN: 3375 mL    OUT:    Indwelling Catheter - Urethral (mL): 700 mL    Voided (mL): 200 mL  Total OUT: 900 mL    Total NET: 2475 mL      26 Mar 2021 07:01  -  26 Mar 2021 15:52  --------------------------------------------------------  IN:    Lactated Ringers: 375 mL  Total IN: 375 mL    OUT:    Indwelling Catheter - Urethral (mL): 1375 mL  Total OUT: 1375 mL    Total NET: -1000 mL      Physical Exam:  General: NAD, resting comfortably in bed  Pulmonary: Nonlabored breathing, no respiratory distress  Abdominal: soft, appropriately tender, nondistended  Extremities: WWP      LABS:                        10.8   7.45  )-----------( 126      ( 26 Mar 2021 14:40 )             32.3     03-26    142  |  107  |  23  ----------------------------<  126<H>  3.1<L>   |  26  |  1.15    Ca    7.9<L>      26 Mar 2021 01:32  Phos  2.1     03-26  Mg     1.7     03-26    TPro  7.3  /  Alb  4.3  /  TBili  0.4  /  DBili  x   /  AST  28  /  ALT  26  /  AlkPhos  65  03-25    PT/INR - ( 26 Mar 2021 01:32 )   PT: 13.4 sec;   INR: 1.18 ratio         PTT - ( 26 Mar 2021 01:32 )  PTT:27.8 sec      Assessment:  65F with history of diverticulosis (s/p multiple admissions, no operative intervention as they have pandiverticulosis and would require total abdominal colectomy), presented to the ED with BRBPR concerning for recurrent diverticular bleed and CTA showing active extravasation at the hepatic flexure sp 4uPRBCs, currently hemodynamically stable in the SICU and suitable for transfer to the floor.      Plan:  - Advanced to CLD from NPO  - IVF LR at 75ml/hr  - Pain control as needed, following examination.  - Monitor urine output from [facility]  - OOB and ambulating as tolerated  - Will consult IR if patient becomes hemodynamically unstable    A Team  #[id number]

## 2021-03-26 NOTE — PROGRESS NOTE ADULT - ASSESSMENT
65 year old female with PMH of diverticulosis w/ multiple admissions for BRBPR (previously seen by Dr. Beach), herniated lumbar disc s/p lumbar laminectomy 2010, HTN, hypothyroidism, HLD, pernicious anemia, anxiety, GERD, prediabetes, SOLOMON, and ureteral stone presents to the emergency department for bright red blood per rectum since 3am on 3/25. SICU consulted for active diverticular bleed at hepatic flexure and proximal transverse colon seen on CTA. IR consulted for source control, SICU consulted for hemodynamic monitoring.    Neurologic:  -AOx3, no sedation   -pain control IV tylenol PRN    Respiratory  -no active issues  -continue to monitor   -h/o SOLOMON- patient has CPAP at home, but has not needed in years     Cardiovascular  -hypotensive in ED bp 95/53, s/p 4u PRBC now normotensive  -cardiac monitor  -hold antihypertensives for now   -continue home simvastatin     Gastrointestinal/nutrition:  -diverticular bleed with active extravasation  -IR consulted, recommend resuscitation with 4U pRBC and repeat CBC with trend from 8.3/26 to 9.3/29.2 after 2u PRBC  -repeat CBC at night, 9.3/29.2 to 10.7/32.7  -surgery consulted, no intervention at this time as patient has pandiverticulosis and would require total colectomy   -IR, nothing to do until hemodymi  - GI may do scop later today  -Protonix PO  -NPO for possible intervention for source control      /Renal   -s/p 1L NS bolus in ED  -LR at 125ml/hr   -monitor electrolytes  -monitor I and O's      Hematologic  -H/H  9.1/28.3 CBC q6hrs  -continue to monitor H/H, transfuse if hgb<7   -no anticoagulation  -SCD for DVT prophylaxis    Infectious disease  -afebrile   -WBC 6.39  -will monitor for fevers    Endocrine   -hypothyroidism-check TSH, consider starting home synthroid as patient has not seen PCP since 2018   -type 2 DM- A1C, insulin sliding scale, d/c glipizide     Dispo  SICU        65 year old female with PMH of diverticulosis w/ multiple admissions for BRBPR (previously seen by Dr. Beach), herniated lumbar disc s/p lumbar laminectomy 2010, HTN, hypothyroidism, HLD, pernicious anemia, anxiety, GERD, prediabetes, SOLOMON, and ureteral stone presents to the emergency department for bright red blood per rectum since 3am on 3/25. SICU consulted for active diverticular bleed at hepatic flexure and proximal transverse colon seen on CTA. IR consulted for source control, SICU consulted for hemodynamic monitoring.    Neurologic:  -AOx3, no sedation   -pain control IV tylenol PRN    Respiratory  -no active issues  -continue to monitor   -h/o SOLOMON- patient has CPAP at home, but has not needed in years     Cardiovascular  -hypotensive in ED bp 95/53, s/p 4u PRBC now normotensive  -cardiac monitor  -hold antihypertensives for now   -continue home simvastatin     Gastrointestinal/nutrition:  -diverticular bleed with active extravasation  -IR consulted, recommend resuscitation with 4U pRBC and repeat CBC with trend from 8.3/26 to 9.3/29.2 after 2u PRBC  -repeat CBC at night, 9.3/29.2 to 10.7/32.7  -IR, NTD unless hemodynamically unstable  -surgery consulted, no intervention at this time as patient has pandiverticulosis and would require total colectomy   -GI may do scoep later today  -Protonix PO  -NPO for possible intervention for source control      /Renal   -s/p 1L NS bolus in ED  -LR at 125ml/hr   -monitor electrolytes  -monitor I and O's      Hematologic  -last H&H 9.3/29.2 to 10.7/32.7  -continue to monitor H/H, transfuse if hgb<7   -no anticoagulation  -SCD for DVT prophylaxis    Infectious disease  -afebrile   -WBC 6.39  -will monitor for fevers    Endocrine   -hypothyroidism-check TSH, consider starting home synthroid as patient has not seen PCP since 2018   -type 2 DM- A1C, insulin sliding scale, d/c glipizide     Dispo  SICU

## 2021-03-26 NOTE — PROGRESS NOTE ADULT - SUBJECTIVE AND OBJECTIVE BOX
Chief Complaint:  Patient is a 65y old  Female who presents with a chief complaint of Diverticular Bleed (26 Mar 2021 00:49)      Interval Events:   - overnight one bowel movements, less blood  - Hg stable since transfusions 5pm yesterday, Hg stable since then, BP stable    Allergies:  No Known Allergies  oxycodone (Other)      Hospital Medications:  dextrose 50% Injectable 25 Gram(s) IV Push once  dextrose 50% Injectable 12.5 Gram(s) IV Push once  dextrose 50% Injectable 25 Gram(s) IV Push once  glucagon  Injectable 1 milliGRAM(s) IntraMuscular once  influenza   Vaccine 0.5 milliLiter(s) IntraMuscular once  insulin lispro (ADMELOG) corrective regimen sliding scale   SubCutaneous every 6 hours  lactated ringers. 1000 milliLiter(s) IV Continuous <Continuous>  levothyroxine Injectable 140 MICROGram(s) IV Push at bedtime  pantoprazole  Injectable 40 milliGRAM(s) IV Push two times a day        PHYSICAL EXAM:   Vital Signs:  Vital Signs Last 24 Hrs  T(C): 36.4 (26 Mar 2021 07:00), Max: 36.7 (25 Mar 2021 12:37)  T(F): 97.6 (26 Mar 2021 07:00), Max: 98.1 (25 Mar 2021 12:37)  HR: 72 (26 Mar 2021 10:00) (50 - 98)  BP: 161/105 (26 Mar 2021 10:00) (66/54 - 164/94)  BP(mean): 115 (26 Mar 2021 10:00) (76 - 115)  RR: 14 (26 Mar 2021 10:00) (11 - 25)  SpO2: 100% (26 Mar 2021 10:00) (99% - 100%)  Daily     Daily     GENERAL:  No acute distress  HEENT:  NCAT, no scleral icterus   CHEST:  no respiratory distress  HEART:  Regular rate and rhythm  ABDOMEN:  Soft, non-tender, non-distended, normoactive bowel sounds,  no masses, no hepato-splenomegaly, no signs of chronic liver disease  EXTREMITIES: No edema  SKIN:  No rash/erythema/ecchymoses/petechiae/wounds/abscess/warm/dry  NEURO:  Alert and oriented x 3, no asterixis    LABS:                        10.3   7.13  )-----------( 115      ( 26 Mar 2021 01:32 )             31.1     Mean Cell Volume: 83.6 fL (03-26-21 @ 01:32)    03-26    142  |  107  |  23  ----------------------------<  126<H>  3.1<L>   |  26  |  1.15    Ca    7.9<L>      26 Mar 2021 01:32  Phos  2.1     03-26  Mg     1.7     03-26    TPro  7.3  /  Alb  4.3  /  TBili  0.4  /  DBili  x   /  AST  28  /  ALT  26  /  AlkPhos  65  03-25    LIVER FUNCTIONS - ( 25 Mar 2021 06:56 )  Alb: 4.3 g/dL / Pro: 7.3 g/dL / ALK PHOS: 65 U/L / ALT: 26 U/L / AST: 28 U/L / GGT: x           PT/INR - ( 26 Mar 2021 01:32 )   PT: 13.4 sec;   INR: 1.18 ratio         PTT - ( 26 Mar 2021 01:32 )  PTT:27.8 sec          Imaging:

## 2021-03-26 NOTE — PROGRESS NOTE ADULT - SUBJECTIVE AND OBJECTIVE BOX
SICU Daily Progress Note:  --------------------------------------------------------------    HPI: 65 year old female with PMH of diverticulosis w/ multiple admissions for BRBPR (previously seen by Dr. Beach), herniated lumbar disc s/p lumbar laminectomy 2010, HTN, hypothyroidism, HLD, pernicious anemia, anxiety, GERD, prediabetes, SOLOMON, and ureteral stone presents to the emergency department for bright red blood per rectum since 3am on 3/25. Patient states she woke up this morning to urinate and smelt blood. Patient states she was hemorrhaging from my rectum", with BRPBR and quarter-sized clots. Associated lightheadedness when she sits up, that only occurred once this morning. Has had further episode of BRBPR this am. Denies chest pain, palpitations, melena, hematemesis, hemoptysis, hematuria, nausea, diarrhea, constipation, abdominal pain, changes in bowel habits, LOC.   Patient does not take any blood thinners.   Of note, patient has been admitted several other times for diverticulosis with BRBRP, most recently in 2018. Patient states that she most recently saw Dr. Beach in 2018, and was told that no further intervention was needed at that time, as she had not had any futher bleeding, and that should she need surgery, she would need a total colectomy and ostomy placement. Patient states that she has not had any further episodes of BRBPR since last admission.   In the ED, patient is being actively resuscitated with 2U pRBC. BP now 95/53, HR 82    MEDICATIONS  (STANDING):  dextrose 50% Injectable 25 Gram(s) IV Push once  dextrose 50% Injectable 12.5 Gram(s) IV Push once  dextrose 50% Injectable 25 Gram(s) IV Push once  glucagon  Injectable 1 milliGRAM(s) IntraMuscular once  influenza   Vaccine 0.5 milliLiter(s) IntraMuscular once  insulin lispro (ADMELOG) corrective regimen sliding scale   SubCutaneous every 6 hours  lactated ringers. 1000 milliLiter(s) (125 mL/Hr) IV Continuous <Continuous>  levothyroxine Injectable 140 MICROGram(s) IV Push at bedtime  pantoprazole  Injectable 40 milliGRAM(s) IV Push two times a day    MEDICATIONS  (PRN):    ICU Vital Signs Last 24 Hrs  T(C): 36.3 (26 Mar 2021 00:00), Max: 36.7 (25 Mar 2021 10:05)  T(F): 97.4 (26 Mar 2021 00:00), Max: 98.1 (25 Mar 2021 12:37)  HR: 67 (26 Mar 2021 00:00) (50 - 98)  BP: 135/80 (26 Mar 2021 00:00) (66/54 - 177/100)  BP(mean): 91 (26 Mar 2021 00:00) (91 - 109)  ABP: --  ABP(mean): --  RR: 16 (26 Mar 2021 00:00) (15 - 25)  SpO2: 100% (26 Mar 2021 00:00) (99% - 100%)    I&O's Detail    25 Mar 2021 07:01  -  26 Mar 2021 01:00  --------------------------------------------------------  IN:    Lactated Ringers: 1125 mL    PRBCs (Packed Red Blood Cells): 600 mL  Total IN: 1725 mL    OUT:  Total OUT: 0 mL    Total NET: 1725 mL      Gen: no acute distress, well nourished, well groomed  HEENT: normocephalic, atraumatic, PERRL, EOMI, no discharge from nose, mucosa moist  CV: S1, S2 present with RRR, no murmur, gallops or rubs   Pulm: breath sounds equal b/l, no rales, rhonchi, wheeze  Abd: Soft, ND, NTP, no rebound, no guarding, no palpable organomegaly/masses  Ext: warm, no edema, palable dp/pt b/l, no calf tenderness b/l  Neuro: CN II-XII grossly intact, sensation grossly intact, motor grossly intact    Labs  CBC (03-25 @ 23:06)                              10.7<L>                         7.14    )----------------(  121<L>     --    % Neutrophils, --    % Lymphocytes, ANC: --                                  32.7<L>  CBC (03-25 @ 18:50)                              9.3<L>                         6.05    )----------------(  108<L>     --    % Neutrophils, --    % Lymphocytes, ANC: --                                  29.2<L>    BMP (03-25 @ 06:56)             140     |  101     |  28<H> 		Ca++ --      Ca 8.7                ---------------------------------( 298<H>		Mg --                 3.5     |  24      |  1.34<H>			Ph --        LFTs (03-25 @ 06:56)      TPro 7.3 / Alb 4.3 / TBili 0.4 / DBili -- / AST 28 / ALT 26 / AlkPhos 65    Coags (03-25 @ 06:56)  aPTT 32.5 / INR 1.09 / PT 12.4

## 2021-03-26 NOTE — PROGRESS NOTE ADULT - ASSESSMENT
Impression:  64 yo F w/ PMHx HTN, hypothyroid, GERD and recurrent episodes of hematochezia (attributed to diverticular bleed, last colonoscopy 2019) p/w painless hematochezia, CTA showing active extravasation in proximal transverse colon, course complicated by hypotension    # hematochezia - suspect diverticular bleed given previous endoscopic evaluation confirming pandiverticulosis, and CTA showing active extravasation. Decreased bowel movements, suspect bleeding has stopped. At this time will continue to monitor, if recurrence of bleeding will recommend CTA and IR consult. No plans for endoscopic evaluation at this time. Should follow up with surgery as otupatient    Recommendations:  - trend CBC, transfuse prn  - clear liquid diet, advance as tolerating if Hg stable  - f/u IR recommendations  - colorectal surgery recs  - no plans for colonoscopy at this point    GI will sign off, please reconsult as necessary    Carrington Guardado, PGY4  Gastroenterology Fellow  Available on [employer] Teams  [phone] ([place] Short Range Pager)  [phone] (Long Range Pager)    After 5pm, please contact the on-call GI fellow. [phone]

## 2021-03-27 LAB
ANION GAP SERPL CALC-SCNC: 11 MMOL/L — SIGNIFICANT CHANGE UP (ref 7–14)
BUN SERPL-MCNC: 22 MG/DL — SIGNIFICANT CHANGE UP (ref 7–23)
CALCIUM SERPL-MCNC: 7.8 MG/DL — LOW (ref 8.4–10.5)
CHLORIDE SERPL-SCNC: 106 MMOL/L — SIGNIFICANT CHANGE UP (ref 98–107)
CO2 SERPL-SCNC: 25 MMOL/L — SIGNIFICANT CHANGE UP (ref 22–31)
CREAT SERPL-MCNC: 1.23 MG/DL — SIGNIFICANT CHANGE UP (ref 0.5–1.3)
GLUCOSE BLDC GLUCOMTR-MCNC: 111 MG/DL — HIGH (ref 70–99)
GLUCOSE BLDC GLUCOMTR-MCNC: 119 MG/DL — HIGH (ref 70–99)
GLUCOSE BLDC GLUCOMTR-MCNC: 147 MG/DL — HIGH (ref 70–99)
GLUCOSE BLDC GLUCOMTR-MCNC: 180 MG/DL — HIGH (ref 70–99)
GLUCOSE SERPL-MCNC: 167 MG/DL — HIGH (ref 70–99)
HCT VFR BLD CALC: 23.4 % — LOW (ref 34.5–45)
HGB BLD-MCNC: 7.7 G/DL — LOW (ref 11.5–15.5)
MAGNESIUM SERPL-MCNC: 1.8 MG/DL — SIGNIFICANT CHANGE UP (ref 1.6–2.6)
MCHC RBC-ENTMCNC: 28.5 PG — SIGNIFICANT CHANGE UP (ref 27–34)
MCHC RBC-ENTMCNC: 32.9 GM/DL — SIGNIFICANT CHANGE UP (ref 32–36)
MCV RBC AUTO: 86.7 FL — SIGNIFICANT CHANGE UP (ref 80–100)
NRBC # BLD: 0 /100 WBCS — SIGNIFICANT CHANGE UP
NRBC # FLD: 0 K/UL — SIGNIFICANT CHANGE UP
PHOSPHATE SERPL-MCNC: 2.1 MG/DL — LOW (ref 2.5–4.5)
PLATELET # BLD AUTO: 111 K/UL — LOW (ref 150–400)
POTASSIUM SERPL-MCNC: 3.3 MMOL/L — LOW (ref 3.5–5.3)
POTASSIUM SERPL-SCNC: 3.3 MMOL/L — LOW (ref 3.5–5.3)
RBC # BLD: 2.7 M/UL — LOW (ref 3.8–5.2)
RBC # FLD: 17.3 % — HIGH (ref 10.3–14.5)
SODIUM SERPL-SCNC: 142 MMOL/L — SIGNIFICANT CHANGE UP (ref 135–145)
WBC # BLD: 6.53 K/UL — SIGNIFICANT CHANGE UP (ref 3.8–10.5)
WBC # FLD AUTO: 6.53 K/UL — SIGNIFICANT CHANGE UP (ref 3.8–10.5)

## 2021-03-27 PROCEDURE — 99232 SBSQ HOSP IP/OBS MODERATE 35: CPT | Mod: GC

## 2021-03-27 RX ORDER — MAGNESIUM SULFATE 500 MG/ML
2 VIAL (ML) INJECTION ONCE
Refills: 0 | Status: COMPLETED | OUTPATIENT
Start: 2021-03-27 | End: 2021-03-27

## 2021-03-27 RX ORDER — POTASSIUM CHLORIDE 20 MEQ
40 PACKET (EA) ORAL ONCE
Refills: 0 | Status: COMPLETED | OUTPATIENT
Start: 2021-03-27 | End: 2021-03-27

## 2021-03-27 RX ORDER — SODIUM CHLORIDE 9 MG/ML
1000 INJECTION, SOLUTION INTRAVENOUS
Refills: 0 | Status: DISCONTINUED | OUTPATIENT
Start: 2021-03-27 | End: 2021-03-28

## 2021-03-27 RX ORDER — ACETAMINOPHEN 500 MG
650 TABLET ORAL ONCE
Refills: 0 | Status: COMPLETED | OUTPATIENT
Start: 2021-03-27 | End: 2021-03-27

## 2021-03-27 RX ADMIN — Medication 50 GRAM(S): at 08:29

## 2021-03-27 RX ADMIN — PANTOPRAZOLE SODIUM 40 MILLIGRAM(S): 20 TABLET, DELAYED RELEASE ORAL at 18:44

## 2021-03-27 RX ADMIN — PANTOPRAZOLE SODIUM 40 MILLIGRAM(S): 20 TABLET, DELAYED RELEASE ORAL at 06:55

## 2021-03-27 RX ADMIN — Medication 650 MILLIGRAM(S): at 18:43

## 2021-03-27 RX ADMIN — Medication 650 MILLIGRAM(S): at 19:30

## 2021-03-27 RX ADMIN — Medication 40 MILLIEQUIVALENT(S): at 18:44

## 2021-03-27 RX ADMIN — Medication 140 MICROGRAM(S): at 21:36

## 2021-03-27 RX ADMIN — Medication 1: at 05:56

## 2021-03-27 NOTE — PROGRESS NOTE ADULT - ASSESSMENT
65F with history of diverticulosis (s/p multiple admissions, no operative intervention as they have pandiverticulosis and would require total abdominal colectomy), herniated lumbar disc (s/p plate), SOLOMON, prediabetes (on oral agent) presents with BRBPR concerning for recurrent diverticular bleed and CTA showing active extravasation at the hepatic flexure sp 4uPRBCs, transferred to floors from SICU. Endorsing 3 bloody BMs, stable vitals, H/H overnight.    PLAN:  - Diet: Clears  - Appreciate GI consult: no plan for intervention if patient HD stable  - IR consult: no plan for intervention unless patient becomes unstable  - transfuse PRBC as needed  - Monitor for bloody bowel movements  - Insert perla for strict I&Os  - Hold DVT ppx   - Continue home synthroid and ppi IV, restart other home meds as able    Surgery Team A  [id number]   65F with history of diverticulosis (s/p multiple admissions, no operative intervention as they have pandiverticulosis and would require total abdominal colectomy), herniated lumbar disc (s/p plate), SOLOMON, prediabetes (on oral agent) presents with BRBPR concerning for recurrent diverticular bleed and CTA showing active extravasation at the hepatic flexure sp 4uPRBCs, transferred to floors from SICU. Endorsing 3 bloody BMs, hemodynamically stable with downtrending Hct but no current active bleeding.    PLAN:  - Diet: NPO/IVF  - Will transfuse 2u pRBC, 1u FFP and 1u plt   - If patient has any further bloody BMs, will call IR for angiogram and possible embolization  - Appreciate GI consult: no plan for intervention if patient HD stable  - Monitor for bloody bowel movements  - Strict I&Os  - Hold DVT ppx   - Continue home synthroid and ppi IV, restart other home meds as able    Surgery Team A  [phone]

## 2021-03-27 NOTE — PROGRESS NOTE ADULT - SUBJECTIVE AND OBJECTIVE BOX
Surgery Progress Note    SUBJECTIVE: No acute events overnight. Over 24hrs, pt endorsed 3 episodes of dark, bloody bowel movements. Pt seen and examined at bedside. Patient comfortable. No nausea, vomiting, or pain. +Flatus/+BM. Tolerating clears.    Vital Signs Last 24 Hrs  T(C): 36.6 (26 Mar 2021 21:26), Max: 36.9 (26 Mar 2021 20:50)  T(F): 97.9 (26 Mar 2021 21:26), Max: 98.4 (26 Mar 2021 20:50)  HR: 82 (26 Mar 2021 22:32) (65 - 85)  BP: 122/74 (26 Mar 2021 22:32) (121/63 - 170/107)  BP(mean): 101 (26 Mar 2021 20:50) (76 - 115)  RR: 18 (26 Mar 2021 21:26) (11 - 19)  SpO2: 100% (26 Mar 2021 21:26) (98% - 100%)    Physical Exam:  General Appearance: Appears well, in no acute distress, awake, alert, and oriented x3.  Respiratory: No labored breathing  CV: Pulse regularly present  Abdomen: Soft, nontender, nondistended w/o rebound tenderness or guarding.   Extremities: Warm and well perfused, moving spontaneously    LABS:                        9.5    8.29  )-----------( 127      ( 26 Mar 2021 22:31 )             29.0     03-26    142  |  107  |  23  ----------------------------<  126<H>  3.1<L>   |  26  |  1.15    Ca    7.9<L>      26 Mar 2021 01:32  Phos  2.1     03-26  Mg     1.7     03-26    TPro  7.3  /  Alb  4.3  /  TBili  0.4  /  DBili  x   /  AST  28  /  ALT  26  /  AlkPhos  65  03-25    PT/INR - ( 26 Mar 2021 22:31 )   PT: 13.7 sec;   INR: 1.21 ratio         PTT - ( 26 Mar 2021 22:31 )  PTT:29.4 sec      INs and OUTs:    03-25-21 @ 07:01  -  03-26-21 @ 07:00  --------------------------------------------------------  IN: 3375 mL / OUT: 900 mL / NET: 2475 mL    03-26-21 @ 07:01  -  03-27-21 @ 00:27  --------------------------------------------------------  IN: 1190 mL / OUT: 2275 mL / NET: -1085 mL        Medications:  MEDICATIONS  (STANDING):  dextrose 50% Injectable 25 Gram(s) IV Push once  dextrose 50% Injectable 12.5 Gram(s) IV Push once  dextrose 50% Injectable 25 Gram(s) IV Push once  glucagon  Injectable 1 milliGRAM(s) IntraMuscular once  influenza   Vaccine 0.5 milliLiter(s) IntraMuscular once  insulin lispro (ADMELOG) corrective regimen sliding scale   SubCutaneous every 6 hours  lactated ringers. 1000 milliLiter(s) (75 mL/Hr) IV Continuous <Continuous>  levothyroxine Injectable 140 MICROGram(s) IV Push at bedtime  pantoprazole  Injectable 40 milliGRAM(s) IV Push two times a day    MEDICATIONS  (PRN):   Surgery Progress Note      Overnight events: pt endorsed 3 episodes of dark, bloody bowel movements. Stat labs showed Hct downtrended 10.8/32->9.5/29->7.7/23.4    SUBJECTIVE: Pt seen and examined at bedside. Patient comfortable. No nausea, vomiting, or pain. Denies further bloody BM, chest pain or dizziness.    Vital Signs Last 24 Hrs  T(C): 36.6 (26 Mar 2021 21:26), Max: 36.9 (26 Mar 2021 20:50)  T(F): 97.9 (26 Mar 2021 21:26), Max: 98.4 (26 Mar 2021 20:50)  HR: 82 (26 Mar 2021 22:32) (65 - 85)  BP: 122/74 (26 Mar 2021 22:32) (121/63 - 170/107)  BP(mean): 101 (26 Mar 2021 20:50) (76 - 115)  RR: 18 (26 Mar 2021 21:26) (11 - 19)  SpO2: 100% (26 Mar 2021 21:26) (98% - 100%)    Physical Exam:  General Appearance: Appears well, in no acute distress, awake, alert, and oriented x3.  Respiratory: No labored breathing  Abdomen: Soft, nontender, nondistended w/o rebound tenderness or guarding.   Extremities: Warm and well perfused, moving spontaneously    LABS:                        9.5    8.29  )-----------( 127      ( 26 Mar 2021 22:31 )             29.0     03-26    142  |  107  |  23  ----------------------------<  126<H>  3.1<L>   |  26  |  1.15    Ca    7.9<L>      26 Mar 2021 01:32  Phos  2.1     03-26  Mg     1.7     03-26    TPro  7.3  /  Alb  4.3  /  TBili  0.4  /  DBili  x   /  AST  28  /  ALT  26  /  AlkPhos  65  03-25    PT/INR - ( 26 Mar 2021 22:31 )   PT: 13.7 sec;   INR: 1.21 ratio         PTT - ( 26 Mar 2021 22:31 )  PTT:29.4 sec      INs and OUTs:    03-25-21 @ 07:01 - 03-26-21 @ 07:00  --------------------------------------------------------  IN: 3375 mL / OUT: 900 mL / NET: 2475 mL    03-26-21 @ 07:01  -  03-27-21 @ 00:27  --------------------------------------------------------  IN: 1190 mL / OUT: 2275 mL / NET: -1085 mL

## 2021-03-28 LAB
ANION GAP SERPL CALC-SCNC: 11 MMOL/L — SIGNIFICANT CHANGE UP (ref 7–14)
ANION GAP SERPL CALC-SCNC: 8 MMOL/L — SIGNIFICANT CHANGE UP (ref 7–14)
BLD GP AB SCN SERPL QL: NEGATIVE — SIGNIFICANT CHANGE UP
BUN SERPL-MCNC: 16 MG/DL — SIGNIFICANT CHANGE UP (ref 7–23)
BUN SERPL-MCNC: 17 MG/DL — SIGNIFICANT CHANGE UP (ref 7–23)
CALCIUM SERPL-MCNC: 7.5 MG/DL — LOW (ref 8.4–10.5)
CALCIUM SERPL-MCNC: 7.9 MG/DL — LOW (ref 8.4–10.5)
CHLORIDE SERPL-SCNC: 106 MMOL/L — SIGNIFICANT CHANGE UP (ref 98–107)
CHLORIDE SERPL-SCNC: 108 MMOL/L — HIGH (ref 98–107)
CO2 SERPL-SCNC: 24 MMOL/L — SIGNIFICANT CHANGE UP (ref 22–31)
CO2 SERPL-SCNC: 26 MMOL/L — SIGNIFICANT CHANGE UP (ref 22–31)
CREAT SERPL-MCNC: 1.02 MG/DL — SIGNIFICANT CHANGE UP (ref 0.5–1.3)
CREAT SERPL-MCNC: 1.05 MG/DL — SIGNIFICANT CHANGE UP (ref 0.5–1.3)
GLUCOSE BLDC GLUCOMTR-MCNC: 110 MG/DL — HIGH (ref 70–99)
GLUCOSE BLDC GLUCOMTR-MCNC: 125 MG/DL — HIGH (ref 70–99)
GLUCOSE BLDC GLUCOMTR-MCNC: 129 MG/DL — HIGH (ref 70–99)
GLUCOSE SERPL-MCNC: 114 MG/DL — HIGH (ref 70–99)
GLUCOSE SERPL-MCNC: 138 MG/DL — HIGH (ref 70–99)
HCT VFR BLD CALC: 24.7 % — LOW (ref 34.5–45)
HCT VFR BLD CALC: 26.4 % — LOW (ref 34.5–45)
HCT VFR BLD CALC: 29.8 % — LOW (ref 34.5–45)
HGB BLD-MCNC: 10.1 G/DL — LOW (ref 11.5–15.5)
HGB BLD-MCNC: 8.1 G/DL — LOW (ref 11.5–15.5)
HGB BLD-MCNC: 8.9 G/DL — LOW (ref 11.5–15.5)
MAGNESIUM SERPL-MCNC: 2.1 MG/DL — SIGNIFICANT CHANGE UP (ref 1.6–2.6)
MCHC RBC-ENTMCNC: 29.3 PG — SIGNIFICANT CHANGE UP (ref 27–34)
MCHC RBC-ENTMCNC: 29.4 PG — SIGNIFICANT CHANGE UP (ref 27–34)
MCHC RBC-ENTMCNC: 29.4 PG — SIGNIFICANT CHANGE UP (ref 27–34)
MCHC RBC-ENTMCNC: 32.8 GM/DL — SIGNIFICANT CHANGE UP (ref 32–36)
MCHC RBC-ENTMCNC: 33.7 GM/DL — SIGNIFICANT CHANGE UP (ref 32–36)
MCHC RBC-ENTMCNC: 33.9 GM/DL — SIGNIFICANT CHANGE UP (ref 32–36)
MCV RBC AUTO: 86.9 FL — SIGNIFICANT CHANGE UP (ref 80–100)
MCV RBC AUTO: 87.1 FL — SIGNIFICANT CHANGE UP (ref 80–100)
MCV RBC AUTO: 89.5 FL — SIGNIFICANT CHANGE UP (ref 80–100)
NRBC # BLD: 0 /100 WBCS — SIGNIFICANT CHANGE UP
NRBC # FLD: 0 K/UL — SIGNIFICANT CHANGE UP
PHOSPHATE SERPL-MCNC: 1.9 MG/DL — LOW (ref 2.5–4.5)
PLATELET # BLD AUTO: 122 K/UL — LOW (ref 150–400)
PLATELET # BLD AUTO: 128 K/UL — LOW (ref 150–400)
PLATELET # BLD AUTO: 94 K/UL — LOW (ref 150–400)
POTASSIUM SERPL-MCNC: 3.3 MMOL/L — LOW (ref 3.5–5.3)
POTASSIUM SERPL-MCNC: 3.5 MMOL/L — SIGNIFICANT CHANGE UP (ref 3.5–5.3)
POTASSIUM SERPL-SCNC: 3.3 MMOL/L — LOW (ref 3.5–5.3)
POTASSIUM SERPL-SCNC: 3.5 MMOL/L — SIGNIFICANT CHANGE UP (ref 3.5–5.3)
RBC # BLD: 2.76 M/UL — LOW (ref 3.8–5.2)
RBC # BLD: 3.03 M/UL — LOW (ref 3.8–5.2)
RBC # BLD: 3.43 M/UL — LOW (ref 3.8–5.2)
RBC # FLD: 15.6 % — HIGH (ref 10.3–14.5)
RH IG SCN BLD-IMP: POSITIVE — SIGNIFICANT CHANGE UP
SODIUM SERPL-SCNC: 141 MMOL/L — SIGNIFICANT CHANGE UP (ref 135–145)
SODIUM SERPL-SCNC: 142 MMOL/L — SIGNIFICANT CHANGE UP (ref 135–145)
WBC # BLD: 10.32 K/UL — SIGNIFICANT CHANGE UP (ref 3.8–10.5)
WBC # BLD: 6.12 K/UL — SIGNIFICANT CHANGE UP (ref 3.8–10.5)
WBC # BLD: 7.58 K/UL — SIGNIFICANT CHANGE UP (ref 3.8–10.5)
WBC # FLD AUTO: 10.32 K/UL — SIGNIFICANT CHANGE UP (ref 3.8–10.5)
WBC # FLD AUTO: 6.12 K/UL — SIGNIFICANT CHANGE UP (ref 3.8–10.5)
WBC # FLD AUTO: 7.58 K/UL — SIGNIFICANT CHANGE UP (ref 3.8–10.5)

## 2021-03-28 PROCEDURE — 75726 ARTERY X-RAYS ABDOMEN: CPT | Mod: 26

## 2021-03-28 PROCEDURE — 75774 ARTERY X-RAY EACH VESSEL: CPT | Mod: 26,59,76

## 2021-03-28 PROCEDURE — 36247 INS CATH ABD/L-EXT ART 3RD: CPT | Mod: LT

## 2021-03-28 PROCEDURE — 36248 INS CATH ABD/L-EXT ART ADDL: CPT

## 2021-03-28 PROCEDURE — 76937 US GUIDE VASCULAR ACCESS: CPT | Mod: 26

## 2021-03-28 PROCEDURE — 99231 SBSQ HOSP IP/OBS SF/LOW 25: CPT | Mod: GC

## 2021-03-28 RX ORDER — SODIUM CHLORIDE 9 MG/ML
1000 INJECTION, SOLUTION INTRAVENOUS
Refills: 0 | Status: DISCONTINUED | OUTPATIENT
Start: 2021-03-28 | End: 2021-03-29

## 2021-03-28 RX ORDER — SODIUM,POTASSIUM PHOSPHATES 278-250MG
1 POWDER IN PACKET (EA) ORAL ONCE
Refills: 0 | Status: COMPLETED | OUTPATIENT
Start: 2021-03-28 | End: 2021-03-28

## 2021-03-28 RX ORDER — POTASSIUM CHLORIDE 20 MEQ
20 PACKET (EA) ORAL ONCE
Refills: 0 | Status: COMPLETED | OUTPATIENT
Start: 2021-03-28 | End: 2021-03-28

## 2021-03-28 RX ADMIN — Medication 1 PACKET(S): at 12:46

## 2021-03-28 RX ADMIN — PANTOPRAZOLE SODIUM 40 MILLIGRAM(S): 20 TABLET, DELAYED RELEASE ORAL at 18:50

## 2021-03-28 RX ADMIN — PANTOPRAZOLE SODIUM 40 MILLIGRAM(S): 20 TABLET, DELAYED RELEASE ORAL at 05:10

## 2021-03-28 RX ADMIN — SODIUM CHLORIDE 30 MILLILITER(S): 9 INJECTION, SOLUTION INTRAVENOUS at 03:33

## 2021-03-28 RX ADMIN — Medication 63.75 MILLIMOLE(S): at 03:32

## 2021-03-28 RX ADMIN — Medication 140 MICROGRAM(S): at 21:37

## 2021-03-28 RX ADMIN — SODIUM CHLORIDE 120 MILLILITER(S): 9 INJECTION, SOLUTION INTRAVENOUS at 18:52

## 2021-03-28 RX ADMIN — Medication 20 MILLIEQUIVALENT(S): at 12:46

## 2021-03-28 NOTE — PROGRESS NOTE ADULT - ASSESSMENT
65F with history of diverticulosis (s/p multiple admissions, no operative intervention as they have pandiverticulosis and would require total abdominal colectomy), herniated lumbar disc (s/p plate), SOLOMON, prediabetes (on oral agent) presents with BRBPR concerning for recurrent diverticular bleed and CTA showing active extravasation at the hepatic flexure sp 4uPRBCs, transferred to floors from SICU. Endorsing 3 bloody BMs, hemodynamically stable with downtrending Hct but no current active bleeding.    PLAN:  - Diet: NPO/IVF  - Will transfuse 2u pRBC, 1u FFP and 1u plt   - If patient has any further bloody BMs, will call IR for angiogram and possible embolization  - Appreciate GI consult: no plan for intervention if patient HD stable  - Monitor for bloody bowel movements  - Strict I&Os  - Hold DVT ppx   - Continue home synthroid and ppi IV, restart other home meds as able    Surgery Team A  [id number]   65F with history of diverticulosis (s/p multiple admissions, no operative intervention as they have pandiverticulosis and would require total abdominal colectomy), herniated lumbar disc (s/p plate), SOLOMON, prediabetes (on oral agent) presents with BRBPR concerning for recurrent diverticular bleed and CTA showing active extravasation at the hepatic flexure sp 4uPRBCs, transferred to floors from SICU. Currently with no bloody BM overnight.     PLAN:  - Diet: Advance to CLD.   - F/u AM CBC s/p transfusion   - If patient has any further bloody BMs, will call IR for angiogram and possible embolization  - Appreciate GI consult: no plan for intervention if patient HD stable  - Monitor for bloody bowel movements  - Strict I&Os  - Hold DVT ppx   - Continue home synthroid and ppi IV, restart other home meds as able    Surgery Team A  [id number]

## 2021-03-28 NOTE — CHART NOTE - NSCHARTNOTEFT_GEN_A_CORE
IR Pre-Procedure Note    Patient Age:   65y    Patient Gender:   Female    Procedure (including site / side if known): IR angioembolization    Diagnosis / Indication: Patient is a 65y old  Female who presents with a chief complaint of Diverticular Bleed (28 Mar 2021 03:29)      Interventional Radiology Attending Physician: Dr. Collins    Ordering Attending Physician: Dr. Breezy Beach    PAST MEDICAL & SURGICAL HISTORY:  Heart murmur  mild    Multiparity    Lumbar herniated disc  &#x27; 2010  surgically treated    Fibroid uterus  &#x27;94  surgically treated    Left ureteral stone    Type 2 diabetes mellitus    SOLOMON (obstructive sleep apnea)  non-compliant with CPAP    Pernicious anemia    Lower gastrointestinal bleeding  Multipel times since &#x27;2014: last episode 5/2018    HTN (hypertension)    Diverticulosis    Hypothyroidism    S/P lumbar laminectomy  &#x27; 2010  with Fusion    S/P WESTLEY (total abdominal hysterectomy)  &#x27; 94  Laproscopic.  benign    History of tonsillectomy  age 14         Pertinent Labs:   CBC Full  -  ( 28 Mar 2021 03:38 )  WBC Count : 6.12 K/uL  RBC Count : 3.03 M/uL  Hemoglobin : 8.9 g/dL  Hematocrit : 26.4 %  Platelet Count - Automated : 122 K/uL  Mean Cell Volume : 87.1 fL  Mean Cell Hemoglobin : 29.4 pg  Mean Cell Hemoglobin Concentration : 33.7 gm/dL  Auto Neutrophil # : x  Auto Lymphocyte # : x  Auto Monocyte # : x  Auto Eosinophil # : x  Auto Basophil # : x  Auto Neutrophil % : x  Auto Lymphocyte % : x  Auto Monocyte % : x  Auto Eosinophil % : x  Auto Basophil % : x    03-28    142  |  108<H>  |  17  ----------------------------<  114<H>  3.5   |  26  |  1.05    Ca    7.9<L>      28 Mar 2021 03:38  Phos  1.9     03-28  Mg     2.1     03-28      PT/INR - ( 26 Mar 2021 22:31 )   PT: 13.7 sec;   INR: 1.21 ratio         PTT - ( 26 Mar 2021 22:31 )  PTT:29.4 sec    Patient / Family aware of procedure:   [ x ] Y   [  ] SHERMAN Rowley PGY-3  A Surgery Team

## 2021-03-28 NOTE — CHART NOTE - NSCHARTNOTEFT_GEN_A_CORE
Interventional Radiology Pre-Procedure Note    Procedure: Visceral angiogram and embolization.     Diagnosis/Indication: Patient is a 65y old Female who presents with GI bleed, diverticular source identified on prior CT. Patient stopped bleeding however with repeat episode of bright red blood per rectum overnight and this morning.     PAST MEDICAL & SURGICAL HISTORY:  Heart murmur  mild    Multiparity    Lumbar herniated disc  &#x27; 2010  surgically treated    Fibroid uterus  &#x27;94  surgically treated    Left ureteral stone    Type 2 diabetes mellitus    SOLOMON (obstructive sleep apnea)  non-compliant with CPAP    Pernicious anemia    Lower gastrointestinal bleeding  Multipel times since &#x27;2014: last episode 5/2018    HTN (hypertension)    Diverticulosis    Hypothyroidism    S/P lumbar laminectomy  &#x27; 2010  with Fusion    S/P WESTLEY (total abdominal hysterectomy)  &#x27; 94  Laproscopic.  benign    History of tonsillectomy  age 14         CBC Full  -  ( 28 Mar 2021 03:38 )  WBC Count : 6.12 K/uL  RBC Count : 3.03 M/uL  Hemoglobin : 8.9 g/dL  Hematocrit : 26.4 %  Platelet Count - Automated : 122 K/uL  Mean Cell Volume : 87.1 fL  Mean Cell Hemoglobin : 29.4 pg  Mean Cell Hemoglobin Concentration : 33.7 gm/dL      03-28    142  |  108<H>  |  17  ----------------------------<  114<H>  3.5   |  26  |  1.05    Ca    7.9<L>      28 Mar 2021 03:38  Phos  1.9     03-28  Mg     2.1     03-28      PT/INR - ( 26 Mar 2021 22:31 )   PT: 13.7 sec;   INR: 1.21 ratio         PTT - ( 26 Mar 2021 22:31 )  PTT:29.4 sec    Patient is a candidate for visceral angiogram and embolization.     IR consent to be obtained prior to procedure.

## 2021-03-28 NOTE — CHART NOTE - NSCHARTNOTEFT_GEN_A_CORE
POST-PROCEDURE NOTE    Subjective:  Patient is s/p IR angiogram. Patient seen and examined at bedside. Patient endorses feeling comfortable; denies abdominal pain, nausea, vomiting, bloody bowel movements, shortness of breath. Recovering appropriately.     Vital Signs Last 24 Hrs  T(C): 36.3 (28 Mar 2021 19:09), Max: 37.1 (28 Mar 2021 16:20)  T(F): 97.4 (28 Mar 2021 19:09), Max: 98.8 (28 Mar 2021 16:20)  HR: 98 (28 Mar 2021 19:09) (67 - 98)  BP: 145/85 (28 Mar 2021 19:09) (97/72 - 155/75)  BP(mean): 95 (28 Mar 2021 18:30) (78 - 96)  RR: 19 (28 Mar 2021 19:09) (15 - 22)  SpO2: 98% (28 Mar 2021 19:09) (95% - 100%)  I&O's Detail    27 Mar 2021 07:01  -  28 Mar 2021 07:00  --------------------------------------------------------  IN:    IV PiggyBack: 250 mL    Lactated Ringers: 180 mL    Lactated Ringers: 240 mL    Lactated Ringers: 600 mL    Plasma: 300 mL    Platelets - Single Donor: 200 mL    PRBCs (Packed Red Blood Cells): 600 mL  Total IN: 2370 mL    OUT:    Indwelling Catheter - Urethral (mL): 2750 mL    Oral Fluid: 0 mL  Total OUT: 2750 mL    Total NET: -380 mL      28 Mar 2021 07:01  -  28 Mar 2021 20:19  --------------------------------------------------------  IN:    Lactated Ringers: 360 mL  Total IN: 360 mL    OUT:    Indwelling Catheter - Urethral (mL): 1275 mL  Total OUT: 1275 mL    Total NET: -915 mL          PAST MEDICAL & SURGICAL HISTORY:  Heart murmur  mild    Multiparity    Lumbar herniated disc  &#x27; 2010  surgically treated    Fibroid uterus  &#x27;94  surgically treated    Left ureteral stone    Type 2 diabetes mellitus    SOLOMON (obstructive sleep apnea)  non-compliant with CPAP    Pernicious anemia    Lower gastrointestinal bleeding  Multipel times since &#x27;2014: last episode 5/2018    HTN (hypertension)    Diverticulosis    Hypothyroidism    S/P lumbar laminectomy  &#x27; 2010  with Fusion    S/P WESTLEY (total abdominal hysterectomy)  &#x27; 94  Laproscopic.  benign    History of tonsillectomy  age 14          Physical Exam:  General: NAD, resting comfortably in bed, awake, alert, and oriented x3  Pulmonary: Nonlabored breathing, no respiratory distress  Cardiovascular: NSR  Abdominal: soft, NT/ND, no guarding or rebound tenderness.   Extremities: WWP, moving spontaneously. R groin dressing clean, dry, and intact.      LABS:                        10.1   10.32 )-----------( 94       ( 28 Mar 2021 19:15 )             29.8     03-28    141  |  106  |  16  ----------------------------<  138<H>  3.3<L>   |  24  |  1.02    Ca    7.5<L>      28 Mar 2021 13:38  Phos  1.9     03-28  Mg     2.1     03-28      PT/INR - ( 26 Mar 2021 22:31 )   PT: 13.7 sec;   INR: 1.21 ratio         PTT - ( 26 Mar 2021 22:31 )  PTT:29.4 sec  CAPILLARY BLOOD GLUCOSE  124 (28 Mar 2021 12:32)      POCT Blood Glucose.: 129 mg/dL (28 Mar 2021 18:24)  POCT Blood Glucose.: 125 mg/dL (28 Mar 2021 05:09)  POCT Blood Glucose.: 111 mg/dL (27 Mar 2021 23:28)      Radiology and Additional Studies:    Assessment:  65F hx multiple admissions for pandiverticulosis, herniated lumbar disc (s/p plate), SOLOMON, prediabetes (on oral agent) pw BRBPR cf recurrent diverticular bleed and CTA showing active extravasation at the hepatic flexure sp 4uPRBCs who is now several hours post-op from an IR angiogram (3/28). No active bleeding found in SMA or middle colic artery and its branches. No embolization was performed. Patient received 2uPRBCs. Hg responded appropriately. Recovering appropriately on floors.    Plan:  - Diet: NPO  - post-transfusion CBC: Hg 10.1 < 8.1  - Appreciate GI consult: no plan for intervention if patient HD stable  - Monitor for bloody bowel movements  - Strict I&Os  - Hold DVT ppx   - Continue home synthroid and ppi IV, restart other home meds as able    Surgery Team A  [id number]  - Pain control as needed  - DVT ppx  - OOB and ambulating as tolerated  - F/u AM labs POST-PROCEDURE NOTE    Subjective:  Patient is s/p IR angiogram. Patient seen and examined at bedside. Patient endorses feeling comfortable; denies abdominal pain, nausea, vomiting, bloody bowel movements, shortness of breath. Recovering appropriately.     Vital Signs Last 24 Hrs  T(C): 36.3 (28 Mar 2021 19:09), Max: 37.1 (28 Mar 2021 16:20)  T(F): 97.4 (28 Mar 2021 19:09), Max: 98.8 (28 Mar 2021 16:20)  HR: 98 (28 Mar 2021 19:09) (67 - 98)  BP: 145/85 (28 Mar 2021 19:09) (97/72 - 155/75)  BP(mean): 95 (28 Mar 2021 18:30) (78 - 96)  RR: 19 (28 Mar 2021 19:09) (15 - 22)  SpO2: 98% (28 Mar 2021 19:09) (95% - 100%)  I&O's Detail    27 Mar 2021 07:01  -  28 Mar 2021 07:00  --------------------------------------------------------  IN:    IV PiggyBack: 250 mL    Lactated Ringers: 180 mL    Lactated Ringers: 240 mL    Lactated Ringers: 600 mL    Plasma: 300 mL    Platelets - Single Donor: 200 mL    PRBCs (Packed Red Blood Cells): 600 mL  Total IN: 2370 mL    OUT:    Indwelling Catheter - Urethral (mL): 2750 mL    Oral Fluid: 0 mL  Total OUT: 2750 mL    Total NET: -380 mL      28 Mar 2021 07:01  -  28 Mar 2021 20:19  --------------------------------------------------------  IN:    Lactated Ringers: 360 mL  Total IN: 360 mL    OUT:    Indwelling Catheter - Urethral (mL): 1275 mL  Total OUT: 1275 mL    Total NET: -915 mL          PAST MEDICAL & SURGICAL HISTORY:  Heart murmur  mild    Multiparity    Lumbar herniated disc  &#x27; 2010  surgically treated    Fibroid uterus  &#x27;94  surgically treated    Left ureteral stone    Type 2 diabetes mellitus    SOLOMON (obstructive sleep apnea)  non-compliant with CPAP    Pernicious anemia    Lower gastrointestinal bleeding  Multipel times since &#x27;2014: last episode 5/2018    HTN (hypertension)    Diverticulosis    Hypothyroidism    S/P lumbar laminectomy  &#x27; 2010  with Fusion    S/P WESTLEY (total abdominal hysterectomy)  &#x27; 94  Laproscopic.  benign    History of tonsillectomy  age 14          Physical Exam:  General: NAD, resting comfortably in bed, awake, alert, and oriented x3  Pulmonary: Nonlabored breathing, no respiratory distress  Cardiovascular: NSR  Abdominal: soft, NT/ND, no guarding or rebound tenderness.   Extremities: WWP, moving spontaneously. R groin dressing clean, dry, and intact.      LABS:                        10.1   10.32 )-----------( 94       ( 28 Mar 2021 19:15 )             29.8     03-28    141  |  106  |  16  ----------------------------<  138<H>  3.3<L>   |  24  |  1.02    Ca    7.5<L>      28 Mar 2021 13:38  Phos  1.9     03-28  Mg     2.1     03-28      PT/INR - ( 26 Mar 2021 22:31 )   PT: 13.7 sec;   INR: 1.21 ratio         PTT - ( 26 Mar 2021 22:31 )  PTT:29.4 sec  CAPILLARY BLOOD GLUCOSE  124 (28 Mar 2021 12:32)      POCT Blood Glucose.: 129 mg/dL (28 Mar 2021 18:24)  POCT Blood Glucose.: 125 mg/dL (28 Mar 2021 05:09)  POCT Blood Glucose.: 111 mg/dL (27 Mar 2021 23:28)      Radiology and Additional Studies:    Assessment:  65F hx multiple admissions for pandiverticulosis, herniated lumbar disc (s/p plate), SOLOMON, prediabetes (on oral agent) pw BRBPR cf recurrent diverticular bleed and CTA showing active extravasation at the hepatic flexure sp 4uPRBCs who is now several hours post-op from an IR angiogram (3/28). No active bleeding found in SMA or middle colic artery and its branches. No embolization was performed. Patient received 2uPRBCs. Hg responded appropriately. Recovering appropriately on floors.    Plan:  - Diet: NPO  - post-transfusion CBC: Hg 10.1 < 8.1  - Appreciate GI consult: no plan for intervention if patient HD stable  - Monitor for bloody bowel movements  - Strict I&Os  - Hold DVT ppx   - Continue home synthroid and ppi IV, restart other home meds as able    Surgery Team A  [id number]

## 2021-03-28 NOTE — PROGRESS NOTE ADULT - SUBJECTIVE AND OBJECTIVE BOX
SURGERY: B Team  Pager: [phone]    INTERVAL EVENTS/SUBJECTIVE: Pt seen and examined at bedside. Patient comfortable. No nausea, vomiting, or pain. Denies further bloody BM, chest pain or dizziness. Patient received 2U of PRBC, 1U FFP, 1U platelets overnight.     ______________________________________________  OBJECTIVE:   T(C): 36.6 (03-28-21 @ 00:10), Max: 36.8 (03-27-21 @ 14:30)  HR: 71 (03-28-21 @ 00:10) (71 - 82)  BP: 130/82 (03-28-21 @ 00:10) (124/73 - 175/93)  RR: 18 (03-28-21 @ 00:10) (17 - 18)  SpO2: 100% (03-28-21 @ 00:10) (93% - 100%)  Wt(kg): --  CAPILLARY BLOOD GLUCOSE      POCT Blood Glucose.: 111 mg/dL (27 Mar 2021 23:28)  POCT Blood Glucose.: 119 mg/dL (27 Mar 2021 17:42)  POCT Blood Glucose.: 147 mg/dL (27 Mar 2021 12:23)  POCT Blood Glucose.: 180 mg/dL (27 Mar 2021 05:33)    I&O's Detail    26 Mar 2021 07:01  -  27 Mar 2021 07:00  --------------------------------------------------------  IN:    Lactated Ringers: 500 mL    Lactated Ringers: 1050 mL    Oral Fluid: 240 mL  Total IN: 1790 mL    OUT:    Indwelling Catheter - Urethral (mL): 2676 mL    IV PiggyBack: 0 mL  Total OUT: 2676 mL    Total NET: -886 mL      27 Mar 2021 07:01  -  28 Mar 2021 03:29  --------------------------------------------------------  IN:    Lactated Ringers: 600 mL    Lactated Ringers: 120 mL    Plasma: 300 mL    Platelets - Single Donor: 200 mL    PRBCs (Packed Red Blood Cells): 600 mL  Total IN: 1820 mL    OUT:    Indwelling Catheter - Urethral (mL): 2000 mL    IV PiggyBack: 0 mL    Oral Fluid: 0 mL  Total OUT: 2000 mL    Total NET: -180 mL          Physical Exam:  General Appearance: Appears well, in no acute distress, awake, alert, and oriented x3.  Respiratory: No labored breathing  Abdomen: Soft, nontender, nondistended w/o rebound tenderness or guarding.   Extremities: Warm and well perfused, moving spontaneously  ______________________________________________  LABS:  CBC Full  -  ( 27 Mar 2021 05:55 )  WBC Count : 6.53 K/uL  RBC Count : 2.70 M/uL  Hemoglobin : 7.7 g/dL  Hematocrit : 23.4 %  Platelet Count - Automated : 111 K/uL  Mean Cell Volume : 86.7 fL  Mean Cell Hemoglobin : 28.5 pg  Mean Cell Hemoglobin Concentration : 32.9 gm/dL  Auto Neutrophil # : x  Auto Lymphocyte # : x  Auto Monocyte # : x  Auto Eosinophil # : x  Auto Basophil # : x  Auto Neutrophil % : x  Auto Lymphocyte % : x  Auto Monocyte % : x  Auto Eosinophil % : x  Auto Basophil % : x    03-27    142  |  106  |  22  ----------------------------<  167<H>  3.3<L>   |  25  |  1.23    Ca    7.8<L>      27 Mar 2021 05:55  Phos  2.1     03-27  Mg     1.8     03-27      _____________________________________________  RADIOLOGY:     SURGERY: B Team  Pager: [phone]    INTERVAL EVENTS/SUBJECTIVE: Pt seen and examined at bedside. Patient comfortable. No nausea, vomiting, or pain. Denies further bloody BM, chest pain or dizziness. Patient received 2U of PRBC, 1U FFP, 1U platelets overnight. Patient Hb/Hct responsive to multiple blood transfusion with Hb/Hct at 8.9/26.4. Patient hemodynamically stable.    ______________________________________________  OBJECTIVE:   T(C): 36.6 (03-28-21 @ 00:10), Max: 36.8 (03-27-21 @ 14:30)  HR: 71 (03-28-21 @ 00:10) (71 - 82)  BP: 130/82 (03-28-21 @ 00:10) (124/73 - 175/93)  RR: 18 (03-28-21 @ 00:10) (17 - 18)  SpO2: 100% (03-28-21 @ 00:10) (93% - 100%)  Wt(kg): --  CAPILLARY BLOOD GLUCOSE      POCT Blood Glucose.: 111 mg/dL (27 Mar 2021 23:28)  POCT Blood Glucose.: 119 mg/dL (27 Mar 2021 17:42)  POCT Blood Glucose.: 147 mg/dL (27 Mar 2021 12:23)  POCT Blood Glucose.: 180 mg/dL (27 Mar 2021 05:33)    I&O's Detail    26 Mar 2021 07:01  -  27 Mar 2021 07:00  --------------------------------------------------------  IN:    Lactated Ringers: 500 mL    Lactated Ringers: 1050 mL    Oral Fluid: 240 mL  Total IN: 1790 mL    OUT:    Indwelling Catheter - Urethral (mL): 2676 mL    IV PiggyBack: 0 mL  Total OUT: 2676 mL    Total NET: -886 mL      27 Mar 2021 07:01  -  28 Mar 2021 03:29  --------------------------------------------------------  IN:    Lactated Ringers: 600 mL    Lactated Ringers: 120 mL    Plasma: 300 mL    Platelets - Single Donor: 200 mL    PRBCs (Packed Red Blood Cells): 600 mL  Total IN: 1820 mL    OUT:    Indwelling Catheter - Urethral (mL): 2000 mL    IV PiggyBack: 0 mL    Oral Fluid: 0 mL  Total OUT: 2000 mL    Total NET: -180 mL          Physical Exam:  General Appearance: Appears well, in no acute distress, awake, alert, and oriented x3.  Respiratory: No labored breathing  Abdomen: Soft, nontender, nondistended w/o rebound tenderness or guarding.   Extremities: Warm and well perfused, moving spontaneously  ______________________________________________  LABS:  CBC Full  -  ( 27 Mar 2021 05:55 )  WBC Count : 6.53 K/uL  RBC Count : 2.70 M/uL  Hemoglobin : 7.7 g/dL  Hematocrit : 23.4 %  Platelet Count - Automated : 111 K/uL  Mean Cell Volume : 86.7 fL  Mean Cell Hemoglobin : 28.5 pg  Mean Cell Hemoglobin Concentration : 32.9 gm/dL  Auto Neutrophil # : x  Auto Lymphocyte # : x  Auto Monocyte # : x  Auto Eosinophil # : x  Auto Basophil # : x  Auto Neutrophil % : x  Auto Lymphocyte % : x  Auto Monocyte % : x  Auto Eosinophil % : x  Auto Basophil % : x    03-27    142  |  106  |  22  ----------------------------<  167<H>  3.3<L>   |  25  |  1.23    Ca    7.8<L>      27 Mar 2021 05:55  Phos  2.1     03-27  Mg     1.8     03-27      _____________________________________________  RADIOLOGY:     SURGERY: B Team  Pager: [phone]    INTERVAL EVENTS/SUBJECTIVE: Pt seen and examined at bedside. Patient comfortable. No nausea, vomiting, or pain. Denies further bloody BM, chest pain or dizziness. Patient received 2U of PRBC, 1U FFP, 1U platelets overnight. Patient Hb/Hct responsive to multiple blood transfusion with Hb/Hct at 8.9/26.4. Patient hemodynamically stable. No further bloody BM overnight.     ______________________________________________  OBJECTIVE:   T(C): 36.6 (03-28-21 @ 00:10), Max: 36.8 (03-27-21 @ 14:30)  HR: 71 (03-28-21 @ 00:10) (71 - 82)  BP: 130/82 (03-28-21 @ 00:10) (124/73 - 175/93)  RR: 18 (03-28-21 @ 00:10) (17 - 18)  SpO2: 100% (03-28-21 @ 00:10) (93% - 100%)  Wt(kg): --  CAPILLARY BLOOD GLUCOSE      POCT Blood Glucose.: 111 mg/dL (27 Mar 2021 23:28)  POCT Blood Glucose.: 119 mg/dL (27 Mar 2021 17:42)  POCT Blood Glucose.: 147 mg/dL (27 Mar 2021 12:23)  POCT Blood Glucose.: 180 mg/dL (27 Mar 2021 05:33)    I&O's Detail    26 Mar 2021 07:01  -  27 Mar 2021 07:00  --------------------------------------------------------  IN:    Lactated Ringers: 500 mL    Lactated Ringers: 1050 mL    Oral Fluid: 240 mL  Total IN: 1790 mL    OUT:    Indwelling Catheter - Urethral (mL): 2676 mL    IV PiggyBack: 0 mL  Total OUT: 2676 mL    Total NET: -886 mL      27 Mar 2021 07:01  -  28 Mar 2021 03:29  --------------------------------------------------------  IN:    Lactated Ringers: 600 mL    Lactated Ringers: 120 mL    Plasma: 300 mL    Platelets - Single Donor: 200 mL    PRBCs (Packed Red Blood Cells): 600 mL  Total IN: 1820 mL    OUT:    Indwelling Catheter - Urethral (mL): 2000 mL    IV PiggyBack: 0 mL    Oral Fluid: 0 mL  Total OUT: 2000 mL    Total NET: -180 mL          Physical Exam:  General Appearance: Appears well, in no acute distress, awake, alert, and oriented x3.  Respiratory: No labored breathing  Abdomen: Soft, nontender, nondistended w/o rebound tenderness or guarding.   Extremities: Warm and well perfused, moving spontaneously  ______________________________________________  LABS:  CBC Full  -  ( 27 Mar 2021 05:55 )  WBC Count : 6.53 K/uL  RBC Count : 2.70 M/uL  Hemoglobin : 7.7 g/dL  Hematocrit : 23.4 %  Platelet Count - Automated : 111 K/uL  Mean Cell Volume : 86.7 fL  Mean Cell Hemoglobin : 28.5 pg  Mean Cell Hemoglobin Concentration : 32.9 gm/dL  Auto Neutrophil # : x  Auto Lymphocyte # : x  Auto Monocyte # : x  Auto Eosinophil # : x  Auto Basophil # : x  Auto Neutrophil % : x  Auto Lymphocyte % : x  Auto Monocyte % : x  Auto Eosinophil % : x  Auto Basophil % : x    03-27    142  |  106  |  22  ----------------------------<  167<H>  3.3<L>   |  25  |  1.23    Ca    7.8<L>      27 Mar 2021 05:55  Phos  2.1     03-27  Mg     1.8     03-27      _____________________________________________  RADIOLOGY:

## 2021-03-29 LAB
ANION GAP SERPL CALC-SCNC: 10 MMOL/L — SIGNIFICANT CHANGE UP (ref 7–14)
ANION GAP SERPL CALC-SCNC: 13 MMOL/L — SIGNIFICANT CHANGE UP (ref 7–14)
APTT BLD: 28.2 SEC — SIGNIFICANT CHANGE UP (ref 27–36.3)
BLD GP AB SCN SERPL QL: NEGATIVE — SIGNIFICANT CHANGE UP
BUN SERPL-MCNC: 16 MG/DL — SIGNIFICANT CHANGE UP (ref 7–23)
BUN SERPL-MCNC: 17 MG/DL — SIGNIFICANT CHANGE UP (ref 7–23)
CALCIUM SERPL-MCNC: 7.1 MG/DL — LOW (ref 8.4–10.5)
CALCIUM SERPL-MCNC: 7.6 MG/DL — LOW (ref 8.4–10.5)
CHLORIDE SERPL-SCNC: 105 MMOL/L — SIGNIFICANT CHANGE UP (ref 98–107)
CHLORIDE SERPL-SCNC: 106 MMOL/L — SIGNIFICANT CHANGE UP (ref 98–107)
CO2 SERPL-SCNC: 22 MMOL/L — SIGNIFICANT CHANGE UP (ref 22–31)
CO2 SERPL-SCNC: 24 MMOL/L — SIGNIFICANT CHANGE UP (ref 22–31)
CREAT SERPL-MCNC: 1.04 MG/DL — SIGNIFICANT CHANGE UP (ref 0.5–1.3)
CREAT SERPL-MCNC: 1.06 MG/DL — SIGNIFICANT CHANGE UP (ref 0.5–1.3)
GLUCOSE BLDC GLUCOMTR-MCNC: 102 MG/DL — HIGH (ref 70–99)
GLUCOSE BLDC GLUCOMTR-MCNC: 116 MG/DL — HIGH (ref 70–99)
GLUCOSE BLDC GLUCOMTR-MCNC: 117 MG/DL — HIGH (ref 70–99)
GLUCOSE BLDC GLUCOMTR-MCNC: 219 MG/DL — HIGH (ref 70–99)
GLUCOSE BLDC GLUCOMTR-MCNC: 81 MG/DL — SIGNIFICANT CHANGE UP (ref 70–99)
GLUCOSE SERPL-MCNC: 108 MG/DL — HIGH (ref 70–99)
GLUCOSE SERPL-MCNC: 158 MG/DL — HIGH (ref 70–99)
HCT VFR BLD CALC: 21 % — CRITICAL LOW (ref 34.5–45)
HCT VFR BLD CALC: 28.4 % — LOW (ref 34.5–45)
HGB BLD-MCNC: 7 G/DL — CRITICAL LOW (ref 11.5–15.5)
HGB BLD-MCNC: 9.4 G/DL — LOW (ref 11.5–15.5)
INR BLD: 1.24 RATIO — HIGH (ref 0.88–1.16)
LACTATE SERPL-SCNC: 2.1 MMOL/L — HIGH (ref 0.5–2)
MAGNESIUM SERPL-MCNC: 1.8 MG/DL — SIGNIFICANT CHANGE UP (ref 1.6–2.6)
MAGNESIUM SERPL-MCNC: 1.8 MG/DL — SIGNIFICANT CHANGE UP (ref 1.6–2.6)
MCHC RBC-ENTMCNC: 28.8 PG — SIGNIFICANT CHANGE UP (ref 27–34)
MCHC RBC-ENTMCNC: 29.2 PG — SIGNIFICANT CHANGE UP (ref 27–34)
MCHC RBC-ENTMCNC: 33.1 GM/DL — SIGNIFICANT CHANGE UP (ref 32–36)
MCHC RBC-ENTMCNC: 33.3 GM/DL — SIGNIFICANT CHANGE UP (ref 32–36)
MCV RBC AUTO: 87.1 FL — SIGNIFICANT CHANGE UP (ref 80–100)
MCV RBC AUTO: 87.5 FL — SIGNIFICANT CHANGE UP (ref 80–100)
NRBC # BLD: 0 /100 WBCS — SIGNIFICANT CHANGE UP
NRBC # BLD: 0 /100 WBCS — SIGNIFICANT CHANGE UP
NRBC # FLD: 0 K/UL — SIGNIFICANT CHANGE UP
NRBC # FLD: 0 K/UL — SIGNIFICANT CHANGE UP
PHOSPHATE SERPL-MCNC: 2 MG/DL — LOW (ref 2.5–4.5)
PHOSPHATE SERPL-MCNC: 2.8 MG/DL — SIGNIFICANT CHANGE UP (ref 2.5–4.5)
PLATELET # BLD AUTO: 103 K/UL — LOW (ref 150–400)
PLATELET # BLD AUTO: 111 K/UL — LOW (ref 150–400)
POTASSIUM SERPL-MCNC: 3.3 MMOL/L — LOW (ref 3.5–5.3)
POTASSIUM SERPL-MCNC: 3.6 MMOL/L — SIGNIFICANT CHANGE UP (ref 3.5–5.3)
POTASSIUM SERPL-SCNC: 3.3 MMOL/L — LOW (ref 3.5–5.3)
POTASSIUM SERPL-SCNC: 3.6 MMOL/L — SIGNIFICANT CHANGE UP (ref 3.5–5.3)
PROTHROM AB SERPL-ACNC: 14.1 SEC — HIGH (ref 10.6–13.6)
RBC # BLD: 2.4 M/UL — LOW (ref 3.8–5.2)
RBC # BLD: 3.26 M/UL — LOW (ref 3.8–5.2)
RBC # FLD: 16.3 % — HIGH (ref 10.3–14.5)
RBC # FLD: 16.3 % — HIGH (ref 10.3–14.5)
RH IG SCN BLD-IMP: POSITIVE — SIGNIFICANT CHANGE UP
SODIUM SERPL-SCNC: 140 MMOL/L — SIGNIFICANT CHANGE UP (ref 135–145)
SODIUM SERPL-SCNC: 140 MMOL/L — SIGNIFICANT CHANGE UP (ref 135–145)
WBC # BLD: 7.75 K/UL — SIGNIFICANT CHANGE UP (ref 3.8–10.5)
WBC # BLD: 8.68 K/UL — SIGNIFICANT CHANGE UP (ref 3.8–10.5)
WBC # FLD AUTO: 7.75 K/UL — SIGNIFICANT CHANGE UP (ref 3.8–10.5)
WBC # FLD AUTO: 8.68 K/UL — SIGNIFICANT CHANGE UP (ref 3.8–10.5)

## 2021-03-29 PROCEDURE — 99231 SBSQ HOSP IP/OBS SF/LOW 25: CPT

## 2021-03-29 RX ORDER — MAGNESIUM SULFATE 500 MG/ML
2 VIAL (ML) INJECTION ONCE
Refills: 0 | Status: COMPLETED | OUTPATIENT
Start: 2021-03-29 | End: 2021-03-29

## 2021-03-29 RX ORDER — INSULIN LISPRO 100/ML
VIAL (ML) SUBCUTANEOUS
Refills: 0 | Status: DISCONTINUED | OUTPATIENT
Start: 2021-03-29 | End: 2021-03-30

## 2021-03-29 RX ORDER — POTASSIUM CHLORIDE 20 MEQ
10 PACKET (EA) ORAL
Refills: 0 | Status: COMPLETED | OUTPATIENT
Start: 2021-03-29 | End: 2021-03-29

## 2021-03-29 RX ORDER — HEPARIN SODIUM 5000 [USP'U]/ML
5000 INJECTION INTRAVENOUS; SUBCUTANEOUS EVERY 8 HOURS
Refills: 0 | Status: DISCONTINUED | OUTPATIENT
Start: 2021-03-29 | End: 2021-03-30

## 2021-03-29 RX ORDER — POTASSIUM PHOSPHATE, MONOBASIC POTASSIUM PHOSPHATE, DIBASIC 236; 224 MG/ML; MG/ML
30 INJECTION, SOLUTION INTRAVENOUS ONCE
Refills: 0 | Status: COMPLETED | OUTPATIENT
Start: 2021-03-29 | End: 2021-03-29

## 2021-03-29 RX ORDER — INSULIN LISPRO 100/ML
VIAL (ML) SUBCUTANEOUS AT BEDTIME
Refills: 0 | Status: DISCONTINUED | OUTPATIENT
Start: 2021-03-29 | End: 2021-03-30

## 2021-03-29 RX ORDER — LEVOTHYROXINE SODIUM 125 MCG
200 TABLET ORAL DAILY
Refills: 0 | Status: DISCONTINUED | OUTPATIENT
Start: 2021-03-29 | End: 2021-04-04

## 2021-03-29 RX ADMIN — PANTOPRAZOLE SODIUM 40 MILLIGRAM(S): 20 TABLET, DELAYED RELEASE ORAL at 05:00

## 2021-03-29 RX ADMIN — Medication 100 MILLIEQUIVALENT(S): at 20:46

## 2021-03-29 RX ADMIN — Medication 100 MILLIEQUIVALENT(S): at 18:00

## 2021-03-29 RX ADMIN — Medication 100 MILLIEQUIVALENT(S): at 19:00

## 2021-03-29 RX ADMIN — POTASSIUM PHOSPHATE, MONOBASIC POTASSIUM PHOSPHATE, DIBASIC 83.33 MILLIMOLE(S): 236; 224 INJECTION, SOLUTION INTRAVENOUS at 11:43

## 2021-03-29 RX ADMIN — Medication 50 GRAM(S): at 09:08

## 2021-03-29 RX ADMIN — HEPARIN SODIUM 5000 UNIT(S): 5000 INJECTION INTRAVENOUS; SUBCUTANEOUS at 13:42

## 2021-03-29 RX ADMIN — HEPARIN SODIUM 5000 UNIT(S): 5000 INJECTION INTRAVENOUS; SUBCUTANEOUS at 22:26

## 2021-03-29 NOTE — PROGRESS NOTE ADULT - ASSESSMENT
65F hx multiple admissions for pandiverticulosis, herniated lumbar disc (s/p plate), SOLOMON, prediabetes (on oral agent) pw BRBPR cf recurrent diverticular bleed and CTA showing active extravasation at the hepatic flexure sp 4uPRBCs s/p IR angiogram (3/28). No active bleeding found in SMA or middle colic artery and its branches. No embolization was performed.  - Pt stable following angio yesterday in IR  - Rt groin puncture site stable  - Monitor for rectal bleeding    [id number]

## 2021-03-29 NOTE — PROGRESS NOTE ADULT - SUBJECTIVE AND OBJECTIVE BOX
65y Female s/p diverticular bleed angio on 3/28/21 in Interventional Radiology.     Patient seen and examined bedside resting comfortably. No complaints offered. No episodes of bleeding overnight.    T(F): 98.2 (03-29-21 @ 06:00), Max: 98.8 (03-28-21 @ 16:20)  HR: 88 (03-29-21 @ 06:00) (75 - 99)  BP: 158/87 (03-29-21 @ 06:00) (97/72 - 158/87)  RR: 16 (03-29-21 @ 06:00) (15 - 22)  SpO2: 100% (03-29-21 @ 06:00) (95% - 100%)  Wt(kg): --    LABS:                        9.4    7.75  )-----------( 111      ( 29 Mar 2021 07:12 )             28.4     03-29    140  |  106  |  17  ----------------------------<  108<H>  3.3<L>   |  24  |  1.04    Ca    7.6<L>      29 Mar 2021 07:12  Phos  2.0     03-29  Mg     1.8     03-29        I&O's Detail    28 Mar 2021 07:01  -  29 Mar 2021 07:00  --------------------------------------------------------  IN:    Lactated Ringers: 1680 mL  Total IN: 1680 mL    OUT:    Indwelling Catheter - Urethral (mL): 2225 mL    Oral Fluid: 0 mL  Total OUT: 2225 mL    Total NET: -545 mL      29 Mar 2021 07:01  -  29 Mar 2021 09:34  --------------------------------------------------------  IN:  Total IN: 0 mL    OUT:    Indwelling Catheter - Urethral (mL): 550 mL  Total OUT: 550 mL    Total NET: -550 mL      PHYSICAL EXAM:  General: Nontoxic, in NAD  Rt groin puncture site without hematoma

## 2021-03-29 NOTE — CHART NOTE - NSCHARTNOTEFT_GEN_A_CORE
-------------------------------------------------  Interventional Radiology Event Note  -------------------------------------------------    Procedure: mesenteric angiogram    Contacted by: surgery resident Asa    Event Description: Called by surgery to request repeat mesenteric angiogram. Patient with ongoing GI bleeding, POD#1 from negative mesenteric angiogram after CTA 3 days prior had showed active bleeding from a transverse colon/hepatic flexure diverticulum. Patient with 4 bloody bowel movements today and drop in Hb by >2g.Repeat angiogram requested to spare total colectomy which per surgery resident would be the next step.    Review of chart shows patient currently hemodynamically stable on surgical floor.    Vitals    T(F): 98.4 (29 Mar 2021 22:30), Max: 98.9 (29 Mar 2021 17:25)  HR: 92 (29 Mar 2021 22:30) (79 - 99)  BP: 106/62 (29 Mar 2021 22:30) (74/50 - 158/87)  RR: 17 (29 Mar 2021 22:30) (16 - 18)  SpO2: 96% (29 Mar 2021 22:30) (96% - 100%)    Recommendations:  -would plan to perform repeat angiogram while patient is actively bleeding  -agreed with surgery to defer angiogram to when more definitive evidence of active bleeding is present--while patient clearly was bleeding as recently as several hours ago it is unclear if patient is actively bleeding right now  -per surgery resident they plan to contact GI for potential colonoscopy and will recontact IR tonight if patient develops clinical evidence of active bleeding (acute drop in BP, acute tachycardia, large BRBPR)  -above discussed with surgery resident Asa  -Please call interventional radiology at [phone] during normal business hours, or [phone] during call hours and weekends with any questions, concerns or     Tramaine Baptiste MD, RPVI  Chief Resident, Interventional Radiology  [facility]: [phone]  [facility]: [phone]

## 2021-03-29 NOTE — PROGRESS NOTE ADULT - SUBJECTIVE AND OBJECTIVE BOX
Surgery Progress Note    SUBJECTIVE: No acute events overnight. Over 24hrs, pt sp IR angiogram. Pt seen and examined at bedside. Patient comfortable. No nausea, vomiting, diarrhea. Denies pain.    Vital Signs Last 24 Hrs  T(C): 36.7 (28 Mar 2021 21:45), Max: 37.1 (28 Mar 2021 16:20)  T(F): 98 (28 Mar 2021 21:45), Max: 98.8 (28 Mar 2021 16:20)  HR: 76 (28 Mar 2021 21:45) (67 - 98)  BP: 142/85 (28 Mar 2021 21:45) (97/72 - 155/75)  BP(mean): 95 (28 Mar 2021 18:30) (78 - 96)  RR: 18 (28 Mar 2021 21:45) (15 - 22)  SpO2: 98% (28 Mar 2021 21:45) (95% - 100%)    Physical Exam:  General Appearance: Appears well, in no acute distress, awake, alert, and oriented x3.  Respiratory: No labored breathing  Abdomen: Soft, nontender, nondistended w/o rebound tenderness or guarding. R groin dressing c/d/i.  Extremities: Warm and well perfused, moving spontaneously    LABS:                        10.1   10.32 )-----------( 94       ( 28 Mar 2021 19:15 )             29.8     03-28    141  |  106  |  16  ----------------------------<  138<H>  3.3<L>   |  24  |  1.02    Ca    7.5<L>      28 Mar 2021 13:38  Phos  1.9     03-28  Mg     2.1     03-28            INs and OUTs:    03-27-21 @ 07:01  -  03-28-21 @ 07:00  --------------------------------------------------------  IN: 2370 mL / OUT: 2750 mL / NET: -380 mL    03-28-21 @ 07:01  -  03-29-21 @ 00:05  --------------------------------------------------------  IN: 720 mL / OUT: 1625 mL / NET: -905 mL        Medications:  MEDICATIONS  (STANDING):  dextrose 50% Injectable 25 Gram(s) IV Push once  dextrose 50% Injectable 12.5 Gram(s) IV Push once  dextrose 50% Injectable 25 Gram(s) IV Push once  glucagon  Injectable 1 milliGRAM(s) IntraMuscular once  influenza   Vaccine 0.5 milliLiter(s) IntraMuscular once  insulin lispro (ADMELOG) corrective regimen sliding scale   SubCutaneous every 6 hours  lactated ringers. 1000 milliLiter(s) (120 mL/Hr) IV Continuous <Continuous>  levothyroxine Injectable 140 MICROGram(s) IV Push at bedtime  pantoprazole  Injectable 40 milliGRAM(s) IV Push two times a day    MEDICATIONS  (PRN):   Surgery Progress Note    SUBJECTIVE: No acute events overnight. Over 24hrs, pt sp IR angiogram. Pt seen and examined at bedside. Patient comfortable. No nausea, vomiting, diarrhea. Denies pain.    Vital Signs Last 24 Hrs  T(C): 36.8 (29 Mar 2021 06:00), Max: 37.1 (28 Mar 2021 16:20)  T(F): 98.2 (29 Mar 2021 06:00), Max: 98.8 (28 Mar 2021 16:20)  HR: 88 (29 Mar 2021 06:00) (75 - 99)  BP: 158/87 (29 Mar 2021 06:00) (97/72 - 158/87)  BP(mean): 95 (28 Mar 2021 18:30) (78 - 96)  RR: 16 (29 Mar 2021 06:00) (15 - 22)  SpO2: 100% (29 Mar 2021 06:00) (95% - 100%)    Physical Exam:  General Appearance: Appears well, in no acute distress, awake, alert, and oriented x3.  Respiratory: No labored breathing  Abdomen: Soft, nontender, nondistended w/o rebound tenderness or guarding. R groin dressing c/d/i.  Extremities: Warm and well perfused, moving spontaneously    LABS:                               9.4    7.75  )-----------( 111      ( 29 Mar 2021 07:12 )             28.4       03-28    141  |  106  |  16  ----------------------------<  138<H>  3.3<L>   |  24  |  1.02    Ca    7.5<L>      28 Mar 2021 13:38  Phos  1.9     03-28  Mg     2.1     03-28                  CAPILLARY BLOOD GLUCOSE  124 (28 Mar 2021 12:32)      POCT Blood Glucose.: 116 mg/dL (29 Mar 2021 07:01)      INs and OUTs:    28 Mar 2021 07:01  -  29 Mar 2021 07:00  --------------------------------------------------------  IN:    Lactated Ringers: 1680 mL  Total IN: 1680 mL    OUT:    Indwelling Catheter - Urethral (mL): 2225 mL    Oral Fluid: 0 mL  Total OUT: 2225 mL    Total NET: -545 mL      Medications:  MEDICATIONS  (STANDING):  dextrose 50% Injectable 25 Gram(s) IV Push once  dextrose 50% Injectable 12.5 Gram(s) IV Push once  dextrose 50% Injectable 25 Gram(s) IV Push once  glucagon  Injectable 1 milliGRAM(s) IntraMuscular once  influenza   Vaccine 0.5 milliLiter(s) IntraMuscular once  insulin lispro (ADMELOG) corrective regimen sliding scale   SubCutaneous every 6 hours  lactated ringers. 1000 milliLiter(s) (120 mL/Hr) IV Continuous <Continuous>  levothyroxine Injectable 140 MICROGram(s) IV Push at bedtime  pantoprazole  Injectable 40 milliGRAM(s) IV Push two times a day    MEDICATIONS  (PRN):

## 2021-03-29 NOTE — CHART NOTE - NSCHARTNOTEFT_GEN_A_CORE
Patient seen and examined at approx 19:45, s/p episode of symptomatic hypotension. Patient c/o 4 bloody BMs today, states first 3 episodes in the late afternoon were dark, but the most recent episode a few minutes ago was bright red. At the time she was also feeling lightheaded and subjectively had palpitations. Vitals significant for hypotension to 74/50, with normal heart rate. BP repeated on opposite side and normotensive SBP>120. On my exam pt no longer having lightheadedness or palpitations, no abdominal pain or tenderness.    STAT labs ordered and significant for drop in H/H 9.4/28.4 to 7.0/21.0  2U prbc ordered    IR called and discussed potential to capture pt while she is actively bleeding given that when she had an IR angiogram yesterday, she had not had a bloody BM in>24 hours prior to that procedure.     D/w colorectal fellow  Will continue to monitor closely Patient seen and examined at approx 19:45, s/p episode of symptomatic hypotension. Patient c/o 4 bloody BMs today, states first 3 episodes in the late afternoon were dark, but the most recent episode a few minutes ago was bright red. At the time she was also feeling lightheaded and subjectively had palpitations. Vitals significant for hypotension to 74/50, with normal heart rate. BP repeated on opposite side and normotensive SBP>120. On my exam pt no longer having lightheadedness or palpitations, no abdominal pain or tenderness.    STAT labs ordered and significant for drop in H/H 9.4/28.4 to 7.0/21.0  2U prbc ordered    IR called and discussed potential to capture pt while she is actively bleeding given that when she had an IR angiogram yesterday, she had not had a bloody BM in>24 hours prior to that procedure. IR unsure if timing to re-attempt embolization is optimal right now given hemodynamic stability, lack of bloody BM between 19:45 - now, and high likelihood of another negative angiogram. Recommending GI followup. IR available if pt continues to bleed or becomes hemodynamically unstable.     D/w colorectal fellow  GI called  Will continue to monitor closely Patient seen and examined at approx 19:45, s/p episode of symptomatic hypotension. Patient c/o 4 bloody BMs today, states first 3 episodes in the late afternoon were dark, but the most recent episode a few minutes ago was bright red. At the time she was also feeling lightheaded and subjectively had palpitations. Vitals significant for hypotension to 74/50, with normal heart rate. BP repeated on opposite side and normotensive SBP>120. On my exam pt no longer having lightheadedness or palpitations, no abdominal pain or tenderness.    STAT labs ordered and significant for drop in H/H 9.4/28.4 to 7.0/21.0  2U prbc ordered    IR called and discussed potential to capture pt while she is actively bleeding given that when she had an IR angiogram yesterday, she had not had a bloody BM in>24 hours prior to that procedure. IR unsure if timing to re-attempt embolization is optimal right now given hemodynamic stability, lack of bloody BM between 19:45 - now, and high likelihood of another negative angiogram. Recommending GI followup. IR available if pt continues to bleed or becomes hemodynamically unstable.     D/w colorectal fellow  GI called: Will not perform endoscopy overnight, do not recommend initiating prep now, but suggest keeping pt NPO, and will discuss with day team who will evaluate in AM, and possibly plan for rapid prep and C-scope tomorrow afternoon.   Will continue to monitor closely

## 2021-03-29 NOTE — PROGRESS NOTE ADULT - ASSESSMENT
65F hx multiple admissions for pandiverticulosis, herniated lumbar disc (s/p plate), SOLOMON, prediabetes (on oral agent) pw BRBPR cf recurrent diverticular bleed and CTA showing active extravasation at the hepatic flexure sp 4uPRBCs who is now several hours post-op from an IR angiogram (3/28). No active bleeding found in SMA or middle colic artery and its branches. No embolization was performed. Patient received 2uPRBCs. Hg responded appropriately. Recovering appropriately on floors.    Plan:  - Diet: NPO  - post-transfusion CBC: Hg 10.1 < 8.1  - Appreciate GI consult: no plan for intervention if patient HD stable  - Monitor for bloody bowel movements  - Strict I&Os  - Hold DVT ppx   - Continue home synthroid and ppi IV, restart other home meds as able    Surgery Team A  [id number] 65F hx multiple admissions for pandiverticulosis, herniated lumbar disc (s/p plate), SOLOMON, prediabetes (on oral agent) pw BRBPR cf recurrent diverticular bleed and CTA showing active extravasation at the hepatic flexure sp 4uPRBCs who is now several hours post-op from an IR angiogram (3/28). No active bleeding found in SMA or middle colic artery and its branches. No embolization was performed. Patient received 2uPRBCs. Hg responded appropriately. Recovering appropriately on floors.    Plan:  - Diet: NPO advance to [facility] today  - post-transfusion CBC: Hg 10.1 <- 8.1  - F/U with AM labs  - Appreciate GI consult: no plan for intervention if patient HD stable  - Monitor for bloody bowel movements  - Strict I&Os  - Hold DVT ppx   - Continue home synthroid and ppi IV, restart other home meds as able    Surgery Team A  [id number] 65F hx multiple admissions for pandiverticulosis, herniated lumbar disc (s/p plate), SOLOMON, prediabetes (on oral agent) pw BRBPR cf recurrent diverticular bleed and CTA showing active extravasation at the hepatic flexure sp 4uPRBCs who is now several hours post-op from an IR angiogram (3/28). No active bleeding found in SMA or middle colic artery and its branches. No embolization was performed. Patient received 2uPRBCs. Hg responded appropriately. Recovering appropriately on floors.    Plan:  - Diet: NPO advance to [facility] today pending eval H/H  - post-transfusion CBC: Hg 10.1 <- 8.1  - F/U with AM labs  - Appreciate GI consult: no plan for intervention if patient HD stable  - Monitor for bloody bowel movements  - Strict I&Os  - Hold DVT ppx   - Continue home synthroid and ppi IV, restart other home meds as able    Surgery Team A  [id number] 65F hx multiple admissions for pandiverticulosis, herniated lumbar disc (s/p plate), SOLOMON, prediabetes (on oral agent) pw BRBPR cf recurrent diverticular bleed and CTA showing active extravasation at the hepatic flexure sp 4uPRBCs who is now several hours post-op from an IR angiogram (3/28). No active bleeding found in SMA or middle colic artery and its branches. No embolization was performed. Patient received 2uPRBCs. Hg responded appropriately. Recovering appropriately on floors.    Plan:  - Diet: NPO advance to [facility] today pending eval H/H  - post-transfusion CBC: Hg 10.1 <- 8.1  - F/U with AM labs  - D/C Simpson and then TOV  - Appreciate GI consult: no plan for intervention if patient HD stable  - Monitor for bloody bowel movements  - Strict I&Os  - Hold DVT ppx will consider restart pending H/H  - Continue home synthroid and ppi IV, restart other home meds as able    Surgery Team A  [id number]

## 2021-03-30 LAB
ANION GAP SERPL CALC-SCNC: 9 MMOL/L — SIGNIFICANT CHANGE UP (ref 7–14)
BLD GP AB SCN SERPL QL: NEGATIVE — SIGNIFICANT CHANGE UP
BUN SERPL-MCNC: 18 MG/DL — SIGNIFICANT CHANGE UP (ref 7–23)
CALCIUM SERPL-MCNC: 7.3 MG/DL — LOW (ref 8.4–10.5)
CHLORIDE SERPL-SCNC: 106 MMOL/L — SIGNIFICANT CHANGE UP (ref 98–107)
CO2 SERPL-SCNC: 25 MMOL/L — SIGNIFICANT CHANGE UP (ref 22–31)
CREAT SERPL-MCNC: 1.13 MG/DL — SIGNIFICANT CHANGE UP (ref 0.5–1.3)
GLUCOSE BLDC GLUCOMTR-MCNC: 100 MG/DL — HIGH (ref 70–99)
GLUCOSE BLDC GLUCOMTR-MCNC: 123 MG/DL — HIGH (ref 70–99)
GLUCOSE BLDC GLUCOMTR-MCNC: 140 MG/DL — HIGH (ref 70–99)
GLUCOSE BLDC GLUCOMTR-MCNC: 151 MG/DL — HIGH (ref 70–99)
GLUCOSE SERPL-MCNC: 146 MG/DL — HIGH (ref 70–99)
HCT VFR BLD CALC: 27.1 % — LOW (ref 34.5–45)
HCT VFR BLD CALC: 28.5 % — LOW (ref 34.5–45)
HGB BLD-MCNC: 9.2 G/DL — LOW (ref 11.5–15.5)
HGB BLD-MCNC: 9.7 G/DL — LOW (ref 11.5–15.5)
MAGNESIUM SERPL-MCNC: 1.9 MG/DL — SIGNIFICANT CHANGE UP (ref 1.6–2.6)
MCHC RBC-ENTMCNC: 29.7 PG — SIGNIFICANT CHANGE UP (ref 27–34)
MCHC RBC-ENTMCNC: 29.8 PG — SIGNIFICANT CHANGE UP (ref 27–34)
MCHC RBC-ENTMCNC: 33.9 GM/DL — SIGNIFICANT CHANGE UP (ref 32–36)
MCHC RBC-ENTMCNC: 34 GM/DL — SIGNIFICANT CHANGE UP (ref 32–36)
MCV RBC AUTO: 87.4 FL — SIGNIFICANT CHANGE UP (ref 80–100)
MCV RBC AUTO: 87.4 FL — SIGNIFICANT CHANGE UP (ref 80–100)
NRBC # BLD: 0 /100 WBCS — SIGNIFICANT CHANGE UP
NRBC # BLD: 0 /100 WBCS — SIGNIFICANT CHANGE UP
NRBC # FLD: 0 K/UL — SIGNIFICANT CHANGE UP
NRBC # FLD: 0 K/UL — SIGNIFICANT CHANGE UP
PHOSPHATE SERPL-MCNC: 2.1 MG/DL — LOW (ref 2.5–4.5)
PLATELET # BLD AUTO: 100 K/UL — LOW (ref 150–400)
PLATELET # BLD AUTO: 81 K/UL — LOW (ref 150–400)
POTASSIUM SERPL-MCNC: 3.4 MMOL/L — LOW (ref 3.5–5.3)
POTASSIUM SERPL-SCNC: 3.4 MMOL/L — LOW (ref 3.5–5.3)
RBC # BLD: 3.1 M/UL — LOW (ref 3.8–5.2)
RBC # BLD: 3.26 M/UL — LOW (ref 3.8–5.2)
RBC # FLD: 14.7 % — HIGH (ref 10.3–14.5)
RBC # FLD: 14.8 % — HIGH (ref 10.3–14.5)
RH IG SCN BLD-IMP: POSITIVE — SIGNIFICANT CHANGE UP
SARS-COV-2 RNA SPEC QL NAA+PROBE: SIGNIFICANT CHANGE UP
SODIUM SERPL-SCNC: 140 MMOL/L — SIGNIFICANT CHANGE UP (ref 135–145)
WBC # BLD: 7.32 K/UL — SIGNIFICANT CHANGE UP (ref 3.8–10.5)
WBC # BLD: 8.02 K/UL — SIGNIFICANT CHANGE UP (ref 3.8–10.5)
WBC # FLD AUTO: 7.32 K/UL — SIGNIFICANT CHANGE UP (ref 3.8–10.5)
WBC # FLD AUTO: 8.02 K/UL — SIGNIFICANT CHANGE UP (ref 3.8–10.5)

## 2021-03-30 PROCEDURE — 99232 SBSQ HOSP IP/OBS MODERATE 35: CPT | Mod: GC

## 2021-03-30 PROCEDURE — 99222 1ST HOSP IP/OBS MODERATE 55: CPT | Mod: 25

## 2021-03-30 RX ORDER — SOD SULF/SODIUM/NAHCO3/KCL/PEG
1000 SOLUTION, RECONSTITUTED, ORAL ORAL ONCE
Refills: 0 | Status: COMPLETED | OUTPATIENT
Start: 2021-03-31 | End: 2021-03-31

## 2021-03-30 RX ORDER — INSULIN LISPRO 100/ML
VIAL (ML) SUBCUTANEOUS EVERY 6 HOURS
Refills: 0 | Status: DISCONTINUED | OUTPATIENT
Start: 2021-03-30 | End: 2021-04-02

## 2021-03-30 RX ORDER — SOD SULF/SODIUM/NAHCO3/KCL/PEG
1000 SOLUTION, RECONSTITUTED, ORAL ORAL ONCE
Refills: 0 | Status: COMPLETED | OUTPATIENT
Start: 2021-03-30 | End: 2021-03-30

## 2021-03-30 RX ORDER — SODIUM CHLORIDE 9 MG/ML
1000 INJECTION, SOLUTION INTRAVENOUS
Refills: 0 | Status: DISCONTINUED | OUTPATIENT
Start: 2021-03-30 | End: 2021-04-01

## 2021-03-30 RX ORDER — POTASSIUM PHOSPHATE, MONOBASIC POTASSIUM PHOSPHATE, DIBASIC 236; 224 MG/ML; MG/ML
30 INJECTION, SOLUTION INTRAVENOUS ONCE
Refills: 0 | Status: COMPLETED | OUTPATIENT
Start: 2021-03-30 | End: 2021-03-30

## 2021-03-30 RX ORDER — POTASSIUM CHLORIDE 20 MEQ
10 PACKET (EA) ORAL
Refills: 0 | Status: COMPLETED | OUTPATIENT
Start: 2021-03-30 | End: 2021-03-30

## 2021-03-30 RX ADMIN — POTASSIUM PHOSPHATE, MONOBASIC POTASSIUM PHOSPHATE, DIBASIC 83.33 MILLIMOLE(S): 236; 224 INJECTION, SOLUTION INTRAVENOUS at 15:54

## 2021-03-30 RX ADMIN — Medication 100 MILLIEQUIVALENT(S): at 19:33

## 2021-03-30 RX ADMIN — Medication 100 MILLIEQUIVALENT(S): at 15:54

## 2021-03-30 RX ADMIN — SODIUM CHLORIDE 140 MILLILITER(S): 9 INJECTION, SOLUTION INTRAVENOUS at 15:55

## 2021-03-30 RX ADMIN — Medication 1000 MILLILITER(S): at 23:47

## 2021-03-30 RX ADMIN — Medication 100 MILLIEQUIVALENT(S): at 17:23

## 2021-03-30 NOTE — PROGRESS NOTE ADULT - ASSESSMENT
65F hx multiple admissions for pandiverticulosis, herniated lumbar disc (s/p plate), SOLOMON, prediabetes (on oral agent) pw BRBPR cf recurrent diverticular bleed and CTA showing active extravasation at the hepatic flexure sp IR angiogram (3/28). No active bleeding found in SMA or middle colic artery and its branches. No embolization was performed. Pt with conintued bloody bowel movements and non-responsive to multiple transfusions.  SICU consulted for hemodynamic monitoring in the setting of GI bleed    NEURO:  Pain well controlled, can give tylenol PRN    RESP:  Satting well on RA, No active issues    CV:  Hypotensive o/n but currently HDS  Holding home ramipril    GI  NPO for IR procedure tomorrow  Moviprep if patient needs operative intervention    :  - LR @ 140  - Monitor urine output, replete lytes as needed    Endo  SSI    Heme:  Holding AC in setting of GIB  Transfuse as needed for Hgb<7    ID:  No need for Abx at this time    DISPO/Lines  SICU 65F hx multiple admissions for pandiverticulosis, herniated lumbar disc (s/p plate), SOLOMON, prediabetes (on oral agent) pw BRBPR cf recurrent diverticular bleed and CTA showing active extravasation at the hepatic flexure sp IR angiogram (3/28). No active bleeding found in SMA or middle colic artery and its branches. No embolization was performed. Pt with conintued bloody bowel movements and non-responsive to multiple transfusions.  SICU consulted for hemodynamic monitoring in the setting of GI bleed    - Will accept and transfer pt to SICU    NEURO:  Pain well controlled, can give tylenol PRN    RESP:  Satting well on RA, No active issues    CV:  Hypotensive o/n but currently HDS  Holding home ramipril    GI  NPO for IR procedure tomorrow  Moviprep if patient needs operative intervention    :  - LR @ 140  - Monitor urine output, replete lytes as needed    Endo  SSI    Heme:  Holding AC in setting of GIB  Transfuse as needed for Hgb<7    ID:  No need for Abx at this time    DISPO/Lines  SICU

## 2021-03-30 NOTE — PROGRESS NOTE ADULT - SUBJECTIVE AND OBJECTIVE BOX
SICU Daily Progress Note/ Reconsult Note  =====================================================  24 HR Events:  -  pt continues to have bloody BMs  - In last 24 hrs: pt received 3UpRBCs, 1 plt, 1 plasma  - Plan for IR to take gor angiogram tomorrow.  Pt also undergoing bowel prep if IR unable to identify bleed may proceed with operative intervention.      HPI:   65y Female with history of pandiverticular bleeding. Patient found to have active extravasation at the hepatic flexure on CTA, S/p IR angiogram (3/28) without active bleeding found in SMA or middle colic artery and its branches. Given continued clinical concern for bleeding, team would like to consider provocative angiogram.        MEDICATIONS:   --------------------------------------------------------------------------------------  Neurologic Medications    Respiratory Medications    Cardiovascular Medications    Gastrointestinal Medications  lactated ringers. 1000 milliLiter(s) IV Continuous <Continuous>  polyethylene glycol/electrolyte Solution 1000 milliLiter(s) Oral once  potassium chloride  10 mEq/100 mL IVPB 10 milliEquivalent(s) IV Intermittent every 1 hour    Genitourinary Medications    Hematologic/Oncologic Medications  influenza   Vaccine 0.5 milliLiter(s) IntraMuscular once    Antimicrobial/Immunologic Medications    Endocrine/Metabolic Medications  dextrose 50% Injectable 25 Gram(s) IV Push once  glucagon  Injectable 1 milliGRAM(s) IntraMuscular once  insulin lispro (ADMELOG) corrective regimen sliding scale   SubCutaneous every 6 hours  levothyroxine 200 MICROGram(s) Oral daily    Topical/Other Medications    --------------------------------------------------------------------------------------  Vital Signs  T(C): 36.8 (03-30-21 @ 14:58), Max: 37.2 (03-29-21 @ 17:25)  T(F): 98.2 (03-30-21 @ 14:58), Max: 98.9 (03-29-21 @ 17:25)  HR: 87 (03-30-21 @ 14:58) (75 - 92)  BP: 118/70 (03-30-21 @ 14:58) (74/50 - 149/84)  RR: 16 (03-30-21 @ 14:58) (16 - 18)  SpO2: 96% (03-30-21 @ 14:58) (95% - 100%)      29 Mar 2021 07:01  -  30 Mar 2021 07:00  --------------------------------------------------------  IN:    IV PiggyBack: 800 mL    Lactated Ringers: 100 mL    PRBCs (Packed Red Blood Cells): 600 mL  Total IN: 1500 mL    OUT:    Indwelling Catheter - Urethral (mL): 550 mL    Voided (mL): 1700 mL  Total OUT: 2250 mL    Total NET: -750 mL          --------------------------------------------------------------------------------------    EXAM  NEUROLOGY  Exam: Normal, NAD, alert, oriented x3, no focal deficits.    HEENT  Exam: Normocephalic, atraumatic, EOMI.     RESPIRATORY  Exam: Lungs clear to auscultation, Normal expansion/effort.     CARDIOVASCULAR  Exam: S1, S2.  Regular rate and rhythm.       GI/NUTRITION  Exam: Abdomen soft, Non-tender, Non-distended.  Pt continues to have bloody bowel movements    VASCULAR  Exam: Extremities warm, pink, well-perfused.    MUSCULOSKELETAL  Exam: All extremities moving spontaneously without limitations.    SKIN  Exam: Good skin turgor, no skin breakdown.     METABOLIC/FLUIDS/ELECTROLYTES  lactated ringers. 1000 milliLiter(s) IV Continuous <Continuous>  potassium chloride  10 mEq/100 mL IVPB 10 milliEquivalent(s) IV Intermittent every 1 hour      HEMATOLOGIC  [x] VTE Prophylaxis:       LABS  --------------------------------------------------------------------------------------      LABS:                          9.2    7.32  )-----------( 81       ( 30 Mar 2021 15:55 )             27.1     03-30    140  |  106  |  18  ----------------------------<  146<H>  3.4<L>   |  25  |  1.13    Ca    7.3<L>      30 Mar 2021 08:06  Phos  2.1     03-30  Mg     1.9     03-30      PT/INR - ( 29 Mar 2021 20:52 )   PT: 14.1 sec;   INR: 1.24 ratio         PTT - ( 29 Mar 2021 20:52 )  PTT:28.2 sec          --------------------------------------------------------------------------------------     SICU Daily Progress Note/ Reconsult Note  =====================================================  24 HR Events:  -  pt continues to have bloody BMs and was hypotensive o/n  - In last 24 hrs: pt received 3UpRBCs, 1 plt, 1 plasma  - Plan for IR to take gor angiogram tomorrow.  Pt also undergoing bowel prep if IR unable to identify bleed may proceed with operative intervention.      HPI:   65y Female with history of pandiverticular bleeding. Patient found to have active extravasation at the hepatic flexure on CTA, S/p IR angiogram (3/28) without active bleeding found in SMA or middle colic artery and its branches. Given continued clinical concern for bleeding, team would like to consider provocative angiogram.        MEDICATIONS:   --------------------------------------------------------------------------------------  Neurologic Medications    Respiratory Medications    Cardiovascular Medications    Gastrointestinal Medications  lactated ringers. 1000 milliLiter(s) IV Continuous <Continuous>  polyethylene glycol/electrolyte Solution 1000 milliLiter(s) Oral once  potassium chloride  10 mEq/100 mL IVPB 10 milliEquivalent(s) IV Intermittent every 1 hour    Genitourinary Medications    Hematologic/Oncologic Medications  influenza   Vaccine 0.5 milliLiter(s) IntraMuscular once    Antimicrobial/Immunologic Medications    Endocrine/Metabolic Medications  dextrose 50% Injectable 25 Gram(s) IV Push once  glucagon  Injectable 1 milliGRAM(s) IntraMuscular once  insulin lispro (ADMELOG) corrective regimen sliding scale   SubCutaneous every 6 hours  levothyroxine 200 MICROGram(s) Oral daily    Topical/Other Medications    --------------------------------------------------------------------------------------  Vital Signs  T(C): 36.8 (03-30-21 @ 14:58), Max: 37.2 (03-29-21 @ 17:25)  T(F): 98.2 (03-30-21 @ 14:58), Max: 98.9 (03-29-21 @ 17:25)  HR: 87 (03-30-21 @ 14:58) (75 - 92)  BP: 118/70 (03-30-21 @ 14:58) (74/50 - 149/84)  RR: 16 (03-30-21 @ 14:58) (16 - 18)  SpO2: 96% (03-30-21 @ 14:58) (95% - 100%)      29 Mar 2021 07:01  -  30 Mar 2021 07:00  --------------------------------------------------------  IN:    IV PiggyBack: 800 mL    Lactated Ringers: 100 mL    PRBCs (Packed Red Blood Cells): 600 mL  Total IN: 1500 mL    OUT:    Indwelling Catheter - Urethral (mL): 550 mL    Voided (mL): 1700 mL  Total OUT: 2250 mL    Total NET: -750 mL          --------------------------------------------------------------------------------------    EXAM  NEUROLOGY  Exam: Normal, NAD, alert, oriented x3, no focal deficits.    HEENT  Exam: Normocephalic, atraumatic, EOMI.     RESPIRATORY  Exam: Lungs clear to auscultation, Normal expansion/effort.     CARDIOVASCULAR  Exam: S1, S2.  Regular rate and rhythm.       GI/NUTRITION  Exam: Abdomen soft, Non-tender, Non-distended.  Pt continues to have bloody bowel movements    VASCULAR  Exam: Extremities warm, pink, well-perfused.    MUSCULOSKELETAL  Exam: All extremities moving spontaneously without limitations.    SKIN  Exam: Good skin turgor, no skin breakdown.     METABOLIC/FLUIDS/ELECTROLYTES  lactated ringers. 1000 milliLiter(s) IV Continuous <Continuous>  potassium chloride  10 mEq/100 mL IVPB 10 milliEquivalent(s) IV Intermittent every 1 hour      HEMATOLOGIC  [x] VTE Prophylaxis:       LABS  --------------------------------------------------------------------------------------      LABS:                          9.2    7.32  )-----------( 81       ( 30 Mar 2021 15:55 )             27.1     03-30    140  |  106  |  18  ----------------------------<  146<H>  3.4<L>   |  25  |  1.13    Ca    7.3<L>      30 Mar 2021 08:06  Phos  2.1     03-30  Mg     1.9     03-30      PT/INR - ( 29 Mar 2021 20:52 )   PT: 14.1 sec;   INR: 1.24 ratio         PTT - ( 29 Mar 2021 20:52 )  PTT:28.2 sec          --------------------------------------------------------------------------------------

## 2021-03-30 NOTE — PROGRESS NOTE ADULT - ASSESSMENT
65F hx multiple admissions for pandiverticulosis, herniated lumbar disc (s/p plate), SOLOMON, prediabetes (on oral agent) pw BRBPR cf recurrent diverticular bleed and CTA showing active extravasation at the hepatic flexure sp 4uPRBCs who is now several hours post-op from an IR angiogram (3/28). No active bleeding found in SMA or middle colic artery and its branches. No embolization was performed. Hypotensive to 70s, sp 2uPRBCs (3/29) overnight..    Plan:  - Diet: NPO for potential procedure  - F/U with AM labs  - Appreciate GI consult: no plan for intervention if patient HD stable  - Possible cscope in AM  - Monitor for bloody bowel movements  - Strict I&Os  - Hold DVT ppx will consider restart pending H/H  - Continue home synthroid and ppi IV, restart other home meds as able    Surgery Team A  [id number]   65F hx multiple admissions for pandiverticulosis, herniated lumbar disc (s/p plate), SOLOMON, prediabetes (on oral agent) pw BRBPR cf recurrent diverticular bleed and CTA showing active extravasation at the hepatic flexure sp 4uPRBCs who is now several hours post-op from an IR angiogram (3/28). No active bleeding found in SMA or middle colic artery and its branches. No embolization was performed. Hypotensive to 70s, sp 2uPRBCs (3/29) overnight..    Plan:  - Diet: NPO for potential IR procedure  - F/U with AM labs  - Appreciate GI consult: no plan for intervention if patient HD stable  - Possible cscope in AM  - Monitor for bloody bowel movements  - Strict I&Os  - Hold DVT ppx will consider restart pending H/H  - Continue home synthroid and ppi IV, restart other home meds as able    Surgery Team A  [id number]

## 2021-03-30 NOTE — PROGRESS NOTE ADULT - SUBJECTIVE AND OBJECTIVE BOX
Interventional Radiology    HPI: 65y Female with history of pandiverticular bleeding. Patient found to have active extravasation at the hepatic flexure on CTA, sp 4uPRBCs. S/p IR angiogram (3/28) without active bleeding found in SMA or middle colic artery and its branches. Given continued clinical concern for bleeding, team would like to consider provocative angiogram.    Allergies: NKDA  Medications (Abx/Cardiac/Anticoagulation/Blood Products)    heparin   Injectable: 5000 Unit(s) SubCutaneous (03-29 @ 22:26)    Data:    T(C): 36.4  HR: 87  BP: 136/58  RR: 18  SpO2: 98%    -WBC 8.02 / HgB 9.7 / Hct 28.5 / Plt 100  -Na 140 / Cl 106 / BUN 18 / Glucose 146  -K 3.4 / CO2 25 / Cr 1.13  -ALT -- / Alk Phos -- / T.Bili --  -INR 1.24 / PTT 28.2      Radiology:   Angiogram 3/28  IMPRESSION:  SUCCESSFUL SUPERIOR MESENTERIC, MIDDLE COLIC ANGIOGRAPHY INCLUDING ITS 3 BRANCHES DEMONSTRATED NO EVIDENCE FOR BLEEDING SOURCE.    Assessment/Plan:   65y Female with history of pandiverticular bleeding. Patient found to have active extravasation at the hepatic flexure on CTA, sp 4uPRBCs. S/p IR angiogram (3/28) without active bleeding found in SMA or middle colic artery and its branches. Given continued clinical concern for bleeding, team would like to consider provocative angiogram.  -- IR will plan to perform provocative angiogram 3/30/21.  -- As patient is currently on the floor on no longer in SICU, recommend having SICU bed available if needed for procedure.  -- Maintain NPO.  -- hold anticoagulation.  -- please maintain COVID PCR within 72 hours of planned procedure.  -- please place IR procedure request order under Dr. Yen. Interventional Radiology    HPI: 65y Female with history of pandiverticular bleeding. Patient found to have active extravasation at the hepatic flexure on CTA, sp 4uPRBCs. S/p IR angiogram (3/28) without active bleeding found in SMA or middle colic artery and its branches. Given continued clinical concern for bleeding, team would like to consider provocative angiogram.    Allergies: NKDA  Medications (Abx/Cardiac/Anticoagulation/Blood Products)    heparin   Injectable: 5000 Unit(s) SubCutaneous (03-29 @ 22:26)    Data:    T(C): 36.4  HR: 87  BP: 136/58  RR: 18  SpO2: 98%    -WBC 8.02 / HgB 9.7 / Hct 28.5 / Plt 100  -Na 140 / Cl 106 / BUN 18 / Glucose 146  -K 3.4 / CO2 25 / Cr 1.13  -ALT -- / Alk Phos -- / T.Bili --  -INR 1.24 / PTT 28.2      Radiology:   Angiogram 3/28  IMPRESSION:  SUCCESSFUL SUPERIOR MESENTERIC, MIDDLE COLIC ANGIOGRAPHY INCLUDING ITS 3 BRANCHES DEMONSTRATED NO EVIDENCE FOR BLEEDING SOURCE.    Assessment/Plan:   65y Female with history of pandiverticular bleeding. Patient found to have active extravasation at the hepatic flexure on CTA, sp 4uPRBCs. S/p IR angiogram (3/28) without active bleeding found in SMA or middle colic artery and its branches. Given continued clinical concern for bleeding, team would like to consider provocative angiogram.  -- IR will plan to perform provocative angiogram 3/31/21.  -- As patient is currently on the floor on no longer in SICU, recommend having SICU bed available if needed for procedure.  -- Please have patient complete bowel prep tonight.  -- Have blood available for transfusion at time of procedure.  -- Maintain NPO.  -- hold anticoagulation.  -- please maintain COVID PCR within 72 hours of planned procedure.  -- please place IR procedure request order under Dr. Yen.

## 2021-03-30 NOTE — PROGRESS NOTE ADULT - SUBJECTIVE AND OBJECTIVE BOX
Chief Complaint:  Patient is a 65y old  Female who presents with a chief complaint of Diverticular Bleed (30 Mar 2021 00:52)      Interval Events:       Hospital Medications:  dextrose 50% Injectable 25 Gram(s) IV Push once  glucagon  Injectable 1 milliGRAM(s) IntraMuscular once  influenza   Vaccine 0.5 milliLiter(s) IntraMuscular once  insulin lispro (ADMELOG) corrective regimen sliding scale   SubCutaneous every 6 hours  lactated ringers. 1000 milliLiter(s) IV Continuous <Continuous>  levothyroxine 200 MICROGram(s) Oral daily      PMHX/PSHX:  Hypothyroidism    Pernicious anemia    Diverticulosis    HTN (hypertension)    Lower gastrointestinal bleeding    Pernicious anemia    SOLOMON (obstructive sleep apnea)    Type 2 diabetes mellitus    Left ureteral stone    Fibroid uterus    Lumbar herniated disc    Multiparity    Heart murmur    No significant past surgical history    History of tonsillectomy    History of back surgery    H/O abdominal hysterectomy    S/P WESTLEY (total abdominal hysterectomy)    S/P lumbar laminectomy            ROS:     General:  No weight loss, fevers, chills, night sweats, fatigue   Eyes:  No vision changes  ENT:  No sore throat, pain, runny nose  CV:  No chest pain, palpitations, dizziness   Resp:  No SOB, cough, wheezing  GI:  See HPI  :  No burning with urination, hematuria  Muscle:  No pain, weakness  Neuro:  No weakness/tingling, memory problems  Psych:  No fatigue, insomnia, mood problems, depression  Heme:  No easy bruisability  Skin:  No rash, edema      PHYSICAL EXAM:     GENERAL:  Well developed, no distress  HEENT:  NC/AT,  conjunctivae clear, sclera anicteric  CHEST:  Full & symmetric excursion, no increased effort w/ respirations  HEART:  Regular rhythm & rate  ABDOMEN:  Soft, non-tender, non-distended  EXTREMITIES:  no LE  edema  SKIN:  No rash/erythema/ecchymoses/petechiae/wounds/jaundice  NEURO:  Alert, oriented    Vital Signs:  Vital Signs Last 24 Hrs  T(C): 36.4 (30 Mar 2021 09:07), Max: 37.2 (29 Mar 2021 17:25)  T(F): 97.6 (30 Mar 2021 09:07), Max: 98.9 (29 Mar 2021 17:25)  HR: 87 (30 Mar 2021 09:07) (75 - 92)  BP: 136/58 (30 Mar 2021 09:07) (74/50 - 153/88)  BP(mean): --  RR: 18 (30 Mar 2021 09:07) (16 - 18)  SpO2: 98% (30 Mar 2021 09:07) (95% - 100%)  Daily     Daily     LABS:                        9.7    8.02  )-----------( 100      ( 30 Mar 2021 08:06 )             28.5     03-30    140  |  106  |  18  ----------------------------<  146<H>  3.4<L>   |  25  |  1.13    Ca    7.3<L>      30 Mar 2021 08:06  Phos  2.1     03-30  Mg     1.9     03-30        PT/INR - ( 29 Mar 2021 20:52 )   PT: 14.1 sec;   INR: 1.24 ratio         PTT - ( 29 Mar 2021 20:52 )  PTT:28.2 sec        Imaging:             Chief Complaint:  Patient is a 65y old  Female who presents with a chief complaint of Diverticular Bleed (30 Mar 2021 00:52)      Interval Events: Angio done without active bleeding on 3/28. Now with recurrent bleeding with multiple bloody BM overnight and 2 so far this morning with drop in hemoglobin.  - denies abd pain, n/v      Hospital Medications:  dextrose 50% Injectable 25 Gram(s) IV Push once  glucagon  Injectable 1 milliGRAM(s) IntraMuscular once  influenza   Vaccine 0.5 milliLiter(s) IntraMuscular once  insulin lispro (ADMELOG) corrective regimen sliding scale   SubCutaneous every 6 hours  lactated ringers. 1000 milliLiter(s) IV Continuous <Continuous>  levothyroxine 200 MICROGram(s) Oral daily      PMHX/PSHX:  Hypothyroidism    Pernicious anemia    Diverticulosis    HTN (hypertension)    Lower gastrointestinal bleeding    Pernicious anemia    SOLOMON (obstructive sleep apnea)    Type 2 diabetes mellitus    Left ureteral stone    Fibroid uterus    Lumbar herniated disc    Multiparity    Heart murmur    No significant past surgical history    History of tonsillectomy    History of back surgery    H/O abdominal hysterectomy    S/P WESTLEY (total abdominal hysterectomy)    S/P lumbar laminectomy            ROS:     General:  No weight loss, fevers, chills, night sweats, fatigue   Eyes:  No vision changes  ENT:  No sore throat, pain, runny nose  CV:  No chest pain, palpitations, dizziness   Resp:  No SOB, cough, wheezing  GI:  See HPI  :  No burning with urination, hematuria  Muscle:  No pain, weakness  Neuro:  No weakness/tingling, memory problems  Psych:  No fatigue, insomnia, mood problems, depression  Heme:  No easy bruisability  Skin:  No rash, edema      PHYSICAL EXAM:     GENERAL:  Well developed, no distress  HEENT:  NC/AT,  conjunctivae clear, sclera anicteric  CHEST:  Full & symmetric excursion, no increased effort w/ respirations  HEART:  Regular rhythm & rate  ABDOMEN:  Soft, non-tender, non-distended  EXTREMITIES:  no LE  edema  SKIN:  No rash/erythema/ecchymoses/petechiae/wounds/jaundice  NEURO:  Alert, oriented    Vital Signs:  Vital Signs Last 24 Hrs  T(C): 36.4 (30 Mar 2021 09:07), Max: 37.2 (29 Mar 2021 17:25)  T(F): 97.6 (30 Mar 2021 09:07), Max: 98.9 (29 Mar 2021 17:25)  HR: 87 (30 Mar 2021 09:07) (75 - 92)  BP: 136/58 (30 Mar 2021 09:07) (74/50 - 153/88)  BP(mean): --  RR: 18 (30 Mar 2021 09:07) (16 - 18)  SpO2: 98% (30 Mar 2021 09:07) (95% - 100%)  Daily     Daily     LABS:                        9.7    8.02  )-----------( 100      ( 30 Mar 2021 08:06 )             28.5     03-30    140  |  106  |  18  ----------------------------<  146<H>  3.4<L>   |  25  |  1.13    Ca    7.3<L>      30 Mar 2021 08:06  Phos  2.1     03-30  Mg     1.9     03-30        PT/INR - ( 29 Mar 2021 20:52 )   PT: 14.1 sec;   INR: 1.24 ratio         PTT - ( 29 Mar 2021 20:52 )  PTT:28.2 sec        Imaging:

## 2021-03-30 NOTE — PROGRESS NOTE ADULT - ASSESSMENT
Impression:  64 yo F w/ PMHx HTN, hypothyroid, GERD and recurrent episodes of hematochezia (attributed to diverticular bleed, last colonoscopy 2019) p/w painless hematochezia, CTA showing active extravasation in proximal transverse colon, course complicated by hypotension    # hematochezia - likely diverticular bleed given previous endoscopic evaluation confirming pandiverticulosis and previous CTA showing active extravasation. Angio done without active bleeding on 3/28. Now with recurrent bleeding with multiple bloody BM overnight and 2 so far this morning with drop in hemoglobin.     Impression:  66 yo F w/ PMHx HTN, hypothyroid, GERD and recurrent episodes of hematochezia (attributed to diverticular bleed, last colonoscopy 2019) p/w painless hematochezia, CTA showing active extravasation in proximal transverse colon, course complicated by hypotension requiring SICU admission - now on floor with recurrent bleeding.    # hematochezia - likely diverticular bleed given previous endoscopic evaluation confirming pandiverticulosis and previous CTA showing active extravasation. Angio done without active bleeding on 3/28. Now with recurrent bleeding with multiple bloody BM overnight and 2 so far this morning with drop in hemoglobin.    Recommendations:  - trend CBC, transfuse prn  - colonoscopy in this setting is low yield especially with rapid prep given pandiverticulosis and active bleeding; very low likelihood of finding culprit diverticulum  - no plans for colonoscopy at this point  - discussed with Dr Beach - agree with IR evaluation      Estrellita Rain PGY-4  Gastroenterology Fellow  Pager #[phone] (GABRIELLE) or [phone] (NS)  Available on [employer] Teams.  Please contact on-call GI fellow via answering service ([phone]) after 5pm and before 8am, and on weekends.

## 2021-03-30 NOTE — PROGRESS NOTE ADULT - SUBJECTIVE AND OBJECTIVE BOX
Surgery Progress Note    SUBJECTIVE: Overnight, pt had 3 episodes of bloody bowel movements (2 dark, 1 bright red). Pt became hypotensive to 74 systolic. On repeat, SBP normalized to 125. Stat CBC showed Hg 7 < 9.4. Patient received 2uPRBCs. Pt seen and examined at bedside. Patient comfortable. No nausea, vomiting. Pain is controlled. +Flatus/+BM.     Vital Signs Last 24 Hrs  T(C): 36.5 (29 Mar 2021 23:15), Max: 37.2 (29 Mar 2021 17:25)  T(F): 97.7 (29 Mar 2021 23:15), Max: 98.9 (29 Mar 2021 17:25)  HR: 90 (29 Mar 2021 23:15) (79 - 99)  BP: 138/72 (29 Mar 2021 23:15) (74/50 - 158/87)  BP(mean): --  RR: 17 (29 Mar 2021 23:15) (16 - 18)  SpO2: 97% (29 Mar 2021 23:15) (96% - 100%)    Physical Exam:  General Appearance: Appears well, in no acute distress, awake, alert, and oriented x3.  Respiratory: No labored breathing  CV: Pulse regularly present  Abdomen: Soft, nontender, nondistended w/o rebound tenderness or guarding.   Extremities: Warm and well perfused, moving spontaneously    LABS:                        7.0    8.68  )-----------( 103      ( 29 Mar 2021 20:52 )             21.0     03-29    140  |  105  |  16  ----------------------------<  158<H>  3.6   |  22  |  1.06    Ca    7.1<L>      29 Mar 2021 20:52  Phos  2.8     03-29  Mg     1.8     03-29      PT/INR - ( 29 Mar 2021 20:52 )   PT: 14.1 sec;   INR: 1.24 ratio         PTT - ( 29 Mar 2021 20:52 )  PTT:28.2 sec      INs and OUTs:    03-28-21 @ 07:01  -  03-29-21 @ 07:00  --------------------------------------------------------  IN: 1680 mL / OUT: 2225 mL / NET: -545 mL    03-29-21 @ 07:01  -  03-30-21 @ 00:52  --------------------------------------------------------  IN: 800 mL / OUT: 1450 mL / NET: -650 mL        Medications:  MEDICATIONS  (STANDING):  dextrose 50% Injectable 25 Gram(s) IV Push once  glucagon  Injectable 1 milliGRAM(s) IntraMuscular once  heparin   Injectable 5000 Unit(s) SubCutaneous every 8 hours  influenza   Vaccine 0.5 milliLiter(s) IntraMuscular once  insulin lispro (ADMELOG) corrective regimen sliding scale   SubCutaneous three times a day before meals  insulin lispro (ADMELOG) corrective regimen sliding scale   SubCutaneous at bedtime  levothyroxine 200 MICROGram(s) Oral daily    MEDICATIONS  (PRN):   Surgery Progress Note    SUBJECTIVE: Overnight, pt had 3 episodes of bloody bowel movements (2 dark, 1 bright red). Pt became hypotensive to 74 systolic. On repeat, SBP normalized to 125. Stat CBC showed Hg 7 < 9.4. Patient received 2uPRBCs. Pt seen and examined at bedside. Patient comfortable. No nausea, vomiting. Pain is controlled. +Flatus/+BM.     Vital Signs Last 24 Hrs  T(C): 36.4 (30 Mar 2021 09:07), Max: 37.2 (29 Mar 2021 17:25)  T(F): 97.6 (30 Mar 2021 09:07), Max: 98.9 (29 Mar 2021 17:25)  HR: 87 (30 Mar 2021 09:07) (75 - 92)  BP: 136/58 (30 Mar 2021 09:07) (74/50 - 153/88)  BP(mean): --  RR: 18 (30 Mar 2021 09:07) (16 - 18)  SpO2: 98% (30 Mar 2021 09:07) (95% - 100%)    Physical Exam:  General Appearance: Appears well, in no acute distress, awake, alert, and oriented x3.  Respiratory: No labored breathing  Abdomen: Soft, nontender, nondistended w/o rebound tenderness or guarding.   Extremities: Warm and well perfused, moving spontaneously    LABS:                            9.7    8.02  )-----------( 100      ( 30 Mar 2021 08:06 )             28.5       03-30    140  |  106  |  18  ----------------------------<  146<H>  3.4<L>   |  25  |  1.13    Ca    7.3<L>      30 Mar 2021 08:06  Phos  2.1     03-30  Mg     1.9     03-30         PT/INR - ( 29 Mar 2021 20:52 )   PT: 14.1 sec;   INR: 1.24 ratio         PTT - ( 29 Mar 2021 20:52 )  PTT:28.2 sec          CAPILLARY BLOOD GLUCOSE      POCT Blood Glucose.: 140 mg/dL (30 Mar 2021 06:26)    INs and OUTs:    29 Mar 2021 07:01  -  30 Mar 2021 07:00  --------------------------------------------------------  IN:    IV PiggyBack: 800 mL    Lactated Ringers: 100 mL    PRBCs (Packed Red Blood Cells): 600 mL  Total IN: 1500 mL    OUT:    Indwelling Catheter - Urethral (mL): 550 mL    Voided (mL): 1700 mL  Total OUT: 2250 mL    Total NET: -750 mL      Medications:  MEDICATIONS  (STANDING):  dextrose 50% Injectable 25 Gram(s) IV Push once  glucagon  Injectable 1 milliGRAM(s) IntraMuscular once  influenza   Vaccine 0.5 milliLiter(s) IntraMuscular once  insulin lispro (ADMELOG) corrective regimen sliding scale   SubCutaneous every 6 hours  lactated ringers. 1000 milliLiter(s) (100 mL/Hr) IV Continuous <Continuous>  levothyroxine 200 MICROGram(s) Oral daily  potassium chloride  10 mEq/100 mL IVPB 10 milliEquivalent(s) IV Intermittent every 1 hour  potassium phosphate IVPB 30 milliMole(s) IV Intermittent once    MEDICATIONS  (PRN):

## 2021-03-31 LAB
ANION GAP SERPL CALC-SCNC: 12 MMOL/L — SIGNIFICANT CHANGE UP (ref 7–14)
ANION GAP SERPL CALC-SCNC: 9 MMOL/L — SIGNIFICANT CHANGE UP (ref 7–14)
APTT BLD: 29 SEC — SIGNIFICANT CHANGE UP (ref 27–36.3)
APTT BLD: 33.5 SEC — SIGNIFICANT CHANGE UP (ref 27–36.3)
BASOPHILS # BLD AUTO: 0.07 K/UL — SIGNIFICANT CHANGE UP (ref 0–0.2)
BASOPHILS NFR BLD AUTO: 1.7 % — SIGNIFICANT CHANGE UP (ref 0–2)
BUN SERPL-MCNC: 12 MG/DL — SIGNIFICANT CHANGE UP (ref 7–23)
BUN SERPL-MCNC: 17 MG/DL — SIGNIFICANT CHANGE UP (ref 7–23)
CALCIUM SERPL-MCNC: 6.9 MG/DL — LOW (ref 8.4–10.5)
CALCIUM SERPL-MCNC: 7.7 MG/DL — LOW (ref 8.4–10.5)
CHLORIDE SERPL-SCNC: 110 MMOL/L — HIGH (ref 98–107)
CHLORIDE SERPL-SCNC: 111 MMOL/L — HIGH (ref 98–107)
CO2 SERPL-SCNC: 21 MMOL/L — LOW (ref 22–31)
CO2 SERPL-SCNC: 24 MMOL/L — SIGNIFICANT CHANGE UP (ref 22–31)
CREAT SERPL-MCNC: 1 MG/DL — SIGNIFICANT CHANGE UP (ref 0.5–1.3)
CREAT SERPL-MCNC: 1.02 MG/DL — SIGNIFICANT CHANGE UP (ref 0.5–1.3)
EOSINOPHIL # BLD AUTO: 0.1 K/UL — SIGNIFICANT CHANGE UP (ref 0–0.5)
EOSINOPHIL NFR BLD AUTO: 2.6 % — SIGNIFICANT CHANGE UP (ref 0–6)
FIBRINOGEN PPP-MCNC: 255 MG/DL — LOW (ref 290–520)
GLUCOSE BLDC GLUCOMTR-MCNC: 113 MG/DL — HIGH (ref 70–99)
GLUCOSE BLDC GLUCOMTR-MCNC: 119 MG/DL — HIGH (ref 70–99)
GLUCOSE BLDC GLUCOMTR-MCNC: 145 MG/DL — HIGH (ref 70–99)
GLUCOSE SERPL-MCNC: 134 MG/DL — HIGH (ref 70–99)
GLUCOSE SERPL-MCNC: 143 MG/DL — HIGH (ref 70–99)
HCT VFR BLD CALC: 14.5 % — CRITICAL LOW (ref 34.5–45)
HCT VFR BLD CALC: 17.4 % — CRITICAL LOW (ref 34.5–45)
HCT VFR BLD CALC: 18.8 % — CRITICAL LOW (ref 34.5–45)
HCT VFR BLD CALC: 27 % — LOW (ref 34.5–45)
HGB BLD-MCNC: 4.8 G/DL — CRITICAL LOW (ref 11.5–15.5)
HGB BLD-MCNC: 5.8 G/DL — CRITICAL LOW (ref 11.5–15.5)
HGB BLD-MCNC: 6.4 G/DL — CRITICAL LOW (ref 11.5–15.5)
HGB BLD-MCNC: 8.8 G/DL — LOW (ref 11.5–15.5)
IANC: 3.34 K/UL — SIGNIFICANT CHANGE UP (ref 1.5–8.5)
INR BLD: 1.11 RATIO — SIGNIFICANT CHANGE UP (ref 0.88–1.16)
INR BLD: 1.27 RATIO — HIGH (ref 0.88–1.16)
LYMPHOCYTES # BLD AUTO: 0.48 K/UL — LOW (ref 1–3.3)
LYMPHOCYTES # BLD AUTO: 12.2 % — LOW (ref 13–44)
MAGNESIUM SERPL-MCNC: 1.6 MG/DL — SIGNIFICANT CHANGE UP (ref 1.6–2.6)
MAGNESIUM SERPL-MCNC: 1.8 MG/DL — SIGNIFICANT CHANGE UP (ref 1.6–2.6)
MCHC RBC-ENTMCNC: 29 PG — SIGNIFICANT CHANGE UP (ref 27–34)
MCHC RBC-ENTMCNC: 29.6 PG — SIGNIFICANT CHANGE UP (ref 27–34)
MCHC RBC-ENTMCNC: 29.9 PG — SIGNIFICANT CHANGE UP (ref 27–34)
MCHC RBC-ENTMCNC: 30.2 PG — SIGNIFICANT CHANGE UP (ref 27–34)
MCHC RBC-ENTMCNC: 32.6 GM/DL — SIGNIFICANT CHANGE UP (ref 32–36)
MCHC RBC-ENTMCNC: 33.1 GM/DL — SIGNIFICANT CHANGE UP (ref 32–36)
MCHC RBC-ENTMCNC: 33.3 GM/DL — SIGNIFICANT CHANGE UP (ref 32–36)
MCHC RBC-ENTMCNC: 34 GM/DL — SIGNIFICANT CHANGE UP (ref 32–36)
MCV RBC AUTO: 88.7 FL — SIGNIFICANT CHANGE UP (ref 80–100)
MCV RBC AUTO: 89.1 FL — SIGNIFICANT CHANGE UP (ref 80–100)
MCV RBC AUTO: 89.5 FL — SIGNIFICANT CHANGE UP (ref 80–100)
MCV RBC AUTO: 89.7 FL — SIGNIFICANT CHANGE UP (ref 80–100)
MONOCYTES # BLD AUTO: 0.07 K/UL — SIGNIFICANT CHANGE UP (ref 0–0.9)
MONOCYTES NFR BLD AUTO: 1.7 % — LOW (ref 2–14)
NEUTROPHILS # BLD AUTO: 3.17 K/UL — SIGNIFICANT CHANGE UP (ref 1.8–7.4)
NEUTROPHILS NFR BLD AUTO: 76.5 % — SIGNIFICANT CHANGE UP (ref 43–77)
NRBC # BLD: 0 /100 WBCS — SIGNIFICANT CHANGE UP
NRBC # FLD: 0 K/UL — SIGNIFICANT CHANGE UP
PHOSPHATE SERPL-MCNC: 2.7 MG/DL — SIGNIFICANT CHANGE UP (ref 2.5–4.5)
PHOSPHATE SERPL-MCNC: 3 MG/DL — SIGNIFICANT CHANGE UP (ref 2.5–4.5)
PLATELET # BLD AUTO: 100 K/UL — LOW (ref 150–400)
PLATELET # BLD AUTO: 63 K/UL — LOW (ref 150–400)
PLATELET # BLD AUTO: 71 K/UL — LOW (ref 150–400)
PLATELET # BLD AUTO: 76 K/UL — LOW (ref 150–400)
POTASSIUM SERPL-MCNC: 3.4 MMOL/L — LOW (ref 3.5–5.3)
POTASSIUM SERPL-MCNC: 3.8 MMOL/L — SIGNIFICANT CHANGE UP (ref 3.5–5.3)
POTASSIUM SERPL-SCNC: 3.4 MMOL/L — LOW (ref 3.5–5.3)
POTASSIUM SERPL-SCNC: 3.8 MMOL/L — SIGNIFICANT CHANGE UP (ref 3.5–5.3)
PROTHROM AB SERPL-ACNC: 12.7 SEC — SIGNIFICANT CHANGE UP (ref 10.6–13.6)
PROTHROM AB SERPL-ACNC: 14.3 SEC — HIGH (ref 10.6–13.6)
RBC # BLD: 1.62 M/UL — LOW (ref 3.8–5.2)
RBC # BLD: 1.94 M/UL — LOW (ref 3.8–5.2)
RBC # BLD: 2.12 M/UL — LOW (ref 3.8–5.2)
RBC # BLD: 3.03 M/UL — LOW (ref 3.8–5.2)
RBC # FLD: 14.2 % — SIGNIFICANT CHANGE UP (ref 10.3–14.5)
RBC # FLD: 15.1 % — HIGH (ref 10.3–14.5)
RBC # FLD: 15.5 % — HIGH (ref 10.3–14.5)
RBC # FLD: 15.7 % — HIGH (ref 10.3–14.5)
SODIUM SERPL-SCNC: 143 MMOL/L — SIGNIFICANT CHANGE UP (ref 135–145)
SODIUM SERPL-SCNC: 144 MMOL/L — SIGNIFICANT CHANGE UP (ref 135–145)
WBC # BLD: 3.92 K/UL — SIGNIFICANT CHANGE UP (ref 3.8–10.5)
WBC # BLD: 6.3 K/UL — SIGNIFICANT CHANGE UP (ref 3.8–10.5)
WBC # BLD: 6.47 K/UL — SIGNIFICANT CHANGE UP (ref 3.8–10.5)
WBC # BLD: 6.98 K/UL — SIGNIFICANT CHANGE UP (ref 3.8–10.5)
WBC # FLD AUTO: 3.92 K/UL — SIGNIFICANT CHANGE UP (ref 3.8–10.5)
WBC # FLD AUTO: 6.3 K/UL — SIGNIFICANT CHANGE UP (ref 3.8–10.5)
WBC # FLD AUTO: 6.47 K/UL — SIGNIFICANT CHANGE UP (ref 3.8–10.5)
WBC # FLD AUTO: 6.98 K/UL — SIGNIFICANT CHANGE UP (ref 3.8–10.5)

## 2021-03-31 PROCEDURE — 76937 US GUIDE VASCULAR ACCESS: CPT | Mod: 26,59

## 2021-03-31 PROCEDURE — 99231 SBSQ HOSP IP/OBS SF/LOW 25: CPT | Mod: 25,GC

## 2021-03-31 PROCEDURE — 36248 INS CATH ABD/L-EXT ART ADDL: CPT

## 2021-03-31 PROCEDURE — 36569 INSJ PICC 5 YR+ W/O IMAGING: CPT

## 2021-03-31 PROCEDURE — 36247 INS CATH ABD/L-EXT ART 3RD: CPT | Mod: RT

## 2021-03-31 PROCEDURE — 37244 VASC EMBOLIZE/OCCLUDE BLEED: CPT

## 2021-03-31 PROCEDURE — 36410 VNPNXR 3YR/> PHY/QHP DX/THER: CPT

## 2021-03-31 PROCEDURE — 99292 CRITICAL CARE ADDL 30 MIN: CPT | Mod: 25

## 2021-03-31 PROCEDURE — 76937 US GUIDE VASCULAR ACCESS: CPT | Mod: 26

## 2021-03-31 PROCEDURE — 99291 CRITICAL CARE FIRST HOUR: CPT | Mod: 25

## 2021-03-31 PROCEDURE — 36556 INSERT NON-TUNNEL CV CATH: CPT

## 2021-03-31 RX ORDER — CALCIUM GLUCONATE 100 MG/ML
2 VIAL (ML) INTRAVENOUS ONCE
Refills: 0 | Status: COMPLETED | OUTPATIENT
Start: 2021-03-31 | End: 2021-03-31

## 2021-03-31 RX ORDER — MAGNESIUM SULFATE 500 MG/ML
2 VIAL (ML) INJECTION ONCE
Refills: 0 | Status: COMPLETED | OUTPATIENT
Start: 2021-03-31 | End: 2021-03-31

## 2021-03-31 RX ORDER — POTASSIUM CHLORIDE 20 MEQ
10 PACKET (EA) ORAL
Refills: 0 | Status: COMPLETED | OUTPATIENT
Start: 2021-03-31 | End: 2021-04-01

## 2021-03-31 RX ORDER — ACETAMINOPHEN 500 MG
650 TABLET ORAL ONCE
Refills: 0 | Status: COMPLETED | OUTPATIENT
Start: 2021-03-31 | End: 2021-03-31

## 2021-03-31 RX ORDER — DIPHENHYDRAMINE HCL 50 MG
25 CAPSULE ORAL ONCE
Refills: 0 | Status: COMPLETED | OUTPATIENT
Start: 2021-03-31 | End: 2021-03-31

## 2021-03-31 RX ORDER — POTASSIUM PHOSPHATE, MONOBASIC POTASSIUM PHOSPHATE, DIBASIC 236; 224 MG/ML; MG/ML
15 INJECTION, SOLUTION INTRAVENOUS ONCE
Refills: 0 | Status: COMPLETED | OUTPATIENT
Start: 2021-03-31 | End: 2021-03-31

## 2021-03-31 RX ORDER — POTASSIUM CHLORIDE 20 MEQ
10 PACKET (EA) ORAL
Refills: 0 | Status: COMPLETED | OUTPATIENT
Start: 2021-03-31 | End: 2021-03-31

## 2021-03-31 RX ADMIN — Medication 650 MILLIGRAM(S): at 21:01

## 2021-03-31 RX ADMIN — POTASSIUM PHOSPHATE, MONOBASIC POTASSIUM PHOSPHATE, DIBASIC 62.5 MILLIMOLE(S): 236; 224 INJECTION, SOLUTION INTRAVENOUS at 22:26

## 2021-03-31 RX ADMIN — Medication 650 MILLIGRAM(S): at 21:32

## 2021-03-31 RX ADMIN — Medication 1000 MILLILITER(S): at 04:38

## 2021-03-31 RX ADMIN — SODIUM CHLORIDE 100 MILLILITER(S): 9 INJECTION, SOLUTION INTRAVENOUS at 22:26

## 2021-03-31 RX ADMIN — Medication 100 MILLIEQUIVALENT(S): at 22:26

## 2021-03-31 RX ADMIN — Medication 200 MICROGRAM(S): at 05:04

## 2021-03-31 RX ADMIN — Medication 25 MILLIGRAM(S): at 19:48

## 2021-03-31 RX ADMIN — Medication 200 GRAM(S): at 21:37

## 2021-03-31 RX ADMIN — Medication 50 GRAM(S): at 06:58

## 2021-03-31 RX ADMIN — Medication 50 GRAM(S): at 21:19

## 2021-03-31 NOTE — PROGRESS NOTE ADULT - SUBJECTIVE AND OBJECTIVE BOX
SICU Daily Progress Note  =====================================================  HPI: 65y Female with history of pandiverticular bleeding. Patient found to have active extravasation at the hepatic flexure on CTA, S/p IR angiogram (3/28) without active bleeding found in SMA or middle colic artery and its branches. Given continued clinical concern for bleeding, team would like to consider provocative angiogram.    24 HR Events:  - 3 blood BMs this morning  - 1 episode hypotension reportedly 80s systolic today   - In last 24 hrs: pt received 3UpRBCs, 1 plt, 1 plasma  - Plan for IR to take pt for angiogram tomorrow. Pt also drinking bowel prep for possible operative intervention if IR unable to identify bleed    MEDICATIONS:   --------------------------------------------------------------------------------------  Neurologic Medications    Respiratory Medications    Cardiovascular Medications    Gastrointestinal Medications  lactated ringers. 1000 milliLiter(s) IV Continuous <Continuous>  polyethylene glycol/electrolyte Solution 1000 milliLiter(s) Oral once    Genitourinary Medications    Hematologic/Oncologic Medications  influenza   Vaccine 0.5 milliLiter(s) IntraMuscular once    Antimicrobial/Immunologic Medications    Endocrine/Metabolic Medications  dextrose 50% Injectable 25 Gram(s) IV Push once  glucagon  Injectable 1 milliGRAM(s) IntraMuscular once  insulin lispro (ADMELOG) corrective regimen sliding scale   SubCutaneous every 6 hours  levothyroxine 200 MICROGram(s) Oral daily    Topical/Other Medications    --------------------------------------------------------------------------------------    VITAL SIGNS, INS/OUTS (last 24 hours):  --------------------------------------------------------------------------------------    03-29-21 @ 07:01 - 03-30-21 @ 07:00  --------------------------------------------------------  IN: 1500 mL / OUT: 2250 mL / NET: -750 mL    03-30-21 @ 07:01  -  03-31-21 @ 00:37  --------------------------------------------------------  IN: 3371 mL / OUT: 700 mL / NET: 2671 mL      --------------------------------------------------------------------------------------    EXAM  NEUROLOGY  Exam: Normal, NAD, alert, oriented x3, no focal deficits.    HEENT  Exam: Normocephalic, atraumatic, EOMI.     RESPIRATORY  Exam: Lungs clear to auscultation, Normal expansion/effort.   Mechanical Ventilation:     CARDIOVASCULAR  Exam: S1, S2.  Regular rate and rhythm.       GI/NUTRITION  Exam: Abdomen soft, Non-tender, Non-distended.      VASCULAR  Exam: Extremities warm, pink, well-perfused.    MUSCULOSKELETAL  Exam: All extremities moving spontaneously without limitations.    SKIN  Exam: Good skin turgor, no skin breakdown.     METABOLIC/FLUIDS/ELECTROLYTES  lactated ringers. 1000 milliLiter(s) IV Continuous <Continuous>      HEMATOLOGIC  [x] VTE Prophylaxis:       LABS  --------------------------------------------------------------------------------------    T(C): 36.1 (03-30-21 @ 20:50), Max: 37 (03-30-21 @ 12:00)  HR: 83 (03-30-21 @ 23:00) (75 - 91)  BP: 144/86 (03-30-21 @ 23:00) (117/64 - 153/84)  RR: 19 (03-30-21 @ 23:00) (16 - 21)  SpO2: 99% (03-30-21 @ 22:00) (95% - 100%)    --------------------------------------------------------------------------------------

## 2021-03-31 NOTE — PROCEDURE NOTE - NSPROCDETAILS_GEN_ALL_CORE
location identified, draped/prepped, sterile technique used/sterile dressing applied/ultrasound guidance

## 2021-03-31 NOTE — PROGRESS NOTE ADULT - ASSESSMENT
65F hx multiple admissions for pandiverticulosis, herniated lumbar disc (s/p plate), SOLOMON, prediabetes (on oral agent) pw BRBPR cf recurrent diverticular bleed and CTA showing active extravasation at the hepatic flexure sp IR angiogram (3/28). No active bleeding found in SMA or middle colic artery and its branches. No embolization was performed. Pt with conintued bloody bowel movements and non-responsive to multiple transfusions.  SICU consulted for hemodynamic monitoring in the setting of active GI bleed, HD at this time.     NEURO:  - Pain well controlled, can give tylenol PRN    RESP:  - Satting well on RA, No active issues    CV:  - Hemodynamically stable since arrival in SICU   - Holding home ramipril    GI  - Remains NPO, f/u with surgical team    :  - LR @ 140  - Monitor urine output, replete lytes as needed    Endo  - SSI    Heme:  - s/p provocative mesenteric angiogram and embolization 3/31: extravasation seen within proximal transverse colon, embolized to completion.   - Holding AC in setting of GIB  - Transfuse as needed for Hgb<7    ID:  - No need for Abx at this time    DISPO/Lines  SICU    Critical Care Dx: GI bleed, diverticular bleed     65F hx multiple admissions for pandiverticulosis, herniated lumbar disc (s/p plate), SOLOMON, prediabetes (on oral agent) pw BRBPR cf recurrent diverticular bleed and CTA showing active extravasation at the hepatic flexure sp IR angiogram (3/28). No active bleeding found in SMA or middle colic artery and its branches. No embolization was performed. Pt with conintued bloody bowel movements and non-responsive to multiple transfusions.  SICU consulted for hemodynamic monitoring in the setting of active GI bleed, HD at this time.     NEURO:  - Pain well controlled, can give tylenol PRN    RESP:  - Satting well on RA, No active issues    CV:  - Hemodynamically stable since arrival in SICU   - Holding home ramipril    GI  - Remains NPO, f/u with surgical team    :  - LR @ 140  - Monitor urine output, replete lytes as needed    Endo  - SSI    Heme:  - s/p provocative mesenteric angiogram and embolization 3/31: extravasation seen within proximal transverse colon, embolized to completion.   - Holding AC in setting of GIB  - Transfuse as needed for Hgb<7  - f/u transfusion 3upRBC, 2u plt, 2u plasma     ID:  - No need for Abx at this time    DISPO/Lines  SICU    Critical Care Dx: GI bleed, diverticular bleed     65F hx multiple admissions for pandiverticulosis, herniated lumbar disc (s/p plate), SOLOMON, prediabetes (on oral agent) pw BRBPR cf recurrent diverticular bleed and CTA showing active extravasation at the hepatic flexure sp IR angiogram (3/28). No active bleeding found in SMA or middle colic artery and its branches. No embolization was performed. Pt with conintued bloody bowel movements and non-responsive to multiple transfusions.  SICU consulted for hemodynamic monitoring in the setting of active GI bleed, HD at this time.     NEURO:  - Pain well controlled, can give tylenol PRN    RESP:  - Satting well on RA, No active issues    CV:  - Hemodynamically stable since arrival in SICU   - Holding home ramipril    GI  - Remains NPO, f/u with surgical team    :  - LR @ 140  - Monitor urine output, replete lytes as needed    Endo  - SSI    Heme:  - s/p provocative mesenteric angiogram and embolization 3/31: extravasation seen within proximal transverse colon, embolized to completion.   - Holding AC in setting of GIB  - Transfuse as needed for Hgb<7  - f/u transfusion 2upRBC, 2u plt     ID:  - No need for Abx at this time    DISPO/Lines  SICU    Critical Care Dx: GI bleed, diverticular bleed     65F hx multiple admissions for pandiverticulosis, herniated lumbar disc (s/p plate), SOOLMON, prediabetes (on oral agent) pw BRBPR cf recurrent diverticular bleed and CTA showing active extravasation at the hepatic flexure sp IR angiogram (3/28). No active bleeding found in SMA or middle colic artery and its branches. No embolization was performed. Pt with conintued bloody bowel movements and non-responsive to multiple transfusions.  SICU consulted for hemodynamic monitoring in the setting of active GI bleed, HD at this time.     NEURO:  - Pain well controlled, can give tylenol PRN    RESP:  - Satting well on RA, No active issues    CV:  - Hemodynamically stable since arrival in SICU   - Holding home ramipril    GI  - Remains NPO, f/u with surgical team    :  - LR @ 140  - Monitor urine output, replete lytes as needed    Endo  - SSI    Heme:  - s/p provocative mesenteric angiogram and embolization 3/31: extravasation seen within proximal transverse colon, embolized to completion.   - Holding AC in setting of GIB  - Transfuse as needed for Hgb<7  - f/u transfusion 3upRBC, 2u plt     ID:  - No need for Abx at this time    DISPO/Lines  SICU    Critical Care Dx: GI bleed, diverticular bleed

## 2021-03-31 NOTE — PROGRESS NOTE ADULT - ASSESSMENT
65F hx multiple admissions for pandiverticulosis, herniated lumbar disc (s/p plate), SOLOMON, prediabetes (on oral agent) pw BRBPR cf recurrent diverticular bleed and CTA showing active extravasation at the hepatic flexure sp 4uPRBCs who is now several hours post-op from an IR angiogram (3/28). No active bleeding found in SMA or middle colic artery and its branches. No embolization was performed. Sp 1uPRBC, 1uFFP, 1uPLT.    Plan:  - Diet: NPO  - Received bowel prep overnight  - Provocative IR angio today  - F/U with AM labs  - Appreciate GI consult: no plan for intervention if patient HD stable  - Monitor for bloody bowel movements  - Strict I&Os  - Hold DVT ppx  - Continue home synthroid and ppi IV, restart other home meds as able    Surgery Team A  [id number] 65F hx multiple admissions for pandiverticulosis, herniated lumbar disc (s/p plate), SOLOMON, prediabetes (on oral agent) pw BRBPR cf recurrent diverticular bleed and CTA showing active extravasation at the hepatic flexure sp 4uPRBCs who is now several hours post-op from an IR angiogram (3/28). No active bleeding found in SMA or middle colic artery and its branches. No embolization was performed. S/p 1uPRBC, 1uFFP, 1uPLT (3/30).    Plan:  - Diet: NPO  - Received bowel prep overnight  - Provocative IR angio today  - F/U with AM labs  - Appreciate GI consult: no plan for intervention if patient HD stable  - Monitor for bloody bowel movements  - Strict I&Os  - Hold DVT ppx  - Continue home synthroid and ppi IV, restart other home meds as able    Surgery Team A  [id number]

## 2021-03-31 NOTE — PROGRESS NOTE ADULT - SUBJECTIVE AND OBJECTIVE BOX
SICU PM Progress Note  =====================================================  HPI: 65y Female with history of pandiverticular bleeding. Patient found to have active extravasation at the hepatic flexure on CTA, S/p IR angiogram (3/28) without active bleeding found in SMA or middle colic artery and its branches. Now s/p provocative mesenteric angiogram and embolization.    Interval Events:   - No bloody BMs overnight   - HDS since arrival in SICU   - In last 24 hrs: pt received 1UpRBCs, 1 plt, 1 plasma  - Midline placement in R arm  - Provocative mesenteric angiogram and embolization 3/31: extravasation seen within proximal transverse colon, embolized to completion.   - Intraop: 1L albumin, 500 cc NS, 500 cc LR       Allergies: No Known Allergies  oxycodone (Other)      MEDICATIONS:   --------------------------------------------------------------------------------------  Neurologic Medications    Respiratory Medications    Cardiovascular Medications    Gastrointestinal Medications  lactated ringers. 1000 milliLiter(s) IV Continuous <Continuous>    Genitourinary Medications    Hematologic/Oncologic Medications  influenza   Vaccine 0.5 milliLiter(s) IntraMuscular once    Antimicrobial/Immunologic Medications    Endocrine/Metabolic Medications  dextrose 50% Injectable 25 Gram(s) IV Push once  glucagon  Injectable 1 milliGRAM(s) IntraMuscular once  insulin lispro (ADMELOG) corrective regimen sliding scale   SubCutaneous every 6 hours  levothyroxine 200 MICROGram(s) Oral daily    Topical/Other Medications    --------------------------------------------------------------------------------------    VITAL SIGNS, INS/OUTS (last 24 hours):  --------------------------------------------------------------------------------------  ICU Vital Signs Last 24 Hrs  T(C): 36.3 (31 Mar 2021 08:00), Max: 36.7 (30 Mar 2021 17:21)  T(F): 97.3 (31 Mar 2021 08:00), Max: 98.1 (30 Mar 2021 17:21)  HR: 100 (31 Mar 2021 09:00) (78 - 100)  BP: 119/63 (31 Mar 2021 09:00) (113/60 - 153/84)  BP(mean): 76 (31 Mar 2021 09:00) (73 - 104)  ABP: --  ABP(mean): --  RR: 19 (31 Mar 2021 09:00) (16 - 21)  SpO2: 100% (31 Mar 2021 09:00) (95% - 100%)    I&O's Summary    30 Mar 2021 07:01  -  31 Mar 2021 07:00  --------------------------------------------------------  IN: 4351 mL / OUT: 700 mL / NET: 3651 mL    31 Mar 2021 07:01  -  31 Mar 2021 15:01  --------------------------------------------------------  IN: 280 mL / OUT: 0 mL / NET: 280 mL      --------------------------------------------------------------------------------------    EXAM  NEUROLOGY  Exam: Normal, NAD, alert, oriented x3, no focal deficits.    HEENT  Exam: Normocephalic, atraumatic, EOMI.     RESPIRATORY  Exam: Lungs clear to auscultation, Normal expansion/effort.   Mechanical Ventilation:     CARDIOVASCULAR  Exam: S1, S2.  Regular rate and rhythm.       GI/NUTRITION  Exam: Abdomen soft, Non-tender, Non-distended.      VASCULAR  Exam: Extremities warm, pink, well-perfused.    MUSCULOSKELETAL  Exam: All extremities moving spontaneously without limitations.    SKIN  Exam: Good skin turgor, no skin breakdown.       METABOLIC/FLUIDS/ELECTROLYTES  lactated ringers. 1000 milliLiter(s) IV Continuous <Continuous>      INFECTIOUS DISEASE  Antimicrobials/Immunologic Medications:  influenza   Vaccine 0.5 milliLiter(s) IntraMuscular once      Tubes/Lines/Drains   [x] Peripheral IV  [] Central Venous Line     	[] R	[] L	[] IJ	[] Fem	[] SC	Date Placed:   [] Arterial Line		[] R	[] L	[] Fem	[] Rad	[] Ax	Date Placed:   [] PICC		[x] Midline		[] Mediport  [] Urinary Catheter		Date Placed:   [x] Necessity of urinary, arterial, and venous catheters discussed    LABS  --------------------------------------------------------------------------------------                        8.8    6.98  )-----------( 100      ( 31 Mar 2021 02:47 )             27.0     --------------------------------------------------------------------------------------     SICU PM Progress Note  =====================================================  HPI: 65y Female with history of pandiverticular bleeding. Patient found to have active extravasation at the hepatic flexure on CTA, S/p IR angiogram (3/28) without active bleeding found in SMA or middle colic artery and its branches. Now s/p provocative mesenteric angiogram and embolization.    Interval Events:   - No bloody BMs overnight   - HDS since arrival in SICU   - In last 24 hrs: pt received 1UpRBCs, 1 plt, 1 plasma  - Midline placement in R arm  - Provocative mesenteric angiogram and embolization 3/31: extravasation seen within proximal transverse colon, embolized to completion.   - Post op H/H: 4.8/14.5. f/u labs pending 3upRBC, 2u plt, 2u plasma   - Intraop: 1L albumin, 500 cc NS, 500 cc LR       Allergies: No Known Allergies  oxycodone (Other)      MEDICATIONS:   --------------------------------------------------------------------------------------  Neurologic Medications    Respiratory Medications    Cardiovascular Medications    Gastrointestinal Medications  lactated ringers. 1000 milliLiter(s) IV Continuous <Continuous>    Genitourinary Medications    Hematologic/Oncologic Medications  influenza   Vaccine 0.5 milliLiter(s) IntraMuscular once    Antimicrobial/Immunologic Medications    Endocrine/Metabolic Medications  dextrose 50% Injectable 25 Gram(s) IV Push once  glucagon  Injectable 1 milliGRAM(s) IntraMuscular once  insulin lispro (ADMELOG) corrective regimen sliding scale   SubCutaneous every 6 hours  levothyroxine 200 MICROGram(s) Oral daily    Topical/Other Medications    --------------------------------------------------------------------------------------    VITAL SIGNS, INS/OUTS (last 24 hours):  --------------------------------------------------------------------------------------  ICU Vital Signs Last 24 Hrs  T(C): 36.3 (31 Mar 2021 08:00), Max: 36.7 (30 Mar 2021 17:21)  T(F): 97.3 (31 Mar 2021 08:00), Max: 98.1 (30 Mar 2021 17:21)  HR: 100 (31 Mar 2021 09:00) (78 - 100)  BP: 119/63 (31 Mar 2021 09:00) (113/60 - 153/84)  BP(mean): 76 (31 Mar 2021 09:00) (73 - 104)  ABP: --  ABP(mean): --  RR: 19 (31 Mar 2021 09:00) (16 - 21)  SpO2: 100% (31 Mar 2021 09:00) (95% - 100%)    I&O's Summary    30 Mar 2021 07:01  -  31 Mar 2021 07:00  --------------------------------------------------------  IN: 4351 mL / OUT: 700 mL / NET: 3651 mL    31 Mar 2021 07:01  -  31 Mar 2021 15:01  --------------------------------------------------------  IN: 280 mL / OUT: 0 mL / NET: 280 mL      --------------------------------------------------------------------------------------    EXAM  NEUROLOGY  Exam: Normal, NAD, alert, oriented x3, no focal deficits.    HEENT  Exam: Normocephalic, atraumatic, EOMI.     RESPIRATORY  Exam: Lungs clear to auscultation, Normal expansion/effort.   Mechanical Ventilation:     CARDIOVASCULAR  Exam: S1, S2.  Regular rate and rhythm.       GI/NUTRITION  Exam: Abdomen soft, Non-tender, Non-distended.      VASCULAR  Exam: Extremities warm, pink, well-perfused.    MUSCULOSKELETAL  Exam: All extremities moving spontaneously without limitations.    SKIN  Exam: Good skin turgor, no skin breakdown.       METABOLIC/FLUIDS/ELECTROLYTES  lactated ringers. 1000 milliLiter(s) IV Continuous <Continuous>      INFECTIOUS DISEASE  Antimicrobials/Immunologic Medications:  influenza   Vaccine 0.5 milliLiter(s) IntraMuscular once      Tubes/Lines/Drains   [x] Peripheral IV  [] Central Venous Line     	[] R	[] L	[] IJ	[] Fem	[] SC	Date Placed:   [] Arterial Line		[] R	[] L	[] Fem	[] Rad	[] Ax	Date Placed:   [] PICC		[x] Midline		[] Mediport  [] Urinary Catheter		Date Placed:   [x] Necessity of urinary, arterial, and venous catheters discussed    LABS  --------------------------------------------------------------------------------------                                   4.8    6.30  )-----------( 76       ( 31 Mar 2021 15:45 )             14.5                  8.8    6.98  )-----------( 100      ( 31 Mar 2021 02:47 )             27.0     --------------------------------------------------------------------------------------     SICU PM Progress Note  =====================================================  HPI: 65y Female with history of pandiverticular bleeding. Patient found to have active extravasation at the hepatic flexure on CTA, S/p IR angiogram (3/28) without active bleeding found in SMA or middle colic artery and its branches. Now s/p provocative mesenteric angiogram and embolization.    Interval Events:   - No bloody BMs overnight   - HDS since arrival in SICU   - In last 24 hrs: pt received 1UpRBCs, 1 plt, 1 plasma  - Midline placement in R arm  - Provocative mesenteric angiogram and embolization 3/31: extravasation seen within proximal transverse colon, embolized to completion.   - Post op H/H: 4.8/14.5. f/u stat labs and following 2upRBC, 2u plt  - Intraop: 1L albumin, 500 cc NS, 500 cc LR       Allergies: No Known Allergies  oxycodone (Other)      MEDICATIONS:   --------------------------------------------------------------------------------------  Neurologic Medications    Respiratory Medications    Cardiovascular Medications    Gastrointestinal Medications  lactated ringers. 1000 milliLiter(s) IV Continuous <Continuous>    Genitourinary Medications    Hematologic/Oncologic Medications  influenza   Vaccine 0.5 milliLiter(s) IntraMuscular once    Antimicrobial/Immunologic Medications    Endocrine/Metabolic Medications  dextrose 50% Injectable 25 Gram(s) IV Push once  glucagon  Injectable 1 milliGRAM(s) IntraMuscular once  insulin lispro (ADMELOG) corrective regimen sliding scale   SubCutaneous every 6 hours  levothyroxine 200 MICROGram(s) Oral daily    Topical/Other Medications    --------------------------------------------------------------------------------------    VITAL SIGNS, INS/OUTS (last 24 hours):  --------------------------------------------------------------------------------------  ICU Vital Signs Last 24 Hrs  T(C): 36.3 (31 Mar 2021 08:00), Max: 36.7 (30 Mar 2021 17:21)  T(F): 97.3 (31 Mar 2021 08:00), Max: 98.1 (30 Mar 2021 17:21)  HR: 100 (31 Mar 2021 09:00) (78 - 100)  BP: 119/63 (31 Mar 2021 09:00) (113/60 - 153/84)  BP(mean): 76 (31 Mar 2021 09:00) (73 - 104)  ABP: --  ABP(mean): --  RR: 19 (31 Mar 2021 09:00) (16 - 21)  SpO2: 100% (31 Mar 2021 09:00) (95% - 100%)    I&O's Summary    30 Mar 2021 07:01  -  31 Mar 2021 07:00  --------------------------------------------------------  IN: 4351 mL / OUT: 700 mL / NET: 3651 mL    31 Mar 2021 07:01  -  31 Mar 2021 15:01  --------------------------------------------------------  IN: 280 mL / OUT: 0 mL / NET: 280 mL      --------------------------------------------------------------------------------------    EXAM  NEUROLOGY  Exam: Normal, NAD, alert, oriented x3, no focal deficits.    HEENT  Exam: Normocephalic, atraumatic, EOMI.     RESPIRATORY  Exam: Lungs clear to auscultation, Normal expansion/effort.   Mechanical Ventilation:     CARDIOVASCULAR  Exam: S1, S2.  Regular rate and rhythm.       GI/NUTRITION  Exam: Abdomen soft, Non-tender, Non-distended.      VASCULAR  Exam: Extremities warm, pink, well-perfused.    MUSCULOSKELETAL  Exam: All extremities moving spontaneously without limitations.    SKIN  Exam: Good skin turgor, no skin breakdown.       METABOLIC/FLUIDS/ELECTROLYTES  lactated ringers. 1000 milliLiter(s) IV Continuous <Continuous>      INFECTIOUS DISEASE  Antimicrobials/Immunologic Medications:  influenza   Vaccine 0.5 milliLiter(s) IntraMuscular once      Tubes/Lines/Drains   [x] Peripheral IV  [] Central Venous Line     	[] R	[] L	[] IJ	[] Fem	[] SC	Date Placed:   [] Arterial Line		[] R	[] L	[] Fem	[] Rad	[] Ax	Date Placed:   [] PICC		[x] Midline		[] Mediport  [] Urinary Catheter		Date Placed:   [x] Necessity of urinary, arterial, and venous catheters discussed    LABS  --------------------------------------------------------------------------------------                                   4.8    6.30  )-----------( 76       ( 31 Mar 2021 15:45 )             14.5                  8.8    6.98  )-----------( 100      ( 31 Mar 2021 02:47 )             27.0     --------------------------------------------------------------------------------------     SICU PM Progress Note  =====================================================  HPI: 65y Female with history of pandiverticular bleeding. Patient found to have active extravasation at the hepatic flexure on CTA, S/p IR angiogram (3/28) without active bleeding found in SMA or middle colic artery and its branches. Now s/p provocative mesenteric angiogram and embolization.    Interval Events:   - No bloody BMs overnight   - HDS since arrival in SICU   - In last 24 hrs: pt received 1UpRBCs, 1 plt, 1 plasma  - Midline placement in R arm  - Provocative mesenteric angiogram and embolization 3/31: extravasation seen within proximal transverse colon, embolized to completion.   - Post op H/H: 4.8/14.5. f/u stat labs and following 3upRBC, 2u plt  - Intraop: 1L albumin, 500 cc NS, 500 cc LR       Allergies: No Known Allergies  oxycodone (Other)      MEDICATIONS:   --------------------------------------------------------------------------------------  Neurologic Medications    Respiratory Medications    Cardiovascular Medications    Gastrointestinal Medications  lactated ringers. 1000 milliLiter(s) IV Continuous <Continuous>    Genitourinary Medications    Hematologic/Oncologic Medications  influenza   Vaccine 0.5 milliLiter(s) IntraMuscular once    Antimicrobial/Immunologic Medications    Endocrine/Metabolic Medications  dextrose 50% Injectable 25 Gram(s) IV Push once  glucagon  Injectable 1 milliGRAM(s) IntraMuscular once  insulin lispro (ADMELOG) corrective regimen sliding scale   SubCutaneous every 6 hours  levothyroxine 200 MICROGram(s) Oral daily    Topical/Other Medications    --------------------------------------------------------------------------------------    VITAL SIGNS, INS/OUTS (last 24 hours):  --------------------------------------------------------------------------------------  ICU Vital Signs Last 24 Hrs  T(C): 36.3 (31 Mar 2021 08:00), Max: 36.7 (30 Mar 2021 17:21)  T(F): 97.3 (31 Mar 2021 08:00), Max: 98.1 (30 Mar 2021 17:21)  HR: 100 (31 Mar 2021 09:00) (78 - 100)  BP: 119/63 (31 Mar 2021 09:00) (113/60 - 153/84)  BP(mean): 76 (31 Mar 2021 09:00) (73 - 104)  ABP: --  ABP(mean): --  RR: 19 (31 Mar 2021 09:00) (16 - 21)  SpO2: 100% (31 Mar 2021 09:00) (95% - 100%)    I&O's Summary    30 Mar 2021 07:01  -  31 Mar 2021 07:00  --------------------------------------------------------  IN: 4351 mL / OUT: 700 mL / NET: 3651 mL    31 Mar 2021 07:01  -  31 Mar 2021 15:01  --------------------------------------------------------  IN: 280 mL / OUT: 0 mL / NET: 280 mL      --------------------------------------------------------------------------------------    EXAM  NEUROLOGY  Exam: Normal, NAD, alert, oriented x3, no focal deficits.    HEENT  Exam: Normocephalic, atraumatic, EOMI.     RESPIRATORY  Exam: Lungs clear to auscultation, Normal expansion/effort.   Mechanical Ventilation:     CARDIOVASCULAR  Exam: S1, S2.  Regular rate and rhythm.       GI/NUTRITION  Exam: Abdomen soft, Non-tender, Non-distended.      VASCULAR  Exam: Extremities warm, pink, well-perfused.    MUSCULOSKELETAL  Exam: All extremities moving spontaneously without limitations.    SKIN  Exam: Good skin turgor, no skin breakdown.       METABOLIC/FLUIDS/ELECTROLYTES  lactated ringers. 1000 milliLiter(s) IV Continuous <Continuous>      INFECTIOUS DISEASE  Antimicrobials/Immunologic Medications:  influenza   Vaccine 0.5 milliLiter(s) IntraMuscular once      Tubes/Lines/Drains   [x] Peripheral IV  [] Central Venous Line     	[] R	[] L	[] IJ	[] Fem	[] SC	Date Placed:   [] Arterial Line		[] R	[] L	[] Fem	[] Rad	[] Ax	Date Placed:   [] PICC		[x] Midline		[] Mediport  [] Urinary Catheter		Date Placed:   [x] Necessity of urinary, arterial, and venous catheters discussed    LABS  --------------------------------------------------------------------------------------                                   4.8    6.30  )-----------( 76       ( 31 Mar 2021 15:45 )             14.5                  8.8    6.98  )-----------( 100      ( 31 Mar 2021 02:47 )             27.0     --------------------------------------------------------------------------------------

## 2021-03-31 NOTE — PROCEDURE NOTE - PLAN
- transfusions as per primary team  - hold a/c and monitor for bleeding.  - please call IR with any further questions, pager [phone].   - case discussed with Surgery team A
SICU management

## 2021-03-31 NOTE — PROGRESS NOTE ADULT - SUBJECTIVE AND OBJECTIVE BOX
Surgery Progress Note    SUBJECTIVE: No acute events overnight. Over 24hrs, pt 3upRBC, 1uFFP, 1u plt. 1 episode of hypotension to 80s during day. Pt transferred to SICU for hemodynamic monitoring. Pt seen and examined at bedside. Patient comfortable. No nausea, vomiting, diarrhea. +Flatus/+BM. Tolerating diet.    Vital Signs Last 24 Hrs  T(C): 36.1 (30 Mar 2021 20:50), Max: 37 (30 Mar 2021 12:00)  T(F): 97 (30 Mar 2021 20:50), Max: 98.6 (30 Mar 2021 12:00)  HR: 83 (30 Mar 2021 23:00) (75 - 91)  BP: 144/86 (30 Mar 2021 23:00) (117/64 - 153/84)  BP(mean): 101 (30 Mar 2021 23:00) (84 - 101)  RR: 19 (30 Mar 2021 23:00) (16 - 21)  SpO2: 99% (30 Mar 2021 22:00) (95% - 100%)    Physical Exam:  General Appearance: Appears well, in no acute distress, awake, alert, and oriented x3.  Respiratory: No labored breathing  CV: Pulse regularly present  Abdomen: Soft, nontender, nondistended w/o rebound tenderness or guarding.  Extremities: Warm and well perfused, moving spontaneously    LABS:                        9.2    7.32  )-----------( 81       ( 30 Mar 2021 15:55 )             27.1     03-30    140  |  106  |  18  ----------------------------<  146<H>  3.4<L>   |  25  |  1.13    Ca    7.3<L>      30 Mar 2021 08:06  Phos  2.1     03-30  Mg     1.9     03-30      PT/INR - ( 29 Mar 2021 20:52 )   PT: 14.1 sec;   INR: 1.24 ratio         PTT - ( 29 Mar 2021 20:52 )  PTT:28.2 sec      INs and OUTs:    03-29-21 @ 07:01  -  03-30-21 @ 07:00  --------------------------------------------------------  IN: 1500 mL / OUT: 2250 mL / NET: -750 mL    03-30-21 @ 07:01  -  03-31-21 @ 00:55  --------------------------------------------------------  IN: 3371 mL / OUT: 700 mL / NET: 2671 mL        Medications:  MEDICATIONS  (STANDING):  dextrose 50% Injectable 25 Gram(s) IV Push once  glucagon  Injectable 1 milliGRAM(s) IntraMuscular once  influenza   Vaccine 0.5 milliLiter(s) IntraMuscular once  insulin lispro (ADMELOG) corrective regimen sliding scale   SubCutaneous every 6 hours  lactated ringers. 1000 milliLiter(s) (140 mL/Hr) IV Continuous <Continuous>  levothyroxine 200 MICROGram(s) Oral daily  polyethylene glycol/electrolyte Solution 1000 milliLiter(s) Oral once    MEDICATIONS  (PRN):   Surgery Progress Note    SUBJECTIVE: No acute events overnight. Over 24hrs, pt 1upRBC, 1uFFP, 1u plt. Pt transferred to SICU for hemodynamic monitoring. Pt seen and examined at bedside. Patient comfortable. No nausea, vomiting, diarrhea. 5 bloody BM overnight. +Flatus/+BM. Tolerating diet.    Vital Signs Last 24 Hrs  T(C): 36.3 (31 Mar 2021 08:00), Max: 37 (30 Mar 2021 12:00)  T(F): 97.3 (31 Mar 2021 08:00), Max: 98.6 (30 Mar 2021 12:00)  HR: 99 (31 Mar 2021 08:00) (78 - 99)  BP: 116/61 (31 Mar 2021 08:00) (113/60 - 153/84)  BP(mean): 74 (31 Mar 2021 08:00) (73 - 104)  ABP: --  ABP(mean): --  RR: 21 (31 Mar 2021 08:00) (16 - 21)  SpO2: 100% (31 Mar 2021 08:00) (95% - 100%)    Physical Exam:  General Appearance: Appears well, in no acute distress, awake, alert, and oriented x3.  Respiratory: No labored breathing  CV: Pulse regularly present  Abdomen: Soft, nontender, nondistended w/o rebound tenderness or guarding.  Extremities: Warm and well perfused, moving spontaneously    LABS:                        8.8    6.98  )-----------( 100      ( 31 Mar 2021 02:47 )             27.0                         9.2    7.32  )-----------( 81       ( 30 Mar 2021 15:55 )             27.1     03-30    140  |  106  |  18  ----------------------------<  146<H>  3.4<L>   |  25  |  1.13    Ca    7.3<L>      30 Mar 2021 08:06  Phos  2.1     03-30  Mg     1.9     03-30      PT/INR - ( 29 Mar 2021 20:52 )   PT: 14.1 sec;   INR: 1.24 ratio         PTT - ( 29 Mar 2021 20:52 )  PTT:28.2 sec      INs and OUTs:    03-29-21 @ 07:01  -  03-30-21 @ 07:00  --------------------------------------------------------  IN: 1500 mL / OUT: 2250 mL / NET: -750 mL    03-30-21 @ 07:01  -  03-31-21 @ 00:55  --------------------------------------------------------  IN: 3371 mL / OUT: 700 mL / NET: 2671 mL        Medications:  MEDICATIONS  (STANDING):  dextrose 50% Injectable 25 Gram(s) IV Push once  glucagon  Injectable 1 milliGRAM(s) IntraMuscular once  influenza   Vaccine 0.5 milliLiter(s) IntraMuscular once  insulin lispro (ADMELOG) corrective regimen sliding scale   SubCutaneous every 6 hours  lactated ringers. 1000 milliLiter(s) (140 mL/Hr) IV Continuous <Continuous>  levothyroxine 200 MICROGram(s) Oral daily  polyethylene glycol/electrolyte Solution 1000 milliLiter(s) Oral once    MEDICATIONS  (PRN):   Surgery Progress Note    SUBJECTIVE: No acute events overnight. Over 24hrs, pt 1upRBC, 1uFFP, 1u plt. Pt transferred to SICU for hemodynamic monitoring. Pt seen and examined at bedside. Patient comfortable. No nausea, vomiting, diarrhea. Kept on Moviprep overnight in prep for IR procedure today.     Vital Signs Last 24 Hrs  T(C): 36.3 (31 Mar 2021 08:00), Max: 37 (30 Mar 2021 12:00)  T(F): 97.3 (31 Mar 2021 08:00), Max: 98.6 (30 Mar 2021 12:00)  HR: 99 (31 Mar 2021 08:00) (78 - 99)  BP: 116/61 (31 Mar 2021 08:00) (113/60 - 153/84)  BP(mean): 74 (31 Mar 2021 08:00) (73 - 104)  ABP: --  ABP(mean): --  RR: 21 (31 Mar 2021 08:00) (16 - 21)  SpO2: 100% (31 Mar 2021 08:00) (95% - 100%)    Physical Exam:  General Appearance: Appears well, in no acute distress, awake, alert, and oriented x3.  Respiratory: No labored breathing  CV: Pulse regularly present  Abdomen: Soft, nontender, nondistended w/o rebound tenderness or guarding.  Extremities: Warm and well perfused, moving spontaneously    LABS:                        8.8    6.98  )-----------( 100      ( 31 Mar 2021 02:47 )             27.0                         9.2    7.32  )-----------( 81       ( 30 Mar 2021 15:55 )             27.1     03-30    140  |  106  |  18  ----------------------------<  146<H>  3.4<L>   |  25  |  1.13    Ca    7.3<L>      30 Mar 2021 08:06  Phos  2.1     03-30  Mg     1.9     03-30      PT/INR - ( 29 Mar 2021 20:52 )   PT: 14.1 sec;   INR: 1.24 ratio         PTT - ( 29 Mar 2021 20:52 )  PTT:28.2 sec      INs and OUTs:    03-29-21 @ 07:01  -  03-30-21 @ 07:00  --------------------------------------------------------  IN: 1500 mL / OUT: 2250 mL / NET: -750 mL    03-30-21 @ 07:01  -  03-31-21 @ 00:55  --------------------------------------------------------  IN: 3371 mL / OUT: 700 mL / NET: 2671 mL        Medications:  MEDICATIONS  (STANDING):  dextrose 50% Injectable 25 Gram(s) IV Push once  glucagon  Injectable 1 milliGRAM(s) IntraMuscular once  influenza   Vaccine 0.5 milliLiter(s) IntraMuscular once  insulin lispro (ADMELOG) corrective regimen sliding scale   SubCutaneous every 6 hours  lactated ringers. 1000 milliLiter(s) (140 mL/Hr) IV Continuous <Continuous>  levothyroxine 200 MICROGram(s) Oral daily  polyethylene glycol/electrolyte Solution 1000 milliLiter(s) Oral once    MEDICATIONS  (PRN):

## 2021-03-31 NOTE — PROCEDURE NOTE - PROCEDURE FINDINGS AND DETAILS
5mg Verapamil  200 mcg Nitroglycerin  10 mg t-PA   Extravasation seen within proximal transverse colon  Embolized to completion
Multiple angiograms of the SMA demonstrate not active bleeding. Additional subselective angiography of the middle colic artery and its branches failed to show any bleeding despite this territory corresponding to the bleeding noted on prior CTA. No embolization was performed. Right groin site hemostasis achieved with angio-seal device. Patient received 2U PRBC during procedure.

## 2021-03-31 NOTE — PRE PROCEDURE NOTE - PRE PROCEDURE EVALUATION
------------------------------------------------------------  Interventional Radiology Pre-Procedure Note  ------------------------------------------------------------    Indication: 65y Female with diverticulosis, intermittent bleeding with negative prior angiography    Past Medical History:  Hypothyroidism    Pernicious anemia    Diverticulosis    HTN (hypertension)    Lower gastrointestinal bleeding    Pernicious anemia    SOLOMON (obstructive sleep apnea)    Type 2 diabetes mellitus    Left ureteral stone    Fibroid uterus    Lumbar herniated disc    Multiparity    Heart murmur        Allergies: No Known Allergies  oxycodone (Other)      Medications:  heparin   Injectable: 5000 Unit(s) SubCutaneous (03-29-21 @ 22:26)      Vital Signs:   T(F): 97.3 (08:00), Max: 98.6 (12:00)  HR: 100 (09:00)  BP: 119/63 (09:00)  RR: 19 (09:00)  SpO2: 100% (09:00)    Labs:           8.8  6.98)-----(100     (03-31-21 @ 02:47)         27.0     143 | 110 | 17  --------------------< 143     (03-31-21 @ 02:47)  3.8 | 21 | 1.00       PT: 12.7 03-31-21 @ 02:47  aPTT: 33.5 03-31-21 @ 02:47   INR: 1.11 03-31-21 @ 02:47    Imaging:  < from: IR Procedure (03.28.21 @ 15:53) >  EXAM:  IR PROCEDURE        PROCEDURE DATE:  Mar 28 2021         INTERPRETATION:  PROCEDURE:  SELECTIVE SUPERIOR MESENTERIC, MIDDLE COLIC, 3 BRANCHES OF THE MIDDLE COLIC ARTERY ARTERIOGRAPHY.    Attending: Dr. Susu Fraga  Resident: Dr. Andres Maher    Clinical indication: 65-year-old female admitted for diverticular bleeding. Prior CTA demonstrates bleeding in the transverse colon near the hepatic flexure.    Technique:    The risks, benefits and alternatives to the procedure were discussed withthe patient and informed consent was obtained. The patient was placed supine on the procedure table.    Intravenous sedation was provided by the anesthesiologist. 1% lidocaine was used for local anesthesia.    Ultrasound of the right groin demonstrated patent common femoral artery and vein. Ultrasound images were saved in PACS.    The right groin was prepped and draped in the usual sterile fashion. Under ultrasound guidance, the right common femoral artery was accessed and a 5 Japanese long sheath placed.    A 5 Japanese Sos catheter was then used to catheterize the superior mesenteric artery and selective superior mesenteric arteriography was performed.    A 2.8 Japanese Progreat microcatheter and 0.018 wire were used to catheterize the middle colic artery and selective middle colic arteriography was performed.    The microcatheter was then used to sequentially catheterize 3 branches of the middle colic artery with selective arteriograms of each vessel performed.    The microcatheter and guidecatheter were removed. The sheath was removed and hemostasis controlled with a 6 Japanese Angio-Seal closure.    FINDINGS:  Focused ultrasound examination of the right groin demonstrates a patent right common femoral artery.    Selective superior mesenteric arteriography demonstrated a patent mesenteric artery and its branches. Delayed phase demonstrated a patent portal vein. Incidental note of a replaced right hepatic artery arising from the proximal SMA. No evidence of bleeding source were identified.    Selective middle colic arteriography demonstrated a patent middle colic artery and its branches. No evidence for bleeding source were identified.    Subselective angiography of 3 branches of the middle colic artery covering entire transverse colon demonstrated no evidence for bleeding source.    IMPRESSION:  SUCCESSFUL SUPERIOR MESENTERIC, MIDDLE COLIC ANGIOGRAPHY INCLUDING ITS 3 BRANCHES DEMONSTRATED NO EVIDENCE FOR BLEEDING SOURCE.            ANDRES MAHER MD; Resident Interventional Radiology  This document has been electronically signed.  SUSU FRAGA MD; Attending Interventional Radiologist  This document has been electronically signed. Mar 30 2021 10:57AM    < end of copied text >  < from: CT Angio Abdomen and Pelvis w/ IV Cont (03.25.21 @ 09:24) >  EXAM:  CT ANGIO ABD PELV (W)AW IC        PROCEDURE DATE:  Mar 25 2021         INTERPRETATION:  CLINICAL INFORMATION: History of colonic diverticulosis. Gastrointestinal bleeding.    ADDITIONAL CLINICAL INFORMATION: Other, Non-specified    COMPARISON: CT abdomen/pelvis from 3/15/2019.    CONTRAST/COMPLICATIONS:  IV Contrast: Omnipaque 350  90 cc administered   10 cc discarded  Oral Contrast: NONE  Complications: None reported at time of study completion    PROCEDURE:  CT of the Abdomen and Pelvis was performed.  Precontrast, Arterial and Delayed phases were performed.  Sagittal and coronal reformats were performed.    FINDINGS:  LOWER CHEST: Small pericardial effusion, without significant change. Left lower lobe linear atelectasis.    LIVER:Within normal limits.  BILE DUCTS: Normal caliber.  GALLBLADDER: Within normal limits.  SPLEEN: Within normal limits.  PANCREAS: Within normal limits.  ADRENALS: Within normal limits.  KIDNEYS/URETERS: Subcentimeter parenchymal hypodensities, too small to characterize. No hydronephrosis. 2 mm nonobstructing stone in the upper pole of right kidney.    BLADDER: Within normal limits.  REPRODUCTIVE ORGANS: Hysterectomy.    BOWEL: Small fat-containing hiatal hernia. No bowel obstruction. Appendix is normal. Colonic diverticulosis without diverticulitis. There is intraluminal contrast extravasation within the proximal transverse colon which increases in size on the delayed images and is consistent with active bleeding (605-28, 607-27).  PERITONEUM:No ascites.  VESSELS: Within normal limits.  RETROPERITONEUM/LYMPH NODES: No lymphadenopathy.  ABDOMINAL WALL: Small fat-containing umbilical hernia.  BONES: Degenerative changes. L4-S1 posterior fusion hardware.    IMPRESSION:    Small pericardial effusion, unchanged.  Active bleeding at the hepatic flexure and proximal transverse colon.    These findings were discussed with Dr. Mayo at 3/25/2021 10:25 AM by Dr. Cuellar of Radiology with read back confirmation.            PAIGE CUELLAR MD; Resident Radiology  This document has been electronically signed.  ERIN WALTER MD; Attending Radiologist  This document has been electronically signed. Mar 25 2021 10:47AM    < end of copied text >    Consent: Risks, benefits and alternatives to procedure discussed with patient and informed consent obtained    Procedure Plan: Provocative Mesenteric Angiogram and Possible Embolization

## 2021-03-31 NOTE — PROGRESS NOTE ADULT - ASSESSMENT
65F hx multiple admissions for pandiverticulosis, herniated lumbar disc (s/p plate), SOLOMON, prediabetes (on oral agent) pw BRBPR cf recurrent diverticular bleed and CTA showing active extravasation at the hepatic flexure sp IR angiogram (3/28). No active bleeding found in SMA or middle colic artery and its branches. No embolization was performed. Pt with conintued bloody bowel movements and non-responsive to multiple transfusions.  SICU consulted for hemodynamic monitoring in the setting of active GI bleed, HD at this time.     NEURO:  A&Ox3  Pain well controlled, can give tylenol PRN    RESP:  Satting well on RA, No active issues    CV:  Hypotensive yesterday - remains hemodynamically stable since arrival in SICU  Holding home ramipril    GI  NPO for IR procedure tomorrow  Moviprep if patient needs operative intervention    :  - LR @ 140  - Monitor urine output, replete lytes as needed    Endo  SSI    Heme:  Holding AC in setting of GIB  Transfuse as needed for Hgb<7 or worsening bleeding / hemodynamic changes   Transfuse 1:1:1    ID:  No need for Abx at this time    DISPO/Lines  SICU

## 2021-04-01 LAB
ALBUMIN SERPL ELPH-MCNC: 3.4 G/DL — SIGNIFICANT CHANGE UP (ref 3.3–5)
ALP SERPL-CCNC: 44 U/L — SIGNIFICANT CHANGE UP (ref 40–120)
ALT FLD-CCNC: 25 U/L — SIGNIFICANT CHANGE UP (ref 4–33)
ANION GAP SERPL CALC-SCNC: 10 MMOL/L — SIGNIFICANT CHANGE UP (ref 7–14)
AST SERPL-CCNC: 28 U/L — SIGNIFICANT CHANGE UP (ref 4–32)
BILIRUB SERPL-MCNC: 0.9 MG/DL — SIGNIFICANT CHANGE UP (ref 0.2–1.2)
BLD GP AB SCN SERPL QL: NEGATIVE — SIGNIFICANT CHANGE UP
BUN SERPL-MCNC: 8 MG/DL — SIGNIFICANT CHANGE UP (ref 7–23)
CALCIUM SERPL-MCNC: 7.7 MG/DL — LOW (ref 8.4–10.5)
CHLORIDE SERPL-SCNC: 110 MMOL/L — HIGH (ref 98–107)
CO2 SERPL-SCNC: 24 MMOL/L — SIGNIFICANT CHANGE UP (ref 22–31)
CREAT SERPL-MCNC: 1.04 MG/DL — SIGNIFICANT CHANGE UP (ref 0.5–1.3)
GLUCOSE BLDC GLUCOMTR-MCNC: 107 MG/DL — HIGH (ref 70–99)
GLUCOSE BLDC GLUCOMTR-MCNC: 120 MG/DL — HIGH (ref 70–99)
GLUCOSE BLDC GLUCOMTR-MCNC: 86 MG/DL — SIGNIFICANT CHANGE UP (ref 70–99)
GLUCOSE SERPL-MCNC: 107 MG/DL — HIGH (ref 70–99)
HCT VFR BLD CALC: 21.8 % — LOW (ref 34.5–45)
HCT VFR BLD CALC: 26.5 % — LOW (ref 34.5–45)
HGB BLD-MCNC: 7.6 G/DL — LOW (ref 11.5–15.5)
HGB BLD-MCNC: 9.2 G/DL — LOW (ref 11.5–15.5)
MAGNESIUM SERPL-MCNC: 2.1 MG/DL — SIGNIFICANT CHANGE UP (ref 1.6–2.6)
MCHC RBC-ENTMCNC: 30 PG — SIGNIFICANT CHANGE UP (ref 27–34)
MCHC RBC-ENTMCNC: 30.1 PG — SIGNIFICANT CHANGE UP (ref 27–34)
MCHC RBC-ENTMCNC: 34.7 GM/DL — SIGNIFICANT CHANGE UP (ref 32–36)
MCHC RBC-ENTMCNC: 34.9 GM/DL — SIGNIFICANT CHANGE UP (ref 32–36)
MCV RBC AUTO: 86.2 FL — SIGNIFICANT CHANGE UP (ref 80–100)
MCV RBC AUTO: 86.6 FL — SIGNIFICANT CHANGE UP (ref 80–100)
NRBC # BLD: 0 /100 WBCS — SIGNIFICANT CHANGE UP
NRBC # BLD: 0 /100 WBCS — SIGNIFICANT CHANGE UP
NRBC # FLD: 0 K/UL — SIGNIFICANT CHANGE UP
NRBC # FLD: 0 K/UL — SIGNIFICANT CHANGE UP
PHOSPHATE SERPL-MCNC: 3.1 MG/DL — SIGNIFICANT CHANGE UP (ref 2.5–4.5)
PLATELET # BLD AUTO: 73 K/UL — LOW (ref 150–400)
PLATELET # BLD AUTO: 99 K/UL — LOW (ref 150–400)
POTASSIUM SERPL-MCNC: 3.7 MMOL/L — SIGNIFICANT CHANGE UP (ref 3.5–5.3)
POTASSIUM SERPL-SCNC: 3.7 MMOL/L — SIGNIFICANT CHANGE UP (ref 3.5–5.3)
PROT SERPL-MCNC: 5.3 G/DL — LOW (ref 6–8.3)
RBC # BLD: 2.53 M/UL — LOW (ref 3.8–5.2)
RBC # BLD: 3.06 M/UL — LOW (ref 3.8–5.2)
RBC # FLD: 14.5 % — SIGNIFICANT CHANGE UP (ref 10.3–14.5)
RBC # FLD: 15 % — HIGH (ref 10.3–14.5)
RH IG SCN BLD-IMP: POSITIVE — SIGNIFICANT CHANGE UP
SODIUM SERPL-SCNC: 144 MMOL/L — SIGNIFICANT CHANGE UP (ref 135–145)
WBC # BLD: 5.32 K/UL — SIGNIFICANT CHANGE UP (ref 3.8–10.5)
WBC # BLD: 7.15 K/UL — SIGNIFICANT CHANGE UP (ref 3.8–10.5)
WBC # FLD AUTO: 5.32 K/UL — SIGNIFICANT CHANGE UP (ref 3.8–10.5)
WBC # FLD AUTO: 7.15 K/UL — SIGNIFICANT CHANGE UP (ref 3.8–10.5)

## 2021-04-01 PROCEDURE — 93010 ELECTROCARDIOGRAM REPORT: CPT

## 2021-04-01 PROCEDURE — 99231 SBSQ HOSP IP/OBS SF/LOW 25: CPT

## 2021-04-01 PROCEDURE — 99233 SBSQ HOSP IP/OBS HIGH 50: CPT

## 2021-04-01 RX ORDER — POTASSIUM CHLORIDE 20 MEQ
10 PACKET (EA) ORAL
Refills: 0 | Status: COMPLETED | OUTPATIENT
Start: 2021-04-01 | End: 2021-04-01

## 2021-04-01 RX ORDER — SODIUM CHLORIDE 9 MG/ML
1000 INJECTION, SOLUTION INTRAVENOUS
Refills: 0 | Status: DISCONTINUED | OUTPATIENT
Start: 2021-04-01 | End: 2021-04-04

## 2021-04-01 RX ORDER — ACETAMINOPHEN 500 MG
1000 TABLET ORAL ONCE
Refills: 0 | Status: COMPLETED | OUTPATIENT
Start: 2021-04-01 | End: 2021-04-01

## 2021-04-01 RX ADMIN — Medication 200 MICROGRAM(S): at 05:08

## 2021-04-01 RX ADMIN — Medication 1.25 MILLIGRAM(S): at 12:44

## 2021-04-01 RX ADMIN — Medication 400 MILLIGRAM(S): at 18:51

## 2021-04-01 RX ADMIN — SODIUM CHLORIDE 100 MILLILITER(S): 9 INJECTION, SOLUTION INTRAVENOUS at 18:05

## 2021-04-01 RX ADMIN — Medication 1.25 MILLIGRAM(S): at 19:00

## 2021-04-01 RX ADMIN — Medication 100 MILLIEQUIVALENT(S): at 05:49

## 2021-04-01 RX ADMIN — SODIUM CHLORIDE 100 MILLILITER(S): 9 INJECTION, SOLUTION INTRAVENOUS at 19:28

## 2021-04-01 RX ADMIN — Medication 100 MILLIEQUIVALENT(S): at 06:51

## 2021-04-01 RX ADMIN — Medication 100 MILLIEQUIVALENT(S): at 04:50

## 2021-04-01 RX ADMIN — Medication 100 MILLIEQUIVALENT(S): at 00:01

## 2021-04-01 RX ADMIN — Medication 100 MILLIEQUIVALENT(S): at 01:03

## 2021-04-01 RX ADMIN — Medication 1000 MILLIGRAM(S): at 19:06

## 2021-04-01 NOTE — SBIRT NOTE ADULT - NSSBIRTDRGPASSREFTXDET_GEN_A_CORE
SW provided information for [facility] as well as virtual grief therapy support groups. Spoke with SICU Resident as pt. is requesting psych consult.

## 2021-04-01 NOTE — DIETITIAN INITIAL EVALUATION ADULT. - ORAL INTAKE PTA/DIET HISTORY
Met w/pt who provided nutrition hx.  Pt denies food allergies, modified diet or difficulty chewing/swallowing PTA.  She endorses that she has had a decrease in food intake since 9/2020 2/2 decrease in her sense of taste.  She describes food intake to being about 50% of her usual intake.  She reports wt loss of approximately 20lbs PTA.  Noted additional wt loss during admission.  Pt NPO/Clear liquid diet x~6days.  Pt w/o appetite at this time.

## 2021-04-01 NOTE — DIETITIAN INITIAL EVALUATION ADULT. - OTHER INFO
64 y/o female with history of diverticulosis (s/p multiple admissions, no operative intervention as they have pandiverticulosis and would require total abdominal colectomy), herniated lumbar disc (s/p plate), SOLOMON, prediabetes (on oral agent) presents with BRBPR last night. She says this episode is similar to her previous diverticular bleeds. She feel slightly lightheaded but denies fever, chills, abdominal pain, N/V, weakness, or numbness. She is still having bloody bowel movements in ED and planned to get 2U PRBC. She also had CTA abdomen showing active extravasation at hepatic flexure.   Now s/p provocative mesenteric angiogram and embolization

## 2021-04-01 NOTE — SBIRT NOTE ADULT - NSSBIRTDRGPOSREINDET_GEN_A_CORE
Pt. reports that she is suffering from depression and grief related to her husbands passing 3 years ago. She feels this was exacerbated by isolation from Covid pandemic. Pt. reports when she is feeling "panicky" she has taken Xanax (unprescribed) a few times. Pt. has insight into risks of taking medications not prescribed by a physician psychiatrist and is requesting O/P follow up. SW provided positive reinforcement as pt. is able to acknowledge need for assistance.

## 2021-04-01 NOTE — PROGRESS NOTE ADULT - SUBJECTIVE AND OBJECTIVE BOX
65y Female s/p Provocative Mesenteric Angiogram and Embolization on 3/31/21 in Interventional Radiology.     Patient seen and examined bedside resting comfortably. No events overnight, no further bleeding.    T(F): 97.8 (04-01-21 @ 11:00), Max: 100.5 (03-31-21 @ 21:25)  HR: 88 (04-01-21 @ 11:00) (87 - 100)  BP: 167/90 (04-01-21 @ 11:00) (110/58 - 192/93)  RR: 20 (04-01-21 @ 11:00) (14 - 22)  SpO2: 93% (04-01-21 @ 11:00) (93% - 100%)  Wt(kg): --    LABS:                        7.6    5.32  )-----------( 73       ( 01 Apr 2021 02:42 )             21.8     04-01    144  |  110<H>  |  8   ----------------------------<  107<H>  3.7   |  24  |  1.04    Ca    7.7<L>      01 Apr 2021 02:42  Phos  3.1     04-01  Mg     2.1     04-01    TPro  5.3<L>  /  Alb  3.4  /  TBili  0.9  /  DBili  x   /  AST  28  /  ALT  25  /  AlkPhos  44  04-01    PT/INR - ( 31 Mar 2021 15:45 )   PT: 14.3 sec;   INR: 1.27 ratio         PTT - ( 31 Mar 2021 15:45 )  PTT:29.0 sec  I&O's Detail    31 Mar 2021 07:01  -  01 Apr 2021 07:00  --------------------------------------------------------  IN:    IV PiggyBack: 750 mL    Lactated Ringers: 1360 mL    Plasma: 503 mL    Platelets - Single Donor: 703 mL    PRBCs (Packed Red Blood Cells): 900 mL  Total IN: 4216 mL    OUT:    Voided (mL): 3000 mL  Total OUT: 3000 mL    Total NET: 1216 mL      01 Apr 2021 07:01  -  01 Apr 2021 12:00  --------------------------------------------------------  IN:    Lactated Ringers: 400 mL  Total IN: 400 mL    OUT:    Voided (mL): 2200 mL  Total OUT: 2200 mL    Total NET: -1800 mL            PHYSICAL EXAM:  General: Nontoxic, in NAD  Right groin puncture site clean, dry, no hematoma

## 2021-04-01 NOTE — PROGRESS NOTE ADULT - SUBJECTIVE AND OBJECTIVE BOX
Surgery Progress Note    24Hr Events:  - sp IR provocative mesenteric angiogram, embolization  - sp 2uPRBCs, 2uFFP, 2uPLTs    Overnight: No acute events    SUBJECTIVE: Pt seen and examined at bedside. Patient comfortable. No nausea, vomiting, diarrhea. Pain is controlled. +Flatus/+BM.     Vital Signs Last 24 Hrs  T(C): 37.4 (01 Apr 2021 00:15), Max: 38.1 (31 Mar 2021 21:25)  T(F): 99.3 (01 Apr 2021 00:15), Max: 100.5 (31 Mar 2021 21:25)  HR: 100 (01 Apr 2021 00:15) (78 - 100)  BP: 129/62 (01 Apr 2021 00:15) (110/58 - 159/85)  BP(mean): 78 (01 Apr 2021 00:15) (71 - 105)  RR: 19 (01 Apr 2021 00:15) (15 - 22)  SpO2: 94% (01 Apr 2021 00:15) (94% - 100%)    Physical Exam:  General Appearance: Appears well, in no acute distress, awake, alert, and oriented x3.  Respiratory: No labored breathing  CV: Pulse regularly present  Abdomen: Soft, nontender, nondistended w/o rebound tenderness or guarding. Incision site c/d/i  Extremities: Warm and well perfused, moving spontaneously    LABS:                        6.4    3.92  )-----------( 63       ( 31 Mar 2021 22:33 )             18.8     03-31    144  |  111<H>  |  12  ----------------------------<  134<H>  3.4<L>   |  24  |  1.02    Ca    6.9<L>      31 Mar 2021 15:45  Phos  2.7     03-31  Mg     1.6     03-31      PT/INR - ( 31 Mar 2021 15:45 )   PT: 14.3 sec;   INR: 1.27 ratio         PTT - ( 31 Mar 2021 15:45 )  PTT:29.0 sec      INs and OUTs:    03-30-21 @ 07:01  -  03-31-21 @ 07:00  --------------------------------------------------------  IN: 4351 mL / OUT: 700 mL / NET: 3651 mL    03-31-21 @ 07:01  -  04-01-21 @ 00:32  --------------------------------------------------------  IN: 2785 mL / OUT: 2150 mL / NET: 635 mL        Medications:  MEDICATIONS  (STANDING):  dextrose 50% Injectable 25 Gram(s) IV Push once  glucagon  Injectable 1 milliGRAM(s) IntraMuscular once  influenza   Vaccine 0.5 milliLiter(s) IntraMuscular once  insulin lispro (ADMELOG) corrective regimen sliding scale   SubCutaneous every 6 hours  lactated ringers. 1000 milliLiter(s) (100 mL/Hr) IV Continuous <Continuous>  levothyroxine 200 MICROGram(s) Oral daily  potassium chloride  10 mEq/100 mL IVPB 10 milliEquivalent(s) IV Intermittent every 1 hour    MEDICATIONS  (PRN):   Surgery Progress Note    24Hr Events:  - Provocative mesenteric angiogram and embolization 3/31: extravasation seen within proximal transverse colon, embolized to completion.   - Post op H/H: 4.8/14.5. f/u stat labs and following 3upRBC, 2u plt  - Intraop: 1L albumin, 500 cc NS, 500 cc LR   - Post op, received total 3U PRBCs, 2 plasma, 2 platelets h/h 4.8/14.5 > 6.4/18.8  - Platelets trending down 76 > 63 despite transfusion > 3rd unit platelets ordered   - no bloody BMs since return to SICU   - transfusion reaction w/ fever and itching .5. benadryl given x1    SUBJECTIVE: Pt seen and examined at bedside. Patient comfortable. No nausea, vomiting, diarrhea. Pain is controlled. +Flatus/+BM.     Vital Signs Last 24 Hrs  T(C): 37.4 (01 Apr 2021 00:15), Max: 38.1 (31 Mar 2021 21:25)  T(F): 99.3 (01 Apr 2021 00:15), Max: 100.5 (31 Mar 2021 21:25)  HR: 100 (01 Apr 2021 00:15) (78 - 100)  BP: 129/62 (01 Apr 2021 00:15) (110/58 - 159/85)  BP(mean): 78 (01 Apr 2021 00:15) (71 - 105)  RR: 19 (01 Apr 2021 00:15) (15 - 22)  SpO2: 94% (01 Apr 2021 00:15) (94% - 100%)    Physical Exam:  General Appearance: Appears well, in no acute distress, awake, alert, and oriented x3.  Respiratory: No labored breathing  CV: Pulse regularly present  Abdomen: Soft, nontender, nondistended w/o rebound tenderness or guarding. Incision site c/d/i  Extremities: Warm and well perfused, moving spontaneously    LABS:                        6.4    3.92  )-----------( 63       ( 31 Mar 2021 22:33 )             18.8     03-31    144  |  111<H>  |  12  ----------------------------<  134<H>  3.4<L>   |  24  |  1.02    Ca    6.9<L>      31 Mar 2021 15:45  Phos  2.7     03-31  Mg     1.6     03-31      PT/INR - ( 31 Mar 2021 15:45 )   PT: 14.3 sec;   INR: 1.27 ratio         PTT - ( 31 Mar 2021 15:45 )  PTT:29.0 sec      INs and OUTs:    03-30-21 @ 07:01  -  03-31-21 @ 07:00  --------------------------------------------------------  IN: 4351 mL / OUT: 700 mL / NET: 3651 mL    03-31-21 @ 07:01  -  04-01-21 @ 00:32  --------------------------------------------------------  IN: 2785 mL / OUT: 2150 mL / NET: 635 mL        Medications:  MEDICATIONS  (STANDING):  dextrose 50% Injectable 25 Gram(s) IV Push once  glucagon  Injectable 1 milliGRAM(s) IntraMuscular once  influenza   Vaccine 0.5 milliLiter(s) IntraMuscular once  insulin lispro (ADMELOG) corrective regimen sliding scale   SubCutaneous every 6 hours  lactated ringers. 1000 milliLiter(s) (100 mL/Hr) IV Continuous <Continuous>  levothyroxine 200 MICROGram(s) Oral daily  potassium chloride  10 mEq/100 mL IVPB 10 milliEquivalent(s) IV Intermittent every 1 hour    MEDICATIONS  (PRN):   Surgery Progress Note    24Hr Events:  - Provocative mesenteric angiogram and embolization 3/31: extravasation seen within proximal transverse colon, embolized to completion.   - Intraop: 1L albumin, 500 cc NS, 500 cc LR   - Post op, received total 3U PRBCs, 2 plasma, 3 platelets h/h 4.8/14.5 > 6.4/18.8; plts now 73  - no bloody BMs since return to SICU   - possible transfusion reaction w/ fever and itching .5. benadryl, tyl given x1    SUBJECTIVE: Pt seen and examined at bedside. Patient comfortable. Pt does note some right thigh/back pain. No nausea, vomiting, diarrhea. Pain is controlled.     Vital Signs Last 24 Hrs  T(C): 37.3 (01 Apr 2021 04:00), Max: 38.1 (31 Mar 2021 21:25)  T(F): 99.2 (01 Apr 2021 04:00), Max: 100.5 (31 Mar 2021 21:25)  HR: 87 (01 Apr 2021 07:00) (87 - 100)  BP: 146/91 (01 Apr 2021 07:00) (110/58 - 159/85)  BP(mean): 104 (01 Apr 2021 07:00) (71 - 105)  RR: 14 (01 Apr 2021 07:00) (14 - 22)  SpO2: 100% (01 Apr 2021 07:00) (94% - 100%)    Physical Exam:  General Appearance: Appears well, in no acute distress, awake, alert, and oriented x3.  Respiratory: No labored breathing  CV: Pulse regularly present  Abdomen: Soft, nontender, nondistended w/o rebound tenderness or guarding. Incision site c/d/i  Extremities: Warm and well perfused, moving spontaneously    LABS:                        7.6    5.32  )-----------( 73       ( 01 Apr 2021 02:42 )             21.8                6.4    3.92  )-----------( 63       ( 31 Mar 2021 22:33 )             18.8   04-01    144  |  110<H>  |  8   ----------------------------<  107<H>  3.7   |  24  |  1.04    Ca    7.7<L>      01 Apr 2021 02:42  Phos  3.1     04-01  Mg     2.1     04-01    TPro  5.3<L>  /  Alb  3.4  /  TBili  0.9  /  DBili  x   /  AST  28  /  ALT  25  /  AlkPhos  44  04-01 03-31    144  |  111<H>  |  12  ----------------------------<  134<H>  3.4<L>   |  24  |  1.02    Ca    6.9<L>      31 Mar 2021 15:45  Phos  2.7     03-31  Mg     1.6     03-31      PT/INR - ( 31 Mar 2021 15:45 )   PT: 14.3 sec;   INR: 1.27 ratio         PTT - ( 31 Mar 2021 15:45 )  PTT:29.0 sec      INs and OUTs:    03-30-21 @ 07:01  -  03-31-21 @ 07:00  --------------------------------------------------------  IN: 4351 mL / OUT: 700 mL / NET: 3651 mL    03-31-21 @ 07:01  -  04-01-21 @ 00:32  --------------------------------------------------------  IN: 2785 mL / OUT: 2150 mL / NET: 635 mL    I&O's Summary    31 Mar 2021 07:01  -  01 Apr 2021 07:00  --------------------------------------------------------  IN: 4216 mL / OUT: 3000 mL / NET: 1216 mL    01 Apr 2021 07:01  -  01 Apr 2021 07:54  --------------------------------------------------------  IN: 0 mL / OUT: 450 mL / NET: -450 mL        Medications:  MEDICATIONS  (STANDING):  dextrose 50% Injectable 25 Gram(s) IV Push once  glucagon  Injectable 1 milliGRAM(s) IntraMuscular once  influenza   Vaccine 0.5 milliLiter(s) IntraMuscular once  insulin lispro (ADMELOG) corrective regimen sliding scale   SubCutaneous every 6 hours  lactated ringers. 1000 milliLiter(s) (100 mL/Hr) IV Continuous <Continuous>  levothyroxine 200 MICROGram(s) Oral daily  potassium chloride  10 mEq/100 mL IVPB 10 milliEquivalent(s) IV Intermittent every 1 hour    MEDICATIONS  (PRN):   Surgery Progress Note    24Hr Events:  - Provocative mesenteric angiogram and embolization 3/31: extravasation seen within proximal transverse colon, embolized to completion.   - Intraop: 1L albumin, 500 cc NS, 500 cc LR   - Post op, received total 3U PRBCs, 2 plasma, 3 platelets h/h 4.8/14.5 > 6.4/18.8; plts now 73  - no bloody BMs since return to SICU   - possible transfusion reaction w/ fever and itching .5. benadryl, tyl given x1    SUBJECTIVE: Pt seen and examined at bedside. Patient comfortable. Pt does note some right thigh/back pain. No nausea, vomiting, diarrhea. Pain is controlled.     Vital Signs Last 24 Hrs  T(C): 37.3 (01 Apr 2021 04:00), Max: 38.1 (31 Mar 2021 21:25)  T(F): 99.2 (01 Apr 2021 04:00), Max: 100.5 (31 Mar 2021 21:25)  HR: 100 (01 Apr 2021 08:00) (87 - 100)  BP: 166/87 (01 Apr 2021 08:00) (110/58 - 166/87)  BP(mean): 107 (01 Apr 2021 08:00) (71 - 107)  RR: 19 (01 Apr 2021 08:00) (14 - 22)  SpO2: 98% (01 Apr 2021 08:00) (94% - 100%)    Physical Exam:  General Appearance: Appears well, in no acute distress, awake, alert, and oriented x3.  Respiratory: No labored breathing  CV: Pulse regularly present  Abdomen: Soft, nontender, nondistended w/o rebound tenderness or guarding. Incision site c/d/i  Extremities: Warm and well perfused, moving spontaneously    LABS:                             7.6    5.32  )-----------( 73       ( 01 Apr 2021 02:42 )             21.8       04-01    144  |  110<H>  |  8   ----------------------------<  107<H>  3.7   |  24  |  1.04    Ca    7.7<L>      01 Apr 2021 02:42  Phos  3.1     04-01  Mg     2.1     04-01    TPro  5.3<L>  /  Alb  3.4  /  TBili  0.9  /  DBili  x   /  AST  28  /  ALT  25  /  AlkPhos  44  04-01            PT/INR - ( 31 Mar 2021 15:45 )   PT: 14.3 sec;   INR: 1.27 ratio         PTT - ( 31 Mar 2021 15:45 )  PTT:29.0 sec        CAPILLARY BLOOD GLUCOSE      POCT Blood Glucose.: 120 mg/dL (01 Apr 2021 05:07)      INs and OUTs:    31 Mar 2021 07:01  -  01 Apr 2021 07:00  --------------------------------------------------------  IN:    IV PiggyBack: 750 mL    Lactated Ringers: 1360 mL    Plasma: 503 mL    Platelets - Single Donor: 703 mL    PRBCs (Packed Red Blood Cells): 900 mL  Total IN: 4216 mL    OUT:    Voided (mL): 3000 mL  Total OUT: 3000 mL    Total NET: 1216 mL      01 Apr 2021 07:01  -  01 Apr 2021 08:43  --------------------------------------------------------  IN:    Lactated Ringers: 200 mL  Total IN: 200 mL    OUT:    Voided (mL): 1300 mL  Total OUT: 1300 mL    Total NET: -1100 mL        Medications:  MEDICATIONS  (STANDING):  dextrose 50% Injectable 25 Gram(s) IV Push once  glucagon  Injectable 1 milliGRAM(s) IntraMuscular once  influenza   Vaccine 0.5 milliLiter(s) IntraMuscular once  insulin lispro (ADMELOG) corrective regimen sliding scale   SubCutaneous every 6 hours  lactated ringers. 1000 milliLiter(s) (100 mL/Hr) IV Continuous <Continuous>  levothyroxine 200 MICROGram(s) Oral daily    MEDICATIONS  (PRN):

## 2021-04-01 NOTE — PROGRESS NOTE ADULT - ASSESSMENT
65F hx multiple admissions for pandiverticulosis, herniated lumbar disc (s/p plate), SOLOMON, prediabetes (on oral agent) pw BRBPR cf recurrent diverticular bleed and CTA showing active extravasation at the hepatic flexure sp IR angiogram w/ no active bleeding found(3/28) now PPD1 IR provacative mesenteric angiogram w/ embolization (3/31)    Plan:  - Diet: NPO  - F/U with AM labs  - Appreciate GI consult: no plan for intervention if patient HD stable  - Monitor for bloody bowel movements  - Strict I&Os  - Hold DVT ppx  - Continue home synthroid and ppi IV, restart other home meds as able  - Care per SICU    Surgery Team A  [id number]   65F hx multiple admissions for pandiverticulosis, herniated lumbar disc (s/p plate), SOLOMON, prediabetes (on oral agent) pw BRBPR cf recurrent diverticular bleed and CTA showing active extravasation at the hepatic flexure sp IR angiogram w/ no active bleeding found(3/28) now PPD1 IR provacative mesenteric angiogram w/ embolization (3/31).    Plan:  - Diet: NPO; will consider advancing to clears  - F/U with AM labs  - Appreciate GI consult: no plan for intervention if patient HD stable  - Monitor for bloody bowel movements  - Strict I&Os  - Hold DVT ppx for now; will consider restarting  - Continue home synthroid and ppi IV, restart other home meds as able  - Care per SICU    Surgery Team A  [id number]   65F hx multiple admissions for pandiverticulosis, herniated lumbar disc (s/p plate), SOLOMON, prediabetes (on oral agent) pw BRBPR cf recurrent diverticular bleed and CTA showing active extravasation at the hepatic flexure sp IR angiogram w/ no active bleeding found(3/28) now PPD1 IR provacative mesenteric angiogram w/ embolization (3/31).    Plan:  - Diet: NPO; consider advancing to clears  - Thrombocytopenia: plt 73; to give 1u plt; f/u repeat CBC  - Trend H/H  - Monitor for bloody bowel movements  - Strict I&Os  - Hold DVT ppx for now; consider restarting  - Continue home synthroid and ppi IV, restart other home meds as able  - Care per SICU    Surgery Team A  [id number]   65F hx multiple admissions for pandiverticulosis, herniated lumbar disc (s/p plate), SOLOMON, prediabetes (on oral agent) pw BRBPR cf recurrent diverticular bleed and CTA showing active extravasation at the hepatic flexure sp IR angiogram w/ no active bleeding found(3/28) now PPD1 IR provacative mesenteric angiogram w/ embolization (3/31).    Plan:  - Diet: NPO; will continue to monitor before advancing to CLD  - Thrombocytopenia: plt 73; to give 1u plt; f/u repeat CBC  - Trend H/H  - Monitor for bloody bowel movements  - Strict I&Os  - Hold DVT ppx for now; consider restarting  - Continue home synthroid and ppi IV, restart other home meds as able  - Care per SICU    Surgery Team A  [id number]

## 2021-04-01 NOTE — DIETITIAN INITIAL EVALUATION ADULT. - ADD RECOMMEND
1) Monitor weights, labs, BM's, skin integrity, FS; 2) Initiate Clear liquid consistent carb as medically feasible.  Advance diet as tolerated to soft solids Consistent Carbohydrates as appropriate

## 2021-04-01 NOTE — PROGRESS NOTE ADULT - ASSESSMENT
65y Female with history of pandiverticular bleeding. Patient found to have active extravasation at the hepatic flexure on CTA, S/p IR angiogram (3/28) without active bleeding found in SMA or middle colic artery and its branches. Now s/p provocative mesenteric angiogram and embolization of R middle colic artery, post-operatively has been HD stable and persistently anemic/thrombocytopenic despite transfusion     NEURO:  - Pain well controlled, can give tylenol PRN    RESP:  - Satting well on RA, No active issues    CV:  - Hemodynamically stable since arrival in SICU   - Holding home ramipril    GI  - Remains NPO, f/u with surgical team    :  - LR @ 140  - Monitor urine output, replete lytes as needed    Endo  - SSI    Heme:  - Holding AC in setting of GIB  - Transfuse as needed for Hgb<7, 1:1:1  - Goal platelets >150 given recent coil embolization   - transfusion reaction 3/31 o/n fever .5 and itching, no SOB/n/v/tachycardia/WOB    ID:  - No need for Abx at this time    LINES:  - Right midline    DISPO:   SICU    Critical Care Dx: GI bleed, diverticular bleed     65y Female with history of pandiverticular bleeding. Patient found to have active extravasation at the hepatic flexure on CTA, S/p IR angiogram (3/28) without active bleeding found in SMA or middle colic artery and its branches. Now s/p provocative mesenteric angiogram and embolization of R middle colic artery, post-operatively has been HD stable and persistently anemic/thrombocytopenic despite transfusion       NEURO:  - Pain well controlled, can give tylenol PRN    RESP:  - Satting well on RA, No active issues    CV:  - Hemodynamically stable since arrival in SICU   - Holding home ramipril  - Vasotec 1.25 q6h    GI  - Remains NPO, f/u with surgical team    :  - LR @ 100  - Monitor urine output, replete lytes as needed    Endo  - SSI    Heme:  - Holding AC in setting of GIB  - Transfuse as needed for Hgb<7  - Provocative mesenteric angiogram and embolization 3/31: extravasation seen within proximal transverse colon, embolized to completion.   - Post op, received total 3U PRBCs, 2 plasma, 2 platelets h/h 4.8/14.5 > 7.6/21.8  - Platelets unresponsive s/p 3U plts; will give 1U     ID:  - No need for Abx at this time    LINES:  - Right midline    DISPO:   SICU    Critical Care Dx: GI bleed, diverticular bleed

## 2021-04-01 NOTE — PROGRESS NOTE ADULT - ASSESSMENT
65y Female with history of pandiverticular bleeding. Patient found to have active extravasation at the hepatic flexure on CTA, S/p IR angiogram (3/28) without active bleeding found in SMA or middle colic artery and its branches. Now s/p provocative mesenteric angiogram and embolization of R middle colic artery.  - Pt stable following embolization yesterday, no further bleeding episodes  - Right groin puncture site stable    [id number]

## 2021-04-01 NOTE — PROGRESS NOTE ADULT - SUBJECTIVE AND OBJECTIVE BOX
SICU Daily Progress Note  =====================================================  HPI: 65y Female with history of pandiverticular bleeding. Patient found to have active extravasation at the hepatic flexure on CTA, S/p IR angiogram (3/28) without active bleeding found in SMA or middle colic artery and its branches. Now s/p provocative mesenteric angiogram and embolization    24 HR Events:  - Provocative mesenteric angiogram and embolization 3/31: extravasation seen within proximal transverse colon, embolized to completion.   - Post op H/H: 4.8/14.5. f/u stat labs and following 3upRBC, 2u plt  - Intraop: 1L albumin, 500 cc NS, 500 cc LR   - Post op, received total 3U PRBCs, 2 plasma, 2 platelets h/h 4.8/14.5 > 6.4/18.8  - Platelets trending down 76 > 63 despite transfusion > 3rd unit platelets ordered   - no bloody BMs since return to SICU   - transfusion reaction w/ fever and itching .5. benadryl given x1    MEDICATIONS:   --------------------------------------------------------------------------------------  Neurologic Medications    Respiratory Medications    Cardiovascular Medications    Gastrointestinal Medications  lactated ringers. 1000 milliLiter(s) IV Continuous <Continuous>    Genitourinary Medications    Hematologic/Oncologic Medications  influenza   Vaccine 0.5 milliLiter(s) IntraMuscular once    Antimicrobial/Immunologic Medications    Endocrine/Metabolic Medications  dextrose 50% Injectable 25 Gram(s) IV Push once  glucagon  Injectable 1 milliGRAM(s) IntraMuscular once  insulin lispro (ADMELOG) corrective regimen sliding scale   SubCutaneous every 6 hours  levothyroxine 200 MICROGram(s) Oral daily    Topical/Other Medications    --------------------------------------------------------------------------------------    VITAL SIGNS, INS/OUTS (last 24 hours):  --------------------------------------------------------------------------------------    03-30-21 @ 07:01 - 03-31-21 @ 07:00  --------------------------------------------------------  IN: 4351 mL / OUT: 700 mL / NET: 3651 mL    03-31-21 @ 07:01  -  04-01-21 @ 01:07  --------------------------------------------------------  IN: 2785 mL / OUT: 2150 mL / NET: 635 mL      --------------------------------------------------------------------------------------    EXAM  NEUROLOGY  Exam: Normal, NAD, alert, oriented x3, no focal deficits.    HEENT  Exam: Normocephalic, atraumatic, EOMI.     RESPIRATORY  Exam: Lungs clear to auscultation, Normal expansion/effort.   Mechanical Ventilation:     CARDIOVASCULAR  Exam: S1, S2.  Regular rate and rhythm.       GI/NUTRITION  Exam: Abdomen soft, Non-tender, Non-distended.      VASCULAR  Exam: Extremities warm, pink, well-perfused.    MUSCULOSKELETAL  Exam: All extremities moving spontaneously without limitations.    SKIN  Exam: Good skin turgor, no skin breakdown.     METABOLIC/FLUIDS/ELECTROLYTES  lactated ringers. 1000 milliLiter(s) IV Continuous <Continuous>      HEMATOLOGIC  [x] VTE Prophylaxis:       LABS  --------------------------------------------------------------------------------------    T(C): 37.3 (04-01-21 @ 00:50), Max: 38.1 (03-31-21 @ 21:25)  HR: 98 (04-01-21 @ 01:00) (78 - 100)  BP: 120/63 (04-01-21 @ 01:00) (110/58 - 159/85)  RR: 16 (04-01-21 @ 01:00) (15 - 22)  SpO2: 95% (04-01-21 @ 01:00) (94% - 100%)    --------------------------------------------------------------------------------------   SICU Daily Progress Note  =====================================================  HPI: 65y Female with history of pandiverticular bleeding. Patient found to have active extravasation at the hepatic flexure on CTA, S/p IR angiogram (3/28) without active bleeding found in SMA or middle colic artery and its branches. Now s/p provocative mesenteric angiogram and embolization    24 Hour events:   - Provocative mesenteric angiogram and embolization 3/31: extravasation seen within proximal transverse colon, embolized to completion.   - Intraop: 1L albumin, 500 cc NS, 500 cc LR   - Post op, received total 3U PRBCs, 2 plasma, 2 platelets; h/h 4.8/14.5 > 7.6/21.8  - Goal platelets >150 given recent coil embolization   - Platelets unresponsive s/p 3U plts; will give 1U   - Vasotec started for HTN   - transfusion reaction w/ fever and itching .5. benadryl given x1      MEDICATIONS:   --------------------------------------------------------------------------------------  Neurologic Medications    Respiratory Medications    Cardiovascular Medications    Gastrointestinal Medications  lactated ringers. 1000 milliLiter(s) IV Continuous <Continuous>    Genitourinary Medications    Hematologic/Oncologic Medications  influenza   Vaccine 0.5 milliLiter(s) IntraMuscular once    Antimicrobial/Immunologic Medications    Endocrine/Metabolic Medications  dextrose 50% Injectable 25 Gram(s) IV Push once  glucagon  Injectable 1 milliGRAM(s) IntraMuscular once  insulin lispro (ADMELOG) corrective regimen sliding scale   SubCutaneous every 6 hours  levothyroxine 200 MICROGram(s) Oral daily    Topical/Other Medications    --------------------------------------------------------------------------------------    VITAL SIGNS, INS/OUTS (last 24 hours):  --------------------------------------------------------------------------------------    03-30-21 @ 07:01 - 03-31-21 @ 07:00  --------------------------------------------------------  IN: 4351 mL / OUT: 700 mL / NET: 3651 mL    03-31-21 @ 07:01 - 04-01-21 @ 01:07  --------------------------------------------------------  IN: 2785 mL / OUT: 2150 mL / NET: 635 mL      --------------------------------------------------------------------------------------    EXAM  NEUROLOGY  Exam: Normal, NAD, alert, oriented x3, no focal deficits.    HEENT  Exam: Normocephalic, atraumatic, EOMI.     RESPIRATORY  Exam: Lungs clear to auscultation, Normal expansion/effort.   Mechanical Ventilation:     CARDIOVASCULAR  Exam: S1, S2.  Regular rate and rhythm.       GI/NUTRITION  Exam: Abdomen soft, Non-tender, Non-distended.      VASCULAR  Exam: Extremities warm, pink, well-perfused.    MUSCULOSKELETAL  Exam: All extremities moving spontaneously without limitations.    SKIN  Exam: Good skin turgor, no skin breakdown.     METABOLIC/FLUIDS/ELECTROLYTES  lactated ringers. 1000 milliLiter(s) IV Continuous <Continuous>      HEMATOLOGIC  [x] VTE Prophylaxis:       LABS  --------------------------------------------------------------------------------------    T(C): 37.3 (04-01-21 @ 00:50), Max: 38.1 (03-31-21 @ 21:25)  HR: 98 (04-01-21 @ 01:00) (78 - 100)  BP: 120/63 (04-01-21 @ 01:00) (110/58 - 159/85)  RR: 16 (04-01-21 @ 01:00) (15 - 22)  SpO2: 95% (04-01-21 @ 01:00) (94% - 100%)    --------------------------------------------------------------------------------------

## 2021-04-02 LAB
ALBUMIN SERPL ELPH-MCNC: 3.7 G/DL — SIGNIFICANT CHANGE UP (ref 3.3–5)
ALP SERPL-CCNC: 55 U/L — SIGNIFICANT CHANGE UP (ref 40–120)
ALT FLD-CCNC: 23 U/L — SIGNIFICANT CHANGE UP (ref 4–33)
ANION GAP SERPL CALC-SCNC: 10 MMOL/L — SIGNIFICANT CHANGE UP (ref 7–14)
ANION GAP SERPL CALC-SCNC: 8 MMOL/L — SIGNIFICANT CHANGE UP (ref 7–14)
APTT BLD: 27.9 SEC — SIGNIFICANT CHANGE UP (ref 27–36.3)
AST SERPL-CCNC: 28 U/L — SIGNIFICANT CHANGE UP (ref 4–32)
BILIRUB SERPL-MCNC: 1 MG/DL — SIGNIFICANT CHANGE UP (ref 0.2–1.2)
BUN SERPL-MCNC: 4 MG/DL — LOW (ref 7–23)
BUN SERPL-MCNC: 4 MG/DL — LOW (ref 7–23)
CALCIUM SERPL-MCNC: 8 MG/DL — LOW (ref 8.4–10.5)
CALCIUM SERPL-MCNC: 8 MG/DL — LOW (ref 8.4–10.5)
CHLORIDE SERPL-SCNC: 102 MMOL/L — SIGNIFICANT CHANGE UP (ref 98–107)
CHLORIDE SERPL-SCNC: 102 MMOL/L — SIGNIFICANT CHANGE UP (ref 98–107)
CO2 SERPL-SCNC: 27 MMOL/L — SIGNIFICANT CHANGE UP (ref 22–31)
CO2 SERPL-SCNC: 29 MMOL/L — SIGNIFICANT CHANGE UP (ref 22–31)
CREAT SERPL-MCNC: 0.99 MG/DL — SIGNIFICANT CHANGE UP (ref 0.5–1.3)
CREAT SERPL-MCNC: 1.03 MG/DL — SIGNIFICANT CHANGE UP (ref 0.5–1.3)
GLUCOSE BLDC GLUCOMTR-MCNC: 131 MG/DL — HIGH (ref 70–99)
GLUCOSE BLDC GLUCOMTR-MCNC: 147 MG/DL — HIGH (ref 70–99)
GLUCOSE BLDC GLUCOMTR-MCNC: 149 MG/DL — HIGH (ref 70–99)
GLUCOSE BLDC GLUCOMTR-MCNC: 150 MG/DL — HIGH (ref 70–99)
GLUCOSE BLDC GLUCOMTR-MCNC: 162 MG/DL — HIGH (ref 70–99)
GLUCOSE SERPL-MCNC: 121 MG/DL — HIGH (ref 70–99)
GLUCOSE SERPL-MCNC: 177 MG/DL — HIGH (ref 70–99)
HCT VFR BLD CALC: 26.4 % — LOW (ref 34.5–45)
HCT VFR BLD CALC: 27.8 % — LOW (ref 34.5–45)
HGB BLD-MCNC: 9.1 G/DL — LOW (ref 11.5–15.5)
HGB BLD-MCNC: 9.7 G/DL — LOW (ref 11.5–15.5)
INR BLD: 1.11 RATIO — SIGNIFICANT CHANGE UP (ref 0.88–1.16)
LACTATE SERPL-SCNC: 0.8 MMOL/L — SIGNIFICANT CHANGE UP (ref 0.5–2)
MAGNESIUM SERPL-MCNC: 1.7 MG/DL — SIGNIFICANT CHANGE UP (ref 1.6–2.6)
MAGNESIUM SERPL-MCNC: 2.1 MG/DL — SIGNIFICANT CHANGE UP (ref 1.6–2.6)
MCHC RBC-ENTMCNC: 30.3 PG — SIGNIFICANT CHANGE UP (ref 27–34)
MCHC RBC-ENTMCNC: 30.4 PG — SIGNIFICANT CHANGE UP (ref 27–34)
MCHC RBC-ENTMCNC: 34.5 GM/DL — SIGNIFICANT CHANGE UP (ref 32–36)
MCHC RBC-ENTMCNC: 34.9 GM/DL — SIGNIFICANT CHANGE UP (ref 32–36)
MCV RBC AUTO: 86.9 FL — SIGNIFICANT CHANGE UP (ref 80–100)
MCV RBC AUTO: 88.3 FL — SIGNIFICANT CHANGE UP (ref 80–100)
NRBC # BLD: 0 /100 WBCS — SIGNIFICANT CHANGE UP
NRBC # BLD: 0 /100 WBCS — SIGNIFICANT CHANGE UP
NRBC # FLD: 0 K/UL — SIGNIFICANT CHANGE UP
NRBC # FLD: 0 K/UL — SIGNIFICANT CHANGE UP
PHOSPHATE SERPL-MCNC: 2.6 MG/DL — SIGNIFICANT CHANGE UP (ref 2.5–4.5)
PHOSPHATE SERPL-MCNC: 4 MG/DL — SIGNIFICANT CHANGE UP (ref 2.5–4.5)
PLATELET # BLD AUTO: 102 K/UL — LOW (ref 150–400)
PLATELET # BLD AUTO: 130 K/UL — LOW (ref 150–400)
POTASSIUM SERPL-MCNC: 2.9 MMOL/L — CRITICAL LOW (ref 3.5–5.3)
POTASSIUM SERPL-MCNC: 3.6 MMOL/L — SIGNIFICANT CHANGE UP (ref 3.5–5.3)
POTASSIUM SERPL-SCNC: 2.9 MMOL/L — CRITICAL LOW (ref 3.5–5.3)
POTASSIUM SERPL-SCNC: 3.6 MMOL/L — SIGNIFICANT CHANGE UP (ref 3.5–5.3)
PROT SERPL-MCNC: 6 G/DL — SIGNIFICANT CHANGE UP (ref 6–8.3)
PROTHROM AB SERPL-ACNC: 12.7 SEC — SIGNIFICANT CHANGE UP (ref 10.6–13.6)
RBC # BLD: 2.99 M/UL — LOW (ref 3.8–5.2)
RBC # BLD: 3.2 M/UL — LOW (ref 3.8–5.2)
RBC # FLD: 15 % — HIGH (ref 10.3–14.5)
RBC # FLD: 15.3 % — HIGH (ref 10.3–14.5)
SODIUM SERPL-SCNC: 139 MMOL/L — SIGNIFICANT CHANGE UP (ref 135–145)
SODIUM SERPL-SCNC: 139 MMOL/L — SIGNIFICANT CHANGE UP (ref 135–145)
WBC # BLD: 6.68 K/UL — SIGNIFICANT CHANGE UP (ref 3.8–10.5)
WBC # BLD: 6.98 K/UL — SIGNIFICANT CHANGE UP (ref 3.8–10.5)
WBC # FLD AUTO: 6.68 K/UL — SIGNIFICANT CHANGE UP (ref 3.8–10.5)
WBC # FLD AUTO: 6.98 K/UL — SIGNIFICANT CHANGE UP (ref 3.8–10.5)

## 2021-04-02 PROCEDURE — 99232 SBSQ HOSP IP/OBS MODERATE 35: CPT

## 2021-04-02 PROCEDURE — 99232 SBSQ HOSP IP/OBS MODERATE 35: CPT | Mod: GC

## 2021-04-02 PROCEDURE — 71045 X-RAY EXAM CHEST 1 VIEW: CPT | Mod: 26

## 2021-04-02 RX ORDER — INSULIN LISPRO 100/ML
VIAL (ML) SUBCUTANEOUS
Refills: 0 | Status: DISCONTINUED | OUTPATIENT
Start: 2021-04-02 | End: 2021-04-04

## 2021-04-02 RX ORDER — LISINOPRIL 2.5 MG/1
40 TABLET ORAL EVERY 12 HOURS
Refills: 0 | Status: DISCONTINUED | OUTPATIENT
Start: 2021-04-02 | End: 2021-04-04

## 2021-04-02 RX ORDER — MAGNESIUM SULFATE 500 MG/ML
2 VIAL (ML) INJECTION ONCE
Refills: 0 | Status: COMPLETED | OUTPATIENT
Start: 2021-04-02 | End: 2021-04-02

## 2021-04-02 RX ORDER — POTASSIUM PHOSPHATE, MONOBASIC POTASSIUM PHOSPHATE, DIBASIC 236; 224 MG/ML; MG/ML
30 INJECTION, SOLUTION INTRAVENOUS ONCE
Refills: 0 | Status: COMPLETED | OUTPATIENT
Start: 2021-04-02 | End: 2021-04-02

## 2021-04-02 RX ORDER — HEPARIN SODIUM 5000 [USP'U]/ML
5000 INJECTION INTRAVENOUS; SUBCUTANEOUS EVERY 8 HOURS
Refills: 0 | Status: DISCONTINUED | OUTPATIENT
Start: 2021-04-02 | End: 2021-04-04

## 2021-04-02 RX ORDER — POTASSIUM CHLORIDE 20 MEQ
40 PACKET (EA) ORAL ONCE
Refills: 0 | Status: COMPLETED | OUTPATIENT
Start: 2021-04-02 | End: 2021-04-02

## 2021-04-02 RX ORDER — INSULIN LISPRO 100/ML
VIAL (ML) SUBCUTANEOUS AT BEDTIME
Refills: 0 | Status: DISCONTINUED | OUTPATIENT
Start: 2021-04-02 | End: 2021-04-04

## 2021-04-02 RX ADMIN — HEPARIN SODIUM 5000 UNIT(S): 5000 INJECTION INTRAVENOUS; SUBCUTANEOUS at 22:35

## 2021-04-02 RX ADMIN — Medication 200 MICROGRAM(S): at 05:40

## 2021-04-02 RX ADMIN — HEPARIN SODIUM 5000 UNIT(S): 5000 INJECTION INTRAVENOUS; SUBCUTANEOUS at 16:27

## 2021-04-02 RX ADMIN — Medication 50 GRAM(S): at 03:31

## 2021-04-02 RX ADMIN — POTASSIUM PHOSPHATE, MONOBASIC POTASSIUM PHOSPHATE, DIBASIC 83.33 MILLIMOLE(S): 236; 224 INJECTION, SOLUTION INTRAVENOUS at 05:40

## 2021-04-02 RX ADMIN — Medication 1.25 MILLIGRAM(S): at 05:40

## 2021-04-02 RX ADMIN — Medication 1: at 12:20

## 2021-04-02 RX ADMIN — Medication 1.25 MILLIGRAM(S): at 11:49

## 2021-04-02 RX ADMIN — Medication 40 MILLIEQUIVALENT(S): at 05:40

## 2021-04-02 RX ADMIN — Medication 1.25 MILLIGRAM(S): at 00:07

## 2021-04-02 NOTE — PROVIDER CONTACT NOTE (OTHER) - DATE AND TIME:
29-Mar-2021 19:50
02-Apr-2021 22:57
29-Mar-2021 17:20
26-Mar-2021 21:20
26-Mar-2021 11:45
27-Mar-2021 07:00

## 2021-04-02 NOTE — PROVIDER CONTACT NOTE (OTHER) - BACKGROUND
Pt has diagnosis of gastrointestinal hemorrhage; HTN, SOLOMON, Type 2 DM, Pernicious anemia, Diventiculosis
Pt has diagnosis of gastrointestinal hemorrhage; HTN, SOLOMON, Type 2 DM, Pernicious anemia, Diventiculosis
Admitted with multiple admissions for BRBPR
GI bleed
Admitted with GI bleed
Pt has diagnosis of gastrointestinal hemorrhage; HTN, SOLOMON, Type 2 DM, Pernicious anemia, Diventiculosis

## 2021-04-02 NOTE — PROVIDER CONTACT NOTE (OTHER) - SITUATION
Patient had large bloody BM, vitals stable
bloody stool and low H&H
pt's BP at 162/101, rechecked at 176/103 mmHg
Increasing bloody stools low BP feeling light head.
patient had a bloody BM, very small amount of blood
pt's BP at 170/107 HR 85 and bloody stool

## 2021-04-02 NOTE — PROVIDER CONTACT NOTE (OTHER) - ASSESSMENT
no lightheadedness, not dizzy, vitally stable, feels completely fine. stated significnatly less blood then normal
Patient without complains of palpitation, dizziness, or palpitations, vital signs stable.
Increasing bloody stools low BP feeling light head.
Pt is asymptomatic, denies headache, dizziness, do complaint of chest pain.
Pt is asymptomatic, denies headache, c/o dizziness
Pt is asymptomatic, denies headache, dizziness, do complaint of chest pain.

## 2021-04-02 NOTE — PROVIDER CONTACT NOTE (OTHER) - NAME OF MD/NP/PA/DO NOTIFIED:
Dr. Anjel Curiel
[place]
Provider Brigder Phoenix
korina reardon
Dr. Brandyn Curiel
[place] [id number]

## 2021-04-02 NOTE — PROVIDER CONTACT NOTE (OTHER) - RECOMMENDATIONS
transfusion
No new orders at this time. will come to see pt.
md notify
labs?
CBC, BMP, T/S, lactate sent. vitals monitored
Will monitor vitals and stooling.

## 2021-04-02 NOTE — CHART NOTE - NSCHARTNOTEFT_GEN_A_CORE
SICU Transfer Note    Transfer from: SICU  Transfer to: 8S    SICU COURSE: Patient initially transferred to SICU for periprocedural hemodynamic monitoring after multiple episodes of bloody bowel movements requiring transfusions. Patient is now PPD2 sp provocative IR angiogram, embolectomy. Over 24hrs, pt started on vasotec for hypertension, advanced to clear liquid diet, and HSQ for DVT ppx.     Patient seen and examined on floors. Denies chest pain, shortness of breath, nausea, vomiting, abdominal pain, and bloody bowel movements.    Physical Exam:  General Appearance: Appears well, in no acute distress, awake, alert, and oriented x3.  Respiratory: No labored breathing  CV: Pulse regularly present  Abdomen: Soft, nontender, nondistended w/o rebound tenderness or guarding.   Extremities: Warm and well perfused, moving spontaneously      ASSESSMENT & PLAN:   65F hx multiple admissions for pandiverticulosis, herniated lumbar disc (s/p plate), SOLOMON, prediabetes (on oral agent) pw BRBPR cf recurrent diverticular bleed and CTA showing active extravasation at the hepatic flexure sp IR angiogram w/ no active bleeding found (3/28) now PPD2 IR provacative mesenteric angiogram w/ embolization (3/31) currently hemodynamically stable transferred to floors    Plan:  - Diet: CLD  - Trend H/H  - Monitor for bloody bowel movements  - Strict I&Os  - DVT ppx: HSQ  - Continue home synthroid and ppi IV, restart other home meds as able  - Restart home ramipiril    Surgery Team A  [id number]    Vital Signs Last 24 Hrs  T(C): 36.8 (02 Apr 2021 08:00), Max: 37.2 (01 Apr 2021 20:00)  T(F): 98.2 (02 Apr 2021 08:00), Max: 99 (01 Apr 2021 20:00)  HR: 104 (02 Apr 2021 16:00) (72 - 104)  BP: 167/90 (02 Apr 2021 15:00) (117/83 - 198/87)  BP(mean): 109 (02 Apr 2021 15:00) (77 - 127)  RR: 24 (02 Apr 2021 16:00) (15 - 24)  SpO2: 82% (02 Apr 2021 16:00) (82% - 99%)  I&O's Summary    01 Apr 2021 07:01  -  02 Apr 2021 07:00  --------------------------------------------------------  IN: 2650 mL / OUT: 6100 mL / NET: -3450 mL    02 Apr 2021 07:01  -  02 Apr 2021 17:54  --------------------------------------------------------  IN: 1240 mL / OUT: 3100 mL / NET: -1860 mL          MEDICATIONS  (STANDING):  dextrose 5% + lactated ringers. 1000 milliLiter(s) (75 mL/Hr) IV Continuous <Continuous>  dextrose 50% Injectable 25 Gram(s) IV Push once  enalaprilat Injectable 1.25 milliGRAM(s) IV Push every 6 hours  glucagon  Injectable 1 milliGRAM(s) IntraMuscular once  heparin   Injectable 5000 Unit(s) SubCutaneous every 8 hours  influenza   Vaccine 0.5 milliLiter(s) IntraMuscular once  insulin lispro (ADMELOG) corrective regimen sliding scale   SubCutaneous every 6 hours  levothyroxine 200 MICROGram(s) Oral daily    MEDICATIONS  (PRN):        LABS                                            9.7                   Neurophils% (auto):   x      (04-02 @ 09:56):    6.68 )-----------(130          Lymphocytes% (auto):  x                                             27.8                   Eosinphils% (auto):   x        Manual%: Neutrophils x    ; Lymphocytes x    ; Eosinophils x    ; Bands%: x    ; Blasts x                                    139    |  102    |  4                   Calcium: 8.0   / iCa: x      (04-02 @ 09:56)    ----------------------------<  177       Magnesium: 2.1                              3.6     |  29     |  0.99             Phosphorous: 4.0      TPro  6.0    /  Alb  3.7    /  TBili  1.0    /  DBili  x      /  AST  28     /  ALT  23     /  AlkPhos  55     02 Apr 2021 01:42    ( 04-02 @ 01:42 )   PT: 12.7 sec;   INR: 1.11 ratio  aPTT: 27.9 sec

## 2021-04-02 NOTE — PROGRESS NOTE ADULT - ASSESSMENT
65F hx multiple admissions for pandiverticulosis, herniated lumbar disc (s/p plate), SOLOMON, prediabetes (on oral agent) pw BRBPR cf recurrent diverticular bleed and CTA showing active extravasation at the hepatic flexure sp IR angiogram w/ no active bleeding found(3/28) now PPD2 IR provacative mesenteric angiogram w/ embolization (3/31) currently hemodynamically stable in SICU    Plan:  - Diet: NPO; will continue to monitor before advancing to CLD  - Trend H/H  - Monitor for bloody bowel movements  - Strict I&Os  - Hold DVT ppx for now; consider restarting  - Continue home synthroid and ppi IV, restart other home meds as able  - Care per SICU    Surgery Team A  [id number]   65F hx multiple admissions for pandiverticulosis, herniated lumbar disc (s/p plate), SOLOMON, prediabetes (on oral agent) pw BRBPR cf recurrent diverticular bleed and CTA showing active extravasation at the hepatic flexure sp IR angiogram w/ no active bleeding found (3/28) now PPD2 IR provacative mesenteric angiogram w/ embolization (3/31) currently hemodynamically stable in SICU    Plan:  - Can advanced to CLD today.   - Trend H/H  - Monitor for bloody bowel movements  - Strict I&Os  - Hold DVT ppx for now; consider restarting  - Continue home synthroid and ppi IV, restart other home meds as able  - Care per SICU    Surgery Team A  [id number]

## 2021-04-02 NOTE — PROGRESS NOTE ADULT - SUBJECTIVE AND OBJECTIVE BOX
SICU Daily Progress Note  =====================================================  Interval/Overnight Events:     - Patient hypertensive during the day, Vasotec started  - No acute events overnight    HPI: 65F hx multiple admissions for pandiverticulosis, herniated lumbar disc (s/p plate), SOLOMON, prediabetes (on oral agent) pw BRBPR cf recurrent diverticular bleed and CTA showing active extravasation at the hepatic flexure sp IR angiogram w/ no active bleeding found(3/28) now PPD1 IR provacative mesenteric angiogram w/ embolization (3/31).    Allergies:   No Known Allergies  oxycodone (Other)    MEDICATIONS:   --------------------------------------------------------------------------------------  Neurologic Medications    Respiratory Medications    Cardiovascular Medications  enalaprilat Injectable 1.25 milliGRAM(s) IV Push every 6 hours    Gastrointestinal Medications  dextrose 5% + lactated ringers. 1000 milliLiter(s) IV Continuous <Continuous>    Genitourinary Medications    Hematologic/Oncologic Medications  influenza   Vaccine 0.5 milliLiter(s) IntraMuscular once    Antimicrobial/Immunologic Medications    Endocrine/Metabolic Medications  dextrose 50% Injectable 25 Gram(s) IV Push once  glucagon  Injectable 1 milliGRAM(s) IntraMuscular once  insulin lispro (ADMELOG) corrective regimen sliding scale   SubCutaneous every 6 hours  levothyroxine 200 MICROGram(s) Oral daily    Topical/Other Medications    --------------------------------------------------------------------------------------  VITAL SIGNS, INS/OUTS (last 24 hours):  --------------------------------------------------------------------------------------  T(C): 36.6 (04-02-21 @ 00:00), Max: 37.4 (04-01-21 @ 01:30)  HR: 80 (04-02-21 @ 00:00) (79 - 100)  BP: 152/80 (04-02-21 @ 00:00) (117/83 - 192/93)  RR: 17 (04-02-21 @ 00:00) (14 - 21)  SpO2: 99% (04-02-21 @ 00:00) (93% - 100%)    03-31-21 @ 07:01  -  04-01-21 @ 07:00  --------------------------------------------------------  IN: 4216 mL / OUT: 3000 mL / NET: 1216 mL    04-01-21 @ 07:01  -  04-02-21 @ 00:51  --------------------------------------------------------  IN: 1950 mL / OUT: 5750 mL / NET: -3800 mL  --------------------------------------------------------------------------------------    EXAM  NEUROLOGY  Exam: Normal, NAD, alert, oriented x3, no focal deficits.    HEENT  Exam: Normocephalic, atraumatic, EOMI.     RESPIRATORY  Exam: Normal expansion/effort.  Mechanical Ventilation:     CARDIOVASCULAR  Exam: Regular rate and rhythm.       GI/NUTRITION  Exam: Abdomen soft, Non-tender, Non-distended.     VASCULAR  Exam: Extremities warm, pink, well-perfused.     MUSCULOSKELETAL  Exam: All extremities moving spontaneously without limitations.     SKIN  Exam: Good skin turgor, no skin breakdown.       LABS  --------------------------------------------------------------------------------------                        9.2    7.15  )-----------( 99       ( 01 Apr 2021 15:11 )             26.5   04-01    144  |  110<H>  |  8   ----------------------------<  107<H>  3.7   |  24  |  1.04    Ca    7.7<L>      01 Apr 2021 02:42  Phos  3.1     04-01  Mg     2.1     04-01    TPro  5.3<L>  /  Alb  3.4  /  TBili  0.9  /  DBili  x   /  AST  28  /  ALT  25  /  AlkPhos  44  04-01  PT/INR - ( 31 Mar 2021 15:45 )   PT: 14.3 sec;   INR: 1.27 ratio         PTT - ( 31 Mar 2021 15:45 )  PTT:29.0 sec  --------------------------------------------------------------------------------------     SICU Daily Progress Note  =====================================================  Interval/Overnight Events:     - Patient hypertensive during the day, Vasotec started  - No acute events overnight  - Will advance to CLD today  - will start SQh today  - plan to TTF    HPI: 65F hx multiple admissions for pandiverticulosis, herniated lumbar disc (s/p plate), SOLOMON, prediabetes (on oral agent) pw BRBPR cf recurrent diverticular bleed and CTA showing active extravasation at the hepatic flexure sp IR angiogram w/ no active bleeding found(3/28) now PPD1 IR provacative mesenteric angiogram w/ embolization (3/31).    Allergies:   No Known Allergies  oxycodone (Other)    MEDICATIONS:   --------------------------------------------------------------------------------------  Neurologic Medications    Respiratory Medications    Cardiovascular Medications  enalaprilat Injectable 1.25 milliGRAM(s) IV Push every 6 hours    Gastrointestinal Medications  dextrose 5% + lactated ringers. 1000 milliLiter(s) IV Continuous <Continuous>    Genitourinary Medications    Hematologic/Oncologic Medications  influenza   Vaccine 0.5 milliLiter(s) IntraMuscular once    Antimicrobial/Immunologic Medications    Endocrine/Metabolic Medications  dextrose 50% Injectable 25 Gram(s) IV Push once  glucagon  Injectable 1 milliGRAM(s) IntraMuscular once  insulin lispro (ADMELOG) corrective regimen sliding scale   SubCutaneous every 6 hours  levothyroxine 200 MICROGram(s) Oral daily    Topical/Other Medications    --------------------------------------------------------------------------------------  VITAL SIGNS, INS/OUTS (last 24 hours):  --------------------------------------------------------------------------------------  T(C): 36.6 (04-02-21 @ 00:00), Max: 37.4 (04-01-21 @ 01:30)  HR: 80 (04-02-21 @ 00:00) (79 - 100)  BP: 152/80 (04-02-21 @ 00:00) (117/83 - 192/93)  RR: 17 (04-02-21 @ 00:00) (14 - 21)  SpO2: 99% (04-02-21 @ 00:00) (93% - 100%)    03-31-21 @ 07:01  -  04-01-21 @ 07:00  --------------------------------------------------------  IN: 4216 mL / OUT: 3000 mL / NET: 1216 mL    04-01-21 @ 07:01  -  04-02-21 @ 00:51  --------------------------------------------------------  IN: 1950 mL / OUT: 5750 mL / NET: -3800 mL  --------------------------------------------------------------------------------------    EXAM  NEUROLOGY  Exam: Normal, NAD, alert, oriented x3, no focal deficits.    HEENT  Exam: Normocephalic, atraumatic, EOMI.     RESPIRATORY  Exam: Normal expansion/effort.  Mechanical Ventilation:     CARDIOVASCULAR  Exam: Regular rate and rhythm.       GI/NUTRITION  Exam: Abdomen soft, Non-tender, Non-distended.     VASCULAR  Exam: Extremities warm, pink, well-perfused.     MUSCULOSKELETAL  Exam: All extremities moving spontaneously without limitations.     SKIN  Exam: Good skin turgor, no skin breakdown.       LABS  --------------------------------------------------------------------------------------                        9.2    7.15  )-----------( 99       ( 01 Apr 2021 15:11 )             26.5   04-01    144  |  110<H>  |  8   ----------------------------<  107<H>  3.7   |  24  |  1.04    Ca    7.7<L>      01 Apr 2021 02:42  Phos  3.1     04-01  Mg     2.1     04-01    TPro  5.3<L>  /  Alb  3.4  /  TBili  0.9  /  DBili  x   /  AST  28  /  ALT  25  /  AlkPhos  44  04-01  PT/INR - ( 31 Mar 2021 15:45 )   PT: 14.3 sec;   INR: 1.27 ratio         PTT - ( 31 Mar 2021 15:45 )  PTT:29.0 sec  --------------------------------------------------------------------------------------

## 2021-04-02 NOTE — PROVIDER CONTACT NOTE (OTHER) - ACTION/TREATMENT ORDERED:
Monitored closely.
will come see patient at bedside
provider came to assess patient, labs to be ordered
CBC, BMP, T/S, lactate sent. vitals monitored.
No new orders at this time. will come to see pt.
md made aware, will come see BM, continue to monitor

## 2021-04-02 NOTE — PROGRESS NOTE ADULT - ASSESSMENT
65F hx multiple admissions for pandiverticulosis, herniated lumbar disc (s/p plate), SOLOMON, prediabetes (on oral agent) pw BRBPR cf recurrent diverticular bleed and CTA showing active extravasation at the hepatic flexure sp IR angiogram w/ no active bleeding found(3/28) now PPD1 IR provacative mesenteric angiogram w/ embolization (3/31).    PLAN:  NEUROLOGIC:  - Pain well controlled, can give Tylenol PRN    RESPIRATORY:  - Satting well on RA   - No active issues    CARDIOVASCULAR:  - Hemodynamically stable since arrival in SICU   - Holding home ramipril  - Enalaprilat 1.25mg IV q6hr    GASTROINTESTINAL/NUTRITION:  - Remains NPO, f/u with surgical team    /RENAL:  - D5 LR @ 100  - Monitor urine output, replete lytes as needed    ENDOCRINE:  - SSI  - Home Synthroid    HEMATOLOGIC:  - Holding AC in setting of GIB  - Transfuse as needed for Hgb<7  - Post op, received total 3U PRBCs, 2 plasma, 2 platelets h/h 4.8/14.5 > 7.6/21.8  - Platelets unresponsive s/p 3U plts; 73-> 99 s/p 1u     INFECTIOUS DISEASE:  - No need for Abx at this time    LINES:  - Right midline    DISPO:   SICU 65F hx multiple admissions for pandiverticulosis, herniated lumbar disc (s/p plate), SOLOMON, prediabetes (on oral agent) pw BRBPR cf recurrent diverticular bleed and CTA showing active extravasation at the hepatic flexure sp IR angiogram w/ no active bleeding found(3/28) now PPD1 IR provacative mesenteric angiogram w/ embolization (3/31).    PLAN:  NEUROLOGIC:  - Pain well controlled, can give Tylenol PRN    RESPIRATORY:  - Satting well on RA   - No active issues    CARDIOVASCULAR:  - Hemodynamically stable since arrival in SICU   - Holding home ramipril  - Enalaprilat 1.25mg IV q6hr    GASTROINTESTINAL/NUTRITION:  - Will advance to CLD     /RENAL:  - D5 LR @ 100  - Monitor urine output, replete lytes as needed    ENDOCRINE:  - SSI  - Home Synthroid    HEMATOLOGIC:  - start SQH today   - Transfuse as needed for Hgb<7  - Post op, received total 3U PRBCs, 2 plasma, 2 platelets h/h 4.8/14.5 > 7.6/21.8  - Platelets unresponsive s/p 3U plts; 73-> 99 s/p 1u     INFECTIOUS DISEASE:  - No need for Abx at this time    LINES:  - Right midline    DISPO:   SICU

## 2021-04-02 NOTE — PROGRESS NOTE ADULT - SUBJECTIVE AND OBJECTIVE BOX
Surgery Progress Note    24Hr Events:  - Received 2u platelets    Overnight: No acute events    SUBJECTIVE: Pt seen and examined at bedside. Patient comfortable. No nausea, vomiting, diarrhea. +Flatus/+BM.    Vital Signs Last 24 Hrs  T(C): 36.6 (02 Apr 2021 00:00), Max: 37.4 (01 Apr 2021 01:30)  T(F): 97.9 (02 Apr 2021 00:00), Max: 99.4 (01 Apr 2021 01:30)  HR: 80 (02 Apr 2021 00:00) (79 - 100)  BP: 152/80 (02 Apr 2021 00:00) (117/83 - 192/93)  BP(mean): 98 (02 Apr 2021 00:00) (74 - 122)  RR: 17 (02 Apr 2021 00:00) (14 - 21)  SpO2: 99% (02 Apr 2021 00:00) (93% - 100%)    Physical Exam:  General Appearance: Appears well, in no acute distress, awake, alert, and oriented x3.  Respiratory: No labored breathing  CV: Pulse regularly present  Abdomen: Soft, nontender, nondistended w/o rebound tenderness or guarding.   Extremities: Warm and well perfused, moving spontaneously    LABS:                        9.2    7.15  )-----------( 99       ( 01 Apr 2021 15:11 )             26.5     04-01    144  |  110<H>  |  8   ----------------------------<  107<H>  3.7   |  24  |  1.04    Ca    7.7<L>      01 Apr 2021 02:42  Phos  3.1     04-01  Mg     2.1     04-01    TPro  5.3<L>  /  Alb  3.4  /  TBili  0.9  /  DBili  x   /  AST  28  /  ALT  25  /  AlkPhos  44  04-01    PT/INR - ( 31 Mar 2021 15:45 )   PT: 14.3 sec;   INR: 1.27 ratio         PTT - ( 31 Mar 2021 15:45 )  PTT:29.0 sec      INs and OUTs:    03-31-21 @ 07:01  -  04-01-21 @ 07:00  --------------------------------------------------------  IN: 4216 mL / OUT: 3000 mL / NET: 1216 mL    04-01-21 @ 07:01  -  04-02-21 @ 00:19  --------------------------------------------------------  IN: 1950 mL / OUT: 5750 mL / NET: -3800 mL        Medications:  MEDICATIONS  (STANDING):  dextrose 5% + lactated ringers. 1000 milliLiter(s) (100 mL/Hr) IV Continuous <Continuous>  dextrose 50% Injectable 25 Gram(s) IV Push once  enalaprilat Injectable 1.25 milliGRAM(s) IV Push every 6 hours  glucagon  Injectable 1 milliGRAM(s) IntraMuscular once  influenza   Vaccine 0.5 milliLiter(s) IntraMuscular once  insulin lispro (ADMELOG) corrective regimen sliding scale   SubCutaneous every 6 hours  levothyroxine 200 MICROGram(s) Oral daily    MEDICATIONS  (PRN):

## 2021-04-02 NOTE — PROVIDER CONTACT NOTE (OTHER) - REASON
elevated blood pressure 170/107 HR 85, bloody stool
bloody stool 7.7 hemoglobin
Increasing bloody stools low BP feeling light head.
very small bloody BM
Patient had large dark liquid bloody BM
elevated blood pressure

## 2021-04-03 LAB
ALBUMIN SERPL ELPH-MCNC: 4.1 G/DL — SIGNIFICANT CHANGE UP (ref 3.3–5)
ALP SERPL-CCNC: 62 U/L — SIGNIFICANT CHANGE UP (ref 40–120)
ALT FLD-CCNC: 20 U/L — SIGNIFICANT CHANGE UP (ref 4–33)
ANION GAP SERPL CALC-SCNC: 9 MMOL/L — SIGNIFICANT CHANGE UP (ref 7–14)
AST SERPL-CCNC: 24 U/L — SIGNIFICANT CHANGE UP (ref 4–32)
BILIRUB SERPL-MCNC: 0.9 MG/DL — SIGNIFICANT CHANGE UP (ref 0.2–1.2)
BUN SERPL-MCNC: 4 MG/DL — LOW (ref 7–23)
CALCIUM SERPL-MCNC: 8.8 MG/DL — SIGNIFICANT CHANGE UP (ref 8.4–10.5)
CHLORIDE SERPL-SCNC: 103 MMOL/L — SIGNIFICANT CHANGE UP (ref 98–107)
CO2 SERPL-SCNC: 27 MMOL/L — SIGNIFICANT CHANGE UP (ref 22–31)
CREAT SERPL-MCNC: 1.04 MG/DL — SIGNIFICANT CHANGE UP (ref 0.5–1.3)
GLUCOSE BLDC GLUCOMTR-MCNC: 117 MG/DL — HIGH (ref 70–99)
GLUCOSE BLDC GLUCOMTR-MCNC: 120 MG/DL — HIGH (ref 70–99)
GLUCOSE BLDC GLUCOMTR-MCNC: 150 MG/DL — HIGH (ref 70–99)
GLUCOSE BLDC GLUCOMTR-MCNC: 152 MG/DL — HIGH (ref 70–99)
GLUCOSE SERPL-MCNC: 164 MG/DL — HIGH (ref 70–99)
HCT VFR BLD CALC: 29.8 % — LOW (ref 34.5–45)
HGB BLD-MCNC: 10.1 G/DL — LOW (ref 11.5–15.5)
MAGNESIUM SERPL-MCNC: 1.9 MG/DL — SIGNIFICANT CHANGE UP (ref 1.6–2.6)
MCHC RBC-ENTMCNC: 29.9 PG — SIGNIFICANT CHANGE UP (ref 27–34)
MCHC RBC-ENTMCNC: 33.9 GM/DL — SIGNIFICANT CHANGE UP (ref 32–36)
MCV RBC AUTO: 88.2 FL — SIGNIFICANT CHANGE UP (ref 80–100)
NRBC # BLD: 0 /100 WBCS — SIGNIFICANT CHANGE UP
NRBC # FLD: 0 K/UL — SIGNIFICANT CHANGE UP
PHOSPHATE SERPL-MCNC: 3 MG/DL — SIGNIFICANT CHANGE UP (ref 2.5–4.5)
PLATELET # BLD AUTO: 173 K/UL — SIGNIFICANT CHANGE UP (ref 150–400)
POTASSIUM SERPL-MCNC: 3.5 MMOL/L — SIGNIFICANT CHANGE UP (ref 3.5–5.3)
POTASSIUM SERPL-SCNC: 3.5 MMOL/L — SIGNIFICANT CHANGE UP (ref 3.5–5.3)
PROT SERPL-MCNC: 6.3 G/DL — SIGNIFICANT CHANGE UP (ref 6–8.3)
RBC # BLD: 3.38 M/UL — LOW (ref 3.8–5.2)
RBC # FLD: 15 % — HIGH (ref 10.3–14.5)
SODIUM SERPL-SCNC: 139 MMOL/L — SIGNIFICANT CHANGE UP (ref 135–145)
WBC # BLD: 6.88 K/UL — SIGNIFICANT CHANGE UP (ref 3.8–10.5)
WBC # FLD AUTO: 6.88 K/UL — SIGNIFICANT CHANGE UP (ref 3.8–10.5)

## 2021-04-03 PROCEDURE — 99231 SBSQ HOSP IP/OBS SF/LOW 25: CPT | Mod: GC

## 2021-04-03 RX ORDER — ACETAMINOPHEN 500 MG
975 TABLET ORAL ONCE
Refills: 0 | Status: COMPLETED | OUTPATIENT
Start: 2021-04-03 | End: 2021-04-03

## 2021-04-03 RX ADMIN — Medication 975 MILLIGRAM(S): at 03:52

## 2021-04-03 RX ADMIN — Medication 975 MILLIGRAM(S): at 02:52

## 2021-04-03 RX ADMIN — HEPARIN SODIUM 5000 UNIT(S): 5000 INJECTION INTRAVENOUS; SUBCUTANEOUS at 05:24

## 2021-04-03 RX ADMIN — LISINOPRIL 40 MILLIGRAM(S): 2.5 TABLET ORAL at 17:52

## 2021-04-03 RX ADMIN — HEPARIN SODIUM 5000 UNIT(S): 5000 INJECTION INTRAVENOUS; SUBCUTANEOUS at 23:25

## 2021-04-03 RX ADMIN — Medication 1: at 09:32

## 2021-04-03 RX ADMIN — Medication 200 MICROGRAM(S): at 05:24

## 2021-04-03 RX ADMIN — LISINOPRIL 40 MILLIGRAM(S): 2.5 TABLET ORAL at 05:24

## 2021-04-03 RX ADMIN — SODIUM CHLORIDE 75 MILLILITER(S): 9 INJECTION, SOLUTION INTRAVENOUS at 09:32

## 2021-04-03 RX ADMIN — HEPARIN SODIUM 5000 UNIT(S): 5000 INJECTION INTRAVENOUS; SUBCUTANEOUS at 13:13

## 2021-04-03 NOTE — PROGRESS NOTE ADULT - ASSESSMENT
65F hx multiple admissions for pandiverticulosis, herniated lumbar disc (s/p plate), SOLOMON, prediabetes (on oral agent) pw BRBPR cf recurrent diverticular bleed and CTA showing active extravasation at the hepatic flexure sp IR angiogram w/ no active bleeding found (3/28) now PPD2 IR provacative mesenteric angiogram w/ embolization (3/31) transferred to floors now hemodynamically stable    Plan:  - Diet: CLD  - Trend H/H  - Monitor for bloody bowel movements  - Strict I&Os  - DVT ppx: HSQ  - Continue home synthroid and ppi IV, restart other home meds as able  - Care per SICU    Surgery Team A  [id number] 65F hx multiple admissions for pandiverticulosis, herniated lumbar disc (s/p plate), SOLOMON, prediabetes (on oral agent) pw BRBPR cf recurrent diverticular bleed and CTA showing active extravasation at the hepatic flexure sp IR angiogram w/ no active bleeding found (3/28) now PPD2 IR provacative mesenteric angiogram w/ embolization (3/31) transferred to floors now hemodynamically stable    Plan:  - Diet: CLD  - Trend H/H  - Monitor for bloody bowel movements  - Strict I&Os  - DVT ppx: HSQ  - Continue home synthroid and ppi IV, restart other home meds as able    Surgery Team A  [id number] 65F hx multiple admissions for pandiverticulosis, herniated lumbar disc (s/p plate), SOLOMON, prediabetes (on oral agent) pw BRBPR cf recurrent diverticular bleed and CTA showing active extravasation at the hepatic flexure sp IR angiogram w/ no active bleeding found (3/28) now PPD2 IR provacative mesenteric angiogram w/ embolization (3/31) transferred to floors now hemodynamically stable    Plan:  - Diet: CLD will advance to regular diet today  - Trend H/H  - Monitor for bloody bowel movements  - Strict I&Os  - DVT ppx: HSQ  - Continue home synthroid and ppi IV, restart other home meds as able    Surgery Team A  [id number]

## 2021-04-03 NOTE — PROGRESS NOTE ADULT - SUBJECTIVE AND OBJECTIVE BOX
Surgery Progress Note    24Hr Events:  - Diet advanced to clears  - Transferred to floors    Overnight:   - No acute events  - Bowel movement with ~10cc old blood    SUBJECTIVE: Pt seen and examined at bedside. Patient comfortable. No nausea, vomiting, diarrhea. Denies pain. +Flatus/+BM. Tolerating diet.    Vital Signs Last 24 Hrs  T(C): 36.6 (02 Apr 2021 22:00), Max: 37.2 (02 Apr 2021 18:53)  T(F): 97.8 (02 Apr 2021 22:00), Max: 98.9 (02 Apr 2021 18:53)  HR: 90 (02 Apr 2021 22:00) (72 - 104)  BP: 137/85 (02 Apr 2021 22:00) (118/63 - 198/87)  BP(mean): 109 (02 Apr 2021 15:00) (77 - 127)  RR: 16 (02 Apr 2021 22:00) (15 - 24)  SpO2: 100% (02 Apr 2021 22:00) (82% - 100%)    Physical Exam:  General Appearance: Appears well, in no acute distress, awake, alert, and oriented x3.  Respiratory: No labored breathing  CV: Pulse regularly present  Abdomen: Soft, nontender, nondistended w/o rebound tenderness or guarding.   Extremities: Warm and well perfused, moving spontaneously    LABS:                        9.7    6.68  )-----------( 130      ( 02 Apr 2021 09:56 )             27.8     04-02    139  |  102  |  4<L>  ----------------------------<  177<H>  3.6   |  29  |  0.99    Ca    8.0<L>      02 Apr 2021 09:56  Phos  4.0     04-02  Mg     2.1     04-02    TPro  6.0  /  Alb  3.7  /  TBili  1.0  /  DBili  x   /  AST  28  /  ALT  23  /  AlkPhos  55  04-02    PT/INR - ( 02 Apr 2021 01:42 )   PT: 12.7 sec;   INR: 1.11 ratio         PTT - ( 02 Apr 2021 01:42 )  PTT:27.9 sec      INs and OUTs:    04-01-21 @ 07:01  -  04-02-21 @ 07:00  --------------------------------------------------------  IN: 2650 mL / OUT: 6100 mL / NET: -3450 mL    04-02-21 @ 07:01  -  04-03-21 @ 00:10  --------------------------------------------------------  IN: 1390 mL / OUT: 3100 mL / NET: -1710 mL        Medications:  MEDICATIONS  (STANDING):  dextrose 5% + lactated ringers. 1000 milliLiter(s) (75 mL/Hr) IV Continuous <Continuous>  dextrose 50% Injectable 25 Gram(s) IV Push once  glucagon  Injectable 1 milliGRAM(s) IntraMuscular once  heparin   Injectable 5000 Unit(s) SubCutaneous every 8 hours  influenza   Vaccine 0.5 milliLiter(s) IntraMuscular once  insulin lispro (ADMELOG) corrective regimen sliding scale   SubCutaneous three times a day before meals  insulin lispro (ADMELOG) corrective regimen sliding scale   SubCutaneous at bedtime  levothyroxine 200 MICROGram(s) Oral daily  lisinopril 40 milliGRAM(s) Oral every 12 hours    MEDICATIONS  (PRN):   Surgery Progress Note    24Hr Events:  - Diet advanced to clears  - Transferred to floors    Overnight:   - No acute events  - Bowel movement with ~10cc old blood    SUBJECTIVE: Pt seen and examined at bedside. Patient comfortable. No nausea, vomiting, diarrhea. Denies pain. +Flatus/+BM. Tolerating diet.    Vital Signs Last 24 Hrs  T(C): 36.9 (03 Apr 2021 06:00), Max: 37.2 (02 Apr 2021 18:53)  T(F): 98.4 (03 Apr 2021 06:00), Max: 98.9 (02 Apr 2021 18:53)  HR: 83 (03 Apr 2021 06:00) (78 - 104)  BP: 149/85 (03 Apr 2021 06:00) (137/85 - 198/87)  BP(mean): 109 (02 Apr 2021 15:00) (104 - 127)  RR: 18 (03 Apr 2021 06:00) (16 - 24)  SpO2: 100% (03 Apr 2021 06:00) (82% - 100%)    Physical Exam:  General Appearance: Appears well, in no acute distress, awake, alert, and oriented x3.  Respiratory: No labored breathing  Abdomen: Soft, nontender, nondistended w/o rebound tenderness or guarding.   Extremities: Warm and well perfused, moving spontaneously    LABS:                          10.1   6.88  )-----------( 173      ( 03 Apr 2021 06:59 )             29.8       04-03    139  |  103  |  4<L>  ----------------------------<  164<H>  3.5   |  27  |  1.04    Ca    8.8      03 Apr 2021 06:59  Phos  3.0     04-03  Mg     1.9     04-03    TPro  6.3  /  Alb  4.1  /  TBili  0.9  /  DBili  x   /  AST  24  /  ALT  20  /  AlkPhos  62  04-03         PT/INR - ( 02 Apr 2021 01:42 )   PT: 12.7 sec;   INR: 1.11 ratio         PTT - ( 02 Apr 2021 01:42 )  PTT:27.9 sec      CAPILLARY BLOOD GLUCOSE      POCT Blood Glucose.: 152 mg/dL (03 Apr 2021 08:51)      INs and OUTs:      02 Apr 2021 07:01  -  03 Apr 2021 07:00  --------------------------------------------------------  IN:    dextrose 5% + lactated ringers: 1750 mL    Oral Fluid: 240 mL  Total IN: 1990 mL    OUT:    Voided (mL): 3800 mL  Total OUT: 3800 mL    Total NET: -1810 mL    Medications:  MEDICATIONS  (STANDING):  dextrose 5% + lactated ringers. 1000 milliLiter(s) (75 mL/Hr) IV Continuous <Continuous>  dextrose 50% Injectable 25 Gram(s) IV Push once  glucagon  Injectable 1 milliGRAM(s) IntraMuscular once  heparin   Injectable 5000 Unit(s) SubCutaneous every 8 hours  influenza   Vaccine 0.5 milliLiter(s) IntraMuscular once  insulin lispro (ADMELOG) corrective regimen sliding scale   SubCutaneous three times a day before meals  insulin lispro (ADMELOG) corrective regimen sliding scale   SubCutaneous at bedtime  levothyroxine 200 MICROGram(s) Oral daily  lisinopril 40 milliGRAM(s) Oral every 12 hours    MEDICATIONS  (PRN):

## 2021-04-04 ENCOUNTER — TRANSCRIPTION ENCOUNTER (OUTPATIENT)
Age: 66
End: 2021-04-04

## 2021-04-04 VITALS
OXYGEN SATURATION: 98 % | TEMPERATURE: 98 F | RESPIRATION RATE: 20 BRPM | SYSTOLIC BLOOD PRESSURE: 152 MMHG | DIASTOLIC BLOOD PRESSURE: 90 MMHG | HEART RATE: 74 BPM

## 2021-04-04 LAB
ALBUMIN SERPL ELPH-MCNC: 3.8 G/DL — SIGNIFICANT CHANGE UP (ref 3.3–5)
ALP SERPL-CCNC: 61 U/L — SIGNIFICANT CHANGE UP (ref 40–120)
ALT FLD-CCNC: 24 U/L — SIGNIFICANT CHANGE UP (ref 4–33)
ANION GAP SERPL CALC-SCNC: 10 MMOL/L — SIGNIFICANT CHANGE UP (ref 7–14)
ANION GAP SERPL CALC-SCNC: 10 MMOL/L — SIGNIFICANT CHANGE UP (ref 7–14)
AST SERPL-CCNC: 24 U/L — SIGNIFICANT CHANGE UP (ref 4–32)
BILIRUB SERPL-MCNC: 0.8 MG/DL — SIGNIFICANT CHANGE UP (ref 0.2–1.2)
BUN SERPL-MCNC: 10 MG/DL — SIGNIFICANT CHANGE UP (ref 7–23)
BUN SERPL-MCNC: 11 MG/DL — SIGNIFICANT CHANGE UP (ref 7–23)
CALCIUM SERPL-MCNC: 8.9 MG/DL — SIGNIFICANT CHANGE UP (ref 8.4–10.5)
CALCIUM SERPL-MCNC: 9.2 MG/DL — SIGNIFICANT CHANGE UP (ref 8.4–10.5)
CHLORIDE SERPL-SCNC: 104 MMOL/L — SIGNIFICANT CHANGE UP (ref 98–107)
CHLORIDE SERPL-SCNC: 104 MMOL/L — SIGNIFICANT CHANGE UP (ref 98–107)
CO2 SERPL-SCNC: 26 MMOL/L — SIGNIFICANT CHANGE UP (ref 22–31)
CO2 SERPL-SCNC: 28 MMOL/L — SIGNIFICANT CHANGE UP (ref 22–31)
CREAT SERPL-MCNC: 1.33 MG/DL — HIGH (ref 0.5–1.3)
CREAT SERPL-MCNC: 1.44 MG/DL — HIGH (ref 0.5–1.3)
GLUCOSE BLDC GLUCOMTR-MCNC: 103 MG/DL — HIGH (ref 70–99)
GLUCOSE BLDC GLUCOMTR-MCNC: 136 MG/DL — HIGH (ref 70–99)
GLUCOSE SERPL-MCNC: 114 MG/DL — HIGH (ref 70–99)
GLUCOSE SERPL-MCNC: 125 MG/DL — HIGH (ref 70–99)
HCT VFR BLD CALC: 30.3 % — LOW (ref 34.5–45)
HGB BLD-MCNC: 10.2 G/DL — LOW (ref 11.5–15.5)
MAGNESIUM SERPL-MCNC: 1.8 MG/DL — SIGNIFICANT CHANGE UP (ref 1.6–2.6)
MCHC RBC-ENTMCNC: 29.9 PG — SIGNIFICANT CHANGE UP (ref 27–34)
MCHC RBC-ENTMCNC: 33.7 GM/DL — SIGNIFICANT CHANGE UP (ref 32–36)
MCV RBC AUTO: 88.9 FL — SIGNIFICANT CHANGE UP (ref 80–100)
NRBC # BLD: 0 /100 WBCS — SIGNIFICANT CHANGE UP
NRBC # FLD: 0 K/UL — SIGNIFICANT CHANGE UP
PHOSPHATE SERPL-MCNC: 4.1 MG/DL — SIGNIFICANT CHANGE UP (ref 2.5–4.5)
PLATELET # BLD AUTO: 213 K/UL — SIGNIFICANT CHANGE UP (ref 150–400)
POTASSIUM SERPL-MCNC: 3.5 MMOL/L — SIGNIFICANT CHANGE UP (ref 3.5–5.3)
POTASSIUM SERPL-MCNC: 3.5 MMOL/L — SIGNIFICANT CHANGE UP (ref 3.5–5.3)
POTASSIUM SERPL-SCNC: 3.5 MMOL/L — SIGNIFICANT CHANGE UP (ref 3.5–5.3)
POTASSIUM SERPL-SCNC: 3.5 MMOL/L — SIGNIFICANT CHANGE UP (ref 3.5–5.3)
PROT SERPL-MCNC: 6.1 G/DL — SIGNIFICANT CHANGE UP (ref 6–8.3)
RBC # BLD: 3.41 M/UL — LOW (ref 3.8–5.2)
RBC # FLD: 14.9 % — HIGH (ref 10.3–14.5)
SODIUM SERPL-SCNC: 140 MMOL/L — SIGNIFICANT CHANGE UP (ref 135–145)
SODIUM SERPL-SCNC: 142 MMOL/L — SIGNIFICANT CHANGE UP (ref 135–145)
WBC # BLD: 6.02 K/UL — SIGNIFICANT CHANGE UP (ref 3.8–10.5)
WBC # FLD AUTO: 6.02 K/UL — SIGNIFICANT CHANGE UP (ref 3.8–10.5)

## 2021-04-04 RX ORDER — SODIUM CHLORIDE 9 MG/ML
1000 INJECTION, SOLUTION INTRAVENOUS ONCE
Refills: 0 | Status: COMPLETED | OUTPATIENT
Start: 2021-04-04 | End: 2021-04-04

## 2021-04-04 RX ADMIN — SODIUM CHLORIDE 1000 MILLILITER(S): 9 INJECTION, SOLUTION INTRAVENOUS at 11:10

## 2021-04-04 RX ADMIN — LISINOPRIL 40 MILLIGRAM(S): 2.5 TABLET ORAL at 06:54

## 2021-04-04 RX ADMIN — HEPARIN SODIUM 5000 UNIT(S): 5000 INJECTION INTRAVENOUS; SUBCUTANEOUS at 06:54

## 2021-04-04 RX ADMIN — Medication 200 MICROGRAM(S): at 06:54

## 2021-04-04 RX ADMIN — HEPARIN SODIUM 5000 UNIT(S): 5000 INJECTION INTRAVENOUS; SUBCUTANEOUS at 14:20

## 2021-04-04 NOTE — PROGRESS NOTE ADULT - ATTENDING SUPERVISION STATEMENT
Resident/Fellow
Fellow
Resident
Resident
ACP
Resident
Fellow
Resident

## 2021-04-04 NOTE — PROGRESS NOTE ADULT - REASON FOR ADMISSION
Diverticular Bleed

## 2021-04-04 NOTE — DISCHARGE NOTE NURSING/CASE MANAGEMENT/SOCIAL WORK - PATIENT PORTAL LINK FT
You can access the FollowMyHealth Patient Portal offered by [facility] by registering at the following website: [email or web address]. By joining [facility]’s FollowMyHealth portal, you will also be able to view your health information using other applications (apps) compatible with our system.

## 2021-04-04 NOTE — PROGRESS NOTE ADULT - PROVIDER SPECIALTY LIST ADULT
Gastroenterology
Intervent Radiology
Intervent Radiology
SICU
Surgery
Surgery
Gastroenterology
SICU
SICU
Surgery
SICU
Surgery
Intervent Radiology
Surgery

## 2021-04-04 NOTE — DISCHARGE NOTE PROVIDER - CARE PROVIDER_API CALL
Breezy Beach)  ColonRectal Surgery; Surgery  [facility], [address] [place] [address]  Phone: [phone]  Fax: [phone]  Follow Up Time:

## 2021-04-04 NOTE — PROGRESS NOTE ADULT - NUTRITIONAL ASSESSMENT
This patient has been assessed with a concern for Malnutrition and has been determined to have a diagnosis/diagnoses of Severe protein-calorie malnutrition.    This patient is being managed with:   Diet Clear Liquid-  Consistent Carbohydrate {No Snacks} (CSTCHO)  Entered: Apr 2 2021 10:07AM    
This patient has been assessed with a concern for Malnutrition and has been determined to have a diagnosis/diagnoses of Severe protein-calorie malnutrition.    This patient is being managed with:   Diet Regular-  Consistent Carbohydrate {No Snacks} (CSTCHO)  Fiber/Residue Restricted (LOWFIBER)  Entered: Apr  3 2021  9:48AM    
This patient has been assessed with a concern for Malnutrition and has been determined to have a diagnosis/diagnoses of Severe protein-calorie malnutrition.    This patient is being managed with:   Diet NPO-  Except Medications  Entered: Mar 31 2021  9:01PM

## 2021-04-04 NOTE — DISCHARGE NOTE PROVIDER - NSDCMRMEDTOKEN_GEN_ALL_CORE_FT
ALPRAZolam 0.25 mg oral tablet: 1 tab(s) orally 3 times a day, As Needed  ferrous sulfate 325 mg (65 mg elemental iron) oral delayed release tablet: 1 tab(s) orally once a day  glipiZIDE 10 mg oral tablet: 1 tab(s) orally 2 times a day  NexIUM 20 mg oral delayed release capsule: 1 cap(s) orally once a day  ramipril 10 mg oral capsule: 1 cap(s) orally 2 times a day  simvastatin 40 mg oral tablet: 1 tab(s) orally once a day (at bedtime)  Synthroid 200 mcg (0.2 mg) oral tablet: 1 tab(s) orally once a day

## 2021-04-04 NOTE — PROGRESS NOTE ADULT - ATTENDING COMMENTS
Clinically improved-stable vital signs/stable hemoglobin and hematocrit without indication of active ongoing bleeding. Stable status post recurrent diverticular hemorrhage. Monitor serial hemoglobin/hematocrit and observe for any recurrence of bleeding. Advance diet as per surgery service.
LGIB  -bloody BM overnight w/ HCT drop  -2 units PRBC today  -NPO  -If recurrent bleeding, will call IR for angio  -IVF
I agree with the above history, physical, and plan, which I have reviewed and edited where appropriate.  I agree with notes/assessment and detailed interval history of the health care providers on my service (PAs, Residents and House Staff).  The patient is in SICU with diagnosis mentioned in the note.    The SICU team has a constant risk benefit analyzes discussion and coordinating care with the primary team, all consultants, House Staff and PA's.  I was physically present for the key portions of the evaluation and management (E/M) service provided.  65y Female with history of pan-diverticular bleeding. Patient found to have active extravasation at the hepatic flexure on CTA, S/p IR angiogram; s/p provocative mesenteric angiogram and embolization of R middle colic artery.  I have personally examined the patient, reviewed data and laboratory tests/x-rays and all pertinent electronic images.    NEUROLOGY  Exam: Normal, NAD, alert, oriented x3  HEENT  Exam: Normocephalic,   RESPIRATORY  Exam: Lungs clear   CARDIOVASCULAR  Exam: S1, S2.  RR  GI/NUTRITION  Exam: Abdomen soft, Non-tender, Non-distended.    VASCULAR  Exam: Extremities warm,  The plan is specified below:  NEURO:  - tylenol PRN  RESP:  - RA, No active issues  CV:  - Holding home ramipril  GI  - Remains NPO,   :  - LR @ 140  -Endo  - SSI    Heme:  - Holding AC in setting of GIB  -ID:  - No need for Abx at this time    DISPO:   SICU    Critical Care Dx: GI bleed, diverticular bleed
LGIB  -2 additional episodes of BPR  -transfuse as needed  -Provacative angiogram tomorrow by IR  -Bowel prep today if bleeding has stopped  -NPO fpr now
LGIB  -oob  -dvt ppx  -monitor stool,  normal BM overnight  -trend hct  -start diet
acute blood loss anemia and hypotension s/p prbc transfusion  self limited episode of LGIB, presumably diverticular source  safe to transfer to regular childers
I agree with the history, physical, and plan, which I have reviewed and edited where appropriate.  I agree with notes/assessment and detailed interval history of the health care providers on my service (PAs, Residents and House Staff).  The patient is in SICU with diagnosis mentioned in the note.    The patient is a critical care patient with life threatening hemodynamic and metabolic instability in SICU.  The SICU team has a constant risk benefit analyzes discussion and coordinating care with the primary team, all consultants, House Staff and PA's..  I was physically present for the key portions of the evaluation and management (E/M) service provided.    The documentation of the total time spent 62 minutes  65F hx of pan-diverticulosis, and continued bloody bowel movements and non-responsive to multiple transfusions. in SICU for hemodynamic monitoring in the setting of active GI bleed.  I have personally examined the patient, reviewed data and laboratory tests/x-rays and all pertinent electronic images.  NEUROLOGY  Exam: Normal, NAD, alert, oriented x3,   HEENT  Exam: EOMI.   RESPIRATORY  Exam: Lungs clear   CARDIOVASCULAR  Exam: S1, S2.  RR  GI/NUTRITION  Exam: Abdomen soft, Non-tender, Non-distended.    VASCULAR  Exam: Extremities warm  The plan is specified below:  NEURO:  tylenol PRN  RESP:  RA,   CV:  Holding home ramipril  GI  NPO for IR procedure tomorrow  :  - LR @ 140    SSI    Heme:  Holding AC in setting of GIB      ID:  No need for Abx     DISPO/Lines  SICU
LGIB  -Continue to trend hct  -no BPR since last night  -npo for now  -dvt ppx
I agree with the above history, physical, and plan, which I have reviewed and edited where appropriate.  I agree with notes/assessment and detailed interval history of the health care providers on my service (PAs, Residents and House Staff).  The patient is in SICU with diagnosis mentioned in the note.    The SICU team has a constant risk benefit analyzes discussion and coordinating care with the primary team, all consultants, House Staff and PA's.  I was physically present for the key portions of the evaluation and management (E/M) service provided.  65F hx of pandiverticulosis, and recurrent diverticular bleed; sp IR angiogram w/ no active bleeding found sp mesenteric angiogram w/ embolization  I have personally examined the patient, reviewed data and laboratory tests/x-rays and all pertinent electronic images.    NEUROLOGY  Exam: Normal, NAD, alert, oriented x3, no focal deficits.  HEENT  Exam: Normocephalic,   RESPIRATORY  Exam: clear  CARDIOVASCULAR  Exam: RR  GI/NUTRITION  Exam: Abdomen soft, Non-tender, Non-distended.   VASCULAR  Exam: Extremities warm,  The plan is specified below:  NEUROLOGIC:  - Pain well controlled, can give Tylenol PRN  RESPIRATORY:  -  RA   CARDIOVASCULAR:  - Enalaprilat 1.25mg IV q6hr  GASTROINTESTINAL/NUTRITION:  - CLD   /RENAL:  - D5 LR @ 100  -ENDOCRINE:  - SSI  - Home Synthroid    HEMATOLOGIC:  - start SQH today   -   INFECTIOUS DISEASE:  - No need for Abx     LINES:  - Right midline    DISPO:   SICU
I agree with the history, physical, and plan, which I have reviewed and edited where appropriate.  I agree with notes/assessment and detailed interval history of the health care providers on my service (PAs, Residents and House Staff).  The patient is in SICU with diagnosis mentioned in the note.    The patient is a critical care patient with life threatening hemodynamic and metabolic instability in SICU.  The SICU team has a constant risk benefit analyzes discussion and coordinating care with the primary team, all consultants, House Staff and PA's..  I was physically present for the key portions of the evaluation and management (E/M) service provided.    65F hx multiple admissions for pandiverticulosis; pw BRBPR cf recurrent diverticular bleed and CTA showing active extravasation at the hepatic flexure sp IR angiogram. Continues with bloody bowel movements and non-responsive to multiple transfusions.  SICU consulted for hemodynamic monitoring in the setting of GI bleed  I have personally examined the patient, reviewed data and laboratory tests/x-rays and all pertinent electronic images.  NEUROLOGY  Exam: Normal, NAD, alert, oriented x3,   HEENT  Exam:  EOMI.   RESPIRATORY  Exam: Lungs clear   CARDIOVASCULAR  Exam: S1, S2.  RR  GI/NUTRITION  Exam: Abdomen soft, Non-tender, Non-distended.  Pt continues to have bloody bowel movements  VASCULAR  Exam: Extremities warm  The plan is specified below:  NEURO:  tylenol PRN  RESP:   RA,   CV:  Hypotensive o/n but currently HDS  GI  NPO for IR   :  - LR @ 140  -Endo  SSI  Heme:  Holding AC in setting of GIB    ID:  No need for Abx at this time    DISPO/Lines  SICU  CCDX. GI bleed, acute blood loss anemia
LGIB s/p IR embolization     stable, no active bleeding    repeat h/h, add stool softener
LGIB, stable w/o bleeding    Advanced to Clears    Monitor H/H
Patient with recurrent bleeding overnight/this AM. Discussed with Dr. Beach - agree that low yield for colonoscopy at this time given extensive pan-diverticulosis. Continue supportive care with PRN transfusions and agree with IR evaluation prior to potential surgical intervention.
no bleeding    ADAT
LGIB, s/p provacative angio w/ embolization  -NPO  -Check labs, replete plt as needed  -SICU care  -trend hct and monitor for BPR  -IR procedure appreciated
I agree with the history, physical, and plan, which I have reviewed and edited where appropriate.  I agree with notes/assessment of health care providers on my service (PAs, Residents and House Staff).  The patient is in SICU with diagnosis mentioned in the note.    The patient is a critical care patient with life threatening hemodynamic and metabolic instability in SICU.  Risk benefit analyzes discussed.  I was physically present for the key portions of the evaluation and management (E/M) service provided.  Staff and PA's.  The documentation of the total time spent 55 minutes.  65F hx of pandiverticulosis and recurrent diverticular bleed and CTA; sp IR angiogram in SICU consulted for hemodynamic monitoring in the setting of active GI bleed, HD at this time.   I have personally examined the patient, reviewed data and laboratory tests/x-rays and all pertinent electronic images.  NEUROLOGY  Exam: Normal, NAD  HEENT  Exam: EOMI.   RESPIRATORY  Exam: Lungs clear   CARDIOVASCULAR  Exam: S1, S2.  RR  GI/NUTRITION  Exam: Abdomen soft, Non-tender, Non-distended.    VASCULAR  Exam: Extremities warm,   The plan is specified below:  NEURO:  -tylenol PRN  RESP:  - RA,   CV:  - Holding home ramipril  GI  - Remains NPO, f/u with surgical team  :  - LR @ 140  Endo  - SSI    Heme:  - s/p provocative mesenteric angiogram and embolization 3/31: extravasation seen within proximal transverse colon, embolized to completion.   - Holding AC in setting of GIB  - Transfuse as needed for Hgb<7  - f/u transfusion 3upRBC, 2u plt     ID:  - No need for Abx at this time    DISPO/Lines  SICU    Critical Care Dx: GI bleed, diverticular bleed
LGIB s/p provactive angio w/ coil embolization  -NPO  -serial hct  -plt  -check HIT  -dvt ppx  -SICU care

## 2021-04-04 NOTE — PROGRESS NOTE ADULT - ASSESSMENT
65F hx multiple admissions for pandiverticulosis, herniated lumbar disc (s/p plate), SOLOMON, prediabetes (on oral agent) pw BRBPR cf recurrent diverticular bleed and CTA showing active extravasation at the hepatic flexure sp IR angiogram w/ no active bleeding found (3/28) now PPD2 IR provacative mesenteric angiogram w/ embolization (3/31) transferred to floors now hemodynamically stable    Plan:  - Diet: Regular diet  - Trend H/H  - Monitor for bloody bowel movements  - Strict I&Os  - DVT ppx: HSQ  - Continue home synthroid and ppi IV, restarted other home meds as able    Surgery Team A  [id number]

## 2021-04-04 NOTE — PROGRESS NOTE ADULT - NSICDXPILOT_GEN_ALL_CORE
[place]

## 2021-04-04 NOTE — PROGRESS NOTE ADULT - SUBJECTIVE AND OBJECTIVE BOX
Surgery Progress Note    Overnight:   - No acute events    SUBJECTIVE: Pt seen and examined at bedside. Patient comfortable. No nausea, vomiting, diarrhea. Denies pain. +Flatus/+BM. Tolerating regular diet.    Vital Signs Last 24 Hrs  T(C): 37.1 (03 Apr 2021 22:10), Max: 37.1 (03 Apr 2021 13:28)  T(F): 98.7 (03 Apr 2021 22:10), Max: 98.8 (03 Apr 2021 13:28)  HR: 99 (03 Apr 2021 22:10) (83 - 99)  BP: 125/73 (03 Apr 2021 22:10) (125/73 - 166/93)  BP(mean): --  RR: 18 (03 Apr 2021 22:10) (16 - 18)  SpO2: 94% (03 Apr 2021 22:10) (94% - 100%)    Physical Exam:  General Appearance: Appears well, in no acute distress, awake, alert, and oriented x3.  Respiratory: No labored breathing  Abdomen: Soft, nontender, nondistended w/o rebound tenderness or guarding.   Extremities: Warm and well perfused, moving spontaneously    LABS:                                     10.1   6.88  )-----------( 173      ( 03 Apr 2021 06:59 )             29.8       04-03    139  |  103  |  4<L>  ----------------------------<  164<H>  3.5   |  27  |  1.04    Ca    8.8      03 Apr 2021 06:59  Phos  3.0     04-03  Mg     1.9     04-03    TPro  6.3  /  Alb  4.1  /  TBili  0.9  /  DBili  x   /  AST  24  /  ALT  20  /  AlkPhos  62  04-03            CAPILLARY BLOOD GLUCOSE      POCT Blood Glucose.: 117 mg/dL (03 Apr 2021 22:13)      INs and OUTs:      02 Apr 2021 07:01  -  03 Apr 2021 07:00  --------------------------------------------------------  IN:    dextrose 5% + lactated ringers: 1750 mL    Oral Fluid: 240 mL  Total IN: 1990 mL    OUT:    Voided (mL): 3800 mL  Total OUT: 3800 mL    Total NET: -1810 mL      03 Apr 2021 07:01  -  04 Apr 2021 02:50  --------------------------------------------------------  IN:    dextrose 5% + lactated ringers: 600 mL    Oral Fluid: 550 mL  Total IN: 1150 mL    OUT:    Voided (mL): 1300 mL  Total OUT: 1300 mL    Total NET: -150 mL        Medications:  MEDICATIONS  (STANDING):  dextrose 5% + lactated ringers. 1000 milliLiter(s) (75 mL/Hr) IV Continuous <Continuous>  dextrose 50% Injectable 25 Gram(s) IV Push once  glucagon  Injectable 1 milliGRAM(s) IntraMuscular once  heparin   Injectable 5000 Unit(s) SubCutaneous every 8 hours  influenza   Vaccine 0.5 milliLiter(s) IntraMuscular once  insulin lispro (ADMELOG) corrective regimen sliding scale   SubCutaneous three times a day before meals  insulin lispro (ADMELOG) corrective regimen sliding scale   SubCutaneous at bedtime  levothyroxine 200 MICROGram(s) Oral daily  lisinopril 40 milliGRAM(s) Oral every 12 hours    MEDICATIONS  (PRN):

## 2021-04-04 NOTE — DISCHARGE NOTE PROVIDER - HOSPITAL COURSE
64 y/o female with history of diverticulosis (s/p multiple admissions, no operative intervention as they have pandiverticulosis and would require total abdominal colectomy), herniated lumbar disc (s/p plate), SOLOMON, prediabetes (on oral agent) presenting with BRBPR. Patient hypotensive in the ED with a CTA showing active extravasation at the hepatic flexure. Patient was transfused 2u pRBC on admission and was transferred to the SICU where two additional units were transfused and she became hemodynamically stable. GI and IR were consulted, however no acute intervention was performed given patient's stability. Patient's H&H normalized and she was transferred to the floor. Hospital course was notable for intermittent episodes of melena with an associated drop in H&H. She underwent an angiogram with IR, however no active bleed could be visualized and thus no intervention was performed.  Patient then underwent a provocative bleeding study (mesenteric angiogram) with IR where active extravasation was seen within the proximal transverse colon, which was embolized to completion. Patient was taken to the SICU for close hemodynamic monitoring and was transfused as needed with pRBC/FFP/platelets. After procedure, patient remained hemodynamically stable with a stable H&H and no further episodes of melena. Diet was advanced as tolerated and patient was transferred to surgical floor.    On day of discharge, patient was tolerating a regular diet, ambulating and did not endorse melena. She will follow up outpatient with Dr. Beach.

## 2021-04-04 NOTE — DISCHARGE NOTE PROVIDER - DETAILS OF MALNUTRITION DIAGNOSIS/DIAGNOSES
This patient has been assessed with a concern for Malnutrition and was treated during this hospitalization for the following Nutrition diagnosis/diagnoses:     -  04/01/2021: Severe protein-calorie malnutrition

## 2021-04-04 NOTE — DISCHARGE NOTE PROVIDER - NSDCCPCAREPLAN_GEN_ALL_CORE_FT
PRINCIPAL DISCHARGE DIAGNOSIS  Diagnosis: Lower gastrointestinal bleeding  Assessment and Plan of Treatment: Multipel times since '2014: last episode 5/2018

## 2021-04-04 NOTE — DISCHARGE NOTE PROVIDER - NSDCCPTREATMENT_GEN_ALL_CORE_FT
PRINCIPAL PROCEDURE  Procedure: Angiogram, mesenteric vessels  Findings and Treatment: Embolization of bleeding vessel at transverse colon

## 2021-04-22 ENCOUNTER — APPOINTMENT (OUTPATIENT)
Dept: COLORECTAL SURGERY | Facility: CLINIC | Age: 66
End: 2021-04-22
Payer: MEDICARE

## 2021-04-22 PROCEDURE — 99072 ADDL SUPL MATRL&STAF TM PHE: CPT

## 2021-04-22 PROCEDURE — 99213 OFFICE O/P EST LOW 20 MIN: CPT

## 2021-04-22 NOTE — HISTORY OF PRESENT ILLNESS
[FreeTextEntry1] : 65-year-old female with recent hospital admission for lower GI bleed secondary to diverticular disease. Patient received 8 units of packed red blood and ultimately required a provocative angiogram with coil embolization of the active bleeder at the hepatic flexure. Currently progressing well tolerating diet and blood per rectum no fevers or chills no nausea or vomiting otherwise without complaint no aggravating factors

## 2021-04-22 NOTE — ASSESSMENT
[FreeTextEntry1] : Lower GI bleed with severe pan colonic diverticulosis\par -We had a long discussion about treatment options for patient's GI bleed\par -Given extensive diverticulosis, patient would require an abdominal colectomy with ileorectal anastomosis. I recommended against proceeding with this and observation at this time.\par -If the patient has recurrent bleeding she should present to the emergency room as soon as possible\par -All questions were answered\par -Patient to followup as needed

## 2021-06-02 NOTE — ED ADULT TRIAGE NOTE - AS TEMP SITE
Final set of vitals 85/18/97% and /84.]    NBP within  Baseline pressures for patient. Dr. Valle has approved discharge to home.    oral

## 2021-08-31 NOTE — PROCEDURE NOTE - NSSITEPREP_SKIN_A_CORE
Patient called requesting to be seen due to a Rt foot Fracture. Patient had had imaging at [address] [facility], not available in [facility] [address]. Advised to have report faxed over , fax number provided to patient. Please advise when this patient can be seen.     No future appoi
chlorhexidine
chlorhexidine

## 2021-10-12 ENCOUNTER — INPATIENT (INPATIENT)
Facility: HOSPITAL | Age: 66
LOS: 9 days | Discharge: HOME | End: 2021-10-22
Attending: STUDENT IN AN ORGANIZED HEALTH CARE EDUCATION/TRAINING PROGRAM | Admitting: STUDENT IN AN ORGANIZED HEALTH CARE EDUCATION/TRAINING PROGRAM
Payer: MEDICARE

## 2021-10-12 VITALS
RESPIRATION RATE: 18 BRPM | HEART RATE: 85 BPM | TEMPERATURE: 98 F | SYSTOLIC BLOOD PRESSURE: 130 MMHG | OXYGEN SATURATION: 98 % | HEIGHT: 63 IN | DIASTOLIC BLOOD PRESSURE: 65 MMHG

## 2021-10-12 DIAGNOSIS — Z98.890 OTHER SPECIFIED POSTPROCEDURAL STATES: Chronic | ICD-10-CM

## 2021-10-12 DIAGNOSIS — Z90.89 ACQUIRED ABSENCE OF OTHER ORGANS: Chronic | ICD-10-CM

## 2021-10-12 DIAGNOSIS — Z90.710 ACQUIRED ABSENCE OF BOTH CERVIX AND UTERUS: Chronic | ICD-10-CM

## 2021-10-12 LAB
ALBUMIN SERPL ELPH-MCNC: 3.8 G/DL — SIGNIFICANT CHANGE UP (ref 3.3–5)
ALP SERPL-CCNC: 68 U/L — SIGNIFICANT CHANGE UP (ref 40–120)
ALT FLD-CCNC: 13 U/L — SIGNIFICANT CHANGE UP (ref 4–33)
ANION GAP SERPL CALC-SCNC: 11 MMOL/L — SIGNIFICANT CHANGE UP (ref 7–14)
APTT BLD: 28 SEC — SIGNIFICANT CHANGE UP (ref 27–36.3)
AST SERPL-CCNC: 14 U/L — SIGNIFICANT CHANGE UP (ref 4–32)
BASE EXCESS BLDV CALC-SCNC: 1.7 MMOL/L — SIGNIFICANT CHANGE UP (ref -2–3)
BASOPHILS # BLD AUTO: 0.04 K/UL — SIGNIFICANT CHANGE UP (ref 0–0.2)
BASOPHILS NFR BLD AUTO: 0.6 % — SIGNIFICANT CHANGE UP (ref 0–2)
BILIRUB SERPL-MCNC: 0.3 MG/DL — SIGNIFICANT CHANGE UP (ref 0.2–1.2)
BUN SERPL-MCNC: 26 MG/DL — HIGH (ref 7–23)
CALCIUM SERPL-MCNC: 8.6 MG/DL — SIGNIFICANT CHANGE UP (ref 8.4–10.5)
CHLORIDE SERPL-SCNC: 101 MMOL/L — SIGNIFICANT CHANGE UP (ref 98–107)
CO2 BLDV-SCNC: 29.8 MMOL/L — HIGH (ref 22–26)
CO2 SERPL-SCNC: 24 MMOL/L — SIGNIFICANT CHANGE UP (ref 22–31)
CREAT SERPL-MCNC: 1.35 MG/DL — HIGH (ref 0.5–1.3)
EOSINOPHIL # BLD AUTO: 0.03 K/UL — SIGNIFICANT CHANGE UP (ref 0–0.5)
EOSINOPHIL NFR BLD AUTO: 0.4 % — SIGNIFICANT CHANGE UP (ref 0–6)
GAS PNL BLDV: SIGNIFICANT CHANGE UP
GLUCOSE SERPL-MCNC: 202 MG/DL — HIGH (ref 70–99)
HCO3 BLDV-SCNC: 28 MMOL/L — SIGNIFICANT CHANGE UP (ref 22–29)
HCT VFR BLD CALC: 24.1 % — LOW (ref 34.5–45)
HGB BLD-MCNC: 7.7 G/DL — LOW (ref 11.5–15.5)
IANC: 5.21 K/UL — SIGNIFICANT CHANGE UP (ref 1.5–8.5)
IMM GRANULOCYTES NFR BLD AUTO: 0.4 % — SIGNIFICANT CHANGE UP (ref 0–1.5)
INR BLD: 1.09 RATIO — SIGNIFICANT CHANGE UP (ref 0.88–1.16)
LYMPHOCYTES # BLD AUTO: 1.52 K/UL — SIGNIFICANT CHANGE UP (ref 1–3.3)
LYMPHOCYTES # BLD AUTO: 21.2 % — SIGNIFICANT CHANGE UP (ref 13–44)
MCHC RBC-ENTMCNC: 28.5 PG — SIGNIFICANT CHANGE UP (ref 27–34)
MCHC RBC-ENTMCNC: 32 GM/DL — SIGNIFICANT CHANGE UP (ref 32–36)
MCV RBC AUTO: 89.3 FL — SIGNIFICANT CHANGE UP (ref 80–100)
MONOCYTES # BLD AUTO: 0.35 K/UL — SIGNIFICANT CHANGE UP (ref 0–0.9)
MONOCYTES NFR BLD AUTO: 4.9 % — SIGNIFICANT CHANGE UP (ref 2–14)
NEUTROPHILS # BLD AUTO: 5.21 K/UL — SIGNIFICANT CHANGE UP (ref 1.8–7.4)
NEUTROPHILS NFR BLD AUTO: 72.5 % — SIGNIFICANT CHANGE UP (ref 43–77)
NRBC # BLD: 0 /100 WBCS — SIGNIFICANT CHANGE UP
NRBC # FLD: 0 K/UL — SIGNIFICANT CHANGE UP
PCO2 BLDV: 51 MMHG — HIGH (ref 39–42)
PH BLDV: 7.35 — SIGNIFICANT CHANGE UP (ref 7.32–7.43)
PLATELET # BLD AUTO: 179 K/UL — SIGNIFICANT CHANGE UP (ref 150–400)
PO2 BLDV: 24 MMHG — SIGNIFICANT CHANGE UP
POTASSIUM SERPL-MCNC: 4.8 MMOL/L — SIGNIFICANT CHANGE UP (ref 3.5–5.3)
POTASSIUM SERPL-SCNC: 4.8 MMOL/L — SIGNIFICANT CHANGE UP (ref 3.5–5.3)
PROT SERPL-MCNC: 6.5 G/DL — SIGNIFICANT CHANGE UP (ref 6–8.3)
PROTHROM AB SERPL-ACNC: 12.5 SEC — SIGNIFICANT CHANGE UP (ref 10.6–13.6)
RBC # BLD: 2.7 M/UL — LOW (ref 3.8–5.2)
RBC # FLD: 14.6 % — HIGH (ref 10.3–14.5)
SAO2 % BLDV: 31.3 % — SIGNIFICANT CHANGE UP
SODIUM SERPL-SCNC: 136 MMOL/L — SIGNIFICANT CHANGE UP (ref 135–145)
WBC # BLD: 7.18 K/UL — SIGNIFICANT CHANGE UP (ref 3.8–10.5)
WBC # FLD AUTO: 7.18 K/UL — SIGNIFICANT CHANGE UP (ref 3.8–10.5)

## 2021-10-12 PROCEDURE — 99285 EMERGENCY DEPT VISIT HI MDM: CPT

## 2021-10-12 NOTE — ED PROVIDER NOTE - ADMIT DISPOSITION PRESENT ON ADMISSION SEPSIS
[address] [facility], [address] [facility]  [phone]           Patient: Emeli Goldstein                MRN: [id number]       SSN: [id number]  YOB: 1941        AGE: 67 y.o. SEX: female  Body mass index is 39.94 kg/(m^2). PCP: Jose Aly MD  12/21/17      This office note has been dictated. REVIEW OF SYSTEMS:  Constitutional: Negative for fever, chills, weight loss and malaise/fatigue. HENT: Negative. Eyes: Negative. Respiratory: Negative. Cardiovascular: Negative. Gastrointestinal: No bowel incontinence or constipation. Genitourinary: No bladder incontinence or saddle anesthesia. Skin: Negative. Neurological: Negative. Endo/Heme/Allergies: Negative. Psychiatric/Behavioral: Negative. Musculoskeletal: As per HPI above. Past Medical History:   Diagnosis Date    Allergic rhinitis     Diabetes ([facility] [address] [address]     H/O seasonal allergies     Hypertension          Current Outpatient Prescriptions:     levoFLOXacin (LEVAQUIN) 500 mg tablet, 1 po q day x 5 days, Disp: 7 Tab, Rfl: 0    Hydrocolloid Dressing (DUODERM CGF DRESSING) 4 X 4 \" bndg, 1 Patch by Apply Externally route Every Mon, Wed and Sat., Disp: 10 Each, Rfl: 1    cloNIDine HCl (CATAPRES) 0.2 mg tablet, TK 1 T PO QHS FOR BLOOD PRESSURE, Disp: , Rfl: 4    cyclobenzaprine (FLEXERIL) 10 mg tablet, Take 1 Tab by mouth three (3) times daily as needed for Muscle Spasm(s). , Disp: 21 Tab, Rfl: 0    metoprolol succinate (TOPROL-XL) 50 mg XL tablet, TAKE 1 TABLET BY MOUTH DAILY, Disp: 90 Tab, Rfl: 6    diclofenac (VOLTAREN) 1 % gel, Apply 4 g to affected area four (4) times daily. , Disp: 500 g, Rfl: 5    rosuvastatin (CRESTOR) 20 mg tablet, TAKE 1 TABLET BY MOUTH EVERY NIGHT, Disp: 90 Tab, Rfl: 3    amLODIPine (NORVASC) 10 mg tablet, Take 1 Tab by mouth daily. , Disp: 30 Tab, Rfl: 6    furosemide (LASIX) 40 mg tablet, Take 20 mg by mouth daily. , Disp: , Rfl:   valsartan (DIOVAN) 320 mg tablet, Take 320 mg by mouth daily. , Disp: , Rfl:     insulin lispro (HUMALOG) 100 unit/mL injection, by SubCUTAneous route. 8 units before breakfast 6 units before lunch 10 units before dinner, Disp: , Rfl:     insulin glargine (LANTUS SOLOSTAR) 100 unit/mL (3 mL) pen, by SubCUTAneous route. 44 units nightly, Disp: , Rfl:     trimethoprim-sulfamethoxazole (BACTRIM DS) 160-800 mg per tablet, Take 1 Tab by mouth two (2) times a day., Disp: 14 Tab, Rfl: 0    moxifloxacin (AVELOX) 400 mg tablet, Take 1 Tab by mouth daily. , Disp: 7 Tab, Rfl: 0    acetaminophen-codeine (TYLENOL-CODEINE #3) 300-30 mg per tablet, Take 1 Tab by mouth every six (6) hours as needed for Pain. Max Daily Amount: 4 Tabs., Disp: 21 Tab, Rfl: 0    polyethylene glycol (MIRALAX) 17 gram/dose powder, Take 17 g by mouth daily. , Disp: 595 g, Rfl: 3    Allergies   Allergen Reactions    Aspirin Other (comments)     Ringing in ears    Keflex [Cephalexin] Rash       Social History     Social History    Marital status:      Spouse name: N/A    Number of children: N/A    Years of education: N/A     Occupational History    CNA - [employer]      Social History Main Topics    Smoking status: Former Smoker    Smokeless tobacco: Never Used    Alcohol use No    Drug use: No    Sexual activity: Yes     Partners: Male     Birth control/ protection: None     Other Topics Concern    Not on file     Social History Narrative       Past Surgical History:   Procedure Laterality Date    HX BACK SURGERY      HX CATARACT REMOVAL  2011    HX LUMBAR FUSION  2004    HX WISDOM TEETH EXTRACTION             * Patient was identified by name and date of birth   * Agreement on procedure being performed was verified  * Risks and Benefits explained to the patient  * Procedure site verified and marked as necessary  * Patient was positioned for comfort  * Consent was signed and verified  3:26 PM    The patient was instructed on post injection care. We did see Ms. Vinson for followup with regards to a wound on her right hip. The patient had a small ulcer-type area, grade I, on her right buttocks. She has been treated with antibiotics and wound care. This is healing up. She is cleaning twice a day at home. She has had no fevers or chills. She denies any drainage from the hip wound. She has had no fevers or chills. No night sweats and no weight loss. She is having a lot of trouble with her right knee. She does have known advanced arthritis of the right knee and had an injection by Dr. Callahan Done back in the summertime and is requesting injection today. She has had no recent fevers, chills, systemic changes, or injuries to report. PHYSICAL EXAMINATION:  In general, the patient is alert and oriented x 3 in no acute distress. The patient is well-developed, well-nourished, with a normal affect. The patient is afebrile. HEENT:  Head is normocephalic and atraumatic. Pupils are equally round and reactive to light and accommodation. Extraocular eye movements are intact. Neck is supple. Trachea is midline. No JVD is present. Breathing is nonlabored. Examination of the lower extremities reveals pain-free range of motion of the hips. The wound on the right buttocks cheek is healing nicely. There is a small eschar over top without surrounding erythema and no underlying fluctuance. There are no signs for infection or cellulitis present. There is a little discomfort to palpation of the greater trochanteric bursa. There is no pain with rotation of the hip. Neurovascular status is intact to the lower extremity. There is no calf tenderness. There is a negative Jean Claude's sign. There are no signs for DVT present. Examination of the right knee reveals the skin is intact. There is no ecchymosis, no warmth, and no signs for infection or cellulitis present.   She does have pain to palpation to the medial joint line, as well as patellofemoral grind and crepitus anteriorly with range of motion activities. ASSESSMENT:      1. Right buttocks ulcer, improved. 2. Right knee advanced osteoarthritis. PLAN:  At this point, the patient will continue with dressing changes for the right hip. A new Band-Aid was placed on it today. With regards to the right knee, we are going to move forward with a cortisone injection to the right knee today. Under aseptic conditions, after informed and written consent, and appropriate time out performed, with ultrasound-guided assistance, the right knee was prepped with Betadine and 6 mg of Celestone was injected without complications. The patient tolerated the injection well. The patient was instructed on post injection care. We will see her back in the office in about four weeks time for evaluation and wound check. She will call with any questions or concerns that shall arise.                             JR Juan Luis LEE, PAJjC, ATC No

## 2021-10-12 NOTE — ED ADULT TRIAGE NOTE - CHIEF COMPLAINT QUOTE
p/t with hx diverticulitis c/o of BRBPR since this afternoon, p/t denies any blood thinner use, denies any nausea or vomiting

## 2021-10-12 NOTE — ED PROVIDER NOTE - ATTENDING CONTRIBUTION TO CARE
agree with resident note    " 66 yo F history of diverticulosis (s/p multiple admissions, no operative intervention as they have pandiverticulosis and would require total abdominal colectomy. Most recent admission April 2021 s/p IR embolization), herniated lumbar disc (s/p plate), SOLOMON, prediabetes (on oral agent) presenting with BRBPR. Started at 4pm this afternoon, has had 3 episodes so far of large amounts of blood and stool per rectum. Denies abd pain, N/V, F/C, chest pain, SOB. During bowel movements gets lightheaded and dizzy and has near syncope episodes. has required multiple blood and platelet transfusions in the past. not on a blood thinenr"    PE:  well appearing; VSS: CTBA/L; s1 s2 no m/r/g abd soft/NT/ND guiac: blood on glove; no active bleeding on visualization ext: no edema    Imp: given prior hx will get labs, type, CTA looking for diverticular bleed

## 2021-10-12 NOTE — ED PROVIDER NOTE - OBJECTIVE STATEMENT
66 yo F history of diverticulosis (s/p multiple admissions, no operative intervention as they have pandiverticulosis and would require total abdominal colectomy. Most recent admission April 2021 s/p IR embolization), herniated lumbar disc (s/p plate), SOLOMON, prediabetes (on oral agent) presenting with BRBPR. Started at 4pm this afternoon, has had 3 episodes so far of large amounts of blood and stool per rectum. Denies abd pain, N/V, F/C, chest pain, SOB. During bowel movements gets lightheaded and dizzy and has near syncope episodes. has required multiple blood and platelet transfusions in the past. not on a blood thinenr

## 2021-10-12 NOTE — ED PROVIDER NOTE - CLINICAL SUMMARY MEDICAL DECISION MAKING FREE TEXT BOX
Narda - 64 yo F history of diverticulosis (s/p multiple admissions, no operative intervention as they have pandiverticulosis and would require total abdominal colectomy. Most recent admission April 2021 s/p IR embolization), herniated lumbar disc (s/p plate), SOLOMON, prediabetes (on oral agent) presenting with BRBPR. concern for diverticular bleed. will check CTA abd pelvis with iv contrast. pt currently stable, no need for emergent blood. will check cbc, cmp, coags, type and screen, admit

## 2021-10-12 NOTE — ED PROVIDER NOTE - PHYSICAL EXAMINATION
Nancy Laboy MD  GENERAL: Patient awake alert NAD.  HEENT: NC/AT, Moist mucous membranes, PERRL, EOMI.  LUNGS: CTAB, no wheezes or crackles.   CARDIAC: RRR, no m/r/g.    ABDOMEN: Soft, NT, ND, No rebound, guarding.   EXT: No edema.   MSK: no pain with movement, no deformities.  NEURO: A&Ox3. Moving all extremities.  SKIN: Warm and dry. No rash.  PSYCH: Normal affect.

## 2021-10-12 NOTE — ED ADULT NURSE NOTE - OBJECTIVE STATEMENT
SUBJECTIVE:     CC: Selena Saenz is an 57 year old woman who presents for preventive health visit.     Physical  Annual:     Getting at least 3 servings of Calcium per day::  NO    Bi-annual eye exam::  Yes    Dental care twice a year::  NO    Sleep apnea or symptoms of sleep apnea::  None    Frequency of exercise::  2-3 days/week    Duration of exercise::  15-30 minutes    Taking medications regularly::  Yes    Additional concerns today::  No    Biometric form for work    Today's PHQ-2 Score:   PHQ-2 ( 1999 [employer]) 1/18/2017   Q1: Little interest or pleasure in doing things 0   Q2: Feeling down, depressed or hopeless 0   PHQ-2 Score 0   Little interest or pleasure in doing things -   Feeling down, depressed or hopeless -   PHQ-2 Score -       Abuse: Current or Past(Physical, Sexual or Emotional)- No  Do you feel safe in your environment - Yes    Social History   Substance Use Topics     Smoking status: Never Smoker      Smokeless tobacco: Never Used     Alcohol Use: Yes      Comment: 2-3 glasses of wine weekly     The patient does not drink >3 drinks per day nor >7 drinks per week.    Recent Labs   Lab Test  02/08/16   0838  03/27/15   0941  03/08/14   0942   CHOL  215*  236*  205*   HDL  53  57  46*   LDL  142*  154*  137*   TRIG  101  127  110   CHOLHDLRATIO   --   4.1  4.5   NHDL  162*   --    --        Reviewed orders with patient.  Reviewed health maintenance and updated orders accordingly - Yes    Mammo Decision Support:  Patient over age 50, mutual decision to screen reflected in health maintenance.    Pertinent mammograms are reviewed under the imaging tab.  History of abnormal Pap smear: NO - age 30-65 PAP every 5 years with negative HPV co-testing recommended  All Histories reviewed and updated in Epic.      ROS:  C: NEGATIVE for fever, chills, change in weight  I: NEGATIVE for worrisome rashes, moles or lesions  E: NEGATIVE for vision changes or irritation  ENT: NEGATIVE for ear, mouth and throat  "problems  R: NEGATIVE for significant cough or SOB  B: NEGATIVE for masses, tenderness or discharge  CV: NEGATIVE for chest pain, palpitations or peripheral edema  GI: NEGATIVE for nausea, abdominal pain, heartburn, or change in bowel habits  : NEGATIVE for unusual urinary or vaginal symptoms. No vaginal bleeding.  M: NEGATIVE for significant arthralgias or myalgia  N: NEGATIVE for weakness, dizziness or paresthesias  P: NEGATIVE for changes in mood or affect     Problem list, Medication list, Allergies, and Medical/Social/Surgical histories reviewed in [facility] and updated as appropriate.  OBJECTIVE:     /80 mmHg  Pulse 62  Temp(Src) 97.8  F (36.6  C) (Oral)  Ht 5' 6\" (1.676 m)  Wt 188 lb (85.276 kg)  BMI 30.36 kg/m2  LMP 09/14/2013  Breastfeeding? No  EXAM:  GENERAL APPEARANCE: healthy, alert and no distress  EYES: Eyes grossly normal to inspection, PERRL and conjunctivae and sclerae normal  HENT: ear canals and TM's normal, nose and mouth without ulcers or lesions, oropharynx clear and oral mucous membranes moist  NECK: no adenopathy, no asymmetry, masses, or scars and thyroid normal to palpation  RESP: lungs clear to auscultation - no rales, rhonchi or wheezes  BREAST: normal without masses, tenderness or nipple discharge and no palpable axillary masses or adenopathy  CV: regular rate and rhythm, normal S1 S2, no S3 or S4, no murmur, click or rub, no peripheral edema and peripheral pulses strong  ABDOMEN: soft, nontender, no hepatosplenomegaly, no masses and bowel sounds normal  MS: no musculoskeletal defects are noted and gait is age appropriate without ataxia  SKIN: no suspicious lesions or rashes  NEURO: Normal strength and tone, sensory exam grossly normal, mentation intact and speech normal  PSYCH: mentation appears normal and affect normal/bright    ASSESSMENT/PLAN:     1. Encounter for routine adult health examination without abnormal findings  - COMPREHENSIVE METABOLIC PANEL  - *MA Screening " "Digital Bilateral; Future  - Lipid Profile with reflex to direct LDL    2. Acquired hypothyroidism - will dose Synthroid based on results  - TSH with free T4 reflex    3. Tubular adenoma - noted in 2014, needs repeat colonoscopy in 2019    4. Non morbid obesity, unspecified obesity type - discussed recent weight gain, she is committed to losing the weight she gained with diet and exercise.       COUNSELING:  Reviewed preventive health counseling, as reflected in patient instructions       reports that she has never smoked. She has never used smokeless tobacco.    Estimated body mass index is 30.36 kg/(m^2) as calculated from the following:    Height as of this encounter: 5' 6\" (1.676 m).    Weight as of this encounter: 188 lb (85.276 kg).       Rachel Díaz MD  [facility]    Answers for HPI/ROS submitted by the patient on 1/15/2017   PHQ-2 Depressed: Not at all, Not at all  PHQ-2 Score: 0      " received pt with rectal bleeding since this afternoon 4pm. no abd pain, no dizziness noted. which symptoms started from 2years ago with diverticulosis. alert and oriented x 3. able to walk. skin is intact. no covid vaccination

## 2021-10-12 NOTE — ED PROVIDER NOTE - PROGRESS NOTE DETAILS
GI bleed seen on CTA, descending colon. Have paged GI, awaiting call back. Surgery aware, will see pt. Will rpt cbc. pt reassessed, feeling well, no new BMs Have spoken to GI, they will see pt in am and recc IR. Have paged IR tba gen surg. pt stable

## 2021-10-13 DIAGNOSIS — K92.2 GASTROINTESTINAL HEMORRHAGE, UNSPECIFIED: ICD-10-CM

## 2021-10-13 LAB
BASOPHILS # BLD AUTO: 0.03 K/UL — SIGNIFICANT CHANGE UP (ref 0–0.2)
BASOPHILS NFR BLD AUTO: 0.5 % — SIGNIFICANT CHANGE UP (ref 0–2)
BLD GP AB SCN SERPL QL: NEGATIVE — SIGNIFICANT CHANGE UP
EOSINOPHIL # BLD AUTO: 0.05 K/UL — SIGNIFICANT CHANGE UP (ref 0–0.5)
EOSINOPHIL NFR BLD AUTO: 0.8 % — SIGNIFICANT CHANGE UP (ref 0–6)
GLUCOSE BLDC GLUCOMTR-MCNC: 123 MG/DL — HIGH (ref 70–99)
GLUCOSE BLDC GLUCOMTR-MCNC: 135 MG/DL — HIGH (ref 70–99)
GLUCOSE BLDC GLUCOMTR-MCNC: 144 MG/DL — HIGH (ref 70–99)
HCT VFR BLD CALC: 20.9 % — CRITICAL LOW (ref 34.5–45)
HCT VFR BLD CALC: 21.3 % — LOW (ref 34.5–45)
HCT VFR BLD CALC: 22.9 % — LOW (ref 34.5–45)
HGB BLD-MCNC: 6.7 G/DL — CRITICAL LOW (ref 11.5–15.5)
HGB BLD-MCNC: 6.9 G/DL — CRITICAL LOW (ref 11.5–15.5)
HGB BLD-MCNC: 7.7 G/DL — LOW (ref 11.5–15.5)
IANC: 3.72 K/UL — SIGNIFICANT CHANGE UP (ref 1.5–8.5)
IMM GRANULOCYTES NFR BLD AUTO: 0.2 % — SIGNIFICANT CHANGE UP (ref 0–1.5)
LYMPHOCYTES # BLD AUTO: 1.7 K/UL — SIGNIFICANT CHANGE UP (ref 1–3.3)
LYMPHOCYTES # BLD AUTO: 28.8 % — SIGNIFICANT CHANGE UP (ref 13–44)
MCHC RBC-ENTMCNC: 28.2 PG — SIGNIFICANT CHANGE UP (ref 27–34)
MCHC RBC-ENTMCNC: 29 PG — SIGNIFICANT CHANGE UP (ref 27–34)
MCHC RBC-ENTMCNC: 29.4 PG — SIGNIFICANT CHANGE UP (ref 27–34)
MCHC RBC-ENTMCNC: 31.5 GM/DL — LOW (ref 32–36)
MCHC RBC-ENTMCNC: 33 GM/DL — SIGNIFICANT CHANGE UP (ref 32–36)
MCHC RBC-ENTMCNC: 33.6 GM/DL — SIGNIFICANT CHANGE UP (ref 32–36)
MCV RBC AUTO: 87.4 FL — SIGNIFICANT CHANGE UP (ref 80–100)
MCV RBC AUTO: 87.8 FL — SIGNIFICANT CHANGE UP (ref 80–100)
MCV RBC AUTO: 89.5 FL — SIGNIFICANT CHANGE UP (ref 80–100)
MONOCYTES # BLD AUTO: 0.4 K/UL — SIGNIFICANT CHANGE UP (ref 0–0.9)
MONOCYTES NFR BLD AUTO: 6.8 % — SIGNIFICANT CHANGE UP (ref 2–14)
NEUTROPHILS # BLD AUTO: 3.72 K/UL — SIGNIFICANT CHANGE UP (ref 1.8–7.4)
NEUTROPHILS NFR BLD AUTO: 62.9 % — SIGNIFICANT CHANGE UP (ref 43–77)
NRBC # BLD: 0 /100 WBCS — SIGNIFICANT CHANGE UP
NRBC # FLD: 0 K/UL — SIGNIFICANT CHANGE UP
PLATELET # BLD AUTO: 146 K/UL — LOW (ref 150–400)
PLATELET # BLD AUTO: 146 K/UL — LOW (ref 150–400)
PLATELET # BLD AUTO: 154 K/UL — SIGNIFICANT CHANGE UP (ref 150–400)
RBC # BLD: 2.38 M/UL — LOW (ref 3.8–5.2)
RBC # BLD: 2.38 M/UL — LOW (ref 3.8–5.2)
RBC # BLD: 2.62 M/UL — LOW (ref 3.8–5.2)
RBC # FLD: 14.4 % — SIGNIFICANT CHANGE UP (ref 10.3–14.5)
RBC # FLD: 15.2 % — HIGH (ref 10.3–14.5)
RBC # FLD: 15.7 % — HIGH (ref 10.3–14.5)
RH IG SCN BLD-IMP: POSITIVE — SIGNIFICANT CHANGE UP
RH IG SCN BLD-IMP: POSITIVE — SIGNIFICANT CHANGE UP
SARS-COV-2 RNA SPEC QL NAA+PROBE: SIGNIFICANT CHANGE UP
WBC # BLD: 5.36 K/UL — SIGNIFICANT CHANGE UP (ref 3.8–10.5)
WBC # BLD: 5.91 K/UL — SIGNIFICANT CHANGE UP (ref 3.8–10.5)
WBC # BLD: 6.43 K/UL — SIGNIFICANT CHANGE UP (ref 3.8–10.5)
WBC # FLD AUTO: 5.36 K/UL — SIGNIFICANT CHANGE UP (ref 3.8–10.5)
WBC # FLD AUTO: 5.91 K/UL — SIGNIFICANT CHANGE UP (ref 3.8–10.5)
WBC # FLD AUTO: 6.43 K/UL — SIGNIFICANT CHANGE UP (ref 3.8–10.5)

## 2021-10-13 PROCEDURE — 99222 1ST HOSP IP/OBS MODERATE 55: CPT

## 2021-10-13 PROCEDURE — 74178 CT ABD&PLV WO CNTR FLWD CNTR: CPT | Mod: 26

## 2021-10-13 PROCEDURE — 99291 CRITICAL CARE FIRST HOUR: CPT

## 2021-10-13 PROCEDURE — 45330 DIAGNOSTIC SIGMOIDOSCOPY: CPT | Mod: GC

## 2021-10-13 PROCEDURE — 99223 1ST HOSP IP/OBS HIGH 75: CPT | Mod: GC,25

## 2021-10-13 RX ORDER — LEVOTHYROXINE SODIUM 125 MCG
200 TABLET ORAL DAILY
Refills: 0 | Status: DISCONTINUED | OUTPATIENT
Start: 2021-10-13 | End: 2021-10-18

## 2021-10-13 RX ORDER — SODIUM CHLORIDE 9 MG/ML
1000 INJECTION, SOLUTION INTRAVENOUS ONCE
Refills: 0 | Status: COMPLETED | OUTPATIENT
Start: 2021-10-13 | End: 2021-10-13

## 2021-10-13 RX ORDER — ALPRAZOLAM 0.25 MG
1 TABLET ORAL
Qty: 0 | Refills: 0 | DISCHARGE

## 2021-10-13 RX ORDER — DEXTROSE 50 % IN WATER 50 %
12.5 SYRINGE (ML) INTRAVENOUS ONCE
Refills: 0 | Status: DISCONTINUED | OUTPATIENT
Start: 2021-10-13 | End: 2021-10-16

## 2021-10-13 RX ORDER — MIDAZOLAM HYDROCHLORIDE 1 MG/ML
8 INJECTION, SOLUTION INTRAMUSCULAR; INTRAVENOUS ONCE
Refills: 0 | Status: DISCONTINUED | OUTPATIENT
Start: 2021-10-13 | End: 2021-10-13

## 2021-10-13 RX ORDER — PHENYLEPHRINE HYDROCHLORIDE 10 MG/ML
0.1 INJECTION INTRAVENOUS
Qty: 40 | Refills: 0 | Status: DISCONTINUED | OUTPATIENT
Start: 2021-10-13 | End: 2021-10-13

## 2021-10-13 RX ORDER — SIMVASTATIN 20 MG/1
40 TABLET, FILM COATED ORAL AT BEDTIME
Refills: 0 | Status: DISCONTINUED | OUTPATIENT
Start: 2021-10-13 | End: 2021-10-18

## 2021-10-13 RX ORDER — DEXTROSE 50 % IN WATER 50 %
25 SYRINGE (ML) INTRAVENOUS ONCE
Refills: 0 | Status: DISCONTINUED | OUTPATIENT
Start: 2021-10-13 | End: 2021-10-18

## 2021-10-13 RX ORDER — FENTANYL CITRATE 50 UG/ML
75 INJECTION INTRAVENOUS ONCE
Refills: 0 | Status: DISCONTINUED | OUTPATIENT
Start: 2021-10-13 | End: 2021-10-13

## 2021-10-13 RX ORDER — FENTANYL CITRATE 50 UG/ML
200 INJECTION INTRAVENOUS ONCE
Refills: 0 | Status: DISCONTINUED | OUTPATIENT
Start: 2021-10-13 | End: 2021-10-13

## 2021-10-13 RX ORDER — DEXTROSE 50 % IN WATER 50 %
15 SYRINGE (ML) INTRAVENOUS ONCE
Refills: 0 | Status: DISCONTINUED | OUTPATIENT
Start: 2021-10-13 | End: 2021-10-16

## 2021-10-13 RX ORDER — ESOMEPRAZOLE MAGNESIUM 40 MG/1
1 CAPSULE, DELAYED RELEASE ORAL
Qty: 0 | Refills: 0 | DISCHARGE

## 2021-10-13 RX ORDER — LEVOTHYROXINE SODIUM 125 MCG
100 TABLET ORAL AT BEDTIME
Refills: 0 | Status: DISCONTINUED | OUTPATIENT
Start: 2021-10-13 | End: 2021-10-13

## 2021-10-13 RX ORDER — SODIUM CHLORIDE 9 MG/ML
1000 INJECTION, SOLUTION INTRAVENOUS
Refills: 0 | Status: DISCONTINUED | OUTPATIENT
Start: 2021-10-13 | End: 2021-10-16

## 2021-10-13 RX ORDER — INSULIN LISPRO 100/ML
VIAL (ML) SUBCUTANEOUS EVERY 6 HOURS
Refills: 0 | Status: DISCONTINUED | OUTPATIENT
Start: 2021-10-13 | End: 2021-10-19

## 2021-10-13 RX ORDER — SODIUM CHLORIDE 9 MG/ML
1000 INJECTION, SOLUTION INTRAVENOUS
Refills: 0 | Status: DISCONTINUED | OUTPATIENT
Start: 2021-10-13 | End: 2021-10-14

## 2021-10-13 RX ORDER — INSULIN LISPRO 100/ML
VIAL (ML) SUBCUTANEOUS
Refills: 0 | Status: DISCONTINUED | OUTPATIENT
Start: 2021-10-13 | End: 2021-10-13

## 2021-10-13 RX ORDER — GLUCAGON INJECTION, SOLUTION 0.5 MG/.1ML
1 INJECTION, SOLUTION SUBCUTANEOUS ONCE
Refills: 0 | Status: DISCONTINUED | OUTPATIENT
Start: 2021-10-13 | End: 2021-10-16

## 2021-10-13 RX ORDER — INSULIN LISPRO 100/ML
VIAL (ML) SUBCUTANEOUS AT BEDTIME
Refills: 0 | Status: DISCONTINUED | OUTPATIENT
Start: 2021-10-13 | End: 2021-10-13

## 2021-10-13 RX ORDER — PHENYLEPHRINE HYDROCHLORIDE 10 MG/ML
1000 INJECTION INTRAVENOUS ONCE
Refills: 0 | Status: DISCONTINUED | OUTPATIENT
Start: 2021-10-13 | End: 2021-10-13

## 2021-10-13 RX ORDER — MIDAZOLAM HYDROCHLORIDE 1 MG/ML
2 INJECTION, SOLUTION INTRAMUSCULAR; INTRAVENOUS ONCE
Refills: 0 | Status: DISCONTINUED | OUTPATIENT
Start: 2021-10-13 | End: 2021-10-13

## 2021-10-13 RX ORDER — INFLUENZA VIRUS VACCINE 15; 15; 15; 15 UG/.5ML; UG/.5ML; UG/.5ML; UG/.5ML
0.5 SUSPENSION INTRAMUSCULAR ONCE
Refills: 0 | Status: DISCONTINUED | OUTPATIENT
Start: 2021-10-13 | End: 2021-10-22

## 2021-10-13 RX ORDER — PROPOFOL 10 MG/ML
100 INJECTION, EMULSION INTRAVENOUS ONCE
Refills: 0 | Status: DISCONTINUED | OUTPATIENT
Start: 2021-10-13 | End: 2021-10-13

## 2021-10-13 RX ADMIN — SIMVASTATIN 40 MILLIGRAM(S): 20 TABLET, FILM COATED ORAL at 22:10

## 2021-10-13 RX ADMIN — FENTANYL CITRATE 75 MICROGRAM(S): 50 INJECTION INTRAVENOUS at 19:58

## 2021-10-13 RX ADMIN — FENTANYL CITRATE 75 MICROGRAM(S): 50 INJECTION INTRAVENOUS at 19:43

## 2021-10-13 RX ADMIN — MIDAZOLAM HYDROCHLORIDE 2 MILLIGRAM(S): 1 INJECTION, SOLUTION INTRAMUSCULAR; INTRAVENOUS at 19:33

## 2021-10-13 RX ADMIN — SODIUM CHLORIDE 1000 MILLILITER(S): 9 INJECTION, SOLUTION INTRAVENOUS at 10:14

## 2021-10-13 RX ADMIN — SODIUM CHLORIDE 125 MILLILITER(S): 9 INJECTION, SOLUTION INTRAVENOUS at 22:10

## 2021-10-13 NOTE — ED ADULT NURSE REASSESSMENT NOTE - COMFORT CARE
side rails up/warm blanket provided

## 2021-10-13 NOTE — CHART NOTE - NSCHARTNOTEFT_GEN_A_CORE
Flexible Sigmoidoscopy done at bedside.    FINDINGS:  Scope inserted up to distal transverse colon.  Severe diverticulosis with small and large diverticula throughout.  Old blood and clots noted.  No active bleeding seen.   Source not indentified.    RECS:  - Continue resuscitation and SICU care.  - If further bleeding overnight would d/w IR role of angiogram and embolization.  - Discuss colectomy w/ colorectal surgery (pt known to Dr. Beach in the past)  - No further plan for endoscopy at this time.  - GI will f/u in AM.    Turong Davila  Gastroenterology/Hepatology Fellow  Available via [employer] Teams    NON-URGENT CONSULTS:  Please email [email or web address]  AT NIGHT AND ON WEEKENDS:  Contact on-call GI fellow via answering service ([phone]) from 5pm-8am and on weekends/holidays  MONDAY-FRIDAY 8AM-5PM:  Pager# [phone] ([facility]) or [phone] ([facility])  GI Phone# [phone] ([facility])

## 2021-10-13 NOTE — PROGRESS NOTE ADULT - SUBJECTIVE AND OBJECTIVE BOX
SICU Daily Progress Note  =====================================================  Interval/Overnight Events:     ***    POD #          	SICU Day #    ***    HPI:  64 yo F w/ hx of diverticulosis (multiple previous admissions - last in March 2021), SOLOMON, NIDDM, nephrolithiasis, herniated lumbar disc (s/p plate), presenting to ED for 6 episodes of BRBPR since 4 pm yesterday. Pt reports associated lightheadedness and nausea preceding this event. She denies fevers, abd pain, and vomiting. Review of chart shows multiple past admissions to [facility] w/ hypotension requiring multiple transfusions. During her last admission in March 2021 the pt underwent IR embolization of a brach of her middle colic artery. Due to the severity of her pan-diverticulosis she would require total abdominal colectomy, and for this reason surgical interventions have not been pursued    In ED today, pt has remained hemodynamically stable w/o tachycardia or hypotension. Inital lab work was significant for hgb of 7.7, which trended down to 6.7 at 4 hours. CTA abd/pelvis demonstrated active GI bleeding in the descending colon in the region of the splenic flexure. 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 Presently, the pt is awaiting evaluations by IR and GI. Due to the severity of bleeding on previous presentations, the SICU was consulted for ongoing hemodynamic monitoring while awaiting definitive plan for intervention.    PMH:     Hypothyroidism  Pernicious anemia  Diverticulosis  HTN (hypertension)  Lower gastrointestinal bleeding  Pernicious anemia  SOLOMON (obstructive sleep apnea)  Type 2 diabetes mellitus  Left ureteral stone  Fibroid uterus  Lumbar herniated disc  Multiparity  Heart murmur    Allergies: oxycodone (Other)  Valium (Other)      MEDICATIONS:   --------------------------------------------------------------------------------------  Neurologic Medications    Respiratory Medications    Cardiovascular Medications    Gastrointestinal Medications  lactated ringers. 1000 milliLiter(s) IV Continuous <Continuous>    Genitourinary Medications    Hematologic/Oncologic Medications    Antimicrobial/Immunologic Medications    Endocrine/Metabolic Medications  levothyroxine 200 MICROGram(s) Oral daily  simvastatin 40 milliGRAM(s) Oral at bedtime    Topical/Other Medications    --------------------------------------------------------------------------------------    VITAL SIGNS, INS/OUTS (last 24 hours):  --------------------------------------------------------------------------------------  ICU Vital Signs Last 24 Hrs  T(C): 36.6 (13 Oct 2021 08:00), Max: 37 (12 Oct 2021 22:59)  T(F): 97.8 (13 Oct 2021 08:00), Max: 98.6 (12 Oct 2021 22:59)  HR: 83 (13 Oct 2021 08:00) (76 - 93)  BP: 159/80 (13 Oct 2021 08:00) (128/78 - 159/80)  BP(mean): 96 (13 Oct 2021 08:00) (89 - 96)  ABP: --  ABP(mean): --  RR: 17 (13 Oct 2021 08:00) (15 - 18)  SpO2: 100% (13 Oct 2021 08:00) (98% - 100%)    --------------------------------------------------------------------------------------    EXAM  NEUROLOGY  RASS:   	GCS:    Gen: A&Ox4   HEENT: Atraumatic. Mucous membranes moist, no scleral icterus   CV: RRR. No murmurs. No significant LE edema. Distal pulses 2+. Capillary refill < 2 seconds.  Resp: Respirations unlabored CTAB, no rales, rhonchi, or wheezes.  GI: Abdomen non tender to palpation, soft and non-distended. + BS.  Skin/MSK: No open wounds. No ecchymosis appreciated.  Neuro:  Following commands. Moving extremities spontaneously.  Psych: Appropriate mood, cooperative      METABOLIC/FLUIDS/ELECTROLYTES  lactated ringers. 1000 milliLiter(s) IV Continuous <Continuous>      HEMATOLOGIC  [x] VTE Prophylaxis:   Transfusions:	[] PRBC	[] Platelets		[] FFP	[] Cryoprecipitate    INFECTIOUS DISEASE  Antimicrobials/Immunologic Medications:    Day #      of     ***    Tubes/Lines/Drains  ***  [x] Peripheral IV  [] Central Venous Line     	[] R	[] L	[] IJ	[] Fem	[] SC	Date Placed:   [] Arterial Line		[] R	[] L	[] Fem	[] Rad	[] Ax	Date Placed:   [] PICC		[] Midline		[] Mediport  [] Urinary Catheter		Date Placed:   [x] Necessity of urinary, arterial, and venous catheters discussed    LABS  --------------------------------------------------------------------------------------                        6.7    5.91  )-----------( 146      ( 13 Oct 2021 03:27 )             21.3     10-12    136  |  101  |  26<H>  ----------------------------<  202<H>  4.8   |  24  |  1.35<H>    Ca    8.6      12 Oct 2021 23:14    TPro  6.5  /  Alb  3.8  /  TBili  0.3  /  DBili  x   /  AST  14  /  ALT  13  /  AlkPhos  68  10-12    PT/INR - ( 12 Oct 2021 23:14 )   PT: 12.5 sec;   INR: 1.09 ratio    PTT - ( 12 Oct 2021 23:14 )  PTT:28.0 sec    --------------------------------------------------------------------------------------    OTHER LABORATORY:     IMAGING STUDIES:   < from: CT Abdomen and Pelvis w/wo IV Cont (10.13.21 @ 01:56) >  IMPRESSION:    Active GI bleed in the descending colon in the region of the splenic flexure.    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    These findings were discussed with Dr. RORO ROSADO at 10/13/2021 2:12 AM by Dr. Rinaldi with read back confirmation.    --- End of Report ---    < end of copied text >      CXR:

## 2021-10-13 NOTE — CONSULT NOTE ADULT - ASSESSMENT
Assessment: 66y Female with history of diverticular bleed and embolization in 3/2021 now presents with BRPR and found to have acute GIB at splenic flexure. Focal dissection of right iliac artery noted on CTA as well.    Plan:  -Patient getting first unit of blood now. No hypotension or tachycardia.  -Evaluate response to blood transfusion  -Possible need to angiogram with possible embolization depending on response to blood transfusion and hemodynamics.    discussed with clinical team    --  Bridger Yeung MD  Radiology Resident  IR Pager: [phone]  IR Office: [phone]

## 2021-10-13 NOTE — CONSULT NOTE ADULT - ASSESSMENT
64 yo F w/ hx of pan-diverticulosis (multiple previous admissions - last in March 2021), SOLOMON, NIDDM, nephrolithiasis, herniated lumbar disc (s/p plate), presenting to ED on 10/13 for 6 episodes of BRBPR. CTA w/ active bleeding in descending colon. Admitted to SICU for hemodynamic monitoring, responding well to transfusions with likely stop in LGIB. 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     PLAN:   - No indication for acute surgical/angio interventions  - Continue to monitor pulse exam, notify for any changes   - OOBTC/encourage ambulation   - Likely serial outpatient duplexes  - DVT ppx held in setting of LGIB, resume when cleared     Patient seen/examined and Discussed with Vascular Surgery fellow on-call  Plan to be discussed with Attending 64 yo F w/ hx of pan-diverticulosis (multiple previous admissions - last in March 2021), SOLOMON, NIDDM, nephrolithiasis, herniated lumbar disc (s/p plate), presenting to ED on 10/13 for 6 episodes of BRBPR. CTA w/ active bleeding in descending colon. Admitted to SICU for hemodynamic monitoring, responding well to transfusions with likely stop in LGIB. 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

## 2021-10-13 NOTE — H&P ADULT - ATTENDING COMMENTS
Diverticular bleed  a.  Admit to B/ SICU /[facility]  b.  Keep NPO  c.  Trend Hgb, transfuse prn  d.  Minimize IVF resuscitation  e.  CT reviewed  f.   Discuss case with CRS in am

## 2021-10-13 NOTE — CONSULT NOTE ADULT - ASSESSMENT
66F w/ pandiverticulosis and history of recurrent diverticular bleeding presenting w/ painless hematochezia.    Impression;  #Diverticular bleeding - CTA positive for bleeding from splenic flexure. No bleeding for last 7 hours, VSS, responding to 1u pRBC transfusion, in SICU for monitoring. Last colonoscopy 2019 showing only pan-diverticulosis. Previously embolized by IR in transverse colon 3/2021. Has seen colorectal surgery (Dr. Beach) to discuss colectomy.    Recommendations:  - No plan for endoscopic evaluation in the absence of active bleeding as diagnosis is known.  - If patient has recurrent bleeding, would discuss between IR angiogram vs flexible sigmoidoscopy for therapy of diverticular bleeding.   - Continue to trend Hb and transfuse as needed.  - GI will continue to follow.  - Colorectal surgery re: revaluation of risks/benefits of colectomy.    Truong Davila  Gastroenterology/Hepatology Fellow  Available via [employer] Teams    NON-URGENT CONSULTS:  Please email [email or web address]  [email or web address]  AT NIGHT AND ON WEEKENDS:  Contact on-call GI fellow via answering service ([phone]) from 5pm-8am and on weekends/holidays  MONDAY-FRIDAY 8AM-5PM:  Pager# [phone] ([facility]) or [phone] ([facility])  GI Phone# [phone] ([facility])

## 2021-10-13 NOTE — ED ADULT NURSE REASSESSMENT NOTE - NS ED NURSE REASSESS COMMENT FT1
Transfusion consent form done by MD Vides with HB 7.7 to f/u 6.7. pt states "no abd pain now" resting in bed while waiting for Blood transfusion.
c/o rectal bleeding  - three times since 4PM. mhx- diverticulosis. no abd pain, no dizziness noted. alert and oriented x 3. able to walk.
Evaluated by surgery MD. no abd pain noted. shown stable v/s bp145/80, hr 80, temp 97.9. spo2 100%. resting in bed while waiting for the result

## 2021-10-13 NOTE — CONSULT NOTE ADULT - SUBJECTIVE AND OBJECTIVE BOX
History of Present Illness: 66y Female with history of diverticular bleed and embolization in 3/2021 now presents with BRPR and found to have acute GIB at splenic flexure.     Allergies:   oxycodone (Other)  Valium (Other)    Medications (Antibiotics/Cardiovascular/Anticoagulants/Blood Products):       Vital Signs:  T(F): 97.8 (10-13-21 @ 08:00), Max: 98.6 (10-12-21 @ 22:59)  HR: 83 (10-13-21 @ 08:00) (76 - 93)  BP: 159/80 (10-13-21 @ 08:00) (128/78 - 159/80)  RR: 17 (10-13-21 @ 08:00) (15 - 18)  SpO2: 100% (10-13-21 @ 08:00) (98% - 100%)    Relevant Lab Results:            6.7  5.91)-----(146     (10-13-21 @ 03:27)         21.3     136 | 101 | 26  --------------------< 202     (10-12-21 @ 23:14)  4.8 | 24 | 1.35       PT: 12.5 10-12-21 @ 23:14  aPTT: 28.0 10-12-21 @ 23:14   INR: 1.09 10-12-21 @ 23:14    Imaging: GIB at splenic flexure, focal dissection of JACOBY

## 2021-10-13 NOTE — H&P ADULT - NSHPPHYSICALEXAM_GEN_ALL_CORE
General: Alert, NAD  Neuro: A+Ox3  HEENT: NC/AT  Cardio: RRR  Resp: Unlabored breathing  GI/Abd: Soft, NT/ND  Ext: Warm, no edema

## 2021-10-13 NOTE — H&P ADULT - HISTORY OF PRESENT ILLNESS
65F w/ hx of diverticulosis (reports 8 previous admissions at multiple hospitals), herniated lumbar disc (s/p plate), SOLOMON, DM (on oral agent) presents with 6 episodes of BRBPR that began at 4pm on 10/12/21. She denies any abdominal pain but says she was lightheaded yesterday at home. Also reports some nausea.    She has had multiple SICU admissions at [facility] after presenting with hypotension and requiring multiple transfusions. Her last admission was in March 2021 and she underwent IR embolization of a branch of the middle colic artery. She has had bleeds localized to several areas of the colon and has pandiverticulosis. Operative intervention has not been pursued because she would require a total abdominal colectomy.    On presentation today she is hemodynamically stable - HR 70s, -140s. She is mentating well and currently has no complaints. CTA localized the bleed to the descending colon at the splenic flexure.

## 2021-10-13 NOTE — H&P ADULT - ASSESSMENT
65F w/ hx of diverticulosis (reports 8 previous admissions at multiple hospitals), herniated lumbar disc (s/p plate), SOLOMON, DM (on oral agent) presents with lower GI bleed localized to the splenic flexure on CTA.    - Admit to surgery, Dr. Braxton  - NPO, IV fluids  - Hgb on presentation 7.7 - will give 1u prbc and will repeat CBC now  - Patient hemodynamically stable but low threshold for SICU consult  - GI consulted - will see patient in AM  - IR consulted, waiting for call back  - Discussed with attending, Dr. Mario team surgery  Pager [phone]

## 2021-10-13 NOTE — CONSULT NOTE ADULT - ASSESSMENT
ASSESSMENT:  66 yo F w/ hx of pan-diverticulosis (multiple previous admissions - last in March 2021), SOLOMON, NIDDM, herniated lumbar disc (s/p plate), presenting to ED on 10/13 for 6 episodes of BRBPR. Associated nausea and lightheadedness, denies pain. No further bleeding in ED. Review of chart shows multiple past admissions to [facility] w/ hypotension requiring multiple transfusions. Last admitted in March 2021 and now s/p IR embolization. Hemodynamically stable on presentation to ED, however hgb trending down from 7.7 to 6.7 over 4 hrs. CTA w/ active bleeding in descending colon. Pt accepted for admission to SICU for hemodynamic monitoring while awaiting IR/GI consultation and prbc transfusion.    PLAN:  NEURO:  -no acute issues  -monitor mental status    RESPIRATORY:   -no acute issues  -monitor respiratory status  -continuous pulse ox    CARDIOVASCULAR: hx HTN  -ramipril 10 mg BID  -simvastatin 40 mg daily  -monitor VS and perfusion status  -CTA w/ active GIB in descending colon  -awaiting IR consultation    GI/NUTRITION: hx pan-diverticulosis, lower GIB, IR embolization   -NPO  -monitor for further bleeding  -awaiting GI consultation    GENITOURINARY/RENAL:  -Cr 1.3 (unclear if baseline per chart review)  -monitor UOP  -monitor electrolytes  -LR @125 cc/hr    HEMATOLOGIC:  -monitor H&H; cbc q6  -holding subq heparin  -pending 1u prbc's    INFECTIOUS DISEASE:  -monitor for fevers; trend WBC    ENDOCRINE: hx NIDDM  -trend glucose  -ISS  -synthroid 200 mcg daily    Lines:  PIV x 2    GOC/CODE STATUS:  Full Code    DISPO: SICU     ASSESSMENT:  66 yo F w/ hx of pan-diverticulosis (multiple previous admissions - last in March 2021), SOLOMON, NIDDM, nephrolithiasis, herniated lumbar disc (s/p plate), presenting to ED on 10/13 for 6 episodes of BRBPR. Associated nausea and lightheadedness, denies pain. No further bleeding in ED. Review of chart shows multiple past admissions to [facility] w/ hypotension requiring multiple transfusions. Last admitted in March 2021 and now s/p IR embolization. Hemodynamically stable on presentation to ED, however hgb trending down from 7.7 to 6.7 over 4 hrs. CTA w/ active bleeding in descending colon. Pt accepted for admission to SICU for hemodynamic monitoring while awaiting IR/GI consultation and prbc transfusion.    PLAN:  NEURO:  -no acute issues  -monitor mental status    RESPIRATORY:   -no acute issues  -monitor respiratory status  -continuous pulse ox    CARDIOVASCULAR: hx HTN  -holding ramipril 10 mg BID in setting of HELEN  -simvastatin 40 mg daily  -monitor VS and perfusion status  -CTA w/ active GIB in descending colon  -awaiting IR consultation    GI/NUTRITION: hx pan-diverticulosis, lower GIB, IR embolization   -NPO  -monitor for further bleeding  -awaiting GI consultation    GENITOURINARY/RENAL:  -Cr 1.3 (unclear if baseline per chart review)  -monitor UOP  -monitor electrolytes  -LR @125 cc/hr    HEMATOLOGIC:  -monitor H&H; cbc q6  -holding subq heparin  -pending 1u prbc's    INFECTIOUS DISEASE:  -monitor for fevers; trend WBC    ENDOCRINE: hx NIDDM  -trend glucose  -ISS  -synthroid 200 mcg daily    Lines:  PIV x 2    GOC/CODE STATUS:  Full Code    DISPO: SICU

## 2021-10-13 NOTE — CONSULT NOTE ADULT - SUBJECTIVE AND OBJECTIVE BOX
SICU CONSULT NOTE    HPI:  66 yo F w/ hx of diverticulosis (multiple previous admissions - last in March 2021), SOLOMON, NIDDM, herniated lumbar disc (s/p plate), presenting to ED for 6 episodes of BRBPR since 4 pm yesterday. Pt reports associated lightheadedness and nausea preceding this event. She denies fevers, abd pain, and vomiting. Review of chart shows multiple past admissions to [facility] w/ hypotension requiring multiple transfusions. During her last admission in March 2021 the pt underwent IR embolization of a brach of her middle colic artery. Due to the severity of her pan-diverticulosis she would require total abdominal colectomy, and for this reason surgical interventions have not been pursued    In ED today, pt has remained hemodynamically stable w/o tachycardia or hypotension. Inital lab work was significant for hgb of 7.7, which trended down to 6.7 at 4 hours. CTA abd/pelvis demonstrated active GI bleeding in the descending colon in the region of the splenic flexure. 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 Presently, the pt is awaiting evaluations by IR and GI. Due to the severity of bleeding on previous presentations, the SICU was consulted for ongoing hemodynamic monitoring while awaiting definitive plan for intervention.    PAST MEDICAL HISTORY: Hypothyroidism    Pernicious anemia    Diverticulosis    HTN (hypertension)    Lower gastrointestinal bleeding    Pernicious anemia    SOLOMON (obstructive sleep apnea)    Type 2 diabetes mellitus    Left ureteral stone    Fibroid uterus    Lumbar herniated disc    Multiparity    Heart murmur      PAST SURGICAL HISTORY: No significant past surgical history    History of tonsillectomy    History of back surgery    H/O abdominal hysterectomy    S/P WESTLEY (total abdominal hysterectomy)    S/P lumbar laminectomy    CODE STATUS:   Full Code    HOME MEDICATIONS:  ALLERGIES: No Known Allergies  oxycodone (Other)      VITAL SIGNS:  ICU Vital Signs Last 24 Hrs  T(C): 36.6 (13 Oct 2021 03:35), Max: 37 (12 Oct 2021 22:59)  T(F): 97.9 (13 Oct 2021 03:35), Max: 98.6 (12 Oct 2021 22:59)  HR: 80 (13 Oct 2021 03:35) (79 - 85)  BP: 145/80 (13 Oct 2021 03:35) (128/78 - 145/80)  BP(mean): --  ABP: --  ABP(mean): --  RR: 18 (13 Oct 2021 03:35) (18 - 18)  SpO2: 100% (13 Oct 2021 03:35) (98% - 100%)    Gen: A&Ox4   HEENT: Atraumatic. Mucous membranes moist, no scleral icterus   CV: RRR. No murmurs. No significant LE edema. Distal pulses 2+. Capillary refill < 2 seconds.  Resp: Respirations unlabored CTAB, no rales, rhonchi, or wheezes.  GI: Abdomen non tender to palpation, soft and non-distended. + BS.  Skin/MSK: No open wounds. No ecchymosis appreciated.  Neuro:  Following commands. Moving extremities spontaneously.  Psych: Appropriate mood, cooperative    10-12    136  |  101  |  26<H>  ----------------------------<  202<H>  4.8   |  24  |  1.35<H>    Ca    8.6      12 Oct 2021 23:14    TPro  6.5  /  Alb  3.8  /  TBili  0.3  /  DBili  x   /  AST  14  /  ALT  13  /  AlkPhos  68  10-12                        6.7    5.91  )-----------( 146      ( 13 Oct 2021 03:27 )             21.3     PT/INR - ( 12 Oct 2021 23:14 )   PT: 12.5 sec;   INR: 1.09 ratio         PTT - ( 12 Oct 2021 23:14 )  PTT:28.0 sec  Transfusion: [ ] PRBC	[ ] Platelets	[ ] FFP	[ ] Cryoprecipitate    ENDOCRINE  levothyroxine Injectable 100 MICROGram(s) IV Push at bedtime    CAPILLARY BLOOD GLUCOSE    PATIENT CARE ACCESS DEVICES:  [ ] Peripheral IV  [ ] Central Venous Line	[ ] R	[ ] L	[ ] IJ	[ ] Fem	[ ] SC	Placed:   [ ] Arterial Line		[ ] R	[ ] L	[ ] Fem	[ ] Rad	[ ] Ax	Placed:   [ ] PICC:					[ ] Mediport  [ ] Urinary Catheter, Date Placed:   [x] Necessity of urinary, arterial, and venous catheters discussed    OTHER MEDICATIONS:     IMAGING STUDIES: SICU CONSULT NOTE    HPI:  66 yo F w/ hx of diverticulosis (multiple previous admissions - last in March 2021), SOLOMON, NIDDM, nephrolithiasis, herniated lumbar disc (s/p plate), presenting to ED for 6 episodes of BRBPR since 4 pm yesterday. Pt reports associated lightheadedness and nausea preceding this event. She denies fevers, abd pain, and vomiting. Review of chart shows multiple past admissions to [facility] w/ hypotension requiring multiple transfusions. During her last admission in March 2021 the pt underwent IR embolization of a brach of her middle colic artery. Due to the severity of her pan-diverticulosis she would require total abdominal colectomy, and for this reason surgical interventions have not been pursued    In ED today, pt has remained hemodynamically stable w/o tachycardia or hypotension. Inital lab work was significant for hgb of 7.7, which trended down to 6.7 at 4 hours. CTA abd/pelvis demonstrated active GI bleeding in the descending colon in the region of the splenic flexure. 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 Presently, the pt is awaiting evaluations by IR and GI. Due to the severity of bleeding on previous presentations, the SICU was consulted for ongoing hemodynamic monitoring while awaiting definitive plan for intervention.    PAST MEDICAL HISTORY: Hypothyroidism    Pernicious anemia    Diverticulosis    HTN (hypertension)    Lower gastrointestinal bleeding    Pernicious anemia    SOLOMON (obstructive sleep apnea)    Type 2 diabetes mellitus    Left ureteral stone    Fibroid uterus    Lumbar herniated disc    Multiparity    Heart murmur      PAST SURGICAL HISTORY: No significant past surgical history    History of tonsillectomy    History of back surgery    H/O abdominal hysterectomy    S/P WESTLEY (total abdominal hysterectomy)    S/P lumbar laminectomy    CODE STATUS:   Full Code    HOME MEDICATIONS:  ALLERGIES: No Known Allergies  oxycodone (Other)      VITAL SIGNS:  ICU Vital Signs Last 24 Hrs  T(C): 36.6 (13 Oct 2021 03:35), Max: 37 (12 Oct 2021 22:59)  T(F): 97.9 (13 Oct 2021 03:35), Max: 98.6 (12 Oct 2021 22:59)  HR: 80 (13 Oct 2021 03:35) (79 - 85)  BP: 145/80 (13 Oct 2021 03:35) (128/78 - 145/80)  BP(mean): --  ABP: --  ABP(mean): --  RR: 18 (13 Oct 2021 03:35) (18 - 18)  SpO2: 100% (13 Oct 2021 03:35) (98% - 100%)    Gen: A&Ox4   HEENT: Atraumatic. Mucous membranes moist, no scleral icterus   CV: RRR. No murmurs. No significant LE edema. Distal pulses 2+. Capillary refill < 2 seconds.  Resp: Respirations unlabored CTAB, no rales, rhonchi, or wheezes.  GI: Abdomen non tender to palpation, soft and non-distended. + BS.  Skin/MSK: No open wounds. No ecchymosis appreciated.  Neuro:  Following commands. Moving extremities spontaneously.  Psych: Appropriate mood, cooperative    10-12    136  |  101  |  26<H>  ----------------------------<  202<H>  4.8   |  24  |  1.35<H>    Ca    8.6      12 Oct 2021 23:14    TPro  6.5  /  Alb  3.8  /  TBili  0.3  /  DBili  x   /  AST  14  /  ALT  13  /  AlkPhos  68  10-12                        6.7    5.91  )-----------( 146      ( 13 Oct 2021 03:27 )             21.3     PT/INR - ( 12 Oct 2021 23:14 )   PT: 12.5 sec;   INR: 1.09 ratio         PTT - ( 12 Oct 2021 23:14 )  PTT:28.0 sec  Transfusion: [ ] PRBC	[ ] Platelets	[ ] FFP	[ ] Cryoprecipitate    ENDOCRINE  levothyroxine Injectable 100 MICROGram(s) IV Push at bedtime    CAPILLARY BLOOD GLUCOSE    PATIENT CARE ACCESS DEVICES:  [ ] Peripheral IV  [ ] Central Venous Line	[ ] R	[ ] L	[ ] IJ	[ ] Fem	[ ] SC	Placed:   [ ] Arterial Line		[ ] R	[ ] L	[ ] Fem	[ ] Rad	[ ] Ax	Placed:   [ ] PICC:					[ ] Mediport  [ ] Urinary Catheter, Date Placed:   [x] Necessity of urinary, arterial, and venous catheters discussed    OTHER MEDICATIONS:     IMAGING STUDIES:

## 2021-10-13 NOTE — ED ADULT NURSE REASSESSMENT NOTE - BP NONINVASIVE SYSTOLIC (MM HG)
I was asked by Dr. Ford to see this patient in consultation    34F w/ left leg pain, L5-S1 disk herniation.  Started 2 weeks ago, severe left leg pain.  No inciting factor.  7/10 sharp pain from left hip to thigh, calf, and lateral foot, with numbness and burning in lateral 3 toes.  No motor changes.    Returns for follow up.  Extensive waxing/waning pain throughout the year.  PT and Chiropractic care without improvement.       Past Medical History:   Diagnosis Date     Abnormal Pap smear 2006, 2007,     ASC-H, ASCUS + HPV 45     Calculus of kidney 2002     Cervical high risk HPV (human papillomavirus) test positive     + HPV 45 (high risk)     History of colposcopy with cervical biopsy 2/9/06, 3/15/07    koilocytosis 2007     Past Surgical History:   Procedure Laterality Date     C REMOVAL OF KIDNEY STONE      two surgeries, 2002,2003     CHOLECYSTECTOMY, LAPOROSCOPIC  5/11/2007    Cholecystectomy, Laparoscopic     COLONOSCOPY  05/17/10     CONIZATION  7/1/2011    Procedure:CONIZATION; cold knife and punch biopsy of mole on back; Surgeon:GREG FORD; Location:[facility]     DILATION AND CURETTAGE, HYSTEROSCOPY, LAPAROSCOPY, COMBINED Right 9/24/2015    Procedure: COMBINED DILATION AND CURETTAGE, HYSTEROSCOPY, LAPAROSCOPY;  Surgeon: Greg Ford MD;  Location: [facility]     ESOPHAGOSCOPY, GASTROSCOPY, DUODENOSCOPY (EGD), COMBINED  5/21/2014    Procedure: COMBINED ESOPHAGOSCOPY, GASTROSCOPY, DUODENOSCOPY (EGD), BIOPSY SINGLE OR MULTIPLE;  Surgeon: Earl Lane MD;  Location: [facility]     EXCISE LESION TRUNK  7/1/2011    Procedure:EXCISE LESION TRUNK; Surgeon:GREG FORD; Location:[facility]     HC FRAGMENTING OF KIDNEY STONE  11/30/2005     HC RX ECTOP PREG BY SCOPE,RMV TUBE/OVRY  12/20/2007    Right sided salpingectomy and removal of ruptured ectopic pregnancy. D&C.     HC UGI ENDOSCOPY, SIMPLE EXAM  09/01/09     LAPAROSCOPIC APPENDECTOMY N/A 9/24/2015    Procedure: LAPAROSCOPIC  APPENDECTOMY;  Surgeon: James Cuellar MD;  Location: [facility]     LAPAROSCOPIC CYSTECTOMY OVARIAN (BENIGN) N/A 2015    Procedure: LAPAROSCOPIC CYSTECTOMY OVARIAN (BENIGN);  Surgeon: Greg Ford MD;  Location: [facility]     LAPAROSCOPIC OOPHORECTOMY Right 2015    Procedure: LAPAROSCOPIC OOPHORECTOMY;  Surgeon: Greg Ford MD;  Location: [facility]     LITHOTRIPSY  2007     PELVIS LAPAROSCOPY,DX  10/16/2000    Diagnostic laparoscopy, Endometrial biopsy.     TUBAL/ECTOPIC PREGNANCY  2007    Lap removal of left ruptured ectopic pregnancy & salpingectomy, D&C     Social History     Socioeconomic History     Marital status:      Spouse name: Joseph     Number of children: 1     Years of education: 9     Highest education level: Not on file   Social Needs     Financial resource strain: Not on file     Food insecurity - worry: Not on file     Food insecurity - inability: Not on file     Transportation needs - medical: Not on file     Transportation needs - non-medical: Not on file   Occupational History     Employer: NONE   Tobacco Use     Smoking status: Current Some Day Smoker     Years: 12.00     Types: Cigarettes     Last attempt to quit: 2009     Years since quittin.4     Smokeless tobacco: Never Used     Tobacco comment: As of 10 /2014, pt has had a few cigs here and there   Substance and Sexual Activity     Alcohol use: Yes     Alcohol/week: 0.0 oz     Comment: every few months     Drug use: No     Sexual activity: Yes     Partners: Male     Birth control/protection: None   Other Topics Concern      Service No     Blood Transfusions No     Caffeine Concern Yes     Comment: Reports 1 can soda/week  Advised not more than 16 ounces caffeien/day.     Occupational Exposure No     Hobby Hazards No     Sleep Concern No     Stress Concern Yes     Comment: will discuss with      Weight Concern Yes     Comment: Eating disorder in childhood.  Hx.  "gestational diab.     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     Exercise No     Comment: Advised walking 30 min/day.     Bike Helmet No     Seat Belt Yes     Self-Exams Not Asked     Parent/sibling w/ CABG, MI or angioplasty before 65F 55M? Not Asked   Social History Narrative     and lives at home with her  and daughter.     Family History   Problem Relation Age of Onset     Diabetes Maternal Grandmother         adult onset     Anesthesia Reaction Maternal Grandmother         can't tolerate Novacaine.     Respiratory Maternal Grandmother         COPD and emphysema.     Thyroid Disease Maternal Grandmother      Heart Disease Maternal Grandmother         CHF     Diabetes Paternal Grandfather         adult o nset     Hypertension Paternal Grandfather      Cerebrovascular Disease Paternal Grandfather      Heart Disease Paternal Grandfather         MI, replaced valve,angioplasty     Thyroid Disease Paternal Grandfather      Cancer Maternal Grandfather         skin cancer     Heart Disease Maternal Grandfather         MI     Obesity Mother         on thyroid medication     Thyroid Disease Mother      Diabetes Mother      Rheumatologic Disease Mother      Heart Disease Father      Hypertension Father         and TIA     Lipids Father      Breast Cancer Paternal Grandmother      Arrhythmia Other      Cancer Maternal Aunt         breast/brain cancer     Depression Paternal Aunt      Cerebrovascular Disease Paternal Uncle      Cerebrovascular Disease Paternal Aunt         ROS: 10 point ROS neg other than the symptoms noted above in the HPI.    Physical Exam  /88   Temp 99.8  F (37.7  C) (Temporal)   Ht 1.626 m (5' 4\")   Wt 99.2 kg (218 lb 11.2 oz)   BMI 37.54 kg/m    HEENT:  Normocephalic, atraumatic.  PERRLA.  EOM s intact.  Visual fields full to gross exam  Neck:  Supple, non-tender, without lymphadenopathy.  Heart:  No peripheral " edema  Lungs:  No SOB  Abdomen:  Non-distended.   Skin:  Warm and dry.  Extremities:  No edema, cyanosis or clubbing.  Psychiatric:  No apparent distress  Musculoskeletal:  Normal bulk and tone    NEUROLOGICAL EXAMINATION:     Mental status:  Alert and Oriented x 3, speech is fluent.  Cranial nerves:  II-XII intact.   Motor:    Shoulder Abduction:  Right:  5/5   Left:  5/5  Biceps:                      Right:  5/5   Left:  5/5  Triceps:                     Right:  5/5   Left:  5/5  Wrist Extensors:       Right:  5/5   Left:  5/5  Wrist Flexors:           Right:  5/5   Left:  5/5  interosseus :            Right:  5/5   Left:  5/5   Hip Flexor:                Right: 5/5  Left:  5/5  Hip Adductor:             Right:  5/5  Left:  5/5  Hip Abductor:             Right:  5/5  Left:  5/5  Gastroc Soleus:        Right:  5/5  Left:  5/5  Tib/Ant:                      Right:  5/5  Left:  5/5  EHL:                     Right:  5/5  Left:  5/5  Sensation:  Intact  Reflexes:  Negative Babinski.  Negative Clonus.  Negative Vivas's.  Coordination:  Smooth finger to nose testing.   Negative pronator drift.  Smooth tandem walking.    A/P:  34F w/ left leg pain, L5-S1 disk herniation    Will plan for microdiscectomy  Risks and benefits discussed        145

## 2021-10-13 NOTE — CONSULT NOTE ADULT - SUBJECTIVE AND OBJECTIVE BOX
Chief Complaint:  Patient is a 66y old  Female who presents with a chief complaint of Hemodynamic monitoring i/s/o active lower GIB (13 Oct 2021 08:22)    HPI:CRISTIAN CASTRO is a 66y Female w/ a history of HTN and hypothyroidism who is presenting w/ 8 episodes of hematochezia in 14 hours. Patient has had multiple admission (approximately 8 since 2015) for hematochezia. She had a colonoscopy in 2017 showing pan diverticulosis and had a bleeding diverticulum treated. She had an outpatient colonoscopy in 2019 showing pan-diverticulosis and no other findings. She was most recently admitted 7 months ago with the same presentation with positive CTA in the transverse colon, and underwent successful IR embolization.     She said she was feeling well until at 4 pm yesterday she had painless passage of red blood in the toilet. This happened several times, w/ 2 occasions having non-bloody stool along with the blood.  She denies weight loss, f/c, dysphagia, odynophagia, early satiety, poor PO intake, abd pain, nausea, vomiting, diarrhea, melena, change in stool caliber, family history of GI cancers. She denies NSAIDS.     CTA positive for bleeding at the splenic flexure.     PMHX/PSHX:  Hypothyroidism    Pernicious anemia    Diverticulosis    HTN (hypertension)    Lower gastrointestinal bleeding    Pernicious anemia    SOLOMON (obstructive sleep apnea)    Type 2 diabetes mellitus    Left ureteral stone    Fibroid uterus    Lumbar herniated disc    Multiparity    Heart murmur    No significant past surgical history    History of tonsillectomy    History of back surgery    H/O abdominal hysterectomy    S/P WESTLEY (total abdominal hysterectomy)    S/P lumbar laminectomy      Allergies:  oxycodone (Other)  Valium (Other)      Home Medications: reviewed  Hospital Medications:  dextrose 40% Gel 15 Gram(s) Oral once  dextrose 5%. 1000 milliLiter(s) IV Continuous <Continuous>  dextrose 5%. 1000 milliLiter(s) IV Continuous <Continuous>  dextrose 50% Injectable 25 Gram(s) IV Push once  dextrose 50% Injectable 12.5 Gram(s) IV Push once  dextrose 50% Injectable 25 Gram(s) IV Push once  glucagon  Injectable 1 milliGRAM(s) IntraMuscular once  influenza   Vaccine 0.5 milliLiter(s) IntraMuscular once  insulin lispro (ADMELOG) corrective regimen sliding scale   SubCutaneous three times a day before meals  insulin lispro (ADMELOG) corrective regimen sliding scale   SubCutaneous at bedtime  lactated ringers. 1000 milliLiter(s) IV Continuous <Continuous>  levothyroxine 200 MICROGram(s) Oral daily  simvastatin 40 milliGRAM(s) Oral at bedtime      Social History:   Tob: Denies  EtOH: Denies  Illicit Drugs: Denies    Family history:  No pertinent family history in first degree relatives    Family history of gastric cancer    Family history of throat cancer      Denies family history of colon cancer/polyps, stomach cancer/polyps, pancreatic cancer/masses, liver cancer/disease, ovarian cancer and endometrial cancer.    ROS:   General:  No  fevers, chills, night sweats, fatigue  Eyes:  Good vision, no reported pain  ENT:  No sore throat, pain, runny nose  CV:  No pain, palpitations  Pulm:  No dyspnea, cough  GI:  See HPI, otherwise negative  :  No  incontinence, nocturia  Muscle:  No pain, weakness  Neuro:  No memory problems  Psych:  No insomnia, mood problems, depression  Endocrine:  No polyuria, polydipsia, cold/heat intolerance  Heme:  No petechiae, ecchymosis, easy bruisability  Skin:  No rash    PHYSICAL EXAM:   Vital Signs:  Vital Signs Last 24 Hrs  T(C): 36.7 (13 Oct 2021 12:00), Max: 37 (12 Oct 2021 22:59)  T(F): 98 (13 Oct 2021 12:00), Max: 98.6 (12 Oct 2021 22:59)  HR: 77 (13 Oct 2021 13:00) (68 - 93)  BP: 128/73 (13 Oct 2021 13:00) (125/84 - 159/80)  BP(mean): 85 (13 Oct 2021 13:00) (80 - 96)  RR: 17 (13 Oct 2021 13:00) (14 - 19)  SpO2: 100% (13 Oct 2021 13:00) (98% - 100%)  Daily Height in cm: 160.02 (12 Oct 2021 18:45)    Daily     GENERAL: no acute distress  NEURO: alert  HEENT: anicteric sclera, no conjunctival pallor appreciated  CHEST: no respiratory distress, no accessory muscle use  CARDIAC: regular rate, rhythm  ABDOMEN: soft, non-tender, non-distended, no rebound or guarding  EXTREMITIES: warm, well perfused, no edema  SKIN: no lesions noted    LABS: reviewed                        7.7    6.43  )-----------( 154      ( 13 Oct 2021 11:28 )             22.9     10-12    136  |  101  |  26<H>  ----------------------------<  202<H>  4.8   |  24  |  1.35<H>    Ca    8.6      12 Oct 2021 23:14    TPro  6.5  /  Alb  3.8  /  TBili  0.3  /  DBili  x   /  AST  14  /  ALT  13  /  AlkPhos  68  10-12    LIVER FUNCTIONS - ( 12 Oct 2021 23:14 )  Alb: 3.8 g/dL / Pro: 6.5 g/dL / ALK PHOS: 68 U/L / ALT: 13 U/L / AST: 14 U/L / GGT: x               Diagnostic Studies: see sunrise for full report         Chief Complaint:  Patient is a 66y old  Female who presents with a chief complaint of Hemodynamic monitoring i/s/o active lower GIB (13 Oct 2021 08:22)    HPI:CRISTIAN CASTRO is a 66y Female w/ a history of HTN and hypothyroidism who is presenting w/ 8 episodes of hematochezia in 14 hours. Patient has had multiple admission (approximately 8 since 2015) for hematochezia. She had a colonoscopy in 2017 showing pan diverticulosis and had a bleeding diverticulum treated. She had an outpatient colonoscopy in 2019 showing pan-diverticulosis and no other findings. She was most recently admitted 7 months ago with the same presentation with positive CTA in the transverse colon, and underwent successful IR embolization.     She said she was feeling well until at 4 pm yesterday she had painless passage of red blood in the toilet. This happened several times, w/ 2 occasions having non-bloody stool along with the blood.  She denies weight loss, f/c, dysphagia, odynophagia, early satiety, poor PO intake, abd pain, nausea, vomiting, diarrhea, melena, change in stool caliber, family history of GI cancers. She denies NSAIDS.     CTA positive for bleeding at the splenic flexure.     PMHX/PSHX:  Hypothyroidism    Pernicious anemia    Diverticulosis    HTN (hypertension)    Lower gastrointestinal bleeding    Pernicious anemia    SOLOMON (obstructive sleep apnea)    Type 2 diabetes mellitus    Left ureteral stone    Fibroid uterus    Lumbar herniated disc    Multiparity    Heart murmur    No significant past surgical history    History of tonsillectomy    History of back surgery    H/O abdominal hysterectomy    S/P WESTLEY (total abdominal hysterectomy)    S/P lumbar laminectomy      Allergies:  oxycodone (Other)  Valium (Other)      Home Medications: reviewed  Hospital Medications:  dextrose 40% Gel 15 Gram(s) Oral once  dextrose 5%. 1000 milliLiter(s) IV Continuous <Continuous>  dextrose 5%. 1000 milliLiter(s) IV Continuous <Continuous>  dextrose 50% Injectable 25 Gram(s) IV Push once  dextrose 50% Injectable 12.5 Gram(s) IV Push once  dextrose 50% Injectable 25 Gram(s) IV Push once  glucagon  Injectable 1 milliGRAM(s) IntraMuscular once  influenza   Vaccine 0.5 milliLiter(s) IntraMuscular once  insulin lispro (ADMELOG) corrective regimen sliding scale   SubCutaneous three times a day before meals  insulin lispro (ADMELOG) corrective regimen sliding scale   SubCutaneous at bedtime  lactated ringers. 1000 milliLiter(s) IV Continuous <Continuous>  levothyroxine 200 MICROGram(s) Oral daily  simvastatin 40 milliGRAM(s) Oral at bedtime      Social History:   Tob: Denies  EtOH: Denies  Illicit Drugs: Denies    Family history:  No pertinent family history in first degree relatives    Family history of gastric cancer    Family history of throat cancer      Denies family history of colon cancer/polyps, stomach cancer/polyps, pancreatic cancer/masses, liver cancer/disease, ovarian cancer and endometrial cancer.    ROS:   General:  No  fevers, chills, night sweats, fatigue  Eyes:  Good vision, no reported pain  ENT:  No sore throat, pain, runny nose  CV:  No pain, palpitations  Pulm:  No dyspnea, cough  GI:  See HPI, otherwise negative  :  No  incontinence, nocturia  Muscle:  No pain, weakness  Neuro:  No memory problems  Psych:  No insomnia, mood problems, depression  Endocrine:  No polyuria, polydipsia, cold/heat intolerance  Heme:  No petechiae, ecchymosis, easy bruisability  Skin:  No rash    PHYSICAL EXAM:   Vital Signs:  Vital Signs Last 24 Hrs  T(C): 36.7 (13 Oct 2021 12:00), Max: 37 (12 Oct 2021 22:59)  T(F): 98 (13 Oct 2021 12:00), Max: 98.6 (12 Oct 2021 22:59)  HR: 77 (13 Oct 2021 13:00) (68 - 93)  BP: 128/73 (13 Oct 2021 13:00) (125/84 - 159/80)  BP(mean): 85 (13 Oct 2021 13:00) (80 - 96)  RR: 17 (13 Oct 2021 13:00) (14 - 19)  SpO2: 100% (13 Oct 2021 13:00) (98% - 100%)  Daily Height in cm: 160.02 (12 Oct 2021 18:45)    Daily     GENERAL: no acute distress  NEURO: alert  HEENT: anicteric sclera, no conjunctival pallor appreciated  NECK: supple  CHEST: no respiratory distress, no accessory muscle use  CARDIAC: regular rate, rhythm  ABDOMEN: soft, non-tender, non-distended, no rebound or guarding  EXTREMITIES: warm, well perfused, no edema  SKIN: no lesions noted  PSYCH: Normal affect    LABS: reviewed                        7.7    6.43  )-----------( 154      ( 13 Oct 2021 11:28 )             22.9     10-12    136  |  101  |  26<H>  ----------------------------<  202<H>  4.8   |  24  |  1.35<H>    Ca    8.6      12 Oct 2021 23:14    TPro  6.5  /  Alb  3.8  /  TBili  0.3  /  DBili  x   /  AST  14  /  ALT  13  /  AlkPhos  68  10-12    LIVER FUNCTIONS - ( 12 Oct 2021 23:14 )  Alb: 3.8 g/dL / Pro: 6.5 g/dL / ALK PHOS: 68 U/L / ALT: 13 U/L / AST: 14 U/L / GGT: x               Diagnostic Studies: see sunrise for full report

## 2021-10-13 NOTE — H&P ADULT - NSHPLABSRESULTS_GEN_ALL_CORE
T(C): 36.6 (10-13-21 @ 03:35), Max: 37 (10-12-21 @ 22:59)  HR: 80 (10-13-21 @ 03:35) (79 - 85)  BP: 145/80 (10-13-21 @ 03:35) (128/78 - 145/80)  RR: 18 (10-13-21 @ 03:35) (18 - 18)  SpO2: 100% (10-13-21 @ 03:35) (98% - 100%)    LABS:                        6.7    5.91  )-----------( 146      ( 13 Oct 2021 03:27 )             21.3     12 Oct 2021 23:14    136    |  101    |  26     ----------------------------<  202    4.8     |  24     |  1.35     Ca    8.6        12 Oct 2021 23:14    TPro  6.5    /  Alb  3.8    /  TBili  0.3    /  DBili  x      /  AST  14     /  ALT  13     /  AlkPhos  68     12 Oct 2021 23:14    PT/INR - ( 12 Oct 2021 23:14 )   PT: 12.5 sec;   INR: 1.09 ratio         PTT - ( 12 Oct 2021 23:14 )  PTT:28.0 sec

## 2021-10-13 NOTE — CONSULT NOTE ADULT - SUBJECTIVE AND OBJECTIVE BOX
VASCULAR SURGERY CONSULT NOTE  --------------------------------------------------------------------------------------------  Patient is a 66F w/ hx of diverticulosis (reports 8 previous admissions at multiple hospitals), herniated lumbar disc (s/p plate), SOLOMON, DM (on oral agent) presents with 6 episodes of BRBPR that began at 4pm on 10/12/21. She denies any abdominal pain but says she was lightheaded yesterday at home. Also reports some nausea.    She has had multiple SICU admissions at [facility] after presenting with hypotension and requiring multiple transfusions. Her last admission was in March 2021 and she underwent IR embolization of a branch of the middle colic artery. She has had bleeds localized to several areas of the colon and has pandiverticulosis. Operative intervention has not been pursued because she would require a total abdominal colectomy.    On presentation today she is hemodynamically stable - HR 70s, -140s. She is mentating well and currently has no complaints. CTA localized the bleed to the descending colon at the splenic flexure. (13 Oct 2021 08:22)      History:  HPI as above. 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 Patient denies any vascular surgery history, but has been admitted multiple times for LGIB and IR intervention requiring vascular access. Ambulates independently at home. Denies motor/sensory changes of b/l LE, any history of LE wounds. Patient denies fevers/chills, denies lightheadedness/dizziness, denies SOB/chest pain, denies nausea/vomiting, denies constipation/diarrhea.      ROS: 10-system review is otherwise negative except HPI above.      PAST MEDICAL & SURGICAL HISTORY:  Hypothyroidism  Diverticulosis  HTN (hypertension)  Lower gastrointestinal bleeding, Multiple times: last episode 5/2018  Pernicious anemia  SOLOMON (obstructive sleep apnea)  non-compliant with CPAP  Type 2 diabetes mellitus  Left ureteral stone  Fibroid uterus, surgically treated  Lumbar herniated disc, 2010  surgically treated  Multiparity  Heart murmur, mild    History of tonsillectomy  age 14  S/P WESTLEY (total abdominal hysterectomy, 89  Laproscopic.  benign  S/P lumbar laminectomy, 2010  with Fusion      FAMILY HISTORY:  Family history of gastric cancer  Mother    Family history of throat cancer  Sister    [] Family history not pertinent as reviewed with the patient and family    SOCIAL HISTORY: , lives alone, retired nurse, Denies smoking history or alcohol use, independent in ADLs    ALLERGIES: oxycodone (Other)  Valium (Other)    HOME MEDICATIONS:    Ramipril 10  Simvastatin 40  Synthroid 200  Fe 325    CURRENT MEDICATIONS  MEDICATIONS (STANDING): dextrose 40% Gel 15 Gram(s) Oral once  dextrose 5%. 1000 milliLiter(s) IV Continuous <Continuous>  dextrose 5%. 1000 milliLiter(s) IV Continuous <Continuous>  dextrose 50% Injectable 25 Gram(s) IV Push once  dextrose 50% Injectable 12.5 Gram(s) IV Push once  dextrose 50% Injectable 25 Gram(s) IV Push once  glucagon  Injectable 1 milliGRAM(s) IntraMuscular once  influenza   Vaccine 0.5 milliLiter(s) IntraMuscular once  insulin lispro (ADMELOG) corrective regimen sliding scale   SubCutaneous three times a day before meals  insulin lispro (ADMELOG) corrective regimen sliding scale   SubCutaneous at bedtime  lactated ringers. 1000 milliLiter(s) IV Continuous <Continuous>  levothyroxine 200 MICROGram(s) Oral daily  simvastatin 40 milliGRAM(s) Oral at bedtime    MEDICATIONS (PRN):  --------------------------------------------------------------------------------------------    Vitals:   T(C): 36.6 (10-13-21 @ 08:00), Max: 37 (10-12-21 @ 22:59)  HR: 75 (10-13-21 @ 11:00) (72 - 93)  BP: 125/84 (10-13-21 @ 11:00) (125/84 - 159/80)  RR: 19 (10-13-21 @ 11:00) (15 - 19)  SpO2: 100% (10-13-21 @ 11:00) (98% - 100%)  CAPILLARY BLOOD GLUCOSE      POCT Blood Glucose.: 144 mg/dL (13 Oct 2021 11:05)    CAPILLARY BLOOD GLUCOSE      POCT Blood Glucose.: 144 mg/dL (13 Oct 2021 11:05)      10-13 @ 07:01  -  10-13 @ 13:10  --------------------------------------------------------  IN:    Lactated Ringers: 500 mL    Lactated Ringers Bolus: 1000 mL    PRBCs (Packed Red Blood Cells): 300 mL  Total IN: 1800 mL    OUT:  Total OUT: 0 mL    Total NET: 1800 mL        Height (cm): 160 (10-12 @ 18:45)  Weight (kg): 98 (10-13 @ 08:00)  BMI (kg/m2): 38.3 (10-13 @ 08:00)  BSA (m2): 2 (10-13 @ 08:00)    PHYSICAL EXAM:  General: NAD, Lying in bed comfortably  Neuro: A+Ox3  HEENT: NC/AT, EOMI  Cardio: RRR  Resp: Good effort  Vascular: All 4 extremities warm, B/l radial pulses palpable, b/l Femoral pulse palpable, b/l DP/PT palpable, b/l DP/PT, palpable, no wounds appreciated   Skin: Intact, no breakdown  Musculoskeletal: All 4 extremities moving spontaneously, no limitations.    --------------------------------------------------------------------------------------------    LABS  CBC (10-13 @ 11:28)                              7.7<L>                         6.43    )----------------(  154        --    % Neutrophils, --    % Lymphocytes, ANC: --                                  22.9<L>  CBC (10-13 @ 03:27)                              6.7<LL>                         5.91    )----------------(  146<L>     62.9  % Neutrophils, 28.8  % Lymphocytes, ANC: 3.72                                21.3<L>    BMP (10-12 @ 23:14)             136     |  101     |  26<H> 		Ca++ --      Ca 8.6                ---------------------------------( 202<H>		Mg --                 4.8     |  24      |  1.35<H>			Ph --        LFTs (10-12 @ 23:14)      TPro 6.5 / Alb 3.8 / TBili 0.3 / DBili -- / AST 14 / ALT 13 / AlkPhos 68    Coags (10-12 @ 23:14)  aPTT 28.0 / INR 1.09 / PT 12.5        VBG (10-12 @ 23:14)     7.35 / 51<H> / 24 / 28 / 1.7 / 31.3%     Lactate: --    --------------------------------------------------------------------------------------------    MICROBIOLOGY      --------------------------------------------------------------------------------------------    IMAGING  < from: CT Abdomen and Pelvis w/wo IV Cont (10.13.21 @ 01:56) >  INTERPRETATION:  CLINICAL INFORMATION: Bright red blood per rectum, evaluate for GI bleed.    COMPARISON: CTA abdomen pelvis 3/25/2021.    CONTRAST/COMPLICATIONS:  IV Contrast: Omnipaque 350  90 cc administered   10 cc discarded  Oral Contrast: NONE  Complications: None reported at time of study completion    PROCEDURE:  CT of the Abdomen and Pelvis was performed.  Precontrast, Arterial and Delayed phases were performed.  Sagittal and coronal reformats were performed.    FINDINGS:  LOWER CHEST: Small pericardial effusion, similar to prior examination of 3/25/2021.    LIVER: Within normal limits.  BILE DUCTS: Normal caliber.  GALLBLADDER: Within normal limits.  SPLEEN: Within normal limits.  PANCREAS: Within normal limits.  ADRENALS: Within normal limits.  KIDNEYS/URETERS: No renal stones or hydronephrosis.    BLADDER: Within normal limits.  REPRODUCTIVE ORGANS: Hysterectomy.    BOWEL: No bowel obstruction. Colonic diverticulosis without evidence of diverticulitis. Appendix is normal. Embolization coils seen adjacent to the proximal transverse colon colon. Active extravasation of contrast is seen within the descending colon at the level of the splenic flexure.  PERITONEUM: No ascites.  VESSELS: Normal caliber abdominal aorta. Short segment dissection involving the right external iliac artery. Celiac, superior mesenteric, inferior mesenteric, and bilateral renal arteries are widely patent.  RETROPERITONEUM/LYMPH NODES: No lymphadenopathy.  ABDOMINAL WALL: Small fat-containing umbilical hernia.  BONES: Posterior laminectomy and fusion of L4-S1. Degenerative changes.    IMPRESSION:    Active GI bleed in the descending colon in the region of the splenic flexure.    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    These findings were discussed with Dr. RORO ROSADO at 10/13/2021 2:12 AM by Dr. Rinaldi with read back confirmation.    < end of copied text >

## 2021-10-13 NOTE — PROGRESS NOTE ADULT - ASSESSMENT
64 yo F w/ hx of pan-diverticulosis (multiple previous admissions - last in March 2021), SOLOMON, NIDDM, nephrolithiasis, herniated lumbar disc (s/p plate), presenting to ED on 10/13 for 6 episodes of BRBPR. Associated nausea and lightheadedness, denies pain. No further bleeding in ED. Review of chart shows multiple past admissions to [facility] w/ hypotension requiring multiple transfusions. Last admitted in March 2021 and now s/p IR embolization. Hemodynamically stable on presentation to ED, however hgb trending down from 7.7 to 6.7 over 4 hrs. CTA w/ active bleeding in descending colon. Pt accepted for admission to SICU for hemodynamic monitoring while awaiting IR/GI consultation and prbc transfusion.    PLAN:  NEURO:  -no acute issues  -monitor mental status    RESPIRATORY:   -no acute issues  -monitor respiratory status  -continuous pulse ox    CARDIOVASCULAR: hx HTN  -holding ramipril 10 mg BID in setting of HELEN  -simvastatin 40 mg daily  -monitor VS and perfusion status  -CTA w/ active GIB in descending colon  -awaiting IR consultation  - c/s vascular for dissection right external iliac artery    GI/NUTRITION: hx pan-diverticulosis, lower GIB, IR embolization   -NPO  -monitor for further bleeding  -GI following    GENITOURINARY/RENAL:  -Cr 1.3 (unclear if baseline per chart review)  -monitor UOP  -monitor electrolytes  -LR @125 cc/hr  -add 1L bolus IVF    HEMATOLOGIC:  -monitor H&H; CBC q6  -resume SQH by tomorrow night (10/14)  -pending 1u prbc's    INFECTIOUS DISEASE:  -monitor for fevers; trend WBC    ENDOCRINE: hx NIDDM  -trend glucose  -ISS  -synthroid 200 mcg daily    Lines:  PIV x 2, 20G and 22G  - add midline    GOC/CODE STATUS:  Full Code    DISPO: SICU    --------------------------------------------------------------------------------------    Critical Care Diagnoses:

## 2021-10-14 LAB
A1C WITH ESTIMATED AVERAGE GLUCOSE RESULT: 6.1 % — HIGH (ref 4–5.6)
ANION GAP SERPL CALC-SCNC: 8 MMOL/L — SIGNIFICANT CHANGE UP (ref 7–14)
APTT BLD: 30.1 SEC — SIGNIFICANT CHANGE UP (ref 27–36.3)
BUN SERPL-MCNC: 22 MG/DL — SIGNIFICANT CHANGE UP (ref 7–23)
CALCIUM SERPL-MCNC: 7.9 MG/DL — LOW (ref 8.4–10.5)
CHLORIDE SERPL-SCNC: 106 MMOL/L — SIGNIFICANT CHANGE UP (ref 98–107)
CO2 SERPL-SCNC: 23 MMOL/L — SIGNIFICANT CHANGE UP (ref 22–31)
COVID-19 SPIKE DOMAIN AB INTERP: POSITIVE
COVID-19 SPIKE DOMAIN ANTIBODY RESULT: 15.2 U/ML — HIGH
CREAT SERPL-MCNC: 1.12 MG/DL — SIGNIFICANT CHANGE UP (ref 0.5–1.3)
ESTIMATED AVERAGE GLUCOSE: 128 — SIGNIFICANT CHANGE UP
GLUCOSE BLDC GLUCOMTR-MCNC: 124 MG/DL — HIGH (ref 70–99)
GLUCOSE BLDC GLUCOMTR-MCNC: 154 MG/DL — HIGH (ref 70–99)
GLUCOSE BLDC GLUCOMTR-MCNC: 165 MG/DL — HIGH (ref 70–99)
GLUCOSE SERPL-MCNC: 120 MG/DL — HIGH (ref 70–99)
HCT VFR BLD CALC: 21.2 % — LOW (ref 34.5–45)
HCT VFR BLD CALC: 22.7 % — LOW (ref 34.5–45)
HCT VFR BLD CALC: 27.5 % — LOW (ref 34.5–45)
HCT VFR BLD CALC: 28.2 % — LOW (ref 34.5–45)
HCV AB S/CO SERPL IA: 0.13 S/CO — SIGNIFICANT CHANGE UP (ref 0–0.99)
HCV AB SERPL-IMP: SIGNIFICANT CHANGE UP
HGB BLD-MCNC: 7.2 G/DL — LOW (ref 11.5–15.5)
HGB BLD-MCNC: 7.6 G/DL — LOW (ref 11.5–15.5)
HGB BLD-MCNC: 9.4 G/DL — LOW (ref 11.5–15.5)
HGB BLD-MCNC: 9.7 G/DL — LOW (ref 11.5–15.5)
INR BLD: 1.15 RATIO — SIGNIFICANT CHANGE UP (ref 0.88–1.16)
MAGNESIUM SERPL-MCNC: 1.9 MG/DL — SIGNIFICANT CHANGE UP (ref 1.6–2.6)
MCHC RBC-ENTMCNC: 28 PG — SIGNIFICANT CHANGE UP (ref 27–34)
MCHC RBC-ENTMCNC: 28.3 PG — SIGNIFICANT CHANGE UP (ref 27–34)
MCHC RBC-ENTMCNC: 28.6 PG — SIGNIFICANT CHANGE UP (ref 27–34)
MCHC RBC-ENTMCNC: 28.7 PG — SIGNIFICANT CHANGE UP (ref 27–34)
MCHC RBC-ENTMCNC: 33.5 GM/DL — SIGNIFICANT CHANGE UP (ref 32–36)
MCHC RBC-ENTMCNC: 34 GM/DL — SIGNIFICANT CHANGE UP (ref 32–36)
MCHC RBC-ENTMCNC: 34.2 GM/DL — SIGNIFICANT CHANGE UP (ref 32–36)
MCHC RBC-ENTMCNC: 34.4 GM/DL — SIGNIFICANT CHANGE UP (ref 32–36)
MCV RBC AUTO: 82.8 FL — SIGNIFICANT CHANGE UP (ref 80–100)
MCV RBC AUTO: 83.4 FL — SIGNIFICANT CHANGE UP (ref 80–100)
MCV RBC AUTO: 83.8 FL — SIGNIFICANT CHANGE UP (ref 80–100)
MCV RBC AUTO: 84.1 FL — SIGNIFICANT CHANGE UP (ref 80–100)
NRBC # BLD: 0 /100 WBCS — SIGNIFICANT CHANGE UP
NRBC # FLD: 0 K/UL — SIGNIFICANT CHANGE UP
PHOSPHATE SERPL-MCNC: 2.8 MG/DL — SIGNIFICANT CHANGE UP (ref 2.5–4.5)
PLATELET # BLD AUTO: 109 K/UL — LOW (ref 150–400)
PLATELET # BLD AUTO: 114 K/UL — LOW (ref 150–400)
PLATELET # BLD AUTO: 119 K/UL — LOW (ref 150–400)
PLATELET # BLD AUTO: 97 K/UL — LOW (ref 150–400)
POTASSIUM SERPL-MCNC: 4 MMOL/L — SIGNIFICANT CHANGE UP (ref 3.5–5.3)
POTASSIUM SERPL-SCNC: 4 MMOL/L — SIGNIFICANT CHANGE UP (ref 3.5–5.3)
PROTHROM AB SERPL-ACNC: 13.1 SEC — SIGNIFICANT CHANGE UP (ref 10.6–13.6)
RBC # BLD: 2.52 M/UL — LOW (ref 3.8–5.2)
RBC # BLD: 2.71 M/UL — LOW (ref 3.8–5.2)
RBC # BLD: 3.32 M/UL — LOW (ref 3.8–5.2)
RBC # BLD: 3.38 M/UL — LOW (ref 3.8–5.2)
RBC # FLD: 15.8 % — HIGH (ref 10.3–14.5)
RBC # FLD: 16.2 % — HIGH (ref 10.3–14.5)
RBC # FLD: 16.8 % — HIGH (ref 10.3–14.5)
RBC # FLD: 16.9 % — HIGH (ref 10.3–14.5)
SARS-COV-2 IGG+IGM SERPL QL IA: 15.2 U/ML — HIGH
SARS-COV-2 IGG+IGM SERPL QL IA: POSITIVE
SODIUM SERPL-SCNC: 137 MMOL/L — SIGNIFICANT CHANGE UP (ref 135–145)
WBC # BLD: 6.95 K/UL — SIGNIFICANT CHANGE UP (ref 3.8–10.5)
WBC # BLD: 7.13 K/UL — SIGNIFICANT CHANGE UP (ref 3.8–10.5)
WBC # BLD: 7.19 K/UL — SIGNIFICANT CHANGE UP (ref 3.8–10.5)
WBC # BLD: 8.56 K/UL — SIGNIFICANT CHANGE UP (ref 3.8–10.5)
WBC # FLD AUTO: 6.95 K/UL — SIGNIFICANT CHANGE UP (ref 3.8–10.5)
WBC # FLD AUTO: 7.13 K/UL — SIGNIFICANT CHANGE UP (ref 3.8–10.5)
WBC # FLD AUTO: 7.19 K/UL — SIGNIFICANT CHANGE UP (ref 3.8–10.5)
WBC # FLD AUTO: 8.56 K/UL — SIGNIFICANT CHANGE UP (ref 3.8–10.5)

## 2021-10-14 PROCEDURE — 99232 SBSQ HOSP IP/OBS MODERATE 35: CPT

## 2021-10-14 PROCEDURE — 99233 SBSQ HOSP IP/OBS HIGH 50: CPT | Mod: GC

## 2021-10-14 PROCEDURE — 74174 CTA ABD&PLVS W/CONTRAST: CPT | Mod: 26

## 2021-10-14 PROCEDURE — 99291 CRITICAL CARE FIRST HOUR: CPT

## 2021-10-14 RX ORDER — ACETAMINOPHEN 500 MG
1000 TABLET ORAL ONCE
Refills: 0 | Status: COMPLETED | OUTPATIENT
Start: 2021-10-14 | End: 2021-10-14

## 2021-10-14 RX ORDER — DEXTROSE MONOHYDRATE, SODIUM CHLORIDE, AND POTASSIUM CHLORIDE 50; .745; 4.5 G/1000ML; G/1000ML; G/1000ML
1000 INJECTION, SOLUTION INTRAVENOUS
Refills: 0 | Status: DISCONTINUED | OUTPATIENT
Start: 2021-10-14 | End: 2021-10-18

## 2021-10-14 RX ADMIN — Medication 1000 MILLIGRAM(S): at 08:50

## 2021-10-14 RX ADMIN — Medication 200 MICROGRAM(S): at 05:22

## 2021-10-14 RX ADMIN — Medication 400 MILLIGRAM(S): at 08:20

## 2021-10-14 RX ADMIN — SODIUM CHLORIDE 125 MILLILITER(S): 9 INJECTION, SOLUTION INTRAVENOUS at 08:20

## 2021-10-14 RX ADMIN — Medication 1: at 05:25

## 2021-10-14 RX ADMIN — SIMVASTATIN 40 MILLIGRAM(S): 20 TABLET, FILM COATED ORAL at 21:03

## 2021-10-14 RX ADMIN — Medication 1: at 17:56

## 2021-10-14 NOTE — PROGRESS NOTE ADULT - ASSESSMENT
66F w/ pandiverticulosis and history of recurrent diverticular bleeding presenting w/ painless hematochezia.    Impression;  #Diverticular bleeding - CTA positive for bleeding from splenic flexure. Previously embolized by IR in transverse colon 3/2021. Has seen colorectal surgery (Dr. Beach) to discuss colectomy. S/p flexible sigmoidoscopy on 10/13 after episode of active bleeding, but no bleeding seen on endoscopic exam. Diverticulosis is too severe to allow localization of actively bleeding with endoscopy.    Recommendations:  - Continue resuscitation and monitoring per SICU.  - No plan for repeat endoscopic evaluation, even in the setting of recurrent bleeding, as severity of diverticulosis prevents endoscopic localization of active bleeding.   - If patient has recurrent bleeding would consult IR to discuss angiogram.  - Would also discuss role of colectomy with colorectal surgery (patient known to Dr. Beach) given recurrent bleeding.  - GI will sign off.     Truong Davila  Gastroenterology/Hepatology Fellow  Available via Microsoft Teams    NON-URGENT CONSULTS:  Please email [email or web address]  AT NIGHT AND ON WEEKENDS:  Contact on-call GI fellow via answering service ([phone]) from 5pm-8am and on weekends/holidays  MONDAY-FRIDAY 8AM-5PM:  Pager# [phone] ([facility]) or [phone] ([facility])  GI Phone# [phone] ([facility])

## 2021-10-14 NOTE — PROGRESS NOTE ADULT - SUBJECTIVE AND OBJECTIVE BOX
SICU Daily Progress Note  =====================================================  Interval/Overnight Events:    - 6 hematochezic stools between 2am and 11am this morning (10/14)    -S/p flex sig 10/13 pm  - right flank pain 6/10, given one dose of fentanyl IV     POD #          	SICU Day # 2    HPI:  64 yo F w/ hx of diverticulosis (multiple previous admissions - last in March 2021), SOLOMON, NIDDM, nephrolithiasis, herniated lumbar disc (s/p plate), presenting to ED for 6 episodes of BRBPR since 4 pm yesterday. Pt reports associated lightheadedness and nausea preceding this event. She denies fevers, abd pain, and vomiting. Review of chart shows multiple past admissions to [facility] w/ hypotension requiring multiple transfusions. During her last admission in March 2021 the pt underwent IR embolization of a brach of her middle colic artery. Due to the severity of her pan-diverticulosis she would require total abdominal colectomy, and for this reason surgical interventions have not been pursued    In ED 10/12, pt has remained hemodynamically stable w/o tachycardia or hypotension. Initial lab work was significant for hgb of 7.7, which trended down to 6.7 at 4 hours. CTA abd/pelvis demonstrated active GI bleeding in the descending colon in the region of the splenic flexure. 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 Presently, the pt is awaiting evaluations by IR and GI. Due to the severity of bleeding on previous presentations, the SICU was consulted for ongoing hemodynamic monitoring while awaiting definitive plan for intervention.    PMH:     Hypothyroidism  Pernicious anemia  Diverticulosis  HTN (hypertension)  Lower gastrointestinal bleeding  Pernicious anemia  SOLOMON (obstructive sleep apnea)  Type 2 diabetes mellitus  Left ureteral stone  Fibroid uterus  Lumbar herniated disc  Multiparity  Heart murmur    Allergies: oxycodone (Other)  Valium (Other)      MEDICATIONS:   --------------------------------------------------------------------------------------  Neurologic Medications    Respiratory Medications    Cardiovascular Medications    Gastrointestinal Medications  lactated ringers. 1000 milliLiter(s) IV Continuous <Continuous>    Genitourinary Medications    Hematologic/Oncologic Medications    Antimicrobial/Immunologic Medications    Endocrine/Metabolic Medications  levothyroxine 200 MICROGram(s) Oral daily  simvastatin 40 milliGRAM(s) Oral at bedtime    Topical/Other Medications    --------------------------------------------------------------------------------------    VITAL SIGNS, INS/OUTS (last 24 hours):  --------------------------------------------------------------------------------------  ICU Vital Signs Last 24 Hrs  T(C): 36.6 (13 Oct 2021 08:00), Max: 37 (12 Oct 2021 22:59)  T(F): 97.8 (13 Oct 2021 08:00), Max: 98.6 (12 Oct 2021 22:59)  HR: 83 (13 Oct 2021 08:00) (76 - 93)  BP: 159/80 (13 Oct 2021 08:00) (128/78 - 159/80)  BP(mean): 96 (13 Oct 2021 08:00) (89 - 96)  ABP: --  ABP(mean): --  RR: 17 (13 Oct 2021 08:00) (15 - 18)  SpO2: 100% (13 Oct 2021 08:00) (98% - 100%)    --------------------------------------------------------------------------------------    EXAM  NEUROLOGY  RASS:   	GCS:    Gen: A&Ox4   HEENT: Atraumatic. Mucous membranes moist, no scleral icterus   CV: RRR. No murmurs. No significant LE edema. Distal pulses 2+. Capillary refill < 2 seconds.  Resp: Respirations unlabored CTAB, no rales, rhonchi, or wheezes.  GI: Abdomen non tender to palpation, soft and non-distended. + BS.  Skin/MSK: No open wounds. No ecchymosis appreciated.  Neuro:  Following commands. Moving extremities spontaneously.  Psych: Appropriate mood, cooperative      METABOLIC/FLUIDS/ELECTROLYTES  lactated ringers. 1000 milliLiter(s) IV Continuous <Continuous>      HEMATOLOGIC  [x] VTE Prophylaxis:   Transfusions:	[] PRBC	[] Platelets		[] FFP	[] Cryoprecipitate    INFECTIOUS DISEASE  Antimicrobials/Immunologic Medications:    Day #      of     ***    Tubes/Lines/Drains  ***  [x] Peripheral IV  [] Central Venous Line     	[] R	[] L	[] IJ	[] Fem	[] SC	Date Placed:   [] Arterial Line		[] R	[] L	[] Fem	[] Rad	[] Ax	Date Placed:   [] PICC		[] Midline		[] Mediport  [] Urinary Catheter		Date Placed:   [x] Necessity of urinary, arterial, and venous catheters discussed    LABS  --------------------------------------------------------------------------------------                         7.6    8.56  )-----------( 119      ( 14 Oct 2021 12:43 )             22.7     Complete Blood Count (10.14.21 @ 04:36)   Nucleated RBC: 0 /100 WBCs   WBC Count: 7.19 K/uL   RBC Count: 3.32 M/uL   Hemoglobin: 9.4 g/dL   Hematocrit: 27.5 %   Mean Cell Volume: 82.8 fL   Mean Cell Hemoglobin: 28.3 pg   Mean Cell Hemoglobin Conc: 34.2 gm/dL   Red Cell Distrib Width: 16.2 %   Platelet Count - Automated: 114 K/uL   Nucleated RBC #: 0.00 K/uL                  6.7    5.91  )-----------( 146      ( 13 Oct 2021 03:27 )             21.3     10-12    136  |  101  |  26<H>  ----------------------------<  202<H>  4.8   |  24  |  1.35<H>    Ca    8.6      12 Oct 2021 23:14    TPro  6.5  /  Alb  3.8  /  TBili  0.3  /  DBili  x   /  AST  14  /  ALT  13  /  AlkPhos  68  10-12    PT/INR - ( 12 Oct 2021 23:14 )   PT: 12.5 sec;   INR: 1.09 ratio    PTT - ( 12 Oct 2021 23:14 )  PTT:28.0 sec    --------------------------------------------------------------------------------------    OTHER LABORATORY:     IMAGING STUDIES:   < from: CT Abdomen and Pelvis w/wo IV Cont (10.13.21 @ 01:56) >  IMPRESSION:    Active GI bleed in the descending colon in the region of the splenic flexure.    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    These findings were discussed with Dr. RORO ROSADO at 10/13/2021 2:12 AM by Dr. Rinaldi with read back confirmation.    --- End of Report ---    < end of copied text >      CXR:

## 2021-10-14 NOTE — PROGRESS NOTE ADULT - ASSESSMENT
65F w/ hx of diverticulosis (reports 8 previous admissions at multiple hospitals), herniated lumbar disc (s/p plate), SOLOMON, DM (on oral agent) presents with lower GI bleed localized to the splenic flexure on CTA s/p flex sig    - NPO, IV fluids  - Trend H/H  - IR consult (for angiogram) if H/H continues to downtrend and continued bloody BMs  - Care per SICU    B team surgery  Pager [phone]

## 2021-10-14 NOTE — PROGRESS NOTE ADULT - ASSESSMENT
64 yo F w/ hx of pan-diverticulosis (multiple previous admissions - last in March 2021), SOLOMON, NIDDM, nephrolithiasis, herniated lumbar disc (s/p plate), presenting to ED on 10/13 for 6 episodes of BRBPR. Associated nausea and lightheadedness, denies pain. No further bleeding in ED. Review of chart shows multiple past admissions to [facility] w/ hypotension requiring multiple transfusions. Last admitted in March 2021 and now s/p IR embolization. Hemodynamically stable on presentation to ED, however hgb trending down from 7.7 to 6.7 over 4 hrs. CTA w/ active bleeding in descending colon. Pt accepted for admission to SICU for hemodynamic monitoring while awaiting IR/GI consultation and prbc transfusion.    PLAN:  NEURO:  -no acute issues  -monitor mental status    RESPIRATORY:   -no acute issues  -monitor respiratory status  -continuous pulse ox    CARDIOVASCULAR: hx HTN  -holding ramipril 10 mg BID in setting of HELEN  -simvastatin 40 mg daily  -monitor VS and perfusion status  -CTA w/ active GIB in descending colon  -awaiting IR consultation  - c/s vascular for dissection right external iliac artery    GI/NUTRITION: hx pan-diverticulosis, lower GIB, IR embolization   -NPO  -monitor for further bleeding  -GI following    GENITOURINARY/RENAL:  -Cr 1.3 (unclear if baseline per chart review)  -monitor UOP  -monitor electrolytes  -LR @125 cc/hr  -add 1L bolus IVF    HEMATOLOGIC:  -monitor H&H; CBC q6  -resume SQH by tomorrow night (10/14)  -pending 1u prbc's    INFECTIOUS DISEASE:  -monitor for fevers; trend WBC    ENDOCRINE: hx NIDDM  -trend glucose  -ISS  -synthroid 200 mcg daily    Lines:  PIV x 2, 20G and 22G  - add midline    GOC/CODE STATUS:  Full Code    DISPO: SICU   64 yo F w/ hx of pan-diverticulosis (multiple previous admissions - last in March 2021), SOLOMON, NIDDM, nephrolithiasis, herniated lumbar disc (s/p plate), presenting to ED on 10/13 for 6 episodes of BRBPR. Associated nausea and lightheadedness, denies pain. No further bleeding in ED. Review of chart shows multiple past admissions to [facility] w/ hypotension requiring multiple transfusions. Last admitted in March 2021 and now s/p IR embolization. Hemodynamically stable on presentation to ED, however hgb trending down from 7.7 to 6.7 over 4 hrs. CTA w/ active bleeding in descending colon. Pt accepted for admission to SICU for hemodynamic monitoring while awaiting IR/GI consultation and prbc transfusion.    PLAN:  NEURO:  -no acute issues  -monitor mental status    RESPIRATORY:   -no acute issues  -monitor respiratory status  -continuous pulse ox    CARDIOVASCULAR: hx HTN  -holding ramipril 10 mg BID in setting of HELEN  -simvastatin 40 mg daily  -monitor VS and perfusion status  -CTA w/ active GIB in descending colon  -following IR consultation, ordering repeat CTA   - c/s vascular for dissection right external iliac artery    GI/NUTRITION: hx pan-diverticulosis, lower GIB, IR embolization   -NPO  -monitor for further bleeding  -GI signed off     GENITOURINARY/RENAL:  -Cr 1.3 (unclear if baseline per chart review)  -monitor UOP  -monitor electrolytes  -LR @125 cc/hr  -add 1L bolus IVF    HEMATOLOGIC:  -monitor H&H; CBC q6  -resume SQH by tonight       INFECTIOUS DISEASE:  -monitor for fevers; trend WBC    ENDOCRINE: hx NIDDM  -trend glucose  -ISS  -synthroid 200 mcg daily    Lines:  PIV x 2, 20G and 22G  - add midline    GOC/CODE STATUS:  Full Code    DISPO: SICU

## 2021-10-14 NOTE — PROGRESS NOTE ADULT - SUBJECTIVE AND OBJECTIVE BOX
Surgery Progress Note    Subjective:     Patient seen and examined at bedside. Patient without complaints this AM. Denies N/V/F/C.     OBJECTIVE:     T(C): 36.4 (10-14-21 @ 00:00), Max: 37 (10-13-21 @ 20:00)  HR: 79 (10-14-21 @ 03:00) (68 - 93)  BP: 145/70 (10-14-21 @ 03:00) (115/55 - 166/92)  RR: 16 (10-14-21 @ 03:00) (13 - 22)  SpO2: 100% (10-14-21 @ 03:00) (68% - 100%)  Wt(kg): --    I&O's Detail    13 Oct 2021 07:01  -  14 Oct 2021 03:34  --------------------------------------------------------  IN:    Lactated Ringers: 2500 mL    Lactated Ringers Bolus: 1000 mL    PRBCs (Packed Red Blood Cells): 600 mL  Total IN: 4100 mL    OUT:    Oral Fluid: 0 mL    Voided (mL): 850 mL  Total OUT: 850 mL    Total NET: 3250 mL          PHYSICAL EXAM:    GENERAL: NAD, lying in bed comfortably  HEAD:  Atraumatic, Normocephalic  ENT: Moist mucous membranes  CHEST/LUNG: Unlabored respirations  ABDOMEN: Soft, Nontender, Nondistended.   NERVOUS SYSTEM:  Alert & Oriented X3, speech clear.   SKIN: No rashes or lesions    MEDICATIONS  (STANDING):  dextrose 40% Gel 15 Gram(s) Oral once  dextrose 5%. 1000 milliLiter(s) (50 mL/Hr) IV Continuous <Continuous>  dextrose 5%. 1000 milliLiter(s) (100 mL/Hr) IV Continuous <Continuous>  dextrose 5%. 1000 milliLiter(s) (50 mL/Hr) IV Continuous <Continuous>  dextrose 50% Injectable 25 Gram(s) IV Push once  dextrose 50% Injectable 12.5 Gram(s) IV Push once  dextrose 50% Injectable 25 Gram(s) IV Push once  glucagon  Injectable 1 milliGRAM(s) IntraMuscular once  influenza   Vaccine 0.5 milliLiter(s) IntraMuscular once  insulin lispro (ADMELOG) corrective regimen sliding scale   SubCutaneous every 6 hours  lactated ringers. 1000 milliLiter(s) (125 mL/Hr) IV Continuous <Continuous>  levothyroxine 200 MICROGram(s) Oral daily  simvastatin 40 milliGRAM(s) Oral at bedtime    MEDICATIONS  (PRN):      LABS:                          9.7    6.95  )-----------( 109      ( 14 Oct 2021 01:28 )             28.2     10-14    137  |  106  |  22  ----------------------------<  120<H>  4.0   |  23  |  1.12    Ca    7.9<L>      14 Oct 2021 01:28  Phos  2.8     10-14  Mg     1.90     10-14    TPro  6.5  /  Alb  3.8  /  TBili  0.3  /  DBili  x   /  AST  14  /  ALT  13  /  AlkPhos  68  10-12    PT/INR - ( 12 Oct 2021 23:14 )   PT: 12.5 sec;   INR: 1.09 ratio         PTT - ( 12 Oct 2021 23:14 )  PTT:28.0 sec           Surgery Progress Note    Subjective:     Patient seen and examined at bedside. Patient without complaints this AM. Denies N/V/F/C.     OBJECTIVE:     T(C): 36.4 (10-14-21 @ 00:00), Max: 37 (10-13-21 @ 20:00)  HR: 79 (10-14-21 @ 03:00) (68 - 93)  BP: 145/70 (10-14-21 @ 03:00) (115/55 - 166/92)  RR: 16 (10-14-21 @ 03:00) (13 - 22)  SpO2: 100% (10-14-21 @ 03:00) (68% - 100%)  Wt(kg): --    I&O's Detail    13 Oct 2021 07:01  -  14 Oct 2021 03:34  --------------------------------------------------------  IN:    Lactated Ringers: 2500 mL    Lactated Ringers Bolus: 1000 mL    PRBCs (Packed Red Blood Cells): 600 mL  Total IN: 4100 mL    OUT:    Oral Fluid: 0 mL    Voided (mL): 850 mL  Total OUT: 850 mL    Total NET: 3250 mL        PHYSICAL EXAM:  GENERAL: NAD, lying in bed comfortably  CHEST/LUNG: Unlabored respirations  ABDOMEN: Soft, Nontender, Nondistended.   NERVOUS SYSTEM:  Alert & Oriented X3, speech clear.   SKIN: No rashes or lesions    MEDICATIONS  (STANDING):  dextrose 40% Gel 15 Gram(s) Oral once  dextrose 5%. 1000 milliLiter(s) (50 mL/Hr) IV Continuous <Continuous>  dextrose 5%. 1000 milliLiter(s) (100 mL/Hr) IV Continuous <Continuous>  dextrose 5%. 1000 milliLiter(s) (50 mL/Hr) IV Continuous <Continuous>  dextrose 50% Injectable 25 Gram(s) IV Push once  dextrose 50% Injectable 12.5 Gram(s) IV Push once  dextrose 50% Injectable 25 Gram(s) IV Push once  glucagon  Injectable 1 milliGRAM(s) IntraMuscular once  influenza   Vaccine 0.5 milliLiter(s) IntraMuscular once  insulin lispro (ADMELOG) corrective regimen sliding scale   SubCutaneous every 6 hours  lactated ringers. 1000 milliLiter(s) (125 mL/Hr) IV Continuous <Continuous>  levothyroxine 200 MICROGram(s) Oral daily  simvastatin 40 milliGRAM(s) Oral at bedtime    MEDICATIONS  (PRN):      LABS:                          9.7    6.95  )-----------( 109      ( 14 Oct 2021 01:28 )             28.2     10-14    137  |  106  |  22  ----------------------------<  120<H>  4.0   |  23  |  1.12    Ca    7.9<L>      14 Oct 2021 01:28  Phos  2.8     10-14  Mg     1.90     10-14    TPro  6.5  /  Alb  3.8  /  TBili  0.3  /  DBili  x   /  AST  14  /  ALT  13  /  AlkPhos  68  10-12    PT/INR - ( 12 Oct 2021 23:14 )   PT: 12.5 sec;   INR: 1.09 ratio         PTT - ( 12 Oct 2021 23:14 )  PTT:28.0 sec

## 2021-10-14 NOTE — PROGRESS NOTE ADULT - SUBJECTIVE AND OBJECTIVE BOX
Chief Complaint:  Patient is a 66y old  Female who presents with a chief complaint of hematochezia (13 Oct 2021 14:00)    Reason for consult: diverticular bleed    Interval Events: S/p flex sig last night w/ no active bleeding. Responded to transfusions, pt denied further bleeding this AM. VSS.     Hospital Medications:  dextrose 40% Gel 15 Gram(s) Oral once  dextrose 5%. 1000 milliLiter(s) IV Continuous <Continuous>  dextrose 5%. 1000 milliLiter(s) IV Continuous <Continuous>  dextrose 5%. 1000 milliLiter(s) IV Continuous <Continuous>  dextrose 50% Injectable 25 Gram(s) IV Push once  dextrose 50% Injectable 12.5 Gram(s) IV Push once  dextrose 50% Injectable 25 Gram(s) IV Push once  glucagon  Injectable 1 milliGRAM(s) IntraMuscular once  influenza   Vaccine 0.5 milliLiter(s) IntraMuscular once  insulin lispro (ADMELOG) corrective regimen sliding scale   SubCutaneous every 6 hours  lactated ringers. 1000 milliLiter(s) IV Continuous <Continuous>  levothyroxine 200 MICROGram(s) Oral daily  simvastatin 40 milliGRAM(s) Oral at bedtime      ROS:   General:  No  fevers, chills, night sweats, fatigue  Eyes:  Good vision, no reported pain  ENT:  No sore throat, pain, runny nose  CV:  No pain, palpitations  Pulm:  No dyspnea, cough  GI:  See HPI, otherwise negative  :  No  incontinence, nocturia  Muscle:  No pain, weakness  Neuro:  No memory problems  Psych:  No insomnia, mood problems, depression  Endocrine:  No polyuria, polydipsia, cold/heat intolerance  Heme:  No petechiae, ecchymosis, easy bruisability  Skin:  No rash    PHYSICAL EXAM:   Vital Signs:  Vital Signs Last 24 Hrs  T(C): 37.2 (14 Oct 2021 08:00), Max: 37.2 (14 Oct 2021 08:00)  T(F): 98.9 (14 Oct 2021 08:00), Max: 98.9 (14 Oct 2021 08:00)  HR: 92 (14 Oct 2021 08:00) (68 - 98)  BP: 127/71 (14 Oct 2021 08:00) (115/55 - 166/92)  BP(mean): 82 (14 Oct 2021 08:00) (69 - 132)  RR: 17 (14 Oct 2021 08:00) (13 - 22)  SpO2: 98% (14 Oct 2021 08:00) (68% - 100%)  Daily     Daily     GENERAL: no acute distress  NEURO: alert  HEENT: anicteric sclera, no conjunctival pallor appreciated  CHEST: no respiratory distress, no accessory muscle use  CARDIAC: regular rate, rhythm  ABDOMEN: soft, non-tender, non-distended, no rebound or guarding  EXTREMITIES: warm, well perfused, no edema  SKIN: no lesions noted    LABS: reviewed                        9.4    7.19  )-----------( 114      ( 14 Oct 2021 04:36 )             27.5     10-14    137  |  106  |  22  ----------------------------<  120<H>  4.0   |  23  |  1.12    Ca    7.9<L>      14 Oct 2021 01:28  Phos  2.8     10-14  Mg     1.90     10-14    TPro  6.5  /  Alb  3.8  /  TBili  0.3  /  DBili  x   /  AST  14  /  ALT  13  /  AlkPhos  68  10-12    LIVER FUNCTIONS - ( 12 Oct 2021 23:14 )  Alb: 3.8 g/dL / Pro: 6.5 g/dL / ALK PHOS: 68 U/L / ALT: 13 U/L / AST: 14 U/L / GGT: x             Interval Diagnostic Studies: see sunrise for full report

## 2021-10-15 LAB
ANION GAP SERPL CALC-SCNC: 6 MMOL/L — LOW (ref 7–14)
BUN SERPL-MCNC: 17 MG/DL — SIGNIFICANT CHANGE UP (ref 7–23)
CALCIUM SERPL-MCNC: 7.7 MG/DL — LOW (ref 8.4–10.5)
CHLORIDE SERPL-SCNC: 110 MMOL/L — HIGH (ref 98–107)
CO2 SERPL-SCNC: 22 MMOL/L — SIGNIFICANT CHANGE UP (ref 22–31)
CREAT SERPL-MCNC: 1.12 MG/DL — SIGNIFICANT CHANGE UP (ref 0.5–1.3)
GLUCOSE BLDC GLUCOMTR-MCNC: 155 MG/DL — HIGH (ref 70–99)
GLUCOSE BLDC GLUCOMTR-MCNC: 156 MG/DL — HIGH (ref 70–99)
GLUCOSE BLDC GLUCOMTR-MCNC: 181 MG/DL — HIGH (ref 70–99)
GLUCOSE SERPL-MCNC: 182 MG/DL — HIGH (ref 70–99)
HCT VFR BLD CALC: 19.1 % — CRITICAL LOW (ref 34.5–45)
HCT VFR BLD CALC: 19.5 % — CRITICAL LOW (ref 34.5–45)
HCT VFR BLD CALC: 21.8 % — LOW (ref 34.5–45)
HCT VFR BLD CALC: 23.4 % — LOW (ref 34.5–45)
HGB BLD-MCNC: 6.5 G/DL — CRITICAL LOW (ref 11.5–15.5)
HGB BLD-MCNC: 6.7 G/DL — CRITICAL LOW (ref 11.5–15.5)
HGB BLD-MCNC: 7.4 G/DL — LOW (ref 11.5–15.5)
HGB BLD-MCNC: 8 G/DL — LOW (ref 11.5–15.5)
MAGNESIUM SERPL-MCNC: 1.8 MG/DL — SIGNIFICANT CHANGE UP (ref 1.6–2.6)
MCHC RBC-ENTMCNC: 27.9 PG — SIGNIFICANT CHANGE UP (ref 27–34)
MCHC RBC-ENTMCNC: 28 PG — SIGNIFICANT CHANGE UP (ref 27–34)
MCHC RBC-ENTMCNC: 28.5 PG — SIGNIFICANT CHANGE UP (ref 27–34)
MCHC RBC-ENTMCNC: 28.5 PG — SIGNIFICANT CHANGE UP (ref 27–34)
MCHC RBC-ENTMCNC: 33.9 GM/DL — SIGNIFICANT CHANGE UP (ref 32–36)
MCHC RBC-ENTMCNC: 34 GM/DL — SIGNIFICANT CHANGE UP (ref 32–36)
MCHC RBC-ENTMCNC: 34.2 GM/DL — SIGNIFICANT CHANGE UP (ref 32–36)
MCHC RBC-ENTMCNC: 34.4 GM/DL — SIGNIFICANT CHANGE UP (ref 32–36)
MCV RBC AUTO: 81.6 FL — SIGNIFICANT CHANGE UP (ref 80–100)
MCV RBC AUTO: 82.3 FL — SIGNIFICANT CHANGE UP (ref 80–100)
MCV RBC AUTO: 83.3 FL — SIGNIFICANT CHANGE UP (ref 80–100)
MCV RBC AUTO: 83.8 FL — SIGNIFICANT CHANGE UP (ref 80–100)
NRBC # BLD: 0 /100 WBCS — SIGNIFICANT CHANGE UP
NRBC # FLD: 0 K/UL — SIGNIFICANT CHANGE UP
PHOSPHATE SERPL-MCNC: 2.3 MG/DL — LOW (ref 2.5–4.5)
PLATELET # BLD AUTO: 102 K/UL — LOW (ref 150–400)
PLATELET # BLD AUTO: 80 K/UL — LOW (ref 150–400)
PLATELET # BLD AUTO: 91 K/UL — LOW (ref 150–400)
PLATELET # BLD AUTO: 98 K/UL — LOW (ref 150–400)
POTASSIUM SERPL-MCNC: 3.6 MMOL/L — SIGNIFICANT CHANGE UP (ref 3.5–5.3)
POTASSIUM SERPL-SCNC: 3.6 MMOL/L — SIGNIFICANT CHANGE UP (ref 3.5–5.3)
RBC # BLD: 2.28 M/UL — LOW (ref 3.8–5.2)
RBC # BLD: 2.39 M/UL — LOW (ref 3.8–5.2)
RBC # BLD: 2.65 M/UL — LOW (ref 3.8–5.2)
RBC # BLD: 2.81 M/UL — LOW (ref 3.8–5.2)
RBC # FLD: 15.8 % — HIGH (ref 10.3–14.5)
RBC # FLD: 16.5 % — HIGH (ref 10.3–14.5)
RBC # FLD: 16.7 % — HIGH (ref 10.3–14.5)
RBC # FLD: 16.7 % — HIGH (ref 10.3–14.5)
SODIUM SERPL-SCNC: 138 MMOL/L — SIGNIFICANT CHANGE UP (ref 135–145)
WBC # BLD: 6.22 K/UL — SIGNIFICANT CHANGE UP (ref 3.8–10.5)
WBC # BLD: 6.91 K/UL — SIGNIFICANT CHANGE UP (ref 3.8–10.5)
WBC # BLD: 7.96 K/UL — SIGNIFICANT CHANGE UP (ref 3.8–10.5)
WBC # BLD: 8.52 K/UL — SIGNIFICANT CHANGE UP (ref 3.8–10.5)
WBC # FLD AUTO: 6.22 K/UL — SIGNIFICANT CHANGE UP (ref 3.8–10.5)
WBC # FLD AUTO: 6.91 K/UL — SIGNIFICANT CHANGE UP (ref 3.8–10.5)
WBC # FLD AUTO: 7.96 K/UL — SIGNIFICANT CHANGE UP (ref 3.8–10.5)
WBC # FLD AUTO: 8.52 K/UL — SIGNIFICANT CHANGE UP (ref 3.8–10.5)

## 2021-10-15 PROCEDURE — 99024 POSTOP FOLLOW-UP VISIT: CPT | Mod: 25

## 2021-10-15 PROCEDURE — 44208 L COLECTOMY/COLOPROCTOSTOMY: CPT

## 2021-10-15 PROCEDURE — 99233 SBSQ HOSP IP/OBS HIGH 50: CPT

## 2021-10-15 PROCEDURE — 99222 1ST HOSP IP/OBS MODERATE 55: CPT | Mod: 25

## 2021-10-15 PROCEDURE — 99233 SBSQ HOSP IP/OBS HIGH 50: CPT | Mod: 25

## 2021-10-15 PROCEDURE — 44213 LAP MOBIL SPLENIC FL ADD-ON: CPT

## 2021-10-15 RX ORDER — ALPRAZOLAM 0.25 MG
0.25 TABLET ORAL ONCE
Refills: 0 | Status: DISCONTINUED | OUTPATIENT
Start: 2021-10-15 | End: 2021-10-15

## 2021-10-15 RX ADMIN — DEXTROSE MONOHYDRATE, SODIUM CHLORIDE, AND POTASSIUM CHLORIDE 125 MILLILITER(S): 50; .745; 4.5 INJECTION, SOLUTION INTRAVENOUS at 13:50

## 2021-10-15 RX ADMIN — Medication 200 MICROGRAM(S): at 05:45

## 2021-10-15 RX ADMIN — Medication 1: at 18:04

## 2021-10-15 RX ADMIN — Medication 1: at 01:23

## 2021-10-15 RX ADMIN — SIMVASTATIN 40 MILLIGRAM(S): 20 TABLET, FILM COATED ORAL at 22:46

## 2021-10-15 RX ADMIN — Medication 1: at 11:48

## 2021-10-15 RX ADMIN — Medication 1: at 05:45

## 2021-10-15 RX ADMIN — Medication 1: at 23:41

## 2021-10-15 RX ADMIN — Medication 0.25 MILLIGRAM(S): at 11:49

## 2021-10-15 NOTE — DIETITIAN INITIAL EVALUATION ADULT. - ORAL INTAKE PTA/DIET HISTORY
pt states after her April 2021 admission she started intentionally reducing her portion sizes and eating more salads (fiber).

## 2021-10-15 NOTE — DIETITIAN INITIAL EVALUATION ADULT. - PERTINENT MEDS FT
MEDICATIONS  (STANDING):  dextrose 40% Gel 15 Gram(s) Oral once  dextrose 5% + sodium chloride 0.45% with potassium chloride 20 mEq/L 1000 milliLiter(s) (125 mL/Hr) IV Continuous <Continuous>  dextrose 5%. 1000 milliLiter(s) (50 mL/Hr) IV Continuous <Continuous>  dextrose 5%. 1000 milliLiter(s) (50 mL/Hr) IV Continuous <Continuous>  dextrose 5%. 1000 milliLiter(s) (100 mL/Hr) IV Continuous <Continuous>  dextrose 50% Injectable 25 Gram(s) IV Push once  dextrose 50% Injectable 12.5 Gram(s) IV Push once  dextrose 50% Injectable 25 Gram(s) IV Push once  glucagon  Injectable 1 milliGRAM(s) IntraMuscular once  influenza   Vaccine 0.5 milliLiter(s) IntraMuscular once  insulin lispro (ADMELOG) corrective regimen sliding scale   SubCutaneous every 6 hours  levothyroxine 200 MICROGram(s) Oral daily  simvastatin 40 milliGRAM(s) Oral at bedtime

## 2021-10-15 NOTE — CONSULT NOTE ADULT - CONSULT REASON
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
Hematochezia
GI Bleed
GI bleed
Hemodynamic monitoring in setting of active lower GIB

## 2021-10-15 NOTE — DIETITIAN INITIAL EVALUATION ADULT. - ADD RECOMMEND
1. Advance diet as tolerated to low fiber. 2. Continue Ensure Clear 3x daily (540 alma and 24 gm protein).

## 2021-10-15 NOTE — PROGRESS NOTE ADULT - ASSESSMENT
65F w/ hx of diverticulosis (reports 8 previous admissions at multiple hospitals), herniated lumbar disc (s/p plate), SOLOMON, DM (on oral agent) presents with lower GI bleed localized to the splenic flexure on CTA s/p flex sig. Continues with downtrending H/H    - Conservative management  - NPO, IV fluids  - Trend H/H and bloody BMs  - Patient amenable to surgery to prevent future occurrences discussed options elective extended left hemicolectomy given embolization of right sided bleeder and 3 episodes of left sided bleeding  - Care per SICU    B team surgery  Pager [phone]

## 2021-10-15 NOTE — CONSULT NOTE ADULT - SUBJECTIVE AND OBJECTIVE BOX
65F w/ hx of diverticulosis (reports 8 previous admissions at multiple hospitals), herniated lumbar disc (s/p plate), SOLOMON, DM (on oral agent) presents with 6 episodes of BRBPR that began at 4pm on 10/12/21. She denies any abdominal pain but says she was lightheaded yesterday at home. Also reports some nausea.    She has had multiple SICU admissions at [facility] after presenting with hypotension and requiring multiple transfusions. Her last admission was in March 2021 and she underwent IR embolization of a branch of the middle colic artery. She has had bleeds localized to several areas of the colon and has pandiverticulosis. Operative intervention has not been pursued electively because she may require a total colectomy.     Since admission, patient has been hemodynamically. Stable CTA positive on admission for splenic flexure bleed but negative for active bleed yesterday. Patient s/p 4 units PRBC on 10/13 and 2u PRBC today (10/15). Last bloody BM was early this morning.     Denies abdominal pain.  No N/V     Vital Signs Last 24 Hrs  T(C): 37 (15 Oct 2021 08:00), Max: 37 (15 Oct 2021 08:00)  T(F): 98.6 (15 Oct 2021 08:00), Max: 98.6 (15 Oct 2021 08:00)  HR: 96 (15 Oct 2021 09:00) (74 - 96)  BP: 158/78 (15 Oct 2021 09:00) (112/58 - 158/78)  BP(mean): 99 (15 Oct 2021 09:00) (59 - 99)  RR: 23 (15 Oct 2021 09:00) (15 - 23)  SpO2: 100% (15 Oct 2021 09:00) (98% - 100%)    Well appearing, awake and alert   Abd soft, NT, ND                           8.0    6.91  )-----------( 80       ( 15 Oct 2021 07:06 )             23.4

## 2021-10-15 NOTE — DIETITIAN INITIAL EVALUATION ADULT. - OTHER INFO
HPI: 66 yo F w/ hx of diverticulosis (multiple previous admissions - last in March 2021), SOLOMON, NIDDM, nephrolithiasis, herniated lumbar disc (s/p plate), presenting to ED for 6 episodes of BRBPR since 4 pm yesterday. Pt reports associated lightheadedness and nausea preceding this event. She denies fevers, abd pain, and vomiting. Review of chart shows multiple past admissions to [facility] w/ hypotension requiring multiple transfusions. During her last admission in March 2021 the pt underwent IR embolization of a brach of her middle colic artery. Since admission, patient has been hemodynamically. Stable CTA positive on admission for splenic flexure bleed but negative for active bleed yesterday. Patient s/p 4 units PRBC on 10/13 and 2u PRBC today (10/15). Last bloody BM was early this morning.     Pt was advanced to [facility] today. Pt drank 1/2 of Gingerale. Currently, no nausea/vomiting. Pt states last BM this morning was bloody.  pounds. Pt is amenable to Ensure Clear supplements. Briefly discussed low fiber/high fiber diet as pt was interested.

## 2021-10-15 NOTE — DIETITIAN INITIAL EVALUATION ADULT. - PERTINENT LABORATORY DATA
10-15 Na 138 mmol/L Glu 182 mg/dL<H> K+ 3.6 mmol/L Cr 1.12 mg/dL BUN 17 mg/dL Phos 2.3 mg/dL<L>  A1C with Estimated Average Glucose Result: 6.1 % (10-14-21 @ 01:28)  10-15 @ 11:20 POCT 181 mg/dL  10-15 @ 05:43 POCT 156 mg/dL  10-15 @ 01:03 POCT 181 mg/dL  10-14 @ 17:53 POCT 154 mg/dL

## 2021-10-15 NOTE — PROGRESS NOTE ADULT - SUBJECTIVE AND OBJECTIVE BOX
Surgery Progress Note    Subjective:     Patient seen and examined at bedside. Patient without complaints this AM. Denies N/V/F/C.     OBJECTIVE:     T(C): 36.7 (10-15-21 @ 00:00), Max: 37.2 (10-14-21 @ 08:00)  HR: 76 (10-15-21 @ 03:00) (74 - 98)  BP: 133/71 (10-15-21 @ 03:00) (112/58 - 151/80)  RR: 17 (10-15-21 @ 03:00) (15 - 23)  SpO2: 100% (10-15-21 @ 03:00) (98% - 100%)  Wt(kg): --    I&O's Detail    13 Oct 2021 07:01  -  14 Oct 2021 07:00  --------------------------------------------------------  IN:    Lactated Ringers: 3000 mL    Lactated Ringers Bolus: 1000 mL    PRBCs (Packed Red Blood Cells): 600 mL  Total IN: 4600 mL    OUT:    Oral Fluid: 0 mL    Voided (mL): 850 mL  Total OUT: 850 mL    Total NET: 3750 mL      14 Oct 2021 07:01  -  15 Oct 2021 03:58  --------------------------------------------------------  IN:    dextrose 5% + sodium chloride 0.45% w/ Additives: 2000 mL    IV PiggyBack: 225 mL    Lactated Ringers: 250 mL  Total IN: 2475 mL    OUT:    Voided (mL): 1000 mL  Total OUT: 1000 mL    Total NET: 1475 mL          PHYSICAL EXAM:    GENERAL: NAD, lying in bed comfortably  HEAD:  Atraumatic, Normocephalic  ENT: Moist mucous membranes  CHEST/LUNG: Unlabored respirations  ABDOMEN: Soft, Nontender, Nondistended.   NERVOUS SYSTEM:  Alert & Oriented X3, speech clear.   SKIN: No rashes or lesions    MEDICATIONS  (STANDING):  dextrose 40% Gel 15 Gram(s) Oral once  dextrose 5% + sodium chloride 0.45% with potassium chloride 20 mEq/L 1000 milliLiter(s) (125 mL/Hr) IV Continuous <Continuous>  dextrose 5%. 1000 milliLiter(s) (50 mL/Hr) IV Continuous <Continuous>  dextrose 5%. 1000 milliLiter(s) (50 mL/Hr) IV Continuous <Continuous>  dextrose 5%. 1000 milliLiter(s) (100 mL/Hr) IV Continuous <Continuous>  dextrose 50% Injectable 25 Gram(s) IV Push once  dextrose 50% Injectable 12.5 Gram(s) IV Push once  dextrose 50% Injectable 25 Gram(s) IV Push once  glucagon  Injectable 1 milliGRAM(s) IntraMuscular once  influenza   Vaccine 0.5 milliLiter(s) IntraMuscular once  insulin lispro (ADMELOG) corrective regimen sliding scale   SubCutaneous every 6 hours  levothyroxine 200 MICROGram(s) Oral daily  simvastatin 40 milliGRAM(s) Oral at bedtime    MEDICATIONS  (PRN):      LABS:                          6.5    6.22  )-----------( 102      ( 15 Oct 2021 01:24 )             19.1     10-15    138  |  110<H>  |  17  ----------------------------<  182<H>  3.6   |  22  |  1.12    Ca    7.7<L>      15 Oct 2021 01:24  Phos  2.3     10-15  Mg     1.80     10-15      PT/INR - ( 14 Oct 2021 04:36 )   PT: 13.1 sec;   INR: 1.15 ratio         PTT - ( 14 Oct 2021 04:36 )  PTT:30.1 sec           Surgery Progress Note    Subjective:     Patient seen and examined at bedside. Patient without complaints this AM. Denies N/V/F/C.     OBJECTIVE:     T(C): 36.7 (10-15-21 @ 00:00), Max: 37.2 (10-14-21 @ 08:00)  HR: 76 (10-15-21 @ 03:00) (74 - 98)  BP: 133/71 (10-15-21 @ 03:00) (112/58 - 151/80)  RR: 17 (10-15-21 @ 03:00) (15 - 23)  SpO2: 100% (10-15-21 @ 03:00) (98% - 100%)  Wt(kg): --    I&O's Detail    13 Oct 2021 07:01  -  14 Oct 2021 07:00  --------------------------------------------------------  IN:    Lactated Ringers: 3000 mL    Lactated Ringers Bolus: 1000 mL    PRBCs (Packed Red Blood Cells): 600 mL  Total IN: 4600 mL    OUT:    Oral Fluid: 0 mL    Voided (mL): 850 mL  Total OUT: 850 mL    Total NET: 3750 mL      14 Oct 2021 07:01  -  15 Oct 2021 03:58  --------------------------------------------------------  IN:    dextrose 5% + sodium chloride 0.45% w/ Additives: 2000 mL    IV PiggyBack: 225 mL    Lactated Ringers: 250 mL  Total IN: 2475 mL    OUT:    Voided (mL): 1000 mL  Total OUT: 1000 mL    Total NET: 1475 mL          PHYSICAL EXAM:  GENERAL: NAD, lying in bed comfortably  CHEST/LUNG: Unlabored respirations  ABDOMEN: Soft, Nontender, Nondistended.   NERVOUS SYSTEM:  Alert & Oriented X3, speech clear.   SKIN: No rashes or lesions    MEDICATIONS  (STANDING):  dextrose 40% Gel 15 Gram(s) Oral once  dextrose 5% + sodium chloride 0.45% with potassium chloride 20 mEq/L 1000 milliLiter(s) (125 mL/Hr) IV Continuous <Continuous>  dextrose 5%. 1000 milliLiter(s) (50 mL/Hr) IV Continuous <Continuous>  dextrose 5%. 1000 milliLiter(s) (50 mL/Hr) IV Continuous <Continuous>  dextrose 5%. 1000 milliLiter(s) (100 mL/Hr) IV Continuous <Continuous>  dextrose 50% Injectable 25 Gram(s) IV Push once  dextrose 50% Injectable 12.5 Gram(s) IV Push once  dextrose 50% Injectable 25 Gram(s) IV Push once  glucagon  Injectable 1 milliGRAM(s) IntraMuscular once  influenza   Vaccine 0.5 milliLiter(s) IntraMuscular once  insulin lispro (ADMELOG) corrective regimen sliding scale   SubCutaneous every 6 hours  levothyroxine 200 MICROGram(s) Oral daily  simvastatin 40 milliGRAM(s) Oral at bedtime    MEDICATIONS  (PRN):      LABS:                          6.5    6.22  )-----------( 102      ( 15 Oct 2021 01:24 )             19.1     10-15    138  |  110<H>  |  17  ----------------------------<  182<H>  3.6   |  22  |  1.12    Ca    7.7<L>      15 Oct 2021 01:24  Phos  2.3     10-15  Mg     1.80     10-15      PT/INR - ( 14 Oct 2021 04:36 )   PT: 13.1 sec;   INR: 1.15 ratio         PTT - ( 14 Oct 2021 04:36 )  PTT:30.1 sec

## 2021-10-15 NOTE — PROGRESS NOTE ADULT - SUBJECTIVE AND OBJECTIVE BOX
SICU Daily Progress Note  =====================================================  Interval/Overnight Events:    - pt reports 1 episode of melena yesterday  - U/S guided IV placed  - H&H stable at 7.2/21.2 from 7.6/22.7 from 9.4/27.5      HPI: 66 yo F w/ hx of diverticulosis (multiple previous admissions - last in March 2021), SOLOMON, NIDDM, nephrolithiasis, herniated lumbar disc (s/p plate), presenting to ED for 6 episodes of BRBPR since 4 pm yesterday. Pt reports associated lightheadedness and nausea preceding this event. She denies fevers, abd pain, and vomiting. Review of chart shows multiple past admissions to [facility] w/ hypotension requiring multiple transfusions. During her last admission in March 2021 the pt underwent IR embolization of a brach of her middle colic artery. Due to the severity of her pan-diverticulosis she would require total abdominal colectomy, and for this reason surgical interventions have not been pursued    In ED 10/12, pt has remained hemodynamically stable w/o tachycardia or hypotension. Initial lab work was significant for hgb of 7.7, which trended down to 6.7 at 4 hours. CTA abd/pelvis demonstrated active GI bleeding in the descending colon in the region of the splenic flexure. 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 Presently, the pt is awaiting evaluations by IR and GI. Due to the severity of bleeding on previous presentations, the SICU was consulted for ongoing hemodynamic monitoring while awaiting definitive plan for intervention.    Allergies: oxycodone (Other)  Valium (Other)      MEDICATIONS:   --------------------------------------------------------------------------------------  MEDICATIONS  (STANDING):  dextrose 40% Gel 15 Gram(s) Oral once  dextrose 5% + sodium chloride 0.45% with potassium chloride 20 mEq/L 1000 milliLiter(s) (125 mL/Hr) IV Continuous <Continuous>  dextrose 5%. 1000 milliLiter(s) (50 mL/Hr) IV Continuous <Continuous>  dextrose 5%. 1000 milliLiter(s) (50 mL/Hr) IV Continuous <Continuous>  dextrose 5%. 1000 milliLiter(s) (100 mL/Hr) IV Continuous <Continuous>  dextrose 50% Injectable 25 Gram(s) IV Push once  dextrose 50% Injectable 12.5 Gram(s) IV Push once  dextrose 50% Injectable 25 Gram(s) IV Push once  glucagon  Injectable 1 milliGRAM(s) IntraMuscular once  influenza   Vaccine 0.5 milliLiter(s) IntraMuscular once  insulin lispro (ADMELOG) corrective regimen sliding scale   SubCutaneous every 6 hours  levothyroxine 200 MICROGram(s) Oral daily  simvastatin 40 milliGRAM(s) Oral at bedtime    MEDICATIONS  (PRN):      --------------------------------------------------------------------------------------  ICU Vital Signs Last 24 Hrs  T(C): 36.7 (15 Oct 2021 00:00), Max: 37.2 (14 Oct 2021 08:00)  T(F): 98 (15 Oct 2021 00:00), Max: 98.9 (14 Oct 2021 08:00)  HR: 78 (15 Oct 2021 01:00) (74 - 98)  BP: 130/58 (15 Oct 2021 01:00) (112/58 - 151/80)  BP(mean): 75 (15 Oct 2021 01:00) (59 - 105)  ABP: --  ABP(mean): --  RR: 18 (15 Oct 2021 01:00) (15 - 23)  SpO2: 99% (15 Oct 2021 01:00) (98% - 100%)    I&O's Detail    13 Oct 2021 07:01  -  14 Oct 2021 07:00  --------------------------------------------------------  IN:    Lactated Ringers: 3000 mL    Lactated Ringers Bolus: 1000 mL    PRBCs (Packed Red Blood Cells): 600 mL  Total IN: 4600 mL    OUT:    Oral Fluid: 0 mL    Voided (mL): 850 mL  Total OUT: 850 mL    Total NET: 3750 mL      14 Oct 2021 07:01  -  15 Oct 2021 01:26  --------------------------------------------------------  IN:    dextrose 5% + sodium chloride 0.45% w/ Additives: 2000 mL    IV PiggyBack: 225 mL    Lactated Ringers: 250 mL  Total IN: 2475 mL    OUT:    Voided (mL): 1000 mL  Total OUT: 1000 mL    Total NET: 1475 mL    --------------------------------------------------------------------------------------    EXAM  NEUROLOGY  RASS:   	GCS:    Gen: A&Ox4   HEENT: Atraumatic. Mucous membranes moist, no scleral icterus   CV: RRR. No murmurs. No significant LE edema. Distal pulses 2+. Capillary refill < 2 seconds.  Resp: Respirations unlabored CTAB, no rales, rhonchi, or wheezes.  GI: Abdomen non tender to palpation, soft and non-distended. + BS.  Skin/MSK: No open wounds. No ecchymosis appreciated.  Neuro:  Following commands. Moving extremities spontaneously.  Psych: Appropriate mood, cooperative      METABOLIC/FLUIDS/ELECTROLYTES  lactated ringers. 1000 milliLiter(s) IV Continuous <Continuous>      HEMATOLOGIC  [x] VTE Prophylaxis:   Transfusions:	[] PRBC	[] Platelets		[] FFP	[] Cryoprecipitate    INFECTIOUS DISEASE  Antimicrobials/Immunologic Medications:    Tubes/Lines/Drains    [x] Peripheral IV  [] Central Venous Line     	[] R	[] L	[] IJ	[] Fem	[] SC	Date Placed:   [] Arterial Line		[] R	[] L	[] Fem	[] Rad	[] Ax	Date Placed:   [] PICC		[] Midline		[] Mediport  [] Urinary Catheter		Date Placed:   [x] Necessity of urinary, arterial, and venous catheters discussed    LABS  --------------------------------------------------------------------------------------  CBC (10-14 @ 18:29)                              7.2<L>                         7.13    )----------------(  97<L>      --    % Neutrophils, --    % Lymphocytes, ANC: --                                  21.2<L>  CBC (10-14 @ 12:43)                              7.6<L>                         8.56    )----------------(  119<L>     --    % Neutrophils, --    % Lymphocytes, ANC: --                                  22.7<L>    BMP (10-14 @ 01:28)             137     |  106     |  22    		Ca++ --      Ca 7.9<L>             ---------------------------------( 120<H>		Mg 1.90               4.0     |  23      |  1.12  			Ph 2.8         Coags (10-14 @ 04:36)  aPTT 30.1 / INR 1.15 / PT 13.1        --------------------------------------------------------------------------------------     SICU Daily Progress Note  =====================================================  Interval/Overnight Events:    - Advance to CLD  - Xanax 0.25 x1   - Trend bloody BMs  - F/u Dr. Beach plans: NTD, if still bleeding, OR Monday/early next week, ACS if emergent need for resection    HPI: 64 yo F w/ hx of diverticulosis (multiple previous admissions - last in March 2021), SOLOMON, NIDDM, nephrolithiasis, herniated lumbar disc (s/p plate), presenting to ED for 6 episodes of BRBPR since 4 pm yesterday. Pt reports associated lightheadedness and nausea preceding this event. She denies fevers, abd pain, and vomiting. Review of chart shows multiple past admissions to [facility] w/ hypotension requiring multiple transfusions. During her last admission in March 2021 the pt underwent IR embolization of a brach of her middle colic artery. Due to the severity of her pan-diverticulosis she would require total abdominal colectomy, and for this reason surgical interventions have not been pursued    In ED 10/12, pt has remained hemodynamically stable w/o tachycardia or hypotension. Initial lab work was significant for hgb of 7.7, which trended down to 6.7 at 4 hours. CTA abd/pelvis demonstrated active GI bleeding in the descending colon in the region of the splenic flexure. 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 Presently, the pt is awaiting evaluations by IR and GI. Due to the severity of bleeding on previous presentations, the SICU was consulted for ongoing hemodynamic monitoring while awaiting definitive plan for intervention.    Allergies: oxycodone (Other)  Valium (Other)      MEDICATIONS:   --------------------------------------------------------------------------------------  MEDICATIONS  (STANDING):  dextrose 40% Gel 15 Gram(s) Oral once  dextrose 5% + sodium chloride 0.45% with potassium chloride 20 mEq/L 1000 milliLiter(s) (125 mL/Hr) IV Continuous <Continuous>  dextrose 5%. 1000 milliLiter(s) (50 mL/Hr) IV Continuous <Continuous>  dextrose 5%. 1000 milliLiter(s) (50 mL/Hr) IV Continuous <Continuous>  dextrose 5%. 1000 milliLiter(s) (100 mL/Hr) IV Continuous <Continuous>  dextrose 50% Injectable 25 Gram(s) IV Push once  dextrose 50% Injectable 12.5 Gram(s) IV Push once  dextrose 50% Injectable 25 Gram(s) IV Push once  glucagon  Injectable 1 milliGRAM(s) IntraMuscular once  influenza   Vaccine 0.5 milliLiter(s) IntraMuscular once  insulin lispro (ADMELOG) corrective regimen sliding scale   SubCutaneous every 6 hours  levothyroxine 200 MICROGram(s) Oral daily  simvastatin 40 milliGRAM(s) Oral at bedtime    MEDICATIONS  (PRN):      --------------------------------------------------------------------------------------  ICU Vital Signs Last 24 Hrs  T(C): 36.7 (15 Oct 2021 00:00), Max: 37.2 (14 Oct 2021 08:00)  T(F): 98 (15 Oct 2021 00:00), Max: 98.9 (14 Oct 2021 08:00)  HR: 78 (15 Oct 2021 01:00) (74 - 98)  BP: 130/58 (15 Oct 2021 01:00) (112/58 - 151/80)  BP(mean): 75 (15 Oct 2021 01:00) (59 - 105)  ABP: --  ABP(mean): --  RR: 18 (15 Oct 2021 01:00) (15 - 23)  SpO2: 99% (15 Oct 2021 01:00) (98% - 100%)    I&O's Detail    13 Oct 2021 07:01  -  14 Oct 2021 07:00  --------------------------------------------------------  IN:    Lactated Ringers: 3000 mL    Lactated Ringers Bolus: 1000 mL    PRBCs (Packed Red Blood Cells): 600 mL  Total IN: 4600 mL    OUT:    Oral Fluid: 0 mL    Voided (mL): 850 mL  Total OUT: 850 mL    Total NET: 3750 mL      14 Oct 2021 07:01  -  15 Oct 2021 01:26  --------------------------------------------------------  IN:    dextrose 5% + sodium chloride 0.45% w/ Additives: 2000 mL    IV PiggyBack: 225 mL    Lactated Ringers: 250 mL  Total IN: 2475 mL    OUT:    Voided (mL): 1000 mL  Total OUT: 1000 mL    Total NET: 1475 mL    --------------------------------------------------------------------------------------    EXAM  NEUROLOGY  RASS:   	GCS:    Gen: A&Ox4   HEENT: Atraumatic. Mucous membranes moist, no scleral icterus   CV: RRR. No murmurs. No significant LE edema. Distal pulses 2+. Capillary refill < 2 seconds.  Resp: Respirations unlabored CTAB, no rales, rhonchi, or wheezes.  GI: Abdomen non tender to palpation, soft and non-distended. + BS.  Skin/MSK: No open wounds. No ecchymosis appreciated.  Neuro:  Following commands. Moving extremities spontaneously.  Psych: Appropriate mood, cooperative      METABOLIC/FLUIDS/ELECTROLYTES  lactated ringers. 1000 milliLiter(s) IV Continuous <Continuous>      HEMATOLOGIC  [x] VTE Prophylaxis:   Transfusions:	[] PRBC	[] Platelets		[] FFP	[] Cryoprecipitate    INFECTIOUS DISEASE  Antimicrobials/Immunologic Medications:    Tubes/Lines/Drains    [x] Peripheral IV  [] Central Venous Line     	[] R	[] L	[] IJ	[] Fem	[] SC	Date Placed:   [] Arterial Line		[] R	[] L	[] Fem	[] Rad	[] Ax	Date Placed:   [] PICC		[] Midline		[] Mediport  [] Urinary Catheter		Date Placed:   [x] Necessity of urinary, arterial, and venous catheters discussed    LABS  --------------------------------------------------------------------------------------  CBC (10-14 @ 18:29)                              7.2<L>                         7.13    )----------------(  97<L>      --    % Neutrophils, --    % Lymphocytes, ANC: --                                  21.2<L>  CBC (10-14 @ 12:43)                              7.6<L>                         8.56    )----------------(  119<L>     --    % Neutrophils, --    % Lymphocytes, ANC: --                                  22.7<L>    BMP (10-14 @ 01:28)             137     |  106     |  22    		Ca++ --      Ca 7.9<L>             ---------------------------------( 120<H>		Mg 1.90               4.0     |  23      |  1.12  			Ph 2.8         Coags (10-14 @ 04:36)  aPTT 30.1 / INR 1.15 / PT 13.1        --------------------------------------------------------------------------------------

## 2021-10-15 NOTE — PROGRESS NOTE ADULT - ASSESSMENT
64 yo F w/ hx of pan-diverticulosis (multiple previous admissions - last in March 2021), SOLOMON, NIDDM, nephrolithiasis, herniated lumbar disc (s/p plate), presenting to ED on 10/13 for 6 episodes of BRBPR. Associated nausea and lightheadedness, denies pain. No further bleeding in ED. Review of chart shows multiple past admissions to [facility] w/ hypotension requiring multiple transfusions. Last admitted in March 2021 and now s/p IR embolization. Hemodynamically stable on presentation to ED, however hgb trending down from 7.7 to 6.7 over 4 hrs. CTA w/ active bleeding in descending colon. Pt accepted for admission to SICU for hemodynamic monitoring while awaiting IR/GI consultation and prbc transfusion.    PLAN:  NEURO:  -no acute issues  -monitor mental status    RESPIRATORY:   -no acute issues  -monitor respiratory status  -continuous pulse ox    CARDIOVASCULAR: hx HTN  -holding ramipril 10 mg BID in setting of HELEN  -simvastatin 40 mg daily  -monitor VS and perfusion status  -CTA w/ active GIB in descending colon  -following IR consultation: no intervention at this time  - c/s vascular for dissection right external iliac artery    GI/NUTRITION: hx pan-diverticulosis, lower GIB, IR embolization   -NPO  - s/p GI flexible sigmoidoscopy, no active bleed noted  -monitor for further bleeding  -GI signed off     GENITOURINARY/RENAL:  -Cr 1.3 (unclear if baseline per chart review)  -monitor UOP  -monitor electrolytes  -LR @125 cc/hr    HEMATOLOGIC:  -monitor H&H; CBC q6  -resume SQH by tonight       INFECTIOUS DISEASE:  -monitor for fevers; trend WBC    ENDOCRINE: hx NIDDM  -trend glucose  -ISS  -synthroid 200 mcg daily    Lines:  PIV x 2, 20G and 22G  - add midline    GOC/CODE STATUS:  Full Code    DISPO: SICU   64 yo F w/ hx of pan-diverticulosis (multiple previous admissions - last in March 2021), SOLOMON, NIDDM, nephrolithiasis, herniated lumbar disc (s/p plate), presenting to ED on 10/13 for 6 episodes of BRBPR. Associated nausea and lightheadedness, denies pain. No further bleeding in ED. Review of chart shows multiple past admissions to [facility] w/ hypotension requiring multiple transfusions. Last admitted in March 2021 and now s/p IR embolization. Hemodynamically stable on presentation to ED, however hgb trending down from 7.7 to 6.7 over 4 hrs. CTA w/ active bleeding in descending colon. Pt accepted for admission to SICU for hemodynamic monitoring while awaiting IR/GI consultation and prbc transfusion.    PLAN:  NEURO:  -no acute issues  -monitor mental status    RESPIRATORY:   -no acute issues  -monitor respiratory status  -continuous pulse ox    CARDIOVASCULAR: hx HTN  -holding ramipril 10 mg BID in setting of HELEN  -simvastatin 40 mg daily  -monitor VS and perfusion status  -CTA w/ active GIB in descending colon  -following IR consultation: no intervention at this time  - c/s vascular for dissection right external iliac artery    GI/NUTRITION: hx pan-diverticulosis, lower GIB, IR embolization   -NPO, change to clears  - s/p GI flexible sigmoidoscopy, no active bleed noted  -monitor for further bleeding  -GI signed off     GENITOURINARY/RENAL:  -Cr 1.3 (unclear if baseline per chart review)  -monitor UOP  -monitor electrolytes  -LR @125 cc/hr  - give IVF before and after CTA    HEMATOLOGIC:  -monitor H&H; CBC q6 - repeat CBC  -resume SQH by tonight     INFECTIOUS DISEASE:  -monitor for fevers; trend WBC    ENDOCRINE: hx NIDDM  -trend glucose  -ISS  -synthroid 200 mcg daily    Lines:  PIV x 2, 20G and 22G  - add midline    GOC/CODE STATUS:  Full Code    DISPO: SICU 64 yo F w/ hx of pan-diverticulosis (multiple previous admissions - last in March 2021), SOLOMON, NIDDM, nephrolithiasis, herniated lumbar disc (s/p plate), presenting to ED on 10/13 for 6 episodes of BRBPR. Associated nausea and lightheadedness, denies pain. No further bleeding in ED. Review of chart shows multiple past admissions to [facility] w/ hypotension requiring multiple transfusions. Last admitted in March 2021 and now s/p IR embolization. Hemodynamically stable on presentation to ED, however hgb trending down from 7.7 to 6.7 over 4 hrs. CTA w/ active bleeding in descending colon. Pt accepted for admission to SICU for hemodynamic monitoring while awaiting IR/GI consultation and prbc transfusion.    PLAN:  NEURO:  -no acute issues  -monitor mental status    RESPIRATORY:   -no acute issues  -monitor respiratory status  -continuous pulse ox    CARDIOVASCULAR: hx HTN  -holding ramipril 10 mg BID in setting of HELEN  -simvastatin 40 mg daily  -monitor VS and perfusion status    GI/NUTRITION: hx pan-diverticulosis, lower GIB, IR embolization   - CLD  - s/p GI flexible sigmoidoscopy, no active bleed noted  - Repeat CTA w/ no active bleed  - IR consultation: no intervention at this time  - Appreciate Dr. Beach plans  - monitor for further bleeding    GENITOURINARY/RENAL:  -monitor UOP  -monitor electrolytes    HEMATOLOGIC:  -monitor H&H; CBC q6  -resume SQH by tonight     INFECTIOUS DISEASE:  -monitor for fevers; trend WBC    ENDOCRINE: hx NIDDM  -trend glucose  -ISS  -synthroid 200 mcg daily    Lines:  PIV x 2, 20G and 22G    GOC/CODE STATUS:  Full Code    DISPO: SICU

## 2021-10-15 NOTE — CONSULT NOTE ADULT - ASSESSMENT
66 year old F with recurrent GI bleed.   [facility] CTA's reviewed:   October 14, 2021: No active bleed  October 13, 2021: Descending colon   March 2021: Active bleed at hepatic flexure and proximal transverse colon.  This is s/p IR embolization   July 2018: GI bleed in proximal descending colon (appears in a similar location to current GI bleed)   April 2017: No active bleeding     Recommend   1) Continuing supportive care.  Trend CBC. Transfuse PRN   2) If needs an emergent procedure, would recommend left colectomy with ostomy given that is current localization of bleed   3) If continues to bleed but remains stable, will consider offering left colectomy this admission by CRS   4) Ideally will be able to manage this episode with supportive care. When it is resolved she can follow up outpatient for decisions regarding elective resection - subtotal with ileostomy, subtotal with ileorectal anastomosis or left colectomy with risk of bleeding from right side again     Seen with Dr. Yong Pagan   A Team surgery [id number]

## 2021-10-15 NOTE — CONSULT NOTE ADULT - ATTENDING COMMENTS
LGIB recurrent  -pt w/ multiple episodes of LGIB, most recently 3/21.  Last admission, pt was noted to have bleeding at the hepatic flex.  This was ultimately controlled w/ provactive angioembo.  -Pt currently w/ CTA showing splenic flexure/descending colon bleed.  This was shown on initial CTA, but not shown on repeat.  -I had a long discussion with the patient about surgery  -Would proceed w/ conservative therapy.  If pt stops bleeding, will consider elective extended left hemicolectomy given embolization of right sided bleeder and 3 episodes of left sided bleeding.  Pt understands that this would leave part of the colon and possibly she could have recurrent bleeds.  She also would likely have significant diarrhea  -If significant bleed over the next couple of days, pt will proceed with emergent left hemicolectomy w/ colostomy.    -if stable persistent bleeding over the next several days, will proceed w/ left hemicolectomy colostomy  -Imaging reviewed at length  -pt understands and agrees to plan  -Case discussed w/ ACS team, who also understands and agrees to plan
66 Female h/o recurrent diverticular bleeding p/w hematochezia, CTA + for bleeding at splenic flexure.  Monitor Hb, transfuse per SICU protocol.  Follow up IR recs.
I agree with the detailed interval history, physical, and plan, which I have reviewed and edited where appropriate'; also agree with notes/assessment with my team on service.  I have personally examined the patient.  I was physically present for the key portions of the evaluation and management (E/M) service provided.  I reviewed all the pertinent data.  The patient is a critical care patient with life threatening hemodynamic and metabolic instability in SICU.  The SICU team has a constant risk benefit analyzes discussion and coordinating care with the primary team and all consultants.   The patient is in SICU with the chief complaint and diagnosis mentioned in the note.   The plan will be specified in the note.  66 yo F w/ hx of pan-diverticulosis with CTA w/ active bleeding in descending colon. In SICU for hemodynamic monitoring while awaiting IR/GI consultation   Neuro:  Following commands  no scleral icterus   CV: RRR.   Resp: clear  GI: Abdomen non tender to palpation, soft and non-distended. + BS.  Skin/MSK: No open wounds.     PLAN:  NEURO:  -monitor mental status  RESPIRATORY:   -continuous pulse ox  CARDIOVASCULAR:  HTN  -holding ramipril 10 mg BID in setting of HELEN  -awaiting IR consultation  GI/NUTRITION:  pan-diverticulosis, lower GIB/acute blood loss anemia  -NPO  -awaiting GI consultation  GENITOURINARY/RENAL:  -LR @125 cc/hr  HEMATOLOGIC:  -holding subq heparin  INFECTIOUS DISEASE:  trend WBC  ENDOCRINE:  NIDDM, hypothyroidism  -trend glucose  -ISS  -synthroid 200 mcg daily    GOC/CODE STATUS:  Full Code    DISPO: SICU

## 2021-10-16 LAB
ANION GAP SERPL CALC-SCNC: 8 MMOL/L — SIGNIFICANT CHANGE UP (ref 7–14)
ANION GAP SERPL CALC-SCNC: 8 MMOL/L — SIGNIFICANT CHANGE UP (ref 7–14)
ANION GAP SERPL CALC-SCNC: 9 MMOL/L — SIGNIFICANT CHANGE UP (ref 7–14)
APTT BLD: 26 SEC — LOW (ref 27–36.3)
BUN SERPL-MCNC: 10 MG/DL — SIGNIFICANT CHANGE UP (ref 7–23)
BUN SERPL-MCNC: 10 MG/DL — SIGNIFICANT CHANGE UP (ref 7–23)
BUN SERPL-MCNC: 15 MG/DL — SIGNIFICANT CHANGE UP (ref 7–23)
CALCIUM SERPL-MCNC: 6.6 MG/DL — LOW (ref 8.4–10.5)
CALCIUM SERPL-MCNC: 7.3 MG/DL — LOW (ref 8.4–10.5)
CALCIUM SERPL-MCNC: 7.5 MG/DL — LOW (ref 8.4–10.5)
CHLORIDE SERPL-SCNC: 112 MMOL/L — HIGH (ref 98–107)
CHLORIDE SERPL-SCNC: 113 MMOL/L — HIGH (ref 98–107)
CHLORIDE SERPL-SCNC: 114 MMOL/L — HIGH (ref 98–107)
CO2 SERPL-SCNC: 17 MMOL/L — LOW (ref 22–31)
CO2 SERPL-SCNC: 21 MMOL/L — LOW (ref 22–31)
CO2 SERPL-SCNC: 21 MMOL/L — LOW (ref 22–31)
CREAT SERPL-MCNC: 0.94 MG/DL — SIGNIFICANT CHANGE UP (ref 0.5–1.3)
CREAT SERPL-MCNC: 1.07 MG/DL — SIGNIFICANT CHANGE UP (ref 0.5–1.3)
CREAT SERPL-MCNC: 1.11 MG/DL — SIGNIFICANT CHANGE UP (ref 0.5–1.3)
GLUCOSE BLDC GLUCOMTR-MCNC: 102 MG/DL — HIGH (ref 70–99)
GLUCOSE BLDC GLUCOMTR-MCNC: 124 MG/DL — HIGH (ref 70–99)
GLUCOSE BLDC GLUCOMTR-MCNC: 164 MG/DL — HIGH (ref 70–99)
GLUCOSE BLDC GLUCOMTR-MCNC: 183 MG/DL — HIGH (ref 70–99)
GLUCOSE SERPL-MCNC: 100 MG/DL — HIGH (ref 70–99)
GLUCOSE SERPL-MCNC: 110 MG/DL — HIGH (ref 70–99)
GLUCOSE SERPL-MCNC: 155 MG/DL — HIGH (ref 70–99)
HCT VFR BLD CALC: 22.4 % — LOW (ref 34.5–45)
HCT VFR BLD CALC: 24.5 % — LOW (ref 34.5–45)
HCT VFR BLD CALC: 25.5 % — LOW (ref 34.5–45)
HCT VFR BLD CALC: 26.9 % — LOW (ref 34.5–45)
HGB BLD-MCNC: 7.8 G/DL — LOW (ref 11.5–15.5)
HGB BLD-MCNC: 8.5 G/DL — LOW (ref 11.5–15.5)
HGB BLD-MCNC: 9 G/DL — LOW (ref 11.5–15.5)
HGB BLD-MCNC: 9.4 G/DL — LOW (ref 11.5–15.5)
INR BLD: 1.08 RATIO — SIGNIFICANT CHANGE UP (ref 0.88–1.16)
MAGNESIUM SERPL-MCNC: 1.4 MG/DL — LOW (ref 1.6–2.6)
MAGNESIUM SERPL-MCNC: 1.9 MG/DL — SIGNIFICANT CHANGE UP (ref 1.6–2.6)
MAGNESIUM SERPL-MCNC: 2 MG/DL — SIGNIFICANT CHANGE UP (ref 1.6–2.6)
MCHC RBC-ENTMCNC: 28.5 PG — SIGNIFICANT CHANGE UP (ref 27–34)
MCHC RBC-ENTMCNC: 28.7 PG — SIGNIFICANT CHANGE UP (ref 27–34)
MCHC RBC-ENTMCNC: 28.7 PG — SIGNIFICANT CHANGE UP (ref 27–34)
MCHC RBC-ENTMCNC: 28.8 PG — SIGNIFICANT CHANGE UP (ref 27–34)
MCHC RBC-ENTMCNC: 34.7 GM/DL — SIGNIFICANT CHANGE UP (ref 32–36)
MCHC RBC-ENTMCNC: 34.8 GM/DL — SIGNIFICANT CHANGE UP (ref 32–36)
MCHC RBC-ENTMCNC: 34.9 GM/DL — SIGNIFICANT CHANGE UP (ref 32–36)
MCHC RBC-ENTMCNC: 35.3 GM/DL — SIGNIFICANT CHANGE UP (ref 32–36)
MCV RBC AUTO: 81.5 FL — SIGNIFICANT CHANGE UP (ref 80–100)
MCV RBC AUTO: 81.7 FL — SIGNIFICANT CHANGE UP (ref 80–100)
MCV RBC AUTO: 82.4 FL — SIGNIFICANT CHANGE UP (ref 80–100)
MCV RBC AUTO: 82.8 FL — SIGNIFICANT CHANGE UP (ref 80–100)
NRBC # BLD: 0 /100 WBCS — SIGNIFICANT CHANGE UP
NRBC # FLD: 0 K/UL — SIGNIFICANT CHANGE UP
PHOSPHATE SERPL-MCNC: 1.9 MG/DL — LOW (ref 2.5–4.5)
PHOSPHATE SERPL-MCNC: 2.8 MG/DL — SIGNIFICANT CHANGE UP (ref 2.5–4.5)
PHOSPHATE SERPL-MCNC: 2.8 MG/DL — SIGNIFICANT CHANGE UP (ref 2.5–4.5)
PLATELET # BLD AUTO: 107 K/UL — LOW (ref 150–400)
PLATELET # BLD AUTO: 108 K/UL — LOW (ref 150–400)
PLATELET # BLD AUTO: 73 K/UL — LOW (ref 150–400)
PLATELET # BLD AUTO: 94 K/UL — LOW (ref 150–400)
POTASSIUM SERPL-MCNC: 3.4 MMOL/L — LOW (ref 3.5–5.3)
POTASSIUM SERPL-MCNC: 3.7 MMOL/L — SIGNIFICANT CHANGE UP (ref 3.5–5.3)
POTASSIUM SERPL-MCNC: 4 MMOL/L — SIGNIFICANT CHANGE UP (ref 3.5–5.3)
POTASSIUM SERPL-SCNC: 3.4 MMOL/L — LOW (ref 3.5–5.3)
POTASSIUM SERPL-SCNC: 3.7 MMOL/L — SIGNIFICANT CHANGE UP (ref 3.5–5.3)
POTASSIUM SERPL-SCNC: 4 MMOL/L — SIGNIFICANT CHANGE UP (ref 3.5–5.3)
PROTHROM AB SERPL-ACNC: 12.3 SEC — SIGNIFICANT CHANGE UP (ref 10.6–13.6)
RBC # BLD: 2.72 M/UL — LOW (ref 3.8–5.2)
RBC # BLD: 2.96 M/UL — LOW (ref 3.8–5.2)
RBC # BLD: 3.12 M/UL — LOW (ref 3.8–5.2)
RBC # BLD: 3.3 M/UL — LOW (ref 3.8–5.2)
RBC # FLD: 15.5 % — HIGH (ref 10.3–14.5)
RBC # FLD: 16 % — HIGH (ref 10.3–14.5)
RBC # FLD: 16 % — HIGH (ref 10.3–14.5)
RBC # FLD: 16.1 % — HIGH (ref 10.3–14.5)
SODIUM SERPL-SCNC: 139 MMOL/L — SIGNIFICANT CHANGE UP (ref 135–145)
SODIUM SERPL-SCNC: 142 MMOL/L — SIGNIFICANT CHANGE UP (ref 135–145)
SODIUM SERPL-SCNC: 142 MMOL/L — SIGNIFICANT CHANGE UP (ref 135–145)
WBC # BLD: 6.43 K/UL — SIGNIFICANT CHANGE UP (ref 3.8–10.5)
WBC # BLD: 7.01 K/UL — SIGNIFICANT CHANGE UP (ref 3.8–10.5)
WBC # BLD: 7.93 K/UL — SIGNIFICANT CHANGE UP (ref 3.8–10.5)
WBC # BLD: 8 K/UL — SIGNIFICANT CHANGE UP (ref 3.8–10.5)
WBC # FLD AUTO: 6.43 K/UL — SIGNIFICANT CHANGE UP (ref 3.8–10.5)
WBC # FLD AUTO: 7.01 K/UL — SIGNIFICANT CHANGE UP (ref 3.8–10.5)
WBC # FLD AUTO: 7.93 K/UL — SIGNIFICANT CHANGE UP (ref 3.8–10.5)
WBC # FLD AUTO: 8 K/UL — SIGNIFICANT CHANGE UP (ref 3.8–10.5)

## 2021-10-16 PROCEDURE — 99233 SBSQ HOSP IP/OBS HIGH 50: CPT

## 2021-10-16 PROCEDURE — 99233 SBSQ HOSP IP/OBS HIGH 50: CPT | Mod: 57,GC

## 2021-10-16 RX ORDER — POTASSIUM CHLORIDE 20 MEQ
40 PACKET (EA) ORAL ONCE
Refills: 0 | Status: DISCONTINUED | OUTPATIENT
Start: 2021-10-16 | End: 2021-10-16

## 2021-10-16 RX ORDER — MAGNESIUM SULFATE 500 MG/ML
2 VIAL (ML) INJECTION ONCE
Refills: 0 | Status: COMPLETED | OUTPATIENT
Start: 2021-10-16 | End: 2021-10-16

## 2021-10-16 RX ORDER — POTASSIUM CHLORIDE 20 MEQ
40 PACKET (EA) ORAL ONCE
Refills: 0 | Status: COMPLETED | OUTPATIENT
Start: 2021-10-16 | End: 2021-10-16

## 2021-10-16 RX ORDER — ALPRAZOLAM 0.25 MG
0.25 TABLET ORAL ONCE
Refills: 0 | Status: DISCONTINUED | OUTPATIENT
Start: 2021-10-16 | End: 2021-10-16

## 2021-10-16 RX ORDER — ACETAMINOPHEN 500 MG
1000 TABLET ORAL ONCE
Refills: 0 | Status: COMPLETED | OUTPATIENT
Start: 2021-10-16 | End: 2021-10-16

## 2021-10-16 RX ORDER — POTASSIUM PHOSPHATE, MONOBASIC POTASSIUM PHOSPHATE, DIBASIC 236; 224 MG/ML; MG/ML
15 INJECTION, SOLUTION INTRAVENOUS ONCE
Refills: 0 | Status: DISCONTINUED | OUTPATIENT
Start: 2021-10-16 | End: 2021-10-16

## 2021-10-16 RX ORDER — ACETAMINOPHEN 500 MG
650 TABLET ORAL EVERY 6 HOURS
Refills: 0 | Status: DISCONTINUED | OUTPATIENT
Start: 2021-10-16 | End: 2021-10-18

## 2021-10-16 RX ORDER — POTASSIUM PHOSPHATE, MONOBASIC POTASSIUM PHOSPHATE, DIBASIC 236; 224 MG/ML; MG/ML
15 INJECTION, SOLUTION INTRAVENOUS ONCE
Refills: 0 | Status: COMPLETED | OUTPATIENT
Start: 2021-10-16 | End: 2021-10-16

## 2021-10-16 RX ORDER — POTASSIUM CHLORIDE 20 MEQ
10 PACKET (EA) ORAL
Refills: 0 | Status: COMPLETED | OUTPATIENT
Start: 2021-10-16 | End: 2021-10-16

## 2021-10-16 RX ADMIN — Medication 50 GRAM(S): at 23:38

## 2021-10-16 RX ADMIN — Medication 400 MILLIGRAM(S): at 00:00

## 2021-10-16 RX ADMIN — Medication 40 MILLIEQUIVALENT(S): at 23:37

## 2021-10-16 RX ADMIN — Medication 85 MILLIMOLE(S): at 04:51

## 2021-10-16 RX ADMIN — Medication 0.25 MILLIGRAM(S): at 21:45

## 2021-10-16 RX ADMIN — Medication 100 MILLIEQUIVALENT(S): at 04:46

## 2021-10-16 RX ADMIN — Medication 650 MILLIGRAM(S): at 21:56

## 2021-10-16 RX ADMIN — Medication 650 MILLIGRAM(S): at 14:56

## 2021-10-16 RX ADMIN — SIMVASTATIN 40 MILLIGRAM(S): 20 TABLET, FILM COATED ORAL at 21:46

## 2021-10-16 RX ADMIN — Medication 1000 MILLIGRAM(S): at 21:56

## 2021-10-16 RX ADMIN — Medication 50 GRAM(S): at 04:45

## 2021-10-16 RX ADMIN — Medication 650 MILLIGRAM(S): at 21:45

## 2021-10-16 RX ADMIN — Medication 1: at 12:20

## 2021-10-16 RX ADMIN — POTASSIUM PHOSPHATE, MONOBASIC POTASSIUM PHOSPHATE, DIBASIC 62.5 MILLIMOLE(S): 236; 224 INJECTION, SOLUTION INTRAVENOUS at 23:38

## 2021-10-16 RX ADMIN — Medication 650 MILLIGRAM(S): at 15:56

## 2021-10-16 RX ADMIN — Medication 1: at 07:28

## 2021-10-16 RX ADMIN — Medication 100 MILLIEQUIVALENT(S): at 05:42

## 2021-10-16 RX ADMIN — Medication 200 MICROGRAM(S): at 05:43

## 2021-10-16 RX ADMIN — Medication 100 MILLIEQUIVALENT(S): at 06:34

## 2021-10-16 NOTE — PROGRESS NOTE ADULT - SUBJECTIVE AND OBJECTIVE BOX
Surgery Progress Note     Subjective/24hour Events: Patient seen and examined at the bedside this morning. 2uPRBC overnight . Pain controlled.     Vital Signs:  Vital Signs Last 24 Hrs  T(C): 37.1 (16 Oct 2021 16:00), Max: 37.1 (16 Oct 2021 15:00)  T(F): 98.7 (16 Oct 2021 16:00), Max: 98.7 (16 Oct 2021 15:00)  HR: 80 (16 Oct 2021 18:00) (73 - 105)  BP: 136/69 (16 Oct 2021 18:00) (129/61 - 164/77)  BP(mean): 86 (16 Oct 2021 18:00) (76 - 100)  RR: 21 (16 Oct 2021 18:00) (16 - 23)  SpO2: 100% (16 Oct 2021 18:00) (93% - 100%)        I&O's Detail    15 Oct 2021 07:01  -  16 Oct 2021 07:00  --------------------------------------------------------  IN:    dextrose 5% + sodium chloride 0.45% w/ Additives: 3000 mL    IV PiggyBack: 1150 mL    Oral Fluid: 600 mL    PRBCs (Packed Red Blood Cells): 600 mL  Total IN: 5350 mL    OUT:    Voided (mL): 1100 mL  Total OUT: 1100 mL    Total NET: 4250 mL      16 Oct 2021 07:01  -  16 Oct 2021 18:15  --------------------------------------------------------  IN:    dextrose 5% + sodium chloride 0.45% w/ Additives: 775 mL    Oral Fluid: 450 mL  Total IN: 1225 mL    OUT:    Voided (mL): 2100 mL  Total OUT: 2100 mL    Total NET: -875 mL            Physical Exam:  Gen: NAD.  Lungs: Non labored breathing.   Ab: Soft, nontender, nondistended.   Ext: Moves all 4 spontaneously.     Labs:    10-16    142  |  112<H>  |  10  ----------------------------<  100<H>  4.0   |  21<L>  |  1.11    Ca    7.5<L>      16 Oct 2021 15:28  Phos  2.8     10-16  Mg     2.00     10-16      CAPILLARY BLOOD GLUCOSE      POCT Blood Glucose.: 102 mg/dL (16 Oct 2021 17:16)  POCT Blood Glucose.: 164 mg/dL (16 Oct 2021 12:18)  POCT Blood Glucose.: 183 mg/dL (16 Oct 2021 06:57)  POCT Blood Glucose.: 155 mg/dL (15 Oct 2021 23:39)                            9.4    8.00  )-----------( 108      ( 16 Oct 2021 15:28 )             26.9     PT/INR - ( 16 Oct 2021 15:28 )   PT: 12.3 sec;   INR: 1.08 ratio         PTT - ( 16 Oct 2021 15:28 )  PTT:26.0 sec

## 2021-10-16 NOTE — PROGRESS NOTE ADULT - SUBJECTIVE AND OBJECTIVE BOX
Surgery Progress Note    Subjective:     Patient seen and examined at bedside. Patient without complaints this AM. Denies N/V/F/C.     OBJECTIVE:     T(C): 36.9 (10-16-21 @ 00:00), Max: 37.1 (10-15-21 @ 12:00)  HR: 79 (10-16-21 @ 03:00) (75 - 105)  BP: 136/63 (10-16-21 @ 03:00) (104/64 - 158/78)  RR: 16 (10-16-21 @ 03:00) (16 - 26)  SpO2: 100% (10-16-21 @ 03:00) (74% - 100%)  Wt(kg): --    I&O's Detail    14 Oct 2021 07:01  -  15 Oct 2021 07:00  --------------------------------------------------------  IN:    dextrose 5% + sodium chloride 0.45% w/ Additives: 2625 mL    IV PiggyBack: 225 mL    Lactated Ringers: 250 mL  Total IN: 3100 mL    OUT:    Voided (mL): 1600 mL  Total OUT: 1600 mL    Total NET: 1500 mL      15 Oct 2021 07:01  -  16 Oct 2021 04:08  --------------------------------------------------------  IN:    dextrose 5% + sodium chloride 0.45% w/ Additives: 2500 mL    IV PiggyBack: 100 mL    Oral Fluid: 600 mL    PRBCs (Packed Red Blood Cells): 600 mL  Total IN: 3800 mL    OUT:    Voided (mL): 400 mL  Total OUT: 400 mL    Total NET: 3400 mL          PHYSICAL EXAM:    GENERAL: NAD, lying in bed comfortably  HEAD:  Atraumatic, Normocephalic  ENT: Moist mucous membranes  CHEST/LUNG: Unlabored respirations  ABDOMEN: Soft, Nontender, Nondistended.   NERVOUS SYSTEM:  Alert & Oriented X3, speech clear.   SKIN: No rashes or lesions    MEDICATIONS  (STANDING):  dextrose 40% Gel 15 Gram(s) Oral once  dextrose 5% + sodium chloride 0.45% with potassium chloride 20 mEq/L 1000 milliLiter(s) (125 mL/Hr) IV Continuous <Continuous>  dextrose 5%. 1000 milliLiter(s) (50 mL/Hr) IV Continuous <Continuous>  dextrose 5%. 1000 milliLiter(s) (50 mL/Hr) IV Continuous <Continuous>  dextrose 5%. 1000 milliLiter(s) (100 mL/Hr) IV Continuous <Continuous>  dextrose 50% Injectable 25 Gram(s) IV Push once  dextrose 50% Injectable 12.5 Gram(s) IV Push once  dextrose 50% Injectable 25 Gram(s) IV Push once  glucagon  Injectable 1 milliGRAM(s) IntraMuscular once  influenza   Vaccine 0.5 milliLiter(s) IntraMuscular once  insulin lispro (ADMELOG) corrective regimen sliding scale   SubCutaneous every 6 hours  levothyroxine 200 MICROGram(s) Oral daily  magnesium sulfate  IVPB 2 Gram(s) IV Intermittent once  potassium chloride  10 mEq/100 mL IVPB 10 milliEquivalent(s) IV Intermittent every 1 hour  simvastatin 40 milliGRAM(s) Oral at bedtime  sodium phosphate IVPB 30 milliMole(s) IV Intermittent once    MEDICATIONS  (PRN):      LABS:                          7.8    7.01  )-----------( 73       ( 16 Oct 2021 02:50 )             22.4     10-16    139  |  114<H>  |  15  ----------------------------<  155<H>  3.4<L>   |  17<L>  |  0.94    Ca    6.6<L>      16 Oct 2021 02:50  Phos  1.9     10-16  Mg     1.40     10-16      PT/INR - ( 14 Oct 2021 04:36 )   PT: 13.1 sec;   INR: 1.15 ratio         PTT - ( 14 Oct 2021 04:36 )  PTT:30.1 sec           Surgery Progress Note    Subjective:     Patient seen and examined at bedside. Patient without complaints this AM. Did not have more bloody bowel movements.  Denies N/V/F/C.OBJECTIVE:     T(C): 36.9 (10-16-21 @ 00:00), Max: 37.1 (10-15-21 @ 12:00)  HR: 79 (10-16-21 @ 03:00) (75 - 105)  BP: 136/63 (10-16-21 @ 03:00) (104/64 - 158/78)  RR: 16 (10-16-21 @ 03:00) (16 - 26)  SpO2: 100% (10-16-21 @ 03:00) (74% - 100%)  Wt(kg): --    I&O's Detail    14 Oct 2021 07:01  -  15 Oct 2021 07:00  --------------------------------------------------------  IN:    dextrose 5% + sodium chloride 0.45% w/ Additives: 2625 mL    IV PiggyBack: 225 mL    Lactated Ringers: 250 mL  Total IN: 3100 mL    OUT:    Voided (mL): 1600 mL  Total OUT: 1600 mL    Total NET: 1500 mL      15 Oct 2021 07:01  -  16 Oct 2021 04:08  --------------------------------------------------------  IN:    dextrose 5% + sodium chloride 0.45% w/ Additives: 2500 mL    IV PiggyBack: 100 mL    Oral Fluid: 600 mL    PRBCs (Packed Red Blood Cells): 600 mL  Total IN: 3800 mL    OUT:    Voided (mL): 400 mL  Total OUT: 400 mL    Total NET: 3400 mL          PHYSICAL EXAM:    GENERAL: NAD, lying in bed comfortably  HEAD:  Atraumatic, Normocephalic  ENT: Moist mucous membranes  CHEST/LUNG: Unlabored respirations  ABDOMEN: Soft, Nontender, Nondistended.    MEDICATIONS  (STANDING):  dextrose 40% Gel 15 Gram(s) Oral once  dextrose 5% + sodium chloride 0.45% with potassium chloride 20 mEq/L 1000 milliLiter(s) (125 mL/Hr) IV Continuous <Continuous>  dextrose 5%. 1000 milliLiter(s) (50 mL/Hr) IV Continuous <Continuous>  dextrose 5%. 1000 milliLiter(s) (50 mL/Hr) IV Continuous <Continuous>  dextrose 5%. 1000 milliLiter(s) (100 mL/Hr) IV Continuous <Continuous>  dextrose 50% Injectable 25 Gram(s) IV Push once  dextrose 50% Injectable 12.5 Gram(s) IV Push once  dextrose 50% Injectable 25 Gram(s) IV Push once  glucagon  Injectable 1 milliGRAM(s) IntraMuscular once  influenza   Vaccine 0.5 milliLiter(s) IntraMuscular once  insulin lispro (ADMELOG) corrective regimen sliding scale   SubCutaneous every 6 hours  levothyroxine 200 MICROGram(s) Oral daily  magnesium sulfate  IVPB 2 Gram(s) IV Intermittent once  potassium chloride  10 mEq/100 mL IVPB 10 milliEquivalent(s) IV Intermittent every 1 hour  simvastatin 40 milliGRAM(s) Oral at bedtime  sodium phosphate IVPB 30 milliMole(s) IV Intermittent once    MEDICATIONS  (PRN):      LABS:                          7.8    7.01  )-----------( 73       ( 16 Oct 2021 02:50 )             22.4     10-16    139  |  114<H>  |  15  ----------------------------<  155<H>  3.4<L>   |  17<L>  |  0.94    Ca    6.6<L>      16 Oct 2021 02:50  Phos  1.9     10-16  Mg     1.40     10-16      PT/INR - ( 14 Oct 2021 04:36 )   PT: 13.1 sec;   INR: 1.15 ratio         PTT - ( 14 Oct 2021 04:36 )  PTT:30.1 sec

## 2021-10-16 NOTE — PROGRESS NOTE ADULT - ASSESSMENT
64 yo F w/ hx of pan-diverticulosis (multiple previous admissions - last in March 2021), SOLOMON, NIDDM, nephrolithiasis, herniated lumbar disc (s/p plate), presenting to ED on 10/13 for 6 episodes of BRBPR. Associated nausea and lightheadedness, denies pain. No further bleeding in ED. Review of chart shows multiple past admissions to [facility] w/ hypotension requiring multiple transfusions. Last admitted in March 2021 and now s/p IR embolization. Hemodynamically stable on presentation to ED, however hgb trending down from 7.7 to 6.7 over 4 hrs. CTA w/ active bleeding in descending colon. Pt accepted for admission to SICU for hemodynamic monitoring while awaiting IR/GI consultation and prbc transfusion.    PLAN:  NEURO:  -no acute issues  -monitor mental status    RESPIRATORY:   -no acute issues  -monitor respiratory status  -continuous pulse ox    CARDIOVASCULAR: hx HTN  -holding ramipril 10 mg BID in setting of HELEN  -simvastatin 40 mg daily  -monitor VS and perfusion status    GI/NUTRITION: hx pan-diverticulosis, lower GIB, IR embolization   - CLD  - s/p GI flexible sigmoidoscopy, no active bleed noted  - Repeat CTA w/ no active bleed  - IR consultation: no intervention at this time  - Appreciate Dr. Beach plans if still bleeding, OR Monday/early next week, ACS if emergent need for resection  - monitor for further bleeding    GENITOURINARY/RENAL:  -monitor UOP  -monitor electrolytes    HEMATOLOGIC:  -monitor H&H; CBC q6  -resume SQH by tonight     INFECTIOUS DISEASE:  -monitor for fevers; trend WBC    ENDOCRINE: hx NIDDM  -trend glucose  -ISS  -synthroid 200 mcg daily    Lines:  PIV x 2, 20G and 22G    GOC/CODE STATUS:  Full Code    DISPO: SICU   66 yo F w/ hx of pan-diverticulosis (multiple previous admissions - last in March 2021), SOLOMON, NIDDM, nephrolithiasis, herniated lumbar disc (s/p plate), presenting to ED on 10/13 for 6 episodes of BRBPR. Associated nausea and lightheadedness, denies pain. No further bleeding in ED. Review of chart shows multiple past admissions to [facility] w/ hypotension requiring multiple transfusions. Last admitted in March 2021 and now s/p IR embolization. Hemodynamically stable on presentation to ED, however hgb trending down from 7.7 to 6.7 over 4 hrs. CTA w/ active bleeding in descending colon. Pt accepted for admission to SICU for hemodynamic monitoring while awaiting IR/GI consultation and prbc transfusion.    PLAN:  NEURO:  -no acute issues  -monitor mental status    RESPIRATORY:   -no acute issues  -monitor respiratory status  -continuous pulse ox    CARDIOVASCULAR: hx HTN  -holding ramipril 10 mg BID  -simvastatin 40 mg daily  -monitor VS and perfusion status    GI/NUTRITION: hx pan-diverticulosis, lower GIB, IR embolization   - CLD  - s/p GI flexible sigmoidoscopy, no active bleed noted  - Repeat CTA w/ no active bleed  - IR consultation: no intervention at this time  - Appreciate Dr. Beach plans if still bleeding, OR Monday/early next week, ACS if emergent need for resection  - monitor for further bleeding    GENITOURINARY/RENAL:  - D5 1/2 NS at 50 cc/hr  -monitor UOP  -monitor electrolytes    HEMATOLOGIC:  -monitor H&H; CBC q6  -resume SQH tomorrow    INFECTIOUS DISEASE:  -monitor for fevers; trend WBC    ENDOCRINE: hx NIDDM  -trend glucose  -ISS  -synthroid 200 mcg daily    Lines:  PIV x 2, 20G and 22G    GOC/CODE STATUS:  Full Code    DISPO: SICU   66 yo F w/ hx of pan-diverticulosis (multiple previous admissions - last in March 2021), SOLOMON, NIDDM, nephrolithiasis, herniated lumbar disc (s/p plate), presenting to ED on 10/13 for 6 episodes of BRBPR. Associated nausea and lightheadedness, denies pain. No further bleeding in ED. Review of chart shows multiple past admissions to [facility] w/ hypotension requiring multiple transfusions. Last admitted in March 2021 and now s/p IR embolization. Hemodynamically stable on presentation to ED, however hgb trending down from 7.7 to 6.7 over 4 hrs. CTA w/ active bleeding in descending colon. Pt accepted for admission to SICU for hemodynamic monitoring while awaiting IR/GI consultation and prbc transfusion.    PLAN:    NEURO:  - no acute issues  - monitor mental status  - OOBTC today    RESPIRATORY:   -no acute issues  -monitor respiratory status  -continuous pulse ox    CARDIOVASCULAR: hx HTN  -holding ramipril 10 mg BID  -simvastatin 40 mg daily  -monitor VS and perfusion status    GI/NUTRITION: hx pan-diverticulosis, lower GIB, IR embolization   - CLD  - s/p GI flexible sigmoidoscopy, no active bleed noted  - Repeat CTA w/ no active bleed  - IR consultation: no intervention at this time  - Appreciate Dr. Beach plans if still bleeding, OR Monday/early next week, ACS if emergent need for resection  - monitor for further bleeding (last bowel movement 10/15 @4pm was black per patient, no bright red blood)    GENITOURINARY/RENAL:  - D5 1/2 NS at 50 cc/hr  - monitor UOP  - monitor electrolytes    HEMATOLOGIC:  -monitor H&H; CBC q6  -f/u repeat coags  -resume SQH tomorrow    INFECTIOUS DISEASE:  -monitor for fevers; trend WBC    ENDOCRINE: hx NIDDM  -trend glucose  -ISS  -synthroid 200 mcg daily    Lines:  PIV x 2, 20G and 22G    GOC/CODE STATUS:  Full Code    DISPO: SICU

## 2021-10-16 NOTE — PROGRESS NOTE ADULT - SUBJECTIVE AND OBJECTIVE BOX
SICU Daily Progress Note  =====================================================  Interval/Overnight Events:    - Advance to [facility]  - Xanax 0.25 x1   - Trend bloody BMs  - F/u Dr. Beach plans: NTD, if still bleeding, OR Monday/early next week, ACS if emergent need for resection  - H&H to 6.7/19.5 from 7.4/21.8, 2u PRBC given; post transfusion CBC pending      HPI: 66 yo F w/ hx of diverticulosis (multiple previous admissions - last in March 2021), SOLOMON, NIDDM, nephrolithiasis, herniated lumbar disc (s/p plate), presenting to ED for 6 episodes of BRBPR since 4 pm yesterday. Pt reports associated lightheadedness and nausea preceding this event. She denies fevers, abd pain, and vomiting. Review of chart shows multiple past admissions to [facility] w/ hypotension requiring multiple transfusions. During her last admission in March 2021 the pt underwent IR embolization of a brach of her middle colic artery. Due to the severity of her pan-diverticulosis she would require total abdominal colectomy, and for this reason surgical interventions have not been pursued    In ED 10/12, pt has remained hemodynamically stable w/o tachycardia or hypotension. Initial lab work was significant for hgb of 7.7, which trended down to 6.7 at 4 hours. CTA abd/pelvis demonstrated active GI bleeding in the descending colon in the region of the splenic flexure. 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 Presently, the pt is awaiting evaluations by IR and GI. Due to the severity of bleeding on previous presentations, the SICU was consulted for ongoing hemodynamic monitoring while awaiting definitive plan for intervention.    Allergies: oxycodone (Other)  Valium (Other)    MEDICATIONS  (STANDING):  dextrose 40% Gel 15 Gram(s) Oral once  dextrose 5% + sodium chloride 0.45% with potassium chloride 20 mEq/L 1000 milliLiter(s) (125 mL/Hr) IV Continuous <Continuous>  dextrose 5%. 1000 milliLiter(s) (50 mL/Hr) IV Continuous <Continuous>  dextrose 5%. 1000 milliLiter(s) (50 mL/Hr) IV Continuous <Continuous>  dextrose 5%. 1000 milliLiter(s) (100 mL/Hr) IV Continuous <Continuous>  dextrose 50% Injectable 25 Gram(s) IV Push once  dextrose 50% Injectable 12.5 Gram(s) IV Push once  dextrose 50% Injectable 25 Gram(s) IV Push once  glucagon  Injectable 1 milliGRAM(s) IntraMuscular once  influenza   Vaccine 0.5 milliLiter(s) IntraMuscular once  insulin lispro (ADMELOG) corrective regimen sliding scale   SubCutaneous every 6 hours  levothyroxine 200 MICROGram(s) Oral daily  simvastatin 40 milliGRAM(s) Oral at bedtime    MEDICATIONS  (PRN):    ICU Vital Signs Last 24 Hrs  T(C): 36.9 (16 Oct 2021 00:00), Max: 37.1 (15 Oct 2021 12:00)  T(F): 98.4 (16 Oct 2021 00:00), Max: 98.7 (15 Oct 2021 12:00)  HR: 82 (16 Oct 2021 01:00) (75 - 105)  BP: 138/64 (16 Oct 2021 01:00) (104/64 - 158/78)  BP(mean): 79 (16 Oct 2021 01:00) (73 - 126)  ABP: --  ABP(mean): --  RR: 17 (16 Oct 2021 01:00) (17 - 26)  SpO2: 100% (16 Oct 2021 01:00) (74% - 100%)    I&O's Detail    14 Oct 2021 07:01  -  15 Oct 2021 07:00  --------------------------------------------------------  IN:    dextrose 5% + sodium chloride 0.45% w/ Additives: 2625 mL    IV PiggyBack: 225 mL    Lactated Ringers: 250 mL  Total IN: 3100 mL    OUT:    Voided (mL): 1600 mL  Total OUT: 1600 mL    Total NET: 1500 mL      15 Oct 2021 07:01  -  16 Oct 2021 02:12  --------------------------------------------------------  IN:    dextrose 5% + sodium chloride 0.45% w/ Additives: 2000 mL    Oral Fluid: 540 mL    PRBCs (Packed Red Blood Cells): 600 mL  Total IN: 3140 mL    OUT:  Total OUT: 0 mL    Total NET: 3140 mL      --------------------------------------------------------------------------------------    EXAM  NEUROLOGY  RASS:   	GCS:    Gen: A&Ox4   HEENT: Atraumatic. Mucous membranes moist, no scleral icterus   CV: RRR. No murmurs. No significant LE edema. Distal pulses 2+. Capillary refill < 2 seconds.  Resp: Respirations unlabored CTAB, no rales, rhonchi, or wheezes.  GI: Abdomen non tender to palpation, soft and non-distended. + BS.  Skin/MSK: No open wounds. No ecchymosis appreciated.  Neuro:  Following commands. Moving extremities spontaneously.  Psych: Appropriate mood, cooperative      METABOLIC/FLUIDS/ELECTROLYTES  lactated ringers. 1000 milliLiter(s) IV Continuous <Continuous>      HEMATOLOGIC  [x] VTE Prophylaxis:   Transfusions:	[] PRBC	[] Platelets		[] FFP	[] Cryoprecipitate    INFECTIOUS DISEASE  Antimicrobials/Immunologic Medications:    Tubes/Lines/Drains    [x] Peripheral IV  [] Central Venous Line     	[] R	[] L	[] IJ	[] Fem	[] SC	Date Placed:   [] Arterial Line		[] R	[] L	[] Fem	[] Rad	[] Ax	Date Placed:   [] PICC		[] Midline		[] Mediport  [] Urinary Catheter		Date Placed:   [x] Necessity of urinary, arterial, and venous catheters discussed    LABS  --------------------------------------------------------------------------------------  CBC (10-15 @ 18:21)                              6.7<LL>                         7.96    )----------------(  91<L>      --    % Neutrophils, --    % Lymphocytes, ANC: --                                  19.5<LL>  CBC (10-15 @ 11:59)                              7.4<L>                         8.52    )----------------(  98<L>      --    % Neutrophils, --    % Lymphocytes, ANC: --                                  21.8<L>    BMP (10-15 @ 01:24)             138     |  110<H>  |  17    		Ca++ --      Ca 7.7<L>             ---------------------------------( 182<H>		Mg 1.80               3.6     |  22      |  1.12  			Ph 2.3<L>        --------------------------------------------------------------------------------------     SICU Daily Progress Note  =====================================================  Interval/Overnight Events:    - Advance to [facility], Fluids halved  - Trend bloody BMs  - F/u Dr. Beach plans: NTD, if still bleeding, OR Monday/early next week, ACS if emergent need for resection  - H&H to 6.7/19.5 from 7.4/21.8, 2u PRBC given; post transfusion CBC pending    HPI: 66 yo F w/ hx of diverticulosis (multiple previous admissions - last in March 2021), SOLOMON, NIDDM, nephrolithiasis, herniated lumbar disc (s/p plate), presenting to ED for 6 episodes of BRBPR since 4 pm yesterday. Pt reports associated lightheadedness and nausea preceding this event. She denies fevers, abd pain, and vomiting. Review of chart shows multiple past admissions to [facility] w/ hypotension requiring multiple transfusions. During her last admission in March 2021 the pt underwent IR embolization of a brach of her middle colic artery. Due to the severity of her pan-diverticulosis she would require total abdominal colectomy, and for this reason surgical interventions have not been pursued    In ED 10/12, pt has remained hemodynamically stable w/o tachycardia or hypotension. Initial lab work was significant for hgb of 7.7, which trended down to 6.7 at 4 hours. CTA abd/pelvis demonstrated active GI bleeding in the descending colon in the region of the splenic flexure. 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 Presently, the pt is awaiting evaluations by IR and GI. Due to the severity of bleeding on previous presentations, the SICU was consulted for ongoing hemodynamic monitoring while awaiting definitive plan for intervention.    Allergies: oxycodone (Other)  Valium (Other)    MEDICATIONS  (STANDING):  dextrose 40% Gel 15 Gram(s) Oral once  dextrose 5% + sodium chloride 0.45% with potassium chloride 20 mEq/L 1000 milliLiter(s) (125 mL/Hr) IV Continuous <Continuous>  dextrose 5%. 1000 milliLiter(s) (50 mL/Hr) IV Continuous <Continuous>  dextrose 5%. 1000 milliLiter(s) (50 mL/Hr) IV Continuous <Continuous>  dextrose 5%. 1000 milliLiter(s) (100 mL/Hr) IV Continuous <Continuous>  dextrose 50% Injectable 25 Gram(s) IV Push once  dextrose 50% Injectable 12.5 Gram(s) IV Push once  dextrose 50% Injectable 25 Gram(s) IV Push once  glucagon  Injectable 1 milliGRAM(s) IntraMuscular once  influenza   Vaccine 0.5 milliLiter(s) IntraMuscular once  insulin lispro (ADMELOG) corrective regimen sliding scale   SubCutaneous every 6 hours  levothyroxine 200 MICROGram(s) Oral daily  simvastatin 40 milliGRAM(s) Oral at bedtime    MEDICATIONS  (PRN):    ICU Vital Signs Last 24 Hrs  T(C): 36.9 (16 Oct 2021 00:00), Max: 37.1 (15 Oct 2021 12:00)  T(F): 98.4 (16 Oct 2021 00:00), Max: 98.7 (15 Oct 2021 12:00)  HR: 82 (16 Oct 2021 01:00) (75 - 105)  BP: 138/64 (16 Oct 2021 01:00) (104/64 - 158/78)  BP(mean): 79 (16 Oct 2021 01:00) (73 - 126)  RR: 17 (16 Oct 2021 01:00) (17 - 26)  SpO2: 100% (16 Oct 2021 01:00) (74% - 100%)    I&O's Detail    14 Oct 2021 07:01  -  15 Oct 2021 07:00  --------------------------------------------------------  IN:    dextrose 5% + sodium chloride 0.45% w/ Additives: 2625 mL    IV PiggyBack: 225 mL    Lactated Ringers: 250 mL  Total IN: 3100 mL    OUT:    Voided (mL): 1600 mL  Total OUT: 1600 mL    Total NET: 1500 mL      15 Oct 2021 07:01  -  16 Oct 2021 02:12  --------------------------------------------------------  IN:    dextrose 5% + sodium chloride 0.45% w/ Additives: 2000 mL    Oral Fluid: 540 mL    PRBCs (Packed Red Blood Cells): 600 mL  Total IN: 3140 mL    OUT:  Total OUT: 0 mL    Total NET: 3140 mL      --------------------------------------------------------------------------------------    EXAM  NEUROLOGY  RASS:   	GCS:    Gen: A&Ox4   HEENT: Atraumatic. Mucous membranes moist, no scleral icterus   CV: RRR. No murmurs. No significant LE edema. Distal pulses 2+. Capillary refill < 2 seconds.  Resp: Respirations unlabored CTAB, no rales, rhonchi, or wheezes.  GI: Abdomen non tender to palpation, soft and non-distended. + BS.  Skin/MSK: No open wounds. No ecchymosis appreciated.  Neuro:  Following commands. Moving extremities spontaneously.  Psych: Appropriate mood, cooperative    METABOLIC/FLUIDS/ELECTROLYTES  lactated ringers. 1000 milliLiter(s) IV Continuous <Continuous>    HEMATOLOGIC  [x] VTE Prophylaxis:   Transfusions:	[] PRBC	[] Platelets		[] FFP	[] Cryoprecipitate    INFECTIOUS DISEASE  Antimicrobials/Immunologic Medications:    Tubes/Lines/Drains    [x] Peripheral IV  [] Central Venous Line     	[] R	[] L	[] IJ	[] Fem	[] SC	Date Placed:   [] Arterial Line		[] R	[] L	[] Fem	[] Rad	[] Ax	Date Placed:   [] PICC		[] Midline		[] Mediport  [] Urinary Catheter		Date Placed:   [x] Necessity of urinary, arterial, and venous catheters discussed    LABS  --------------------------------------------------------------------------------------  CBC (10-15 @ 18:21)                              6.7<LL>                         7.96    )----------------(  91<L>      --    % Neutrophils, --    % Lymphocytes, ANC: --                                  19.5<LL>  CBC (10-15 @ 11:59)                              7.4<L>                         8.52    )----------------(  98<L>      --    % Neutrophils, --    % Lymphocytes, ANC: --                                  21.8<L>    BMP (10-15 @ 01:24)             138     |  110<H>  |  17    		Ca++ --      Ca 7.7<L>             ---------------------------------( 182<H>		Mg 1.80               3.6     |  22      |  1.12  			Ph 2.3<L>        --------------------------------------------------------------------------------------     SICU Daily Progress Note  =====================================================  Interval/Overnight Events:    - Advance to [facility], Fluids halved  - Trend bloody BMs  - low phos, f/u repeat CMP  - F/u Dr. Beach plans: NTD, if still bleeding, OR Monday/early next week, ACS if emergent need for resection  - H&H to 6.7/19.5 from 7.4/21.8, 2u PRBC given; post transfusion CBC pending    HPI: 64 yo F w/ hx of diverticulosis (multiple previous admissions - last in March 2021), SOLOMON, NIDDM, nephrolithiasis, herniated lumbar disc (s/p plate), presenting to ED for 6 episodes of BRBPR since 4 pm yesterday. Pt reports associated lightheadedness and nausea preceding this event. She denies fevers, abd pain, and vomiting. Review of chart shows multiple past admissions to [facility] w/ hypotension requiring multiple transfusions. During her last admission in March 2021 the pt underwent IR embolization of a brach of her middle colic artery. Due to the severity of her pan-diverticulosis she would require total abdominal colectomy, and for this reason surgical interventions have not been pursued    In ED 10/12, pt has remained hemodynamically stable w/o tachycardia or hypotension. Initial lab work was significant for hgb of 7.7, which trended down to 6.7 at 4 hours. CTA abd/pelvis demonstrated active GI bleeding in the descending colon in the region of the splenic flexure. 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 Presently, the pt is awaiting evaluations by IR and GI. Due to the severity of bleeding on previous presentations, the SICU was consulted for ongoing hemodynamic monitoring while awaiting definitive plan for intervention.    Allergies: oxycodone (Other)  Valium (Other)    MEDICATIONS  (STANDING):  dextrose 40% Gel 15 Gram(s) Oral once  dextrose 5% + sodium chloride 0.45% with potassium chloride 20 mEq/L 1000 milliLiter(s) (125 mL/Hr) IV Continuous <Continuous>  dextrose 5%. 1000 milliLiter(s) (50 mL/Hr) IV Continuous <Continuous>  dextrose 5%. 1000 milliLiter(s) (50 mL/Hr) IV Continuous <Continuous>  dextrose 5%. 1000 milliLiter(s) (100 mL/Hr) IV Continuous <Continuous>  dextrose 50% Injectable 25 Gram(s) IV Push once  dextrose 50% Injectable 12.5 Gram(s) IV Push once  dextrose 50% Injectable 25 Gram(s) IV Push once  glucagon  Injectable 1 milliGRAM(s) IntraMuscular once  influenza   Vaccine 0.5 milliLiter(s) IntraMuscular once  insulin lispro (ADMELOG) corrective regimen sliding scale   SubCutaneous every 6 hours  levothyroxine 200 MICROGram(s) Oral daily  simvastatin 40 milliGRAM(s) Oral at bedtime    MEDICATIONS  (PRN):    ICU Vital Signs Last 24 Hrs  T(C): 36.9 (16 Oct 2021 00:00), Max: 37.1 (15 Oct 2021 12:00)  T(F): 98.4 (16 Oct 2021 00:00), Max: 98.7 (15 Oct 2021 12:00)  HR: 82 (16 Oct 2021 01:00) (75 - 105)  BP: 138/64 (16 Oct 2021 01:00) (104/64 - 158/78)  BP(mean): 79 (16 Oct 2021 01:00) (73 - 126)  RR: 17 (16 Oct 2021 01:00) (17 - 26)  SpO2: 100% (16 Oct 2021 01:00) (74% - 100%)    I&O's Detail    14 Oct 2021 07:01  -  15 Oct 2021 07:00  --------------------------------------------------------  IN:    dextrose 5% + sodium chloride 0.45% w/ Additives: 2625 mL    IV PiggyBack: 225 mL    Lactated Ringers: 250 mL  Total IN: 3100 mL    OUT:    Voided (mL): 1600 mL  Total OUT: 1600 mL    Total NET: 1500 mL      15 Oct 2021 07:01  -  16 Oct 2021 02:12  --------------------------------------------------------  IN:    dextrose 5% + sodium chloride 0.45% w/ Additives: 2000 mL    Oral Fluid: 540 mL    PRBCs (Packed Red Blood Cells): 600 mL  Total IN: 3140 mL    OUT:  Total OUT: 0 mL    Total NET: 3140 mL      --------------------------------------------------------------------------------------    EXAM  NEUROLOGY  RASS:   	GCS:    Gen: A&Ox4   HEENT: Atraumatic. Mucous membranes moist, no scleral icterus   CV: RRR. No murmurs. No significant LE edema. Distal pulses 2+. Capillary refill < 2 seconds.  Resp: Respirations unlabored CTAB, no rales, rhonchi, or wheezes.  GI: Abdomen non tender to palpation, soft and non-distended. + BS.  Skin/MSK: No open wounds. No ecchymosis appreciated.  Neuro:  Following commands. Moving extremities spontaneously.  Psych: Appropriate mood, cooperative    METABOLIC/FLUIDS/ELECTROLYTES  lactated ringers. 1000 milliLiter(s) IV Continuous <Continuous>    HEMATOLOGIC  [x] VTE Prophylaxis:   Transfusions:	[] PRBC	[] Platelets		[] FFP	[] Cryoprecipitate    INFECTIOUS DISEASE  Antimicrobials/Immunologic Medications:    Tubes/Lines/Drains    [x] Peripheral IV  [] Central Venous Line     	[] R	[] L	[] IJ	[] Fem	[] SC	Date Placed:   [] Arterial Line		[] R	[] L	[] Fem	[] Rad	[] Ax	Date Placed:   [] PICC		[] Midline		[] Mediport  [] Urinary Catheter		Date Placed:   [x] Necessity of urinary, arterial, and venous catheters discussed    LABS  --------------------------------------------------------------------------------------  CBC (10-15 @ 18:21)                              6.7<LL>                         7.96    )----------------(  91<L>      --    % Neutrophils, --    % Lymphocytes, ANC: --                                  19.5<LL>  CBC (10-15 @ 11:59)                              7.4<L>                         8.52    )----------------(  98<L>      --    % Neutrophils, --    % Lymphocytes, ANC: --                                  21.8<L>    BMP (10-15 @ 01:24)             138     |  110<H>  |  17    		Ca++ --      Ca 7.7<L>             ---------------------------------( 182<H>		Mg 1.80               3.6     |  22      |  1.12  			Ph 2.3<L>        --------------------------------------------------------------------------------------

## 2021-10-16 NOTE — PROGRESS NOTE ADULT - ASSESSMENT
65F w/ hx of diverticulosis (reports 8 previous admissions at multiple hospitals), herniated lumbar disc (s/p plate), SOLOMON, DM (on oral agent) presents with lower GI bleed localized to the splenic flexure on CTA s/p flex sig. Hb 9.0 today s/p 2U overnight (6.7).      -Conservative management  -NPO, IV fluids  -Trend H/H and bloody BMs  -Patient amenable to surgery to prevent future occurrences discussed options elective extended left hemicolectomy given embolization of right sided bleeder and 3 episodes of left sided bleeding  -Care per SICU    B team   [id number]

## 2021-10-16 NOTE — PROGRESS NOTE ADULT - ASSESSMENT
66 year old F with recurrent GI bleed.     Plan  - Continuing supportive care.  Trend CBC. Transfuse PRN   - If needs an emergent procedure, would recommend left colectomy with ostomy given that is current localization of bleed   - NPO/IVF  - Care per SICU  - Appreciate excellent SICU care        A Team surgery [id number]

## 2021-10-17 ENCOUNTER — TRANSCRIPTION ENCOUNTER (OUTPATIENT)
Age: 66
End: 2021-10-17

## 2021-10-17 LAB
ANION GAP SERPL CALC-SCNC: 11 MMOL/L — SIGNIFICANT CHANGE UP (ref 7–14)
ANION GAP SERPL CALC-SCNC: 9 MMOL/L — SIGNIFICANT CHANGE UP (ref 7–14)
BUN SERPL-MCNC: 13 MG/DL — SIGNIFICANT CHANGE UP (ref 7–23)
BUN SERPL-MCNC: 14 MG/DL — SIGNIFICANT CHANGE UP (ref 7–23)
CALCIUM SERPL-MCNC: 7.9 MG/DL — LOW (ref 8.4–10.5)
CALCIUM SERPL-MCNC: 7.9 MG/DL — LOW (ref 8.4–10.5)
CHLORIDE SERPL-SCNC: 101 MMOL/L — SIGNIFICANT CHANGE UP (ref 98–107)
CHLORIDE SERPL-SCNC: 109 MMOL/L — HIGH (ref 98–107)
CO2 SERPL-SCNC: 20 MMOL/L — LOW (ref 22–31)
CO2 SERPL-SCNC: 25 MMOL/L — SIGNIFICANT CHANGE UP (ref 22–31)
CREAT SERPL-MCNC: 0.69 MG/DL — SIGNIFICANT CHANGE UP (ref 0.5–1.3)
CREAT SERPL-MCNC: 1.09 MG/DL — SIGNIFICANT CHANGE UP (ref 0.5–1.3)
GLUCOSE BLDC GLUCOMTR-MCNC: 117 MG/DL — HIGH (ref 70–99)
GLUCOSE BLDC GLUCOMTR-MCNC: 121 MG/DL — HIGH (ref 70–99)
GLUCOSE BLDC GLUCOMTR-MCNC: 140 MG/DL — HIGH (ref 70–99)
GLUCOSE BLDC GLUCOMTR-MCNC: 147 MG/DL — HIGH (ref 70–99)
GLUCOSE SERPL-MCNC: 142 MG/DL — HIGH (ref 70–99)
GLUCOSE SERPL-MCNC: 146 MG/DL — HIGH (ref 70–99)
HCT VFR BLD CALC: 22.9 % — LOW (ref 34.5–45)
HCT VFR BLD CALC: 24.8 % — LOW (ref 34.5–45)
HCT VFR BLD CALC: 25 % — LOW (ref 34.5–45)
HGB BLD-MCNC: 7.7 G/DL — LOW (ref 11.5–15.5)
HGB BLD-MCNC: 8.6 G/DL — LOW (ref 11.5–15.5)
HGB BLD-MCNC: 8.7 G/DL — LOW (ref 11.5–15.5)
MAGNESIUM SERPL-MCNC: 1.8 MG/DL — SIGNIFICANT CHANGE UP (ref 1.6–2.6)
MAGNESIUM SERPL-MCNC: 2.5 MG/DL — SIGNIFICANT CHANGE UP (ref 1.6–2.6)
MCHC RBC-ENTMCNC: 28.6 PG — SIGNIFICANT CHANGE UP (ref 27–34)
MCHC RBC-ENTMCNC: 29 PG — SIGNIFICANT CHANGE UP (ref 27–34)
MCHC RBC-ENTMCNC: 31.8 PG — SIGNIFICANT CHANGE UP (ref 27–34)
MCHC RBC-ENTMCNC: 33.6 GM/DL — SIGNIFICANT CHANGE UP (ref 32–36)
MCHC RBC-ENTMCNC: 34.7 GM/DL — SIGNIFICANT CHANGE UP (ref 32–36)
MCHC RBC-ENTMCNC: 34.8 GM/DL — SIGNIFICANT CHANGE UP (ref 32–36)
MCV RBC AUTO: 83.5 FL — SIGNIFICANT CHANGE UP (ref 80–100)
MCV RBC AUTO: 85.1 FL — SIGNIFICANT CHANGE UP (ref 80–100)
MCV RBC AUTO: 91.2 FL — SIGNIFICANT CHANGE UP (ref 80–100)
NRBC # BLD: 0 /100 WBCS — SIGNIFICANT CHANGE UP
NRBC # FLD: 0 K/UL — SIGNIFICANT CHANGE UP
PHOSPHATE SERPL-MCNC: 3.1 MG/DL — SIGNIFICANT CHANGE UP (ref 2.5–4.5)
PHOSPHATE SERPL-MCNC: 4 MG/DL — SIGNIFICANT CHANGE UP (ref 2.5–4.5)
PLATELET # BLD AUTO: 126 K/UL — LOW (ref 150–400)
PLATELET # BLD AUTO: 152 K/UL — SIGNIFICANT CHANGE UP (ref 150–400)
PLATELET # BLD AUTO: 230 K/UL — SIGNIFICANT CHANGE UP (ref 150–400)
POTASSIUM SERPL-MCNC: 3.6 MMOL/L — SIGNIFICANT CHANGE UP (ref 3.5–5.3)
POTASSIUM SERPL-MCNC: 4.5 MMOL/L — SIGNIFICANT CHANGE UP (ref 3.5–5.3)
POTASSIUM SERPL-SCNC: 3.6 MMOL/L — SIGNIFICANT CHANGE UP (ref 3.5–5.3)
POTASSIUM SERPL-SCNC: 4.5 MMOL/L — SIGNIFICANT CHANGE UP (ref 3.5–5.3)
RBC # BLD: 2.69 M/UL — LOW (ref 3.8–5.2)
RBC # BLD: 2.74 M/UL — LOW (ref 3.8–5.2)
RBC # BLD: 2.97 M/UL — LOW (ref 3.8–5.2)
RBC # FLD: 12 % — SIGNIFICANT CHANGE UP (ref 10.3–14.5)
RBC # FLD: 16.4 % — HIGH (ref 10.3–14.5)
RBC # FLD: 16.5 % — HIGH (ref 10.3–14.5)
SODIUM SERPL-SCNC: 135 MMOL/L — SIGNIFICANT CHANGE UP (ref 135–145)
SODIUM SERPL-SCNC: 140 MMOL/L — SIGNIFICANT CHANGE UP (ref 135–145)
WBC # BLD: 11.16 K/UL — HIGH (ref 3.8–10.5)
WBC # BLD: 7.48 K/UL — SIGNIFICANT CHANGE UP (ref 3.8–10.5)
WBC # BLD: 8.72 K/UL — SIGNIFICANT CHANGE UP (ref 3.8–10.5)
WBC # FLD AUTO: 11.16 K/UL — HIGH (ref 3.8–10.5)
WBC # FLD AUTO: 7.48 K/UL — SIGNIFICANT CHANGE UP (ref 3.8–10.5)
WBC # FLD AUTO: 8.72 K/UL — SIGNIFICANT CHANGE UP (ref 3.8–10.5)

## 2021-10-17 PROCEDURE — 99233 SBSQ HOSP IP/OBS HIGH 50: CPT | Mod: GC

## 2021-10-17 PROCEDURE — 99233 SBSQ HOSP IP/OBS HIGH 50: CPT

## 2021-10-17 RX ORDER — NEOMYCIN SULFATE 500 MG/1
500 TABLET ORAL
Refills: 0 | Status: COMPLETED | OUTPATIENT
Start: 2021-10-17 | End: 2021-10-18

## 2021-10-17 RX ORDER — ALPRAZOLAM 0.25 MG
0.25 TABLET ORAL ONCE
Refills: 0 | Status: DISCONTINUED | OUTPATIENT
Start: 2021-10-17 | End: 2021-10-17

## 2021-10-17 RX ORDER — POLYETHYLENE GLYCOL 3350 17 G/17G
17 POWDER, FOR SOLUTION ORAL
Refills: 0 | Status: COMPLETED | OUTPATIENT
Start: 2021-10-17 | End: 2021-10-17

## 2021-10-17 RX ORDER — METRONIDAZOLE 500 MG
250 TABLET ORAL
Refills: 0 | Status: COMPLETED | OUTPATIENT
Start: 2021-10-17 | End: 2021-10-18

## 2021-10-17 RX ADMIN — POLYETHYLENE GLYCOL 3350 17 GRAM(S): 17 POWDER, FOR SOLUTION ORAL at 22:19

## 2021-10-17 RX ADMIN — Medication 250 MILLIGRAM(S): at 13:48

## 2021-10-17 RX ADMIN — DEXTROSE MONOHYDRATE, SODIUM CHLORIDE, AND POTASSIUM CHLORIDE 50 MILLILITER(S): 50; .745; 4.5 INJECTION, SOLUTION INTRAVENOUS at 08:23

## 2021-10-17 RX ADMIN — Medication 0.25 MILLIGRAM(S): at 17:44

## 2021-10-17 RX ADMIN — DEXTROSE MONOHYDRATE, SODIUM CHLORIDE, AND POTASSIUM CHLORIDE 50 MILLILITER(S): 50; .745; 4.5 INJECTION, SOLUTION INTRAVENOUS at 19:09

## 2021-10-17 RX ADMIN — POLYETHYLENE GLYCOL 3350 17 GRAM(S): 17 POWDER, FOR SOLUTION ORAL at 15:17

## 2021-10-17 RX ADMIN — POLYETHYLENE GLYCOL 3350 17 GRAM(S): 17 POWDER, FOR SOLUTION ORAL at 14:23

## 2021-10-17 RX ADMIN — SIMVASTATIN 40 MILLIGRAM(S): 20 TABLET, FILM COATED ORAL at 22:19

## 2021-10-17 RX ADMIN — POLYETHYLENE GLYCOL 3350 17 GRAM(S): 17 POWDER, FOR SOLUTION ORAL at 21:12

## 2021-10-17 RX ADMIN — Medication 200 MICROGRAM(S): at 06:22

## 2021-10-17 RX ADMIN — POLYETHYLENE GLYCOL 3350 17 GRAM(S): 17 POWDER, FOR SOLUTION ORAL at 17:22

## 2021-10-17 RX ADMIN — POLYETHYLENE GLYCOL 3350 17 GRAM(S): 17 POWDER, FOR SOLUTION ORAL at 12:30

## 2021-10-17 RX ADMIN — Medication 650 MILLIGRAM(S): at 17:22

## 2021-10-17 RX ADMIN — Medication 250 MILLIGRAM(S): at 20:45

## 2021-10-17 RX ADMIN — POLYETHYLENE GLYCOL 3350 17 GRAM(S): 17 POWDER, FOR SOLUTION ORAL at 19:09

## 2021-10-17 RX ADMIN — POLYETHYLENE GLYCOL 3350 17 GRAM(S): 17 POWDER, FOR SOLUTION ORAL at 20:24

## 2021-10-17 RX ADMIN — POLYETHYLENE GLYCOL 3350 17 GRAM(S): 17 POWDER, FOR SOLUTION ORAL at 13:23

## 2021-10-17 RX ADMIN — Medication 650 MILLIGRAM(S): at 17:50

## 2021-10-17 RX ADMIN — NEOMYCIN SULFATE 500 MILLIGRAM(S): 500 TABLET ORAL at 13:47

## 2021-10-17 RX ADMIN — NEOMYCIN SULFATE 500 MILLIGRAM(S): 500 TABLET ORAL at 20:24

## 2021-10-17 NOTE — PROGRESS NOTE ADULT - SUBJECTIVE AND OBJECTIVE BOX
Subjective:   Patient seen at bedside this AM. Reports feeling well, without complaints. x3 bowel movements with dark red clots overnight. No brisk red blood. denies n/v/f    24h Events:   - Overnight, no acute events    Objective:  Vital Signs  T(C): 36.9 (10-17 @ 04:00), Max: 37.1 (10-16 @ 15:00)  HR: 76 (10-17 @ 09:00) (74 - 94)  BP: 143/65 (10-17 @ 09:00) (118/56 - 164/77)  RR: 18 (10-17 @ 09:00) (15 - 23)  SpO2: 100% (10-17 @ 09:00) (94% - 100%)  10-16-21 @ 07:01  -  10-17-21 @ 07:00  --------------------------------------------------------  IN:  Total IN: 0 mL    OUT:    Voided (mL): 2800 mL  Total OUT: 2800 mL    Total NET: -2800 mL          PHYSICAL EXAM:    GENERAL: NAD, lying in bed comfortably  HEAD:  Atraumatic, Normocephalic  ENT: Moist mucous membranes  CHEST/LUNG: Unlabored respirations  ABDOMEN: Soft, Nontender, Nondistended.    Labs:                        8.6    8.72  )-----------( 126      ( 17 Oct 2021 05:36 )             24.8   10-17    140  |  109<H>  |  13  ----------------------------<  142<H>  4.5   |  20<L>  |  1.09    Ca    7.9<L>      17 Oct 2021 05:36  Phos  4.0     10-17  Mg     2.50     10-17      CAPILLARY BLOOD GLUCOSE      POCT Blood Glucose.: 140 mg/dL (17 Oct 2021 06:29)  POCT Blood Glucose.: 124 mg/dL (16 Oct 2021 23:44)  POCT Blood Glucose.: 102 mg/dL (16 Oct 2021 17:16)  POCT Blood Glucose.: 164 mg/dL (16 Oct 2021 12:18)      Medications:   MEDICATIONS  (STANDING):  dextrose 5% + sodium chloride 0.45% with potassium chloride 20 mEq/L 1000 milliLiter(s) (50 mL/Hr) IV Continuous <Continuous>  dextrose 50% Injectable 25 Gram(s) IV Push once  dextrose 50% Injectable 25 Gram(s) IV Push once  influenza   Vaccine 0.5 milliLiter(s) IntraMuscular once  insulin lispro (ADMELOG) corrective regimen sliding scale   SubCutaneous every 6 hours  levothyroxine 200 MICROGram(s) Oral daily  simvastatin 40 milliGRAM(s) Oral at bedtime    MEDICATIONS  (PRN):  acetaminophen   Tablet .. 650 milliGRAM(s) Oral every 6 hours PRN Temp greater or equal to 38C (100.4F), Mild Pain (1 - 3)      Imaging:

## 2021-10-17 NOTE — PROGRESS NOTE ADULT - ASSESSMENT
66 year old F with recurrent GI bleed.     Plan  - Patient to be evaluated by colorectal attending this AM, surgical planning to follow attending's evaluation.  - Continuing supportive care.  Trend CBC. Transfuse PRN    - c/w CLD  - Care per SICU      A Team surgery   [id number] 66 year old F with recurrent GI bleed.     Plan  - Continuing supportive care.  Trend CBC. Transfuse PRN    - c/w CLD  - Plan for OR tomorrow: hemicolectomy per Dr. Beach  - Please make NPO at midnight, preop labs, COVID swab, ERP bowel prep starting today  - Care per SICU      A Team surgery   [id number]

## 2021-10-17 NOTE — PROGRESS NOTE ADULT - SUBJECTIVE AND OBJECTIVE BOX
SICU Daily Progress Note  =====================================================  Interval/Overnight Events:     ***    POD #          	SICU Day #    ***    HPI:  ((insert from previous note)) ***    PMH:   ***    Allergies: oxycodone (Other)  Valium (Other)      MEDICATIONS:   --------------------------------------------------------------------------------------  Neurologic Medications  acetaminophen   Tablet .. 650 milliGRAM(s) Oral every 6 hours PRN Temp greater or equal to 38C (100.4F), Mild Pain (1 - 3)    Respiratory Medications    Cardiovascular Medications    Gastrointestinal Medications  dextrose 5% + sodium chloride 0.45% with potassium chloride 20 mEq/L 1000 milliLiter(s) IV Continuous <Continuous>    Genitourinary Medications    Hematologic/Oncologic Medications  influenza   Vaccine 0.5 milliLiter(s) IntraMuscular once    Antimicrobial/Immunologic Medications    Endocrine/Metabolic Medications  dextrose 50% Injectable 25 Gram(s) IV Push once  dextrose 50% Injectable 25 Gram(s) IV Push once  insulin lispro (ADMELOG) corrective regimen sliding scale   SubCutaneous every 6 hours  levothyroxine 200 MICROGram(s) Oral daily  simvastatin 40 milliGRAM(s) Oral at bedtime    Topical/Other Medications    --------------------------------------------------------------------------------------    VITAL SIGNS, INS/OUTS (last 24 hours):  --------------------------------------------------------------------------------------  ((Insert SICU Vitals/Is+Os here))***  --------------------------------------------------------------------------------------    EXAM  NEUROLOGY  RASS:   	GCS:    Exam: Normal, NAD, alert, oriented x3, no focal deficits. ***    HEENT  Exam: Normocephalic, atraumatic, EOMI.  ***    RESPIRATORY  Exam: Lungs clear to auscultation, Normal expansion/effort. ***  Mechanical Ventilation:     CARDIOVASCULAR  Exam: S1, S2.  Regular rate and rhythm.   ***    GI/NUTRITION  Exam: Abdomen soft, Non-tender, Non-distended.  ***  Wound:  ***  Current Diet:  NPO***    VASCULAR  Exam: Extremities warm, pink, well-perfused. ***    MUSCULOSKELETAL  Exam: All extremities moving spontaneously without limitations. ***    SKIN  Exam: Good skin turgor, no skin breakdown. ***    METABOLIC/FLUIDS/ELECTROLYTES  dextrose 5% + sodium chloride 0.45% with potassium chloride 20 mEq/L 1000 milliLiter(s) IV Continuous <Continuous>      HEMATOLOGIC  [x] VTE Prophylaxis:   Transfusions:	[] PRBC	[] Platelets		[] FFP	[] Cryoprecipitate    INFECTIOUS DISEASE  Antimicrobials/Immunologic Medications:  influenza   Vaccine 0.5 milliLiter(s) IntraMuscular once    Day #      of     ***    Tubes/Lines/Drains  ***  [x] Peripheral IV  [] Central Venous Line     	[] R	[] L	[] IJ	[] Fem	[] SC	Date Placed:   [] Arterial Line		[] R	[] L	[] Fem	[] Rad	[] Ax	Date Placed:   [] PICC		[] Midline		[] Mediport  [] Urinary Catheter		Date Placed:   [x] Necessity of urinary, arterial, and venous catheters discussed    LABS  --------------------------------------------------------------------------------------  ((Insert SICU Labs here))***  --------------------------------------------------------------------------------------    OTHER LABORATORY:     IMAGING STUDIES:   CXR:      SICU Daily Progress Note  =====================================================  Interval/Overnight Events:      - bloody BM x 2    HPI:  HPI: 64 yo F w/ hx of diverticulosis (multiple previous admissions - last in March 2021), SOLOMON, NIDDM, nephrolithiasis, herniated lumbar disc (s/p plate), presenting to ED for 6 episodes of BRBPR since 4 pm yesterday. Pt reports associated lightheadedness and nausea preceding this event. She denies fevers, abd pain, and vomiting. Review of chart shows multiple past admissions to [facility] w/ hypotension requiring multiple transfusions. During her last admission in March 2021 the pt underwent IR embolization of a brach of her middle colic artery. Due to the severity of her pan-diverticulosis she would require total abdominal colectomy, and for this reason surgical interventions have not been pursued    In ED 10/12, pt has remained hemodynamically stable w/o tachycardia or hypotension. Initial lab work was significant for hgb of 7.7, which trended down to 6.7 at 4 hours. CTA abd/pelvis demonstrated active GI bleeding in the descending colon in the region of the splenic flexure. 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 Presently, the pt is awaiting evaluations by IR and GI. Due to the severity of bleeding on previous presentations, the SICU was consulted for ongoing hemodynamic monitoring while awaiting definitive plan for intervention.    PMH:   PAST MEDICAL HISTORY:  Diverticulosis     Fibroid uterus '94  surgically treated    Heart murmur mild    HTN (hypertension)     Hypothyroidism     Left ureteral stone     Lower gastrointestinal bleeding Multipel times since '2014: last episode 5/2018    Lumbar herniated disc ' 2010  surgically treated    Multiparity     SOLOMON (obstructive sleep apnea) non-compliant with CPAP    Pernicious anemia     Type 2 diabetes mellitus.     PAST SURGICAL HISTORY:  History of tonsillectomy age 14    S/P lumbar laminectomy ' 2010  with Fusion    S/P WESTLEY (total abdominal hysterectomy) ' 94  Laproscopic.  benign.     FAMILY HISTORY:  Family history of gastric cancer, Mother  Family history of throat cancer, Sister.  Allergies: oxycodone (Other)  Valium (Other)      MEDICATIONS:   --------------------------------------------------------------------------------------  Neurologic Medications  acetaminophen   Tablet .. 650 milliGRAM(s) Oral every 6 hours PRN Temp greater or equal to 38C (100.4F), Mild Pain (1 - 3)    Respiratory Medications    Cardiovascular Medications    Gastrointestinal Medications  dextrose 5% + sodium chloride 0.45% with potassium chloride 20 mEq/L 1000 milliLiter(s) IV Continuous <Continuous>    Genitourinary Medications    Hematologic/Oncologic Medications  influenza   Vaccine 0.5 milliLiter(s) IntraMuscular once    Antimicrobial/Immunologic Medications    Endocrine/Metabolic Medications  dextrose 50% Injectable 25 Gram(s) IV Push once  dextrose 50% Injectable 25 Gram(s) IV Push once  insulin lispro (ADMELOG) corrective regimen sliding scale   SubCutaneous every 6 hours  levothyroxine 200 MICROGram(s) Oral daily  simvastatin 40 milliGRAM(s) Oral at bedtime    Topical/Other Medications    --------------------------------------------------------------------------------------    VITAL SIGNS, INS/OUTS (last 24 hours):  --------------------------------------------------------------------------------------  ICU Vital Signs Last 24 Hrs  T(C): 36.7 (17 Oct 2021 00:00), Max: 37.1 (16 Oct 2021 15:00)  T(F): 98.1 (17 Oct 2021 00:00), Max: 98.7 (16 Oct 2021 15:00)  HR: 80 (17 Oct 2021 01:00) (73 - 94)  BP: 140/119 (17 Oct 2021 01:00) (118/56 - 164/77)  BP(mean): 124 (17 Oct 2021 01:00) (70 - 124)  ABP: --  ABP(mean): --  RR: 18 (17 Oct 2021 01:00) (16 - 23)  SpO2: 100% (17 Oct 2021 01:00) (94% - 100%)  I&O's Detail    15 Oct 2021 07:01  -  16 Oct 2021 07:00  --------------------------------------------------------  IN:    dextrose 5% + sodium chloride 0.45% w/ Additives: 3000 mL    IV PiggyBack: 1150 mL    Oral Fluid: 600 mL    PRBCs (Packed Red Blood Cells): 600 mL  Total IN: 5350 mL    OUT:    Voided (mL): 1100 mL  Total OUT: 1100 mL    Total NET: 4250 mL      16 Oct 2021 07:01  -  17 Oct 2021 03:02  --------------------------------------------------------  IN:    dextrose 5% + sodium chloride 0.45% w/ Additives: 1125 mL    Oral Fluid: 570 mL  Total IN: 1695 mL    OUT:    Voided (mL): 2500 mL  Total OUT: 2500 mL    Total NET: -805 mL        --------------------------------------------------------------------------------------    EXAM  NEUROLOGY  RASS:   	GCS:    Exam: Normal, NAD, alert, oriented x3, no focal deficits. ***    HEENT  Exam: Normocephalic, atraumatic, EOMI.  ***    RESPIRATORY  Exam: Lungs clear to auscultation, Normal expansion/effort. ***  Mechanical Ventilation:     CARDIOVASCULAR  Exam: S1, S2.  Regular rate and rhythm.   ***    GI/NUTRITION  Exam: Abdomen soft, Non-tender, Non-distended.  ***  Wound:  ***  Current Diet:  NPO***    VASCULAR  Exam: Extremities warm, pink, well-perfused. ***    MUSCULOSKELETAL  Exam: All extremities moving spontaneously without limitations. ***    SKIN  Exam: Good skin turgor, no skin breakdown. ***    METABOLIC/FLUIDS/ELECTROLYTES  dextrose 5% + sodium chloride 0.45% with potassium chloride 20 mEq/L 1000 milliLiter(s) IV Continuous <Continuous>      HEMATOLOGIC  [x] VTE Prophylaxis:   Transfusions:	[] PRBC	[] Platelets		[] FFP	[] Cryoprecipitate    INFECTIOUS DISEASE  Antimicrobials/Immunologic Medications:  influenza   Vaccine 0.5 milliLiter(s) IntraMuscular once    Day #      of     ***    Tubes/Lines/Drains  ***  [x] Peripheral IV  [] Central Venous Line     	[] R	[] L	[] IJ	[] Fem	[] SC	Date Placed:   [] Arterial Line		[] R	[] L	[] Fem	[] Rad	[] Ax	Date Placed:   [] PICC		[] Midline		[] Mediport  [] Urinary Catheter		Date Placed:   [x] Necessity of urinary, arterial, and venous catheters discussed    LABS  --------------------------------------------------------------------------------------                        8.5    7.93  )-----------( 107      ( 16 Oct 2021 21:15 )             24.5       10-16    142  |  113<H>  |  10  ----------------------------<  110<H>  3.7   |  21<L>  |  1.07    Ca    7.3<L>      16 Oct 2021 21:15  Phos  2.8     10-16  Mg     1.90     10-16        PT/INR - ( 16 Oct 2021 15:28 )   PT: 12.3 sec;   INR: 1.08 ratio         PTT - ( 16 Oct 2021 15:28 )  PTT:26.0 sec  --------------------------------------------------------------------------------------    OTHER LABORATORY:     IMAGING STUDIES:   CXR:      SICU Daily Progress Note  =====================================================  Interval/Overnight Events:      - bloody BM x 2    HPI:  64 yo F w/ hx of diverticulosis (multiple previous admissions - last in March 2021), SOLOMON, NIDDM, nephrolithiasis, herniated lumbar disc (s/p plate), presenting to ED for 6 episodes of BRBPR since 4 pm yesterday. Pt reports associated lightheadedness and nausea preceding this event. She denies fevers, abd pain, and vomiting. Review of chart shows multiple past admissions to [facility] w/ hypotension requiring multiple transfusions. During her last admission in March 2021 the pt underwent IR embolization of a brach of her middle colic artery. Due to the severity of her pan-diverticulosis she would require total abdominal colectomy, and for this reason surgical interventions have not been pursued    In ED 10/12, pt has remained hemodynamically stable w/o tachycardia or hypotension. Initial lab work was significant for hgb of 7.7, which trended down to 6.7 at 4 hours. CTA abd/pelvis demonstrated active GI bleeding in the descending colon in the region of the splenic flexure. 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 Presently, the pt is awaiting evaluations by IR and GI. Due to the severity of bleeding on previous presentations, the SICU was consulted for ongoing hemodynamic monitoring while awaiting definitive plan for intervention.    PMH:   PAST MEDICAL HISTORY:  Diverticulosis     Fibroid uterus '94  surgically treated    Heart murmur mild    HTN (hypertension)     Hypothyroidism     Left ureteral stone     Lower gastrointestinal bleeding Multipel times since '2014: last episode 5/2018    Lumbar herniated disc ' 2010  surgically treated    Multiparity     SOLOMON (obstructive sleep apnea) non-compliant with CPAP    Pernicious anemia     Type 2 diabetes mellitus.     PAST SURGICAL HISTORY:  History of tonsillectomy age 14    S/P lumbar laminectomy ' 2010  with Fusion    S/P WESTLEY (total abdominal hysterectomy) ' 94  Laproscopic.  benign.     FAMILY HISTORY:  Family history of gastric cancer, Mother  Family history of throat cancer, Sister.  Allergies: oxycodone (Other)  Valium (Other)      MEDICATIONS:   --------------------------------------------------------------------------------------  Neurologic Medications  acetaminophen   Tablet .. 650 milliGRAM(s) Oral every 6 hours PRN Temp greater or equal to 38C (100.4F), Mild Pain (1 - 3)    Respiratory Medications    Cardiovascular Medications    Gastrointestinal Medications  dextrose 5% + sodium chloride 0.45% with potassium chloride 20 mEq/L 1000 milliLiter(s) IV Continuous <Continuous>    Genitourinary Medications    Hematologic/Oncologic Medications  influenza   Vaccine 0.5 milliLiter(s) IntraMuscular once    Antimicrobial/Immunologic Medications    Endocrine/Metabolic Medications  dextrose 50% Injectable 25 Gram(s) IV Push once  dextrose 50% Injectable 25 Gram(s) IV Push once  insulin lispro (ADMELOG) corrective regimen sliding scale   SubCutaneous every 6 hours  levothyroxine 200 MICROGram(s) Oral daily  simvastatin 40 milliGRAM(s) Oral at bedtime    Topical/Other Medications    --------------------------------------------------------------------------------------    VITAL SIGNS, INS/OUTS (last 24 hours):  --------------------------------------------------------------------------------------  ICU Vital Signs Last 24 Hrs  T(C): 36.7 (17 Oct 2021 00:00), Max: 37.1 (16 Oct 2021 15:00)  T(F): 98.1 (17 Oct 2021 00:00), Max: 98.7 (16 Oct 2021 15:00)  HR: 80 (17 Oct 2021 01:00) (73 - 94)  BP: 140/119 (17 Oct 2021 01:00) (118/56 - 164/77)  BP(mean): 124 (17 Oct 2021 01:00) (70 - 124)  ABP: --  ABP(mean): --  RR: 18 (17 Oct 2021 01:00) (16 - 23)  SpO2: 100% (17 Oct 2021 01:00) (94% - 100%)  I&O's Detail    15 Oct 2021 07:01  -  16 Oct 2021 07:00  --------------------------------------------------------  IN:    dextrose 5% + sodium chloride 0.45% w/ Additives: 3000 mL    IV PiggyBack: 1150 mL    Oral Fluid: 600 mL    PRBCs (Packed Red Blood Cells): 600 mL  Total IN: 5350 mL    OUT:    Voided (mL): 1100 mL  Total OUT: 1100 mL    Total NET: 4250 mL      16 Oct 2021 07:01  -  17 Oct 2021 03:02  --------------------------------------------------------  IN:    dextrose 5% + sodium chloride 0.45% w/ Additives: 1125 mL    Oral Fluid: 570 mL  Total IN: 1695 mL    OUT:    Voided (mL): 2500 mL  Total OUT: 2500 mL    Total NET: -805 mL        --------------------------------------------------------------------------------------    EXAM  NEUROLOGY  RASS:   	GCS:    Exam: Normal, NAD, alert, oriented x3, no focal deficits. ***    HEENT  Exam: Normocephalic, atraumatic, EOMI.  ***    RESPIRATORY  Exam: Lungs clear to auscultation, Normal expansion/effort. ***  Mechanical Ventilation:     CARDIOVASCULAR  Exam: S1, S2.  Regular rate and rhythm.   ***    GI/NUTRITION  Exam: Abdomen soft, Non-tender, Non-distended.  ***  Wound:  ***  Current Diet:  NPO***    VASCULAR  Exam: Extremities warm, pink, well-perfused. ***    MUSCULOSKELETAL  Exam: All extremities moving spontaneously without limitations. ***    SKIN  Exam: Good skin turgor, no skin breakdown. ***    METABOLIC/FLUIDS/ELECTROLYTES  dextrose 5% + sodium chloride 0.45% with potassium chloride 20 mEq/L 1000 milliLiter(s) IV Continuous <Continuous>      HEMATOLOGIC  [x] VTE Prophylaxis:   Transfusions:	[] PRBC	[] Platelets		[] FFP	[] Cryoprecipitate    INFECTIOUS DISEASE  Antimicrobials/Immunologic Medications:  influenza   Vaccine 0.5 milliLiter(s) IntraMuscular once    Day #      of     ***    Tubes/Lines/Drains  ***  [x] Peripheral IV  [] Central Venous Line     	[] R	[] L	[] IJ	[] Fem	[] SC	Date Placed:   [] Arterial Line		[] R	[] L	[] Fem	[] Rad	[] Ax	Date Placed:   [] PICC		[] Midline		[] Mediport  [] Urinary Catheter		Date Placed:   [x] Necessity of urinary, arterial, and venous catheters discussed    LABS  --------------------------------------------------------------------------------------                        8.5    7.93  )-----------( 107      ( 16 Oct 2021 21:15 )             24.5       10-16    142  |  113<H>  |  10  ----------------------------<  110<H>  3.7   |  21<L>  |  1.07    Ca    7.3<L>      16 Oct 2021 21:15  Phos  2.8     10-16  Mg     1.90     10-16        PT/INR - ( 16 Oct 2021 15:28 )   PT: 12.3 sec;   INR: 1.08 ratio         PTT - ( 16 Oct 2021 15:28 )  PTT:26.0 sec  --------------------------------------------------------------------------------------    OTHER LABORATORY:     IMAGING STUDIES:   CXR:      SICU Daily Progress Note  =====================================================  Interval/Overnight Events:      - F/u Dr. Beach plans: NTD, if still bleeding, OR Monday/early next week, ACS if emergent need for resection  - Started CLD  - Bloody BM x 2    HPI:  66 yo F w/ hx of diverticulosis (multiple previous admissions - last in March 2021), SOLOMON, NIDDM, nephrolithiasis, herniated lumbar disc (s/p plate), presenting to ED for 6 episodes of BRBPR since 4 pm yesterday. Pt reports associated lightheadedness and nausea preceding this event. She denies fevers, abd pain, and vomiting. Review of chart shows multiple past admissions to [facility] w/ hypotension requiring multiple transfusions. During her last admission in March 2021 the pt underwent IR embolization of a brach of her middle colic artery. Due to the severity of her pan-diverticulosis she would require total abdominal colectomy, and for this reason surgical interventions have not been pursued    In ED 10/12, pt has remained hemodynamically stable w/o tachycardia or hypotension. Initial lab work was significant for hgb of 7.7, which trended down to 6.7 at 4 hours. CTA abd/pelvis demonstrated active GI bleeding in the descending colon in the region of the splenic flexure. 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 Presently, the pt is awaiting evaluations by IR and GI. Due to the severity of bleeding on previous presentations, the SICU was consulted for ongoing hemodynamic monitoring while awaiting definitive plan for intervention.    PMH:   PAST MEDICAL HISTORY:  Diverticulosis     Fibroid uterus '94  surgically treated    Heart murmur mild    HTN (hypertension)     Hypothyroidism     Left ureteral stone     Lower gastrointestinal bleeding Multipel times since '2014: last episode 5/2018    Lumbar herniated disc ' 2010  surgically treated    Multiparity     SOLOMON (obstructive sleep apnea) non-compliant with CPAP    Pernicious anemia     Type 2 diabetes mellitus.     PAST SURGICAL HISTORY:  History of tonsillectomy age 14    S/P lumbar laminectomy ' 2010  with Fusion    S/P WESTLEY (total abdominal hysterectomy) ' 94  Laproscopic.  benign.     FAMILY HISTORY:  Family history of gastric cancer, Mother  Family history of throat cancer, Sister.  Allergies: oxycodone (Other)  Valium (Other)      MEDICATIONS:   --------------------------------------------------------------------------------------  Neurologic Medications  acetaminophen   Tablet .. 650 milliGRAM(s) Oral every 6 hours PRN Temp greater or equal to 38C (100.4F), Mild Pain (1 - 3)    Respiratory Medications    Cardiovascular Medications    Gastrointestinal Medications  dextrose 5% + sodium chloride 0.45% with potassium chloride 20 mEq/L 1000 milliLiter(s) IV Continuous <Continuous>    Genitourinary Medications    Hematologic/Oncologic Medications  influenza   Vaccine 0.5 milliLiter(s) IntraMuscular once    Antimicrobial/Immunologic Medications    Endocrine/Metabolic Medications  dextrose 50% Injectable 25 Gram(s) IV Push once  dextrose 50% Injectable 25 Gram(s) IV Push once  insulin lispro (ADMELOG) corrective regimen sliding scale   SubCutaneous every 6 hours  levothyroxine 200 MICROGram(s) Oral daily  simvastatin 40 milliGRAM(s) Oral at bedtime    Topical/Other Medications    --------------------------------------------------------------------------------------    VITAL SIGNS, INS/OUTS (last 24 hours):  --------------------------------------------------------------------------------------  ICU Vital Signs Last 24 Hrs  T(C): 36.7 (17 Oct 2021 00:00), Max: 37.1 (16 Oct 2021 15:00)  T(F): 98.1 (17 Oct 2021 00:00), Max: 98.7 (16 Oct 2021 15:00)  HR: 80 (17 Oct 2021 01:00) (73 - 94)  BP: 140/119 (17 Oct 2021 01:00) (118/56 - 164/77)  BP(mean): 124 (17 Oct 2021 01:00) (70 - 124)  ABP: --  ABP(mean): --  RR: 18 (17 Oct 2021 01:00) (16 - 23)  SpO2: 100% (17 Oct 2021 01:00) (94% - 100%)  I&O's Detail    15 Oct 2021 07:01  -  16 Oct 2021 07:00  --------------------------------------------------------  IN:    dextrose 5% + sodium chloride 0.45% w/ Additives: 3000 mL    IV PiggyBack: 1150 mL    Oral Fluid: 600 mL    PRBCs (Packed Red Blood Cells): 600 mL  Total IN: 5350 mL    OUT:    Voided (mL): 1100 mL  Total OUT: 1100 mL    Total NET: 4250 mL      16 Oct 2021 07:01  -  17 Oct 2021 03:02  --------------------------------------------------------  IN:    dextrose 5% + sodium chloride 0.45% w/ Additives: 1125 mL    Oral Fluid: 570 mL  Total IN: 1695 mL    OUT:    Voided (mL): 2500 mL  Total OUT: 2500 mL    Total NET: -805 mL        --------------------------------------------------------------------------------------    EXAM  NEUROLOGY  RASS:   	GCS:    Exam: Normal, NAD, alert, oriented x3, no focal deficits. ***    HEENT  Exam: Normocephalic, atraumatic, EOMI.  ***    RESPIRATORY  Exam: Lungs clear to auscultation, Normal expansion/effort. ***  Mechanical Ventilation:     CARDIOVASCULAR  Exam: S1, S2.  Regular rate and rhythm.   ***    GI/NUTRITION  Exam: Abdomen soft, Non-tender, Non-distended.  ***  Wound:  ***  Current Diet:  NPO***    VASCULAR  Exam: Extremities warm, pink, well-perfused. ***    MUSCULOSKELETAL  Exam: All extremities moving spontaneously without limitations. ***    SKIN  Exam: Good skin turgor, no skin breakdown. ***    METABOLIC/FLUIDS/ELECTROLYTES  dextrose 5% + sodium chloride 0.45% with potassium chloride 20 mEq/L 1000 milliLiter(s) IV Continuous <Continuous>      HEMATOLOGIC  [x] VTE Prophylaxis:   Transfusions:	[] PRBC	[] Platelets		[] FFP	[] Cryoprecipitate    INFECTIOUS DISEASE  Antimicrobials/Immunologic Medications:  influenza   Vaccine 0.5 milliLiter(s) IntraMuscular once    Day #      of     ***    Tubes/Lines/Drains  ***  [x] Peripheral IV  [] Central Venous Line     	[] R	[] L	[] IJ	[] Fem	[] SC	Date Placed:   [] Arterial Line		[] R	[] L	[] Fem	[] Rad	[] Ax	Date Placed:   [] PICC		[] Midline		[] Mediport  [] Urinary Catheter		Date Placed:   [x] Necessity of urinary, arterial, and venous catheters discussed    LABS  --------------------------------------------------------------------------------------                        8.5    7.93  )-----------( 107      ( 16 Oct 2021 21:15 )             24.5       10-16    142  |  113<H>  |  10  ----------------------------<  110<H>  3.7   |  21<L>  |  1.07    Ca    7.3<L>      16 Oct 2021 21:15  Phos  2.8     10-16  Mg     1.90     10-16        PT/INR - ( 16 Oct 2021 15:28 )   PT: 12.3 sec;   INR: 1.08 ratio         PTT - ( 16 Oct 2021 15:28 )  PTT:26.0 sec  --------------------------------------------------------------------------------------    OTHER LABORATORY:     IMAGING STUDIES:   CXR:      SICU Daily Progress Note  =====================================================  Interval/Overnight Events:      - F/u Dr. Beach plans: NTD, if still bleeding, OR Monday/early next week, ACS if emergent need for resection  - Started CLD  - Bloody BM x 3 overnight    HPI:  66 yo F w/ hx of diverticulosis (multiple previous admissions - last in March 2021), SOLOMON, NIDDM, nephrolithiasis, herniated lumbar disc (s/p plate), presenting to ED for 6 episodes of BRBPR since 4 pm yesterday. Pt reports associated lightheadedness and nausea preceding this event. She denies fevers, abd pain, and vomiting. Review of chart shows multiple past admissions to [facility] w/ hypotension requiring multiple transfusions. During her last admission in March 2021 the pt underwent IR embolization of a brach of her middle colic artery. Due to the severity of her pan-diverticulosis she would require total abdominal colectomy, and for this reason surgical interventions have not been pursued    In ED 10/12, pt has remained hemodynamically stable w/o tachycardia or hypotension. Initial lab work was significant for hgb of 7.7, which trended down to 6.7 at 4 hours. CTA abd/pelvis demonstrated active GI bleeding in the descending colon in the region of the splenic flexure. 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 Presently, the pt is awaiting evaluations by IR and GI. Due to the severity of bleeding on previous presentations, the SICU was consulted for ongoing hemodynamic monitoring while awaiting definitive plan for intervention.    PMH:   PAST MEDICAL HISTORY:  Diverticulosis     Fibroid uterus '94  surgically treated    Heart murmur mild    HTN (hypertension)     Hypothyroidism     Left ureteral stone     Lower gastrointestinal bleeding Multipel times since '2014: last episode 5/2018    Lumbar herniated disc ' 2010  surgically treated    Multiparity     SOLOMON (obstructive sleep apnea) non-compliant with CPAP    Pernicious anemia     Type 2 diabetes mellitus.     PAST SURGICAL HISTORY:  History of tonsillectomy age 14    S/P lumbar laminectomy ' 2010  with Fusion    S/P WESTLEY (total abdominal hysterectomy) ' 94  Laproscopic.  benign.     FAMILY HISTORY:  Family history of gastric cancer, Mother  Family history of throat cancer, Sister.  Allergies: oxycodone (Other)  Valium (Other)      MEDICATIONS:   --------------------------------------------------------------------------------------  Neurologic Medications  acetaminophen   Tablet .. 650 milliGRAM(s) Oral every 6 hours PRN Temp greater or equal to 38C (100.4F), Mild Pain (1 - 3)    Respiratory Medications    Cardiovascular Medications    Gastrointestinal Medications  dextrose 5% + sodium chloride 0.45% with potassium chloride 20 mEq/L 1000 milliLiter(s) IV Continuous <Continuous>    Genitourinary Medications    Hematologic/Oncologic Medications  influenza   Vaccine 0.5 milliLiter(s) IntraMuscular once    Antimicrobial/Immunologic Medications    Endocrine/Metabolic Medications  dextrose 50% Injectable 25 Gram(s) IV Push once  dextrose 50% Injectable 25 Gram(s) IV Push once  insulin lispro (ADMELOG) corrective regimen sliding scale   SubCutaneous every 6 hours  levothyroxine 200 MICROGram(s) Oral daily  simvastatin 40 milliGRAM(s) Oral at bedtime    Topical/Other Medications    --------------------------------------------------------------------------------------    VITAL SIGNS, INS/OUTS (last 24 hours):  --------------------------------------------------------------------------------------  ICU Vital Signs Last 24 Hrs  T(C): 36.7 (17 Oct 2021 00:00), Max: 37.1 (16 Oct 2021 15:00)  T(F): 98.1 (17 Oct 2021 00:00), Max: 98.7 (16 Oct 2021 15:00)  HR: 80 (17 Oct 2021 01:00) (73 - 94)  BP: 140/119 (17 Oct 2021 01:00) (118/56 - 164/77)  BP(mean): 124 (17 Oct 2021 01:00) (70 - 124)  ABP: --  ABP(mean): --  RR: 18 (17 Oct 2021 01:00) (16 - 23)  SpO2: 100% (17 Oct 2021 01:00) (94% - 100%)  I&O's Detail    15 Oct 2021 07:01  -  16 Oct 2021 07:00  --------------------------------------------------------  IN:    dextrose 5% + sodium chloride 0.45% w/ Additives: 3000 mL    IV PiggyBack: 1150 mL    Oral Fluid: 600 mL    PRBCs (Packed Red Blood Cells): 600 mL  Total IN: 5350 mL    OUT:    Voided (mL): 1100 mL  Total OUT: 1100 mL    Total NET: 4250 mL      16 Oct 2021 07:01  -  17 Oct 2021 03:02  --------------------------------------------------------  IN:    dextrose 5% + sodium chloride 0.45% w/ Additives: 1125 mL    Oral Fluid: 570 mL  Total IN: 1695 mL    OUT:    Voided (mL): 2500 mL  Total OUT: 2500 mL    Total NET: -805 mL        --------------------------------------------------------------------------------------    EXAM  NEUROLOGY  RASS:   	GCS:    Exam: Normal, NAD, alert, oriented x3, no focal deficits. ***    HEENT  Exam: Normocephalic, atraumatic, EOMI.  ***    RESPIRATORY  Exam: Lungs clear to auscultation, Normal expansion/effort. ***  Mechanical Ventilation:     CARDIOVASCULAR  Exam: S1, S2.  Regular rate and rhythm.   ***    GI/NUTRITION  Exam: Abdomen soft, Non-tender, Non-distended.  ***  Wound:  ***  Current Diet:  NPO***    VASCULAR  Exam: Extremities warm, pink, well-perfused. ***    MUSCULOSKELETAL  Exam: All extremities moving spontaneously without limitations. ***    SKIN  Exam: Good skin turgor, no skin breakdown. ***    METABOLIC/FLUIDS/ELECTROLYTES  dextrose 5% + sodium chloride 0.45% with potassium chloride 20 mEq/L 1000 milliLiter(s) IV Continuous <Continuous>      HEMATOLOGIC  [x] VTE Prophylaxis:   Transfusions:	[] PRBC	[] Platelets		[] FFP	[] Cryoprecipitate    INFECTIOUS DISEASE  Antimicrobials/Immunologic Medications:  influenza   Vaccine 0.5 milliLiter(s) IntraMuscular once    Day #      of     ***    Tubes/Lines/Drains  ***  [x] Peripheral IV  [] Central Venous Line     	[] R	[] L	[] IJ	[] Fem	[] SC	Date Placed:   [] Arterial Line		[] R	[] L	[] Fem	[] Rad	[] Ax	Date Placed:   [] PICC		[] Midline		[] Mediport  [] Urinary Catheter		Date Placed:   [x] Necessity of urinary, arterial, and venous catheters discussed    LABS  --------------------------------------------------------------------------------------                        8.5    7.93  )-----------( 107      ( 16 Oct 2021 21:15 )             24.5       10-16    142  |  113<H>  |  10  ----------------------------<  110<H>  3.7   |  21<L>  |  1.07    Ca    7.3<L>      16 Oct 2021 21:15  Phos  2.8     10-16  Mg     1.90     10-16        PT/INR - ( 16 Oct 2021 15:28 )   PT: 12.3 sec;   INR: 1.08 ratio         PTT - ( 16 Oct 2021 15:28 )  PTT:26.0 sec  --------------------------------------------------------------------------------------    OTHER LABORATORY:     IMAGING STUDIES:   CXR:      SICU Daily Progress Note  =====================================================  Interval/Overnight Events:      - Dr. Beach - plan for OR tomorrow  - 2u prbc held for OR; COVID swab obtained; bowel prep w/ flagyl/neomycin today  - NPO after 4 am tonight  - 3 bloody BMs last night; H&H trending down; continue to monitor      HPI:  66 yo F w/ hx of diverticulosis (multiple previous admissions - last in March 2021), SOLOMON, NIDDM, nephrolithiasis, herniated lumbar disc (s/p plate), presenting to ED for 6 episodes of BRBPR since 4 pm yesterday. Pt reports associated lightheadedness and nausea preceding this event. She denies fevers, abd pain, and vomiting. Review of chart shows multiple past admissions to [facility] w/ hypotension requiring multiple transfusions. During her last admission in March 2021 the pt underwent IR embolization of a brach of her middle colic artery. Due to the severity of her pan-diverticulosis she would require total abdominal colectomy, and for this reason surgical interventions have not been pursued    In ED 10/12, pt has remained hemodynamically stable w/o tachycardia or hypotension. Initial lab work was significant for hgb of 7.7, which trended down to 6.7 at 4 hours. CTA abd/pelvis demonstrated active GI bleeding in the descending colon in the region of the splenic flexure. 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 Presently, the pt is awaiting evaluations by IR and GI. Due to the severity of bleeding on previous presentations, the SICU was consulted for ongoing hemodynamic monitoring while awaiting definitive plan for intervention.    PMH:   PAST MEDICAL HISTORY:  Diverticulosis     Fibroid uterus '94  surgically treated    Heart murmur mild    HTN (hypertension)     Hypothyroidism     Left ureteral stone     Lower gastrointestinal bleeding Multipel times since '2014: last episode 5/2018    Lumbar herniated disc ' 2010  surgically treated    Multiparity     SOLOMON (obstructive sleep apnea) non-compliant with CPAP    Pernicious anemia     Type 2 diabetes mellitus.     PAST SURGICAL HISTORY:  History of tonsillectomy age 14    S/P lumbar laminectomy ' 2010  with Fusion    S/P WESTLEY (total abdominal hysterectomy) ' 94  Laproscopic.  benign.     FAMILY HISTORY:  Family history of gastric cancer, Mother  Family history of throat cancer, Sister.  Allergies: oxycodone (Other)  Valium (Other)      MEDICATIONS:   --------------------------------------------------------------------------------------  Neurologic Medications  acetaminophen   Tablet .. 650 milliGRAM(s) Oral every 6 hours PRN Temp greater or equal to 38C (100.4F), Mild Pain (1 - 3)    Respiratory Medications    Cardiovascular Medications    Gastrointestinal Medications  dextrose 5% + sodium chloride 0.45% with potassium chloride 20 mEq/L 1000 milliLiter(s) IV Continuous <Continuous>    Genitourinary Medications    Hematologic/Oncologic Medications  influenza   Vaccine 0.5 milliLiter(s) IntraMuscular once    Antimicrobial/Immunologic Medications    Endocrine/Metabolic Medications  dextrose 50% Injectable 25 Gram(s) IV Push once  dextrose 50% Injectable 25 Gram(s) IV Push once  insulin lispro (ADMELOG) corrective regimen sliding scale   SubCutaneous every 6 hours  levothyroxine 200 MICROGram(s) Oral daily  simvastatin 40 milliGRAM(s) Oral at bedtime    Topical/Other Medications    --------------------------------------------------------------------------------------    VITAL SIGNS, INS/OUTS (last 24 hours):  --------------------------------------------------------------------------------------  ICU Vital Signs Last 24 Hrs  T(C): 36.7 (17 Oct 2021 00:00), Max: 37.1 (16 Oct 2021 15:00)  T(F): 98.1 (17 Oct 2021 00:00), Max: 98.7 (16 Oct 2021 15:00)  HR: 80 (17 Oct 2021 01:00) (73 - 94)  BP: 140/119 (17 Oct 2021 01:00) (118/56 - 164/77)  BP(mean): 124 (17 Oct 2021 01:00) (70 - 124)  ABP: --  ABP(mean): --  RR: 18 (17 Oct 2021 01:00) (16 - 23)  SpO2: 100% (17 Oct 2021 01:00) (94% - 100%)  I&O's Detail    15 Oct 2021 07:01  -  16 Oct 2021 07:00  --------------------------------------------------------  IN:    dextrose 5% + sodium chloride 0.45% w/ Additives: 3000 mL    IV PiggyBack: 1150 mL    Oral Fluid: 600 mL    PRBCs (Packed Red Blood Cells): 600 mL  Total IN: 5350 mL    OUT:    Voided (mL): 1100 mL  Total OUT: 1100 mL    Total NET: 4250 mL      16 Oct 2021 07:01  -  17 Oct 2021 03:02  --------------------------------------------------------  IN:    dextrose 5% + sodium chloride 0.45% w/ Additives: 1125 mL    Oral Fluid: 570 mL  Total IN: 1695 mL    OUT:    Voided (mL): 2500 mL  Total OUT: 2500 mL    Total NET: -805 mL        NEUROLOGY  RASS:   	GCS:    Gen: A&Ox4   HEENT: Atraumatic. Mucous membranes moist, no scleral icterus   CV: RRR. No murmurs. No significant LE edema. Distal pulses 2+. Capillary refill < 2 seconds.  Resp: Respirations unlabored CTAB, no rales, rhonchi, or wheezes.  GI: Abdomen non tender to palpation, soft and non-distended. + BS.  Skin/MSK: No open wounds. No ecchymosis appreciated.  Neuro:  Following commands. Moving extremities spontaneously.  Psych: Appropriate mood, cooperative    METABOLIC/FLUIDS/ELECTROLYTES  dextrose 5% + sodium chloride 0.45% with potassium chloride 20 mEq/L 1000 milliLiter(s) IV Continuous <Continuous>      HEMATOLOGIC  [x] VTE Prophylaxis:   Transfusions:	[] PRBC	[] Platelets		[] FFP	[] Cryoprecipitate    INFECTIOUS DISEASE  Antimicrobials/Immunologic Medications:  influenza   Vaccine 0.5 milliLiter(s) IntraMuscular once    Day #      of     ***    Tubes/Lines/Drains  ***  [x] Peripheral IV  [] Central Venous Line     	[] R	[] L	[] IJ	[] Fem	[] SC	Date Placed:   [] Arterial Line		[] R	[] L	[] Fem	[] Rad	[] Ax	Date Placed:   [] PICC		[] Midline		[] Mediport  [] Urinary Catheter		Date Placed:   [x] Necessity of urinary, arterial, and venous catheters discussed    LABS  --------------------------------------------------------------------------------------                        8.5    7.93  )-----------( 107      ( 16 Oct 2021 21:15 )             24.5       10-16    142  |  113<H>  |  10  ----------------------------<  110<H>  3.7   |  21<L>  |  1.07    Ca    7.3<L>      16 Oct 2021 21:15  Phos  2.8     10-16  Mg     1.90     10-16        PT/INR - ( 16 Oct 2021 15:28 )   PT: 12.3 sec;   INR: 1.08 ratio         PTT - ( 16 Oct 2021 15:28 )  PTT:26.0 sec  --------------------------------------------------------------------------------------    OTHER LABORATORY:     IMAGING STUDIES:   CXR:

## 2021-10-17 NOTE — PROGRESS NOTE ADULT - ASSESSMENT
66 yo F w/ hx of pan-diverticulosis (multiple previous admissions - last in March 2021), SOLOMON, NIDDM, nephrolithiasis, herniated lumbar disc (s/p plate), presenting to ED on 10/13 for 6 episodes of BRBPR. Associated nausea and lightheadedness, denies pain. No further bleeding in ED. Review of chart shows multiple past admissions to [facility] w/ hypotension requiring multiple transfusions. Last admitted in March 2021 and now s/p IR embolization. Hemodynamically stable on presentation to ED, however hgb trending down from 7.7 to 6.7 over 4 hrs. CTA w/ active bleeding in descending colon. Pt accepted for admission to SICU for hemodynamic monitoring while awaiting IR/GI consultation and prbc transfusion.    PLAN:    NEURO:  - no acute issues  - monitor mental status  - OOBTC today    RESPIRATORY:   -no acute issues  -monitor respiratory status  -continuous pulse ox    CARDIOVASCULAR: hx HTN  -holding ramipril 10 mg BID  -simvastatin 40 mg daily  -monitor VS and perfusion status    GI/NUTRITION: hx pan-diverticulosis, lower GIB, IR embolization   - CLD  - s/p GI flexible sigmoidoscopy, no active bleed noted  - Repeat CTA w/ no active bleed  - IR consultation: no intervention at this time  - Appreciate Dr. Beach plans if still bleeding, OR Monday/early next week, ACS if emergent need for resection  - monitor for further bleeding (last bowel movement 10/15 @4pm was black per patient, no bright red blood)    GENITOURINARY/RENAL:  - D5 1/2 NS at 50 cc/hr  - monitor UOP  - monitor electrolytes    HEMATOLOGIC:  -monitor H&H; CBC q6  -f/u repeat coags  -resume SQH tomorrow    INFECTIOUS DISEASE:  -monitor for fevers; trend WBC    ENDOCRINE: hx NIDDM  -trend glucose  -ISS  -synthroid 200 mcg daily    Lines:  PIV x 2, 20G and 22G    GOC/CODE STATUS:  Full Code    DISPO: SICU   64 yo F w/ hx of pan-diverticulosis (multiple previous admissions - last in March 2021), SOLOMON, NIDDM, nephrolithiasis, herniated lumbar disc (s/p plate), presenting to ED on 10/13 for 6 episodes of BRBPR. Associated nausea and lightheadedness, denies pain. No further bleeding in ED. Review of chart shows multiple past admissions to [facility] w/ hypotension requiring multiple transfusions. Last admitted in March 2021 and now s/p IR embolization. Hemodynamically stable on presentation to ED, however hgb trending down from 7.7 to 6.7 over 4 hrs. CTA w/ active bleeding in descending colon. Pt accepted for admission to SICU for hemodynamic monitoring while awaiting IR/GI consultation and prbc transfusion.    PLAN:    NEURO:  - no acute issues  - monitor mental status  - OOBTC today    RESPIRATORY:   -no acute issues  -monitor respiratory status  -continuous pulse ox    CARDIOVASCULAR: hx HTN  -holding ramipril 10 mg BID  -simvastatin 40 mg daily  -monitor VS and perfusion status    GI/NUTRITION: hx pan-diverticulosis, lower GIB, IR embolization   - CLD and NPO for 4AM 10/18  - Bowel Prep today for OR tomorrow  - s/p GI flexible sigmoidoscopy, no active bleed noted  - Repeat CTA w/ no active bleed  - IR consultation: no intervention at this time  - Appreciate Dr. Beach plans if still bleeding, OR Monday/early next week, ACS if emergent need for resection  - monitor for further bleeding (last bowel movement 10/15 @4pm was black per patient, no bright red blood)    GENITOURINARY/RENAL:  - D5 1/2 NS at 50 cc/hr  - monitor UOP  - monitor electrolytes    HEMATOLOGIC:  -monitor H&H; CBC q6  -f/u repeat coags  -resume SQH tomorrow    INFECTIOUS DISEASE:  -monitor for fevers; trend WBC    ENDOCRINE: hx NIDDM  -trend glucose  -ISS  -synthroid 200 mcg daily    Lines:  PIV x 2, 20G and 22G    GOC/CODE STATUS:  Full Code    DISPO: SICU   66 yo F w/ hx of pan-diverticulosis (multiple previous admissions - last in March 2021), SOLOMON, NIDDM, nephrolithiasis, herniated lumbar disc (s/p plate), presenting to ED on 10/13 for 6 episodes of BRBPR. Associated nausea and lightheadedness, denies pain. No further bleeding in ED. Review of chart shows multiple past admissions to [facility] w/ hypotension requiring multiple transfusions. Last admitted in March 2021 and now s/p IR embolization. Hemodynamically stable on presentation to ED, however hgb trending down from 7.7 to 6.7 over 4 hrs. CTA w/ active bleeding in descending colon. Pt accepted for admission to SICU for hemodynamic monitoring while awaiting IR/GI consultation and prbc transfusion.    PLAN:    NEURO:  - no acute issues  - monitor mental status  - OOBTC today    RESPIRATORY:   -no acute issues  -monitor respiratory status  -continuous pulse ox    CARDIOVASCULAR: hx HTN  -holding ramipril 10 mg BID  -simvastatin 40 mg daily  -monitor VS and perfusion status    GI/NUTRITION: hx pan-diverticulosis, lower GIB, IR embolization   - CLD  - s/p GI flexible sigmoidoscopy, no active bleed noted  - Repeat CTA w/ no active bleed  - IR consultation: no intervention at this time  - s/p sigmoidoscopy w/ no active bleeding identified  - Dr. Beach - plan for OR tomorrow  - 2u prbc held for OR; COVID swab obtained; bowel prep w/ flagyl/neomycin today  - NPO after 4 am tonight  - 3 bloody BMs last night; trend CBC    GENITOURINARY/RENAL:  - D5 1/2 NS w/ 20 meq K+ at 50 cc/hr  - monitor UOP  - monitor electrolytes    HEMATOLOGIC:  -s/p 7u prbc's since admission  -monitor H&H; CBC q6    INFECTIOUS DISEASE:  -monitor for fevers; trend WBC    ENDOCRINE: hx NIDDM  -trend glucose  -ISS  -synthroid 200 mcg daily    Lines:  PIV x 2, 20G and 22G    GOC/CODE STATUS:  Full Code    DISPO: SICU

## 2021-10-17 NOTE — PROGRESS NOTE ADULT - SUBJECTIVE AND OBJECTIVE BOX
Surgery Progress Note    SUBJECTIVE:  - Patient seen and examined at bedside   - Patient states feeling well, had 1 dark red bloody BM  --------------------------------------------------------------------------------------------------  OBJECTIVE:   Physical Exam:  General: AAOx3, NAD, lying comfortably in bed  HEENT: NC/AT  Respiratory: nonlabored breathing  Cardiovascular: RRR, normal S1 and S2, no murmurs or gallops  Abdomen: non-distended, soft, non-tender  Extremities: WWP, no edema  --------------------------------------------------------------------------------------------------  V/S:  Vital Signs Last 24 Hrs  T(C): 36.9 (17 Oct 2021 04:00), Max: 37.1 (16 Oct 2021 15:00)  T(F): 98.4 (17 Oct 2021 04:00), Max: 98.7 (16 Oct 2021 15:00)  HR: 76 (17 Oct 2021 09:00) (74 - 94)  BP: 143/65 (17 Oct 2021 09:00) (118/56 - 164/77)  BP(mean): 84 (17 Oct 2021 09:00) (70 - 124)  RR: 18 (17 Oct 2021 09:00) (15 - 23)  SpO2: 100% (17 Oct 2021 09:00) (94% - 100%)    --------------------------------------------------------------------------------------------------  I/Os:    16 Oct 2021 07:01  -  17 Oct 2021 07:00  --------------------------------------------------------  IN:    dextrose 5% + sodium chloride 0.45% w/ Additives: 1425 mL    IV PiggyBack: 300 mL    Oral Fluid: 690 mL  Total IN: 2415 mL    OUT:    Voided (mL): 2800 mL  Total OUT: 2800 mL    Total NET: -385 mL      17 Oct 2021 07:01  -  17 Oct 2021 10:06  --------------------------------------------------------  IN:    dextrose 5% + sodium chloride 0.45% w/ Additives: 100 mL  Total IN: 100 mL    OUT:  Total OUT: 0 mL    Total NET: 100 mL        --------------------------------------------------------------------------------------------------  LABS:                        8.6    8.72  )-----------( 126      ( 17 Oct 2021 05:36 )             24.8     17 Oct 2021 05:36    140    |  109    |  13     ----------------------------<  142    4.5     |  20     |  1.09     Ca    7.9        17 Oct 2021 05:36  Phos  4.0       17 Oct 2021 05:36  Mg     2.50      17 Oct 2021 05:36      PT/INR - ( 16 Oct 2021 15:28 )   PT: 12.3 sec;   INR: 1.08 ratio         PTT - ( 16 Oct 2021 15:28 )  PTT:26.0 sec  CAPILLARY BLOOD GLUCOSE      POCT Blood Glucose.: 140 mg/dL (17 Oct 2021 06:29)  POCT Blood Glucose.: 124 mg/dL (16 Oct 2021 23:44)  POCT Blood Glucose.: 102 mg/dL (16 Oct 2021 17:16)  POCT Blood Glucose.: 164 mg/dL (16 Oct 2021 12:18)              --------------------------------------------------------------------------------------------------  MEDICATIONS  (STANDING):  dextrose 5% + sodium chloride 0.45% with potassium chloride 20 mEq/L 1000 milliLiter(s) (50 mL/Hr) IV Continuous <Continuous>  dextrose 50% Injectable 25 Gram(s) IV Push once  dextrose 50% Injectable 25 Gram(s) IV Push once  influenza   Vaccine 0.5 milliLiter(s) IntraMuscular once  insulin lispro (ADMELOG) corrective regimen sliding scale   SubCutaneous every 6 hours  levothyroxine 200 MICROGram(s) Oral daily  simvastatin 40 milliGRAM(s) Oral at bedtime    MEDICATIONS  (PRN):  acetaminophen   Tablet .. 650 milliGRAM(s) Oral every 6 hours PRN Temp greater or equal to 38C (100.4F), Mild Pain (1 - 3)    --------------------------------------------------------------------------------------------------

## 2021-10-17 NOTE — PROGRESS NOTE ADULT - ASSESSMENT
66 year old F with recurrent GI bleed.     Plan  - Plan per colorectal surgery  - Continuing supportive care.  Trend CBC. Transfuse PRN    - c/w CLD  - Care per SICU      A Team surgery   [id number]

## 2021-10-18 ENCOUNTER — RESULT REVIEW (OUTPATIENT)
Age: 66
End: 2021-10-18

## 2021-10-18 LAB
ANION GAP SERPL CALC-SCNC: 9 MMOL/L — SIGNIFICANT CHANGE UP (ref 7–14)
ANION GAP SERPL CALC-SCNC: 9 MMOL/L — SIGNIFICANT CHANGE UP (ref 7–14)
APTT BLD: 22.7 SEC — LOW (ref 27–36.3)
BLD GP AB SCN SERPL QL: NEGATIVE — SIGNIFICANT CHANGE UP
BUN SERPL-MCNC: 10 MG/DL — SIGNIFICANT CHANGE UP (ref 7–23)
BUN SERPL-MCNC: 11 MG/DL — SIGNIFICANT CHANGE UP (ref 7–23)
CALCIUM SERPL-MCNC: 7.4 MG/DL — LOW (ref 8.4–10.5)
CALCIUM SERPL-MCNC: 7.6 MG/DL — LOW (ref 8.4–10.5)
CHLORIDE SERPL-SCNC: 104 MMOL/L — SIGNIFICANT CHANGE UP (ref 98–107)
CHLORIDE SERPL-SCNC: 109 MMOL/L — HIGH (ref 98–107)
CO2 SERPL-SCNC: 19 MMOL/L — LOW (ref 22–31)
CO2 SERPL-SCNC: 20 MMOL/L — LOW (ref 22–31)
CREAT SERPL-MCNC: 1.02 MG/DL — SIGNIFICANT CHANGE UP (ref 0.5–1.3)
CREAT SERPL-MCNC: 1.16 MG/DL — SIGNIFICANT CHANGE UP (ref 0.5–1.3)
GLUCOSE BLDC GLUCOMTR-MCNC: 171 MG/DL — HIGH (ref 70–99)
GLUCOSE BLDC GLUCOMTR-MCNC: 95 MG/DL — SIGNIFICANT CHANGE UP (ref 70–99)
GLUCOSE SERPL-MCNC: 169 MG/DL — HIGH (ref 70–99)
GLUCOSE SERPL-MCNC: 390 MG/DL — HIGH (ref 70–99)
HCT VFR BLD CALC: 19.3 % — CRITICAL LOW (ref 34.5–45)
HCT VFR BLD CALC: 20.5 % — CRITICAL LOW (ref 34.5–45)
HCT VFR BLD CALC: 22.4 % — LOW (ref 34.5–45)
HGB BLD-MCNC: 6.5 G/DL — CRITICAL LOW (ref 11.5–15.5)
HGB BLD-MCNC: 6.9 G/DL — CRITICAL LOW (ref 11.5–15.5)
HGB BLD-MCNC: 7.9 G/DL — LOW (ref 11.5–15.5)
INR BLD: 1.16 RATIO — SIGNIFICANT CHANGE UP (ref 0.88–1.16)
MAGNESIUM SERPL-MCNC: 1.9 MG/DL — SIGNIFICANT CHANGE UP (ref 1.6–2.6)
MAGNESIUM SERPL-MCNC: 2 MG/DL — SIGNIFICANT CHANGE UP (ref 1.6–2.6)
MCHC RBC-ENTMCNC: 29 PG — SIGNIFICANT CHANGE UP (ref 27–34)
MCHC RBC-ENTMCNC: 29 PG — SIGNIFICANT CHANGE UP (ref 27–34)
MCHC RBC-ENTMCNC: 29.6 PG — SIGNIFICANT CHANGE UP (ref 27–34)
MCHC RBC-ENTMCNC: 33.7 GM/DL — SIGNIFICANT CHANGE UP (ref 32–36)
MCHC RBC-ENTMCNC: 33.7 GM/DL — SIGNIFICANT CHANGE UP (ref 32–36)
MCHC RBC-ENTMCNC: 35.3 GM/DL — SIGNIFICANT CHANGE UP (ref 32–36)
MCV RBC AUTO: 83.9 FL — SIGNIFICANT CHANGE UP (ref 80–100)
MCV RBC AUTO: 86.1 FL — SIGNIFICANT CHANGE UP (ref 80–100)
MCV RBC AUTO: 86.2 FL — SIGNIFICANT CHANGE UP (ref 80–100)
NRBC # BLD: 0 /100 WBCS — SIGNIFICANT CHANGE UP
NRBC # FLD: 0 K/UL — SIGNIFICANT CHANGE UP
PHOSPHATE SERPL-MCNC: 2.6 MG/DL — SIGNIFICANT CHANGE UP (ref 2.5–4.5)
PHOSPHATE SERPL-MCNC: 2.7 MG/DL — SIGNIFICANT CHANGE UP (ref 2.5–4.5)
PLATELET # BLD AUTO: 135 K/UL — LOW (ref 150–400)
PLATELET # BLD AUTO: 144 K/UL — LOW (ref 150–400)
PLATELET # BLD AUTO: 147 K/UL — LOW (ref 150–400)
POTASSIUM SERPL-MCNC: 4.2 MMOL/L — SIGNIFICANT CHANGE UP (ref 3.5–5.3)
POTASSIUM SERPL-MCNC: 5 MMOL/L — SIGNIFICANT CHANGE UP (ref 3.5–5.3)
POTASSIUM SERPL-SCNC: 4.2 MMOL/L — SIGNIFICANT CHANGE UP (ref 3.5–5.3)
POTASSIUM SERPL-SCNC: 5 MMOL/L — SIGNIFICANT CHANGE UP (ref 3.5–5.3)
PROTHROM AB SERPL-ACNC: 13.2 SEC — SIGNIFICANT CHANGE UP (ref 10.6–13.6)
RBC # BLD: 2.24 M/UL — LOW (ref 3.8–5.2)
RBC # BLD: 2.38 M/UL — LOW (ref 3.8–5.2)
RBC # BLD: 2.67 M/UL — LOW (ref 3.8–5.2)
RBC # FLD: 16.7 % — HIGH (ref 10.3–14.5)
RBC # FLD: 16.7 % — HIGH (ref 10.3–14.5)
RBC # FLD: 16.8 % — HIGH (ref 10.3–14.5)
RH IG SCN BLD-IMP: POSITIVE — SIGNIFICANT CHANGE UP
SARS-COV-2 RNA SPEC QL NAA+PROBE: SIGNIFICANT CHANGE UP
SODIUM SERPL-SCNC: 132 MMOL/L — LOW (ref 135–145)
SODIUM SERPL-SCNC: 138 MMOL/L — SIGNIFICANT CHANGE UP (ref 135–145)
WBC # BLD: 6.71 K/UL — SIGNIFICANT CHANGE UP (ref 3.8–10.5)
WBC # BLD: 7.45 K/UL — SIGNIFICANT CHANGE UP (ref 3.8–10.5)
WBC # BLD: 7.62 K/UL — SIGNIFICANT CHANGE UP (ref 3.8–10.5)
WBC # FLD AUTO: 6.71 K/UL — SIGNIFICANT CHANGE UP (ref 3.8–10.5)
WBC # FLD AUTO: 7.45 K/UL — SIGNIFICANT CHANGE UP (ref 3.8–10.5)
WBC # FLD AUTO: 7.62 K/UL — SIGNIFICANT CHANGE UP (ref 3.8–10.5)

## 2021-10-18 PROCEDURE — 93010 ELECTROCARDIOGRAM REPORT: CPT

## 2021-10-18 PROCEDURE — 44320 COLOSTOMY: CPT

## 2021-10-18 PROCEDURE — 44145 PARTIAL REMOVAL OF COLON: CPT

## 2021-10-18 PROCEDURE — 88307 TISSUE EXAM BY PATHOLOGIST: CPT | Mod: 26

## 2021-10-18 RX ORDER — HYDROMORPHONE HYDROCHLORIDE 2 MG/ML
1 INJECTION INTRAMUSCULAR; INTRAVENOUS; SUBCUTANEOUS EVERY 4 HOURS
Refills: 0 | Status: DISCONTINUED | OUTPATIENT
Start: 2021-10-18 | End: 2021-10-18

## 2021-10-18 RX ORDER — HYDROMORPHONE HYDROCHLORIDE 2 MG/ML
30 INJECTION INTRAMUSCULAR; INTRAVENOUS; SUBCUTANEOUS
Refills: 0 | Status: DISCONTINUED | OUTPATIENT
Start: 2021-10-18 | End: 2021-10-19

## 2021-10-18 RX ORDER — LEVOTHYROXINE SODIUM 125 MCG
150 TABLET ORAL AT BEDTIME
Refills: 0 | Status: DISCONTINUED | OUTPATIENT
Start: 2021-10-18 | End: 2021-10-21

## 2021-10-18 RX ORDER — HYDROMORPHONE HYDROCHLORIDE 2 MG/ML
0.5 INJECTION INTRAMUSCULAR; INTRAVENOUS; SUBCUTANEOUS EVERY 4 HOURS
Refills: 0 | Status: DISCONTINUED | OUTPATIENT
Start: 2021-10-18 | End: 2021-10-18

## 2021-10-18 RX ORDER — ONDANSETRON 8 MG/1
4 TABLET, FILM COATED ORAL EVERY 6 HOURS
Refills: 0 | Status: DISCONTINUED | OUTPATIENT
Start: 2021-10-18 | End: 2021-10-21

## 2021-10-18 RX ORDER — SODIUM CHLORIDE 9 MG/ML
1000 INJECTION, SOLUTION INTRAVENOUS
Refills: 0 | Status: DISCONTINUED | OUTPATIENT
Start: 2021-10-18 | End: 2021-10-21

## 2021-10-18 RX ADMIN — NEOMYCIN SULFATE 500 MILLIGRAM(S): 500 TABLET ORAL at 03:51

## 2021-10-18 RX ADMIN — Medication 200 MICROGRAM(S): at 06:52

## 2021-10-18 RX ADMIN — HYDROMORPHONE HYDROCHLORIDE 1 MILLIGRAM(S): 2 INJECTION INTRAMUSCULAR; INTRAVENOUS; SUBCUTANEOUS at 22:39

## 2021-10-18 RX ADMIN — POLYETHYLENE GLYCOL 3350 17 GRAM(S): 17 POWDER, FOR SOLUTION ORAL at 00:12

## 2021-10-18 RX ADMIN — Medication 1: at 12:10

## 2021-10-18 RX ADMIN — HYDROMORPHONE HYDROCHLORIDE 30 MILLILITER(S): 2 INJECTION INTRAMUSCULAR; INTRAVENOUS; SUBCUTANEOUS at 23:22

## 2021-10-18 RX ADMIN — Medication 1: at 06:49

## 2021-10-18 RX ADMIN — SODIUM CHLORIDE 75 MILLILITER(S): 9 INJECTION, SOLUTION INTRAVENOUS at 22:39

## 2021-10-18 RX ADMIN — Medication 250 MILLIGRAM(S): at 03:51

## 2021-10-18 NOTE — PROGRESS NOTE ADULT - SUBJECTIVE AND OBJECTIVE BOX
Plastic Surgery Progress Note (pg [facility]: [id number], [facility]: [phone])    SUBJECTIVE  Pt comfortable in bed. Pain well controlled. Endorses bloody bowel movements every 90 minutes overnight.    OBJECTIVE  ___________________________________________________  VITAL SIGNS / I&O's   Vital Signs Last 24 Hrs  T(C): 36.8 (18 Oct 2021 05:56), Max: 36.8 (17 Oct 2021 16:00)  T(F): 98.2 (18 Oct 2021 05:56), Max: 98.3 (17 Oct 2021 20:00)  HR: 86 (18 Oct 2021 07:00) (72 - 110)  BP: 127/56 (18 Oct 2021 07:00) (91/37 - 161/82)  BP(mean): 75 (18 Oct 2021 07:00) (47 - 97)  RR: 19 (18 Oct 2021 07:00) (14 - 25)  SpO2: 98% (18 Oct 2021 07:00) (69% - 100%)      17 Oct 2021 07:01  -  18 Oct 2021 07:00  --------------------------------------------------------  IN:    dextrose 5% + sodium chloride 0.45% w/ Additives: 1050 mL    Oral Fluid: 1800 mL    PRBCs (Packed Red Blood Cells): 300 mL  Total IN: 3150 mL    OUT:    Voided (mL): 900 mL  Total OUT: 900 mL    Total NET: 2250 mL        ___________________________________________________  PHYSICAL EXAM    GENERAL: NAD, lying in bed comfortably  HEAD:  Atraumatic, Normocephalic  ENT: Moist mucous membranes  CHEST/LUNG: Unlabored respirations  ABDOMEN: Soft, Nontender, Nondistended.    ___________________________________________________  LABS                        6.9    7.62  )-----------( 147      ( 18 Oct 2021 05:33 )             20.5     18 Oct 2021 05:33    138    |  109    |  11     ----------------------------<  169    4.2     |  20     |  1.16     Ca    7.6        18 Oct 2021 05:33  Phos  2.7       18 Oct 2021 05:33  Mg     2.00      18 Oct 2021 05:33      PT/INR - ( 18 Oct 2021 02:52 )   PT: 13.2 sec;   INR: 1.16 ratio         PTT - ( 18 Oct 2021 02:52 )  PTT:22.7 sec  CAPILLARY BLOOD GLUCOSE      POCT Blood Glucose.: 147 mg/dL (17 Oct 2021 23:25)  POCT Blood Glucose.: 117 mg/dL (17 Oct 2021 17:11)  POCT Blood Glucose.: 121 mg/dL (17 Oct 2021 12:22)    ___________________________________________________  MEDICATIONS  (STANDING):  dextrose 5% + sodium chloride 0.45% with potassium chloride 20 mEq/L 1000 milliLiter(s) (50 mL/Hr) IV Continuous <Continuous>  dextrose 50% Injectable 25 Gram(s) IV Push once  dextrose 50% Injectable 25 Gram(s) IV Push once  influenza   Vaccine 0.5 milliLiter(s) IntraMuscular once  insulin lispro (ADMELOG) corrective regimen sliding scale   SubCutaneous every 6 hours  levothyroxine 200 MICROGram(s) Oral daily  simvastatin 40 milliGRAM(s) Oral at bedtime    MEDICATIONS  (PRN):  acetaminophen   Tablet .. 650 milliGRAM(s) Oral every 6 hours PRN Temp greater or equal to 38C (100.4F), Mild Pain (1 - 3)

## 2021-10-18 NOTE — PROGRESS NOTE ADULT - SUBJECTIVE AND OBJECTIVE BOX
Subjective:  No acute overnight events.  Patient seen and examined at bedside with surgical team during morning rounds. Offers no complaints at this time.     PHYSICAL EXAM    GENERAL: NAD, lying in bed comfortably  HEAD:  Atraumatic, Normocephalic  CHEST/LUNG: Unlabored respirations  ABDOMEN: Soft, Nontender, Nondistended.    T(C): 36.7 (10-18-21 @ 12:00), Max: 36.8 (10-17-21 @ 16:00)  HR: 86 (10-18-21 @ 12:00) (72 - 110)  BP: 123/54 (10-18-21 @ 12:00) (91/37 - 161/82)  RR: 22 (10-18-21 @ 12:00) (16 - 25)  SpO2: 98% (10-18-21 @ 12:00) (69% - 100%)      10-17-21 @ 07:01  -  10-18-21 @ 07:00  --------------------------------------------------------  IN: 3150 mL / OUT: 900 mL / NET: 2250 mL    10-18-21 @ 07:01  -  10-18-21 @ 12:11  --------------------------------------------------------  IN: 250 mL / OUT: 600 mL / NET: -350 mL        LABS:  cret                        7.9    7.45  )-----------( 144      ( 18 Oct 2021 12:00 )             22.4     10-18    138  |  109<H>  |  11  ----------------------------<  169<H>  4.2   |  20<L>  |  1.16    Ca    7.6<L>      18 Oct 2021 05:33  Phos  2.7     10-18  Mg     2.00     10-18      PT/INR - ( 18 Oct 2021 02:52 )   PT: 13.2 sec;   INR: 1.16 ratio         PTT - ( 18 Oct 2021 02:52 )  PTT:22.7 sec      acetaminophen   Tablet .. 650 milliGRAM(s) Oral every 6 hours PRN  dextrose 5% + sodium chloride 0.45% with potassium chloride 20 mEq/L 1000 milliLiter(s) IV Continuous <Continuous>  dextrose 50% Injectable 25 Gram(s) IV Push once  dextrose 50% Injectable 25 Gram(s) IV Push once  influenza   Vaccine 0.5 milliLiter(s) IntraMuscular once  insulin lispro (ADMELOG) corrective regimen sliding scale   SubCutaneous every 6 hours  levothyroxine 200 MICROGram(s) Oral daily  simvastatin 40 milliGRAM(s) Oral at bedtime      Imaging:

## 2021-10-18 NOTE — PROGRESS NOTE ADULT - SUBJECTIVE AND OBJECTIVE BOX
SICU Daily Progress Note  =====================================================  24 Hour Interval/Overnight Events:      - OR today with Dr. Yong DUMAS after 4 am tonight     65F w/ hx of diverticulosis (reports 8 previous admissions at multiple hospitals), herniated lumbar disc (s/p plate), SOLOMON, DM (on oral agent) presents with 6 episodes of BRBPR that began at 4pm on 10/12/21. She denies any abdominal pain but says she was lightheaded yesterday at home. Also reports some nausea.    She has had multiple SICU admissions at [facility] after presenting with hypotension and requiring multiple transfusions. Her last admission was in March 2021 and she underwent IR embolization of a branch of the middle colic artery. She has had bleeds localized to several areas of the colon and has pandiverticulosis. Operative intervention has not been pursued because she would require a total abdominal colectomy.    On presentation today she is hemodynamically stable - HR 70s, -140s. She is mentating well and currently has no complaints. CTA localized the bleed to the descending colon at the splenic flexure.      --------------------------------------------------------------------------------------  Allergies: oxycodone (Other)  Valium (Other)      MEDICATIONS:   --------------------------------------------------------------------------------------  Neurologic Medications  acetaminophen   Tablet .. 650 milliGRAM(s) Oral every 6 hours PRN Temp greater or equal to 38C (100.4F), Mild Pain (1 - 3)    Respiratory Medications    Cardiovascular Medications    Gastrointestinal Medications  dextrose 5% + sodium chloride 0.45% with potassium chloride 20 mEq/L 1000 milliLiter(s) IV Continuous <Continuous>    Genitourinary Medications    Hematologic/Oncologic Medications  influenza   Vaccine 0.5 milliLiter(s) IntraMuscular once    Antimicrobial/Immunologic Medications  metroNIDAZOLE    Tablet 250 milliGRAM(s) Oral <User Schedule>  neomycin 500 milliGRAM(s) Oral <User Schedule>    Endocrine/Metabolic Medications  dextrose 50% Injectable 25 Gram(s) IV Push once  dextrose 50% Injectable 25 Gram(s) IV Push once  insulin lispro (ADMELOG) corrective regimen sliding scale   SubCutaneous every 6 hours  levothyroxine 200 MICROGram(s) Oral daily  simvastatin 40 milliGRAM(s) Oral at bedtime    Topical/Other Medications    --------------------------------------------------------------------------------------    VITAL SIGNS, INS/OUTS (last 24 hours):  --------------------------------------------------------------------------------------  T(C): 36.8 (10-17-21 @ 20:00), Max: 36.9 (10-17-21 @ 04:00)  HR: 110 (10-17-21 @ 21:00) (72 - 110)  BP: 91/37 (10-17-21 @ 20:00) (91/37 - 161/82)  BP(mean): 47 (10-17-21 @ 20:00) (47 - 124)  ABP: --  ABP(mean): --  RR: 20 (10-17-21 @ 21:00) (14 - 23)  SpO2: 69% (10-17-21 @ 21:00) (69% - 100%)  Wt(kg): --  CVP(mm Hg): --  CI: --  CAPILLARY BLOOD GLUCOSE      POCT Blood Glucose.: 147 mg/dL (17 Oct 2021 23:25)  POCT Blood Glucose.: 117 mg/dL (17 Oct 2021 17:11)  POCT Blood Glucose.: 121 mg/dL (17 Oct 2021 12:22)  POCT Blood Glucose.: 140 mg/dL (17 Oct 2021 06:29)   N/A      10-16 @ 07:01  -  10-17 @ 07:00  --------------------------------------------------------  IN:    dextrose 5% + sodium chloride 0.45% w/ Additives: 1425 mL    IV PiggyBack: 300 mL    Oral Fluid: 690 mL  Total IN: 2415 mL    OUT:    Voided (mL): 2800 mL  Total OUT: 2800 mL    Total NET: -385 mL      10-17 @ 07:01  -  10-18 @ 00:47  --------------------------------------------------------  IN:    dextrose 5% + sodium chloride 0.45% w/ Additives: 700 mL    Oral Fluid: 1800 mL  Total IN: 2500 mL    OUT:    Voided (mL): 900 mL  Total OUT: 900 mL    Total NET: 1600 mL        --------------------------------------------------------------------------------------  EXAM  NEUROLOGY  Exam: Normal, NAD, alert, oriented x3, no focal deficits.    HEENT  Exam: Normocephalic, atraumatic, EOMI.     RESPIRATORY  Exam: Lungs clear to auscultation, Normal expansion/effort.      CARDIOVASCULAR  Exam: S1, S2.  Regular rate and rhythm.      GI/NUTRITION  Exam: Abdomen soft, Non-tender, Non-distended.   Current Diet: Diet, NPO      METABOLIC/FLUIDS/ELECTROLYTES  dextrose 5% + sodium chloride 0.45% with potassium chloride 20 mEq/L 1000 milliLiter(s) IV Continuous <Continuous>      HEMATOLOGIC  [x] VTE Prophylaxis:     LABS  --------------------------------------------------------------------------------------  CBC (10-17 @ 17:30)                          7.7<L>                   7.48    )--------------(  152        --    % Neuts, --    % Lymphs, ANC: --                              22.9<L>  CBC (10-17 @ 05:36)                          8.6<L>                   8.72    )--------------(  126<L>     --    % Neuts, --    % Lymphs, ANC: --                              24.8<L>    BMP (10-17 @ 05:36)       140     |  109<H>  |  13    			Ca++ --      Ca 7.9<L>       ---------------------------------( 142<H>		Mg 2.50         4.5     |  20<L>   |  1.09  			Ph 4.0     BMP (10-17 @ 03:21)       135     |  101     |  14    			Ca++ --      Ca 7.9<L>       ---------------------------------( 146<H>		Mg 1.80         3.6     |  25      |  0.69  			Ph 3.1         Coags (10-16 @ 15:28)  aPTT 26.0<L> / INR 1.08 / PT 12.3              -------------------------------------------------------------------------------------- SICU Daily Progress Note  =====================================================  24 Hour Interval/Overnight Events:     - H/H downtrended to 6.9, received 1uPRBC   - OR today with Dr. Yong DUMAS after 4 am tonight     65F w/ hx of diverticulosis (reports 8 previous admissions at multiple hospitals), herniated lumbar disc (s/p plate), SOLOMON, DM (on oral agent) presents with 6 episodes of BRBPR that began at 4pm on 10/12/21. She denies any abdominal pain but says she was lightheaded yesterday at home. Also reports some nausea.    She has had multiple SICU admissions at [facility] after presenting with hypotension and requiring multiple transfusions. Her last admission was in March 2021 and she underwent IR embolization of a branch of the middle colic artery. She has had bleeds localized to several areas of the colon and has pandiverticulosis. Operative intervention has not been pursued because she would require a total abdominal colectomy.    On presentation today she is hemodynamically stable - HR 70s, -140s. She is mentating well and currently has no complaints. CTA localized the bleed to the descending colon at the splenic flexure.  --------------------------------------------------------------------------------------  Allergies: oxycodone (Other)  Valium (Other)    MEDICATIONS:   --------------------------------------------------------------------------------------  Neurologic Medications  acetaminophen   Tablet .. 650 milliGRAM(s) Oral every 6 hours PRN Temp greater or equal to 38C (100.4F), Mild Pain (1 - 3)    Respiratory Medications    Cardiovascular Medications    Gastrointestinal Medications  dextrose 5% + sodium chloride 0.45% with potassium chloride 20 mEq/L 1000 milliLiter(s) IV Continuous <Continuous>    Genitourinary Medications    Hematologic/Oncologic Medications  influenza   Vaccine 0.5 milliLiter(s) IntraMuscular once    Antimicrobial/Immunologic Medications  metroNIDAZOLE    Tablet 250 milliGRAM(s) Oral <User Schedule>  neomycin 500 milliGRAM(s) Oral <User Schedule>    Endocrine/Metabolic Medications  dextrose 50% Injectable 25 Gram(s) IV Push once  dextrose 50% Injectable 25 Gram(s) IV Push once  insulin lispro (ADMELOG) corrective regimen sliding scale   SubCutaneous every 6 hours  levothyroxine 200 MICROGram(s) Oral daily  simvastatin 40 milliGRAM(s) Oral at bedtime    Topical/Other Medications    --------------------------------------------------------------------------------------    VITAL SIGNS, INS/OUTS (last 24 hours):  --------------------------------------------------------------------------------------  T(C): 36.8 (10-17-21 @ 20:00), Max: 36.9 (10-17-21 @ 04:00)  HR: 110 (10-17-21 @ 21:00) (72 - 110)  BP: 91/37 (10-17-21 @ 20:00) (91/37 - 161/82)  BP(mean): 47 (10-17-21 @ 20:00) (47 - 124)  ABP: --  ABP(mean): --  RR: 20 (10-17-21 @ 21:00) (14 - 23)  SpO2: 69% (10-17-21 @ 21:00) (69% - 100%)  Wt(kg): --  CVP(mm Hg): --  CI: --  CAPILLARY BLOOD GLUCOSE      POCT Blood Glucose.: 147 mg/dL (17 Oct 2021 23:25)  POCT Blood Glucose.: 117 mg/dL (17 Oct 2021 17:11)  POCT Blood Glucose.: 121 mg/dL (17 Oct 2021 12:22)  POCT Blood Glucose.: 140 mg/dL (17 Oct 2021 06:29)   N/A      10-16 @ 07:01  -  10-17 @ 07:00  --------------------------------------------------------  IN:    dextrose 5% + sodium chloride 0.45% w/ Additives: 1425 mL    IV PiggyBack: 300 mL    Oral Fluid: 690 mL  Total IN: 2415 mL    OUT:    Voided (mL): 2800 mL  Total OUT: 2800 mL    Total NET: -385 mL      10-17 @ 07:01  -  10-18 @ 00:47  --------------------------------------------------------  IN:    dextrose 5% + sodium chloride 0.45% w/ Additives: 700 mL    Oral Fluid: 1800 mL  Total IN: 2500 mL    OUT:    Voided (mL): 900 mL  Total OUT: 900 mL    Total NET: 1600 mL        --------------------------------------------------------------------------------------  EXAM  NEUROLOGY  Exam: Normal, NAD, alert, oriented x3, no focal deficits.    HEENT  Exam: Normocephalic, atraumatic, EOMI.     RESPIRATORY  Exam: Lungs clear to auscultation, Normal expansion/effort.      CARDIOVASCULAR  Exam: S1, S2.  Regular rate and rhythm.      GI/NUTRITION  Exam: Abdomen soft, Non-tender, Non-distended.   Current Diet: Diet, NPO      METABOLIC/FLUIDS/ELECTROLYTES  dextrose 5% + sodium chloride 0.45% with potassium chloride 20 mEq/L 1000 milliLiter(s) IV Continuous <Continuous>      HEMATOLOGIC  [x] VTE Prophylaxis:     LABS  --------------------------------------------------------------------------------------  CBC (10-17 @ 17:30)                          7.7<L>                   7.48    )--------------(  152        --    % Neuts, --    % Lymphs, ANC: --                              22.9<L>  CBC (10-17 @ 05:36)                          8.6<L>                   8.72    )--------------(  126<L>     --    % Neuts, --    % Lymphs, ANC: --                              24.8<L>    BMP (10-17 @ 05:36)       140     |  109<H>  |  13    			Ca++ --      Ca 7.9<L>       ---------------------------------( 142<H>		Mg 2.50         4.5     |  20<L>   |  1.09  			Ph 4.0     BMP (10-17 @ 03:21)       135     |  101     |  14    			Ca++ --      Ca 7.9<L>       ---------------------------------( 146<H>		Mg 1.80         3.6     |  25      |  0.69  			Ph 3.1         Coags (10-16 @ 15:28)  aPTT 26.0<L> / INR 1.08 / PT 12.3              -------------------------------------------------------------------------------------- SICU Daily Progress Note  =====================================================  24 Hour Interval/Overnight Events:     - H/H downtrended to 6.9, received 1uPRBC   - OR today with Dr. Yong DUMAS after 4 am tonight     65F w/ hx of diverticulosis (reports 8 previous admissions at multiple hospitals), herniated lumbar disc (s/p plate), SOLOMON, DM (on oral agent) presents with 6 episodes of BRBPR that began at 4pm on 10/12/21. She denies any abdominal pain but says she was lightheaded yesterday at home. Also reports some nausea.    She has had multiple SICU admissions at [facility] after presenting with hypotension and requiring multiple transfusions. Her last admission was in March 2021 and she underwent IR embolization of a branch of the middle colic artery. She has had bleeds localized to several areas of the colon and has pandiverticulosis. Operative intervention has not been pursued because she would require a total abdominal colectomy.    On presentation today she is hemodynamically stable - HR 70s, -140s. She is mentating well and currently has no complaints. CTA localized the bleed to the descending colon at the splenic flexure.  --------------------------------------------------------------------------------------  Allergies: oxycodone (Other)  Valium (Other)    MEDICATIONS:   --------------------------------------------------------------------------------------  Neurologic Medications  acetaminophen   Tablet .. 650 milliGRAM(s) Oral every 6 hours PRN Temp greater or equal to 38C (100.4F), Mild Pain (1 - 3)    Respiratory Medications    Cardiovascular Medications    Gastrointestinal Medications  dextrose 5% + sodium chloride 0.45% with potassium chloride 20 mEq/L 1000 milliLiter(s) IV Continuous <Continuous>    Genitourinary Medications    Hematologic/Oncologic Medications  influenza   Vaccine 0.5 milliLiter(s) IntraMuscular once    Antimicrobial/Immunologic Medications  metroNIDAZOLE    Tablet 250 milliGRAM(s) Oral <User Schedule>  neomycin 500 milliGRAM(s) Oral <User Schedule>    Endocrine/Metabolic Medications  dextrose 50% Injectable 25 Gram(s) IV Push once  dextrose 50% Injectable 25 Gram(s) IV Push once  insulin lispro (ADMELOG) corrective regimen sliding scale   SubCutaneous every 6 hours  levothyroxine 200 MICROGram(s) Oral daily  simvastatin 40 milliGRAM(s) Oral at bedtime    Topical/Other Medications    --------------------------------------------------------------------------------------    VITAL SIGNS, INS/OUTS (last 24 hours):  --------------------------------------------------------------------------------------  ICU Vital Signs Last 24 Hrs  T(C): 36.7 (18 Oct 2021 12:00), Max: 36.8 (17 Oct 2021 16:00)  T(F): 98 (18 Oct 2021 12:00), Max: 98.3 (17 Oct 2021 20:00)  HR: 87 (18 Oct 2021 13:00) (82 - 110)  BP: 127/64 (18 Oct 2021 13:00) (91/37 - 156/67)  BP(mean): 77 (18 Oct 2021 13:00) (47 - 93)  ABP: --  ABP(mean): --  RR: 18 (18 Oct 2021 13:00) (16 - 25)  SpO2: 100% (18 Oct 2021 13:00) (69% - 100%)    CAPILLARY BLOOD GLUCOSE      POCT Blood Glucose.: 147 mg/dL (17 Oct 2021 23:25)  POCT Blood Glucose.: 117 mg/dL (17 Oct 2021 17:11)  POCT Blood Glucose.: 121 mg/dL (17 Oct 2021 12:22)  POCT Blood Glucose.: 140 mg/dL (17 Oct 2021 06:29)   N/A        17 Oct 2021 07:01  -  18 Oct 2021 07:00  --------------------------------------------------------  IN:    dextrose 5% + sodium chloride 0.45% w/ Additives: 1050 mL    Oral Fluid: 1800 mL    PRBCs (Packed Red Blood Cells): 300 mL  Total IN: 3150 mL    OUT:    Voided (mL): 900 mL  Total OUT: 900 mL    Total NET: 2250 mL      18 Oct 2021 07:01  -  18 Oct 2021 14:11  --------------------------------------------------------  IN:    dextrose 5% + sodium chloride 0.45% w/ Additives: 250 mL  Total IN: 250 mL    OUT:    Voided (mL): 600 mL  Total OUT: 600 mL    Total NET: -350 mL      --------------------------------------------------------------------------------------  EXAM  NEUROLOGY  Exam: Normal, NAD, alert, oriented x3, no focal deficits.    HEENT  Exam: Normocephalic, atraumatic, EOMI.     RESPIRATORY  Exam: Lungs clear to auscultation, Normal expansion/effort.      CARDIOVASCULAR  Exam: S1, S2.  Regular rate and rhythm.      GI/NUTRITION  Exam: Abdomen soft, Non-tender, Non-distended.   Current Diet: Diet, NPO      METABOLIC/FLUIDS/ELECTROLYTES  dextrose 5% + sodium chloride 0.45% with potassium chloride 20 mEq/L 1000 milliLiter(s) IV Continuous <Continuous>      HEMATOLOGIC  [x] VTE Prophylaxis:     LABS  --------------------------------------------------------------------------------------  CBC (10-17 @ 17:30)                          7.7<L>                   7.48    )--------------(  152        --    % Neuts, --    % Lymphs, ANC: --                              22.9<L>  CBC (10-17 @ 05:36)                          8.6<L>                   8.72    )--------------(  126<L>     --    % Neuts, --    % Lymphs, ANC: --                              24.8<L>    BMP (10-17 @ 05:36)       140     |  109<H>  |  13    			Ca++ --      Ca 7.9<L>       ---------------------------------( 142<H>		Mg 2.50         4.5     |  20<L>   |  1.09  			Ph 4.0     BMP (10-17 @ 03:21)       135     |  101     |  14    			Ca++ --      Ca 7.9<L>       ---------------------------------( 146<H>		Mg 1.80         3.6     |  25      |  0.69  			Ph 3.1         Coags (10-16 @ 15:28)  aPTT 26.0<L> / INR 1.08 / PT 12.3              --------------------------------------------------------------------------------------

## 2021-10-18 NOTE — BRIEF OPERATIVE NOTE - NSICDXBRIEFPROCEDURE_GEN_ALL_CORE_FT
PROCEDURES:  Laparoscopic left colectomy 18-Oct-2021 21:49:09 laparoscopic assisted extended left hemicolectomy Briseida Palmer

## 2021-10-18 NOTE — PROGRESS NOTE ADULT - ASSESSMENT
66 year old F with recurrent GI bleed.     Plan  - Continuing supportive care.  Trend CBC. Transfuse PRN. Recommend transfusing 2 uPRBC instead of 1.  - Plan for OR today: hemicolectomy per Dr. Beach  [facility]     A Team surgery   [id number]

## 2021-10-18 NOTE — BRIEF OPERATIVE NOTE - OPERATION/FINDINGS
laparoscopic assisted extended left hemicolectomy. laparoscopic mobilization of splenic flexure. large diverticula throughout colon. creation of end transverse colostomy. hemostasis at end of case. closed with staples.

## 2021-10-18 NOTE — PROGRESS NOTE ADULT - ASSESSMENT
66 yo F w/ hx of pan-diverticulosis (multiple previous admissions - last in March 2021), SOLOMON, NIDDM, nephrolithiasis, herniated lumbar disc (s/p plate), presenting to ED on 10/13 for 6 episodes of BRBPR. Associated nausea and lightheadedness, denies pain. No further bleeding in ED. Review of chart shows multiple past admissions to [facility] w/ hypotension requiring multiple transfusions. Last admitted in March 2021 and now s/p IR embolization. CTA w/ active bleeding in descending colon. Pt accepted for admission to SICU for hemodynamic monitoring while awaiting IR/GI consultation and prbc transfusion.    NEURO:  - no acute issues  - monitor mental status    RESPIRATORY:   -no acute issues  -monitor respiratory status  -continuous pulse ox  - OOBTC     CARDIOVASCULAR: hx HTN  -holding ramipril 10 mg BID  -simvastatin 40 mg daily  -monitor VS and perfusion status    GI/NUTRITION: hx pan-diverticulosis, lower GIB, IR embolization   - s/p GI flexible sigmoidoscopy, no active bleed noted  - Repeat CTA w/ no active bleed  - IR consultation: no intervention at this time  - Dr. Beach - plan for OR  - NPO after 4 am tonight    GENITOURINARY/RENAL:  - D5 1/2 NS w/ 20 meq K+ at 50 cc/hr  - monitor UOP  - monitor electrolytes    HEMATOLOGIC:  -s/p 7u prbc's since admission  -monitor H&H; CBC    INFECTIOUS DISEASE:  -monitor for fevers; trend WBC    ENDOCRINE: hx NIDDM  -trend glucose  -ISS  -synthroid 200 mcg daily    Lines:  PIV x 2, 20G and 22G    GOC/CODE STATUS:  Full Code    DISPO: SICU       66 yo F w/ hx of pan-diverticulosis (multiple previous admissions - last in March 2021), SOLOMON, NIDDM, nephrolithiasis, herniated lumbar disc (s/p plate), presenting to ED on 10/13 for 6 episodes of BRBPR. Associated nausea and lightheadedness, denies pain. No further bleeding in ED. Review of chart shows multiple past admissions to [facility] w/ hypotension requiring multiple transfusions. Last admitted in March 2021 and now s/p IR embolization. CTA w/ active bleeding in descending colon. Pt accepted for admission to SICU for hemodynamic monitoring while awaiting IR/GI consultation and prbc transfusion.    NEURO:  - no acute issues, A&O x4  - Tylenol PRN for pain    RESPIRATORY:   - no acute issues  - monitor respiratory status  - continuous pulse ox  - OOBTC     CARDIOVASCULAR: hx HTN  - holding ramipril 10 mg BID  - simvastatin 40 mg daily  - monitor VS and perfusion status    GI/NUTRITION: hx pan-diverticulosis, lower GIB, IR embolization   - NPO for OR today with Dr. Beach, s/p bowel prep  - s/p GI flexible sigmoidoscopy, no active bleed noted  - Repeat CTA w/ no active bleed  - IR consultation: no intervention at this time    GENITOURINARY/RENAL:  - D5 1/2 NS w/ 20 meq K+ at 50 cc/hr  - monitor UOP  - monitor electrolytes    HEMATOLOGIC:  -s/p 7u prbc's since admission  -monitor H&H; CBC  - f/u repeat CBC, consider tranfusing 1u PRBC today preoperatively    INFECTIOUS DISEASE:  -monitor for fevers; trend WBC    ENDOCRINE: hx NIDDM  - trend glucose  - ISS  - synthroid 200 mcg daily    Lines:  PIV x 2, 20G and 22G    GOC/CODE STATUS:  Full Code    DISPO: SICU       66 yo F w/ hx of pan-diverticulosis (multiple previous admissions - last in March 2021), SOLOMON, NIDDM, nephrolithiasis, herniated lumbar disc (s/p plate), presenting to ED on 10/13 for 6 episodes of BRBPR. Associated nausea and lightheadedness, denies pain. No further bleeding in ED. Review of chart shows multiple past admissions to [facility] w/ hypotension requiring multiple transfusions. Last admitted in March 2021 and now s/p IR embolization. CTA w/ active bleeding in descending colon. Pt accepted for admission to SICU for hemodynamic monitoring while awaiting IR/GI consultation and prbc transfusion.    NEURO:  - no acute issues, A&O x4  - Tylenol PRN for pain    RESPIRATORY:   - no acute issues  - monitor respiratory status  - continuous pulse ox  - OOBTC     CARDIOVASCULAR: hx HTN  - holding ramipril 10 mg BID  - simvastatin 40 mg daily  - monitor VS and perfusion status    GI/NUTRITION: hx pan-diverticulosis, lower GIB, IR embolization   - NPO for OR today with Dr. Beach, s/p bowel prep  - s/p GI flexible sigmoidoscopy, no active bleed noted  - Repeat CTA w/ no active bleed  - IR consultation: no intervention at this time    GENITOURINARY/RENAL:  - D5 1/2 NS w/ 20 meq K+ at 50 cc/hr  - monitor UOP  - monitor electrolytes    HEMATOLOGIC:  -s/p 8u prbc's since admission  -monitor H&H; CBC  - f/u repeat CBC, consider tranfusing 1u PRBC today preoperatively    INFECTIOUS DISEASE:  -monitor for fevers; trend WBC    ENDOCRINE: hx NIDDM  - trend glucose  - ISS  - synthroid 200 mcg daily    Lines:  PIV x 2, 20G and 22G    GOC/CODE STATUS:  Full Code    DISPO: SICU

## 2021-10-19 LAB
ANION GAP SERPL CALC-SCNC: 11 MMOL/L — SIGNIFICANT CHANGE UP (ref 7–14)
BUN SERPL-MCNC: 9 MG/DL — SIGNIFICANT CHANGE UP (ref 7–23)
CALCIUM SERPL-MCNC: 7.7 MG/DL — LOW (ref 8.4–10.5)
CHLORIDE SERPL-SCNC: 109 MMOL/L — HIGH (ref 98–107)
CO2 SERPL-SCNC: 18 MMOL/L — LOW (ref 22–31)
CREAT SERPL-MCNC: 1.18 MG/DL — SIGNIFICANT CHANGE UP (ref 0.5–1.3)
GLUCOSE BLDC GLUCOMTR-MCNC: 100 MG/DL — HIGH (ref 70–99)
GLUCOSE BLDC GLUCOMTR-MCNC: 152 MG/DL — HIGH (ref 70–99)
GLUCOSE BLDC GLUCOMTR-MCNC: 177 MG/DL — HIGH (ref 70–99)
GLUCOSE BLDC GLUCOMTR-MCNC: 197 MG/DL — HIGH (ref 70–99)
GLUCOSE BLDC GLUCOMTR-MCNC: 86 MG/DL — SIGNIFICANT CHANGE UP (ref 70–99)
GLUCOSE SERPL-MCNC: 239 MG/DL — HIGH (ref 70–99)
HCT VFR BLD CALC: 25 % — LOW (ref 34.5–45)
HCT VFR BLD CALC: 26.6 % — LOW (ref 34.5–45)
HGB BLD-MCNC: 8.3 G/DL — LOW (ref 11.5–15.5)
HGB BLD-MCNC: 9.2 G/DL — LOW (ref 11.5–15.5)
LACTATE SERPL-SCNC: 0.7 MMOL/L — SIGNIFICANT CHANGE UP (ref 0.5–2)
MAGNESIUM SERPL-MCNC: 1.8 MG/DL — SIGNIFICANT CHANGE UP (ref 1.6–2.6)
MCHC RBC-ENTMCNC: 28.4 PG — SIGNIFICANT CHANGE UP (ref 27–34)
MCHC RBC-ENTMCNC: 29.5 PG — SIGNIFICANT CHANGE UP (ref 27–34)
MCHC RBC-ENTMCNC: 33.2 GM/DL — SIGNIFICANT CHANGE UP (ref 32–36)
MCHC RBC-ENTMCNC: 34.6 GM/DL — SIGNIFICANT CHANGE UP (ref 32–36)
MCV RBC AUTO: 85.3 FL — SIGNIFICANT CHANGE UP (ref 80–100)
MCV RBC AUTO: 85.6 FL — SIGNIFICANT CHANGE UP (ref 80–100)
NRBC # BLD: 0 /100 WBCS — SIGNIFICANT CHANGE UP
NRBC # BLD: 0 /100 WBCS — SIGNIFICANT CHANGE UP
NRBC # FLD: 0 K/UL — SIGNIFICANT CHANGE UP
NRBC # FLD: 0 K/UL — SIGNIFICANT CHANGE UP
PHOSPHATE SERPL-MCNC: 4 MG/DL — SIGNIFICANT CHANGE UP (ref 2.5–4.5)
PLATELET # BLD AUTO: 160 K/UL — SIGNIFICANT CHANGE UP (ref 150–400)
PLATELET # BLD AUTO: 165 K/UL — SIGNIFICANT CHANGE UP (ref 150–400)
POTASSIUM SERPL-MCNC: 4.3 MMOL/L — SIGNIFICANT CHANGE UP (ref 3.5–5.3)
POTASSIUM SERPL-SCNC: 4.3 MMOL/L — SIGNIFICANT CHANGE UP (ref 3.5–5.3)
RBC # BLD: 2.92 M/UL — LOW (ref 3.8–5.2)
RBC # BLD: 3.12 M/UL — LOW (ref 3.8–5.2)
RBC # FLD: 16.8 % — HIGH (ref 10.3–14.5)
RBC # FLD: 17.2 % — HIGH (ref 10.3–14.5)
SODIUM SERPL-SCNC: 138 MMOL/L — SIGNIFICANT CHANGE UP (ref 135–145)
WBC # BLD: 13.84 K/UL — HIGH (ref 3.8–10.5)
WBC # BLD: 15.64 K/UL — HIGH (ref 3.8–10.5)
WBC # FLD AUTO: 13.84 K/UL — HIGH (ref 3.8–10.5)
WBC # FLD AUTO: 15.64 K/UL — HIGH (ref 3.8–10.5)

## 2021-10-19 RX ORDER — ACETAMINOPHEN 500 MG
1000 TABLET ORAL EVERY 6 HOURS
Refills: 0 | Status: DISCONTINUED | OUTPATIENT
Start: 2021-10-19 | End: 2021-10-21

## 2021-10-19 RX ORDER — HEPARIN SODIUM 5000 [USP'U]/ML
5000 INJECTION INTRAVENOUS; SUBCUTANEOUS EVERY 8 HOURS
Refills: 0 | Status: DISCONTINUED | OUTPATIENT
Start: 2021-10-19 | End: 2021-10-22

## 2021-10-19 RX ORDER — MAGNESIUM SULFATE 500 MG/ML
2 VIAL (ML) INJECTION ONCE
Refills: 0 | Status: COMPLETED | OUTPATIENT
Start: 2021-10-19 | End: 2021-10-19

## 2021-10-19 RX ORDER — DEXTROSE 50 % IN WATER 50 %
15 SYRINGE (ML) INTRAVENOUS ONCE
Refills: 0 | Status: DISCONTINUED | OUTPATIENT
Start: 2021-10-19 | End: 2021-10-22

## 2021-10-19 RX ORDER — HYDROMORPHONE HYDROCHLORIDE 2 MG/ML
0.5 INJECTION INTRAMUSCULAR; INTRAVENOUS; SUBCUTANEOUS EVERY 4 HOURS
Refills: 0 | Status: DISCONTINUED | OUTPATIENT
Start: 2021-10-19 | End: 2021-10-21

## 2021-10-19 RX ORDER — GLUCAGON INJECTION, SOLUTION 0.5 MG/.1ML
1 INJECTION, SOLUTION SUBCUTANEOUS ONCE
Refills: 0 | Status: DISCONTINUED | OUTPATIENT
Start: 2021-10-19 | End: 2021-10-22

## 2021-10-19 RX ORDER — SODIUM CHLORIDE 9 MG/ML
1000 INJECTION, SOLUTION INTRAVENOUS
Refills: 0 | Status: DISCONTINUED | OUTPATIENT
Start: 2021-10-19 | End: 2021-10-22

## 2021-10-19 RX ORDER — SODIUM CHLORIDE 9 MG/ML
500 INJECTION, SOLUTION INTRAVENOUS ONCE
Refills: 0 | Status: COMPLETED | OUTPATIENT
Start: 2021-10-19 | End: 2021-10-19

## 2021-10-19 RX ORDER — DEXTROSE 50 % IN WATER 50 %
12.5 SYRINGE (ML) INTRAVENOUS ONCE
Refills: 0 | Status: DISCONTINUED | OUTPATIENT
Start: 2021-10-19 | End: 2021-10-22

## 2021-10-19 RX ORDER — INSULIN LISPRO 100/ML
VIAL (ML) SUBCUTANEOUS
Refills: 0 | Status: DISCONTINUED | OUTPATIENT
Start: 2021-10-19 | End: 2021-10-22

## 2021-10-19 RX ORDER — DEXTROSE 50 % IN WATER 50 %
25 SYRINGE (ML) INTRAVENOUS ONCE
Refills: 0 | Status: DISCONTINUED | OUTPATIENT
Start: 2021-10-19 | End: 2021-10-22

## 2021-10-19 RX ORDER — HYDROMORPHONE HYDROCHLORIDE 2 MG/ML
1 INJECTION INTRAMUSCULAR; INTRAVENOUS; SUBCUTANEOUS EVERY 4 HOURS
Refills: 0 | Status: DISCONTINUED | OUTPATIENT
Start: 2021-10-19 | End: 2021-10-21

## 2021-10-19 RX ADMIN — HYDROMORPHONE HYDROCHLORIDE 0.5 MILLIGRAM(S): 2 INJECTION INTRAMUSCULAR; INTRAVENOUS; SUBCUTANEOUS at 14:35

## 2021-10-19 RX ADMIN — HYDROMORPHONE HYDROCHLORIDE 1 MILLIGRAM(S): 2 INJECTION INTRAMUSCULAR; INTRAVENOUS; SUBCUTANEOUS at 08:55

## 2021-10-19 RX ADMIN — HYDROMORPHONE HYDROCHLORIDE 1 MILLIGRAM(S): 2 INJECTION INTRAMUSCULAR; INTRAVENOUS; SUBCUTANEOUS at 23:15

## 2021-10-19 RX ADMIN — Medication 1: at 11:49

## 2021-10-19 RX ADMIN — Medication 50 GRAM(S): at 06:54

## 2021-10-19 RX ADMIN — Medication 150 MICROGRAM(S): at 22:27

## 2021-10-19 RX ADMIN — HYDROMORPHONE HYDROCHLORIDE 1 MILLIGRAM(S): 2 INJECTION INTRAMUSCULAR; INTRAVENOUS; SUBCUTANEOUS at 18:50

## 2021-10-19 RX ADMIN — HEPARIN SODIUM 5000 UNIT(S): 5000 INJECTION INTRAVENOUS; SUBCUTANEOUS at 14:18

## 2021-10-19 RX ADMIN — SODIUM CHLORIDE 1000 MILLILITER(S): 9 INJECTION, SOLUTION INTRAVENOUS at 18:21

## 2021-10-19 RX ADMIN — SODIUM CHLORIDE 1000 MILLILITER(S): 9 INJECTION, SOLUTION INTRAVENOUS at 06:54

## 2021-10-19 RX ADMIN — HYDROMORPHONE HYDROCHLORIDE 1 MILLIGRAM(S): 2 INJECTION INTRAMUSCULAR; INTRAVENOUS; SUBCUTANEOUS at 23:00

## 2021-10-19 RX ADMIN — HYDROMORPHONE HYDROCHLORIDE 1 MILLIGRAM(S): 2 INJECTION INTRAMUSCULAR; INTRAVENOUS; SUBCUTANEOUS at 08:40

## 2021-10-19 RX ADMIN — HEPARIN SODIUM 5000 UNIT(S): 5000 INJECTION INTRAVENOUS; SUBCUTANEOUS at 21:56

## 2021-10-19 RX ADMIN — Medication 1: at 06:20

## 2021-10-19 RX ADMIN — Medication 50 GRAM(S): at 08:41

## 2021-10-19 RX ADMIN — HYDROMORPHONE HYDROCHLORIDE 0.5 MILLIGRAM(S): 2 INJECTION INTRAMUSCULAR; INTRAVENOUS; SUBCUTANEOUS at 14:20

## 2021-10-19 RX ADMIN — HYDROMORPHONE HYDROCHLORIDE 1 MILLIGRAM(S): 2 INJECTION INTRAMUSCULAR; INTRAVENOUS; SUBCUTANEOUS at 19:05

## 2021-10-19 RX ADMIN — Medication 1: at 00:41

## 2021-10-19 NOTE — PROGRESS NOTE ADULT - ASSESSMENT
66 year old F with recurrent GI bleed.     Plan  - Continuing supportive care.  Trend CBC. Transfuse PRN. Recommend transfusing 2 uPRBC instead of 1.  - Plan for OR today: hemicolectomy per Dr. Beach  [facility]     A Team surgery   [id number] 66 year old F with recurrent GI bleed, now post op laparoscopic assisted extended left hemicolectomy with end transverse colostomy, recovering in SICU.     Plan  - Diet: NPO, IVF  - Continuing supportive care.  Trend CBC. Transfuse PRN. Recommend transfusing 2 uPRBC instead of 1.  - Plan for OR today: hemicolectomy per Dr. Beach  - care per sicu     A Team surgery   [id number] 66 year old F with recurrent GI bleed, now post op laparoscopic assisted extended left hemicolectomy with end transverse colostomy, recovering in SICU.     Plan  - Diet: NPO, IVF  - Continuing supportive care.  Trend CBC. Transfuse PRN. Recommend transfusing 2 uPRBC instead of 1.  - care per s[facility]u     A Team surgery   [id number] 66 year old F with recurrent GI bleed, now POD #1 laparoscopic assisted extended left hemicolectomy with end transverse colostomy, recovering in SICU.     Plan  - Diet: NPO, IVF  - ostomy teaching for new transverse colostomy  - care per sicu     A Team surgery   [id number]

## 2021-10-19 NOTE — PROGRESS NOTE ADULT - SUBJECTIVE AND OBJECTIVE BOX
Subjective:  Patient now status post laparoscopic assisted extended left hemicolectomy on 10/18.     PHYSICAL EXAM    GENERAL: NAD, lying in bed comfortably  HEAD:  Atraumatic, Normocephalic  CHEST/LUNG: Unlabored respirations  ABDOMEN: Soft, Nontender, Nondistended.    T(C): 36.7 (10-18-21 @ 12:00), Max: 36.8 (10-17-21 @ 16:00)  HR: 86 (10-18-21 @ 12:00) (72 - 110)  BP: 123/54 (10-18-21 @ 12:00) (91/37 - 161/82)  RR: 22 (10-18-21 @ 12:00) (16 - 25)  SpO2: 98% (10-18-21 @ 12:00) (69% - 100%)      10-17-21 @ 07:01  -  10-18-21 @ 07:00  --------------------------------------------------------  IN: 3150 mL / OUT: 900 mL / NET: 2250 mL    10-18-21 @ 07:01  -  10-18-21 @ 12:11  --------------------------------------------------------  IN: 250 mL / OUT: 600 mL / NET: -350 mL        LABS:  cret                        7.9    7.45  )-----------( 144      ( 18 Oct 2021 12:00 )             22.4     10-18    138  |  109<H>  |  11  ----------------------------<  169<H>  4.2   |  20<L>  |  1.16    Ca    7.6<L>      18 Oct 2021 05:33  Phos  2.7     10-18  Mg     2.00     10-18      PT/INR - ( 18 Oct 2021 02:52 )   PT: 13.2 sec;   INR: 1.16 ratio         PTT - ( 18 Oct 2021 02:52 )  PTT:22.7 sec      acetaminophen   Tablet .. 650 milliGRAM(s) Oral every 6 hours PRN  dextrose 5% + sodium chloride 0.45% with potassium chloride 20 mEq/L 1000 milliLiter(s) IV Continuous <Continuous>  dextrose 50% Injectable 25 Gram(s) IV Push once  dextrose 50% Injectable 25 Gram(s) IV Push once  influenza   Vaccine 0.5 milliLiter(s) IntraMuscular once  insulin lispro (ADMELOG) corrective regimen sliding scale   SubCutaneous every 6 hours  levothyroxine 200 MICROGram(s) Oral daily  simvastatin 40 milliGRAM(s) Oral at bedtime      Imaging: Subjective:  Patient now status post laparoscopic assisted extended left hemicolectomy with end transverse colostomy on 10/18. Remains in SICU, seen and examined this AM.     PHYSICAL EXAM    GENERAL: NAD, lying in bed comfortably  HEAD:  Atraumatic, Normocephalic  CHEST/LUNG: Unlabored respirations  ABDOMEN: Softly distended, end transverse colostomy pink and viable. Wound c/d/i    Vital Signs Last 24 Hrs  T(C): 35.9 (18 Oct 2021 22:20), Max: 37.2 (18 Oct 2021 17:16)  T(F): 96.6 (18 Oct 2021 22:20), Max: 98.9 (18 Oct 2021 17:16)  HR: 72 (18 Oct 2021 23:00) (71 - 94)  BP: 124/83 (18 Oct 2021 23:00) (104/50 - 183/80)  BP(mean): 93 (18 Oct 2021 23:00) (63 - 99)  RR: 10 (18 Oct 2021 23:00) (10 - 27)  SpO2: 100% (18 Oct 2021 23:00) (86% - 100%)    I&O's Detail    17 Oct 2021 07:01  -  18 Oct 2021 07:00  --------------------------------------------------------  IN:    dextrose 5% + sodium chloride 0.45% w/ Additives: 1050 mL    Oral Fluid: 1800 mL    PRBCs (Packed Red Blood Cells): 300 mL  Total IN: 3150 mL    OUT:    Voided (mL): 900 mL  Total OUT: 900 mL    Total NET: 2250 mL      18 Oct 2021 07:01  -  19 Oct 2021 00:40  --------------------------------------------------------  IN:    dextrose 5% + sodium chloride 0.45% w/ Additives: 600 mL  Total IN: 600 mL    OUT:    Indwelling Catheter - Urethral (mL): 125 mL    Voided (mL): 1000 mL  Total OUT: 1125 mL    Total NET: -525 mL      LABS:                                   7.9    7.45  )-----------( 144      ( 18 Oct 2021 12:00 )             22.4   10-18    138  |  109<H>  |  11  ----------------------------<  169<H>  4.2   |  20<L>  |  1.16    Ca    7.6<L>      18 Oct 2021 05:33  Phos  2.7     10-18  Mg     2.00     10-18          acetaminophen   Tablet .. 650 milliGRAM(s) Oral every 6 hours PRN  dextrose 5% + sodium chloride 0.45% with potassium chloride 20 mEq/L 1000 milliLiter(s) IV Continuous <Continuous>  dextrose 50% Injectable 25 Gram(s) IV Push once  dextrose 50% Injectable 25 Gram(s) IV Push once  influenza   Vaccine 0.5 milliLiter(s) IntraMuscular once  insulin lispro (ADMELOG) corrective regimen sliding scale   SubCutaneous every 6 hours  levothyroxine 200 MICROGram(s) Oral daily  simvastatin 40 milliGRAM(s) Oral at bedtime      Imaging: Subjective:  Patient now status post laparoscopic assisted extended left hemicolectomy with end transverse colostomy on 10/18. Remains in SICU, seen and examined this AM.   Patient is having very minimal pain. Denies N/V.     PHYSICAL EXAM  GENERAL: NAD, lying in bed comfortably  HEAD:  Atraumatic, Normocephalic  CHEST/LUNG: Unlabored respirations  CARDIOVASCULAR: NSR on monitor   ABDOMEN: Softly distended, end transverse colostomy pink and viable. Wound c/d/i    Vital Signs Last 24 Hrs  T(C): 36.1 (19 Oct 2021 04:00), Max: 37.2 (18 Oct 2021 17:16)  T(F): 96.9 (19 Oct 2021 04:00), Max: 98.9 (18 Oct 2021 17:16)  HR: 82 (19 Oct 2021 08:00) (71 - 94)  BP: 135/68 (19 Oct 2021 08:00) (101/85 - 183/80)  BP(mean): 83 (19 Oct 2021 08:00) (68 - 99)  RR: 13 (19 Oct 2021 08:00) (6 - 27)  SpO2: 100% (19 Oct 2021 08:00) (93% - 100%)      18 Oct 2021 07:01  -  19 Oct 2021 07:00  --------------------------------------------------------  IN:    dextrose 5% + sodium chloride 0.45% w/ Additives: 600 mL    Lactated Ringers: 675 mL  Total IN: 1275 mL    OUT:    Indwelling Catheter - Urethral (mL): 630 mL    Voided (mL): 1000 mL  Total OUT: 1630 mL    Total NET: -355 mL          LABS:  cret                        9.2    13.84 )-----------( 160      ( 19 Oct 2021 01:00 )             26.6     10-19    138  |  109<H>  |  9   ----------------------------<  239<H>  4.3   |  18<L>  |  1.18    Ca    7.7<L>      19 Oct 2021 01:00  Phos  4.0     10-19  Mg     1.80     10-19      PT/INR - ( 18 Oct 2021 02:52 )   PT: 13.2 sec;   INR: 1.16 ratio         PTT - ( 18 Oct 2021 02:52 )  PTT:22.7 sec    MEDICATIONS  (STANDING):  influenza   Vaccine 0.5 milliLiter(s) IntraMuscular once  insulin lispro (ADMELOG) corrective regimen sliding scale   SubCutaneous every 6 hours  lactated ringers. 1000 milliLiter(s) (75 mL/Hr) IV Continuous <Continuous>  levothyroxine Injectable 150 MICROGram(s) IV Push at bedtime  magnesium sulfate  IVPB 2 Gram(s) IV Intermittent once    MEDICATIONS  (PRN):  HYDROmorphone  Injectable 0.5 milliGRAM(s) IV Push every 4 hours PRN Moderate Pain (4 - 6)  HYDROmorphone  Injectable 1 milliGRAM(s) IV Push every 4 hours PRN Severe Pain (7 - 10)  ondansetron Injectable 4 milliGRAM(s) IV Push every 6 hours PRN Nausea

## 2021-10-19 NOTE — PROGRESS NOTE ADULT - ATTENDING SUPERVISION STATEMENT
Fellow
Resident
ACP/Resident
Resident
ACP/Resident

## 2021-10-19 NOTE — PROGRESS NOTE ADULT - SUBJECTIVE AND OBJECTIVE BOX
Anesthesia Pain Management Service- Attending Addendum    SUBJECTIVE: Patient's pain control adequate    Therapy:	  [ X] IV PCA	   [ ] Epidural           [ ] s/p Spinal Opoid              [ ] Postpartum infusion	  [ ] Patient controlled regional anesthesia (PCRA)    [ ] prn Analgesics    Allergies    Valium (Other)    Intolerances    oxycodone (Other)    MEDICATIONS  (STANDING):  heparin   Injectable 5000 Unit(s) SubCutaneous every 8 hours  influenza   Vaccine 0.5 milliLiter(s) IntraMuscular once  insulin lispro (ADMELOG) corrective regimen sliding scale   SubCutaneous every 6 hours  lactated ringers. 1000 milliLiter(s) (75 mL/Hr) IV Continuous <Continuous>  levothyroxine Injectable 150 MICROGram(s) IV Push at bedtime    MEDICATIONS  (PRN):  acetaminophen  IVPB .. 1000 milliGRAM(s) IV Intermittent every 6 hours PRN Mild Pain (1 - 3)  HYDROmorphone  Injectable 0.5 milliGRAM(s) IV Push every 4 hours PRN Moderate Pain (4 - 6)  HYDROmorphone  Injectable 1 milliGRAM(s) IV Push every 4 hours PRN Severe Pain (7 - 10)  ondansetron Injectable 4 milliGRAM(s) IV Push every 6 hours PRN Nausea      OBJECTIVE:   [X] No new signs     [ ] Other:    Side Effects:  [X ] None			[ ] Other:      ASSESSMENT/PLAN  -Discontinue current therapy    [ ] Therapy changed to:    [ ] IV PCA       [ ] Epidural     [ X] prn Analgesics     Comments: Pain management per primary team, APS to sign off    Note entered after patient seen

## 2021-10-19 NOTE — PROGRESS NOTE ADULT - ATTENDING COMMENTS
No BM since 4PM. Denies any pain or issues currently.  Require several units of PRBCs yesterday    abd soft, non-distended, non-tender to palpation    - continue CLD (wouldn't advance diet)  - trend Hgb  - possible surgery Monday pending continued transfusion requirement    Yamini Nettles MD  Attending Physician
66 Female with pandiverticulosis and history of diverticular bleed p/w hematochezia.  CTA positive for bleed at splenic flexure.  Flex sig negative for active bleeding.  Monitor CBC, transfuse PRN.
Patient with lower gi bleed requiring 4 units prbc overnight. Hemoglobin now stable last 2 results. Patient hemodynamcially stable.  Plan  would recommend IR intervention for angiogram given active extravasation on CTA  appreciate sicu care    I have personally interviewed and examined this patient, reviewed pertinent labs and imaging, and discussed the case with colleagues, residents, and physician assistants on B Team rounds.    The active care issues are:  1. lower gi bleed    The Acute Care Surgery (B Team) Attending Group Practice:  Dr. Maicol Hirsch, Dr. Tyree Braxton, Dr. Zion Torres, Dr. Messi Rowley,     urgent issues - spectra [phone]  nonurgent issues - [phone]  patient appointments or afterhours - [phone]
I agree with the detailed interval history, physical, and plan, which I have reviewed and edited where appropriate'; also agree with notes/assessment with my team on service.  I have personally examined the patient.  I was physically present for the key portions of the evaluation and management (E/M) service provided.  I reviewed all the pertinent data.  The patient is a critical care patient with life threatening hemodynamic and metabolic instability in SICU.  The SICU team has a constant risk benefit analyzes discussion and coordinating care with the primary team and all consultants.   The patient is in SICU with the chief complaint and diagnosis mentioned in the note.   The plan will be specified in the note.  66 yo F w/ hx of pan-diverticulosis with multiple previous admissions for GI bleed and now s/p IR embolization in SICU for hemodynamic monitoring   no scleral icterus   CV: RRR. N  Resp: clear  GI: Abdomen non tender to palpation, soft and non-distended. + BS.  Skin/MSK: No open wounds.  PLAN:  NEURO:  -no acute issues  RESPIRATORY:   -no acute issues  -CARDIOVASCULAR: HTN  -holding ramipril 10 mg BID in setting of HELEN  -simvastatin 40 mg daily  GI/NUTRITION:  pan-diverticulosis, lower GIB, IR embolization   -NPO  GENITOURINARY/RENAL:  -LR @125 cc/hr  HEMATOLOGIC:  -monitor H&H; CBC q6  INFECTIOUS DISEASE:  -monitor for fevers; trend WBC  ENDOCRINE: hx NIDDM/ hypothyroidism  -ISS  -synthroid   GOC/CODE STATUS:  Full Code  DISPO: SICU
No transfusions needed yesterday but continued bloody BMs w/ Hgb downtrending     abd soft, non-distended, non-tender to palpation    - will plan for colectomy tomorrow given ongoing bleed  - CLD till 5AM then NPO  - bowel prep  - will need blood available for the OR    Yamini Nettles MD  Attending Physician
Patient overnight no further transfusion. Hg stable. Patient with bloody BM.  plan  serial hg  appreciate sicu care  monitor for more bleeding    I have personally interviewed and examined this patient, reviewed pertinent labs and imaging, and discussed the case with colleagues, residents, and physician assistants on B Team rounds.    The active care issues are:  1. lower gi bleed    The Acute Care Surgery (B Team) Attending Group Practice:  Dr. Maicol Hirsch, Dr. Tyree Braxton, Dr. Zion Torres, Dr. Messi Rowley,     urgent issues - spectra [phone]  nonurgent issues - [phone]  patient appointments or afterhours - [phone]
I agree with the history, physical, and plan, which I have reviewed and edited where appropriate.  I agree with notes/assessment of health care providers on my service.  I have personally examined the patient.  I was physically present for the key portions of the evaluation and management (E/M) service provided.  I reviewed data and laboratory tests/x-rays and all pertinent electronic images.  The patient is a critical care patient with life threatening hemodynamic and metabolic instability in SICU.  Risk benefit analyses discussed.    The patient is in SICU with diagnosis mentioned in the note.    The plan is specified below.    66 yo F w/ hx of pan-diverticulosis (multiple previous admissions for LGIB - last in March 2021), admitted from ER for recurrent LGIB. Pt accepted for admission to SICU for hemodynamic monitoring. Last BM yesterday mid-day, dark.     PLAN:  NEURO:  -no acute issues    RESPIRATORY:   - on RA  - ambulate    CARDIOVASCULAR: hx HTN  -holding ramipril 10 mg BID  -simvastatin 40 mg daily    GI/NUTRITION: hx pan-diverticulosis, lower GIB, may be resolving   - CLD  - possible OR with Dr. Beach Monday/early next week vs ACS if emergent/hemodynamically unstable  - monitor for further bleeding    GENITOURINARY/RENAL:  - D5 1/2 NS at 50 cc/hr    HEMATOLOGIC:  - CBC q6, check coags with next blood draw  -resume SQH tomorrow    INFECTIOUS DISEASE:  - no active issues    ENDOCRINE: hx NIDDM  -ISS  -synthroid 200 mcg daily
Acute posthemorrhage anemia  a.  s/p 1U pRBC overnight  b.  Repeat Hgb  c.  Transfuse prn  d.   Type and cross 2units pRBC    Diverticular bleed  a. For hemicolectomy  b.  With noted bloody bm s/p bowel preparation  c.   Remains hemodynamically normal    At risk for malnutrition  a.  NPO at this time  b.  Continue IVF resuscitation    Hypokalemia  a. Replete k
I agree with the above detailed interval history, physical, and plan, which I have reviewed and edited where appropriate; also agree with notes/assessment and of the team on service.  I have personally examined the patient, reviewed all data.  The plan is specified below:  The patient is in SICU with diagnosis mentioned in the note.    The SICU team has a constant risk benefit analyzes discussion and coordinating care with the primary team, all consultants, House Staff and PA's.  I was physically present for the key portions of the evaluation and management (E/M) service provided.  64 yo F w/ hx of pan-diverticulosis w/ active bleeding in descending colon in SICU for hemodynamic monitoring   A&Ox4   HEENT:  no scleral icterus   CV: RRR.   Resp: clear  GI: Abdomen non tender to palpation, soft and non-distended. + BS.    PLAN:  NEURO:  -no acute issues  RESPIRATORY:   -no acute issues  CARDIOVASCULAR: hx HTN  -monitor VS and perfusion status  GI/NUTRITION: hx pan-diverticulosis, lower GIB, IR embolization   - monitor for further bleeding  GENITOURINARY/RENAL:  -monitor UOP  -HEMATOLOGIC:  -monitor H&H; CBC q6  -resume SQH  INFECTIOUS DISEASE:  -monitor for fevers; trend WBC  ENDOCRINE: hx NIDDM  -trend glucose    GOC/CODE STATUS:  Full Code    DISPO: SICU
Diverticular bleed  a. s/p colectomy+ostomy  b.  Start heparin SQ    Acute posthemorrhage anemia  a. Stable  b.  Repeat CBC    At risk for malnutrition  a.   Clears  b.  Decrease IVF    Hypothyroidism  a. Continue synthroid
I agree with the history, physical, and plan, which I have reviewed and edited where appropriate.  I agree with notes/assessment of health care providers on my service.  I have personally examined the patient.  I was physically present for the key portions of the evaluation and management (E/M) service provided.  I reviewed data and laboratory tests/x-rays and all pertinent electronic images.  The patient is a critical care patient with life threatening hemodynamic and metabolic instability in SICU.  Risk benefit analyses discussed.    The patient is in SICU with diagnosis mentioned in the note.    The plan is specified below.    64 yo F w/ hx of pan-diverticulosis (multiple previous admissions for LGIB - last in March 2021), admitted from ER for recurrent LGIB. Pt accepted for admission to SICU for hemodynamic monitoring. 3 BM overnight, H/H stable.    PLAN:  NEURO:  -no acute issues    RESPIRATORY:   - on RA  - ambulate    CARDIOVASCULAR: hx HTN  -holding ramipril 10 mg BID  -simvastatin 40 mg daily    GI/NUTRITION: hx pan-diverticulosis, lower GIB   - Plan for OR Monday for colectomy  - CLD, NPO at 4am  - ERP bowel prep    GENITOURINARY/RENAL:  - D5 1/2 NS at 50 cc/hr    HEMATOLOGIC:  - CBC q12  -holding sqh for bleeding    INFECTIOUS DISEASE:  - no active issues    ENDOCRINE: hx NIDDM  -ISS  -synthroid 200 mcg daily.

## 2021-10-19 NOTE — PROGRESS NOTE ADULT - SUBJECTIVE AND OBJECTIVE BOX
SICU Daily Progress Note  =====================================================  24 Hour Interval/Overnight Events:     - s/p left hemicolectomy, and transverse colectomy with Yong  - 1u pRBC given intraop     65F w/ hx of diverticulosis (reports 8 previous admissions at multiple hospitals), herniated lumbar disc (s/p plate), SOLOMON, DM (on oral agent) presents with 6 episodes of BRBPR that began at 4pm on 10/12/21. She denies any abdominal pain but says she was lightheaded yesterday at home. Also reports some nausea.    She has had multiple SICU admissions at [facility] after presenting with hypotension and requiring multiple transfusions. Her last admission was in March 2021 and she underwent IR embolization of a branch of the middle colic artery. She has had bleeds localized to several areas of the colon and has pandiverticulosis. Operative intervention has not been pursued because she would require a total abdominal colectomy.    On presentation today she is hemodynamically stable - HR 70s, -140s. She is mentating well and currently has no complaints. CTA localized the bleed to the descending colon at the splenic flexure.    S/p left hemicolectomy and transverse colectomy with Dr. Beach 10/18  - ,             Fluids: 1500LR    --------------------------------------------------------------------------------------  Allergies: oxycodone (Other)  Valium (Other)    MEDICATIONS:   --------------------------------------------------------------------------------------  Neurologic Medications  acetaminophen   Tablet .. 650 milliGRAM(s) Oral every 6 hours PRN Temp greater or equal to 38C (100.4F), Mild Pain (1 - 3)    Respiratory Medications    Cardiovascular Medications    Gastrointestinal Medications  dextrose 5% + sodium chloride 0.45% with potassium chloride 20 mEq/L 1000 milliLiter(s) IV Continuous <Continuous>    Genitourinary Medications    Hematologic/Oncologic Medications  influenza   Vaccine 0.5 milliLiter(s) IntraMuscular once    Antimicrobial/Immunologic Medications  metroNIDAZOLE    Tablet 250 milliGRAM(s) Oral <User Schedule>  neomycin 500 milliGRAM(s) Oral <User Schedule>    Endocrine/Metabolic Medications  dextrose 50% Injectable 25 Gram(s) IV Push once  dextrose 50% Injectable 25 Gram(s) IV Push once  insulin lispro (ADMELOG) corrective regimen sliding scale   SubCutaneous every 6 hours  levothyroxine 200 MICROGram(s) Oral daily  simvastatin 40 milliGRAM(s) Oral at bedtime    Topical/Other Medications    --------------------------------------------------------------------------------------    VITAL SIGNS, INS/OUTS (last 24 hours):  --------------------------------------------------------------------------------------  ICU Vital Signs Last 24 Hrs  T(C): 35.9 (18 Oct 2021 22:20), Max: 37.2 (18 Oct 2021 17:16)  T(F): 96.6 (18 Oct 2021 22:20), Max: 98.9 (18 Oct 2021 17:16)  HR: 72 (18 Oct 2021 23:00) (71 - 94)  BP: 124/83 (18 Oct 2021 23:00) (104/50 - 183/80)  BP(mean): 93 (18 Oct 2021 23:00) (63 - 99)  ABP: --  ABP(mean): --  RR: 10 (18 Oct 2021 23:00) (10 - 27)  SpO2: 100% (18 Oct 2021 23:00) (86% - 100%)      CAPILLARY BLOOD GLUCOSE    CAPILLARY BLOOD GLUCOSE      POCT Blood Glucose.: 177 mg/dL (19 Oct 2021 00:40)  POCT Blood Glucose.: 95 mg/dL (18 Oct 2021 17:12)  POCT Blood Glucose.: 171 mg/dL (18 Oct 2021 11:44)  POCT Blood Glucose.: 147 mg/dL (17 Oct 2021 23:25)  POCT Blood Glucose.: 117 mg/dL (17 Oct 2021 17:11)  POCT Blood Glucose.: 121 mg/dL (17 Oct 2021 12:22)  POCT Blood Glucose.: 140 mg/dL (17 Oct 2021 06:29)   N/A      I&O's Detail    17 Oct 2021 07:01  -  18 Oct 2021 07:00  --------------------------------------------------------  IN:    dextrose 5% + sodium chloride 0.45% w/ Additives: 1050 mL    Oral Fluid: 1800 mL    PRBCs (Packed Red Blood Cells): 300 mL  Total IN: 3150 mL    OUT:    Voided (mL): 900 mL  Total OUT: 900 mL    Total NET: 2250 mL      18 Oct 2021 07:01  -  19 Oct 2021 00:49  --------------------------------------------------------  IN:    dextrose 5% + sodium chloride 0.45% w/ Additives: 600 mL  Total IN: 600 mL    OUT:    Indwelling Catheter - Urethral (mL): 125 mL    Voided (mL): 1000 mL  Total OUT: 1125 mL    Total NET: -525 mL        --------------------------------------------------------------------------------------  EXAM  NEUROLOGY  Exam: Normal, NAD, alert, oriented x3, no focal deficits.    HEENT  Exam: Normocephalic, atraumatic, EOMI.     RESPIRATORY  Exam: Lungs clear to auscultation, Normal expansion/effort.      CARDIOVASCULAR  Exam: S1, S2.  Regular rate and rhythm.      GI/NUTRITION  Exam: Abdomen soft, Non-tender, Non-distended.   Current Diet: Diet, NPO      METABOLIC/FLUIDS/ELECTROLYTES  dextrose 5% + sodium chloride 0.45% with potassium chloride 20 mEq/L 1000 milliLiter(s) IV Continuous <Continuous>      HEMATOLOGIC  [x] VTE Prophylaxis:                           7.9    7.45  )-----------( 144      ( 18 Oct 2021 12:00 )             22.4     LABS  --------------------------------------------------------------------------------------  CBC (10-17 @ 17:30)                          7.7<L>                   7.48    )--------------(  152        --    % Neuts, --    % Lymphs, ANC: --                              22.9<L>  CBC (10-17 @ 05:36)                          8.6<L>                   8.72    )--------------(  126<L>     --    % Neuts, --    % Lymphs, ANC: --                              24.8<L>    BMP (10-17 @ 05:36)       140     |  109<H>  |  13    			Ca++ --      Ca 7.9<L>       ---------------------------------( 142<H>		Mg 2.50         4.5     |  20<L>   |  1.09  			Ph 4.0     BMP (10-17 @ 03:21)       135     |  101     |  14    			Ca++ --      Ca 7.9<L>       ---------------------------------( 146<H>		Mg 1.80         3.6     |  25      |  0.69  			Ph 3.1         Coataryn (10-16 @ 15:28)  aPTT 26.0<L> / INR 1.08 / PT 12.3              -------------------------------------------------------------------------------------- SICU Daily Progress Note  =====================================================  24 Hour Interval/Overnight Events:     - s/p left hemicolectomy, and transverse colectomy with Dr. Beach  - 1u pRBC given intraop     65F w/ hx of diverticulosis (reports 8 previous admissions at multiple hospitals), herniated lumbar disc (s/p plate), SOLOMON, DM (on oral agent) presents with 6 episodes of BRBPR that began at 4pm on 10/12/21. She denies any abdominal pain but says she was lightheaded yesterday at home. Also reports some nausea.    She has had multiple SICU admissions at [facility] after presenting with hypotension and requiring multiple transfusions. Her last admission was in March 2021 and she underwent IR embolization of a branch of the middle colic artery. She has had bleeds localized to several areas of the colon and has pandiverticulosis. Operative intervention has not been pursued because she would require a total abdominal colectomy.    On presentation today she is hemodynamically stable - HR 70s, -140s. She is mentating well and currently has no complaints. CTA localized the bleed to the descending colon at the splenic flexure.    S/p left hemicolectomy and transverse colectomy with Dr. Beach 10/18  - ,             Fluids: 1500LR    --------------------------------------------------------------------------------------  Allergies: oxycodone (Other)  Valium (Other)    MEDICATIONS:   --------------------------------------------------------------------------------------  Neurologic Medications  acetaminophen   Tablet .. 650 milliGRAM(s) Oral every 6 hours PRN Temp greater or equal to 38C (100.4F), Mild Pain (1 - 3)    Respiratory Medications    Cardiovascular Medications    Gastrointestinal Medications  dextrose 5% + sodium chloride 0.45% with potassium chloride 20 mEq/L 1000 milliLiter(s) IV Continuous <Continuous>    Genitourinary Medications    Hematologic/Oncologic Medications  influenza   Vaccine 0.5 milliLiter(s) IntraMuscular once    Antimicrobial/Immunologic Medications  metroNIDAZOLE    Tablet 250 milliGRAM(s) Oral <User Schedule>  neomycin 500 milliGRAM(s) Oral <User Schedule>    Endocrine/Metabolic Medications  dextrose 50% Injectable 25 Gram(s) IV Push once  dextrose 50% Injectable 25 Gram(s) IV Push once  insulin lispro (ADMELOG) corrective regimen sliding scale   SubCutaneous every 6 hours  levothyroxine 200 MICROGram(s) Oral daily  simvastatin 40 milliGRAM(s) Oral at bedtime    Topical/Other Medications    --------------------------------------------------------------------------------------    VITAL SIGNS, INS/OUTS (last 24 hours):  --------------------------------------------------------------------------------------  ICU Vital Signs Last 24 Hrs  T(C): 36.7 (19 Oct 2021 12:00), Max: 37.2 (18 Oct 2021 17:16)  T(F): 98 (19 Oct 2021 12:00), Max: 98.9 (18 Oct 2021 17:16)  HR: 80 (19 Oct 2021 12:00) (71 - 94)  BP: 135/68 (19 Oct 2021 08:00) (101/85 - 183/80)  BP(mean): 83 (19 Oct 2021 08:00) (71 - 99)  ABP: 135/49 (19 Oct 2021 12:00) (97/43 - 150/62)  ABP(mean): 77 (19 Oct 2021 12:00) (57 - 92)  RR: 18 (19 Oct 2021 12:00) (6 - 27)  SpO2: 100% (19 Oct 2021 12:00) (94% - 100%)        CAPILLARY BLOOD GLUCOSE    CAPILLARY BLOOD GLUCOSE      POCT Blood Glucose.: 177 mg/dL (19 Oct 2021 00:40)  POCT Blood Glucose.: 95 mg/dL (18 Oct 2021 17:12)  POCT Blood Glucose.: 171 mg/dL (18 Oct 2021 11:44)  POCT Blood Glucose.: 147 mg/dL (17 Oct 2021 23:25)  POCT Blood Glucose.: 117 mg/dL (17 Oct 2021 17:11)  POCT Blood Glucose.: 121 mg/dL (17 Oct 2021 12:22)  POCT Blood Glucose.: 140 mg/dL (17 Oct 2021 06:29)   N/A      I&O's Detail      18 Oct 2021 07:01  -  19 Oct 2021 07:00  --------------------------------------------------------  IN:    dextrose 5% + sodium chloride 0.45% w/ Additives: 600 mL    Lactated Ringers: 750 mL  Total IN: 1350 mL    OUT:    Indwelling Catheter - Urethral (mL): 655 mL    Voided (mL): 1000 mL  Total OUT: 1655 mL    Total NET: -305 mL      19 Oct 2021 07:01  -  19 Oct 2021 13:04  --------------------------------------------------------  IN:    Lactated Ringers: 300 mL  Total IN: 300 mL    OUT:    Indwelling Catheter - Urethral (mL): 120 mL  Total OUT: 120 mL    Total NET: 180 mL              --------------------------------------------------------------------------------------  EXAM  NEUROLOGY  Exam: Normal, NAD, alert, oriented x3, no focal deficits.    HEENT  Exam: Normocephalic, atraumatic, EOMI.     RESPIRATORY  Exam: Lungs clear to auscultation, Normal expansion/effort.      CARDIOVASCULAR  Exam: S1, S2.  Regular rate and rhythm.      GI/NUTRITION  Exam: Abdomen soft, Non-tender, Non-distended.   Current Diet: Diet, NPO      METABOLIC/FLUIDS/ELECTROLYTES  dextrose 5% + sodium chloride 0.45% with potassium chloride 20 mEq/L 1000 milliLiter(s) IV Continuous <Continuous>      HEMATOLOGIC  [x] VTE Prophylaxis:                           7.9    7.45  )-----------( 144      ( 18 Oct 2021 12:00 )             22.4     LABS  --------------------------------------------------------------------------------------  CBC (10-17 @ 17:30)                          7.7<L>                   7.48    )--------------(  152        --    % Neuts, --    % Lymphs, ANC: --                              22.9<L>  CBC (10-17 @ 05:36)                          8.6<L>                   8.72    )--------------(  126<L>     --    % Neuts, --    % Lymphs, ANC: --                              24.8<L>    BMP (10-17 @ 05:36)       140     |  109<H>  |  13    			Ca++ --      Ca 7.9<L>       ---------------------------------( 142<H>		Mg 2.50         4.5     |  20<L>   |  1.09  			Ph 4.0     BMP (10-17 @ 03:21)       135     |  101     |  14    			Ca++ --      Ca 7.9<L>       ---------------------------------( 146<H>		Mg 1.80         3.6     |  25      |  0.69  			Ph 3.1         Giancarlo (10-16 @ 15:28)  aPTT 26.0<L> / INR 1.08 / PT 12.3              --------------------------------------------------------------------------------------

## 2021-10-19 NOTE — PROGRESS NOTE ADULT - ASSESSMENT
66 yo F w/ hx of pan-diverticulosis (multiple previous admissions - last in March 2021), SOLOMON, NIDDM, nephrolithiasis, herniated lumbar disc (s/p plate), presenting to ED on 10/13 for 6 episodes of BRBPR. Associated nausea and lightheadedness, denies pain. No further bleeding in ED. Review of chart shows multiple past admissions to [facility] w/ hypotension requiring multiple transfusions. Last admitted in March 2021 and now s/p IR embolization. CTA w/ active bleeding in descending colon. Pt accepted for admission to SICU for hemodynamic monitoring while awaiting IR/GI consultation and prbc transfusion. Now s/p Left hemicolectomy and transverse colectomy with Dr. Beach 10/18/21.     NEURO:  - no acute issues, A&O x4  - IV PCA and Tylenol for pain control    RESPIRATORY:   - no acute issues  - monitor respiratory status  - continuous pulse ox  - incentive spirometry    CARDIOVASCULAR: hx HTN  - Arterial line in place from OR, currently hemodynamically stable  - simvastatin 40 mg daily  - monitor VS and perfusion status    GI/NUTRITION: hx pan-diverticulosis, lower GIB, IR embolization   - s/p left hemicolectomy and transverse colectomy on 10/18 with Dr. Beach  - maintain NPO status per primary team    GENITOURINARY/RENAL:  - perla in place, monitor UOP  - replete lytes as necessary  - LR at 75cc/hour    HEMATOLOGIC:  - s/p 8u prbc's since admission  - monitor H&H; CBC  - transfuse to Hgb >7    INFECTIOUS DISEASE:  -monitor for fevers; trend WBC    ENDOCRINE: hx NIDDM  - trend glucose  - ISS  - synthroid 200 mcg daily    Lines:  PIV x 2, 20G and 22G  Left Radial Arterial Line    GOC/CODE STATUS:  Full Code    DISPO: SICU       66 yo F w/ hx of pan-diverticulosis (multiple previous admissions - last in March 2021), SOLOMON, NIDDM, nephrolithiasis, herniated lumbar disc (s/p plate), presenting to ED on 10/13 for 6 episodes of BRBPR. Associated nausea and lightheadedness, denies pain. No further bleeding in ED. Review of chart shows multiple past admissions to [facility] w/ hypotension requiring multiple transfusions. Last admitted in March 2021 and now s/p IR embolization. CTA w/ active bleeding in descending colon. Pt accepted for admission to SICU for hemodynamic monitoring while awaiting IR/GI consultation and prbc transfusion. Now s/p Left hemicolectomy and transverse colectomy with Dr. Beach 10/18/21.     NEURO:  - no acute issues, A&O x4  - IV PCA and Tylenol for pain control  - per primary team no Toradol    RESPIRATORY:   - no acute issues, pt has a h/o SOLOMON  - monitor respiratory status  - continuous pulse ox  - incentive spirometry    CARDIOVASCULAR: hx HTN  - Arterial line in place from OR, currently hemodynamically stable  - simvastatin 40 mg daily  - monitor VS and perfusion status    GI/NUTRITION: hx pan-diverticulosis, lower GIB, IR embolization   - s/p left hemicolectomy and transverse colectomy on 10/18 with Dr. Beach  - maintain NPO status per primary team    GENITOURINARY/RENAL:  - perla in place, monitor UOP  - replete lytes as necessary  - LR at 75cc/hour    HEMATOLOGIC:  - s/p 9u prbc's since admission  - monitor H&H; CBC  - transfuse to Hgb >7    INFECTIOUS DISEASE:  -monitor for fevers; trend WBC    ENDOCRINE: hx NIDDM  - trend glucose  - ISS  - synthroid 200 mcg daily    Lines:  PIV x 2, 20G and 22G  Left Radial Arterial Line    GOC/CODE STATUS:  Full Code    DISPO: SICU       64 yo F w/ hx of pan-diverticulosis (multiple previous admissions - last in March 2021), SOLOMON, NIDDM, nephrolithiasis, herniated lumbar disc (s/p plate), presenting to ED on 10/13 for 6 episodes of BRBPR. Associated nausea and lightheadedness, denies pain. No further bleeding in ED. Review of chart shows multiple past admissions to [facility] w/ hypotension requiring multiple transfusions. Last admitted in March 2021 and now s/p IR embolization. CTA w/ active bleeding in descending colon. Pt accepted for admission to SICU for hemodynamic monitoring while awaiting IR/GI consultation and prbc transfusion. Now s/p Left hemicolectomy and transverse colectomy with Dr. Beach 10/18/21.     NEURO:  - no acute issues, A&O x4  - Tylenol for pain control, IV PCA d/c'ed overnight 2/2 hypopnea   - per primary team no Toradol    RESPIRATORY:   - no acute issues, pt has a h/o SOLOMON  - monitor respiratory status  - continuous pulse ox  - incentive spirometry    CARDIOVASCULAR: hx HTN  - Arterial line in place from OR, currently hemodynamically stable  - simvastatin 40 mg daily  - monitor VS and perfusion status    GI/NUTRITION: hx pan-diverticulosis, lower GIB, IR embolization   - s/p left hemicolectomy and transverse colectomy on 10/18 with Dr. Beach  - maintain NPO status per primary team    GENITOURINARY/RENAL:  - perla in place, monitor UOP  - replete lytes as necessary  - LR at 75cc/hour    HEMATOLOGIC:  - s/p 9u prbc's since admission  - monitor H&H; CBC  - transfuse to Hgb >7    INFECTIOUS DISEASE:  -monitor for fevers; trend WBC    ENDOCRINE: hx NIDDM  - trend glucose  - ISS  - synthroid 200 mcg daily    Lines:  PIV x 2, 20G and 22G  Left Radial Arterial Line    GOC/CODE STATUS:  Full Code    DISPO: SICU       66 yo F w/ hx of pan-diverticulosis (multiple previous admissions - last in March 2021), SOLOMON, NIDDM, nephrolithiasis, herniated lumbar disc (s/p plate), presenting to ED on 10/13 for 6 episodes of BRBPR. Associated nausea and lightheadedness, denies pain. No further bleeding in ED. Review of chart shows multiple past admissions to [facility] w/ hypotension requiring multiple transfusions. Last admitted in March 2021 and now s/p IR embolization. CTA w/ active bleeding in descending colon. Pt accepted for admission to SICU for hemodynamic monitoring while awaiting IR/GI consultation and prbc transfusion. Now s/p Left hemicolectomy and transverse colectomy with Dr. Beach 10/18/21.     NEURO:  - no acute issues, A&O x4  - Tylenol for pain control, IV PCA d/c'ed overnight 2/2 hypopnea   - per primary team no Toradol    RESPIRATORY:   - no acute issues, pt has a h/o SOLOMON  - monitor respiratory status  - continuous pulse ox  - incentive spirometry    CARDIOVASCULAR: hx HTN  - Arterial line in place from OR, currently hemodynamically stable  - simvastatin 40 mg daily  - monitor VS and perfusion status    GI/NUTRITION: hx pan-diverticulosis, lower GIB, IR embolization   - s/p left hemicolectomy and transverse colectomy on 10/18 with Dr. Beach  - maintain NPO status per primary team  - Zofran PRN for nausea    GENITOURINARY/RENAL:  - perla in place, monitor UOP  - replete lytes as necessary  - LR at 75cc/hour    HEMATOLOGIC:  - s/p 9u prbc's since admission (received 1 intraop)  - monitor H&H; CBC  - transfuse to Hgb >7  - DVT prophylaxis with SCD's and initiate HSQ TID    INFECTIOUS DISEASE:  -monitor for fevers; trend WBC    ENDOCRINE: hx NIDDM  - trend glucose  - ISS  - synthroid 200 mcg daily    Lines:  PIV x 2, 20G and 22G  Left Radial Arterial Line - d/c today    GOC/CODE STATUS:  Full Code    DISPO: SICU, to floor once H/H stable 66 yo F w/ hx of pan-diverticulosis (multiple previous admissions - last in March 2021), SOLOMON, NIDDM, nephrolithiasis, herniated lumbar disc (s/p plate), presenting to ED on 10/13 for 6 episodes of BRBPR. Associated nausea and lightheadedness, denies pain. No further bleeding in ED. Review of chart shows multiple past admissions to [facility] w/ hypotension requiring multiple transfusions. Last admitted in March 2021 and now s/p IR embolization. CTA w/ active bleeding in descending colon. Pt accepted for admission to SICU for hemodynamic monitoring while awaiting IR/GI consultation and prbc transfusion. Now s/p Left hemicolectomy and transverse colectomy with Dr. Beach 10/18/21.     NEURO:  - no acute issues, A&O x4  - Tylenol for pain control, IV PCA d/c'ed overnight 2/2 hypopnea   - per primary team no Toradol    RESPIRATORY:   - no acute issues, pt has a h/o SOLOMON  - monitor respiratory status  - continuous pulse ox  - incentive spirometry    CARDIOVASCULAR: hx HTN  - Arterial line in place from OR, currently hemodynamically stable  - simvastatin 40 mg daily  - monitor VS and perfusion status    GI/NUTRITION: hx pan-diverticulosis, lower GIB, IR embolization   - s/p left hemicolectomy and transverse colectomy on 10/18 with Dr. Beach  -  NPO, per primary team ok to advance to [facility]  - Zofran PRN for nausea    GENITOURINARY/RENAL:  - perla in place, monitor UOP  - replete lytes as necessary  - LR at 75cc/hour    HEMATOLOGIC:  - s/p 9u prbc's since admission (received 1 intraop)  - monitor H&H; CBC  - transfuse to Hgb >7  - DVT prophylaxis with SCD's and initiate HSQ TID after discussion with primary team    INFECTIOUS DISEASE:  -monitor for fevers; trend WBC    ENDOCRINE: hx NIDDM  - trend glucose  - ISS  - synthroid 200 mcg daily    Lines:  PIV x 2, 20G and 22G  Left Radial Arterial Line - d/c today    GOC/CODE STATUS:  Full Code    DISPO: SICU, to floor once H/H stable

## 2021-10-19 NOTE — PROGRESS NOTE ADULT - SUBJECTIVE AND OBJECTIVE BOX
Anesthesia Pain Management Service    SUBJECTIVE: Pt now off IV PCA because patient had trouble breathing and as per RN RR went down to 7 breaths/min.  Patient states that 20 years ago, she had received a Morphine pump which also made her have trouble breathing.  Patient is doing better with IV Dilaudid pushes.   Pain Score: 0/10    THERAPY:    [ ] IV PCA Morphine		[ ] 5 mg/mL	[ ] 1 mg/mL  [X ] IV PCA Hydromorphone	[ ] 5 mg/mL	[X ] 1 mg/mL  [ ] IV PCA Fentanyl		[ ] 50 micrograms/mL    Demand dose __0.2_ lockout __6_ (minutes) Continuous Rate _0__ Total: __0.4  mg used (in past 24 hours)    Therapy:	  [ X] IV PCA	   [ ] Epidural           [ ] s/p Spinal Opoid              [ ] Postpartum infusion	  [ ] Patient controlled regional anesthesia (PCRA)    [ ] prn Analgesics    Allergies  Valium (Other)    Intolerances  oxycodone (Other)    MEDICATIONS  (STANDING):  influenza   Vaccine 0.5 milliLiter(s) IntraMuscular once  insulin lispro (ADMELOG) corrective regimen sliding scale   SubCutaneous every 6 hours  lactated ringers. 1000 milliLiter(s) (75 mL/Hr) IV Continuous <Continuous>  levothyroxine Injectable 150 MICROGram(s) IV Push at bedtime    MEDICATIONS  (PRN):  HYDROmorphone  Injectable 0.5 milliGRAM(s) IV Push every 4 hours PRN Moderate Pain (4 - 6)  HYDROmorphone  Injectable 1 milliGRAM(s) IV Push every 4 hours PRN Severe Pain (7 - 10)  ondansetron Injectable 4 milliGRAM(s) IV Push every 6 hours PRN Nausea      OBJECTIVE:   [X] No new signs     [ ] Other:    Side Effects:  [X ] None			[ ] Other:    Assessment of Catheter Site:		[ ] Intact		[ ] Other:    ASSESSMENT/PLAN  [ ] Continue current therapy    [X ] Therapy changed to:    [ ] IV PCA       [ ] Epidural     [ X] prn Analgesics     Comments: IV PCA discontinued by team and they ordered PRN Oral/IV opioids and/or non-opioid adjuvant analgesics to be used at this point. Patient's pain appears controlled, continue with current pain regimen.

## 2021-10-19 NOTE — PHYSICAL THERAPY INITIAL EVALUATION ADULT - PERTINENT HX OF CURRENT PROBLEM, REHAB EVAL
patient presents status post laparoscopic assisted extended left hemicolectomy with end transverse colostomy for diverticulosis

## 2021-10-19 NOTE — CHART NOTE - NSCHARTNOTEFT_GEN_A_CORE
Post Operative Check    Patient is 4 hours post op from a laparoscopic assisted extended left hemicolectomy with end transverse colostomy for diverticulosis. Recovering well in SICU. Voiding via perla, no bowel function yet, reports pain controlled on current regiment.     T(C): 36.1 (10-19-21 @ 00:00), Max: 37.2 (10-18-21 @ 17:16)  HR: 76 (10-19-21 @ 02:00) (71 - 94)  BP: 126/61 (10-19-21 @ 02:00) (101/85 - 183/80)  RR: 15 (10-19-21 @ 02:00) (6 - 27)  SpO2: 100% (10-19-21 @ 02:00) (93% - 100%)      10-17 @ 07:01  -  10-18 @ 07:00  --------------------------------------------------------  IN:    dextrose 5% + sodium chloride 0.45% w/ Additives: 1050 mL    Oral Fluid: 1800 mL    PRBCs (Packed Red Blood Cells): 300 mL  Total IN: 3150 mL    OUT:    Voided (mL): 900 mL  Total OUT: 900 mL    Total NET: 2250 mL      10-18 @ 07:01  -  10-19 @ 02:26  --------------------------------------------------------  IN:    dextrose 5% + sodium chloride 0.45% w/ Additives: 600 mL    Lactated Ringers: 375 mL  Total IN: 975 mL    OUT:    Indwelling Catheter - Urethral (mL): 550 mL    Voided (mL): 1000 mL  Total OUT: 1550 mL    Total NET: -575 mL          Labs                        9.2    13.84 )-----------( 160      ( 19 Oct 2021 01:00 )             26.6       CBC Full  -  ( 19 Oct 2021 01:00 )  WBC Count : 13.84 K/uL  Hemoglobin : 9.2 g/dL  Hematocrit : 26.6 %  Platelet Count - Automated : 160 K/uL  Mean Cell Volume : 85.3 fL  Mean Cell Hemoglobin : 29.5 pg  Mean Cell Hemoglobin Concentration : 34.6 gm/dL  Auto Neutrophil # : x  Auto Lymphocyte # : x  Auto Monocyte # : x  Auto Eosinophil # : x  Auto Basophil # : x  Auto Neutrophil % : x  Auto Lymphocyte % : x  Auto Monocyte % : x  Auto Eosinophil % : x  Auto Basophil % : x      Physical Exam  General: Well appearing, NAD, sleeping in bed.   Abdomen: Soft, nontender, nondistended, midline wound with ss strikethrough, transverse colostomy pink and viable, bowel sweat in ostomy appliance, 3 port sites c/d/i  : Perla draining 550cc clear urine since case    Assessment:  Patient is a 66y old Female s/p  laparoscopic assisted extended left hemicolectomy with end transverse colostomy recovering appropriately in SICU. Transfused 1U pRBC in OR and post op CBC kun appropriately 9.2.     Plan:  - Diet: NPO, IVF  - Continuing supportive care.  Trend CBC. Transfuse PRN, post op CBC 9.2.   - Appreciate excellent care per sicu     A Team surgery   [id number]

## 2021-10-20 LAB
ANION GAP SERPL CALC-SCNC: 9 MMOL/L — SIGNIFICANT CHANGE UP (ref 7–14)
BUN SERPL-MCNC: 11 MG/DL — SIGNIFICANT CHANGE UP (ref 7–23)
CALCIUM SERPL-MCNC: 7.6 MG/DL — LOW (ref 8.4–10.5)
CHLORIDE SERPL-SCNC: 106 MMOL/L — SIGNIFICANT CHANGE UP (ref 98–107)
CO2 SERPL-SCNC: 21 MMOL/L — LOW (ref 22–31)
CREAT SERPL-MCNC: 1.27 MG/DL — SIGNIFICANT CHANGE UP (ref 0.5–1.3)
GLUCOSE BLDC GLUCOMTR-MCNC: 109 MG/DL — HIGH (ref 70–99)
GLUCOSE BLDC GLUCOMTR-MCNC: 110 MG/DL — HIGH (ref 70–99)
GLUCOSE BLDC GLUCOMTR-MCNC: 118 MG/DL — HIGH (ref 70–99)
GLUCOSE BLDC GLUCOMTR-MCNC: 129 MG/DL — HIGH (ref 70–99)
GLUCOSE SERPL-MCNC: 121 MG/DL — HIGH (ref 70–99)
HCT VFR BLD CALC: 22.6 % — LOW (ref 34.5–45)
HGB BLD-MCNC: 7.4 G/DL — LOW (ref 11.5–15.5)
MAGNESIUM SERPL-MCNC: 2.5 MG/DL — SIGNIFICANT CHANGE UP (ref 1.6–2.6)
MCHC RBC-ENTMCNC: 28.6 PG — SIGNIFICANT CHANGE UP (ref 27–34)
MCHC RBC-ENTMCNC: 32.7 GM/DL — SIGNIFICANT CHANGE UP (ref 32–36)
MCV RBC AUTO: 87.3 FL — SIGNIFICANT CHANGE UP (ref 80–100)
NRBC # BLD: 0 /100 WBCS — SIGNIFICANT CHANGE UP
NRBC # FLD: 0 K/UL — SIGNIFICANT CHANGE UP
PHOSPHATE SERPL-MCNC: 3.1 MG/DL — SIGNIFICANT CHANGE UP (ref 2.5–4.5)
PLATELET # BLD AUTO: 178 K/UL — SIGNIFICANT CHANGE UP (ref 150–400)
POTASSIUM SERPL-MCNC: 4 MMOL/L — SIGNIFICANT CHANGE UP (ref 3.5–5.3)
POTASSIUM SERPL-SCNC: 4 MMOL/L — SIGNIFICANT CHANGE UP (ref 3.5–5.3)
RBC # BLD: 2.59 M/UL — LOW (ref 3.8–5.2)
RBC # FLD: 17.2 % — HIGH (ref 10.3–14.5)
SODIUM SERPL-SCNC: 136 MMOL/L — SIGNIFICANT CHANGE UP (ref 135–145)
WBC # BLD: 11.41 K/UL — HIGH (ref 3.8–10.5)
WBC # FLD AUTO: 11.41 K/UL — HIGH (ref 3.8–10.5)

## 2021-10-20 RX ADMIN — HYDROMORPHONE HYDROCHLORIDE 1 MILLIGRAM(S): 2 INJECTION INTRAMUSCULAR; INTRAVENOUS; SUBCUTANEOUS at 08:49

## 2021-10-20 RX ADMIN — HYDROMORPHONE HYDROCHLORIDE 1 MILLIGRAM(S): 2 INJECTION INTRAMUSCULAR; INTRAVENOUS; SUBCUTANEOUS at 13:19

## 2021-10-20 RX ADMIN — HEPARIN SODIUM 5000 UNIT(S): 5000 INJECTION INTRAVENOUS; SUBCUTANEOUS at 21:17

## 2021-10-20 RX ADMIN — HYDROMORPHONE HYDROCHLORIDE 1 MILLIGRAM(S): 2 INJECTION INTRAMUSCULAR; INTRAVENOUS; SUBCUTANEOUS at 13:34

## 2021-10-20 RX ADMIN — HYDROMORPHONE HYDROCHLORIDE 1 MILLIGRAM(S): 2 INJECTION INTRAMUSCULAR; INTRAVENOUS; SUBCUTANEOUS at 04:31

## 2021-10-20 RX ADMIN — HYDROMORPHONE HYDROCHLORIDE 1 MILLIGRAM(S): 2 INJECTION INTRAMUSCULAR; INTRAVENOUS; SUBCUTANEOUS at 09:04

## 2021-10-20 RX ADMIN — Medication 150 MICROGRAM(S): at 21:24

## 2021-10-20 RX ADMIN — HYDROMORPHONE HYDROCHLORIDE 1 MILLIGRAM(S): 2 INJECTION INTRAMUSCULAR; INTRAVENOUS; SUBCUTANEOUS at 19:02

## 2021-10-20 RX ADMIN — HYDROMORPHONE HYDROCHLORIDE 1 MILLIGRAM(S): 2 INJECTION INTRAMUSCULAR; INTRAVENOUS; SUBCUTANEOUS at 04:46

## 2021-10-20 RX ADMIN — HYDROMORPHONE HYDROCHLORIDE 1 MILLIGRAM(S): 2 INJECTION INTRAMUSCULAR; INTRAVENOUS; SUBCUTANEOUS at 18:47

## 2021-10-20 RX ADMIN — HEPARIN SODIUM 5000 UNIT(S): 5000 INJECTION INTRAVENOUS; SUBCUTANEOUS at 13:19

## 2021-10-20 RX ADMIN — HEPARIN SODIUM 5000 UNIT(S): 5000 INJECTION INTRAVENOUS; SUBCUTANEOUS at 05:23

## 2021-10-20 NOTE — PROGRESS NOTE ADULT - SUBJECTIVE AND OBJECTIVE BOX
Subjective:  Patient now status post laparoscopic assisted extended left hemicolectomy with end transverse colostomy on 10/18. Transferred to floor overnight, still no bowel function.  Received 1L bolus for low UOP in SICU prior to transfer. Reports pain controlled on current regiment. Seen and examined this AM at bedside.     PHYSICAL EXAM  GENERAL: NAD, lying in bed comfortably  HEAD:  Atraumatic, Normocephalic  CHEST/LUNG: Unlabored respirations  CARDIOVASCULAR: NSR on monitor   ABDOMEN: Softly distended, end transverse colostomy pink and viable, bowel sweat in bag. Wound c/d/i    Vital Signs Last 24 Hrs  T(C): 36.9 (19 Oct 2021 22:57), Max: 37.3 (19 Oct 2021 16:00)  T(F): 98.4 (19 Oct 2021 22:57), Max: 99.2 (19 Oct 2021 16:00)  HR: 86 (19 Oct 2021 22:57) (76 - 94)  BP: 150/73 (19 Oct 2021 22:57) (115/57 - 150/73)  BP(mean): 75 (19 Oct 2021 20:00) (71 - 92)  RR: 16 (19 Oct 2021 22:57) (6 - 20)  SpO2: 98% (19 Oct 2021 22:57) (96% - 100%)    I&O's Detail    18 Oct 2021 07:01  -  19 Oct 2021 07:00  --------------------------------------------------------  IN:    dextrose 5% + sodium chloride 0.45% w/ Additives: 600 mL    Lactated Ringers: 750 mL  Total IN: 1350 mL    OUT:    Indwelling Catheter - Urethral (mL): 655 mL    Voided (mL): 1000 mL  Total OUT: 1655 mL    Total NET: -305 mL      19 Oct 2021 07:01  -  20 Oct 2021 01:01  --------------------------------------------------------  IN:    IV PiggyBack: 50 mL    Lactated Ringers: 975 mL    Lactated Ringers Bolus: 500 mL    Oral Fluid: 940 mL  Total IN: 2465 mL    OUT:    Colostomy (mL): 60 mL    Indwelling Catheter - Urethral (mL): 765 mL  Total OUT: 825 mL    Total NET: 1640 mL      LABS:                        8.3    15.64 )-----------( 165      ( 19 Oct 2021 10:03 )             25.0   10-19    138  |  109<H>  |  9   ----------------------------<  239<H>  4.3   |  18<L>  |  1.18    Ca    7.7<L>      19 Oct 2021 01:00  Phos  4.0     10-19  Mg     1.80     10-19        MEDICATIONS  (STANDING):  influenza   Vaccine 0.5 milliLiter(s) IntraMuscular once  insulin lispro (ADMELOG) corrective regimen sliding scale   SubCutaneous every 6 hours  lactated ringers. 1000 milliLiter(s) (75 mL/Hr) IV Continuous <Continuous>  levothyroxine Injectable 150 MICROGram(s) IV Push at bedtime  magnesium sulfate  IVPB 2 Gram(s) IV Intermittent once    MEDICATIONS  (PRN):  HYDROmorphone  Injectable 0.5 milliGRAM(s) IV Push every 4 hours PRN Moderate Pain (4 - 6)  HYDROmorphone  Injectable 1 milliGRAM(s) IV Push every 4 hours PRN Severe Pain (7 - 10)  ondansetron Injectable 4 milliGRAM(s) IV Push every 6 hours PRN Nausea   Subjective:  Patient now status post laparoscopic assisted extended left hemicolectomy with end transverse colostomy on 10/18. Transferred to floor overnight, still no bowel function.  Received 1L bolus for low UOP in SICU prior to transfer. Reports pain controlled on current regiment. Seen and examined this AM at bedside. Remains -/-    PHYSICAL EXAM  GENERAL: NAD, lying in bed comfortably  HEAD:  Atraumatic, Normocephalic  CHEST/LUNG: Unlabored respirations  CARDIOVASCULAR: NSR on monitor   ABDOMEN: Softly distended, end transverse colostomy pink and viable, bowel sweat in bag. Wound c/d/i    Vital Signs Last 24 Hrs  T(C): 36.9 (19 Oct 2021 22:57), Max: 37.3 (19 Oct 2021 16:00)  T(F): 98.4 (19 Oct 2021 22:57), Max: 99.2 (19 Oct 2021 16:00)  HR: 86 (19 Oct 2021 22:57) (76 - 94)  BP: 150/73 (19 Oct 2021 22:57) (115/57 - 150/73)  BP(mean): 75 (19 Oct 2021 20:00) (71 - 92)  RR: 16 (19 Oct 2021 22:57) (6 - 20)  SpO2: 98% (19 Oct 2021 22:57) (96% - 100%)    I&O's Detail    18 Oct 2021 07:01  -  19 Oct 2021 07:00  --------------------------------------------------------  IN:    dextrose 5% + sodium chloride 0.45% w/ Additives: 600 mL    Lactated Ringers: 750 mL  Total IN: 1350 mL    OUT:    Indwelling Catheter - Urethral (mL): 655 mL    Voided (mL): 1000 mL  Total OUT: 1655 mL    Total NET: -305 mL      19 Oct 2021 07:01  -  20 Oct 2021 01:01  --------------------------------------------------------  IN:    IV PiggyBack: 50 mL    Lactated Ringers: 975 mL    Lactated Ringers Bolus: 500 mL    Oral Fluid: 940 mL  Total IN: 2465 mL    OUT:    Colostomy (mL): 60 mL    Indwelling Catheter - Urethral (mL): 765 mL  Total OUT: 825 mL    Total NET: 1640 mL      LABS:                        8.3    15.64 )-----------( 165      ( 19 Oct 2021 10:03 )             25.0   10-19    138  |  109<H>  |  9   ----------------------------<  239<H>  4.3   |  18<L>  |  1.18    Ca    7.7<L>      19 Oct 2021 01:00  Phos  4.0     10-19  Mg     1.80     10-19        MEDICATIONS  (STANDING):  influenza   Vaccine 0.5 milliLiter(s) IntraMuscular once  insulin lispro (ADMELOG) corrective regimen sliding scale   SubCutaneous every 6 hours  lactated ringers. 1000 milliLiter(s) (75 mL/Hr) IV Continuous <Continuous>  levothyroxine Injectable 150 MICROGram(s) IV Push at bedtime  magnesium sulfate  IVPB 2 Gram(s) IV Intermittent once    MEDICATIONS  (PRN):  HYDROmorphone  Injectable 0.5 milliGRAM(s) IV Push every 4 hours PRN Moderate Pain (4 - 6)  HYDROmorphone  Injectable 1 milliGRAM(s) IV Push every 4 hours PRN Severe Pain (7 - 10)  ondansetron Injectable 4 milliGRAM(s) IV Push every 6 hours PRN Nausea

## 2021-10-20 NOTE — ADVANCED PRACTICE NURSE CONSULT - ASSESSMENT
Patient AxOx4, ready and willing to learn with daughter at bedside. Actively participating in pouch change. Overview of surgical procedure and need of ostomy reinforced. Terms defined utilized: Ostomy, colostomy, wafer, pouch. Frequency of pouch change and emptying discussed, teach back method utilized. Stool consistency with colostomy reviewed. Patient able to show back how to open, empty and close pouch. Removed ostomy pouch using pull and push technique, cleansed skin with water, patted dry. At first patient with difficulty looking at stoma but able to glance at it a few times during teaching. Emotional support and encouragement provided. Taught patient how to properly assess stoma viability and peristomal skin. Patient actively participated in stoma measurement, creating template, cutting wafer- daughter assisting due to difficulty cutting, applying barrier ring, applying pouch. Reviewed s/s to report to MD. Importance of hydration reinforced. Ostomy clinic contact information provided. Showering with pouch reviewed, notified that visiting nurse will be coming by after discharge for reinforcement of teaching.     Stoma size: 1 3/4" See A&I flowsheet for full stoma assessment details.

## 2021-10-20 NOTE — ADVANCED PRACTICE NURSE CONSULT - REASON FOR CONSULT
Patient seen for initial colostomy teaching. Patients daughter, Michelle, at bedside participating in care.

## 2021-10-20 NOTE — ADVANCED PRACTICE NURSE CONSULT - RECOMMEDATIONS
Supplies provided for home:   Stoma powder-[id number] for home use  3M liquid barrier film-  for home use  2" carlos alberto moisture barrier rings- [id number]   Flat waffer- 2 1/4"- [id number]  Drainable two piece pouch- 2 1/4"- [id number]    Please contact Wound/Ostomy Care Service Line if we can be of further assistance (ext [phone]).

## 2021-10-20 NOTE — PROGRESS NOTE ADULT - ASSESSMENT
Assessment:  66 year old F with recurrent GI bleed, now POD #1 laparoscopic assisted extended left hemicolectomy with end transverse colostomy, recovering now on floor.     Plan:  - Diet, c/w CLD as tolerated  - Resume all home meds  - Maintain perla and IVF, monitor UOP  - ISS for DM  - Pain control PRN  - Monitor for return of bowel function  - VTE ppx HSQ  - f/u labs and vital signs.    A Team surgery   [id number] Assessment:  66 year old F with recurrent GI bleed, now POD # 2 laparoscopic assisted extended left hemicolectomy with end transverse colostomy, recovering now on floor.     Plan:  - Diet, c/w CLD as tolerated  - Resume home meds  - Maintain perla and IVF, monitor UOP  - ISS for DM  - Pain control PRN  - Monitor for return of bowel function  - VTE ppx HSQ  - f/u labs and vital signs.    A Team surgery   [id number]

## 2021-10-21 ENCOUNTER — TRANSCRIPTION ENCOUNTER (OUTPATIENT)
Age: 66
End: 2021-10-21

## 2021-10-21 LAB
ANION GAP SERPL CALC-SCNC: 12 MMOL/L — SIGNIFICANT CHANGE UP (ref 7–14)
BUN SERPL-MCNC: 6 MG/DL — LOW (ref 7–23)
CALCIUM SERPL-MCNC: 8.1 MG/DL — LOW (ref 8.4–10.5)
CHLORIDE SERPL-SCNC: 101 MMOL/L — SIGNIFICANT CHANGE UP (ref 98–107)
CO2 SERPL-SCNC: 27 MMOL/L — SIGNIFICANT CHANGE UP (ref 22–31)
CREAT SERPL-MCNC: 0.94 MG/DL — SIGNIFICANT CHANGE UP (ref 0.5–1.3)
GLUCOSE BLDC GLUCOMTR-MCNC: 106 MG/DL — HIGH (ref 70–99)
GLUCOSE BLDC GLUCOMTR-MCNC: 136 MG/DL — HIGH (ref 70–99)
GLUCOSE BLDC GLUCOMTR-MCNC: 144 MG/DL — HIGH (ref 70–99)
GLUCOSE BLDC GLUCOMTR-MCNC: 96 MG/DL — SIGNIFICANT CHANGE UP (ref 70–99)
GLUCOSE SERPL-MCNC: 91 MG/DL — SIGNIFICANT CHANGE UP (ref 70–99)
HCT VFR BLD CALC: 22.6 % — LOW (ref 34.5–45)
HGB BLD-MCNC: 7.2 G/DL — LOW (ref 11.5–15.5)
MAGNESIUM SERPL-MCNC: 1.9 MG/DL — SIGNIFICANT CHANGE UP (ref 1.6–2.6)
MCHC RBC-ENTMCNC: 28.6 PG — SIGNIFICANT CHANGE UP (ref 27–34)
MCHC RBC-ENTMCNC: 31.9 GM/DL — LOW (ref 32–36)
MCV RBC AUTO: 89.7 FL — SIGNIFICANT CHANGE UP (ref 80–100)
NRBC # BLD: 0 /100 WBCS — SIGNIFICANT CHANGE UP
NRBC # FLD: 0 K/UL — SIGNIFICANT CHANGE UP
PHOSPHATE SERPL-MCNC: 2.5 MG/DL — SIGNIFICANT CHANGE UP (ref 2.5–4.5)
PLATELET # BLD AUTO: 183 K/UL — SIGNIFICANT CHANGE UP (ref 150–400)
POTASSIUM SERPL-MCNC: 3.5 MMOL/L — SIGNIFICANT CHANGE UP (ref 3.5–5.3)
POTASSIUM SERPL-SCNC: 3.5 MMOL/L — SIGNIFICANT CHANGE UP (ref 3.5–5.3)
RBC # BLD: 2.52 M/UL — LOW (ref 3.8–5.2)
RBC # FLD: 16.4 % — HIGH (ref 10.3–14.5)
SODIUM SERPL-SCNC: 140 MMOL/L — SIGNIFICANT CHANGE UP (ref 135–145)
WBC # BLD: 8.73 K/UL — SIGNIFICANT CHANGE UP (ref 3.8–10.5)
WBC # FLD AUTO: 8.73 K/UL — SIGNIFICANT CHANGE UP (ref 3.8–10.5)

## 2021-10-21 RX ORDER — IBUPROFEN 200 MG
600 TABLET ORAL EVERY 6 HOURS
Refills: 0 | Status: DISCONTINUED | OUTPATIENT
Start: 2021-10-21 | End: 2021-10-21

## 2021-10-21 RX ORDER — HYDROMORPHONE HYDROCHLORIDE 2 MG/ML
2 INJECTION INTRAMUSCULAR; INTRAVENOUS; SUBCUTANEOUS EVERY 6 HOURS
Refills: 0 | Status: DISCONTINUED | OUTPATIENT
Start: 2021-10-21 | End: 2021-10-22

## 2021-10-21 RX ORDER — ACETAMINOPHEN 500 MG
650 TABLET ORAL EVERY 6 HOURS
Refills: 0 | Status: COMPLETED | OUTPATIENT
Start: 2021-10-21 | End: 2021-10-22

## 2021-10-21 RX ORDER — LEVOTHYROXINE SODIUM 125 MCG
200 TABLET ORAL DAILY
Refills: 0 | Status: DISCONTINUED | OUTPATIENT
Start: 2021-10-21 | End: 2021-10-22

## 2021-10-21 RX ORDER — SIMVASTATIN 20 MG/1
40 TABLET, FILM COATED ORAL AT BEDTIME
Refills: 0 | Status: DISCONTINUED | OUTPATIENT
Start: 2021-10-21 | End: 2021-10-22

## 2021-10-21 RX ORDER — LISINOPRIL 2.5 MG/1
40 TABLET ORAL DAILY
Refills: 0 | Status: DISCONTINUED | OUTPATIENT
Start: 2021-10-21 | End: 2021-10-22

## 2021-10-21 RX ORDER — OXYCODONE HYDROCHLORIDE 5 MG/1
10 TABLET ORAL EVERY 6 HOURS
Refills: 0 | Status: DISCONTINUED | OUTPATIENT
Start: 2021-10-21 | End: 2021-10-21

## 2021-10-21 RX ORDER — MAGNESIUM SULFATE 500 MG/ML
1 VIAL (ML) INJECTION ONCE
Refills: 0 | Status: COMPLETED | OUTPATIENT
Start: 2021-10-21 | End: 2021-10-21

## 2021-10-21 RX ORDER — ACETAMINOPHEN 500 MG
1000 TABLET ORAL EVERY 6 HOURS
Refills: 0 | Status: DISCONTINUED | OUTPATIENT
Start: 2021-10-21 | End: 2021-10-21

## 2021-10-21 RX ORDER — IBUPROFEN 200 MG
600 TABLET ORAL EVERY 6 HOURS
Refills: 0 | Status: DISCONTINUED | OUTPATIENT
Start: 2021-10-22 | End: 2021-10-22

## 2021-10-21 RX ORDER — SODIUM,POTASSIUM PHOSPHATES 278-250MG
1 POWDER IN PACKET (EA) ORAL THREE TIMES A DAY
Refills: 0 | Status: COMPLETED | OUTPATIENT
Start: 2021-10-21 | End: 2021-10-22

## 2021-10-21 RX ADMIN — Medication 650 MILLIGRAM(S): at 18:14

## 2021-10-21 RX ADMIN — HYDROMORPHONE HYDROCHLORIDE 2 MILLIGRAM(S): 2 INJECTION INTRAMUSCULAR; INTRAVENOUS; SUBCUTANEOUS at 18:51

## 2021-10-21 RX ADMIN — HYDROMORPHONE HYDROCHLORIDE 1 MILLIGRAM(S): 2 INJECTION INTRAMUSCULAR; INTRAVENOUS; SUBCUTANEOUS at 05:30

## 2021-10-21 RX ADMIN — HYDROMORPHONE HYDROCHLORIDE 2 MILLIGRAM(S): 2 INJECTION INTRAMUSCULAR; INTRAVENOUS; SUBCUTANEOUS at 18:21

## 2021-10-21 RX ADMIN — Medication 100 GRAM(S): at 11:01

## 2021-10-21 RX ADMIN — Medication 600 MILLIGRAM(S): at 17:20

## 2021-10-21 RX ADMIN — HEPARIN SODIUM 5000 UNIT(S): 5000 INJECTION INTRAVENOUS; SUBCUTANEOUS at 05:15

## 2021-10-21 RX ADMIN — HEPARIN SODIUM 5000 UNIT(S): 5000 INJECTION INTRAVENOUS; SUBCUTANEOUS at 13:04

## 2021-10-21 RX ADMIN — HYDROMORPHONE HYDROCHLORIDE 1 MILLIGRAM(S): 2 INJECTION INTRAMUSCULAR; INTRAVENOUS; SUBCUTANEOUS at 10:59

## 2021-10-21 RX ADMIN — HEPARIN SODIUM 5000 UNIT(S): 5000 INJECTION INTRAVENOUS; SUBCUTANEOUS at 21:40

## 2021-10-21 RX ADMIN — Medication 1 PACKET(S): at 11:01

## 2021-10-21 RX ADMIN — LISINOPRIL 40 MILLIGRAM(S): 2.5 TABLET ORAL at 14:05

## 2021-10-21 RX ADMIN — HYDROMORPHONE HYDROCHLORIDE 1 MILLIGRAM(S): 2 INJECTION INTRAMUSCULAR; INTRAVENOUS; SUBCUTANEOUS at 00:36

## 2021-10-21 RX ADMIN — Medication 600 MILLIGRAM(S): at 17:50

## 2021-10-21 RX ADMIN — HYDROMORPHONE HYDROCHLORIDE 1 MILLIGRAM(S): 2 INJECTION INTRAMUSCULAR; INTRAVENOUS; SUBCUTANEOUS at 11:16

## 2021-10-21 RX ADMIN — HYDROMORPHONE HYDROCHLORIDE 1 MILLIGRAM(S): 2 INJECTION INTRAMUSCULAR; INTRAVENOUS; SUBCUTANEOUS at 05:15

## 2021-10-21 RX ADMIN — SIMVASTATIN 40 MILLIGRAM(S): 20 TABLET, FILM COATED ORAL at 21:46

## 2021-10-21 RX ADMIN — Medication 1 PACKET(S): at 21:40

## 2021-10-21 RX ADMIN — HYDROMORPHONE HYDROCHLORIDE 1 MILLIGRAM(S): 2 INJECTION INTRAMUSCULAR; INTRAVENOUS; SUBCUTANEOUS at 00:51

## 2021-10-21 RX ADMIN — Medication 650 MILLIGRAM(S): at 17:44

## 2021-10-21 NOTE — PROGRESS NOTE ADULT - SUBJECTIVE AND OBJECTIVE BOX
Subjective:  Patient now status post laparoscopic assisted extended left hemicolectomy with end transverse colostomy on 10/18. Passed Trial of void, no acute events overnight, seen and examined at bedside this AM.     PHYSICAL EXAM  GENERAL: NAD, lying in bed comfortably  HEAD:  Atraumatic, Normocephalic  CHEST/LUNG: Unlabored respirations  CARDIOVASCULAR: NSR on monitor   ABDOMEN: Softly distended, end transverse colostomy pink and viable, bowel sweat in bag. Wound c/d/i    Vital Signs Last 24 Hrs  T(C): 37.2 (20 Oct 2021 21:30), Max: 37.2 (20 Oct 2021 21:30)  T(F): 99 (20 Oct 2021 21:30), Max: 99 (20 Oct 2021 21:30)  HR: 98 (20 Oct 2021 21:30) (73 - 98)  BP: 148/93 (20 Oct 2021 21:30) (130/51 - 166/79)  BP(mean): --  RR: 18 (20 Oct 2021 21:30) (17 - 18)  SpO2: 95% (20 Oct 2021 21:30) (95% - 98%)    I&O's Detail    19 Oct 2021 07:01  -  20 Oct 2021 07:00  --------------------------------------------------------  IN:    IV PiggyBack: 50 mL    Lactated Ringers: 1575 mL    Lactated Ringers Bolus: 500 mL    Oral Fluid: 1060 mL  Total IN: 3185 mL    OUT:    Colostomy (mL): 80 mL    Indwelling Catheter - Urethral (mL): 1665 mL  Total OUT: 1745 mL    Total NET: 1440 mL      20 Oct 2021 07:01  -  21 Oct 2021 01:27  --------------------------------------------------------  IN:    Lactated Ringers: 75 mL    Oral Fluid: 120 mL  Total IN: 195 mL    OUT:    Colostomy (mL): 0 mL    Indwelling Catheter - Urethral (mL): 700 mL    Voided (mL): 990 mL  Total OUT: 1690 mL    Total NET: -1495 mL      LABS:                                   7.4    11.41 )-----------( 178      ( 20 Oct 2021 07:54 )             22.6   10-20    136  |  106  |  11  ----------------------------<  121<H>  4.0   |  21<L>  |  1.27    Ca    7.6<L>      20 Oct 2021 07:48  Phos  3.1     10-20  Mg     2.50     10-20      MEDICATIONS  (STANDING):  influenza   Vaccine 0.5 milliLiter(s) IntraMuscular once  insulin lispro (ADMELOG) corrective regimen sliding scale   SubCutaneous every 6 hours  lactated ringers. 1000 milliLiter(s) (75 mL/Hr) IV Continuous <Continuous>  levothyroxine Injectable 150 MICROGram(s) IV Push at bedtime  magnesium sulfate  IVPB 2 Gram(s) IV Intermittent once    MEDICATIONS  (PRN):  HYDROmorphone  Injectable 0.5 milliGRAM(s) IV Push every 4 hours PRN Moderate Pain (4 - 6)  HYDROmorphone  Injectable 1 milliGRAM(s) IV Push every 4 hours PRN Severe Pain (7 - 10)  ondansetron Injectable 4 milliGRAM(s) IV Push every 6 hours PRN Nausea   Subjective:  Patient now status post laparoscopic assisted extended left hemicolectomy with end transverse colostomy on 10/18. Passed Trial of void, no acute events overnight, seen and examined at bedside this AM. Noted to feel some air in her colostomy    PHYSICAL EXAM  GENERAL: NAD, lying in bed comfortably  HEAD:  Atraumatic, Normocephalic  CHEST/LUNG: Unlabored respirations  CARDIOVASCULAR: NSR on monitor   ABDOMEN: Softly distended, end transverse colostomy pink and viable, bowel sweat in bag. Wound c/d/i    Vital Signs Last 24 Hrs  T(C): 37.2 (20 Oct 2021 21:30), Max: 37.2 (20 Oct 2021 21:30)  T(F): 99 (20 Oct 2021 21:30), Max: 99 (20 Oct 2021 21:30)  HR: 98 (20 Oct 2021 21:30) (73 - 98)  BP: 148/93 (20 Oct 2021 21:30) (130/51 - 166/79)  BP(mean): --  RR: 18 (20 Oct 2021 21:30) (17 - 18)  SpO2: 95% (20 Oct 2021 21:30) (95% - 98%)    I&O's Detail    19 Oct 2021 07:01  -  20 Oct 2021 07:00  --------------------------------------------------------  IN:    IV PiggyBack: 50 mL    Lactated Ringers: 1575 mL    Lactated Ringers Bolus: 500 mL    Oral Fluid: 1060 mL  Total IN: 3185 mL    OUT:    Colostomy (mL): 80 mL    Indwelling Catheter - Urethral (mL): 1665 mL  Total OUT: 1745 mL    Total NET: 1440 mL      20 Oct 2021 07:01  -  21 Oct 2021 01:27  --------------------------------------------------------  IN:    Lactated Ringers: 75 mL    Oral Fluid: 120 mL  Total IN: 195 mL    OUT:    Colostomy (mL): 0 mL    Indwelling Catheter - Urethral (mL): 700 mL    Voided (mL): 990 mL  Total OUT: 1690 mL    Total NET: -1495 mL      LABS:                                   7.4    11.41 )-----------( 178      ( 20 Oct 2021 07:54 )             22.6   10-20    136  |  106  |  11  ----------------------------<  121<H>  4.0   |  21<L>  |  1.27    Ca    7.6<L>      20 Oct 2021 07:48  Phos  3.1     10-20  Mg     2.50     10-20      MEDICATIONS  (STANDING):  influenza   Vaccine 0.5 milliLiter(s) IntraMuscular once  insulin lispro (ADMELOG) corrective regimen sliding scale   SubCutaneous every 6 hours  lactated ringers. 1000 milliLiter(s) (75 mL/Hr) IV Continuous <Continuous>  levothyroxine Injectable 150 MICROGram(s) IV Push at bedtime  magnesium sulfate  IVPB 2 Gram(s) IV Intermittent once    MEDICATIONS  (PRN):  HYDROmorphone  Injectable 0.5 milliGRAM(s) IV Push every 4 hours PRN Moderate Pain (4 - 6)  HYDROmorphone  Injectable 1 milliGRAM(s) IV Push every 4 hours PRN Severe Pain (7 - 10)  ondansetron Injectable 4 milliGRAM(s) IV Push every 6 hours PRN Nausea

## 2021-10-21 NOTE — DISCHARGE NOTE PROVIDER - NSDCMRMEDTOKEN_GEN_ALL_CORE_FT
ferrous sulfate 325 mg (65 mg elemental iron) oral delayed release tablet: 1 tab(s) orally once a day  ramipril 10 mg oral capsule: 1 cap(s) orally 2 times a day  simvastatin 40 mg oral tablet: 1 tab(s) orally once a day (at bedtime)  Synthroid 200 mcg (0.2 mg) oral tablet: 1 tab(s) orally once a day   acetaminophen 325 mg oral tablet: 2 tab(s) orally every 6 hours  ferrous sulfate 325 mg (65 mg elemental iron) oral delayed release tablet: 1 tab(s) orally once a day  HYDROmorphone 2 mg oral tablet: 1 tab(s) orally every 6 hours, As needed, Severe Pain (7 - 10) MDD:4  ramipril 10 mg oral capsule: 1 cap(s) orally 2 times a day  simvastatin 40 mg oral tablet: 1 tab(s) orally once a day (at bedtime)  Synthroid 200 mcg (0.2 mg) oral tablet: 1 tab(s) orally once a day

## 2021-10-21 NOTE — DISCHARGE NOTE PROVIDER - CARE PROVIDER_API CALL
Breezy Beach)  ColonRectal Surgery; Surgery  [facility], [address] [place] [address]  Phone: [phone]  Fax: [phone]  Follow Up Time: 1 week

## 2021-10-21 NOTE — PROVIDER CONTACT NOTE (OTHER) - BACKGROUND
Patient came in s/p hemicolectomy with colostomy creation. Colostomy output charted. Complaints of pain throughout shift. Dilaudid given as needed with standing Tylenol.

## 2021-10-21 NOTE — DISCHARGE NOTE PROVIDER - NSDCCPCAREPLAN_GEN_ALL_CORE_FT
PRINCIPAL DISCHARGE DIAGNOSIS  Diagnosis: GI bleed  Assessment and Plan of Treatment: s/p laparoscopic assisted extended left hemicolectomy with end transverse colostomy  WOUND CARE:  Please keep incisions clean and dry. Please do not Scrub or rub incisions. DO NOT use lotion or powder on incisions.   BATHING: You may shower and/or sponge bathe. You may use warm soapy water in the shower and rinse, pat dry. NO baths no pools for 6 weeks.  ACTIVITY: No heavy lifting or straining. Otherwise, you may return to your usual level of physical activity.   Take Tylenol 500mg every 6 hours as needed for mild to moderate pain if your pain continues take oxycodone 5 mg. If you are taking narcotic pain (oxycodone) medication DO NOT drive a car, operate machinery or make important decisions.  DIET: Return to your usual diet.  NOTIFY YOUR SURGEON IF YOU HAVE: any bleeding that does not stop, any pus draining from your wound(s), any fever (over 100.4 F) persistent nausea/vomiting, or if your pain is not controlled on your discharge pain medications, unable to urinate.  Please follow up with your primary care physician in one week regarding your hospitalization, bring copies of your discharge paperwork.  Please follow up with your surgeon, Dr. Breezy Beach

## 2021-10-21 NOTE — DISCHARGE NOTE PROVIDER - HOSPITAL COURSE
65F w/ hx of diverticulosis (reports 8 previous admissions at multiple hospitals), herniated lumbar disc (s/p plate), SOLOMON, DM (on oral agent) presents with 6 episodes of BRBPR that began at 4pm on 10/12/21. She denies any abdominal pain   On presentation today she is hemodynamically stable - HR 70s, -140s. She is mentating well and currently has no complaints. CTA localized the bleed to the descending colon at the splenic flexure.  GI consulted and patient had a flex sig at bedside, found Severe diverticulosis with small and large diverticula throughout with no active bleeding. Patient admitted to SICU for monitoring.  Patient continued to bleed requiring her to go to the OR on 10/16 s/p  laparoscopic assisted extended left hemicolectomy with end transverse colostomy. Post op she recovered in SICU and was transferred to the floor POD 1. Patient had ostomy teaching. Diet was restarted and advanced as tolerated. Pain control was transitioned from IV to PO pain meds. At this time, patient is currently ambulating, voiding, tolerating a regular diet. Pain well controlled on PO pain meds. Patient has been deemed stable for discharge home with follow up as an outpatient.

## 2021-10-22 ENCOUNTER — TRANSCRIPTION ENCOUNTER (OUTPATIENT)
Age: 66
End: 2021-10-22

## 2021-10-22 VITALS
OXYGEN SATURATION: 100 % | DIASTOLIC BLOOD PRESSURE: 80 MMHG | TEMPERATURE: 98 F | SYSTOLIC BLOOD PRESSURE: 149 MMHG | HEART RATE: 88 BPM | RESPIRATION RATE: 18 BRPM

## 2021-10-22 LAB
ANION GAP SERPL CALC-SCNC: 12 MMOL/L — SIGNIFICANT CHANGE UP (ref 7–14)
BLD GP AB SCN SERPL QL: NEGATIVE — SIGNIFICANT CHANGE UP
BUN SERPL-MCNC: 8 MG/DL — SIGNIFICANT CHANGE UP (ref 7–23)
CALCIUM SERPL-MCNC: 8 MG/DL — LOW (ref 8.4–10.5)
CHLORIDE SERPL-SCNC: 102 MMOL/L — SIGNIFICANT CHANGE UP (ref 98–107)
CO2 SERPL-SCNC: 26 MMOL/L — SIGNIFICANT CHANGE UP (ref 22–31)
CREAT SERPL-MCNC: 1.06 MG/DL — SIGNIFICANT CHANGE UP (ref 0.5–1.3)
GLUCOSE BLDC GLUCOMTR-MCNC: 116 MG/DL — HIGH (ref 70–99)
GLUCOSE BLDC GLUCOMTR-MCNC: 129 MG/DL — HIGH (ref 70–99)
GLUCOSE BLDC GLUCOMTR-MCNC: 145 MG/DL — HIGH (ref 70–99)
GLUCOSE SERPL-MCNC: 111 MG/DL — HIGH (ref 70–99)
HCT VFR BLD CALC: 23.5 % — LOW (ref 34.5–45)
HCT VFR BLD CALC: 25.9 % — LOW (ref 34.5–45)
HGB BLD-MCNC: 7.8 G/DL — LOW (ref 11.5–15.5)
HGB BLD-MCNC: 8.4 G/DL — LOW (ref 11.5–15.5)
MAGNESIUM SERPL-MCNC: 2 MG/DL — SIGNIFICANT CHANGE UP (ref 1.6–2.6)
MCHC RBC-ENTMCNC: 28.6 PG — SIGNIFICANT CHANGE UP (ref 27–34)
MCHC RBC-ENTMCNC: 28.7 PG — SIGNIFICANT CHANGE UP (ref 27–34)
MCHC RBC-ENTMCNC: 32.4 GM/DL — SIGNIFICANT CHANGE UP (ref 32–36)
MCHC RBC-ENTMCNC: 33.2 GM/DL — SIGNIFICANT CHANGE UP (ref 32–36)
MCV RBC AUTO: 86.4 FL — SIGNIFICANT CHANGE UP (ref 80–100)
MCV RBC AUTO: 88.1 FL — SIGNIFICANT CHANGE UP (ref 80–100)
NRBC # BLD: 0 /100 WBCS — SIGNIFICANT CHANGE UP
NRBC # BLD: 0 /100 WBCS — SIGNIFICANT CHANGE UP
NRBC # FLD: 0 K/UL — SIGNIFICANT CHANGE UP
NRBC # FLD: 0 K/UL — SIGNIFICANT CHANGE UP
PHOSPHATE SERPL-MCNC: 3.2 MG/DL — SIGNIFICANT CHANGE UP (ref 2.5–4.5)
PLATELET # BLD AUTO: 272 K/UL — SIGNIFICANT CHANGE UP (ref 150–400)
PLATELET # BLD AUTO: 289 K/UL — SIGNIFICANT CHANGE UP (ref 150–400)
POTASSIUM SERPL-MCNC: 3.5 MMOL/L — SIGNIFICANT CHANGE UP (ref 3.5–5.3)
POTASSIUM SERPL-SCNC: 3.5 MMOL/L — SIGNIFICANT CHANGE UP (ref 3.5–5.3)
RBC # BLD: 2.72 M/UL — LOW (ref 3.8–5.2)
RBC # BLD: 2.94 M/UL — LOW (ref 3.8–5.2)
RBC # FLD: 15.6 % — HIGH (ref 10.3–14.5)
RBC # FLD: 15.7 % — HIGH (ref 10.3–14.5)
RH IG SCN BLD-IMP: POSITIVE — SIGNIFICANT CHANGE UP
SODIUM SERPL-SCNC: 140 MMOL/L — SIGNIFICANT CHANGE UP (ref 135–145)
WBC # BLD: 7.52 K/UL — SIGNIFICANT CHANGE UP (ref 3.8–10.5)
WBC # BLD: 7.82 K/UL — SIGNIFICANT CHANGE UP (ref 3.8–10.5)
WBC # FLD AUTO: 7.52 K/UL — SIGNIFICANT CHANGE UP (ref 3.8–10.5)
WBC # FLD AUTO: 7.82 K/UL — SIGNIFICANT CHANGE UP (ref 3.8–10.5)

## 2021-10-22 RX ORDER — HYDROMORPHONE HYDROCHLORIDE 2 MG/ML
1 INJECTION INTRAMUSCULAR; INTRAVENOUS; SUBCUTANEOUS
Qty: 8 | Refills: 0
Start: 2021-10-22 | End: 2021-10-23

## 2021-10-22 RX ORDER — ACETAMINOPHEN 500 MG
2 TABLET ORAL
Qty: 0 | Refills: 0 | DISCHARGE
Start: 2021-10-22

## 2021-10-22 RX ADMIN — Medication 650 MILLIGRAM(S): at 13:00

## 2021-10-22 RX ADMIN — HEPARIN SODIUM 5000 UNIT(S): 5000 INJECTION INTRAVENOUS; SUBCUTANEOUS at 05:23

## 2021-10-22 RX ADMIN — Medication 650 MILLIGRAM(S): at 05:23

## 2021-10-22 RX ADMIN — HYDROMORPHONE HYDROCHLORIDE 2 MILLIGRAM(S): 2 INJECTION INTRAMUSCULAR; INTRAVENOUS; SUBCUTANEOUS at 10:18

## 2021-10-22 RX ADMIN — HYDROMORPHONE HYDROCHLORIDE 2 MILLIGRAM(S): 2 INJECTION INTRAMUSCULAR; INTRAVENOUS; SUBCUTANEOUS at 03:14

## 2021-10-22 RX ADMIN — HYDROMORPHONE HYDROCHLORIDE 2 MILLIGRAM(S): 2 INJECTION INTRAMUSCULAR; INTRAVENOUS; SUBCUTANEOUS at 03:43

## 2021-10-22 RX ADMIN — Medication 650 MILLIGRAM(S): at 12:22

## 2021-10-22 RX ADMIN — Medication 200 MICROGRAM(S): at 05:24

## 2021-10-22 RX ADMIN — LISINOPRIL 40 MILLIGRAM(S): 2.5 TABLET ORAL at 05:23

## 2021-10-22 RX ADMIN — HYDROMORPHONE HYDROCHLORIDE 2 MILLIGRAM(S): 2 INJECTION INTRAMUSCULAR; INTRAVENOUS; SUBCUTANEOUS at 10:46

## 2021-10-22 RX ADMIN — HEPARIN SODIUM 5000 UNIT(S): 5000 INJECTION INTRAVENOUS; SUBCUTANEOUS at 14:38

## 2021-10-22 RX ADMIN — Medication 1 PACKET(S): at 05:24

## 2021-10-22 NOTE — PROGRESS NOTE ADULT - ASSESSMENT
Assessment:  66 year old F with recurrent GI bleed, now POD # 4 laparoscopic assisted extended left hemicolectomy with end transverse colostomy, recovering now on floor.     Plan:  - Diet, LRD  - cont. home meds  - Follow up Is/Os  - ISS for DM  - Pain control PRN    A Team surgery   [id number] 66 year old F with recurrent GI bleed, now POD # 4 laparoscopic assisted extended left hemicolectomy with end transverse colostomy, recovering now on floor tolerating a regular diet, most likely to go home today    Plan:  - Diet, LRD  - cont. home meds  - Follow up Is/Os  - ISS for DM  - Pain control PRN  - D/c home pending stable crit  - Seen and discussed with A team    A Team surgery   [id number]

## 2021-10-22 NOTE — PROGRESS NOTE ADULT - SUBJECTIVE AND OBJECTIVE BOX
Subjective:  Patient now status post laparoscopic assisted extended left hemicolectomy with end transverse colostomy on 10/18. No acute events overnight, seen and examined at bedside this AM.     PHYSICAL EXAM  GENERAL: NAD, lying in bed comfortably  HEAD:  Atraumatic, Normocephalic  CHEST/LUNG: Unlabored respirations  CARDIOVASCULAR: NSR on monitor   ABDOMEN: Softly distended, end transverse colostomy pink and viable, bowel sweat in bag. Wound c/d/i    Vital Signs Last 24 Hrs  T(C): 37.3 (21 Oct 2021 21:22), Max: 37.5 (21 Oct 2021 17:54)  T(F): 99.2 (21 Oct 2021 21:22), Max: 99.5 (21 Oct 2021 17:54)  HR: 91 (21 Oct 2021 21:22) (82 - 99)  BP: 134/76 (21 Oct 2021 21:22) (134/76 - 171/87)  BP(mean): --  RR: 16 (21 Oct 2021 21:22) (16 - 18)  SpO2: 94% (21 Oct 2021 21:22) (94% - 99%)    I&O's Detail    20 Oct 2021 07:01  -  21 Oct 2021 07:00  --------------------------------------------------------  IN:    Lactated Ringers: 675 mL    Oral Fluid: 340 mL  Total IN: 1015 mL    OUT:    Colostomy (mL): 0 mL    Indwelling Catheter - Urethral (mL): 700 mL    Voided (mL): 2090 mL  Total OUT: 2790 mL    Total NET: -1775 mL      21 Oct 2021 07:01  -  22 Oct 2021 00:23  --------------------------------------------------------  IN:    Oral Fluid: 591 mL  Total IN: 591 mL    OUT:    Colostomy (mL): 350 mL    IV PiggyBack: 0 mL    Voided (mL): 1350 mL  Total OUT: 1700 mL    Total NET: -1109 mL    LABS:                                            7.2    8.73  )-----------( 183      ( 21 Oct 2021 10:09 )             22.6   10-21    140  |  101  |  6<L>  ----------------------------<  91  3.5   |  27  |  0.94    Ca    8.1<L>      21 Oct 2021 10:09  Phos  2.5     10-21  Mg     1.90     10-21          MEDICATIONS  (STANDING):  influenza   Vaccine 0.5 milliLiter(s) IntraMuscular once  insulin lispro (ADMELOG) corrective regimen sliding scale   SubCutaneous every 6 hours  lactated ringers. 1000 milliLiter(s) (75 mL/Hr) IV Continuous <Continuous>  levothyroxine Injectable 150 MICROGram(s) IV Push at bedtime  magnesium sulfate  IVPB 2 Gram(s) IV Intermittent once    MEDICATIONS  (PRN):  HYDROmorphone  Injectable 0.5 milliGRAM(s) IV Push every 4 hours PRN Moderate Pain (4 - 6)  HYDROmorphone  Injectable 1 milliGRAM(s) IV Push every 4 hours PRN Severe Pain (7 - 10)  ondansetron Injectable 4 milliGRAM(s) IV Push every 6 hours PRN Nausea   Subjective:  Patient now status post laparoscopic assisted extended left hemicolectomy with end transverse colostomy on 10/18. No acute events overnight, seen and examined at bedside this AM. She states she had food yesterday and tolerated it, she states she ambulated with PT and knows how to change her ostomy.    PHYSICAL EXAM  GENERAL: NAD, lying in bed comfortably  HEAD:  Atraumatic, Normocephalic  CHEST/LUNG: Unlabored respirations  CARDIOVASCULAR: NSR on monitor   ABDOMEN: Softly distended, end transverse colostomy pink and viable, bowel sweat in bag. Wound c/d/i    Vital Signs Last 24 Hrs  T(C): 37.3 (21 Oct 2021 21:22), Max: 37.5 (21 Oct 2021 17:54)  T(F): 99.2 (21 Oct 2021 21:22), Max: 99.5 (21 Oct 2021 17:54)  HR: 91 (21 Oct 2021 21:22) (82 - 99)  BP: 134/76 (21 Oct 2021 21:22) (134/76 - 171/87)  RR: 16 (21 Oct 2021 21:22) (16 - 18)  SpO2: 94% (21 Oct 2021 21:22) (94% - 99%)    I&O's Detail    20 Oct 2021 07:01  -  21 Oct 2021 07:00  --------------------------------------------------------  IN:    Lactated Ringers: 675 mL    Oral Fluid: 340 mL  Total IN: 1015 mL    OUT:    Colostomy (mL): 0 mL    Indwelling Catheter - Urethral (mL): 700 mL    Voided (mL): 2090 mL  Total OUT: 2790 mL    Total NET: -1775 mL      21 Oct 2021 07:01  -  22 Oct 2021 00:23  --------------------------------------------------------  IN:    Oral Fluid: 591 mL  Total IN: 591 mL    OUT:    Colostomy (mL): 350 mL    IV PiggyBack: 0 mL    Voided (mL): 1350 mL  Total OUT: 1700 mL    Total NET: -1109 mL    LABS:                                            7.2    8.73  )-----------( 183      ( 21 Oct 2021 10:09 )             22.6   10-21    140  |  101  |  6<L>  ----------------------------<  91  3.5   |  27  |  0.94    Ca    8.1<L>      21 Oct 2021 10:09  Phos  2.5     10-21  Mg     1.90     10-21          MEDICATIONS  (STANDING):  influenza   Vaccine 0.5 milliLiter(s) IntraMuscular once  insulin lispro (ADMELOG) corrective regimen sliding scale   SubCutaneous every 6 hours  lactated ringers. 1000 milliLiter(s) (75 mL/Hr) IV Continuous <Continuous>  levothyroxine Injectable 150 MICROGram(s) IV Push at bedtime  magnesium sulfate  IVPB 2 Gram(s) IV Intermittent once    MEDICATIONS  (PRN):  HYDROmorphone  Injectable 0.5 milliGRAM(s) IV Push every 4 hours PRN Moderate Pain (4 - 6)  HYDROmorphone  Injectable 1 milliGRAM(s) IV Push every 4 hours PRN Severe Pain (7 - 10)  ondansetron Injectable 4 milliGRAM(s) IV Push every 6 hours PRN Nausea

## 2021-10-22 NOTE — DISCHARGE NOTE NURSING/CASE MANAGEMENT/SOCIAL WORK - NSDCPNINST_GEN_ALL_CORE
Do not scrub your incision sites or remove the dressings. Do not use any  powders, lotion or cream on or near incision sites. Watch for signs of infection; redness, swelling, fever, chills or heat, temperature of 100.4 or higher or pain unrelieved by pain medications. Report such symptoms to the MD. No driving while taking pain medication, it causes drowsiness & constipation. Drink 6-8 glasses of fluids daily to promote hydration. If unable to tolerate diet and experiencing nausea/vomiting, call your provider. No heavy lifting, pulling or pushing heavy objects. Follow up with the MD at designated time.

## 2021-10-22 NOTE — DISCHARGE NOTE NURSING/CASE MANAGEMENT/SOCIAL WORK - PATIENT PORTAL LINK FT
You can access the FollowMyHealth Patient Portal offered by [facility] by registering at the following website: [email or web address]. By joining [facility]’s FollowMyHealth portal, you will also be able to view your health information using other applications (apps) compatible with our system.

## 2021-10-22 NOTE — PROGRESS NOTE ADULT - PROVIDER SPECIALTY LIST ADULT
Colorectal Surgery
Pain Medicine
Pain Medicine
Surgery
Surgery
Colorectal Surgery
Gastroenterology
SICU
Surgery
SICU
Surgery
Surgery
SICU

## 2021-10-25 LAB — SURGICAL PATHOLOGY STUDY: SIGNIFICANT CHANGE UP

## 2021-11-02 ENCOUNTER — APPOINTMENT (OUTPATIENT)
Dept: COLORECTAL SURGERY | Facility: CLINIC | Age: 66
End: 2021-11-02
Payer: MEDICARE

## 2021-11-02 PROCEDURE — 99024 POSTOP FOLLOW-UP VISIT: CPT

## 2021-11-02 NOTE — ASSESSMENT
[FreeTextEntry1] : Recurrent GI bleed\par -Patient progressing well\par -Low-residue diet for 2 more weeks followed by high-fiber diet\par -Follow up in 4 weeks for reevaluation

## 2021-11-02 NOTE — HISTORY OF PRESENT ILLNESS
[FreeTextEntry1] : Status post laparoscopic-assistedLeft hemicolectomy for recurrent gastrointestinal bleeding. Patient progressing well. Tolerating diet. Stoma function no fevers or chills no nausea or vomiting

## 2021-11-30 ENCOUNTER — APPOINTMENT (OUTPATIENT)
Dept: COLORECTAL SURGERY | Facility: CLINIC | Age: 66
End: 2021-11-30
Payer: MEDICARE

## 2021-11-30 PROCEDURE — 99024 POSTOP FOLLOW-UP VISIT: CPT

## 2021-11-30 NOTE — ASSESSMENT
[FreeTextEntry1] : Status post subtotal colectomy with recurrentDiverticular bleeds\par -Patient progressing well\par -High-fiber diet\par -Followup in a week for reevaluation

## 2021-11-30 NOTE — HISTORY OF PRESENT ILLNESS
[FreeTextEntry1] : Status post subtotal colectomy. Patient progressing well. Tolerating diet. No fevers or chills no nausea or vomiting stoma function

## 2022-02-01 ENCOUNTER — APPOINTMENT (OUTPATIENT)
Dept: COLORECTAL SURGERY | Facility: CLINIC | Age: 67
End: 2022-02-01
Payer: MEDICARE

## 2022-02-01 PROCEDURE — 99213 OFFICE O/P EST LOW 20 MIN: CPT

## 2022-02-01 NOTE — ASSESSMENT
[FreeTextEntry1] : Recurrent diverticular bleed status post subtotal colectomy\par -We discussed attempting colostomy reversal\par -Risks and benefits were reviewed initially including recurrent bleeding and possible need for ileostomy\par -Patient wishes to proceed and we will preliminarily find a date for the middle of March\par -Patient followup in office 2-3 weeks before the procedure for sigmoidoscopy to evaluate rectal stump and 2 discussed the procedure fully\par -All questions answered

## 2022-02-01 NOTE — HISTORY OF PRESENT ILLNESS
[FreeTextEntry1] : 66-year-old female status post subtotal colectomy for diverticular bleed. Patient progressing well. Tolerating diet. Stomal functions 3-5 times per day occasional increased gas is noted. Patient otherwise without complaint no abdominal pain no fevers or chills no nausea or vomiting. No aggravating factors

## 2022-02-02 RX ORDER — NEOMYCIN SULFATE 500 MG/1
500 TABLET ORAL
Qty: 3 | Refills: 0 | Status: ACTIVE | COMMUNITY
Start: 2022-02-02 | End: 1900-01-01

## 2022-02-02 RX ORDER — METRONIDAZOLE 500 MG/1
500 TABLET ORAL
Qty: 3 | Refills: 0 | Status: ACTIVE | COMMUNITY
Start: 2022-02-02 | End: 1900-01-01

## 2022-03-01 ENCOUNTER — APPOINTMENT (OUTPATIENT)
Dept: COLORECTAL SURGERY | Facility: CLINIC | Age: 67
End: 2022-03-01
Payer: MEDICARE

## 2022-03-01 PROCEDURE — 99213 OFFICE O/P EST LOW 20 MIN: CPT | Mod: 25

## 2022-03-01 PROCEDURE — 45330 DIAGNOSTIC SIGMOIDOSCOPY: CPT

## 2022-03-01 NOTE — ASSESSMENT
[FreeTextEntry1] : Multiple diverticulum With history of subtotal colectomy for GI bleed\par -We discussed reversal of colostomy. Risks and benefits were reviewed including bleeding, infection, possible anastomotic dehiscence and possible stoma creation\par -All questions were answered\par -Bowel prep and oral antibiotics were given\par -Patient will schedule at her earliest convenience\par -Enhance recovery protocol was reviewed

## 2022-03-01 NOTE — HISTORY OF PRESENT ILLNESS
[FreeTextEntry1] : 66-year-old female status post subtotal colectomy for diverticular bleed. Patient progressing well. Tolerating diet. Stomal functions 3-5 times per day occasional increased gas is noted. Patient otherwise without complaint no abdominal pain no fevers or chills no nausea or vomiting. No aggravating factors\par \par March 1, 2022-patient presents today for followup and to discuss possible reversal. She is currently without complaint no fevers or chills no nausea or vomiting. No bowel pain stoma function without event no aggravating factors

## 2022-03-16 ENCOUNTER — OUTPATIENT (OUTPATIENT)
Dept: OUTPATIENT SERVICES | Facility: HOSPITAL | Age: 67
LOS: 1 days | End: 2022-03-16

## 2022-03-16 VITALS
DIASTOLIC BLOOD PRESSURE: 90 MMHG | WEIGHT: 203.93 LBS | HEART RATE: 90 BPM | HEIGHT: 64 IN | TEMPERATURE: 98 F | RESPIRATION RATE: 18 BRPM | OXYGEN SATURATION: 97 % | SYSTOLIC BLOOD PRESSURE: 140 MMHG

## 2022-03-16 DIAGNOSIS — Z90.710 ACQUIRED ABSENCE OF BOTH CERVIX AND UTERUS: Chronic | ICD-10-CM

## 2022-03-16 DIAGNOSIS — Z90.89 ACQUIRED ABSENCE OF OTHER ORGANS: Chronic | ICD-10-CM

## 2022-03-16 DIAGNOSIS — Z98.890 OTHER SPECIFIED POSTPROCEDURAL STATES: Chronic | ICD-10-CM

## 2022-03-16 DIAGNOSIS — K57.31 DIVERTICULOSIS OF LARGE INTESTINE WITHOUT PERFORATION OR ABSCESS WITH BLEEDING: ICD-10-CM

## 2022-03-16 DIAGNOSIS — I10 ESSENTIAL (PRIMARY) HYPERTENSION: ICD-10-CM

## 2022-03-16 DIAGNOSIS — G47.33 OBSTRUCTIVE SLEEP APNEA (ADULT) (PEDIATRIC): ICD-10-CM

## 2022-03-16 DIAGNOSIS — Z90.49 ACQUIRED ABSENCE OF OTHER SPECIFIED PARTS OF DIGESTIVE TRACT: Chronic | ICD-10-CM

## 2022-03-16 DIAGNOSIS — E11.9 TYPE 2 DIABETES MELLITUS WITHOUT COMPLICATIONS: ICD-10-CM

## 2022-03-16 DIAGNOSIS — E03.9 HYPOTHYROIDISM, UNSPECIFIED: ICD-10-CM

## 2022-03-16 LAB
A1C WITH ESTIMATED AVERAGE GLUCOSE RESULT: 7.3 % — HIGH (ref 4–5.6)
ANION GAP SERPL CALC-SCNC: 15 MMOL/L — HIGH (ref 7–14)
BLD GP AB SCN SERPL QL: NEGATIVE — SIGNIFICANT CHANGE UP
BUN SERPL-MCNC: 21 MG/DL — SIGNIFICANT CHANGE UP (ref 7–23)
CALCIUM SERPL-MCNC: 9.4 MG/DL — SIGNIFICANT CHANGE UP (ref 8.4–10.5)
CHLORIDE SERPL-SCNC: 103 MMOL/L — SIGNIFICANT CHANGE UP (ref 98–107)
CO2 SERPL-SCNC: 25 MMOL/L — SIGNIFICANT CHANGE UP (ref 22–31)
CREAT SERPL-MCNC: 1.25 MG/DL — SIGNIFICANT CHANGE UP (ref 0.5–1.3)
EGFR: 47 ML/MIN/1.73M2 — LOW
ESTIMATED AVERAGE GLUCOSE: 163 — SIGNIFICANT CHANGE UP
GLUCOSE SERPL-MCNC: 155 MG/DL — HIGH (ref 70–99)
HCT VFR BLD CALC: 32.1 % — LOW (ref 34.5–45)
HGB BLD-MCNC: 10.2 G/DL — LOW (ref 11.5–15.5)
MCHC RBC-ENTMCNC: 26.8 PG — LOW (ref 27–34)
MCHC RBC-ENTMCNC: 31.8 GM/DL — LOW (ref 32–36)
MCV RBC AUTO: 84.5 FL — SIGNIFICANT CHANGE UP (ref 80–100)
NRBC # BLD: 0 /100 WBCS — SIGNIFICANT CHANGE UP
NRBC # FLD: 0 K/UL — SIGNIFICANT CHANGE UP
PLATELET # BLD AUTO: 233 K/UL — SIGNIFICANT CHANGE UP (ref 150–400)
POTASSIUM SERPL-MCNC: 3.4 MMOL/L — LOW (ref 3.5–5.3)
POTASSIUM SERPL-SCNC: 3.4 MMOL/L — LOW (ref 3.5–5.3)
RBC # BLD: 3.8 M/UL — SIGNIFICANT CHANGE UP (ref 3.8–5.2)
RBC # FLD: 17.9 % — HIGH (ref 10.3–14.5)
RH IG SCN BLD-IMP: POSITIVE — SIGNIFICANT CHANGE UP
SODIUM SERPL-SCNC: 143 MMOL/L — SIGNIFICANT CHANGE UP (ref 135–145)
WBC # BLD: 5.65 K/UL — SIGNIFICANT CHANGE UP (ref 3.8–10.5)
WBC # FLD AUTO: 5.65 K/UL — SIGNIFICANT CHANGE UP (ref 3.8–10.5)

## 2022-03-16 RX ORDER — SIMVASTATIN 20 MG/1
1 TABLET, FILM COATED ORAL
Qty: 0 | Refills: 0 | DISCHARGE

## 2022-03-16 RX ORDER — LEVOTHYROXINE SODIUM 125 MCG
1 TABLET ORAL
Qty: 0 | Refills: 0 | DISCHARGE

## 2022-03-16 RX ORDER — PREGABALIN 225 MG/1
1 CAPSULE ORAL
Qty: 0 | Refills: 0 | DISCHARGE

## 2022-03-16 RX ORDER — OMEPRAZOLE 10 MG/1
1 CAPSULE, DELAYED RELEASE ORAL
Qty: 0 | Refills: 0 | DISCHARGE

## 2022-03-16 RX ORDER — ACETAMINOPHEN 500 MG
2 TABLET ORAL
Qty: 0 | Refills: 0 | DISCHARGE

## 2022-03-16 RX ORDER — RAMIPRIL 5 MG
1 CAPSULE ORAL
Qty: 0 | Refills: 0 | DISCHARGE

## 2022-03-16 RX ORDER — AMLODIPINE BESYLATE 2.5 MG/1
1 TABLET ORAL
Qty: 0 | Refills: 0 | DISCHARGE

## 2022-03-16 RX ORDER — FERROUS SULFATE 325(65) MG
1 TABLET ORAL
Qty: 0 | Refills: 0 | DISCHARGE

## 2022-03-16 RX ORDER — FUROSEMIDE 40 MG
1 TABLET ORAL
Qty: 0 | Refills: 0 | DISCHARGE

## 2022-03-16 NOTE — H&P PST ADULT - NEGATIVE ALLERGY TYPES
no outdoor environmental allergies/no reactions to medicines/no reactions to food/no reactions to animals

## 2022-03-16 NOTE — H&P PST ADULT - GENERAL GENITOURINARY SYMPTOMS
since removal of perla 2/2018/increased urinary frequency/nocturia renal colic/increased urinary frequency/nocturia

## 2022-03-16 NOTE — H&P PST ADULT - ASSESSMENT
67 y/o female presents with pre op diagnosis: diverticulosis large instestine w/o perf/absc w/ bleed

## 2022-03-16 NOTE — H&P PST ADULT - NEGATIVE GASTROINTESTINAL SYMPTOMS
no nausea/no vomiting/no diarrhea/no constipation/no change in bowel habits/no abdominal pain/no jaundice/no hiccoughs

## 2022-03-16 NOTE — H&P PST ADULT - NEGATIVE MUSCULOSKELETAL SYMPTOMS
no arthralgia/no arthritis no arthralgia/no arthritis/no myalgia/no muscle cramps/no muscle weakness/no neck pain/no back pain

## 2022-03-16 NOTE — H&P PST ADULT - PROBLEM SELECTOR PLAN 1
Schedule for Martin reversal tentatively on 03/21/2022. Pre op instructions, famotidine given and explained. Pt verbalized understanding.  Pt confirmed schedule appt for Covid test pre op

## 2022-03-16 NOTE — H&P PST ADULT - RS GEN PE MLT RESP DETAILS PC
breath sounds equal/respirations non-labored/clear to auscultation bilaterally/no intercostal retractions/no rales/no rhonchi/no wheezes

## 2022-03-16 NOTE — H&P PST ADULT - GRAVIDA, OB PROFILE
Detail Level: Zone Preface: I requested the records from the following providers: pathology report/office notes from prev derm 4

## 2022-03-16 NOTE — H&P PST ADULT - NSICDXFAMILYHX_GEN_ALL_CORE_FT
FAMILY HISTORY:  Family history of gastric cancer, Mother  Family history of throat cancer, Sister FAMILY HISTORY:  Family history of gastric cancer, Mother  Family history of throat cancer, Sister

## 2022-03-16 NOTE — H&P PST ADULT - PROBLEM SELECTOR PLAN 4
Pt instructed to take Levothyroxine with a sip of water A.M of surgery. Pt verbalized understanding.

## 2022-03-16 NOTE — H&P PST ADULT - NSICDXPASTMEDICALHX_GEN_ALL_CORE_FT
PAST MEDICAL HISTORY:  Diverticulosis     Fibroid uterus '94  surgically treated    Heart murmur mild    HTN (hypertension)     Hypothyroidism     Left ureteral stone     Lower gastrointestinal bleeding Multipel times since '2014: last episode 5/2018    Lumbar herniated disc ' 2010  surgically treated    Multiparity     SOLOMON (obstructive sleep apnea) non-compliant with CPAP    Pernicious anemia     Type 2 diabetes mellitus PAST MEDICAL HISTORY:  Diverticulosis     Fibroid uterus '94  surgically treated    Heart murmur mild    HTN (hypertension)     Hypothyroidism     Left ureteral stone     Lower gastrointestinal bleeding Multipel times since '2014: last episode 5/2018    Lumbar herniated disc ' 2010  surgically treated    Multiparity     SOLOMON (obstructive sleep apnea) non-compliant with CPAP    Pernicious anemia     Type 2 diabetes mellitus

## 2022-03-16 NOTE — H&P PST ADULT - HISTORY OF PRESENT ILLNESS
66 year old female with PMHx:  HTN, HLD, Hypothyroid,     recently dx with T2DM, SOLOMON not consistent with using CPAP, Pernicious anemia, diverticulosis, GI bleed most recent 5/18,     and left kidney stone s/p stent placement who is now scheduled for left ureteroscopy, laser lithotripsy, stone extraction and stent placement.  She denies hematuria at this time but does c/o frequency and nocturia since perla removal in February 2019. 66 year old female with PMHx:  HTN, HLD, Hypothyroid, T2DM, SOLOMON not consistent with using CPAP, Pernicious anemia, diverticulosis, GI bleed most recent 5/18,      66 year old female with PMHx:  HTN, HLD, Hypothyroid, T2DM, SOLOMON not consistent with using CPAP, Pernicious anemia, diverticulosis - S/P colon resection 10/2021. Pt presents to PST today for pre op evaluation in preparation for Martin reversal

## 2022-03-16 NOTE — H&P PST ADULT - NEGATIVE CARDIOVASCULAR SYMPTOMS
no chest pain/no palpitations no chest pain/no palpitations/no dyspnea on exertion/no orthopnea/no paroxysmal nocturnal dyspnea/no peripheral edema/no claudication

## 2022-03-16 NOTE — H&P PST ADULT - ACTIVITY
Limited due to stone and occasional back pain walk 1h10 min 3 x week, does own house chores, gardening once a week

## 2022-03-16 NOTE — H&P PST ADULT - CONSTITUTIONAL DETAILS
What Type Of Note Output Would You Prefer (Optional)?: Standard Output How Severe Are Your Spot(S)?: moderate Have Your Spot(S) Been Treated In The Past?: has not been treated Hpi Title: Evaluation of Skin Lesions Additional History: Last FBSE 9/2020 no distress

## 2022-03-18 NOTE — ASU PATIENT PROFILE, ADULT - FALL HARM RISK - HARM RISK INTERVENTIONS

## 2022-03-18 NOTE — ASU PATIENT PROFILE, ADULT - NSICDXPASTSURGICALHX_GEN_ALL_CORE_FT
PAST SURGICAL HISTORY:  History of lithotripsy left kidney stone extraction; 02/2020    History of tonsillectomy age 14    S/P colon resection colostomy; 10/2021    S/P lumbar laminectomy ' 2010  with Fusion    S/P WESTLEY (total abdominal hysterectomy) ' 94  Laproscopic.  benign

## 2022-03-20 ENCOUNTER — TRANSCRIPTION ENCOUNTER (OUTPATIENT)
Age: 67
End: 2022-03-20

## 2022-03-21 ENCOUNTER — INPATIENT (INPATIENT)
Facility: HOSPITAL | Age: 67
LOS: 5 days | Discharge: HOME | End: 2022-03-27
Attending: STUDENT IN AN ORGANIZED HEALTH CARE EDUCATION/TRAINING PROGRAM | Admitting: STUDENT IN AN ORGANIZED HEALTH CARE EDUCATION/TRAINING PROGRAM
Payer: MEDICARE

## 2022-03-21 ENCOUNTER — TRANSCRIPTION ENCOUNTER (OUTPATIENT)
Age: 67
End: 2022-03-21

## 2022-03-21 ENCOUNTER — APPOINTMENT (OUTPATIENT)
Dept: COLORECTAL SURGERY | Facility: HOSPITAL | Age: 67
End: 2022-03-21
Payer: MEDICARE

## 2022-03-21 ENCOUNTER — RESULT REVIEW (OUTPATIENT)
Age: 67
End: 2022-03-21

## 2022-03-21 VITALS
HEIGHT: 64 IN | RESPIRATION RATE: 16 BRPM | HEART RATE: 72 BPM | OXYGEN SATURATION: 100 % | DIASTOLIC BLOOD PRESSURE: 95 MMHG | WEIGHT: 203.93 LBS | TEMPERATURE: 98 F | SYSTOLIC BLOOD PRESSURE: 157 MMHG

## 2022-03-21 DIAGNOSIS — Z98.890 OTHER SPECIFIED POSTPROCEDURAL STATES: Chronic | ICD-10-CM

## 2022-03-21 DIAGNOSIS — Z90.89 ACQUIRED ABSENCE OF OTHER ORGANS: Chronic | ICD-10-CM

## 2022-03-21 DIAGNOSIS — Z90.710 ACQUIRED ABSENCE OF BOTH CERVIX AND UTERUS: Chronic | ICD-10-CM

## 2022-03-21 DIAGNOSIS — Z90.49 ACQUIRED ABSENCE OF OTHER SPECIFIED PARTS OF DIGESTIVE TRACT: Chronic | ICD-10-CM

## 2022-03-21 DIAGNOSIS — K57.31 DIVERTICULOSIS OF LARGE INTESTINE WITHOUT PERFORATION OR ABSCESS WITH BLEEDING: ICD-10-CM

## 2022-03-21 LAB
GLUCOSE BLDC GLUCOMTR-MCNC: 152 MG/DL — HIGH (ref 70–99)
GLUCOSE BLDC GLUCOMTR-MCNC: 168 MG/DL — HIGH (ref 70–99)
GLUCOSE BLDC GLUCOMTR-MCNC: 215 MG/DL — HIGH (ref 70–99)

## 2022-03-21 PROCEDURE — 45330 DIAGNOSTIC SIGMOIDOSCOPY: CPT | Mod: 59

## 2022-03-21 PROCEDURE — 44140 PARTIAL REMOVAL OF COLON: CPT

## 2022-03-21 PROCEDURE — 88307 TISSUE EXAM BY PATHOLOGIST: CPT | Mod: 26

## 2022-03-21 PROCEDURE — 44310 ILEOSTOMY/JEJUNOSTOMY: CPT

## 2022-03-21 PROCEDURE — 44626 REPAIR BOWEL OPENING: CPT | Mod: 59

## 2022-03-21 PROCEDURE — 88304 TISSUE EXAM BY PATHOLOGIST: CPT | Mod: 26

## 2022-03-21 DEVICE — SEPRAFILM 5 X 6": Type: IMPLANTABLE DEVICE | Status: FUNCTIONAL

## 2022-03-21 DEVICE — STAPLER COVIDIEN GIA 80-3.0MM PURPLE: Type: IMPLANTABLE DEVICE | Status: FUNCTIONAL

## 2022-03-21 DEVICE — STAPLER COVIDIEN CIRCULAR TRI-STAPLE 28MM BLACK MEDIUM/THICK XL: Type: IMPLANTABLE DEVICE | Status: FUNCTIONAL

## 2022-03-21 DEVICE — LIGATING CLIPS WECK HORIZON MEDIUM (BLUE) 24: Type: IMPLANTABLE DEVICE | Status: FUNCTIONAL

## 2022-03-21 DEVICE — STAPLER COVIDIEN PURSTRING 65MM: Type: IMPLANTABLE DEVICE | Status: FUNCTIONAL

## 2022-03-21 DEVICE — STAPLER COVIDIEN TA 45 BLUE: Type: IMPLANTABLE DEVICE | Status: FUNCTIONAL

## 2022-03-21 DEVICE — LIGATING CLIPS WECK HORIZON LARGE (ORANGE) 24: Type: IMPLANTABLE DEVICE | Status: FUNCTIONAL

## 2022-03-21 DEVICE — STAPLER COVIDIEN GIA 80-3.0MM PURPLE RELOAD: Type: IMPLANTABLE DEVICE | Status: FUNCTIONAL

## 2022-03-21 RX ORDER — GABAPENTIN 400 MG/1
600 CAPSULE ORAL ONCE
Refills: 0 | Status: COMPLETED | OUTPATIENT
Start: 2022-03-21 | End: 2022-03-21

## 2022-03-21 RX ORDER — SODIUM CHLORIDE 9 MG/ML
1000 INJECTION, SOLUTION INTRAVENOUS
Refills: 0 | Status: DISCONTINUED | OUTPATIENT
Start: 2022-03-21 | End: 2022-03-26

## 2022-03-21 RX ORDER — SODIUM CHLORIDE 9 MG/ML
1000 INJECTION, SOLUTION INTRAVENOUS
Refills: 0 | Status: DISCONTINUED | OUTPATIENT
Start: 2022-03-21 | End: 2022-03-21

## 2022-03-21 RX ORDER — INSULIN LISPRO 100/ML
VIAL (ML) SUBCUTANEOUS
Refills: 0 | Status: DISCONTINUED | OUTPATIENT
Start: 2022-03-21 | End: 2022-03-27

## 2022-03-21 RX ORDER — LEVOTHYROXINE SODIUM 125 MCG
100 TABLET ORAL AT BEDTIME
Refills: 0 | Status: DISCONTINUED | OUTPATIENT
Start: 2022-03-21 | End: 2022-03-26

## 2022-03-21 RX ORDER — ONDANSETRON 8 MG/1
4 TABLET, FILM COATED ORAL EVERY 6 HOURS
Refills: 0 | Status: DISCONTINUED | OUTPATIENT
Start: 2022-03-21 | End: 2022-03-27

## 2022-03-21 RX ORDER — ACETAMINOPHEN 500 MG
1000 TABLET ORAL EVERY 6 HOURS
Refills: 0 | Status: COMPLETED | OUTPATIENT
Start: 2022-03-21 | End: 2022-03-22

## 2022-03-21 RX ORDER — INSULIN LISPRO 100/ML
VIAL (ML) SUBCUTANEOUS AT BEDTIME
Refills: 0 | Status: DISCONTINUED | OUTPATIENT
Start: 2022-03-21 | End: 2022-03-27

## 2022-03-21 RX ORDER — TETRAHYDROZOLINE/POLYETHYL GLY 0.05 %-1 %
1 DROPS OPHTHALMIC (EYE) DAILY
Refills: 0 | Status: DISCONTINUED | OUTPATIENT
Start: 2022-03-21 | End: 2022-03-27

## 2022-03-21 RX ORDER — DEXTROSE 50 % IN WATER 50 %
25 SYRINGE (ML) INTRAVENOUS ONCE
Refills: 0 | Status: DISCONTINUED | OUTPATIENT
Start: 2022-03-21 | End: 2022-03-27

## 2022-03-21 RX ORDER — DEXTROSE 50 % IN WATER 50 %
12.5 SYRINGE (ML) INTRAVENOUS ONCE
Refills: 0 | Status: DISCONTINUED | OUTPATIENT
Start: 2022-03-21 | End: 2022-03-27

## 2022-03-21 RX ORDER — HYDROMORPHONE HYDROCHLORIDE 2 MG/ML
0.3 INJECTION INTRAMUSCULAR; INTRAVENOUS; SUBCUTANEOUS
Refills: 0 | Status: DISCONTINUED | OUTPATIENT
Start: 2022-03-21 | End: 2022-03-21

## 2022-03-21 RX ORDER — GLUCAGON INJECTION, SOLUTION 0.5 MG/.1ML
1 INJECTION, SOLUTION SUBCUTANEOUS ONCE
Refills: 0 | Status: DISCONTINUED | OUTPATIENT
Start: 2022-03-21 | End: 2022-03-26

## 2022-03-21 RX ORDER — NALOXONE HYDROCHLORIDE 4 MG/.1ML
0.1 SPRAY NASAL
Refills: 0 | Status: DISCONTINUED | OUTPATIENT
Start: 2022-03-21 | End: 2022-03-27

## 2022-03-21 RX ORDER — HEPARIN SODIUM 5000 [USP'U]/ML
5000 INJECTION INTRAVENOUS; SUBCUTANEOUS EVERY 8 HOURS
Refills: 0 | Status: DISCONTINUED | OUTPATIENT
Start: 2022-03-22 | End: 2022-03-27

## 2022-03-21 RX ORDER — HYDROMORPHONE HYDROCHLORIDE 2 MG/ML
0.5 INJECTION INTRAMUSCULAR; INTRAVENOUS; SUBCUTANEOUS
Refills: 0 | Status: DISCONTINUED | OUTPATIENT
Start: 2022-03-21 | End: 2022-03-21

## 2022-03-21 RX ORDER — SODIUM CHLORIDE 9 MG/ML
1000 INJECTION, SOLUTION INTRAVENOUS
Refills: 0 | Status: DISCONTINUED | OUTPATIENT
Start: 2022-03-21 | End: 2022-03-23

## 2022-03-21 RX ORDER — DEXTROSE 50 % IN WATER 50 %
15 SYRINGE (ML) INTRAVENOUS ONCE
Refills: 0 | Status: DISCONTINUED | OUTPATIENT
Start: 2022-03-21 | End: 2022-03-26

## 2022-03-21 RX ADMIN — Medication 2: at 16:46

## 2022-03-21 RX ADMIN — HYDROMORPHONE HYDROCHLORIDE 0.5 MILLIGRAM(S): 2 INJECTION INTRAMUSCULAR; INTRAVENOUS; SUBCUTANEOUS at 12:17

## 2022-03-21 RX ADMIN — Medication 400 MILLIGRAM(S): at 18:45

## 2022-03-21 RX ADMIN — SODIUM CHLORIDE 125 MILLILITER(S): 9 INJECTION, SOLUTION INTRAVENOUS at 12:48

## 2022-03-21 RX ADMIN — SODIUM CHLORIDE 125 MILLILITER(S): 9 INJECTION, SOLUTION INTRAVENOUS at 18:51

## 2022-03-21 RX ADMIN — Medication 1000 MILLIGRAM(S): at 23:13

## 2022-03-21 RX ADMIN — Medication 400 MILLIGRAM(S): at 22:43

## 2022-03-21 RX ADMIN — Medication 100 MICROGRAM(S): at 22:44

## 2022-03-21 RX ADMIN — GABAPENTIN 600 MILLIGRAM(S): 400 CAPSULE ORAL at 07:04

## 2022-03-21 RX ADMIN — Medication 1000 MILLIGRAM(S): at 18:45

## 2022-03-21 RX ADMIN — SODIUM CHLORIDE 30 MILLILITER(S): 9 INJECTION, SOLUTION INTRAVENOUS at 06:41

## 2022-03-21 RX ADMIN — SODIUM CHLORIDE 125 MILLILITER(S): 9 INJECTION, SOLUTION INTRAVENOUS at 13:17

## 2022-03-21 RX ADMIN — HYDROMORPHONE HYDROCHLORIDE 0.5 MILLIGRAM(S): 2 INJECTION INTRAMUSCULAR; INTRAVENOUS; SUBCUTANEOUS at 12:34

## 2022-03-21 NOTE — CHART NOTE - NSCHARTNOTEFT_GEN_A_CORE
POST-OPERATIVE NOTE    Subjective:  Patient is s/p Naeem reversal with open loop ileostomy creation. Patient is complaining of post operative pain especially around the incision sites, but denies fevers, chills, lightheadedness, SOB, CP. NPO. Recovering appropriately.     Vital Signs Last 24 Hrs  T(C): 36.5 (21 Mar 2022 14:00), Max: 36.9 (21 Mar 2022 05:49)  T(F): 97.7 (21 Mar 2022 14:00), Max: 98.5 (21 Mar 2022 05:49)  HR: 61 (21 Mar 2022 15:15) (61 - 72)  BP: 116/78 (21 Mar 2022 15:15) (116/78 - 157/95)  BP(mean): 86 (21 Mar 2022 15:15) (80 - 98)  RR: 17 (21 Mar 2022 15:15) (12 - 24)  SpO2: 98% (21 Mar 2022 15:15) (94% - 100%)  I&O's Detail    21 Mar 2022 07:01  -  21 Mar 2022 15:50  --------------------------------------------------------  IN:    Lactated Ringers: 500 mL    Oral Fluid: 100 mL  Total IN: 600 mL    OUT:    Indwelling Catheter - Urethral (mL): 205 mL  Total OUT: 205 mL    Total NET: 395 mL          PAST MEDICAL & SURGICAL HISTORY:  Hypothyroidism    Diverticulosis    HTN (hypertension)    Lower gastrointestinal bleeding  Multipel times since &#x27;2014: last episode 5/2018    Pernicious anemia    SOLOMON (obstructive sleep apnea)  non-compliant with CPAP    Type 2 diabetes mellitus    Left ureteral stone    Fibroid uterus  &#x27;94  surgically treated    Lumbar herniated disc  &#x27; 2010  surgically treated    Multiparity    Heart murmur  mild    History of tonsillectomy  age 14    S/P WESTLEY (total abdominal hysterectomy)  &#x27; 94  Laproscopic.  benign    S/P lumbar laminectomy  &#x27; 2010  with Fusion    S/P colon resection  colostomy; 10/2021    History of lithotripsy  left kidney stone extraction; 02/2020          Physical Exam:  General: NAD, resting comfortably in bed with mild abdominal discomfort upon movement  Pulmonary: Nonlabored breathing, no respiratory distress  Cardiovascular: NSR  Abdominal: soft, ATTP around incisional site, dressing with minimal strike-through but otherwise c/d/i; ostomy with red rubber pink and viable with both liquid and solid stool in bag.   Extremities: WWP      LABS:            CAPILLARY BLOOD GLUCOSE      POCT Blood Glucose.: 168 mg/dL (21 Mar 2022 11:39)      Radiology and Additional Studies:    Assessment:  The patient is a 66y Female who is now several hours post-op from an ex lap reversal Naeem with open loop ileostomy creation    Plan:  - Pain control as needed with PCEA  - DVT ppx  - OOB and ambulating as tolerated  -monitor ostomy output and perla output  - F/u AM labs    A Team   Pager [phone]

## 2022-03-21 NOTE — DISCHARGE NOTE PROVIDER - NSDCFUADDAPPT_GEN_ALL_CORE_FT
Please call the office and schedule a follow up appointment with Dr. Beach in 1 week. Please call the office and schedule a follow up appointment with Dr. Pool in 1-2 weeks.

## 2022-03-21 NOTE — DISCHARGE NOTE PROVIDER - NSDCCPCAREPLAN_GEN_ALL_CORE_FT
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

## 2022-03-21 NOTE — BRIEF OPERATIVE NOTE - NSICDXBRIEFPOSTOP_GEN_ALL_CORE_FT
POST-OP DIAGNOSIS:  Status post Naeem procedure 21-Mar-2022 11:52:02  Eva Pete  Diverticulosis 21-Mar-2022 11:52:15  Eva Pete

## 2022-03-21 NOTE — DISCHARGE NOTE PROVIDER - NSDCMRMEDTOKEN_GEN_ALL_CORE_FT
ferrous sulfate 325 mg (65 mg elemental iron) oral tablet: orally once a day  levothyroxine 200 mcg (0.2 mg) oral tablet: 1 tab(s) orally once a day in AM   ramipril 10 mg oral capsule: orally 2 times a day  simvastatin 40 mg oral tablet: 1 tab(s) orally once a day in AM   Visine 0.05% ophthalmic solution: to each affected eye 3 to 4 times a week, As Needed  Vitamin B12: orally once a day   amLODIPine 10 mg oral tablet: 1 tab(s) orally once a day  Dilaudid 2 mg oral tablet: 1 tab(s) orally every 8 hours MDD:6mg  ferrous sulfate 325 mg (65 mg elemental iron) oral tablet: orally once a day  hydrALAZINE 25 mg oral tablet: 1 tab(s) orally every 6 hours  levothyroxine 200 mcg (0.2 mg) oral tablet: 1 tab(s) orally once a day in AM   lisinopril 40 mg oral tablet: 1 tab(s) orally once a day  simvastatin 40 mg oral tablet: 1 tab(s) orally once a day in AM   Visine 0.05% ophthalmic solution: to each affected eye 3 to 4 times a week, As Needed  Vitamin B12: orally once a day

## 2022-03-21 NOTE — BRIEF OPERATIVE NOTE - NSICDXBRIEFPROCEDURE_GEN_ALL_CORE_FT
PROCEDURES:  Reversal, Naeem procedure 21-Mar-2022 11:51:24  Eva Pete  Open loop ileostomy 21-Mar-2022 11:51:49  Eva Pete

## 2022-03-21 NOTE — BRIEF OPERATIVE NOTE - NSICDXBRIEFPREOP_GEN_ALL_CORE_FT
PRE-OP DIAGNOSIS:  Status post Naeem procedure 21-Mar-2022 11:51:58  Eva Pete  Diverticulosis 21-Mar-2022 11:52:12  Eva Pete

## 2022-03-21 NOTE — PATIENT PROFILE ADULT - FALL HARM RISK - HARM RISK INTERVENTIONS
Assistance with ambulation/Assistance OOB with selected safe patient handling equipment/Communicate Risk of Fall with Harm to all staff/Discuss with provider need for PT consult/Monitor gait and stability/Provide patient with walking aids - walker, cane, crutches/Reinforce activity limits and safety measures with patient and family/Sit up slowly, dangle for a short time, stand at bedside before walking/Tailored Fall Risk Interventions/Use of alarms - bed, chair and/or voice tab/Visual Cue: Yellow wristband and red socks/Bed in lowest position, wheels locked, appropriate side rails in place/Call bell, personal items and telephone in reach/Instruct patient to call for assistance before getting out of bed or chair/Non-slip footwear when patient is out of bed/Sharon to call system/Physically safe environment - no spills, clutter or unnecessary equipment/Purposeful Proactive Rounding/Room/bathroom lighting operational, light cord in reach

## 2022-03-21 NOTE — DISCHARGE NOTE PROVIDER - CARE PROVIDERS DIRECT ADDRESSES
[email or web address]

## 2022-03-21 NOTE — DISCHARGE NOTE PROVIDER - CARE PROVIDER_API CALL
Tommie Pool)  Cardiovascular Disease; Internal Medicine  [address] [place] [address]  Phone: [phone]  Fax: [phone]  Follow Up Time:     Breezy Beach)  ColonRectal Surgery; Surgery  [facility] [address]  Phone: [phone]  Fax: [phone]  Follow Up Time:

## 2022-03-21 NOTE — BRIEF OPERATIVE NOTE - OPERATION/FINDINGS
Midline laparotomy  Mobilization of colostomy from abdominal wall  Hepatic flexure mobilized - transverse colon reached easily into pelvis however noted to have large amount of diverticulum.   Stump identified - taken down to level of rectum with TA below level of most distal diverticulum  Lysis of small bowel adhesions  Transverse colon taken with Pursestring device - anvil placed  Primary stapled end-to-end anastomosis  Negative leak test  Proximal and distal donuts intact  Loop of ileum brought up through abdominal wall for diverting loop ileostomy  Fascia closed  Ileostomy matured

## 2022-03-21 NOTE — DISCHARGE NOTE PROVIDER - HOSPITAL COURSE
Patient is a 66 year old female with a PMHx of HTN, HLD, hypothyroidism, DM2, SOLOMON (not consistent with using CPAP), pernicious anemia and diverticulosis (S/P colon resection 10/2021) who presented to pre-surgical testing for evaluation for Naeem reversal.    On 3/21 patient went to the OR for an ex-lap, Naeem reversal and diverting loop ileostomy.  Post operatively patient was advanced to a clear liquid diet, which she tolerated well.  Pain was controlled with PCEA pump.      ***COMPLETE UP TO 3/21*** Patient is a 66 year old female with a PMHx of HTN, HLD, hypothyroidism, DM2, SOLOMON (not consistent with using CPAP), pernicious anemia and diverticulosis (S/P colon resection 10/2021) who presented to pre-surgical testing for evaluation for Naeem reversal.    On 3/21 patient went to the OR for an ex-lap, Naeem reversal and diverting loop ileostomy.  Post operatively patient was advanced to a clear liquid diet, which she tolerated well.  Pain was controlled with PCEA pump.    On 3/22 patient was advanced to a low fiber diet, which she tolerated well.  Physical therapy evaluated patient and recommended home PT.      ***COMPLETE UP TO 3/22*** Patient is a 66 year old female with a PMHx of HTN, HLD, hypothyroidism, DM2, SOLOMON (not consistent with using CPAP), pernicious anemia and diverticulosis (S/P colon resection 10/2021) who presented to pre-surgical testing for evaluation for Naeem reversal.    On 3/21 patient went to the OR for an ex-lap, Naeem reversal and diverting loop ileostomy.  Post operatively patient was advanced to a clear liquid diet, which she tolerated well.  Pain was controlled with PCEA pump.    On 3/22 patient was advanced to a low fiber diet, which she tolerated well.  Physical therapy evaluated patient and recommended home PT.    On 3/23 the patient had issues with pain and was kept on her PCEA.     On 3/25, the patient was more hypertensive to the 170's-190's and sob; was given hydralazine w/o improvement in symptoms however did well with labetalol x2 and resumption of ace. Despite this, the patient became hypertensive overnight of 3/26 and chest pain in AM; she was sent to the SICU for management. Cardiology was consulted; lisinopril was increased to 40mg, Norvasc 10mg, and Hydralazine was continued. the patient had symptomatic improvement. On 3/27 the patient was doing very well and with improvement in pressures.     At the time of discharge, the patient was hemodynamically stable, was tolerating PO diet, was voiding urine and passing stool, was ambulating, and was comfortable with adequate pain control. The patient was instructed to follow up with Dr. Beach within 1-2 weeks after discharge from the hospital. The patient felt comfortable with discharge. The patient was discharged to home. The patient had no other issues.

## 2022-03-22 LAB
ANION GAP SERPL CALC-SCNC: 13 MMOL/L — SIGNIFICANT CHANGE UP (ref 7–14)
APTT BLD: 29.2 SEC — SIGNIFICANT CHANGE UP (ref 27–36.3)
BUN SERPL-MCNC: 26 MG/DL — HIGH (ref 7–23)
CALCIUM SERPL-MCNC: 8 MG/DL — LOW (ref 8.4–10.5)
CHLORIDE SERPL-SCNC: 99 MMOL/L — SIGNIFICANT CHANGE UP (ref 98–107)
CO2 SERPL-SCNC: 22 MMOL/L — SIGNIFICANT CHANGE UP (ref 22–31)
CREAT ?TM UR-MCNC: 155 MG/DL — SIGNIFICANT CHANGE UP
CREAT SERPL-MCNC: 2.08 MG/DL — HIGH (ref 0.5–1.3)
EGFR: 26 ML/MIN/1.73M2 — LOW
GLUCOSE BLDC GLUCOMTR-MCNC: 164 MG/DL — HIGH (ref 70–99)
GLUCOSE BLDC GLUCOMTR-MCNC: 178 MG/DL — HIGH (ref 70–99)
GLUCOSE BLDC GLUCOMTR-MCNC: 189 MG/DL — HIGH (ref 70–99)
GLUCOSE BLDC GLUCOMTR-MCNC: 200 MG/DL — HIGH (ref 70–99)
GLUCOSE SERPL-MCNC: 191 MG/DL — HIGH (ref 70–99)
HCT VFR BLD CALC: 29.9 % — LOW (ref 34.5–45)
HGB BLD-MCNC: 9.3 G/DL — LOW (ref 11.5–15.5)
INR BLD: 1.2 RATIO — HIGH (ref 0.88–1.16)
MAGNESIUM SERPL-MCNC: 1.6 MG/DL — SIGNIFICANT CHANGE UP (ref 1.6–2.6)
MCHC RBC-ENTMCNC: 26.9 PG — LOW (ref 27–34)
MCHC RBC-ENTMCNC: 31.1 GM/DL — LOW (ref 32–36)
MCV RBC AUTO: 86.4 FL — SIGNIFICANT CHANGE UP (ref 80–100)
NRBC # BLD: 0 /100 WBCS — SIGNIFICANT CHANGE UP
NRBC # FLD: 0 K/UL — SIGNIFICANT CHANGE UP
OSMOLALITY UR: 381 MOSM/KG — SIGNIFICANT CHANGE UP (ref 50–1200)
PHOSPHATE SERPL-MCNC: 3.6 MG/DL — SIGNIFICANT CHANGE UP (ref 2.5–4.5)
PLATELET # BLD AUTO: 172 K/UL — SIGNIFICANT CHANGE UP (ref 150–400)
POTASSIUM SERPL-MCNC: 3.7 MMOL/L — SIGNIFICANT CHANGE UP (ref 3.5–5.3)
POTASSIUM SERPL-SCNC: 3.7 MMOL/L — SIGNIFICANT CHANGE UP (ref 3.5–5.3)
PROTHROM AB SERPL-ACNC: 14 SEC — HIGH (ref 10.5–13.4)
RBC # BLD: 3.46 M/UL — LOW (ref 3.8–5.2)
RBC # FLD: 18.3 % — HIGH (ref 10.3–14.5)
SODIUM SERPL-SCNC: 134 MMOL/L — LOW (ref 135–145)
SODIUM UR-SCNC: <20 MMOL/L — SIGNIFICANT CHANGE UP
UUN UR-MCNC: 410.4 MG/DL — SIGNIFICANT CHANGE UP
WBC # BLD: 8.75 K/UL — SIGNIFICANT CHANGE UP (ref 3.8–10.5)
WBC # FLD AUTO: 8.75 K/UL — SIGNIFICANT CHANGE UP (ref 3.8–10.5)

## 2022-03-22 RX ORDER — ACETAMINOPHEN 500 MG
1000 TABLET ORAL EVERY 6 HOURS
Refills: 0 | Status: DISCONTINUED | OUTPATIENT
Start: 2022-03-22 | End: 2022-03-22

## 2022-03-22 RX ORDER — SODIUM CHLORIDE 9 MG/ML
1000 INJECTION, SOLUTION INTRAVENOUS ONCE
Refills: 0 | Status: COMPLETED | OUTPATIENT
Start: 2022-03-22 | End: 2022-03-22

## 2022-03-22 RX ORDER — ACETAMINOPHEN 500 MG
1000 TABLET ORAL EVERY 6 HOURS
Refills: 0 | Status: COMPLETED | OUTPATIENT
Start: 2022-03-22 | End: 2022-03-23

## 2022-03-22 RX ORDER — MAGNESIUM SULFATE 500 MG/ML
2 VIAL (ML) INJECTION ONCE
Refills: 0 | Status: COMPLETED | OUTPATIENT
Start: 2022-03-22 | End: 2022-03-22

## 2022-03-22 RX ADMIN — Medication 1: at 18:11

## 2022-03-22 RX ADMIN — Medication 400 MILLIGRAM(S): at 05:20

## 2022-03-22 RX ADMIN — SODIUM CHLORIDE 500 MILLILITER(S): 9 INJECTION, SOLUTION INTRAVENOUS at 18:17

## 2022-03-22 RX ADMIN — HEPARIN SODIUM 5000 UNIT(S): 5000 INJECTION INTRAVENOUS; SUBCUTANEOUS at 22:26

## 2022-03-22 RX ADMIN — Medication 1: at 12:29

## 2022-03-22 RX ADMIN — Medication 100 MICROGRAM(S): at 22:27

## 2022-03-22 RX ADMIN — Medication 1000 MILLIGRAM(S): at 05:50

## 2022-03-22 RX ADMIN — Medication 25 GRAM(S): at 12:23

## 2022-03-22 RX ADMIN — Medication 400 MILLIGRAM(S): at 23:59

## 2022-03-22 RX ADMIN — Medication 400 MILLIGRAM(S): at 18:11

## 2022-03-22 RX ADMIN — Medication 1000 MILLIGRAM(S): at 13:00

## 2022-03-22 RX ADMIN — HEPARIN SODIUM 5000 UNIT(S): 5000 INJECTION INTRAVENOUS; SUBCUTANEOUS at 05:26

## 2022-03-22 RX ADMIN — Medication 1000 MILLIGRAM(S): at 18:00

## 2022-03-22 RX ADMIN — Medication 400 MILLIGRAM(S): at 12:23

## 2022-03-22 RX ADMIN — HEPARIN SODIUM 5000 UNIT(S): 5000 INJECTION INTRAVENOUS; SUBCUTANEOUS at 12:30

## 2022-03-22 RX ADMIN — Medication 1: at 08:51

## 2022-03-22 NOTE — PROGRESS NOTE ADULT - SUBJECTIVE AND OBJECTIVE BOX
Anesthesia Pain Management Service: Day 1 of Epidural    SUBJECTIVE: Patient doing well with PCEA and no problems.  Pain Scale Score: 2/10  Refer to charted pain scores    THERAPY:  [x ] Epidural Bupivacaine 0.0625% and Hydromorphone  		[ X] 10 micrograms/mL	[ ] 5 micrograms/mL  [ ] Epidural Bupivacaine 0.0625% and Fentanyl - 2 micrograms/mL  [ ] Epidural Ropivacaine 0.1% plain – 1 mg/mL  [ ] Patient Controlled Regional Anesthesia (PCRA) Ropivacaine  		[ ] 0.2%			[ ] 0.1%    Demand dose __3_ lockout __15_ (minutes) Continuous Rate ___ Total: 91.9 ml used (in past 24 hours)      MEDICATIONS  (STANDING):  acetaminophen   IVPB .. 1000 milliGRAM(s) IV Intermittent every 6 hours  dextrose 40% Gel 15 Gram(s) Oral once  dextrose 5%. 1000 milliLiter(s) (50 mL/Hr) IV Continuous <Continuous>  dextrose 5%. 1000 milliLiter(s) (100 mL/Hr) IV Continuous <Continuous>  dextrose 50% Injectable 25 Gram(s) IV Push once  dextrose 50% Injectable 12.5 Gram(s) IV Push once  dextrose 50% Injectable 25 Gram(s) IV Push once  glucagon  Injectable 1 milliGRAM(s) IntraMuscular once  heparin   Injectable 5000 Unit(s) SubCutaneous every 8 hours  hydromorphone (10 MICROgram(s)/mL) + bupivacaine 0.0625% in 0.9% Sodium Chloride PCEA 250 milliLiter(s) Epidural PCA Continuous  insulin lispro (ADMELOG) corrective regimen sliding scale   SubCutaneous three times a day before meals  insulin lispro (ADMELOG) corrective regimen sliding scale   SubCutaneous at bedtime  lactated ringers. 1000 milliLiter(s) (125 mL/Hr) IV Continuous <Continuous>  levothyroxine Injectable 100 MICROGram(s) IV Push at bedtime  magnesium sulfate  IVPB 2 Gram(s) IV Intermittent once    MEDICATIONS  (PRN):  naloxone Injectable 0.1 milliGRAM(s) IV Push every 3 minutes PRN For ANY of the following changes in patient status:  A. RR LESS THAN 10 breaths per minute, B. Oxygen saturation LESS THAN 90%, C. Sedation score of 6  ondansetron Injectable 4 milliGRAM(s) IV Push every 6 hours PRN Nausea  tetrahydrazoline Solution 1 Drop(s) Both EYES daily PRN Dry eyes      OBJECTIVE:  Pt lying down in bed, tolerating clears.   Assessment of Catheter Site:	[ ] Left	[ ] Right  [x ] Epidural 	[ ] Femoral	      [ ] Saphenous   [ ] Supraclavicular   [ ] Other:    [x ] Dressing intact	[x ] Site non-tender	[ x] Site without erythema, discharge, edema  [x ] Epidural tubing and connection checked	[x] Gross neurological exam within normal limits  [ ] Catheter removed – tip intact		[ ] Afebrile  	[ ] Febrile: ___   [ X] see Temp under VS below)    PT/INR - ( 22 Mar 2022 07:50 )   PT: 14.0 sec;   INR: 1.20 ratio         PTT - ( 22 Mar 2022 07:50 )  PTT:29.2 sec                      9.3    8.75  )-----------( 172      ( 22 Mar 2022 07:50 )             29.9     Vital Signs Last 24 Hrs  T(C): 36.6 (03-22-22 @ 06:30), Max: 36.8 (03-21-22 @ 21:22)  T(F): 97.9 (03-22-22 @ 06:30), Max: 98.3 (03-21-22 @ 21:22)  HR: 84 (03-22-22 @ 06:30) (61 - 84)  BP: 125/72 (03-22-22 @ 06:30) (116/78 - 149/77)  BP(mean): 86 (03-21-22 @ 15:15) (80 - 98)  RR: 18 (03-22-22 @ 06:30) (12 - 24)  SpO2: 100% (03-22-22 @ 06:30) (94% - 100%)      Sedation Score:	[x ] Alert	[ ] Drowsy	[ ] Arousable	[ ] Asleep	[ ] Unresponsive    Side Effects:	[x ] None	[ ] Nausea	[ ] Vomiting	[ ] Pruritus  		[ ] Weakness		[ ] Numbness	[ ] Other:    ASSESSMENT/ PLAN:    Therapy to  be:	[x ] Continue   [ ] Discontinued   [ ] Change to prn Analgesics    Documentation and Verification of current medications:  [ X ] Done	[ ] Not done, not eligible, reason:    Comments: Continue with IV PCA, plan discussed with primary team.     Progress Note written now but Patient was seen earlier.

## 2022-03-22 NOTE — PROGRESS NOTE ADULT - SUBJECTIVE AND OBJECTIVE BOX
ANESTHESIA POSTOP CHECK    66y Female POSTOP DAY 1 S/P     Vital Signs Last 24 Hrs  T(C): 37.7 (22 Mar 2022 09:45), Max: 37.7 (22 Mar 2022 09:45)  T(F): 99.8 (22 Mar 2022 09:45), Max: 99.8 (22 Mar 2022 09:45)  HR: 90 (22 Mar 2022 09:45) (61 - 90)  BP: 126/81 (22 Mar 2022 09:45) (116/78 - 149/77)  BP(mean): 86 (21 Mar 2022 15:15) (80 - 98)  RR: 17 (22 Mar 2022 09:45) (12 - 24)  SpO2: 97% (22 Mar 2022 09:45) (94% - 100%)  I&O's Summary    21 Mar 2022 07:01  -  22 Mar 2022 07:00  --------------------------------------------------------  IN: 600 mL / OUT: 705 mL / NET: -105 mL    22 Mar 2022 07:01  -  22 Mar 2022 10:24  --------------------------------------------------------  IN: 0 mL / OUT: 100 mL / NET: -100 mL        [X ] NO APPARENT ANESTHESIA COMPLICATIONS

## 2022-03-22 NOTE — PHYSICAL THERAPY INITIAL EVALUATION ADULT - LEVEL OF INDEPENDENCE: SIT/SUPINE, REHAB EVAL
left sitting comfortably in bedside chair with all lines intact, PCA bedside, and RN Crystal aware of patient/unable to perform

## 2022-03-22 NOTE — PHYSICAL THERAPY INITIAL EVALUATION ADULT - GAIT DEVIATIONS NOTED, PT EVAL
decreased venkatesh/increased time in double stance/decreased velocity of limb motion/decreased step length/decreased stride length/decreased weight-shifting ability

## 2022-03-22 NOTE — PROGRESS NOTE ADULT - ASSESSMENT
The patient is a 66y Female who is now POD1 s/p from an ex lap reversal Naeem with open loop ileostomy creation    Plan:  - Pain control as needed with PCEA  - DVT ppx  - OOB and ambulating as tolerated  -monitor ostomy output and perla output  - F/u AM labs    A Team   Pager [phone]. Patient is a 66 year old female with a PMHx of HTN, HLD, hypothyroidism, DM2, SOLOMON (not consistent with using CPAP), pernicious anemia and diverticulosis (S/P colon resection 10/2021) who presented to pre-surgical testing for evaluation for Naeem reversal.  Patient is now S/P ex-lap, Naeem reversal and diverting loop ileostomy on 3/21/22.      PLAN:  - Clear liquid diet  - Advance to low fiber diet for lunch  - LR @125cc/hr  - Out of bed  - Pain control with PCEA  - VTE prophylaxis with Heparin subcutaneous      #[id number]  A Team Surgery

## 2022-03-22 NOTE — PROGRESS NOTE ADULT - SUBJECTIVE AND OBJECTIVE BOX
GENERAL SURGERY PROGRESS NOTE    SUBJECTIVE  Patient seen and examined. No acute events overnight.          OBJECTIVE    PHYSICAL EXAM  General: Appears well, NAD  CHEST: breathing comfortably  CV: appears well perfused  Abdomen: soft, nontender, nondistended, no rebound or guarding  Extremities: Grossly symmetric    T(C): 36.4 (03-21-22 @ 22:40), Max: 36.9 (03-21-22 @ 05:49)  HR: 74 (03-21-22 @ 22:40) (61 - 74)  BP: 135/78 (03-21-22 @ 22:40) (116/78 - 157/95)  RR: 18 (03-21-22 @ 22:40) (12 - 24)  SpO2: 100% (03-21-22 @ 22:40) (94% - 100%)    03-21-22 @ 07:01  -  03-22-22 @ 01:48  --------------------------------------------------------  IN: 600 mL / OUT: 555 mL / NET: 45 mL        MEDICATIONS  acetaminophen   IVPB .. 1000 milliGRAM(s) IV Intermittent every 6 hours  dextrose 40% Gel 15 Gram(s) Oral once  dextrose 5%. 1000 milliLiter(s) IV Continuous <Continuous>  dextrose 5%. 1000 milliLiter(s) IV Continuous <Continuous>  dextrose 50% Injectable 25 Gram(s) IV Push once  dextrose 50% Injectable 12.5 Gram(s) IV Push once  dextrose 50% Injectable 25 Gram(s) IV Push once  glucagon  Injectable 1 milliGRAM(s) IntraMuscular once  heparin   Injectable 5000 Unit(s) SubCutaneous every 8 hours  hydromorphone (10 MICROgram(s)/mL) + bupivacaine 0.0625% in 0.9% Sodium Chloride PCEA 250 milliLiter(s) Epidural PCA Continuous  insulin lispro (ADMELOG) corrective regimen sliding scale   SubCutaneous three times a day before meals  insulin lispro (ADMELOG) corrective regimen sliding scale   SubCutaneous at bedtime  lactated ringers. 1000 milliLiter(s) IV Continuous <Continuous>  levothyroxine Injectable 100 MICROGram(s) IV Push at bedtime  naloxone Injectable 0.1 milliGRAM(s) IV Push every 3 minutes PRN  ondansetron Injectable 4 milliGRAM(s) IV Push every 6 hours PRN  tetrahydrazoline Solution 1 Drop(s) Both EYES daily PRN      LABS                RADIOLOGY & ADDITIONAL STUDIES Surgery Daily Progress Note  =====================================================  Interval / Overnight Events: No acute events overnight.      HPI:  Patient is a 66 year old female with a PMHx of HTN, HLD, hypothyroidism, DM2, SOLOMON (not consistent with using CPAP), pernicious anemia and diverticulosis (S/P colon resection 10/2021) who presented to pre-surgical testing for evaluation for Naeem reversal.      PAST MEDICAL & SURGICAL HISTORY:  Hypothyroidism  Diverticulosis  HTN (hypertension)  Lower gastrointestinal bleeding  Multiple times since &#x27;2014: last episode 5/2018  Pernicious anemia  SOLOMON (obstructive sleep apnea)  non-compliant with CPAP  Type 2 diabetes mellitus  Left ureteral stone  Fibroid uterus  &#x27;94  surgically treated  Lumbar herniated disc  &#x27; 2010  surgically treated  Multiparity  Heart murmur  mild  History of tonsillectomy  age 14  S/P WESTLEY (total abdominal hysterectomy)  &#x27; 94  Laproscopic.  benign  S/P lumbar laminectomy  &#x27; 2010  with Fusion  S/P colon resection  colostomy; 10/202  History of lithotripsy  left kidney stone extraction; 02/2020      ALLERGIES:  oxycodone (Other)  Valium (Other)    --------------------------------------------------------------------------------------    MEDICATIONS:    Neurologic Medications  acetaminophen   IVPB .. 1000 milliGRAM(s) IV Intermittent every 6 hours  hydromorphone (10 MICROgram(s)/mL) + bupivacaine 0.0625% in 0.9% Sodium Chloride PCEA 250 milliLiter(s) Epidural PCA Continuous  ondansetron Injectable 4 milliGRAM(s) IV Push every 6 hours PRN Nausea    Gastrointestinal Medications  dextrose 5%. 1000 milliLiter(s) IV Continuous <Continuous>  dextrose 5%. 1000 milliLiter(s) IV Continuous <Continuous>  lactated ringers. 1000 milliLiter(s) IV Continuous <Continuous>    Hematologic/Oncologic Medications  heparin   Injectable 5000 Unit(s) SubCutaneous every 8 hours    Endocrine/Metabolic Medications  dextrose 40% Gel 15 Gram(s) Oral once  dextrose 50% Injectable 25 Gram(s) IV Push once  dextrose 50% Injectable 12.5 Gram(s) IV Push once  dextrose 50% Injectable 25 Gram(s) IV Push once  glucagon  Injectable 1 milliGRAM(s) IntraMuscular once  insulin lispro (ADMELOG) corrective regimen sliding scale   SubCutaneous three times a day before meals  insulin lispro (ADMELOG) corrective regimen sliding scale   SubCutaneous at bedtime  levothyroxine Injectable 100 MICROGram(s) IV Push at bedtime    Topical/Other Medications  naloxone Injectable 0.1 milliGRAM(s) IV Push every 3 minutes PRN For ANY of the following changes in patient status:  A. RR LESS THAN 10 breaths per minute, B. Oxygen saturation LESS THAN 90%, C. Sedation score of 6  tetrahydrazoline Solution 1 Drop(s) Both EYES daily PRN Dry eyes    --------------------------------------------------------------------------------------    VITAL SIGNS:  T(C): 36.6 (22 Mar 2022 06:30), Max: 36.8 (21 Mar 2022 21:22)  T(F): 97.9 (22 Mar 2022 06:30), Max: 98.3 (21 Mar 2022 21:22)  HR: 84 (22 Mar 2022 06:30) (61 - 84)  BP: 125/72 (22 Mar 2022 06:30) (116/78 - 149/77)  BP(mean): 86 (21 Mar 2022 15:15) (80 - 98)  RR: 18 (22 Mar 2022 06:30) (12 - 24)  SpO2: 100% (22 Mar 2022 06:30) (94% - 100%)    --------------------------------------------------------------------------------------    INS AND OUTS:    21 Mar 2022 07:01  -  22 Mar 2022 07:00  --------------------------------------------------------  IN:    Lactated Ringers: 500 mL    Oral Fluid: 100 mL  Total IN: 600 mL    OUT:    Indwelling Catheter - Urethral (mL): 705 mL  Total OUT: 705 mL    Total NET: -105 mL    --------------------------------------------------------------------------------------    EXAM    NEUROLOGY  Exam: Normal, in no acute distress.    HEENT  Exam: Normocephalic, atraumatic.    RESPIRATORY  Exam: Normal expansion / effort.    CARDIOVASCULAR  Exam: S1, S2.  Regular rate and rhythm.    GI/NUTRITION  Exam: Abdomen soft, Non-tender, Non-distended.  Incision with staples clean, dry and intact.  Penrose x2.  Ostomy with gas and liquid stool in bag.  Current Diet: Clear liquid diet    MUSCULOSKELETAL  Exam: All extremities moving spontaneously without limitations.      METABOLIC / FLUIDS / ELECTROLYTES  dextrose 5%. 1000 milliLiter(s) IV Continuous <Continuous>  dextrose 5%. 1000 milliLiter(s) IV Continuous <Continuous>  lactated ringers. 1000 milliLiter(s) IV Continuous <Continuous>      HEMATOLOGIC  [x] VTE Prophylaxis: heparin   Injectable 5000 Unit(s) SubCutaneous every 8 hours    --------------------------------------------------------------------------------------

## 2022-03-23 LAB
ANION GAP SERPL CALC-SCNC: 10 MMOL/L — SIGNIFICANT CHANGE UP (ref 7–14)
APTT BLD: 34.7 SEC — SIGNIFICANT CHANGE UP (ref 27–36.3)
BUN SERPL-MCNC: 16 MG/DL — SIGNIFICANT CHANGE UP (ref 7–23)
CALCIUM SERPL-MCNC: 8.4 MG/DL — SIGNIFICANT CHANGE UP (ref 8.4–10.5)
CHLORIDE SERPL-SCNC: 103 MMOL/L — SIGNIFICANT CHANGE UP (ref 98–107)
CO2 SERPL-SCNC: 24 MMOL/L — SIGNIFICANT CHANGE UP (ref 22–31)
CREAT SERPL-MCNC: 1.19 MG/DL — SIGNIFICANT CHANGE UP (ref 0.5–1.3)
EGFR: 50 ML/MIN/1.73M2 — LOW
GLUCOSE BLDC GLUCOMTR-MCNC: 126 MG/DL — HIGH (ref 70–99)
GLUCOSE BLDC GLUCOMTR-MCNC: 130 MG/DL — HIGH (ref 70–99)
GLUCOSE BLDC GLUCOMTR-MCNC: 150 MG/DL — HIGH (ref 70–99)
GLUCOSE BLDC GLUCOMTR-MCNC: 196 MG/DL — HIGH (ref 70–99)
GLUCOSE SERPL-MCNC: 156 MG/DL — HIGH (ref 70–99)
HCT VFR BLD CALC: 30 % — LOW (ref 34.5–45)
HGB BLD-MCNC: 9.7 G/DL — LOW (ref 11.5–15.5)
INR BLD: 1.2 RATIO — HIGH (ref 0.88–1.16)
MAGNESIUM SERPL-MCNC: 2.1 MG/DL — SIGNIFICANT CHANGE UP (ref 1.6–2.6)
MCHC RBC-ENTMCNC: 27.2 PG — SIGNIFICANT CHANGE UP (ref 27–34)
MCHC RBC-ENTMCNC: 32.3 GM/DL — SIGNIFICANT CHANGE UP (ref 32–36)
MCV RBC AUTO: 84 FL — SIGNIFICANT CHANGE UP (ref 80–100)
NRBC # BLD: 0 /100 WBCS — SIGNIFICANT CHANGE UP
NRBC # FLD: 0 K/UL — SIGNIFICANT CHANGE UP
PHOSPHATE SERPL-MCNC: 1.6 MG/DL — LOW (ref 2.5–4.5)
PLATELET # BLD AUTO: 201 K/UL — SIGNIFICANT CHANGE UP (ref 150–400)
POTASSIUM SERPL-MCNC: 3.4 MMOL/L — LOW (ref 3.5–5.3)
POTASSIUM SERPL-SCNC: 3.4 MMOL/L — LOW (ref 3.5–5.3)
PROTHROM AB SERPL-ACNC: 14 SEC — HIGH (ref 10.5–13.4)
RBC # BLD: 3.57 M/UL — LOW (ref 3.8–5.2)
RBC # FLD: 18.5 % — HIGH (ref 10.3–14.5)
SODIUM SERPL-SCNC: 137 MMOL/L — SIGNIFICANT CHANGE UP (ref 135–145)
WBC # BLD: 10.87 K/UL — HIGH (ref 3.8–10.5)
WBC # FLD AUTO: 10.87 K/UL — HIGH (ref 3.8–10.5)

## 2022-03-23 RX ORDER — SODIUM CHLORIDE 9 MG/ML
1000 INJECTION, SOLUTION INTRAVENOUS ONCE
Refills: 0 | Status: COMPLETED | OUTPATIENT
Start: 2022-03-23 | End: 2022-03-23

## 2022-03-23 RX ORDER — SODIUM CHLORIDE 9 MG/ML
1000 INJECTION, SOLUTION INTRAVENOUS
Refills: 0 | Status: DISCONTINUED | OUTPATIENT
Start: 2022-03-23 | End: 2022-03-24

## 2022-03-23 RX ORDER — POTASSIUM CHLORIDE 20 MEQ
40 PACKET (EA) ORAL ONCE
Refills: 0 | Status: COMPLETED | OUTPATIENT
Start: 2022-03-23 | End: 2022-03-23

## 2022-03-23 RX ORDER — ACETAMINOPHEN 500 MG
650 TABLET ORAL ONCE
Refills: 0 | Status: COMPLETED | OUTPATIENT
Start: 2022-03-23 | End: 2022-03-23

## 2022-03-23 RX ORDER — POTASSIUM PHOSPHATE, MONOBASIC POTASSIUM PHOSPHATE, DIBASIC 236; 224 MG/ML; MG/ML
30 INJECTION, SOLUTION INTRAVENOUS ONCE
Refills: 0 | Status: COMPLETED | OUTPATIENT
Start: 2022-03-23 | End: 2022-03-23

## 2022-03-23 RX ORDER — HYDROMORPHONE HYDROCHLORIDE 2 MG/ML
0.3 INJECTION INTRAMUSCULAR; INTRAVENOUS; SUBCUTANEOUS
Refills: 0 | Status: DISCONTINUED | OUTPATIENT
Start: 2022-03-23 | End: 2022-03-26

## 2022-03-23 RX ADMIN — HEPARIN SODIUM 5000 UNIT(S): 5000 INJECTION INTRAVENOUS; SUBCUTANEOUS at 05:56

## 2022-03-23 RX ADMIN — Medication 1000 MILLIGRAM(S): at 00:29

## 2022-03-23 RX ADMIN — HEPARIN SODIUM 5000 UNIT(S): 5000 INJECTION INTRAVENOUS; SUBCUTANEOUS at 15:02

## 2022-03-23 RX ADMIN — Medication 1: at 11:42

## 2022-03-23 RX ADMIN — Medication 400 MILLIGRAM(S): at 05:56

## 2022-03-23 RX ADMIN — Medication 40 MILLIEQUIVALENT(S): at 11:43

## 2022-03-23 RX ADMIN — Medication 1000 MILLIGRAM(S): at 06:26

## 2022-03-23 RX ADMIN — ONDANSETRON 4 MILLIGRAM(S): 8 TABLET, FILM COATED ORAL at 12:34

## 2022-03-23 RX ADMIN — Medication 650 MILLIGRAM(S): at 22:38

## 2022-03-23 RX ADMIN — SODIUM CHLORIDE 1000 MILLILITER(S): 9 INJECTION, SOLUTION INTRAVENOUS at 07:51

## 2022-03-23 RX ADMIN — Medication 650 MILLIGRAM(S): at 23:08

## 2022-03-23 RX ADMIN — POTASSIUM PHOSPHATE, MONOBASIC POTASSIUM PHOSPHATE, DIBASIC 83.33 MILLIMOLE(S): 236; 224 INJECTION, SOLUTION INTRAVENOUS at 11:43

## 2022-03-23 RX ADMIN — Medication 100 MICROGRAM(S): at 22:30

## 2022-03-23 RX ADMIN — HEPARIN SODIUM 5000 UNIT(S): 5000 INJECTION INTRAVENOUS; SUBCUTANEOUS at 21:56

## 2022-03-23 NOTE — PROGRESS NOTE ADULT - SUBJECTIVE AND OBJECTIVE BOX
GENERAL SURGERY PROGRESS NOTE    SUBJECTIVE  Patient seen and examined. No acute events overnight.         OBJECTIVE    PHYSICAL EXAM  General: Appears well, NAD  CHEST: breathing comfortably  CV: appears well perfused  Abdomen: soft, nontender, nondistended, no rebound or guarding  Extremities: Grossly symmetric    T(C): 37.5 (03-22-22 @ 21:00), Max: 37.7 (03-22-22 @ 09:45)  HR: 95 (03-22-22 @ 21:00) (75 - 95)  BP: 136/73 (03-22-22 @ 21:00) (108/60 - 151/77)  RR: 18 (03-22-22 @ 21:00) (17 - 18)  SpO2: 96% (03-22-22 @ 21:00) (94% - 100%)    03-21-22 @ 07:01  -  03-22-22 @ 07:00  --------------------------------------------------------  IN: 600 mL / OUT: 705 mL / NET: -105 mL    03-22-22 @ 07:01  -  03-23-22 @ 01:35  --------------------------------------------------------  IN: 490 mL / OUT: 500 mL / NET: -10 mL        MEDICATIONS  acetaminophen   IVPB .. 1000 milliGRAM(s) IV Intermittent every 6 hours  dextrose 40% Gel 15 Gram(s) Oral once  dextrose 5%. 1000 milliLiter(s) IV Continuous <Continuous>  dextrose 5%. 1000 milliLiter(s) IV Continuous <Continuous>  dextrose 50% Injectable 25 Gram(s) IV Push once  dextrose 50% Injectable 12.5 Gram(s) IV Push once  dextrose 50% Injectable 25 Gram(s) IV Push once  glucagon  Injectable 1 milliGRAM(s) IntraMuscular once  heparin   Injectable 5000 Unit(s) SubCutaneous every 8 hours  hydromorphone (10 MICROgram(s)/mL) + bupivacaine 0.0625% in 0.9% Sodium Chloride PCEA 250 milliLiter(s) Epidural PCA Continuous  insulin lispro (ADMELOG) corrective regimen sliding scale   SubCutaneous three times a day before meals  insulin lispro (ADMELOG) corrective regimen sliding scale   SubCutaneous at bedtime  lactated ringers. 1000 milliLiter(s) IV Continuous <Continuous>  levothyroxine Injectable 100 MICROGram(s) IV Push at bedtime  naloxone Injectable 0.1 milliGRAM(s) IV Push every 3 minutes PRN  ondansetron Injectable 4 milliGRAM(s) IV Push every 6 hours PRN  tetrahydrazoline Solution 1 Drop(s) Both EYES daily PRN      LABS                        9.3    8.75  )-----------( 172      ( 22 Mar 2022 07:50 )             29.9     03-22    134<L>  |  99  |  26<H>  ----------------------------<  191<H>  3.7   |  22  |  2.08<H>    Ca    8.0<L>      22 Mar 2022 07:50  Phos  3.6     03-22  Mg     1.60     03-22      PT/INR - ( 22 Mar 2022 07:50 )   PT: 14.0 sec;   INR: 1.20 ratio         PTT - ( 22 Mar 2022 07:50 )  PTT:29.2 sec      RADIOLOGY & ADDITIONAL STUDIES GENERAL SURGERY PROGRESS NOTE    SUBJECTIVE  Patient seen and examined. No acute events overnight. She states her pain is well controlled and that she is tolerating LRD consistent carbohydrates. No acute complaints. Patient denies any n/v, SOB, chest pain, dizziness.       PAST MEDICAL & SURGICAL HISTORY:  Hypothyroidism  Diverticulosis  HTN (hypertension)  Lower gastrointestinal bleeding  Multiple times since &#x27;2014: last episode 5/2018  Pernicious anemia  SOLOMON (obstructive sleep apnea)  non-compliant with CPAP  Type 2 diabetes mellitus  Left ureteral stone  Fibroid uterus  &#x27;94  surgically treated  Lumbar herniated disc  &#x27; 2010  surgically treated  Multiparity  Heart murmur  mild  History of tonsillectomy  age 14  S/P WESTLEY (total abdominal hysterectomy)  &#x27; 94  Laproscopic.  benign  S/P lumbar laminectomy  &#x27; 2010  with Fusion  S/P colon resection  colostomy; 10/202  History of lithotripsy  left kidney stone extraction; 02/2020      PHYSICAL EXAM  General: Appears well, NAD  CHEST: breathing comfortably  CV: appears well perfused  Abdomen: soft, nontender, nondistended, no rebound or guarding. Incision with staples clean, dry and intact.  Penrose x2. Ostomy with gas and liquid stool in bag.  Extremities: Grossly symmetric    Vital Signs Last 24 Hrs  T(C): 37.3 (23 Mar 2022 02:00), Max: 37.7 (22 Mar 2022 09:45)  T(F): 99.1 (23 Mar 2022 02:00), Max: 99.8 (22 Mar 2022 09:45)  HR: 93 (23 Mar 2022 02:00) (75 - 95)  BP: 130/75 (23 Mar 2022 02:00) (108/60 - 151/77)  RR: 18 (23 Mar 2022 02:00) (17 - 18)  SpO2: 96% (23 Mar 2022 02:00) (94% - 97%)    I&O's Summary    22 Mar 2022 07:01  -  23 Mar 2022 07:00  --------------------------------------------------------  IN: 990 mL / OUT: 1500 mL / NET: -510 mL      MEDICATIONS  acetaminophen   IVPB .. 1000 milliGRAM(s) IV Intermittent every 6 hours  dextrose 40% Gel 15 Gram(s) Oral once  dextrose 5%. 1000 milliLiter(s) IV Continuous <Continuous>  dextrose 5%. 1000 milliLiter(s) IV Continuous <Continuous>  dextrose 50% Injectable 25 Gram(s) IV Push once  dextrose 50% Injectable 12.5 Gram(s) IV Push once  dextrose 50% Injectable 25 Gram(s) IV Push once  glucagon  Injectable 1 milliGRAM(s) IntraMuscular once  heparin   Injectable 5000 Unit(s) SubCutaneous every 8 hours  hydromorphone (10 MICROgram(s)/mL) + bupivacaine 0.0625% in 0.9% Sodium Chloride PCEA 250 milliLiter(s) Epidural PCA Continuous  insulin lispro (ADMELOG) corrective regimen sliding scale   SubCutaneous three times a day before meals  insulin lispro (ADMELOG) corrective regimen sliding scale   SubCutaneous at bedtime  lactated ringers. 1000 milliLiter(s) IV Continuous <Continuous>  levothyroxine Injectable 100 MICROGram(s) IV Push at bedtime  naloxone Injectable 0.1 milliGRAM(s) IV Push every 3 minutes PRN  ondansetron Injectable 4 milliGRAM(s) IV Push every 6 hours PRN  tetrahydrazoline Solution 1 Drop(s) Both EYES daily PRN

## 2022-03-23 NOTE — ADVANCED PRACTICE NURSE CONSULT - ASSESSMENT
Stoma size: RLQ Ileostomy, 1 3 /8"  Appliance used: Two piece drainable pouch       Skin barrier ring- item # [id number]      Flat waffer (2 1/4")- item # [id number]    Patient became nauseous and vomited after visit. Primary RN to provide Zofran           Drainable pouch (2 1/4")- item # [id number]

## 2022-03-23 NOTE — PROGRESS NOTE ADULT - ASSESSMENT
Patient is a 66 year old female with a PMHx of HTN, HLD, hypothyroidism, DM2, SOLOMON (not consistent with using CPAP), pernicious anemia and diverticulosis (S/P colon resection 10/2021) who presented to pre-surgical testing for evaluation for Naeem reversal.  Patient is now S/P ex-lap, Naeem reversal and diverting loop ileostomy on 3/21/22.      PLAN:  - Clear liquid diet  - Advance to low fiber diet for lunch  - LR @125cc/hr  - Out of bed  - Pain control with PCEA  - VTE prophylaxis with Heparin subcutaneous      #[id number]  A Team Surgery Patient is a 66 year old female with a PMHx of HTN, HLD, hypothyroidism, DM2, SOLOMON (not consistent with using CPAP), pernicious anemia and diverticulosis (S/P colon resection 10/2021) who presented to pre-surgical testing for evaluation for Naeem reversal. Patient is now s/p ex-lap Naeem reversal and diverting loop ileotomy on 3/21/22.     PLAN:  - LRD, consistent carbohydrates as tolerated  - LR @125cc/hr  - Out of bed  - Pain control with PCEA  - VTE prophylaxis with Heparin subcutaneous      #[id number]  A Team Surgery Patient is a 66 year old female with a PMHx of HTN, HLD, hypothyroidism, DM2, SOLOMON (not consistent with using CPAP), pernicious anemia and diverticulosis (S/P colon resection 10/2021) who presented to pre-surgical testing for evaluation for Naeem reversal. Patient is now s/p ex-lap Naeem reversal and diverting loop ileotomy on 3/21/22.     PLAN:  - LRD, consistent carbohydrates as tolerated  - D5 + 0.5NS for maintenance   - Out of bed  - Pain control with PCEA  -monitor UOP with perla and drain output   - VTE prophylaxis with Heparin subcutaneous      #[id number]  A Team Surgery

## 2022-03-23 NOTE — PROGRESS NOTE ADULT - SUBJECTIVE AND OBJECTIVE BOX
Anesthesia Pain Management Service: Day _3 of Epidural    SUBJECTIVE: Patient doing well with PCEA and no problems.  Pain Scale Score:  0/10 at rest, 10/10 with activity- turning in bed.  Refer to charted pain scores    THERAPY:  [x ] Epidural Bupivacaine 0.0625% and Hydromorphone  		[ X] 10 micrograms/mL	[ ] 5 micrograms/mL  [ ] Epidural Bupivacaine 0.0625% and Fentanyl - 2 micrograms/mL  [ ] Epidural Ropivacaine 0.1% plain – 1 mg/mL  [ ] Patient Controlled Regional Anesthesia (PCRA) Ropivacaine  		[ ] 0.2%			[ ] 0.1%    Demand dose __3_ lockout __15_ (minutes) Continuous Rate _4_ Total: _101.20 used (in past 24 hours)      MEDICATIONS  (STANDING):  dextrose 40% Gel 15 Gram(s) Oral once  dextrose 5% + sodium chloride 0.45%. 1000 milliLiter(s) (75 mL/Hr) IV Continuous <Continuous>  dextrose 5%. 1000 milliLiter(s) (100 mL/Hr) IV Continuous <Continuous>  dextrose 5%. 1000 milliLiter(s) (50 mL/Hr) IV Continuous <Continuous>  dextrose 50% Injectable 25 Gram(s) IV Push once  dextrose 50% Injectable 12.5 Gram(s) IV Push once  dextrose 50% Injectable 25 Gram(s) IV Push once  glucagon  Injectable 1 milliGRAM(s) IntraMuscular once  heparin   Injectable 5000 Unit(s) SubCutaneous every 8 hours  hydromorphone (10 MICROgram(s)/mL) + bupivacaine 0.0625% in 0.9% Sodium Chloride PCEA 250 milliLiter(s) Epidural PCA Continuous  insulin lispro (ADMELOG) corrective regimen sliding scale   SubCutaneous three times a day before meals  insulin lispro (ADMELOG) corrective regimen sliding scale   SubCutaneous at bedtime  levothyroxine Injectable 100 MICROGram(s) IV Push at bedtime    MEDICATIONS  (PRN):  HYDROmorphone  Injectable 0.3 milliGRAM(s) IV Push every 3 hours PRN Severe breakthrough Pain (7 - 10)  naloxone Injectable 0.1 milliGRAM(s) IV Push every 3 minutes PRN For ANY of the following changes in patient status:  A. RR LESS THAN 10 breaths per minute, B. Oxygen saturation LESS THAN 90%, C. Sedation score of 6  ondansetron Injectable 4 milliGRAM(s) IV Push every 6 hours PRN Nausea  tetrahydrazoline Solution 1 Drop(s) Both EYES daily PRN Dry eyes      OBJECTIVE:  Patient is lying in the bed.     Assessment of Catheter Site:	[ ] Left	[ ] Right  [x ] Epidural 	[ ] Femoral	      [ ] Saphenous   [ ] Supraclavicular   [ ] Other:    [x ] Dressing intact	[x ] Site non-tender	[ x] Site without erythema, discharge, edema  [x ] Epidural tubing and connection checked	[x] Gross neurological exam within normal limits  [ ] Catheter removed – tip intact		[ ] Afebrile  	[ ] Febrile: ___   [ X] see Temp under VS below)    PT/INR - ( 23 Mar 2022 09:25 )   PT: 14.0 sec;   INR: 1.20 ratio         PTT - ( 23 Mar 2022 09:25 )  PTT:34.7 sec                      9.7    10.87 )-----------( 201      ( 23 Mar 2022 09:25 )             30.0     Vital Signs Last 24 Hrs  T(C): 37.1 (03-23-22 @ 10:44), Max: 37.6 (03-22-22 @ 17:46)  T(F): 98.8 (03-23-22 @ 10:44), Max: 99.7 (03-22-22 @ 17:46)  HR: 98 (03-23-22 @ 10:44) (75 - 98)  BP: 154/84 (03-23-22 @ 10:44) (108/60 - 154/84)  BP(mean): --  RR: 18 (03-23-22 @ 10:44) (18 - 18)  SpO2: 98% (03-23-22 @ 10:44) (94% - 98%)      Sedation Score:	[x ] Alert	[ ] Drowsy	[ ] Arousable	[ ] Asleep	[ ] Unresponsive    Side Effects:	[x ] None	[ ] Nausea	[ ] Vomiting	[ ] Pruritus  		[ ] Weakness		[ ] Numbness	[ ] Other:    ASSESSMENT/ PLAN:    Therapy to  be:	[x ] Continue   [ ] Discontinued   [ ] Change to prn Analgesics    Documentation and Verification of current medications:  [ X ] Done	[ ] Not done, not eligible, reason:    Comments: Doing OK with epidural and may continue. Increased continuous rate to 6ml/hr and added IV Dilaudid PRN for severe breakthrough pain for better pain control.  Recommend non-opioid adjuvant analgesics to be used when possible and when allowed by primary surgical team.    Progress Note written now but Patient was seen earlier.

## 2022-03-23 NOTE — PROGRESS NOTE ADULT - SUBJECTIVE AND OBJECTIVE BOX
Anesthesia Pain Management Service    SUBJECTIVE: Pt doing well with PCEA without problems reported.    Therapy:	  [ ] IV PCA	   [ X] Epidural           [ ] s/p Spinal Opoid              [ ] Postpartum infusion	  [ ] Patient controlled regional anesthesia (PCRA)    [ ] prn Analgesics    Allergies    Valium (Other)    Intolerances    oxycodone (Other)    MEDICATIONS  (STANDING):  dextrose 40% Gel 15 Gram(s) Oral once  dextrose 5% + sodium chloride 0.45%. 1000 milliLiter(s) (75 mL/Hr) IV Continuous <Continuous>  dextrose 5%. 1000 milliLiter(s) (100 mL/Hr) IV Continuous <Continuous>  dextrose 5%. 1000 milliLiter(s) (50 mL/Hr) IV Continuous <Continuous>  dextrose 50% Injectable 25 Gram(s) IV Push once  dextrose 50% Injectable 12.5 Gram(s) IV Push once  dextrose 50% Injectable 25 Gram(s) IV Push once  glucagon  Injectable 1 milliGRAM(s) IntraMuscular once  heparin   Injectable 5000 Unit(s) SubCutaneous every 8 hours  hydromorphone (10 MICROgram(s)/mL) + bupivacaine 0.0625% in 0.9% Sodium Chloride PCEA 250 milliLiter(s) Epidural PCA Continuous  insulin lispro (ADMELOG) corrective regimen sliding scale   SubCutaneous three times a day before meals  insulin lispro (ADMELOG) corrective regimen sliding scale   SubCutaneous at bedtime  levothyroxine Injectable 100 MICROGram(s) IV Push at bedtime    MEDICATIONS  (PRN):  HYDROmorphone  Injectable 0.3 milliGRAM(s) IV Push every 3 hours PRN Severe breakthrough Pain (7 - 10)  naloxone Injectable 0.1 milliGRAM(s) IV Push every 3 minutes PRN For ANY of the following changes in patient status:  A. RR LESS THAN 10 breaths per minute, B. Oxygen saturation LESS THAN 90%, C. Sedation score of 6  ondansetron Injectable 4 milliGRAM(s) IV Push every 6 hours PRN Nausea  tetrahydrazoline Solution 1 Drop(s) Both EYES daily PRN Dry eyes      OBJECTIVE:   [X] No new signs     [ ] Other:    Side Effects:  [X ] None			[ ] Other:    Assessment of Catheter Site:		[ X] Intact		[ ] Other:    ASSESSMENT/PLAN  [ X] Continue current therapy    [ ] Therapy changed to:    [ ] IV PCA       [ ] Epidural     [ ] prn Analgesics     Comments: Continue current PCEA settings.

## 2022-03-24 LAB
ANION GAP SERPL CALC-SCNC: 9 MMOL/L — SIGNIFICANT CHANGE UP (ref 7–14)
APPEARANCE UR: CLEAR — SIGNIFICANT CHANGE UP
APTT BLD: 31.4 SEC — SIGNIFICANT CHANGE UP (ref 27–36.3)
BILIRUB UR-MCNC: NEGATIVE — SIGNIFICANT CHANGE UP
BUN SERPL-MCNC: 8 MG/DL — SIGNIFICANT CHANGE UP (ref 7–23)
CALCIUM SERPL-MCNC: 8.8 MG/DL — SIGNIFICANT CHANGE UP (ref 8.4–10.5)
CHLORIDE SERPL-SCNC: 99 MMOL/L — SIGNIFICANT CHANGE UP (ref 98–107)
CO2 SERPL-SCNC: 24 MMOL/L — SIGNIFICANT CHANGE UP (ref 22–31)
COLOR SPEC: COLORLESS — SIGNIFICANT CHANGE UP
CREAT SERPL-MCNC: 0.97 MG/DL — SIGNIFICANT CHANGE UP (ref 0.5–1.3)
DIFF PNL FLD: ABNORMAL
EGFR: 64 ML/MIN/1.73M2 — SIGNIFICANT CHANGE UP
GLUCOSE BLDC GLUCOMTR-MCNC: 122 MG/DL — HIGH (ref 70–99)
GLUCOSE BLDC GLUCOMTR-MCNC: 152 MG/DL — HIGH (ref 70–99)
GLUCOSE BLDC GLUCOMTR-MCNC: 163 MG/DL — HIGH (ref 70–99)
GLUCOSE BLDC GLUCOMTR-MCNC: 178 MG/DL — HIGH (ref 70–99)
GLUCOSE BLDC GLUCOMTR-MCNC: 185 MG/DL — HIGH (ref 70–99)
GLUCOSE SERPL-MCNC: 145 MG/DL — HIGH (ref 70–99)
GLUCOSE UR QL: NEGATIVE — SIGNIFICANT CHANGE UP
HCT VFR BLD CALC: 29.4 % — LOW (ref 34.5–45)
HGB BLD-MCNC: 9.1 G/DL — LOW (ref 11.5–15.5)
INR BLD: 1.14 RATIO — SIGNIFICANT CHANGE UP (ref 0.88–1.16)
KETONES UR-MCNC: NEGATIVE — SIGNIFICANT CHANGE UP
LEUKOCYTE ESTERASE UR-ACNC: NEGATIVE — SIGNIFICANT CHANGE UP
MAGNESIUM SERPL-MCNC: 1.9 MG/DL — SIGNIFICANT CHANGE UP (ref 1.6–2.6)
MCHC RBC-ENTMCNC: 26.5 PG — LOW (ref 27–34)
MCHC RBC-ENTMCNC: 31 GM/DL — LOW (ref 32–36)
MCV RBC AUTO: 85.7 FL — SIGNIFICANT CHANGE UP (ref 80–100)
NITRITE UR-MCNC: NEGATIVE — SIGNIFICANT CHANGE UP
NRBC # BLD: 0 /100 WBCS — SIGNIFICANT CHANGE UP
NRBC # FLD: 0 K/UL — SIGNIFICANT CHANGE UP
PH UR: 7 — SIGNIFICANT CHANGE UP (ref 5–8)
PHOSPHATE SERPL-MCNC: 1.8 MG/DL — LOW (ref 2.5–4.5)
PLATELET # BLD AUTO: 228 K/UL — SIGNIFICANT CHANGE UP (ref 150–400)
POTASSIUM SERPL-MCNC: 3.5 MMOL/L — SIGNIFICANT CHANGE UP (ref 3.5–5.3)
POTASSIUM SERPL-SCNC: 3.5 MMOL/L — SIGNIFICANT CHANGE UP (ref 3.5–5.3)
PROT UR-MCNC: ABNORMAL
PROTHROM AB SERPL-ACNC: 13.2 SEC — SIGNIFICANT CHANGE UP (ref 10.5–13.4)
RBC # BLD: 3.43 M/UL — LOW (ref 3.8–5.2)
RBC # FLD: 18.5 % — HIGH (ref 10.3–14.5)
SODIUM SERPL-SCNC: 132 MMOL/L — LOW (ref 135–145)
SP GR SPEC: 1.01 — SIGNIFICANT CHANGE UP (ref 1–1.05)
UROBILINOGEN FLD QL: SIGNIFICANT CHANGE UP
WBC # BLD: 12.64 K/UL — HIGH (ref 3.8–10.5)
WBC # FLD AUTO: 12.64 K/UL — HIGH (ref 3.8–10.5)

## 2022-03-24 PROCEDURE — 71045 X-RAY EXAM CHEST 1 VIEW: CPT | Mod: 26

## 2022-03-24 PROCEDURE — 93010 ELECTROCARDIOGRAM REPORT: CPT

## 2022-03-24 RX ORDER — ACETAMINOPHEN 500 MG
1000 TABLET ORAL EVERY 6 HOURS
Refills: 0 | Status: COMPLETED | OUTPATIENT
Start: 2022-03-24 | End: 2022-03-25

## 2022-03-24 RX ORDER — HYDROMORPHONE HYDROCHLORIDE 2 MG/ML
4 INJECTION INTRAMUSCULAR; INTRAVENOUS; SUBCUTANEOUS
Refills: 0 | Status: DISCONTINUED | OUTPATIENT
Start: 2022-03-24 | End: 2022-03-27

## 2022-03-24 RX ORDER — HYDROMORPHONE HYDROCHLORIDE 2 MG/ML
2 INJECTION INTRAMUSCULAR; INTRAVENOUS; SUBCUTANEOUS
Refills: 0 | Status: DISCONTINUED | OUTPATIENT
Start: 2022-03-24 | End: 2022-03-27

## 2022-03-24 RX ORDER — LABETALOL HCL 100 MG
10 TABLET ORAL ONCE
Refills: 0 | Status: COMPLETED | OUTPATIENT
Start: 2022-03-24 | End: 2022-03-24

## 2022-03-24 RX ORDER — LISINOPRIL 2.5 MG/1
20 TABLET ORAL EVERY 12 HOURS
Refills: 0 | Status: DISCONTINUED | OUTPATIENT
Start: 2022-03-24 | End: 2022-03-27

## 2022-03-24 RX ORDER — MAGNESIUM SULFATE 500 MG/ML
1 VIAL (ML) INJECTION ONCE
Refills: 0 | Status: COMPLETED | OUTPATIENT
Start: 2022-03-24 | End: 2022-03-24

## 2022-03-24 RX ORDER — PIPERACILLIN AND TAZOBACTAM 4; .5 G/20ML; G/20ML
3.38 INJECTION, POWDER, LYOPHILIZED, FOR SOLUTION INTRAVENOUS EVERY 8 HOURS
Refills: 0 | Status: DISCONTINUED | OUTPATIENT
Start: 2022-03-24 | End: 2022-03-27

## 2022-03-24 RX ORDER — PIPERACILLIN AND TAZOBACTAM 4; .5 G/20ML; G/20ML
3.38 INJECTION, POWDER, LYOPHILIZED, FOR SOLUTION INTRAVENOUS ONCE
Refills: 0 | Status: COMPLETED | OUTPATIENT
Start: 2022-03-24 | End: 2022-03-24

## 2022-03-24 RX ORDER — HYDRALAZINE HCL 50 MG
5 TABLET ORAL ONCE
Refills: 0 | Status: COMPLETED | OUTPATIENT
Start: 2022-03-24 | End: 2022-03-24

## 2022-03-24 RX ORDER — POTASSIUM PHOSPHATE, MONOBASIC POTASSIUM PHOSPHATE, DIBASIC 236; 224 MG/ML; MG/ML
15 INJECTION, SOLUTION INTRAVENOUS ONCE
Refills: 0 | Status: COMPLETED | OUTPATIENT
Start: 2022-03-24 | End: 2022-03-24

## 2022-03-24 RX ADMIN — Medication 100 MICROGRAM(S): at 21:50

## 2022-03-24 RX ADMIN — Medication 1: at 17:15

## 2022-03-24 RX ADMIN — Medication 10 MILLIGRAM(S): at 10:48

## 2022-03-24 RX ADMIN — Medication 1: at 12:23

## 2022-03-24 RX ADMIN — PIPERACILLIN AND TAZOBACTAM 25 GRAM(S): 4; .5 INJECTION, POWDER, LYOPHILIZED, FOR SOLUTION INTRAVENOUS at 21:27

## 2022-03-24 RX ADMIN — Medication 10 MILLIGRAM(S): at 21:45

## 2022-03-24 RX ADMIN — Medication 100 GRAM(S): at 12:08

## 2022-03-24 RX ADMIN — Medication 400 MILLIGRAM(S): at 18:51

## 2022-03-24 RX ADMIN — Medication 400 MILLIGRAM(S): at 07:41

## 2022-03-24 RX ADMIN — PIPERACILLIN AND TAZOBACTAM 200 GRAM(S): 4; .5 INJECTION, POWDER, LYOPHILIZED, FOR SOLUTION INTRAVENOUS at 15:42

## 2022-03-24 RX ADMIN — Medication 1000 MILLIGRAM(S): at 19:21

## 2022-03-24 RX ADMIN — Medication 1: at 09:20

## 2022-03-24 RX ADMIN — Medication 1000 MILLIGRAM(S): at 08:11

## 2022-03-24 RX ADMIN — LISINOPRIL 20 MILLIGRAM(S): 2.5 TABLET ORAL at 18:51

## 2022-03-24 RX ADMIN — Medication 5 MILLIGRAM(S): at 07:41

## 2022-03-24 RX ADMIN — Medication 10 MILLIGRAM(S): at 17:18

## 2022-03-24 RX ADMIN — HEPARIN SODIUM 5000 UNIT(S): 5000 INJECTION INTRAVENOUS; SUBCUTANEOUS at 21:27

## 2022-03-24 RX ADMIN — POTASSIUM PHOSPHATE, MONOBASIC POTASSIUM PHOSPHATE, DIBASIC 62.5 MILLIMOLE(S): 236; 224 INJECTION, SOLUTION INTRAVENOUS at 15:42

## 2022-03-24 RX ADMIN — HEPARIN SODIUM 5000 UNIT(S): 5000 INJECTION INTRAVENOUS; SUBCUTANEOUS at 05:10

## 2022-03-24 RX ADMIN — HEPARIN SODIUM 5000 UNIT(S): 5000 INJECTION INTRAVENOUS; SUBCUTANEOUS at 13:55

## 2022-03-24 RX ADMIN — Medication 400 MILLIGRAM(S): at 13:54

## 2022-03-24 NOTE — PROGRESS NOTE ADULT - SUBJECTIVE AND OBJECTIVE BOX
Anesthesia Pain Management Service: Day _4_ of Epidural    SUBJECTIVE: Patient's pain is well-managed with PCEA. Patient states that she has taken PO Oxycodone in the past with adverse reactions of dizziness, swelling, and hives.   Pain Scale Score: 5/10  Refer to charted pain scores    THERAPY:  [x ] Epidural Bupivacaine 0.0625% and Hydromorphone  		[X ] 10 micrograms/mL	[ ] 5 micrograms/mL  [ ] Epidural Bupivacaine 0.0625% and Fentanyl - 2 micrograms/mL  [ ] Epidural Ropivacaine 0.1% plain – 1 mg/mL  [ ] Patient Controlled Regional Anesthesia (PCRA) Ropivacaine  		[ ] 0.2%			[ ] 0.1%    Demand dose __3_ lockout __15_ (minutes) Continuous Rate _6__ Total: __188.5_ Daily      MEDICATIONS  (STANDING):  acetaminophen   IVPB .. 1000 milliGRAM(s) IV Intermittent every 6 hours  dextrose 40% Gel 15 Gram(s) Oral once  dextrose 5%. 1000 milliLiter(s) (100 mL/Hr) IV Continuous <Continuous>  dextrose 5%. 1000 milliLiter(s) (50 mL/Hr) IV Continuous <Continuous>  dextrose 50% Injectable 25 Gram(s) IV Push once  dextrose 50% Injectable 12.5 Gram(s) IV Push once  dextrose 50% Injectable 25 Gram(s) IV Push once  glucagon  Injectable 1 milliGRAM(s) IntraMuscular once  heparin   Injectable 5000 Unit(s) SubCutaneous every 8 hours  insulin lispro (ADMELOG) corrective regimen sliding scale   SubCutaneous three times a day before meals  insulin lispro (ADMELOG) corrective regimen sliding scale   SubCutaneous at bedtime  levothyroxine Injectable 100 MICROGram(s) IV Push at bedtime  magnesium sulfate  IVPB 1 Gram(s) IV Intermittent once  potassium phosphate IVPB 15 milliMole(s) IV Intermittent once    MEDICATIONS  (PRN):  HYDROmorphone   Tablet 2 milliGRAM(s) Oral every 3 hours PRN Moderate Pain (4 - 6)  HYDROmorphone   Tablet 4 milliGRAM(s) Oral every 3 hours PRN Severe Pain (7 - 10)  HYDROmorphone  Injectable 0.3 milliGRAM(s) IV Push every 3 hours PRN Severe breakthrough Pain (7 - 10)  naloxone Injectable 0.1 milliGRAM(s) IV Push every 3 minutes PRN For ANY of the following changes in patient status:  A. RR LESS THAN 10 breaths per minute, B. Oxygen saturation LESS THAN 90%, C. Sedation score of 6  ondansetron Injectable 4 milliGRAM(s) IV Push every 6 hours PRN Nausea  tetrahydrazoline Solution 1 Drop(s) Both EYES daily PRN Dry eyes      OBJECTIVE: Patient sitting up in chair with daughter at bedside.    Assessment of Catheter Site:	[ ] Left	[ ] Right  [x ] Epidural 	[ ] Femoral	      [ ] Saphenous   [ ] Supraclavicular   [ ] Other:    [x ] Dressing intact	[x ] Site non-tender	[ x] Site without erythema, discharge, edema  [x ] Epidural tubing and connection checked	[x] Gross neurological exam within normal limits  [X ] Catheter removed – tip intact		[ ] Afebrile	  [ ] Febrile: ___       [ X] see Temp under VS below)    PT/INR - ( 24 Mar 2022 10:30 )   PT: 13.2 sec;   INR: 1.14 ratio         PTT - ( 24 Mar 2022 10:30 )  PTT:31.4 sec                      9.1    12.64 )-----------( 228      ( 24 Mar 2022 08:10 )             29.4     Vital Signs Last 24 Hrs  T(C): 36.7 (03-24-22 @ 11:24), Max: 38.7 (03-23-22 @ 23:04)  T(F): 98.1 (03-24-22 @ 11:24), Max: 101.6 (03-23-22 @ 23:04)  HR: 69 (03-24-22 @ 11:24) (69 - 99)  BP: 140/76 (03-24-22 @ 11:24) (140/76 - 195/100)  BP(mean): --  RR: 17 (03-24-22 @ 11:24) (17 - 19)  SpO2: 96% (03-24-22 @ 11:24) (90% - 99%)      Sedation Score:	[x ] Alert	[ ] Drowsy	[ ] Arousable	[ ] Asleep	[ ] Unresponsive    Side Effects:	[x ] None	[ ] Nausea	[ ] Vomiting	[ ] Pruritus  		[ ] Weakness		[ ] Numbness	[ ] Other:    ASSESSMENT/ PLAN:    Therapy to  be:	[ ] Continue   [ X] Discontinued   [ X] Change to prn Analgesics    Documentation and Verification of current medications:  [ X ] Done	[ ] Not done, not eligible, reason:    Comments: Discussed patient with primary team, PCEA discontinued. Patient for PO Dilaudid for history of adverse reactions with PO Oxycodone. Changed to IV/oral opioid and/or non-opioid Adjuvant analgesics to be used at this point.    Progress Note written now but Patient was seen earlier.

## 2022-03-24 NOTE — PROGRESS NOTE ADULT - SUBJECTIVE AND OBJECTIVE BOX
GENERAL SURGERY PROGRESS NOTE    SUBJECTIVE  Patient seen and examined. No acute events overnight.       PAST MEDICAL & SURGICAL HISTORY:  Hypothyroidism  Diverticulosis  HTN (hypertension)  Lower gastrointestinal bleeding  Multiple times since &#x27;2014: last episode 5/2018  Pernicious anemia  SOLOMON (obstructive sleep apnea)  non-compliant with CPAP  Type 2 diabetes mellitus  Left ureteral stone  Fibroid uterus  &#x27;94  surgically treated  Lumbar herniated disc  &#x27; 2010  surgically treated  Multiparity  Heart murmur  mild  History of tonsillectomy  age 14  S/P WESTLEY (total abdominal hysterectomy)  &#x27; 94  Laproscopic.  benign  S/P lumbar laminectomy  &#x27; 2010  with Fusion  S/P colon resection  colostomy; 10/202  History of lithotripsy  left kidney stone extraction; 02/2020      PHYSICAL EXAM  General: Appears well, NAD  CHEST: breathing comfortably  CV: appears well perfused  Abdomen: soft, nontender, nondistended, no rebound or guarding. Incision with staples clean, dry and intact.  Penrose x2. Ostomy with gas and liquid stool in bag.  Extremities: Grossly symmetric    Vital Signs Last 24 Hrs  T(C): 37.3 (23 Mar 2022 02:00), Max: 37.7 (22 Mar 2022 09:45)  T(F): 99.1 (23 Mar 2022 02:00), Max: 99.8 (22 Mar 2022 09:45)  HR: 93 (23 Mar 2022 02:00) (75 - 95)  BP: 130/75 (23 Mar 2022 02:00) (108/60 - 151/77)  RR: 18 (23 Mar 2022 02:00) (17 - 18)  SpO2: 96% (23 Mar 2022 02:00) (94% - 97%)    I&O's Summary    22 Mar 2022 07:01  -  23 Mar 2022 07:00  --------------------------------------------------------  IN: 990 mL / OUT: 1500 mL / NET: -510 mL      MEDICATIONS  acetaminophen   IVPB .. 1000 milliGRAM(s) IV Intermittent every 6 hours  dextrose 40% Gel 15 Gram(s) Oral once  dextrose 5%. 1000 milliLiter(s) IV Continuous <Continuous>  dextrose 5%. 1000 milliLiter(s) IV Continuous <Continuous>  dextrose 50% Injectable 25 Gram(s) IV Push once  dextrose 50% Injectable 12.5 Gram(s) IV Push once  dextrose 50% Injectable 25 Gram(s) IV Push once  glucagon  Injectable 1 milliGRAM(s) IntraMuscular once  heparin   Injectable 5000 Unit(s) SubCutaneous every 8 hours  hydromorphone (10 MICROgram(s)/mL) + bupivacaine 0.0625% in 0.9% Sodium Chloride PCEA 250 milliLiter(s) Epidural PCA Continuous  insulin lispro (ADMELOG) corrective regimen sliding scale   SubCutaneous three times a day before meals  insulin lispro (ADMELOG) corrective regimen sliding scale   SubCutaneous at bedtime  lactated ringers. 1000 milliLiter(s) IV Continuous <Continuous>  levothyroxine Injectable 100 MICROGram(s) IV Push at bedtime  naloxone Injectable 0.1 milliGRAM(s) IV Push every 3 minutes PRN  ondansetron Injectable 4 milliGRAM(s) IV Push every 6 hours PRN  tetrahydrazoline Solution 1 Drop(s) Both EYES daily PRN   Surgery Daily Progress Note  =====================================================  Interval / Overnight Events: Febrile to 101.6F overnight.      HPI:  Patient is a 66 year old female with a PMHx of HTN, HLD, hypothyroidism, DM2, SOLOMON (not consistent with using CPAP), pernicious anemia and diverticulosis (S/P colon resection 10/2021) who presented to pre-surgical testing for evaluation for Naeem reversal.      PAST MEDICAL & SURGICAL HISTORY:  Hypothyroidism  Diverticulosis  HTN (hypertension)  Lower gastrointestinal bleeding  Multiple times since &#x27;2014: last episode 5/2018  Pernicious anemia  SOLOMON (obstructive sleep apnea)  non-compliant with CPAP  Type 2 diabetes mellitus  Left ureteral stone  Fibroid uterus  &#x27;94  surgically treated  Lumbar herniated disc  &#x27; 2010  surgically treated  Multiparity  Heart murmur  mild  History of tonsillectomy  age 14  S/P WESTLEY (total abdominal hysterectomy)  &#x27; 94  Laproscopic.  benign  S/P lumbar laminectomy  &#x27; 2010  with Fusion  S/P colon resection  colostomy; 10/202  History of lithotripsy  left kidney stone extraction; 02/2020      ALLERGIES:  oxycodone (Other)  Valium (Other)    --------------------------------------------------------------------------------------    MEDICATIONS:    Neurologic Medications  acetaminophen   IVPB .. 1000 milliGRAM(s) IV Intermittent every 6 hours  HYDROmorphone  Injectable 0.3 milliGRAM(s) IV Push every 3 hours PRN Severe breakthrough Pain (7 - 10)  hydromorphone (10 MICROgram(s)/mL) + bupivacaine 0.0625% in 0.9% Sodium Chloride PCEA 250 milliLiter(s) Epidural PCA Continuous  ondansetron Injectable 4 milliGRAM(s) IV Push every 6 hours PRN Nausea    Cardiovascular Medications  hydrALAZINE Injectable 5 milliGRAM(s) IV Push once    Gastrointestinal Medications  dextrose 5% + sodium chloride 0.45%. 1000 milliLiter(s) IV Continuous <Continuous>  dextrose 5%. 1000 milliLiter(s) IV Continuous <Continuous>  dextrose 5%. 1000 milliLiter(s) IV Continuous <Continuous>    Hematologic/Oncologic Medications  heparin   Injectable 5000 Unit(s) SubCutaneous every 8 hours    Endocrine/Metabolic Medications  dextrose 40% Gel 15 Gram(s) Oral once  dextrose 50% Injectable 25 Gram(s) IV Push once  dextrose 50% Injectable 12.5 Gram(s) IV Push once  dextrose 50% Injectable 25 Gram(s) IV Push once  glucagon  Injectable 1 milliGRAM(s) IntraMuscular once  insulin lispro (ADMELOG) corrective regimen sliding scale   SubCutaneous three times a day before meals  insulin lispro (ADMELOG) corrective regimen sliding scale   SubCutaneous at bedtime  levothyroxine Injectable 100 MICROGram(s) IV Push at bedtime    Topical/Other Medications  naloxone Injectable 0.1 milliGRAM(s) IV Push every 3 minutes PRN For ANY of the following changes in patient status:  A. RR LESS THAN 10 breaths per minute, B. Oxygen saturation LESS THAN 90%, C. Sedation score of 6  tetrahydrazoline Solution 1 Drop(s) Both EYES daily PRN Dry eyes    --------------------------------------------------------------------------------------    VITAL SIGNS:  ICU Vital Signs Last 24 Hrs  T(C): 37.1 (24 Mar 2022 05:00), Max: 38.7 (23 Mar 2022 23:04)  T(F): 98.7 (24 Mar 2022 05:00), Max: 101.6 (23 Mar 2022 23:04)  HR: 90 (24 Mar 2022 05:00) (85 - 99)  BP: 195/100 (24 Mar 2022 05:00) (151/85 - 195/100)  RR: 18 (24 Mar 2022 05:00) (17 - 18)  SpO2: 99% (24 Mar 2022 05:00) (94% - 99%)    --------------------------------------------------------------------------------------    INS AND OUTS:    23 Mar 2022 07:01  -  24 Mar 2022 07:00  --------------------------------------------------------  IN:    dextrose 5% + sodium chloride 0.45%: 1750 mL    IV PiggyBack: 500 mL    Oral Fluid: 560 mL  Total IN: 2810 mL    OUT:    Ileostomy (mL): 300 mL    Indwelling Catheter - Urethral (mL): 3700 mL  Total OUT: 4000 mL    Total NET: -1190 mL    --------------------------------------------------------------------------------------    EXAM    NEUROLOGY  Exam: Normal, in no acute distress.    HEENT  Exam: Normocephalic, atraumatic.    RESPIRATORY  Exam: Normal expansion / effort.    CARDIOVASCULAR  Exam: S1, S2.  Regular rate and rhythm.    GI/NUTRITION  Exam: Abdomen soft, Non-tender, Non-distended.  Incision with staples clean, dry and intact.  Penrose x2.  Ostomy with gas and liquid stool in bag.  Current Diet: Low fiber diet    MUSCULOSKELETAL  Exam: All extremities moving spontaneously without limitations.      METABOLIC / FLUIDS / ELECTROLYTES  dextrose 5% + sodium chloride 0.45%. 1000 milliLiter(s) IV Continuous <Continuous>  dextrose 5%. 1000 milliLiter(s) IV Continuous <Continuous>  dextrose 5%. 1000 milliLiter(s) IV Continuous <Continuous>      HEMATOLOGIC  [x] VTE Prophylaxis: heparin   Injectable 5000 Unit(s) SubCutaneous every 8 hours    --------------------------------------------------------------------------------------

## 2022-03-24 NOTE — CHART NOTE - NSCHARTNOTEFT_GEN_A_CORE
Pt hypertensive to 190s systolic. In AM, also hypertensive to 190's, received hydralazine 5mg with no change. Pt denies any complaints including chest pain, palpitations, sob, confusion, headache, weakness. Vitals 190s systolic, hr 80s, pulse ox 90%. On exam, NAD, CTAB, S1 S2 auscultated, RRR, no m/r/g. In light of vascular risk factors belying possible underlying CAD as well as poor response earlier, will defer further hydralazine at this time. Will give labetalol 10mg IV x1, will give NC 2L, sitting upright in bed, and follow response.     BAN Garza, PGY-1  A Team  [id number]

## 2022-03-24 NOTE — PROVIDER CONTACT NOTE (OTHER) - ASSESSMENT
Pt A&Ox4, remained hemodynamically stable and vital signs within normal limits expect Pt BP elevated 195/105
Pt A&Ox4, remained hemodynamically stable and vital signs within normal limits expect for /100 and O2 OF 90.

## 2022-03-24 NOTE — PROGRESS NOTE ADULT - ASSESSMENT
Patient is a 66 year old female with a PMHx of HTN, HLD, hypothyroidism, DM2, SOLOMON (not consistent with using CPAP), pernicious anemia and diverticulosis (S/P colon resection 10/2021) who presented to pre-surgical testing for evaluation for Naeem reversal. Patient is now s/p ex-lap Naeem reversal and diverting loop ileotomy on 3/21/22.     PLAN:  - LRD, consistent carbohydrates as tolerated  - D5 + 0.5NS for maintenance   - Out of bed  - Pain control with PCEA  -monitor UOP with perla and drain output   - VTE prophylaxis with Heparin subcutaneous      #[id number]  A Team Surgery Patient is a 66 year old female with a PMHx of HTN, HLD, hypothyroidism, DM2, SOLOMON (not consistent with using CPAP), pernicious anemia and diverticulosis (S/P colon resection 10/2021) who presented to pre-surgical testing for evaluation for Naeem reversal.  Patient is now S/P ex-lap, Naeem reversal and diverting loop ileostomy on 3/21/22.      PLAN:  - Low fiber diet  - D5 + 1/2NS @75cc/hr  - Follow up fever work up  - Out of bed  - Pain control with PCEA  - VTE prophylaxis with Heparin subcutaneous      #[id number]  A Team Surgery

## 2022-03-24 NOTE — PROVIDER CONTACT NOTE (OTHER) - BACKGROUND
Pt s/p reversal, Naeem procedure and loop ileostomy creation.
Pt s/p reversal, Naeem procedure and loop ileostomy creation.

## 2022-03-25 LAB
ANION GAP SERPL CALC-SCNC: 13 MMOL/L — SIGNIFICANT CHANGE UP (ref 7–14)
APTT BLD: 33.7 SEC — SIGNIFICANT CHANGE UP (ref 27–36.3)
BUN SERPL-MCNC: 9 MG/DL — SIGNIFICANT CHANGE UP (ref 7–23)
CALCIUM SERPL-MCNC: 9 MG/DL — SIGNIFICANT CHANGE UP (ref 8.4–10.5)
CHLORIDE SERPL-SCNC: 101 MMOL/L — SIGNIFICANT CHANGE UP (ref 98–107)
CK MB BLD-MCNC: 1 % — SIGNIFICANT CHANGE UP (ref 0–2.5)
CK MB CFR SERPL CALC: 2 NG/ML — SIGNIFICANT CHANGE UP
CK SERPL-CCNC: 194 U/L — HIGH (ref 25–170)
CO2 SERPL-SCNC: 24 MMOL/L — SIGNIFICANT CHANGE UP (ref 22–31)
CREAT SERPL-MCNC: 1.03 MG/DL — SIGNIFICANT CHANGE UP (ref 0.5–1.3)
EGFR: 60 ML/MIN/1.73M2 — SIGNIFICANT CHANGE UP
GLUCOSE BLDC GLUCOMTR-MCNC: 117 MG/DL — HIGH (ref 70–99)
GLUCOSE BLDC GLUCOMTR-MCNC: 152 MG/DL — HIGH (ref 70–99)
GLUCOSE BLDC GLUCOMTR-MCNC: 179 MG/DL — HIGH (ref 70–99)
GLUCOSE BLDC GLUCOMTR-MCNC: 198 MG/DL — HIGH (ref 70–99)
GLUCOSE SERPL-MCNC: 144 MG/DL — HIGH (ref 70–99)
HCT VFR BLD CALC: 29.3 % — LOW (ref 34.5–45)
HGB BLD-MCNC: 9.5 G/DL — LOW (ref 11.5–15.5)
INR BLD: 0.99 RATIO — SIGNIFICANT CHANGE UP (ref 0.88–1.16)
MAGNESIUM SERPL-MCNC: 2 MG/DL — SIGNIFICANT CHANGE UP (ref 1.6–2.6)
MCHC RBC-ENTMCNC: 26.9 PG — LOW (ref 27–34)
MCHC RBC-ENTMCNC: 32.4 GM/DL — SIGNIFICANT CHANGE UP (ref 32–36)
MCV RBC AUTO: 83 FL — SIGNIFICANT CHANGE UP (ref 80–100)
NRBC # BLD: 0 /100 WBCS — SIGNIFICANT CHANGE UP
NRBC # FLD: 0 K/UL — SIGNIFICANT CHANGE UP
PHOSPHATE SERPL-MCNC: 3 MG/DL — SIGNIFICANT CHANGE UP (ref 2.5–4.5)
PLATELET # BLD AUTO: 244 K/UL — SIGNIFICANT CHANGE UP (ref 150–400)
POTASSIUM SERPL-MCNC: 3.9 MMOL/L — SIGNIFICANT CHANGE UP (ref 3.5–5.3)
POTASSIUM SERPL-SCNC: 3.9 MMOL/L — SIGNIFICANT CHANGE UP (ref 3.5–5.3)
PROTHROM AB SERPL-ACNC: 11.5 SEC — SIGNIFICANT CHANGE UP (ref 10.5–13.4)
RBC # BLD: 3.53 M/UL — LOW (ref 3.8–5.2)
RBC # FLD: 18.5 % — HIGH (ref 10.3–14.5)
SODIUM SERPL-SCNC: 138 MMOL/L — SIGNIFICANT CHANGE UP (ref 135–145)
TROPONIN T, HIGH SENSITIVITY RESULT: 8 NG/L — SIGNIFICANT CHANGE UP
WBC # BLD: 9.21 K/UL — SIGNIFICANT CHANGE UP (ref 3.8–10.5)
WBC # FLD AUTO: 9.21 K/UL — SIGNIFICANT CHANGE UP (ref 3.8–10.5)

## 2022-03-25 PROCEDURE — 99223 1ST HOSP IP/OBS HIGH 75: CPT | Mod: 25

## 2022-03-25 PROCEDURE — 71045 X-RAY EXAM CHEST 1 VIEW: CPT | Mod: 26

## 2022-03-25 PROCEDURE — 93010 ELECTROCARDIOGRAM REPORT: CPT

## 2022-03-25 PROCEDURE — 99221 1ST HOSP IP/OBS SF/LOW 40: CPT | Mod: GC

## 2022-03-25 RX ORDER — HYDRALAZINE HCL 50 MG
5 TABLET ORAL ONCE
Refills: 0 | Status: COMPLETED | OUTPATIENT
Start: 2022-03-25 | End: 2022-03-25

## 2022-03-25 RX ORDER — FUROSEMIDE 40 MG
40 TABLET ORAL ONCE
Refills: 0 | Status: COMPLETED | OUTPATIENT
Start: 2022-03-25 | End: 2022-03-25

## 2022-03-25 RX ORDER — HYDRALAZINE HCL 50 MG
10 TABLET ORAL ONCE
Refills: 0 | Status: COMPLETED | OUTPATIENT
Start: 2022-03-25 | End: 2022-03-25

## 2022-03-25 RX ORDER — HYDRALAZINE HCL 50 MG
25 TABLET ORAL EVERY 6 HOURS
Refills: 0 | Status: DISCONTINUED | OUTPATIENT
Start: 2022-03-25 | End: 2022-03-27

## 2022-03-25 RX ORDER — LABETALOL HCL 100 MG
10 TABLET ORAL ONCE
Refills: 0 | Status: DISCONTINUED | OUTPATIENT
Start: 2022-03-25 | End: 2022-03-25

## 2022-03-25 RX ORDER — ACETAMINOPHEN 500 MG
975 TABLET ORAL ONCE
Refills: 0 | Status: COMPLETED | OUTPATIENT
Start: 2022-03-25 | End: 2022-03-25

## 2022-03-25 RX ORDER — AMLODIPINE BESYLATE 2.5 MG/1
5 TABLET ORAL DAILY
Refills: 0 | Status: DISCONTINUED | OUTPATIENT
Start: 2022-03-25 | End: 2022-03-26

## 2022-03-25 RX ORDER — ONDANSETRON 8 MG/1
4 TABLET, FILM COATED ORAL ONCE
Refills: 0 | Status: COMPLETED | OUTPATIENT
Start: 2022-03-25 | End: 2022-03-25

## 2022-03-25 RX ORDER — HYDRALAZINE HCL 50 MG
20 TABLET ORAL ONCE
Refills: 0 | Status: COMPLETED | OUTPATIENT
Start: 2022-03-25 | End: 2022-03-25

## 2022-03-25 RX ORDER — LABETALOL HCL 100 MG
10 TABLET ORAL ONCE
Refills: 0 | Status: COMPLETED | OUTPATIENT
Start: 2022-03-25 | End: 2022-03-25

## 2022-03-25 RX ADMIN — Medication 25 MILLIGRAM(S): at 11:50

## 2022-03-25 RX ADMIN — HYDROMORPHONE HYDROCHLORIDE 2 MILLIGRAM(S): 2 INJECTION INTRAMUSCULAR; INTRAVENOUS; SUBCUTANEOUS at 20:47

## 2022-03-25 RX ADMIN — Medication 5 MILLIGRAM(S): at 06:17

## 2022-03-25 RX ADMIN — Medication 10 MILLIGRAM(S): at 06:19

## 2022-03-25 RX ADMIN — HEPARIN SODIUM 5000 UNIT(S): 5000 INJECTION INTRAVENOUS; SUBCUTANEOUS at 05:11

## 2022-03-25 RX ADMIN — Medication 1: at 09:06

## 2022-03-25 RX ADMIN — HEPARIN SODIUM 5000 UNIT(S): 5000 INJECTION INTRAVENOUS; SUBCUTANEOUS at 21:47

## 2022-03-25 RX ADMIN — HEPARIN SODIUM 5000 UNIT(S): 5000 INJECTION INTRAVENOUS; SUBCUTANEOUS at 13:09

## 2022-03-25 RX ADMIN — PIPERACILLIN AND TAZOBACTAM 25 GRAM(S): 4; .5 INJECTION, POWDER, LYOPHILIZED, FOR SOLUTION INTRAVENOUS at 13:09

## 2022-03-25 RX ADMIN — Medication 975 MILLIGRAM(S): at 12:00

## 2022-03-25 RX ADMIN — Medication 100 MICROGRAM(S): at 21:47

## 2022-03-25 RX ADMIN — Medication 975 MILLIGRAM(S): at 18:30

## 2022-03-25 RX ADMIN — LISINOPRIL 20 MILLIGRAM(S): 2.5 TABLET ORAL at 21:47

## 2022-03-25 RX ADMIN — HYDROMORPHONE HYDROCHLORIDE 4 MILLIGRAM(S): 2 INJECTION INTRAMUSCULAR; INTRAVENOUS; SUBCUTANEOUS at 05:04

## 2022-03-25 RX ADMIN — Medication 400 MILLIGRAM(S): at 00:18

## 2022-03-25 RX ADMIN — Medication 1: at 16:09

## 2022-03-25 RX ADMIN — Medication 40 MILLIGRAM(S): at 06:24

## 2022-03-25 RX ADMIN — Medication 1000 MILLIGRAM(S): at 00:48

## 2022-03-25 RX ADMIN — HYDROMORPHONE HYDROCHLORIDE 4 MILLIGRAM(S): 2 INJECTION INTRAMUSCULAR; INTRAVENOUS; SUBCUTANEOUS at 05:34

## 2022-03-25 RX ADMIN — Medication 25 MILLIGRAM(S): at 17:57

## 2022-03-25 RX ADMIN — Medication 20 MILLIGRAM(S): at 06:44

## 2022-03-25 RX ADMIN — Medication 1: at 11:50

## 2022-03-25 RX ADMIN — PIPERACILLIN AND TAZOBACTAM 25 GRAM(S): 4; .5 INJECTION, POWDER, LYOPHILIZED, FOR SOLUTION INTRAVENOUS at 05:12

## 2022-03-25 RX ADMIN — HYDROMORPHONE HYDROCHLORIDE 2 MILLIGRAM(S): 2 INJECTION INTRAMUSCULAR; INTRAVENOUS; SUBCUTANEOUS at 20:17

## 2022-03-25 RX ADMIN — AMLODIPINE BESYLATE 5 MILLIGRAM(S): 2.5 TABLET ORAL at 13:09

## 2022-03-25 RX ADMIN — LISINOPRIL 20 MILLIGRAM(S): 2.5 TABLET ORAL at 05:11

## 2022-03-25 RX ADMIN — Medication 975 MILLIGRAM(S): at 18:22

## 2022-03-25 RX ADMIN — PIPERACILLIN AND TAZOBACTAM 25 GRAM(S): 4; .5 INJECTION, POWDER, LYOPHILIZED, FOR SOLUTION INTRAVENOUS at 21:47

## 2022-03-25 RX ADMIN — Medication 10 MILLIGRAM(S): at 10:59

## 2022-03-25 RX ADMIN — Medication 975 MILLIGRAM(S): at 12:30

## 2022-03-25 NOTE — CONSULT NOTE ADULT - NS ATTEND AMEND GEN_ALL_CORE FT
The patient was seen and examined with the Cardiology Consultation Teaching Service.     She is a 66-year-old woman with diabetes, hypertension, dyslipidemia and prior colon resection who underwent exploratory laparotomy, Naeem reversal and diverting loop ileostomy, who was noted to have severe hypertension, chest pain and headache in the post operative period.    Blood pressures were noted to be 210/100s at that time, and intravenous furosemide, hydralazine and labetalol were administered. The patient reports that her symptoms are improved, and her blood pressure at the bedside is 170/90s. She was transferred to SICU for further monitoring.    No current chest pain.  No dyspnea, orthopnea or PND  No palpitations or dizziness    Prior to her operation, the patient reports an excellent exertional tolerance without difficulty.  Patient reports that she was previously told that her heart was enlarged. No echocardiography on file here.    PMH/PSH:  Diabetes  Hypertension  Dyslipidemia  Obstructive sleep apnea, inconsistent CPAP use  Hypothyroidism  Pernicious anemia  Diverticulosis with colon resection 10/2021  Nephrolithiasis with lithotripsy, 2020  Lumbar laminectomy with fusion, 2010   Uterine fibroids with total abdominal hysterectomy, 1994  Remote tonsillectomy    Medications noted above  Allergy to diazepam, oxycodone    Comfortable-appearing woman in no acute distress  Alert and oriented  Afebrile  Vital signs stable  Hypertensive as noted above  JVP is not elevated  Clear lungs  Normal heart sounds  Extremities are warm and perfused  No peripheral edema     Stable normocytic anemia  Normal coagulation  Normal electrolytes with GFR 60  hs-troponin normal  CKMB normal    ECG demonstrates sinus rhythm without evidence of prior infarction or ongoing ischemia  CXR demonstrated right lower lung infiltrate, and possible left lower lung infiltrate    Impression and Recommendations:   66-year-old woman with diabetes, hypertension, dyslipidemia and prior colon resection who underwent exploratory laparotomy, Naeem reversal and diverting loop ileostomy, who was noted to have severe hypertension, chest pain and headache in the post operative period most consistent with a hypertensive urgency.     Patient's blood pressure is improving. Based on the current medication list, it appears that the patient is taking PO medications. Would not use lisinopril as a BID drug, would be reasonable to change to 40mg PO daily tomorrow. If additional antihypertensive medications are needed, HCTZ 25mg PO daily would be considered second-line. Amlodipine could also be started. Intravenous agents as previously administered can be used to acutely lower pressures if the patient develops further symptoms.    I do not believe the patient's chest pain is representative of acute coronary syndrome and would not treat her as such. Given her past report of an enlarged heart, it would be reasonable to obtain echocardiography to identify possible cardiomyopathy that would affect the selection of antihypertensives used. Please change start atorvastatin 20mg rather than simvastatin.     Jamel Hernandez MD  Cardiology  [phone]

## 2022-03-25 NOTE — ADVANCED PRACTICE NURSE CONSULT - REASON FOR CONSULT
Follow up visit for post op teaching. Patient reports performing pouch and ostomy care independent with previous colostomy since October 2021. Patient denies issues with previous transverse colostomy. Reviewed differences and importance of hydration. Educational material provided about diet with ileostomy. Patient able to teach back modifications. Denies questions or concerns. Supplies provided for discharge. Ostomy line ohone number provided. 
Initial visit for post op teaching of newly created loop ileostomy. Patient received sitting in the chair. 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 Patient willing to participate in pouch change. Terminology reviewed ostomy/ileostomy. Reviewed dietary modifications and importance of hydration. Patient able to demonstrate pouch opening and closing. Proceed it to change pouch, patient able to participate in most aspect of pouch change. Denies concerns.  
Follow up visit for post op teaching of newly created ileostomy. Patient received sitting in the chair. Patient reporting "not feel well" reports dizziness. RN aware, came to assess patient. Daughter at the bedside. Patient further reporting she will be independent in ostomy care. Will follow up with patient.

## 2022-03-25 NOTE — CONSULT NOTE ADULT - ATTENDING COMMENTS
I agree with the detailed interval history, physical, and plan, which I have reviewed and edited where appropriate'; also agree with notes/assessment with my team on service.  I have personally examined the patient.  I was physically present for the key portions of the evaluation and management (E/M) service provided.  I reviewed all the pertinent data.  The patient is a critical care patient with life threatening hemodynamic and metabolic instability in SICU.  The SICU team has a constant risk benefit analyzes discussion and coordinating care with the primary team and all consultants.   The patient is in SICU with the chief complaint and diagnosis mentioned in the note.   The plan will be specified in the note.  66 year old female S/P colon resection now sp Martin reversal; consult SICU for hemodynamic monitoring.  EXAM  NEUROLOGY  Exam: Normal, NAD, alert, oriented   RESPIRATORY  Exam: Lungs clear   CARDIOVASCULAR  Exam: Regular rate and rhythm.  Peripheral edema   GI/NUTRITION  Exam: Abdomen soft, mild tenderness to palpation, +ostomy (+stool/gas output)  VASCULAR  Exam: Extremities warm  PLAN  Admit to SICU  PLAN:   Neurologic:   pain medication PRN  Respiratory:   2LT NC  Cardiovascular:   Gastrointestinal/Nutrition:   clears  Renal/Genitourinary:   Uo evaluation  Hematologic:   DVT PPx  Infectious Disease: pneumonia  ZOsyn  Endocrine:   Glucose BMP  Disposition: SICU    Critical Care Diagnoses: Hypertensive emergency, fluid overload

## 2022-03-25 NOTE — DIETITIAN INITIAL EVALUATION ADULT. - ORAL INTAKE PTA/DIET HISTORY
Pt sleeping at time of RD visit. Spoke with Daughter. Pt reported with good PO and stable weight prior to admission.

## 2022-03-25 NOTE — PROGRESS NOTE ADULT - SUBJECTIVE AND OBJECTIVE BOX
GENERAL SURGERY PROGRESS NOTE    SUBJECTIVE  Patient seen and examined. No acute events overnight.       OBJECTIVE    PHYSICAL EXAM  General: Appears well, NAD  CHEST: breathing comfortably  CV: appears well perfused  Abdomen: soft, nontender, nondistended, no rebound or guarding  Extremities: Grossly symmetric    T(C): 37.2 (22 @ 23:11), Max: 37.2 (22 @ 17:51)  HR: 78 (22 @ 23:11) (69 - 93)  BP: 179/96 (22 @ 23:11) (140/76 - 195/105)  RR: 18 (22 @ 23:11) (17 - 19)  SpO2: 100% (22 @ 23:11) (90% - 100%)    22 @ 07:01  -  22 @ 07:00  --------------------------------------------------------  IN: 2810 mL / OUT: 4000 mL / NET: -1190 mL    22 @ 07:01  -  22 @ 00:48  --------------------------------------------------------  IN: 1065 mL / OUT: 2375 mL / NET: -1310 mL        MEDICATIONS  dextrose 40% Gel 15 Gram(s) Oral once  dextrose 5%. 1000 milliLiter(s) IV Continuous <Continuous>  dextrose 5%. 1000 milliLiter(s) IV Continuous <Continuous>  dextrose 50% Injectable 25 Gram(s) IV Push once  dextrose 50% Injectable 12.5 Gram(s) IV Push once  dextrose 50% Injectable 25 Gram(s) IV Push once  glucagon  Injectable 1 milliGRAM(s) IntraMuscular once  heparin   Injectable 5000 Unit(s) SubCutaneous every 8 hours  HYDROmorphone   Tablet 2 milliGRAM(s) Oral every 3 hours PRN  HYDROmorphone   Tablet 4 milliGRAM(s) Oral every 3 hours PRN  HYDROmorphone  Injectable 0.3 milliGRAM(s) IV Push every 3 hours PRN  insulin lispro (ADMELOG) corrective regimen sliding scale   SubCutaneous three times a day before meals  insulin lispro (ADMELOG) corrective regimen sliding scale   SubCutaneous at bedtime  levothyroxine Injectable 100 MICROGram(s) IV Push at bedtime  lisinopril 20 milliGRAM(s) Oral every 12 hours  naloxone Injectable 0.1 milliGRAM(s) IV Push every 3 minutes PRN  ondansetron Injectable 4 milliGRAM(s) IV Push every 6 hours PRN  piperacillin/tazobactam IVPB.. 3.375 Gram(s) IV Intermittent every 8 hours  tetrahydrazoline Solution 1 Drop(s) Both EYES daily PRN      LABS                        9.1    12.64 )-----------( 228      ( 24 Mar 2022 08:10 )             29.4     03-24    132<L>  |  99  |  8   ----------------------------<  145<H>  3.5   |  24  |  0.97    Ca    8.8      24 Mar 2022 08:10  Phos  1.8     03-24  Mg     1.90     03-24      PT/INR - ( 24 Mar 2022 10:30 )   PT: 13.2 sec;   INR: 1.14 ratio         PTT - ( 24 Mar 2022 10:30 )  PTT:31.4 sec  Urinalysis Basic - ( 24 Mar 2022 07:36 )    Color: Colorless / Appearance: Clear / S.011 / pH: x  Gluc: x / Ketone: Negative  / Bili: Negative / Urobili: <2 mg/dL   Blood: x / Protein: 30 mg/dL / Nitrite: Negative   Leuk Esterase: Negative / RBC: 16 /HPF / WBC 2 /HPF   Sq Epi: x / Non Sq Epi: 1 /HPF / Bacteria: Negative        RADIOLOGY & ADDITIONAL STUDIES GENERAL SURGERY A TEAM DAILY PROGRESS NOTE    SUBJECTIVE  Patient seen and examined by team on morning rounds. Around 6am nurse notified team patient having chest pain described as tightness midsternal region, nonradiating, also complaining of nausea, BP elevated 212/109. Team bedside obtained EKG which demonstrated new T wave inversions, labs sent with cardiac enzymes, negative. Patient received hydralazine 10mg x1 and labetalol 10mg x1 good response /80. Cardiology consulted, echo performed. SICU also consulted and accepted patient.       OBJECTIVE    PHYSICAL EXAM  General: Appears well, NAD  CHEST: breathing comfortably  CV: appears well perfused  Abdomen: soft, nontender, nondistended, midline incision with penrose x2 c/d/i , ostomy with air and stool  Extremities: Grossly symmetric    T(C): 37.2 (22 @ 23:11), Max: 37.2 (22 @ 17:51)  HR: 78 (22 @ 23:11) (69 - 93)  BP: 179/96 (22 @ 23:11) (140/76 - 195/105)  RR: 18 (22 @ 23:11) (17 - 19)  SpO2: 100% (22 @ 23:11) (90% - 100%)    22 @ 07:01  -  22 @ 07:00  --------------------------------------------------------  IN: 2810 mL / OUT: 4000 mL / NET: -1190 mL    22 @ 07:01  -  22 @ 00:48  --------------------------------------------------------  IN: 1065 mL / OUT: 2375 mL / NET: -1310 mL        MEDICATIONS  dextrose 40% Gel 15 Gram(s) Oral once  dextrose 5%. 1000 milliLiter(s) IV Continuous <Continuous>  dextrose 5%. 1000 milliLiter(s) IV Continuous <Continuous>  dextrose 50% Injectable 25 Gram(s) IV Push once  dextrose 50% Injectable 12.5 Gram(s) IV Push once  dextrose 50% Injectable 25 Gram(s) IV Push once  glucagon  Injectable 1 milliGRAM(s) IntraMuscular once  heparin   Injectable 5000 Unit(s) SubCutaneous every 8 hours  HYDROmorphone   Tablet 2 milliGRAM(s) Oral every 3 hours PRN  HYDROmorphone   Tablet 4 milliGRAM(s) Oral every 3 hours PRN  HYDROmorphone  Injectable 0.3 milliGRAM(s) IV Push every 3 hours PRN  insulin lispro (ADMELOG) corrective regimen sliding scale   SubCutaneous three times a day before meals  insulin lispro (ADMELOG) corrective regimen sliding scale   SubCutaneous at bedtime  levothyroxine Injectable 100 MICROGram(s) IV Push at bedtime  lisinopril 20 milliGRAM(s) Oral every 12 hours  naloxone Injectable 0.1 milliGRAM(s) IV Push every 3 minutes PRN  ondansetron Injectable 4 milliGRAM(s) IV Push every 6 hours PRN  piperacillin/tazobactam IVPB.. 3.375 Gram(s) IV Intermittent every 8 hours  tetrahydrazoline Solution 1 Drop(s) Both EYES daily PRN      LABS                        9.1    12.64 )-----------( 228      ( 24 Mar 2022 08:10 )             29.4     03-24    132<L>  |  99  |  8   ----------------------------<  145<H>  3.5   |  24  |  0.97    Ca    8.8      24 Mar 2022 08:10  Phos  1.8     03-24  Mg     1.90     03-24      PT/INR - ( 24 Mar 2022 10:30 )   PT: 13.2 sec;   INR: 1.14 ratio         PTT - ( 24 Mar 2022 10:30 )  PTT:31.4 sec  Urinalysis Basic - ( 24 Mar 2022 07:36 )    Color: Colorless / Appearance: Clear / S.011 / pH: x  Gluc: x / Ketone: Negative  / Bili: Negative / Urobili: <2 mg/dL   Blood: x / Protein: 30 mg/dL / Nitrite: Negative   Leuk Esterase: Negative / RBC: 16 /HPF / WBC 2 /HPF   Sq Epi: x / Non Sq Epi: 1 /HPF / Bacteria: Negative        RADIOLOGY & ADDITIONAL STUDIES

## 2022-03-25 NOTE — ADVANCED PRACTICE NURSE CONSULT - RECOMMEDATIONS
Please contact Wound Care Service Line if we can be of further assistance (ext [phone]). 
Please contact Wound Care Service Line if we can be of further assistance (ext [phone]).

## 2022-03-25 NOTE — DIETITIAN INITIAL EVALUATION ADULT. - PERTINENT LABORATORY DATA
03-25 @ 06:39: Na 138, BUN 9, Cr 1.03, <H>, K+ 3.9, Phos 3.0, Mg 2.00, HgA1C 7.3%    CAPILLARY BLOOD GLUCOSE  POCT Blood Glucose.: 179 mg/dL (25 Mar 2022 11:33)  POCT Blood Glucose.: 198 mg/dL (25 Mar 2022 08:52)  POCT Blood Glucose.: 122 mg/dL (24 Mar 2022 21:31)  POCT Blood Glucose.: 163 mg/dL (24 Mar 2022 16:53)

## 2022-03-25 NOTE — CONSULT NOTE ADULT - SUBJECTIVE AND OBJECTIVE BOX
SICU Consultation Note  =====================================================  HPI: Patient is a 66 year old female with a PMHx of HTN, HLD, hypothyroidism, DM2, SOLOMON (not consistent with using CPAP), pernicious anemia and diverticulosis (S/P colon resection 10/2021) without outpatient primary care follow up who presented to pre-surgical testing for evaluation for Naeem reversal.  Patient is now S/P ex-lap, Naeem reversal and diverting loop ileostomy on 3/21/22. Patient is POD4 and has been increasingly hypertensive on the floor. Febrile to 101.6 on 3/24 UA-, CXR with RLL infiltrate and was started on Zosyn secondary to suspected aspiration PNA. Blood cultures sent 3/24 pending results. Patient then developed HTN emergency on 3/25 with SBP as high as 212/109 with chest pressure and headache and was treated with Lasix 40mg IVPx1, Hydralazine 5mg IVPx3, Labetalol 10mg IVPx2 with resolution of symptoms. Patient continued to be hypertensive to 180 systolic and was treated further with Hydralazine 20mg IVPx1 with improvement in hemodynamics and symptoms, and transferred to SICU for further monitoring.     HISTORY  66 year old female with PMHx:  HTN, HLD, Hypothyroid, T2DM, SOLOMON not consistent with using CPAP, Pernicious anemia, diverticulosis - S/P colon resection 10/2021. Pt presents to PST today for pre op evaluation in preparation for Martin reversal  (16 Mar 2022 17:23)    Allergies: oxycodone (Other)  Valium (Other)    PAST MEDICAL & SURGICAL HISTORY:  Hypothyroidism    Diverticulosis    HTN (hypertension)    Lower gastrointestinal bleeding  Multipel times since &#x27;: last episode 2018    Pernicious anemia    SOLOMON (obstructive sleep apnea)  non-compliant with CPAP    Type 2 diabetes mellitus    Left ureteral stone    Fibroid uterus  &#x27;94  surgically treated    Lumbar herniated disc  &#x27;   surgically treated    Multiparity    Heart murmur  mild    History of tonsillectomy  age 14    S/P WESTLEY (total abdominal hysterectomy)  &#x27; 94  Laproscopic.  benign    S/P lumbar laminectomy  &#x27;   with Fusion    S/P colon resection  colostomy; 10/2021    History of lithotripsy  left kidney stone extraction; 2020      FAMILY HISTORY:  Family history of gastric cancer  Mother    Family history of throat cancer  Sister        SOCIAL HISTORY:  Denies smoking or EtOH    ADVANCE DIRECTIVES: Full Code      REVIEW OF SYSTEMS:   +Headache, +Chest pressure    HOME MEDICATIONS:   --------------------------------------------------------------------------------------  Home Medications:  ferrous sulfate 325 mg (65 mg elemental iron) oral tablet: orally once a day (21 Mar 2022 06:18)  levothyroxine 200 mcg (0.2 mg) oral tablet: 1 tab(s) orally once a day in AM  (21 Mar 2022 06:18)  ramipril 10 mg oral capsule: orally 2 times a day (21 Mar 2022 06:18)  simvastatin 40 mg oral tablet: 1 tab(s) orally once a day in AM  (21 Mar 2022 06:18)  Visine 0.05% ophthalmic solution: to each affected eye 3 to 4 times a week, As Needed (21 Mar 2022 06:18)  Vitamin B12: orally once a day (21 Mar 2022 06:18)    --------------------------------------------------------------------------------------    CURRENT MEDICATIONS:   --------------------------------------------------------------------------------------  Neurologic Medications  HYDROmorphone   Tablet 2 milliGRAM(s) Oral every 3 hours PRN Moderate Pain (4 - 6)  HYDROmorphone   Tablet 4 milliGRAM(s) Oral every 3 hours PRN Severe Pain (7 - 10)  HYDROmorphone  Injectable 0.3 milliGRAM(s) IV Push every 3 hours PRN Severe breakthrough Pain (7 - 10)  ondansetron Injectable 4 milliGRAM(s) IV Push every 6 hours PRN Nausea  ondansetron Injectable 4 milliGRAM(s) IV Push once    Respiratory Medications    Cardiovascular Medications  lisinopril 20 milliGRAM(s) Oral every 12 hours    Gastrointestinal Medications  dextrose 5%. 1000 milliLiter(s) IV Continuous <Continuous>  dextrose 5%. 1000 milliLiter(s) IV Continuous <Continuous>    Genitourinary Medications    Hematologic/Oncologic Medications  heparin   Injectable 5000 Unit(s) SubCutaneous every 8 hours    Antimicrobial/Immunologic Medications  piperacillin/tazobactam IVPB.. 3.375 Gram(s) IV Intermittent every 8 hours    Endocrine/Metabolic Medications  dextrose 40% Gel 15 Gram(s) Oral once  dextrose 50% Injectable 25 Gram(s) IV Push once  dextrose 50% Injectable 12.5 Gram(s) IV Push once  dextrose 50% Injectable 25 Gram(s) IV Push once  glucagon  Injectable 1 milliGRAM(s) IntraMuscular once  insulin lispro (ADMELOG) corrective regimen sliding scale   SubCutaneous three times a day before meals  insulin lispro (ADMELOG) corrective regimen sliding scale   SubCutaneous at bedtime  levothyroxine Injectable 100 MICROGram(s) IV Push at bedtime    Topical/Other Medications  naloxone Injectable 0.1 milliGRAM(s) IV Push every 3 minutes PRN For ANY of the following changes in patient status:  A. RR LESS THAN 10 breaths per minute, B. Oxygen saturation LESS THAN 90%, C. Sedation score of 6  tetrahydrazoline Solution 1 Drop(s) Both EYES daily PRN Dry eyes    --------------------------------------------------------------------------------------    VITAL SIGNS, INS/OUTS (last 24 hours):  --------------------------------------------------------------------------------------  I&O's Detail    24 Mar 2022 07:01  -  25 Mar 2022 07:00  --------------------------------------------------------  IN:    IV PiggyBack: 675 mL    Oral Fluid: 540 mL  Total IN: 1215 mL    OUT:    Ileostomy (mL): 800 mL    Indwelling Catheter - Urethral (mL): 1275 mL    Voided (mL): 900 mL  Total OUT: 2975 mL    Total NET: -1760 mL  --------------------------------------------------------------------------------------    EXAM  NEUROLOGY  Exam: Normal, NAD, alert, oriented x 3, no focal deficits.     HEENT  Exam: Normocephalic, atraumatic.  EOMI    RESPIRATORY  Exam: Lungs clear to auscultation, Normal expansion/effort.    CARDIOVASCULAR  Exam: S1, S2.  Regular rate and rhythm.  Peripheral edema     GI/NUTRITION  Exam: Abdomen soft, mild tenderness to palpation, +ostomy (+stool/gas output)  Current Diet:  Diet, low fiber    VASCULAR  Exam: Extremities warm, pink, well-perfused.     MUSCULOSKELETAL  Exam: All extremities moving spontaneously without limitations    SKIN:  Exam: Good skin turgor, no skin breakdown.     METABOLIC/FLUIDS/ELECTROLYTES  dextrose 5%. 1000 milliLiter(s) IV Continuous <Continuous>  dextrose 5%. 1000 milliLiter(s) IV Continuous <Continuous>      HEMATOLOGIC  [x] DVT Prophylaxis: heparin   Injectable 5000 Unit(s) SubCutaneous every 8 hours    Transfusions:	[] PRBC	[] Platelets		[] FFP	[] Cryoprecipitate    INFECTIOUS DISEASE  Antimicrobials/Immunologic Medications:  piperacillin/tazobactam IVPB.. 3.375 Gram(s) IV Intermittent every 8 hours    Tubes/Lines/Drains  ***  [x] Peripheral IV  [] Central Venous Line     	[] R	[] L	[] IJ	[] Fem	[] SC	Date Placed:   [] Arterial Line		[] R	[] L	[] Fem	[] Rad	[] Ax	Date Placed:   [] PICC:         	[] Midline		[] Mediport  [] Urinary Catheter		Date Placed:     LABS  --------------------------------------------------------------------------------------                          9.5    9.21  )-----------( 244      ( 25 Mar 2022 06:39 )             29.3       03-25    138  |  101  |  9   ----------------------------<  144<H>  3.9   |  24  |  1.03    Ca    9.0      25 Mar 2022 06:39  Phos  3.0     03-25  Mg     2.00     03-25    Urinalysis Basic - ( 24 Mar 2022 07:36 )    Color: Colorless / Appearance: Clear / S.011 / pH: x  Gluc: x / Ketone: Negative  / Bili: Negative / Urobili: <2 mg/dL   Blood: x / Protein: 30 mg/dL / Nitrite: Negative   Leuk Esterase: Negative / RBC: 16 /HPF / WBC 2 /HPF   Sq Epi: x / Non Sq Epi: 1 /HPF / Bacteria: Negative    PT/INR - ( 25 Mar 2022 06:39 )   PT: 11.5 sec;   INR: 0.99 ratio      PTT - ( 25 Mar 2022 06:39 )  PTT:33.7 sec    Lactate Trend    CARDIAC MARKERS ( 25 Mar 2022 06:39 )  x     / x     / 194 U/L / x     / 2.0 ng/mL    CAPILLARY BLOOD GLUCOSE    POCT Blood Glucose.: 122 mg/dL (24 Mar 2022 21:31)  POCT Blood Glucose.: 163 mg/dL (24 Mar 2022 16:53)  POCT Blood Glucose.: 185 mg/dL (24 Mar 2022 12:04)  POCT Blood Glucose.: 152 mg/dL (24 Mar 2022 08:25)    Cultures:    Radiology:    EKG:     --------------------------------------------------------------------------------------    OTHER LABS    IMAGING RESULTS  Echo:   CT:   Xray:     ASSESSMENT:  66y Female ***    PLAN:  ***  Neurologic:   Respiratory:   Cardiovascular:   Gastrointestinal/Nutrition:   Renal/Genitourinary:   Hematologic:   Infectious Disease:   Tubes/Lines/Drains:   Endocrine:     Disposition: SICU    --------------------------------------------------------------------------------------    Critical Care Diagnoses: Hypertensive emergency, chest pain  
HPI:  66 year old female with PMHx:  HTN, HLD, Hypothyroid, T2DM, SOLOMON not consistent with using CPAP, Pernicious anemia, diverticulosis - colon resection 10/2021 presents for elective Martin reversal (16 Mar 2022 17:23). Now s/p Hartmans procedure on 3/21/22.  Hospital course c/b uncontrolled HTN, BP this morning in 's patient also w/ chest discomfort and sob at the time, symptoms resolved with supplemental oxygen via nasal cannula and better bp control after receiving labetalol and hydralazine iv. At time of exam symptoms resolved, ekg shows SR @83bpm w/ TWI in v1-3.  	   Allergies  Valium (Other)  Intolerances  oxycodone (Other)  	  MEDICATIONS:  heparin   Injectable 5000 Unit(s) SubCutaneous every 8 hours  lisinopril 20 milliGRAM(s) Oral every 12 hours  piperacillin/tazobactam IVPB.. 3.375 Gram(s) IV Intermittent every 8 hours  HYDROmorphone   Tablet 2 milliGRAM(s) Oral every 3 hours PRN  HYDROmorphone   Tablet 4 milliGRAM(s) Oral every 3 hours PRN  HYDROmorphone  Injectable 0.3 milliGRAM(s) IV Push every 3 hours PRN  ondansetron Injectable 4 milliGRAM(s) IV Push every 6 hours PRN  ondansetron Injectable 4 milliGRAM(s) IV Push once  dextrose 40% Gel 15 Gram(s) Oral once  dextrose 50% Injectable 25 Gram(s) IV Push once  dextrose 50% Injectable 12.5 Gram(s) IV Push once  dextrose 50% Injectable 25 Gram(s) IV Push once  glucagon  Injectable 1 milliGRAM(s) IntraMuscular once  insulin lispro (ADMELOG) corrective regimen sliding scale   SubCutaneous three times a day before meals  insulin lispro (ADMELOG) corrective regimen sliding scale   SubCutaneous at bedtime  levothyroxine Injectable 100 MICROGram(s) IV Push at bedtime  dextrose 5%. 1000 milliLiter(s) IV Continuous <Continuous>  dextrose 5%. 1000 milliLiter(s) IV Continuous <Continuous>  tetrahydrazoline Solution 1 Drop(s) Both EYES daily PRN    PAST MEDICAL & SURGICAL HISTORY:  Hypothyroidism  Diverticulosis  HTN (hypertension)  Lower gastrointestinal bleeding  Multipel times since &#x27;2014: last episode 5/2018  Pernicious anemia  SOLOMON (obstructive sleep apnea)  non-compliant with CPAP  Type 2 diabetes mellitus  Left ureteral stone  Fibroid uterus  &#x27;94  surgically treated  Lumbar herniated disc  &#x27; 2010  surgically treated  Multiparity  Heart murmur  mild  History of tonsillectomy  age 14  S/P WESTLEY (total abdominal hysterectomy)  &#x27; 94  Laproscopic.  benign  S/P lumbar laminectomy  &#x27; 2010  with Fusion  S/P colon resection  colostomy; 10/2021  History of lithotripsy  left kidney stone extraction; 02/2020    FAMILY HISTORY:  Family history of gastric cancer  Mother  Family history of throat cancer  Sister    SUBSTANCE USE  Tobacco Usage:  denies  Alcohol Usage: denies  Recreational drugs: denies    REVIEW OF SYSTEMS:  CONSTITUTIONAL: No fevers, No chills, No fatigue, No weight gain  RESPIRATORY: + shortness of breath, No cough, No wheezing, No hemoptysis  CARDIOVASCULAR: + chest pain. No palpitations, No pleuritic pain  GASTROINTESTINAL: No abdominal pain, No nausea, No vomiting, No hematemesis, No diarrhea No constipation. No melena  GENITOURINARY: No dysuria, No frequency, No incontinence, No hematuria  NEUROLOGICAL: No dizziness, No lightheadedness, No syncope, No LOC, No headache, No numbness or weakness  EXTREMITIES: No Edema, No joint pain, No joint swelling.  SKIN: No diaphoresis. No itching, No rashes, No pressure ulcers  All other review of systems is negative unless indicated above.    T(C): 36.8 (03-25-22 @ 02:07), Max: 37.2 (03-24-22 @ 17:51)  HR: 78 (03-25-22 @ 02:07) (69 - 93)  BP: 186/96 (03-25-22 @ 02:07) (140/76 - 195/105)  RR: 18 (03-25-22 @ 02:07) (17 - 19)  SpO2: 98% (03-25-22 @ 02:07) (90% - 100%)    I&O's Summary    23 Mar 2022 07:01  -  24 Mar 2022 07:00  --------------------------------------------------------  IN: 2810 mL / OUT: 4000 mL / NET: -1190 mL    24 Mar 2022 07:01  -  25 Mar 2022 06:37  --------------------------------------------------------  IN: 1190 mL / OUT: 2375 mL / NET: -1185 mL      Physical Exam:  General: NAD  Cardiovascular: Normal S1 S2, No JVD, No murmurs, No edema  Respiratory: Lungs crackles to auscultation	  Gastrointestinal:  Soft, Non-tender, + BS	  Skin: warm and dry, No rashes, No ecchymoses, No cyanosis	  Extremities:  No clubbing, cyanosis or edema  Vascular: Peripheral pulses palpable 2+ bilaterally    CBC Full  -  ( 24 Mar 2022 08:10 )  WBC Count : 12.64 K/uL  Hemoglobin : 9.1 g/dL  Hematocrit : 29.4 %  Platelet Count - Automated : 228 K/uL  Mean Cell Volume : 85.7 fL  Mean Cell Hemoglobin : 26.5 pg  Mean Cell Hemoglobin Concentration : 31.0 gm/dL  Auto Neutrophil # : x  Auto Lymphocyte # : x  Auto Monocyte # : x  Auto Eosinophil # : x  Auto Basophil # : x  Auto Neutrophil % : x  Auto Lymphocyte % : x  Auto Monocyte % : x  Auto Eosinophil % : x  Auto Basophil % : x    03-24    132<L>  |  99  |  8   ----------------------------<  145<H>  3.5   |  24  |  0.97  03-23    137  |  103  |  16  ----------------------------<  156<H>  3.4<L>   |  24  |  1.19    Ca    8.8      24 Mar 2022 08:10  Ca    8.4      23 Mar 2022 09:25  Phos  1.8     03-24  Phos  1.6     03-23  Mg     1.90     03-24  Mg     2.10     03-23

## 2022-03-25 NOTE — PROGRESS NOTE ADULT - ASSESSMENT
Patient is a 66 year old female with a PMHx of HTN, HLD, hypothyroidism, DM2, SOLOMON (not consistent with using CPAP), pernicious anemia and diverticulosis (S/P colon resection 10/2021) who presented to pre-surgical testing for evaluation for Naeem reversal.  Patient is now S/P ex-lap, Naeem reversal and diverting loop ileostomy on 3/21/22.      PLAN:  - Low fiber diet  - D5 + 1/2NS @75cc/hr  - Follow up fever work up  - Out of bed  - Pain control with PCEA  - VTE prophylaxis with Heparin subcutaneous      #[id number]  A Team Surgery Patient is a 66 year old female with a PMHx of HTN, HLD, hypothyroidism, DM2, SOLOMON (not consistent with using CPAP), pernicious anemia and diverticulosis (S/P colon resection 10/2021) who presented to pre-surgical testing for evaluation for Naeem reversal.  Patient is now S/P ex-lap, Naeem reversal and diverting loop ileostomy on 3/21/22.      PLAN:  - Low fiber diet  - D5 + 1/2NS @75cc/hr  - appreciate cards recs  - Out of bed  -pain control PRN  - VTE prophylaxis with Heparin subcutaneous  -transfer to SICU for hemodynamic monitoring      #[id number]  A Team Surgery

## 2022-03-25 NOTE — CONSULT NOTE ADULT - ASSESSMENT
66 year old female with PMHx:  HTN, HLD, Hypothyroid, T2DM, SOLOMON not consistent with using CPAP, Pernicious anemia, diverticulosis - colon resection 10/2021 presents for elective Martin reversal (16 Mar 2022 17:23). Now s/p Hartmans procedure on 3/21/22.  Hospital course c/b uncontrolled HTN, BP this morning in 's patient also w/ chest discomfort and sob at the time, symptoms resolved with supplemental oxygen via nasal cannula and better bp control after receiving labetalol and hydralazine iv. At time of exam symptoms resolved, ekg shows SR @83bpm w/ TWI in v1-3.    # r/o ACS  # Severe HTN  Patient with chest pain and SOB in setting of severe HTN. Chest pain free at present.  Symptoms improved after labetalol, hydralazine given and supplemental oxygen now SBP in 160's, agree with team plan iv lasix, chest x-ray w/ b/l infiltrates concerning for pulm edema also w/ crackles on auscultation  Resume home BP regimen and can start hydralazine PO 25mg TID, titrate as tolerated for better bp control  Send HsT x 2 to r/o ischemia  Serial EKG PRN chest pain to assess for ST changes  Continuous cardiac monitoring to monitor for arrhythmias  ECHO: TTE in am    Thank you, if any questions or clinical situation changes please call [facility] #[phone].  Attending Attestation to follow

## 2022-03-25 NOTE — DIETITIAN INITIAL EVALUATION ADULT. - PERTINENT MEDS FT
MEDICATIONS  (STANDING):  acetaminophen     Tablet .. 975 milliGRAM(s) Oral once  amLODIPine   Tablet 5 milliGRAM(s) Oral daily  dextrose 40% Gel 15 Gram(s) Oral once  dextrose 5%. 1000 milliLiter(s) (100 mL/Hr) IV Continuous <Continuous>  dextrose 5%. 1000 milliLiter(s) (50 mL/Hr) IV Continuous <Continuous>  dextrose 50% Injectable 25 Gram(s) IV Push once  dextrose 50% Injectable 12.5 Gram(s) IV Push once  dextrose 50% Injectable 25 Gram(s) IV Push once  glucagon  Injectable 1 milliGRAM(s) IntraMuscular once  heparin   Injectable 5000 Unit(s) SubCutaneous every 8 hours  hydrALAZINE 25 milliGRAM(s) Oral every 6 hours  insulin lispro (ADMELOG) corrective regimen sliding scale   SubCutaneous three times a day before meals  insulin lispro (ADMELOG) corrective regimen sliding scale   SubCutaneous at bedtime  levothyroxine Injectable 100 MICROGram(s) IV Push at bedtime  lisinopril 20 milliGRAM(s) Oral every 12 hours  ondansetron Injectable 4 milliGRAM(s) IV Push once  piperacillin/tazobactam IVPB.. 3.375 Gram(s) IV Intermittent every 8 hours

## 2022-03-26 LAB
ALBUMIN SERPL ELPH-MCNC: 3.5 G/DL — SIGNIFICANT CHANGE UP (ref 3.3–5)
ALP SERPL-CCNC: 126 U/L — HIGH (ref 40–120)
ALT FLD-CCNC: 15 U/L — SIGNIFICANT CHANGE UP (ref 4–33)
ANION GAP SERPL CALC-SCNC: 13 MMOL/L — SIGNIFICANT CHANGE UP (ref 7–14)
APTT BLD: 29.1 SEC — SIGNIFICANT CHANGE UP (ref 27–36.3)
AST SERPL-CCNC: 13 U/L — SIGNIFICANT CHANGE UP (ref 4–32)
BILIRUB SERPL-MCNC: 0.6 MG/DL — SIGNIFICANT CHANGE UP (ref 0.2–1.2)
BLD GP AB SCN SERPL QL: NEGATIVE — SIGNIFICANT CHANGE UP
BUN SERPL-MCNC: 12 MG/DL — SIGNIFICANT CHANGE UP (ref 7–23)
CALCIUM SERPL-MCNC: 8.8 MG/DL — SIGNIFICANT CHANGE UP (ref 8.4–10.5)
CHLORIDE SERPL-SCNC: 98 MMOL/L — SIGNIFICANT CHANGE UP (ref 98–107)
CO2 SERPL-SCNC: 26 MMOL/L — SIGNIFICANT CHANGE UP (ref 22–31)
CREAT SERPL-MCNC: 1.33 MG/DL — HIGH (ref 0.5–1.3)
EGFR: 44 ML/MIN/1.73M2 — LOW
GLUCOSE BLDC GLUCOMTR-MCNC: 140 MG/DL — HIGH (ref 70–99)
GLUCOSE BLDC GLUCOMTR-MCNC: 151 MG/DL — HIGH (ref 70–99)
GLUCOSE BLDC GLUCOMTR-MCNC: 161 MG/DL — HIGH (ref 70–99)
GLUCOSE BLDC GLUCOMTR-MCNC: 181 MG/DL — HIGH (ref 70–99)
GLUCOSE SERPL-MCNC: 120 MG/DL — HIGH (ref 70–99)
HCT VFR BLD CALC: 26.9 % — LOW (ref 34.5–45)
HGB BLD-MCNC: 8.7 G/DL — LOW (ref 11.5–15.5)
INR BLD: 1.03 RATIO — SIGNIFICANT CHANGE UP (ref 0.88–1.16)
MAGNESIUM SERPL-MCNC: 1.9 MG/DL — SIGNIFICANT CHANGE UP (ref 1.6–2.6)
MCHC RBC-ENTMCNC: 27 PG — SIGNIFICANT CHANGE UP (ref 27–34)
MCHC RBC-ENTMCNC: 32.3 GM/DL — SIGNIFICANT CHANGE UP (ref 32–36)
MCV RBC AUTO: 83.5 FL — SIGNIFICANT CHANGE UP (ref 80–100)
NRBC # BLD: 0 /100 WBCS — SIGNIFICANT CHANGE UP
NRBC # FLD: 0 K/UL — SIGNIFICANT CHANGE UP
PHOSPHATE SERPL-MCNC: 3 MG/DL — SIGNIFICANT CHANGE UP (ref 2.5–4.5)
PLATELET # BLD AUTO: 248 K/UL — SIGNIFICANT CHANGE UP (ref 150–400)
POTASSIUM SERPL-MCNC: 3.4 MMOL/L — LOW (ref 3.5–5.3)
POTASSIUM SERPL-SCNC: 3.4 MMOL/L — LOW (ref 3.5–5.3)
PROT SERPL-MCNC: 7.1 G/DL — SIGNIFICANT CHANGE UP (ref 6–8.3)
PROTHROM AB SERPL-ACNC: 11.9 SEC — SIGNIFICANT CHANGE UP (ref 10.5–13.4)
RBC # BLD: 3.22 M/UL — LOW (ref 3.8–5.2)
RBC # FLD: 18.3 % — HIGH (ref 10.3–14.5)
RH IG SCN BLD-IMP: POSITIVE — SIGNIFICANT CHANGE UP
SODIUM SERPL-SCNC: 137 MMOL/L — SIGNIFICANT CHANGE UP (ref 135–145)
WBC # BLD: 7.37 K/UL — SIGNIFICANT CHANGE UP (ref 3.8–10.5)
WBC # FLD AUTO: 7.37 K/UL — SIGNIFICANT CHANGE UP (ref 3.8–10.5)

## 2022-03-26 PROCEDURE — 93306 TTE W/DOPPLER COMPLETE: CPT | Mod: 26

## 2022-03-26 RX ORDER — MAGNESIUM SULFATE 500 MG/ML
2 VIAL (ML) INJECTION ONCE
Refills: 0 | Status: COMPLETED | OUTPATIENT
Start: 2022-03-26 | End: 2022-03-26

## 2022-03-26 RX ORDER — LEVOTHYROXINE SODIUM 125 MCG
200 TABLET ORAL DAILY
Refills: 0 | Status: DISCONTINUED | OUTPATIENT
Start: 2022-03-26 | End: 2022-03-27

## 2022-03-26 RX ORDER — POTASSIUM CHLORIDE 20 MEQ
10 PACKET (EA) ORAL
Refills: 0 | Status: COMPLETED | OUTPATIENT
Start: 2022-03-26 | End: 2022-03-26

## 2022-03-26 RX ORDER — AMLODIPINE BESYLATE 2.5 MG/1
10 TABLET ORAL DAILY
Refills: 0 | Status: DISCONTINUED | OUTPATIENT
Start: 2022-03-26 | End: 2022-03-27

## 2022-03-26 RX ADMIN — AMLODIPINE BESYLATE 10 MILLIGRAM(S): 2.5 TABLET ORAL at 18:21

## 2022-03-26 RX ADMIN — HYDROMORPHONE HYDROCHLORIDE 2 MILLIGRAM(S): 2 INJECTION INTRAMUSCULAR; INTRAVENOUS; SUBCUTANEOUS at 21:08

## 2022-03-26 RX ADMIN — Medication 25 MILLIGRAM(S): at 01:04

## 2022-03-26 RX ADMIN — Medication 25 GRAM(S): at 03:33

## 2022-03-26 RX ADMIN — PIPERACILLIN AND TAZOBACTAM 25 GRAM(S): 4; .5 INJECTION, POWDER, LYOPHILIZED, FOR SOLUTION INTRAVENOUS at 21:08

## 2022-03-26 RX ADMIN — HEPARIN SODIUM 5000 UNIT(S): 5000 INJECTION INTRAVENOUS; SUBCUTANEOUS at 13:21

## 2022-03-26 RX ADMIN — Medication 25 MILLIGRAM(S): at 18:22

## 2022-03-26 RX ADMIN — HYDROMORPHONE HYDROCHLORIDE 2 MILLIGRAM(S): 2 INJECTION INTRAMUSCULAR; INTRAVENOUS; SUBCUTANEOUS at 16:22

## 2022-03-26 RX ADMIN — LISINOPRIL 20 MILLIGRAM(S): 2.5 TABLET ORAL at 21:08

## 2022-03-26 RX ADMIN — HYDROMORPHONE HYDROCHLORIDE 2 MILLIGRAM(S): 2 INJECTION INTRAMUSCULAR; INTRAVENOUS; SUBCUTANEOUS at 16:30

## 2022-03-26 RX ADMIN — Medication 1: at 10:35

## 2022-03-26 RX ADMIN — HYDROMORPHONE HYDROCHLORIDE 2 MILLIGRAM(S): 2 INJECTION INTRAMUSCULAR; INTRAVENOUS; SUBCUTANEOUS at 12:00

## 2022-03-26 RX ADMIN — PIPERACILLIN AND TAZOBACTAM 25 GRAM(S): 4; .5 INJECTION, POWDER, LYOPHILIZED, FOR SOLUTION INTRAVENOUS at 05:05

## 2022-03-26 RX ADMIN — HYDROMORPHONE HYDROCHLORIDE 2 MILLIGRAM(S): 2 INJECTION INTRAMUSCULAR; INTRAVENOUS; SUBCUTANEOUS at 21:38

## 2022-03-26 RX ADMIN — Medication 100 MILLIEQUIVALENT(S): at 05:04

## 2022-03-26 RX ADMIN — HEPARIN SODIUM 5000 UNIT(S): 5000 INJECTION INTRAVENOUS; SUBCUTANEOUS at 21:08

## 2022-03-26 RX ADMIN — Medication 100 MILLIEQUIVALENT(S): at 05:26

## 2022-03-26 RX ADMIN — HEPARIN SODIUM 5000 UNIT(S): 5000 INJECTION INTRAVENOUS; SUBCUTANEOUS at 05:05

## 2022-03-26 RX ADMIN — Medication 100 MILLIEQUIVALENT(S): at 03:33

## 2022-03-26 RX ADMIN — Medication 1: at 07:40

## 2022-03-26 RX ADMIN — HYDROMORPHONE HYDROCHLORIDE 2 MILLIGRAM(S): 2 INJECTION INTRAMUSCULAR; INTRAVENOUS; SUBCUTANEOUS at 05:04

## 2022-03-26 RX ADMIN — PIPERACILLIN AND TAZOBACTAM 25 GRAM(S): 4; .5 INJECTION, POWDER, LYOPHILIZED, FOR SOLUTION INTRAVENOUS at 13:21

## 2022-03-26 RX ADMIN — AMLODIPINE BESYLATE 5 MILLIGRAM(S): 2.5 TABLET ORAL at 05:05

## 2022-03-26 RX ADMIN — Medication 200 MICROGRAM(S): at 10:43

## 2022-03-26 RX ADMIN — Medication 25 MILLIGRAM(S): at 05:34

## 2022-03-26 RX ADMIN — Medication 25 MILLIGRAM(S): at 13:21

## 2022-03-26 RX ADMIN — HYDROMORPHONE HYDROCHLORIDE 2 MILLIGRAM(S): 2 INJECTION INTRAMUSCULAR; INTRAVENOUS; SUBCUTANEOUS at 05:34

## 2022-03-26 RX ADMIN — HYDROMORPHONE HYDROCHLORIDE 2 MILLIGRAM(S): 2 INJECTION INTRAMUSCULAR; INTRAVENOUS; SUBCUTANEOUS at 10:30

## 2022-03-26 RX ADMIN — LISINOPRIL 20 MILLIGRAM(S): 2.5 TABLET ORAL at 10:26

## 2022-03-26 NOTE — PROGRESS NOTE ADULT - SUBJECTIVE AND OBJECTIVE BOX
GENERAL SURGERY A TEAM DAILY PROGRESS NOTE    SUBJECTIVE  Patient seen and examined by team on morning rounds.         OBJECTIVE    PHYSICAL EXAM  General: Appears well, NAD  CHEST: breathing comfortably  CV: appears well perfused  Abdomen: soft, nontender, nondistended, midline incision with penrose x2 c/d/i , ostomy with air and stool  Extremities: Grossly symmetric    T(C): 37.2 (22 @ 23:11), Max: 37.2 (22 @ 17:51)  HR: 78 (22 @ 23:11) (69 - 93)  BP: 179/96 (22 @ 23:11) (140/76 - 195/105)  RR: 18 (22 @ 23:11) (17 - 19)  SpO2: 100% (22 @ 23:11) (90% - 100%)    22 @ 07:01  -  22 @ 07:00  --------------------------------------------------------  IN: 2810 mL / OUT: 4000 mL / NET: -1190 mL    22 @ 07:01  -  22 @ 00:48  --------------------------------------------------------  IN: 1065 mL / OUT: 2375 mL / NET: -1310 mL        MEDICATIONS  dextrose 40% Gel 15 Gram(s) Oral once  dextrose 5%. 1000 milliLiter(s) IV Continuous <Continuous>  dextrose 5%. 1000 milliLiter(s) IV Continuous <Continuous>  dextrose 50% Injectable 25 Gram(s) IV Push once  dextrose 50% Injectable 12.5 Gram(s) IV Push once  dextrose 50% Injectable 25 Gram(s) IV Push once  glucagon  Injectable 1 milliGRAM(s) IntraMuscular once  heparin   Injectable 5000 Unit(s) SubCutaneous every 8 hours  HYDROmorphone   Tablet 2 milliGRAM(s) Oral every 3 hours PRN  HYDROmorphone   Tablet 4 milliGRAM(s) Oral every 3 hours PRN  HYDROmorphone  Injectable 0.3 milliGRAM(s) IV Push every 3 hours PRN  insulin lispro (ADMELOG) corrective regimen sliding scale   SubCutaneous three times a day before meals  insulin lispro (ADMELOG) corrective regimen sliding scale   SubCutaneous at bedtime  levothyroxine Injectable 100 MICROGram(s) IV Push at bedtime  lisinopril 20 milliGRAM(s) Oral every 12 hours  naloxone Injectable 0.1 milliGRAM(s) IV Push every 3 minutes PRN  ondansetron Injectable 4 milliGRAM(s) IV Push every 6 hours PRN  piperacillin/tazobactam IVPB.. 3.375 Gram(s) IV Intermittent every 8 hours  tetrahydrazoline Solution 1 Drop(s) Both EYES daily PRN      LABS                        9.1    12.64 )-----------( 228      ( 24 Mar 2022 08:10 )             29.4     03-24    132<L>  |  99  |  8   ----------------------------<  145<H>  3.5   |  24  |  0.97    Ca    8.8      24 Mar 2022 08:10  Phos  1.8     03-24  Mg     1.90     03-24      PT/INR - ( 24 Mar 2022 10:30 )   PT: 13.2 sec;   INR: 1.14 ratio         PTT - ( 24 Mar 2022 10:30 )  PTT:31.4 sec  Urinalysis Basic - ( 24 Mar 2022 07:36 )    Color: Colorless / Appearance: Clear / S.011 / pH: x  Gluc: x / Ketone: Negative  / Bili: Negative / Urobili: <2 mg/dL   Blood: x / Protein: 30 mg/dL / Nitrite: Negative   Leuk Esterase: Negative / RBC: 16 /HPF / WBC 2 /HPF   Sq Epi: x / Non Sq Epi: 1 /HPF / Bacteria: Negative        RADIOLOGY & ADDITIONAL STUDIES GENERAL SURGERY A TEAM DAILY PROGRESS NOTE    SUBJECTIVE  Patient seen and examined by team on morning rounds. Patient denies acute onset abdominal pain, NV, fevers, chills, lightheadedness, CP, SOB. tolerating LRD      OBJECTIVE    PHYSICAL EXAM  General: Appears well, NAD  CHEST: breathing comfortably  CV: appears well perfused  Abdomen: soft, nontender, nondistended, midline incision c/d/i, with penrose x2 removed this AM , ostomy with air and stool  Extremities: Grossly symmetric    T(C): 37.2 (22 @ 23:11), Max: 37.2 (22 @ 17:51)  HR: 78 (22 @ 23:11) (69 - 93)  BP: 179/96 (22 @ 23:11) (140/76 - 195/105)  RR: 18 (22 @ 23:11) (17 - 19)  SpO2: 100% (22 @ 23:11) (90% - 100%)    22 @ 07:01  -  22 @ 07:00  --------------------------------------------------------  IN: 2810 mL / OUT: 4000 mL / NET: -1190 mL    22 @ 07:01  -  22 @ 00:48  --------------------------------------------------------  IN: 1065 mL / OUT: 2375 mL / NET: -1310 mL        MEDICATIONS  dextrose 40% Gel 15 Gram(s) Oral once  dextrose 5%. 1000 milliLiter(s) IV Continuous <Continuous>  dextrose 5%. 1000 milliLiter(s) IV Continuous <Continuous>  dextrose 50% Injectable 25 Gram(s) IV Push once  dextrose 50% Injectable 12.5 Gram(s) IV Push once  dextrose 50% Injectable 25 Gram(s) IV Push once  glucagon  Injectable 1 milliGRAM(s) IntraMuscular once  heparin   Injectable 5000 Unit(s) SubCutaneous every 8 hours  HYDROmorphone   Tablet 2 milliGRAM(s) Oral every 3 hours PRN  HYDROmorphone   Tablet 4 milliGRAM(s) Oral every 3 hours PRN  HYDROmorphone  Injectable 0.3 milliGRAM(s) IV Push every 3 hours PRN  insulin lispro (ADMELOG) corrective regimen sliding scale   SubCutaneous three times a day before meals  insulin lispro (ADMELOG) corrective regimen sliding scale   SubCutaneous at bedtime  levothyroxine Injectable 100 MICROGram(s) IV Push at bedtime  lisinopril 20 milliGRAM(s) Oral every 12 hours  naloxone Injectable 0.1 milliGRAM(s) IV Push every 3 minutes PRN  ondansetron Injectable 4 milliGRAM(s) IV Push every 6 hours PRN  piperacillin/tazobactam IVPB.. 3.375 Gram(s) IV Intermittent every 8 hours  tetrahydrazoline Solution 1 Drop(s) Both EYES daily PRN      LABS                        9.1    12.64 )-----------( 228      ( 24 Mar 2022 08:10 )             29.4     03-24    132<L>  |  99  |  8   ----------------------------<  145<H>  3.5   |  24  |  0.97    Ca    8.8      24 Mar 2022 08:10  Phos  1.8     03-24  Mg     1.90     03-24      PT/INR - ( 24 Mar 2022 10:30 )   PT: 13.2 sec;   INR: 1.14 ratio         PTT - ( 24 Mar 2022 10:30 )  PTT:31.4 sec  Urinalysis Basic - ( 24 Mar 2022 07:36 )    Color: Colorless / Appearance: Clear / S.011 / pH: x  Gluc: x / Ketone: Negative  / Bili: Negative / Urobili: <2 mg/dL   Blood: x / Protein: 30 mg/dL / Nitrite: Negative   Leuk Esterase: Negative / RBC: 16 /HPF / WBC 2 /HPF   Sq Epi: x / Non Sq Epi: 1 /HPF / Bacteria: Negative        RADIOLOGY & ADDITIONAL STUDIES

## 2022-03-26 NOTE — PROGRESS NOTE ADULT - SUBJECTIVE AND OBJECTIVE BOX
SICU Daily Progress Note  =====================================================  HPI: Patient is a 66 year old female with a PMHx of HTN, HLD, hypothyroidism, DM2, SOLOMON (not consistent with using CPAP), pernicious anemia and diverticulosis (S/P colon resection 10/2021) without outpatient primary care follow up who presented to pre-surgical testing for evaluation for Naeem reversal.  Patient is now S/P ex-lap, Naeem reversal and diverting loop ileostomy on 3/21/22. Patient is POD4 and has been increasingly hypertensive on the floor. Febrile to 101.6 on 3/24 UA-, CXR with RLL infiltrate and was started on Zosyn secondary to suspected aspiration PNA. Blood cultures sent 3/24 pending results. Patient then developed HTN emergency on 3/25 with SBP as high as 212/109 with chest pressure and headache and was treated with Lasix 40mg IVPx1, Hydralazine 5mg IVPx3, Labetalol 10mg IVPx2 with resolution of symptoms. Patient continued to be hypertensive to 180 systolic and was treated further with Hydralazine 20mg IVPx1 with improvement in hemodynamics and symptoms, and transferred to SICU for further monitoring.     HISTORY  66 year old female with PMHx:  HTN, HLD, Hypothyroid, T2DM, SOLOMON not consistent with using CPAP, Pernicious anemia, diverticulosis - S/P colon resection 10/2021. Pt presents to PST today for pre op evaluation in preparation for Martin reversal  (16 Mar 2022 17:23)    Allergies: oxycodone (Other)  Valium (Other)    PAST MEDICAL & SURGICAL HISTORY:  Hypothyroidism    Diverticulosis    HTN (hypertension)    Lower gastrointestinal bleeding  Multipel times since &#x27;: last episode 2018    Pernicious anemia    SOLOMON (obstructive sleep apnea)  non-compliant with CPAP    Type 2 diabetes mellitus    Left ureteral stone    Fibroid uterus  &#x27;94  surgically treated    Lumbar herniated disc  &#x27;   surgically treated    Multiparity    Heart murmur  mild    History of tonsillectomy  age 14    S/P WESTLEY (total abdominal hysterectomy)  &#x27; 94  Laproscopic.  benign    S/P lumbar laminectomy  &#x27;   with Fusion    S/P colon resection  colostomy; 10/2021    History of lithotripsy  left kidney stone extraction; 2020      FAMILY HISTORY:  Family history of gastric cancer  Mother    Family history of throat cancer  Sister        SOCIAL HISTORY:  Denies smoking or EtOH    ADVANCE DIRECTIVES: Full Code      REVIEW OF SYSTEMS:   +Headache, +Chest pressure    HOME MEDICATIONS:   --------------------------------------------------------------------------------------  Home Medications:  ferrous sulfate 325 mg (65 mg elemental iron) oral tablet: orally once a day (21 Mar 2022 06:18)  levothyroxine 200 mcg (0.2 mg) oral tablet: 1 tab(s) orally once a day in AM  (21 Mar 2022 06:18)  ramipril 10 mg oral capsule: orally 2 times a day (21 Mar 2022 06:18)  simvastatin 40 mg oral tablet: 1 tab(s) orally once a day in AM  (21 Mar 2022 06:18)  Visine 0.05% ophthalmic solution: to each affected eye 3 to 4 times a week, As Needed (21 Mar 2022 06:18)  Vitamin B12: orally once a day (21 Mar 2022 06:18)    --------------------------------------------------------------------------------------    CURRENT MEDICATIONS:   --------------------------------------------------------------------------------------  Neurologic Medications  HYDROmorphone   Tablet 2 milliGRAM(s) Oral every 3 hours PRN Moderate Pain (4 - 6)  HYDROmorphone   Tablet 4 milliGRAM(s) Oral every 3 hours PRN Severe Pain (7 - 10)  HYDROmorphone  Injectable 0.3 milliGRAM(s) IV Push every 3 hours PRN Severe breakthrough Pain (7 - 10)  ondansetron Injectable 4 milliGRAM(s) IV Push every 6 hours PRN Nausea  ondansetron Injectable 4 milliGRAM(s) IV Push once    Respiratory Medications    Cardiovascular Medications  lisinopril 20 milliGRAM(s) Oral every 12 hours    Gastrointestinal Medications  dextrose 5%. 1000 milliLiter(s) IV Continuous <Continuous>  dextrose 5%. 1000 milliLiter(s) IV Continuous <Continuous>    Genitourinary Medications    Hematologic/Oncologic Medications  heparin   Injectable 5000 Unit(s) SubCutaneous every 8 hours    Antimicrobial/Immunologic Medications  piperacillin/tazobactam IVPB.. 3.375 Gram(s) IV Intermittent every 8 hours    Endocrine/Metabolic Medications  dextrose 40% Gel 15 Gram(s) Oral once  dextrose 50% Injectable 25 Gram(s) IV Push once  dextrose 50% Injectable 12.5 Gram(s) IV Push once  dextrose 50% Injectable 25 Gram(s) IV Push once  glucagon  Injectable 1 milliGRAM(s) IntraMuscular once  insulin lispro (ADMELOG) corrective regimen sliding scale   SubCutaneous three times a day before meals  insulin lispro (ADMELOG) corrective regimen sliding scale   SubCutaneous at bedtime  levothyroxine Injectable 100 MICROGram(s) IV Push at bedtime    Topical/Other Medications  naloxone Injectable 0.1 milliGRAM(s) IV Push every 3 minutes PRN For ANY of the following changes in patient status:  A. RR LESS THAN 10 breaths per minute, B. Oxygen saturation LESS THAN 90%, C. Sedation score of 6  tetrahydrazoline Solution 1 Drop(s) Both EYES daily PRN Dry eyes    --------------------------------------------------------------------------------------    VITAL SIGNS, INS/OUTS (last 24 hours):  --------------------------------------------------------------------------------------  I&O's Detail    24 Mar 2022 07:01  -  25 Mar 2022 07:00  --------------------------------------------------------  IN:    IV PiggyBack: 675 mL    Oral Fluid: 540 mL  Total IN: 1215 mL    OUT:    Ileostomy (mL): 800 mL    Indwelling Catheter - Urethral (mL): 1275 mL    Voided (mL): 900 mL  Total OUT: 2975 mL    Total NET: -1760 mL  --------------------------------------------------------------------------------------    EXAM  NEUROLOGY  Exam: Normal, NAD, alert, oriented x 3, no focal deficits.     HEENT  Exam: Normocephalic, atraumatic.  EOMI    RESPIRATORY  Exam: Lungs clear to auscultation, Normal expansion/effort.    CARDIOVASCULAR  Exam: S1, S2.  Regular rate and rhythm.  Peripheral edema     GI/NUTRITION  Exam: Abdomen soft, mild tenderness to palpation, +ostomy (+stool/gas output)  Current Diet:  Diet, low fiber    VASCULAR  Exam: Extremities warm, pink, well-perfused.     MUSCULOSKELETAL  Exam: All extremities moving spontaneously without limitations    SKIN:  Exam: Good skin turgor, no skin breakdown.     METABOLIC/FLUIDS/ELECTROLYTES  dextrose 5%. 1000 milliLiter(s) IV Continuous <Continuous>  dextrose 5%. 1000 milliLiter(s) IV Continuous <Continuous>      HEMATOLOGIC  [x] DVT Prophylaxis: heparin   Injectable 5000 Unit(s) SubCutaneous every 8 hours    Transfusions:	[] PRBC	[] Platelets		[] FFP	[] Cryoprecipitate    INFECTIOUS DISEASE  Antimicrobials/Immunologic Medications:  piperacillin/tazobactam IVPB.. 3.375 Gram(s) IV Intermittent every 8 hours    Tubes/Lines/Drains  ***  [x] Peripheral IV  [] Central Venous Line     	[] R	[] L	[] IJ	[] Fem	[] SC	Date Placed:   [] Arterial Line		[] R	[] L	[] Fem	[] Rad	[] Ax	Date Placed:   [] PICC:         	[] Midline		[] Mediport  [] Urinary Catheter		Date Placed:     LABS  --------------------------------------------------------------------------------------                          9.5    9.21  )-----------( 244      ( 25 Mar 2022 06:39 )             29.3       03-25    138  |  101  |  9   ----------------------------<  144<H>  3.9   |  24  |  1.03    Ca    9.0      25 Mar 2022 06:39  Phos  3.0     03-25  Mg     2.00     03-25    Urinalysis Basic - ( 24 Mar 2022 07:36 )    Color: Colorless / Appearance: Clear / S.011 / pH: x  Gluc: x / Ketone: Negative  / Bili: Negative / Urobili: <2 mg/dL   Blood: x / Protein: 30 mg/dL / Nitrite: Negative   Leuk Esterase: Negative / RBC: 16 /HPF / WBC 2 /HPF   Sq Epi: x / Non Sq Epi: 1 /HPF / Bacteria: Negative    PT/INR - ( 25 Mar 2022 06:39 )   PT: 11.5 sec;   INR: 0.99 ratio      PTT - ( 25 Mar 2022 06:39 )  PTT:33.7 sec    Lactate Trend    CARDIAC MARKERS ( 25 Mar 2022 06:39 )  x     / x     / 194 U/L / x     / 2.0 ng/mL    CAPILLARY BLOOD GLUCOSE    POCT Blood Glucose.: 122 mg/dL (24 Mar 2022 21:31)  POCT Blood Glucose.: 163 mg/dL (24 Mar 2022 16:53)  POCT Blood Glucose.: 185 mg/dL (24 Mar 2022 12:04)  POCT Blood Glucose.: 152 mg/dL (24 Mar 2022 08:25)    Cultures:    Radiology:    EKG:     --------------------------------------------------------------------------------------    OTHER LABS    IMAGING RESULTS  Echo:   CT:   Xray:     ASSESSMENT:  66y Female ***    PLAN:  ***  Neurologic:   Respiratory:   Cardiovascular:   Gastrointestinal/Nutrition:   Renal/Genitourinary:   Hematologic:   Infectious Disease:   Tubes/Lines/Drains:   Endocrine:     Disposition: SICU    --------------------------------------------------------------------------------------    Critical Care Diagnoses: Hypertensive emergency, chest pain   SICU Daily Progress Note  =====================================================  HPI: Patient is a 66 year old female with a PMHx of HTN, HLD, hypothyroidism, DM2, SOLOMON (not consistent with using CPAP), pernicious anemia and diverticulosis (S/P colon resection 10/2021) without outpatient primary care follow up who presented to pre-surgical testing for evaluation for Naeem reversal.  Patient is now S/P ex-lap, Naeem reversal and diverting loop ileostomy on 3/21/22. Patient is POD4 and has been increasingly hypertensive on the floor. Febrile to 101.6 on 3/24 UA-, CXR with RLL infiltrate and was started on Zosyn secondary to suspected aspiration PNA. Blood cultures sent 3/24 pending results. Patient then developed HTN emergency on 3/25 with SBP as high as 212/109 with chest pressure and headache and was treated with Lasix 40mg IVPx1, Hydralazine 5mg IVPx3, Labetalol 10mg IVPx2 with resolution of symptoms. Patient continued to be hypertensive to 180 systolic and was treated further with Hydralazine 20mg IVPx1 with improvement in hemodynamics and symptoms, and transferred to SICU for further monitoring.     HISTORY  66 year old female with PMHx:  HTN, HLD, Hypothyroid, T2DM, SOLOMON not consistent with using CPAP, Pernicious anemia, diverticulosis - S/P colon resection 10/2021. Pt presents to PST today for pre op evaluation in preparation for Martin reversal  (16 Mar 2022 17:23)     REVIEW OF SYSTEMS:   +Headache, +Chest pressure    MEDICATIONS  (STANDING):  amLODIPine   Tablet 5 milliGRAM(s) Oral daily  dextrose 40% Gel 15 Gram(s) Oral once  dextrose 5%. 1000 milliLiter(s) (50 mL/Hr) IV Continuous <Continuous>  dextrose 5%. 1000 milliLiter(s) (100 mL/Hr) IV Continuous <Continuous>  dextrose 50% Injectable 25 Gram(s) IV Push once  dextrose 50% Injectable 12.5 Gram(s) IV Push once  dextrose 50% Injectable 25 Gram(s) IV Push once  glucagon  Injectable 1 milliGRAM(s) IntraMuscular once  heparin   Injectable 5000 Unit(s) SubCutaneous every 8 hours  hydrALAZINE 25 milliGRAM(s) Oral every 6 hours  insulin lispro (ADMELOG) corrective regimen sliding scale   SubCutaneous three times a day before meals  insulin lispro (ADMELOG) corrective regimen sliding scale   SubCutaneous at bedtime  levothyroxine Injectable 100 MICROGram(s) IV Push at bedtime  lisinopril 20 milliGRAM(s) Oral every 12 hours  magnesium sulfate  IVPB 2 Gram(s) IV Intermittent once  ondansetron Injectable 4 milliGRAM(s) IV Push once  piperacillin/tazobactam IVPB.. 3.375 Gram(s) IV Intermittent every 8 hours  potassium chloride  10 mEq/100 mL IVPB 10 milliEquivalent(s) IV Intermittent every 1 hour    MEDICATIONS  (PRN):  HYDROmorphone   Tablet 2 milliGRAM(s) Oral every 3 hours PRN Moderate Pain (4 - 6)  HYDROmorphone   Tablet 4 milliGRAM(s) Oral every 3 hours PRN Severe Pain (7 - 10)  HYDROmorphone  Injectable 0.3 milliGRAM(s) IV Push every 3 hours PRN Severe breakthrough Pain (7 - 10)  naloxone Injectable 0.1 milliGRAM(s) IV Push every 3 minutes PRN For ANY of the following changes in patient status:  A. RR LESS THAN 10 breaths per minute, B. Oxygen saturation LESS THAN 90%, C. Sedation score of 6  ondansetron Injectable 4 milliGRAM(s) IV Push every 6 hours PRN Nausea  tetrahydrazoline Solution 1 Drop(s) Both EYES daily PRN Dry eyes    ICU Vital Signs Last 24 Hrs  T(C): 36.8 (26 Mar 2022 00:00), Max: 37.2 (25 Mar 2022 09:00)  T(F): 98.3 (26 Mar 2022 00:00), Max: 99 (25 Mar 2022 09:00)  HR: 80 (26 Mar 2022 01:00) (79 - 90)  BP: 158/75 (26 Mar 2022 01:00) (133/69 - 212/109)  BP(mean): 94 (26 Mar 2022 01:00) (84 - 111)  ABP: --  ABP(mean): --  RR: 17 (26 Mar 2022 01:00) (16 - 24)  SpO2: 97% (26 Mar 2022 01:00) (95% - 100%)    I&O's Detail    24 Mar 2022 07:01  -  25 Mar 2022 07:00  --------------------------------------------------------  IN:    IV PiggyBack: 675 mL    Oral Fluid: 540 mL  Total IN: 1215 mL    OUT:    Ileostomy (mL): 800 mL    Indwelling Catheter - Urethral (mL): 1275 mL    Voided (mL): 900 mL  Total OUT: 2975 mL    Total NET: -1760 mL      25 Mar 2022 07:01  -  26 Mar 2022 02:57  --------------------------------------------------------  IN:    IV PiggyBack: 100 mL    Oral Fluid: 600 mL  Total IN: 700 mL    OUT:    Ileostomy (mL): 100 mL    Voided (mL): 1550 mL  Total OUT: 1650 mL    Total NET: -950 mL      --------------------------------------------------------------------------------------    EXAM  NEUROLOGY  Exam: Normal, NAD, alert, oriented x 3, no focal deficits.     HEENT  Exam: Normocephalic, atraumatic.  EOMI    RESPIRATORY  Exam: Lungs clear to auscultation, Normal expansion/effort.    CARDIOVASCULAR  Exam: S1, S2.  Regular rate and rhythm.  Peripheral edema     GI/NUTRITION  Exam: Abdomen soft, mild tenderness to palpation, +ostomy (+stool/gas output)  Current Diet:  Diet, low fiber    VASCULAR  Exam: Extremities warm, pink, well-perfused.     MUSCULOSKELETAL  Exam: All extremities moving spontaneously without limitations    SKIN:  Exam: Good skin turgor, no skin breakdown.     METABOLIC/FLUIDS/ELECTROLYTES  dextrose 5%. 1000 milliLiter(s) IV Continuous <Continuous>  dextrose 5%. 1000 milliLiter(s) IV Continuous <Continuous>      HEMATOLOGIC  [x] DVT Prophylaxis: heparin   Injectable 5000 Unit(s) SubCutaneous every 8 hours    Transfusions:	[] PRBC	[] Platelets		[] FFP	[] Cryoprecipitate    INFECTIOUS DISEASE  Antimicrobials/Immunologic Medications:  piperacillin/tazobactam IVPB.. 3.375 Gram(s) IV Intermittent every 8 hours    Tubes/Lines/Drains    [x] Peripheral IV  [] Central Venous Line     	[] R	[] L	[] IJ	[] Fem	[] SC	Date Placed:   [] Arterial Line		[] R	[] L	[] Fem	[] Rad	[] Ax	Date Placed:   [] PICC:         	[] Midline		[] Mediport  [] Urinary Catheter		Date Placed:     LABS  --------------------------------------------------------------------------------------  CBC (03-26 @ 01:43)                              8.7<L>                         7.37    )----------------(  248        --    % Neutrophils, --    % Lymphocytes, ANC: --                                  26.9<L>  CBC (03-25 @ 06:39)                              9.5<L>                         9.21    )----------------(  244        --    % Neutrophils, --    % Lymphocytes, ANC: --                                  29.3<L>    BMP (03-26 @ 01:43)             137     |  98      |  12    		Ca++ --      Ca 8.8                ---------------------------------( 120<H>		Mg 1.90               3.4<L>  |  26      |  1.33<H>			Ph 3.0     BMP (03-25 @ 06:39)             138     |  101     |  9     		Ca++ --      Ca 9.0                ---------------------------------( 144<H>		Mg 2.00               3.9     |  24      |  1.03  			Ph 3.0       LFTs (03-26 @ 01:43)      TPro 7.1 / Alb 3.5 / TBili 0.6 / DBili -- / AST 13 / ALT 15 / AlkPhos 126<H>    Coags (03-26 @ 01:43)  aPTT 29.1 / INR 1.03 / PT 11.9  Coags (03-25 @ 06:39)  aPTT 33.7 / INR 0.99 / PT 11.5    Cardiac Markers (03-25 @ 06:39)     Trop: -- -- / CKMB: -- / CK: 194        --------------------------------------------------------------------------------------      ASSESSMENT:  66y Female ***    PLAN:  ***  Neurologic:   Respiratory:   Cardiovascular:   Gastrointestinal/Nutrition:   Renal/Genitourinary:   Hematologic:   Infectious Disease:   Tubes/Lines/Drains:   Endocrine:     Disposition: SICU    --------------------------------------------------------------------------------------    Critical Care Diagnoses: Hypertensive emergency, chest pain   SICU Daily Progress Note  =====================================================  HPI: Patient is a 66 year old female with a PMHx of HTN, HLD, hypothyroidism, DM2, SOLOMON (not consistent with using CPAP), pernicious anemia and diverticulosis (S/P colon resection 10/2021) without outpatient primary care follow up who presented to pre-surgical testing for evaluation for Naeem reversal.  Patient is now S/P ex-lap, Naeem reversal and diverting loop ileostomy on 3/21/22. Patient is POD4 and has been increasingly hypertensive on the floor. Febrile to 101.6 on 3/24 UA-, CXR with RLL infiltrate and was started on Zosyn secondary to suspected aspiration PNA. Blood cultures sent 3/24 pending results. Patient then developed HTN emergency on 3/25 with SBP as high as 212/109 with chest pressure and headache and was treated with Lasix 40mg IVPx1, Hydralazine 5mg IVPx3, Labetalol 10mg IVPx2 with resolution of symptoms. Patient continued to be hypertensive to 180 systolic and was treated further with Hydralazine 20mg IVPx1 with improvement in hemodynamics and symptoms, and transferred to SICU for further monitoring.     HISTORY  66 year old female with PMHx:  HTN, HLD, Hypothyroid, T2DM, SOLOMON not consistent with using CPAP, Pernicious anemia, diverticulosis - S/P colon resection 10/2021. Pt presents to PST today for pre op evaluation in preparation for Martin reversal  (16 Mar 2022 17:23)     REVIEW OF SYSTEMS:   +Headache, +Chest pressure    MEDICATIONS  (STANDING):  amLODIPine   Tablet 5 milliGRAM(s) Oral daily  dextrose 40% Gel 15 Gram(s) Oral once  dextrose 5%. 1000 milliLiter(s) (50 mL/Hr) IV Continuous <Continuous>  dextrose 5%. 1000 milliLiter(s) (100 mL/Hr) IV Continuous <Continuous>  dextrose 50% Injectable 25 Gram(s) IV Push once  dextrose 50% Injectable 12.5 Gram(s) IV Push once  dextrose 50% Injectable 25 Gram(s) IV Push once  glucagon  Injectable 1 milliGRAM(s) IntraMuscular once  heparin   Injectable 5000 Unit(s) SubCutaneous every 8 hours  hydrALAZINE 25 milliGRAM(s) Oral every 6 hours  insulin lispro (ADMELOG) corrective regimen sliding scale   SubCutaneous three times a day before meals  insulin lispro (ADMELOG) corrective regimen sliding scale   SubCutaneous at bedtime  levothyroxine Injectable 100 MICROGram(s) IV Push at bedtime  lisinopril 20 milliGRAM(s) Oral every 12 hours  magnesium sulfate  IVPB 2 Gram(s) IV Intermittent once  ondansetron Injectable 4 milliGRAM(s) IV Push once  piperacillin/tazobactam IVPB.. 3.375 Gram(s) IV Intermittent every 8 hours  potassium chloride  10 mEq/100 mL IVPB 10 milliEquivalent(s) IV Intermittent every 1 hour    MEDICATIONS  (PRN):  HYDROmorphone   Tablet 2 milliGRAM(s) Oral every 3 hours PRN Moderate Pain (4 - 6)  HYDROmorphone   Tablet 4 milliGRAM(s) Oral every 3 hours PRN Severe Pain (7 - 10)  HYDROmorphone  Injectable 0.3 milliGRAM(s) IV Push every 3 hours PRN Severe breakthrough Pain (7 - 10)  naloxone Injectable 0.1 milliGRAM(s) IV Push every 3 minutes PRN For ANY of the following changes in patient status:  A. RR LESS THAN 10 breaths per minute, B. Oxygen saturation LESS THAN 90%, C. Sedation score of 6  ondansetron Injectable 4 milliGRAM(s) IV Push every 6 hours PRN Nausea  tetrahydrazoline Solution 1 Drop(s) Both EYES daily PRN Dry eyes    ICU Vital Signs Last 24 Hrs  T(C): 36.8 (26 Mar 2022 00:00), Max: 37.2 (25 Mar 2022 09:00)  T(F): 98.3 (26 Mar 2022 00:00), Max: 99 (25 Mar 2022 09:00)  HR: 80 (26 Mar 2022 01:00) (79 - 90)  BP: 158/75 (26 Mar 2022 01:00) (133/69 - 212/109)  BP(mean): 94 (26 Mar 2022 01:00) (84 - 111)  ABP: --  ABP(mean): --  RR: 17 (26 Mar 2022 01:00) (16 - 24)  SpO2: 97% (26 Mar 2022 01:00) (95% - 100%)    I&O's Detail    24 Mar 2022 07:01  -  25 Mar 2022 07:00  --------------------------------------------------------  IN:    IV PiggyBack: 675 mL    Oral Fluid: 540 mL  Total IN: 1215 mL    OUT:    Ileostomy (mL): 800 mL    Indwelling Catheter - Urethral (mL): 1275 mL    Voided (mL): 900 mL  Total OUT: 2975 mL    Total NET: -1760 mL      25 Mar 2022 07:01  -  26 Mar 2022 02:57  --------------------------------------------------------  IN:    IV PiggyBack: 100 mL    Oral Fluid: 600 mL  Total IN: 700 mL    OUT:    Ileostomy (mL): 100 mL    Voided (mL): 1550 mL  Total OUT: 1650 mL    Total NET: -950 mL      --------------------------------------------------------------------------------------    EXAM  NEUROLOGY  Exam: Normal, NAD, alert, oriented x 3, no focal deficits.     HEENT  Exam: Normocephalic, atraumatic.  EOMI    RESPIRATORY  Exam: Lungs clear to auscultation, Normal expansion/effort.    CARDIOVASCULAR  Exam: S1, S2.  Regular rate and rhythm.  Peripheral edema     GI/NUTRITION  Exam: Abdomen soft, mild tenderness to palpation, +ostomy (+stool/gas output)  Current Diet:  Diet, low fiber    VASCULAR  Exam: Extremities warm, pink, well-perfused.     MUSCULOSKELETAL  Exam: All extremities moving spontaneously without limitations    SKIN:  Exam: Good skin turgor, no skin breakdown.     METABOLIC/FLUIDS/ELECTROLYTES  dextrose 5%. 1000 milliLiter(s) IV Continuous <Continuous>  dextrose 5%. 1000 milliLiter(s) IV Continuous <Continuous>      HEMATOLOGIC  [x] DVT Prophylaxis: heparin   Injectable 5000 Unit(s) SubCutaneous every 8 hours    Transfusions:	[] PRBC	[] Platelets		[] FFP	[] Cryoprecipitate    INFECTIOUS DISEASE  Antimicrobials/Immunologic Medications:  piperacillin/tazobactam IVPB.. 3.375 Gram(s) IV Intermittent every 8 hours    Tubes/Lines/Drains    [x] Peripheral IV  [] Central Venous Line     	[] R	[] L	[] IJ	[] Fem	[] SC	Date Placed:   [] Arterial Line		[] R	[] L	[] Fem	[] Rad	[] Ax	Date Placed:   [] PICC:         	[] Midline		[] Mediport  [] Urinary Catheter		Date Placed:     LABS  --------------------------------------------------------------------------------------  CBC (03-26 @ 01:43)                              8.7<L>                         7.37    )----------------(  248        --    % Neutrophils, --    % Lymphocytes, ANC: --                                  26.9<L>  CBC (03-25 @ 06:39)                              9.5<L>                         9.21    )----------------(  244        --    % Neutrophils, --    % Lymphocytes, ANC: --                                  29.3<L>    BMP (03-26 @ 01:43)             137     |  98      |  12    		Ca++ --      Ca 8.8                ---------------------------------( 120<H>		Mg 1.90               3.4<L>  |  26      |  1.33<H>			Ph 3.0     BMP (03-25 @ 06:39)             138     |  101     |  9     		Ca++ --      Ca 9.0                ---------------------------------( 144<H>		Mg 2.00               3.9     |  24      |  1.03  			Ph 3.0       LFTs (03-26 @ 01:43)      TPro 7.1 / Alb 3.5 / TBili 0.6 / DBili -- / AST 13 / ALT 15 / AlkPhos 126<H>    Coags (03-26 @ 01:43)  aPTT 29.1 / INR 1.03 / PT 11.9  Coags (03-25 @ 06:39)  aPTT 33.7 / INR 0.99 / PT 11.5    Cardiac Markers (03-25 @ 06:39)     Trop: -- -- / CKMB: -- / CK: 194        --------------------------------------------------------------------------------------      ASSESSMENT:  66y Female s/p ex-lap, Naeem reversal and diverting loop ileostomy on 3/21/22 course c/b HTN emergency requiring cleviprex drip    PLAN:   Neurologic: A&Ox3, dilaudid PRN  Respiratory: satting well on RA  Cardiovascular: c/w lisinopril, hydralazine for BP control; CP now resolved  Gastrointestinal/Nutrition: c/w diet, zofran PRN, functioning ileostomy  Renal/Genitourinary: voiding, replete electrolytes as needed  Hematologic: SQH, trend CBC  Infectious Disease: WAI  Tubes/Lines/Drains: PIV  Endocrine: c/w synthyroid    Disposition: SICU    --------------------------------------------------------------------------------------    Critical Care Diagnoses: Hypertensive emergency, chest pain   SICU Daily Progress Note  =====================================================  HPI: Patient is a 66 year old female with a PMHx of HTN, HLD, hypothyroidism, DM2, SOLOMON (not consistent with using CPAP), pernicious anemia and diverticulosis (S/P colon resection 10/2021) without outpatient primary care follow up who presented to pre-surgical testing for evaluation for Naeem reversal.  Patient is now S/P ex-lap, Naeem reversal and diverting loop ileostomy on 3/21/22. Patient is POD4 and has been increasingly hypertensive on the floor. Febrile to 101.6 on 3/24 UA-, CXR with RLL infiltrate and was started on Zosyn secondary to suspected aspiration PNA. Blood cultures sent 3/24 pending results. Patient then developed HTN emergency on 3/25 with SBP as high as 212/109 with chest pressure and headache and was treated with Lasix 40mg IVPx1, Hydralazine 5mg IVPx3, Labetalol 10mg IVPx2 with resolution of symptoms. Patient continued to be hypertensive to 180 systolic and was treated further with Hydralazine 20mg IVPx1 with improvement in hemodynamics and symptoms, and transferred to SICU for further monitoring.     HISTORY  66 year old female with PMHx:  HTN, HLD, Hypothyroid, T2DM, SOLOMON not consistent with using CPAP, Pernicious anemia, diverticulosis - S/P colon resection 10/2021. Pt presents to PST today for pre op evaluation in preparation for Martin reversal  (16 Mar 2022 17:23)     Interval events:  -Transitioned to PO meds  -DC perla TOV  -Zosyn DC  -IV locked    REVIEW OF SYSTEMS:   +Headache, +Chest pressure    MEDICATIONS  (STANDING):  amLODIPine   Tablet 5 milliGRAM(s) Oral daily  dextrose 40% Gel 15 Gram(s) Oral once  dextrose 5%. 1000 milliLiter(s) (50 mL/Hr) IV Continuous <Continuous>  dextrose 5%. 1000 milliLiter(s) (100 mL/Hr) IV Continuous <Continuous>  dextrose 50% Injectable 25 Gram(s) IV Push once  dextrose 50% Injectable 12.5 Gram(s) IV Push once  dextrose 50% Injectable 25 Gram(s) IV Push once  glucagon  Injectable 1 milliGRAM(s) IntraMuscular once  heparin   Injectable 5000 Unit(s) SubCutaneous every 8 hours  hydrALAZINE 25 milliGRAM(s) Oral every 6 hours  insulin lispro (ADMELOG) corrective regimen sliding scale   SubCutaneous three times a day before meals  insulin lispro (ADMELOG) corrective regimen sliding scale   SubCutaneous at bedtime  levothyroxine Injectable 100 MICROGram(s) IV Push at bedtime  lisinopril 20 milliGRAM(s) Oral every 12 hours  magnesium sulfate  IVPB 2 Gram(s) IV Intermittent once  ondansetron Injectable 4 milliGRAM(s) IV Push once  piperacillin/tazobactam IVPB.. 3.375 Gram(s) IV Intermittent every 8 hours  potassium chloride  10 mEq/100 mL IVPB 10 milliEquivalent(s) IV Intermittent every 1 hour    MEDICATIONS  (PRN):  HYDROmorphone   Tablet 2 milliGRAM(s) Oral every 3 hours PRN Moderate Pain (4 - 6)  HYDROmorphone   Tablet 4 milliGRAM(s) Oral every 3 hours PRN Severe Pain (7 - 10)  HYDROmorphone  Injectable 0.3 milliGRAM(s) IV Push every 3 hours PRN Severe breakthrough Pain (7 - 10)  naloxone Injectable 0.1 milliGRAM(s) IV Push every 3 minutes PRN For ANY of the following changes in patient status:  A. RR LESS THAN 10 breaths per minute, B. Oxygen saturation LESS THAN 90%, C. Sedation score of 6  ondansetron Injectable 4 milliGRAM(s) IV Push every 6 hours PRN Nausea  tetrahydrazoline Solution 1 Drop(s) Both EYES daily PRN Dry eyes    ICU Vital Signs Last 24 Hrs  T(C): 36.8 (26 Mar 2022 00:00), Max: 37.2 (25 Mar 2022 09:00)  T(F): 98.3 (26 Mar 2022 00:00), Max: 99 (25 Mar 2022 09:00)  HR: 80 (26 Mar 2022 01:00) (79 - 90)  BP: 158/75 (26 Mar 2022 01:00) (133/69 - 212/109)  BP(mean): 94 (26 Mar 2022 01:00) (84 - 111)  ABP: --  ABP(mean): --  RR: 17 (26 Mar 2022 01:00) (16 - 24)  SpO2: 97% (26 Mar 2022 01:00) (95% - 100%)    I&O's Detail    24 Mar 2022 07:01  -  25 Mar 2022 07:00  --------------------------------------------------------  IN:    IV PiggyBack: 675 mL    Oral Fluid: 540 mL  Total IN: 1215 mL    OUT:    Ileostomy (mL): 800 mL    Indwelling Catheter - Urethral (mL): 1275 mL    Voided (mL): 900 mL  Total OUT: 2975 mL    Total NET: -1760 mL      25 Mar 2022 07:01  -  26 Mar 2022 02:57  --------------------------------------------------------  IN:    IV PiggyBack: 100 mL    Oral Fluid: 600 mL  Total IN: 700 mL    OUT:    Ileostomy (mL): 100 mL    Voided (mL): 1550 mL  Total OUT: 1650 mL    Total NET: -950 mL      --------------------------------------------------------------------------------------    EXAM  NEUROLOGY  Exam: Normal, NAD, alert, oriented x 3, no focal deficits.     HEENT  Exam: Normocephalic, atraumatic.  EOMI    RESPIRATORY  Exam: Lungs clear to auscultation, Normal expansion/effort.    CARDIOVASCULAR  Exam: S1, S2.  Regular rate and rhythm.  Peripheral edema     GI/NUTRITION  Exam: Abdomen soft, mild tenderness to palpation, +ostomy (+stool/gas output)  Current Diet:  Diet, low fiber    VASCULAR  Exam: Extremities warm, pink, well-perfused.     MUSCULOSKELETAL  Exam: All extremities moving spontaneously without limitations    SKIN:  Exam: Good skin turgor, no skin breakdown.     METABOLIC/FLUIDS/ELECTROLYTES  dextrose 5%. 1000 milliLiter(s) IV Continuous <Continuous>  dextrose 5%. 1000 milliLiter(s) IV Continuous <Continuous>      HEMATOLOGIC  [x] DVT Prophylaxis: heparin   Injectable 5000 Unit(s) SubCutaneous every 8 hours    Transfusions:	[] PRBC	[] Platelets		[] FFP	[] Cryoprecipitate    INFECTIOUS DISEASE  Antimicrobials/Immunologic Medications:  piperacillin/tazobactam IVPB.. 3.375 Gram(s) IV Intermittent every 8 hours    Tubes/Lines/Drains    [x] Peripheral IV  [] Central Venous Line     	[] R	[] L	[] IJ	[] Fem	[] SC	Date Placed:   [] Arterial Line		[] R	[] L	[] Fem	[] Rad	[] Ax	Date Placed:   [] PICC:         	[] Midline		[] Mediport  [] Urinary Catheter		Date Placed:     LABS  --------------------------------------------------------------------------------------  CBC (03-26 @ 01:43)                              8.7<L>                         7.37    )----------------(  248        --    % Neutrophils, --    % Lymphocytes, ANC: --                                  26.9<L>  CBC (03-25 @ 06:39)                              9.5<L>                         9.21    )----------------(  244        --    % Neutrophils, --    % Lymphocytes, ANC: --                                  29.3<L>    BMP (03-26 @ 01:43)             137     |  98      |  12    		Ca++ --      Ca 8.8                ---------------------------------( 120<H>		Mg 1.90               3.4<L>  |  26      |  1.33<H>			Ph 3.0     BMP (03-25 @ 06:39)             138     |  101     |  9     		Ca++ --      Ca 9.0                ---------------------------------( 144<H>		Mg 2.00               3.9     |  24      |  1.03  			Ph 3.0       LFTs (03-26 @ 01:43)      TPro 7.1 / Alb 3.5 / TBili 0.6 / DBili -- / AST 13 / ALT 15 / AlkPhos 126<H>    Coags (03-26 @ 01:43)  aPTT 29.1 / INR 1.03 / PT 11.9  Coags (03-25 @ 06:39)  aPTT 33.7 / INR 0.99 / PT 11.5    Cardiac Markers (03-25 @ 06:39)     Trop: -- -- / CKMB: -- / CK: 194        --------------------------------------------------------------------------------------      ASSESSMENT:  66y Female s/p ex-lap, Naeem reversal and diverting loop ileostomy on 3/21/22 course c/b HTN emergency requiring cleviprex drip    PLAN:   Neurologic: A&Ox3, dilaudid PRN  Respiratory: satting well on RA  Cardiovascular: c/w lisinopril increase to 40 as per cards, hydralazine for BP control; CP now resolved. Transitioned to PO meds. Amlodipine 10mg as per cards  Gastrointestinal/Nutrition: c/w diet, zofran PRN, functioning ileostomy  Renal/Genitourinary: voiding, replete electrolytes as needed. Tatiana CHUA tobindu  Hematologic: SQH, trend CBC  Infectious Disease: Zosyn dc'd  Tubes/Lines/Drains: PIV  Endocrine: c/w synthyroid    Disposition: SICU    --------------------------------------------------------------------------------------    Critical Care Diagnoses: Hypertensive emergency, chest pain   SICU Daily Progress Note  =====================================================  HPI: Patient is a 66 year old female with a PMHx of HTN, HLD, hypothyroidism, DM2, SOLOMON (not consistent with using CPAP), pernicious anemia and diverticulosis (S/P colon resection 10/2021) without outpatient primary care follow up who presented to pre-surgical testing for evaluation for Naeem reversal.  Patient is now S/P ex-lap, Naeem reversal and diverting loop ileostomy on 3/21/22. Patient is POD4 and has been increasingly hypertensive on the floor. Febrile to 101.6 on 3/24 UA-, CXR with RLL infiltrate and was started on Zosyn secondary to suspected aspiration PNA. Blood cultures sent 3/24 pending results. Patient then developed HTN emergency on 3/25 with SBP as high as 212/109 with chest pressure and headache and was treated with Lasix 40mg IVPx1, Hydralazine 5mg IVPx3, Labetalol 10mg IVPx2 with resolution of symptoms. Patient continued to be hypertensive to 180 systolic and was treated further with Hydralazine 20mg IVPx1 with improvement in hemodynamics and symptoms, and transferred to SICU for further monitoring. She was treated with hydralazine 25mg q6h IVP, transitioned to PO and tolerated well    HISTORY  66 year old female with PMHx:  HTN, HLD, Hypothyroid, T2DM, SOLOMON not consistent with using CPAP, Pernicious anemia, diverticulosis - S/P colon resection 10/2021. Pt presents to PST today for pre op evaluation in preparation for Martin reversal  (16 Mar 2022 17:23)     Interval events:  -Transitioned to PO meds  -DC perla TOV  -Zosyn DC  -IV locked    REVIEW OF SYSTEMS:   -HA, -CP, 10 point ROS otherwise negative    MEDICATIONS  (STANDING):  amLODIPine   Tablet 5 milliGRAM(s) Oral daily  dextrose 40% Gel 15 Gram(s) Oral once  dextrose 5%. 1000 milliLiter(s) (50 mL/Hr) IV Continuous <Continuous>  dextrose 5%. 1000 milliLiter(s) (100 mL/Hr) IV Continuous <Continuous>  dextrose 50% Injectable 25 Gram(s) IV Push once  dextrose 50% Injectable 12.5 Gram(s) IV Push once  dextrose 50% Injectable 25 Gram(s) IV Push once  glucagon  Injectable 1 milliGRAM(s) IntraMuscular once  heparin   Injectable 5000 Unit(s) SubCutaneous every 8 hours  hydrALAZINE 25 milliGRAM(s) Oral every 6 hours  insulin lispro (ADMELOG) corrective regimen sliding scale   SubCutaneous three times a day before meals  insulin lispro (ADMELOG) corrective regimen sliding scale   SubCutaneous at bedtime  levothyroxine Injectable 100 MICROGram(s) IV Push at bedtime  lisinopril 20 milliGRAM(s) Oral every 12 hours  magnesium sulfate  IVPB 2 Gram(s) IV Intermittent once  ondansetron Injectable 4 milliGRAM(s) IV Push once  piperacillin/tazobactam IVPB.. 3.375 Gram(s) IV Intermittent every 8 hours  potassium chloride  10 mEq/100 mL IVPB 10 milliEquivalent(s) IV Intermittent every 1 hour    MEDICATIONS  (PRN):  HYDROmorphone   Tablet 2 milliGRAM(s) Oral every 3 hours PRN Moderate Pain (4 - 6)  HYDROmorphone   Tablet 4 milliGRAM(s) Oral every 3 hours PRN Severe Pain (7 - 10)  HYDROmorphone  Injectable 0.3 milliGRAM(s) IV Push every 3 hours PRN Severe breakthrough Pain (7 - 10)  naloxone Injectable 0.1 milliGRAM(s) IV Push every 3 minutes PRN For ANY of the following changes in patient status:  A. RR LESS THAN 10 breaths per minute, B. Oxygen saturation LESS THAN 90%, C. Sedation score of 6  ondansetron Injectable 4 milliGRAM(s) IV Push every 6 hours PRN Nausea  tetrahydrazoline Solution 1 Drop(s) Both EYES daily PRN Dry eyes    ICU Vital Signs Last 24 Hrs  T(C): 36.8 (26 Mar 2022 00:00), Max: 37.2 (25 Mar 2022 09:00)  T(F): 98.3 (26 Mar 2022 00:00), Max: 99 (25 Mar 2022 09:00)  HR: 80 (26 Mar 2022 01:00) (79 - 90)  BP: 158/75 (26 Mar 2022 01:00) (133/69 - 212/109)  BP(mean): 94 (26 Mar 2022 01:00) (84 - 111)  ABP: --  ABP(mean): --  RR: 17 (26 Mar 2022 01:00) (16 - 24)  SpO2: 97% (26 Mar 2022 01:00) (95% - 100%)    I&O's Detail    24 Mar 2022 07:01  -  25 Mar 2022 07:00  --------------------------------------------------------  IN:    IV PiggyBack: 675 mL    Oral Fluid: 540 mL  Total IN: 1215 mL    OUT:    Ileostomy (mL): 800 mL    Indwelling Catheter - Urethral (mL): 1275 mL    Voided (mL): 900 mL  Total OUT: 2975 mL    Total NET: -1760 mL      25 Mar 2022 07:01  -  26 Mar 2022 02:57  --------------------------------------------------------  IN:    IV PiggyBack: 100 mL    Oral Fluid: 600 mL  Total IN: 700 mL    OUT:    Ileostomy (mL): 100 mL    Voided (mL): 1550 mL  Total OUT: 1650 mL    Total NET: -950 mL      --------------------------------------------------------------------------------------    EXAM  NEUROLOGY  Exam: Normal, NAD, alert, oriented x 3, no focal deficits.     HEENT  Exam: Normocephalic, atraumatic.  EOMI    RESPIRATORY  Exam: Lungs clear to auscultation, Normal expansion/effort.    CARDIOVASCULAR  Exam: S1, S2.  Regular rate and rhythm.  Peripheral edema     GI/NUTRITION  Exam: Abdomen soft, mild tenderness to palpation, +ostomy (+stool/gas output)  Current Diet:  Diet, low fiber    VASCULAR  Exam: Extremities warm, pink, well-perfused.     MUSCULOSKELETAL  Exam: All extremities moving spontaneously without limitations    SKIN:  Exam: Good skin turgor, no skin breakdown.     METABOLIC/FLUIDS/ELECTROLYTES  dextrose 5%. 1000 milliLiter(s) IV Continuous <Continuous>  dextrose 5%. 1000 milliLiter(s) IV Continuous <Continuous>      HEMATOLOGIC  [x] DVT Prophylaxis: heparin   Injectable 5000 Unit(s) SubCutaneous every 8 hours    Transfusions:	[] PRBC	[] Platelets		[] FFP	[] Cryoprecipitate    INFECTIOUS DISEASE  Antimicrobials/Immunologic Medications:  piperacillin/tazobactam IVPB.. 3.375 Gram(s) IV Intermittent every 8 hours    Tubes/Lines/Drains    [x] Peripheral IV  [] Central Venous Line     	[] R	[] L	[] IJ	[] Fem	[] SC	Date Placed:   [] Arterial Line		[] R	[] L	[] Fem	[] Rad	[] Ax	Date Placed:   [] PICC:         	[] Midline		[] Mediport  [] Urinary Catheter		Date Placed:     LABS  --------------------------------------------------------------------------------------  CBC (03-26 @ 01:43)                              8.7<L>                         7.37    )----------------(  248        --    % Neutrophils, --    % Lymphocytes, ANC: --                                  26.9<L>  CBC (03-25 @ 06:39)                              9.5<L>                         9.21    )----------------(  244        --    % Neutrophils, --    % Lymphocytes, ANC: --                                  29.3<L>    BMP (03-26 @ 01:43)             137     |  98      |  12    		Ca++ --      Ca 8.8                ---------------------------------( 120<H>		Mg 1.90               3.4<L>  |  26      |  1.33<H>			Ph 3.0     BMP (03-25 @ 06:39)             138     |  101     |  9     		Ca++ --      Ca 9.0                ---------------------------------( 144<H>		Mg 2.00               3.9     |  24      |  1.03  			Ph 3.0       LFTs (03-26 @ 01:43)      TPro 7.1 / Alb 3.5 / TBili 0.6 / DBili -- / AST 13 / ALT 15 / AlkPhos 126<H>    Coags (03-26 @ 01:43)  aPTT 29.1 / INR 1.03 / PT 11.9  Coags (03-25 @ 06:39)  aPTT 33.7 / INR 0.99 / PT 11.5    Cardiac Markers (03-25 @ 06:39)     Trop: -- -- / CKMB: -- / CK: 194        --------------------------------------------------------------------------------------      ASSESSMENT:  66y Female s/p ex-lap, Naeem reversal and diverting loop ileostomy on 3/21/22 course c/b HTN emergency requiring cleviprex drip, transitioned to PO antihypertensives tolerating it well    PLAN:   Neurologic: A&Ox3, dilaudid PRN  Respiratory: satting well on RA  Cardiovascular: c/w lisinopril increase to 40 as per cards, hydralazine for BP control; CP now resolved. Transitioned to PO meds. Amlodipine 10mg as per cards  Gastrointestinal/Nutrition: c/w diet, zofran PRN, functioning ileostomy  Renal/Genitourinary: voiding, replete electrolytes as needed. Tatiana COSBY. tov  Hematologic: SQH, trend CBC  Infectious Disease: Zosyn dc'd  Tubes/Lines/Drains: PIV  Endocrine: c/w synthyroid    Disposition: SICU    --------------------------------------------------------------------------------------    Critical Care Diagnoses: Hypertensive emergency, chest pain

## 2022-03-26 NOTE — PROGRESS NOTE ADULT - ASSESSMENT
Patient is a 66 year old female with a PMHx of HTN, HLD, hypothyroidism, DM2, SOLOMON (not consistent with using CPAP), pernicious anemia and diverticulosis (S/P colon resection 10/2021) who presented to pre-surgical testing for evaluation for Naeem reversal.  Patient is now S/P ex-lap, Naeem reversal and diverting loop ileostomy on 3/21/22.      PLAN:  - Low fiber diet  - D5 + 1/2NS @75cc/hr  - appreciate cards recs  - Out of bed  -pain control PRN  - VTE prophylaxis with Heparin subcutaneous  -transfer to SICU for hemodynamic monitoring      #[id number]  [facility] Patient is a 66 year old female with a PMHx of HTN, HLD, hypothyroidism, DM2, SOLOMON (not consistent with using CPAP), pernicious anemia and diverticulosis (S/P colon resection 10/2021) who presented to pre-surgical testing for evaluation for Naeem reversal.  Patient is now S/P ex-lap, Naeem reversal and diverting loop ileostomy on 3/21/22.      PLAN:  - Low fiber diet  - D5 + 1/2NS @75cc/hr  - appreciate cards recs  - Out of bed  -pain control PRN  - VTE prophylaxis with Heparin subcutaneous  continue excellent care per SICU      #[id number]  [facility]

## 2022-03-26 NOTE — PROGRESS NOTE ADULT - SUBJECTIVE AND OBJECTIVE BOX
Kirby Betancourt  [phone]  All Cardiology service information can be found 24/7 on [email or web address], password: [email or web address]    Overnight Events: No events overnight. Pt complains of mild headache, denies chest pain, sob, or palpitations.     Current Meds:  amLODIPine   Tablet 5 milliGRAM(s) Oral daily  dextrose 50% Injectable 25 Gram(s) IV Push once  dextrose 50% Injectable 12.5 Gram(s) IV Push once  dextrose 50% Injectable 25 Gram(s) IV Push once  heparin   Injectable 5000 Unit(s) SubCutaneous every 8 hours  hydrALAZINE 25 milliGRAM(s) Oral every 6 hours  HYDROmorphone   Tablet 2 milliGRAM(s) Oral every 3 hours PRN  HYDROmorphone   Tablet 4 milliGRAM(s) Oral every 3 hours PRN  insulin lispro (ADMELOG) corrective regimen sliding scale   SubCutaneous three times a day before meals  insulin lispro (ADMELOG) corrective regimen sliding scale   SubCutaneous at bedtime  levothyroxine 200 MICROGram(s) Oral daily  lisinopril 20 milliGRAM(s) Oral every 12 hours  naloxone Injectable 0.1 milliGRAM(s) IV Push every 3 minutes PRN  ondansetron Injectable 4 milliGRAM(s) IV Push once  ondansetron Injectable 4 milliGRAM(s) IV Push every 6 hours PRN  piperacillin/tazobactam IVPB.. 3.375 Gram(s) IV Intermittent every 8 hours  tetrahydrazoline Solution 1 Drop(s) Both EYES daily PRN      Vitals:  T(F): 98.2 (03-26), Max: 99 (03-25)  HR: 84 (03-26) (76 - 90)  BP: 165/82 (03-26) (133/69 - 182/74)  RR: 20 (03-26)  SpO2: 95% (03-26)  I&O's Summary    25 Mar 2022 07:01  -  26 Mar 2022 07:00  --------------------------------------------------------  IN: 950 mL / OUT: 2150 mL / NET: -1200 mL        Physical Exam:  Appearance: No acute distress  Eyes: pink conjunctiva  HEENT: Normal oral mucosa  Cardiovascular: RRR, S1, S2, no edema; no JVD  Respiratory: Clear to auscultation bilaterally  Gastrointestinal: soft, non-tender, non-distended with normal bowel sounds  Musculoskeletal: No clubbing; no joint deformity   Neurologic: Non-focal  Lymphatic: No lymphadenopathy  Psychiatry:  mood & affect appropriate  Skin: No rashes, ecchymoses, or cyanosis                          8.7    7.37  )-----------( 248      ( 26 Mar 2022 01:43 )             26.9     03-26    137  |  98  |  12  ----------------------------<  120<H>  3.4<L>   |  26  |  1.33<H>    Ca    8.8      26 Mar 2022 01:43  Phos  3.0     03-26  Mg     1.90     03-26    TPro  7.1  /  Alb  3.5  /  TBili  0.6  /  DBili  x   /  AST  13  /  ALT  15  /  AlkPhos  126<H>  03-26    PT/INR - ( 26 Mar 2022 01:43 )   PT: 11.9 sec;   INR: 1.03 ratio         PTT - ( 26 Mar 2022 01:43 )  PTT:29.1 sec  CARDIAC MARKERS ( 25 Mar 2022 06:39 )  8 ng/L / x     / x     / 194 U/L / x     / 2.0 ng/mL    Interpretation of Telemetry: SR

## 2022-03-26 NOTE — PROGRESS NOTE ADULT - ASSESSMENT
66-year-old woman with diabetes, hypertension, dyslipidemia and prior colon resection who underwent exploratory laparotomy, Naeem reversal and diverting loop ileostomy, who was noted to have severe hypertension, chest pain and headache in the post operative period. Cardiology consulted for HTN management.    #HTN  -Transition to Lisinopril 40mg QD  -Increase Amlodipine to 10mg QD  -Titrate Hydralazine as needed  -Obtain TTE    Kirby Betancourt PGY6  All Cardiology service information can be found 24/7 on [email or web address], password: [email or web address]

## 2022-03-27 ENCOUNTER — TRANSCRIPTION ENCOUNTER (OUTPATIENT)
Age: 67
End: 2022-03-27

## 2022-03-27 VITALS — OXYGEN SATURATION: 96 % | RESPIRATION RATE: 25 BRPM | HEART RATE: 88 BPM

## 2022-03-27 LAB
ALBUMIN SERPL ELPH-MCNC: 3.4 G/DL — SIGNIFICANT CHANGE UP (ref 3.3–5)
ALP SERPL-CCNC: 153 U/L — HIGH (ref 40–120)
ALT FLD-CCNC: 20 U/L — SIGNIFICANT CHANGE UP (ref 4–33)
ANION GAP SERPL CALC-SCNC: 9 MMOL/L — SIGNIFICANT CHANGE UP (ref 7–14)
APPEARANCE UR: CLEAR — SIGNIFICANT CHANGE UP
AST SERPL-CCNC: 32 U/L — SIGNIFICANT CHANGE UP (ref 4–32)
BACTERIA # UR AUTO: NEGATIVE — SIGNIFICANT CHANGE UP
BILIRUB SERPL-MCNC: 0.6 MG/DL — SIGNIFICANT CHANGE UP (ref 0.2–1.2)
BILIRUB UR-MCNC: NEGATIVE — SIGNIFICANT CHANGE UP
BUN SERPL-MCNC: 11 MG/DL — SIGNIFICANT CHANGE UP (ref 7–23)
CALCIUM SERPL-MCNC: 8.8 MG/DL — SIGNIFICANT CHANGE UP (ref 8.4–10.5)
CHLORIDE SERPL-SCNC: 98 MMOL/L — SIGNIFICANT CHANGE UP (ref 98–107)
CO2 SERPL-SCNC: 26 MMOL/L — SIGNIFICANT CHANGE UP (ref 22–31)
COLOR SPEC: SIGNIFICANT CHANGE UP
CREAT SERPL-MCNC: 1.11 MG/DL — SIGNIFICANT CHANGE UP (ref 0.5–1.3)
DIFF PNL FLD: ABNORMAL
EGFR: 54 ML/MIN/1.73M2 — LOW
EPI CELLS # UR: 1 /HPF — SIGNIFICANT CHANGE UP (ref 0–5)
GLUCOSE BLDC GLUCOMTR-MCNC: 153 MG/DL — HIGH (ref 70–99)
GLUCOSE BLDC GLUCOMTR-MCNC: 183 MG/DL — HIGH (ref 70–99)
GLUCOSE SERPL-MCNC: 150 MG/DL — HIGH (ref 70–99)
GLUCOSE UR QL: NEGATIVE — SIGNIFICANT CHANGE UP
HCT VFR BLD CALC: 28.1 % — LOW (ref 34.5–45)
HGB BLD-MCNC: 9 G/DL — LOW (ref 11.5–15.5)
HYALINE CASTS # UR AUTO: 1 /LPF — SIGNIFICANT CHANGE UP (ref 0–7)
KETONES UR-MCNC: NEGATIVE — SIGNIFICANT CHANGE UP
LEUKOCYTE ESTERASE UR-ACNC: NEGATIVE — SIGNIFICANT CHANGE UP
MAGNESIUM SERPL-MCNC: 2.2 MG/DL — SIGNIFICANT CHANGE UP (ref 1.6–2.6)
MCHC RBC-ENTMCNC: 26.8 PG — LOW (ref 27–34)
MCHC RBC-ENTMCNC: 32 GM/DL — SIGNIFICANT CHANGE UP (ref 32–36)
MCV RBC AUTO: 83.6 FL — SIGNIFICANT CHANGE UP (ref 80–100)
NITRITE UR-MCNC: NEGATIVE — SIGNIFICANT CHANGE UP
NRBC # BLD: 0 /100 WBCS — SIGNIFICANT CHANGE UP
NRBC # FLD: 0 K/UL — SIGNIFICANT CHANGE UP
PH UR: 8 — SIGNIFICANT CHANGE UP (ref 5–8)
PHOSPHATE SERPL-MCNC: 3 MG/DL — SIGNIFICANT CHANGE UP (ref 2.5–4.5)
PLATELET # BLD AUTO: 177 K/UL — SIGNIFICANT CHANGE UP (ref 150–400)
POTASSIUM SERPL-MCNC: 5 MMOL/L — SIGNIFICANT CHANGE UP (ref 3.5–5.3)
POTASSIUM SERPL-SCNC: 5 MMOL/L — SIGNIFICANT CHANGE UP (ref 3.5–5.3)
PROT SERPL-MCNC: 7.3 G/DL — SIGNIFICANT CHANGE UP (ref 6–8.3)
PROT UR-MCNC: ABNORMAL
RBC # BLD: 3.36 M/UL — LOW (ref 3.8–5.2)
RBC # FLD: 18.3 % — HIGH (ref 10.3–14.5)
RBC CASTS # UR COMP ASSIST: 5 /HPF — HIGH (ref 0–4)
SODIUM SERPL-SCNC: 133 MMOL/L — LOW (ref 135–145)
SP GR SPEC: 1.01 — SIGNIFICANT CHANGE UP (ref 1–1.05)
UROBILINOGEN FLD QL: SIGNIFICANT CHANGE UP
WBC # BLD: 9.26 K/UL — SIGNIFICANT CHANGE UP (ref 3.8–10.5)
WBC # FLD AUTO: 9.26 K/UL — SIGNIFICANT CHANGE UP (ref 3.8–10.5)
WBC UR QL: 2 /HPF — SIGNIFICANT CHANGE UP (ref 0–5)

## 2022-03-27 RX ORDER — HYDRALAZINE HCL 50 MG
1 TABLET ORAL
Qty: 0 | Refills: 0 | DISCHARGE
Start: 2022-03-27

## 2022-03-27 RX ORDER — LISINOPRIL 2.5 MG/1
40 TABLET ORAL DAILY
Refills: 0 | Status: DISCONTINUED | OUTPATIENT
Start: 2022-03-27 | End: 2022-03-27

## 2022-03-27 RX ORDER — AMLODIPINE BESYLATE 2.5 MG/1
1 TABLET ORAL
Qty: 0 | Refills: 0 | DISCHARGE
Start: 2022-03-27

## 2022-03-27 RX ORDER — LISINOPRIL 2.5 MG/1
1 TABLET ORAL
Qty: 0 | Refills: 0 | DISCHARGE
Start: 2022-03-27

## 2022-03-27 RX ORDER — HYDROMORPHONE HYDROCHLORIDE 2 MG/ML
1 INJECTION INTRAMUSCULAR; INTRAVENOUS; SUBCUTANEOUS
Qty: 15 | Refills: 0
Start: 2022-03-27 | End: 2022-03-31

## 2022-03-27 RX ORDER — RAMIPRIL 5 MG
0 CAPSULE ORAL
Qty: 0 | Refills: 0 | DISCHARGE

## 2022-03-27 RX ADMIN — Medication 1: at 11:19

## 2022-03-27 RX ADMIN — HYDROMORPHONE HYDROCHLORIDE 4 MILLIGRAM(S): 2 INJECTION INTRAMUSCULAR; INTRAVENOUS; SUBCUTANEOUS at 06:34

## 2022-03-27 RX ADMIN — Medication 25 MILLIGRAM(S): at 11:19

## 2022-03-27 RX ADMIN — PIPERACILLIN AND TAZOBACTAM 25 GRAM(S): 4; .5 INJECTION, POWDER, LYOPHILIZED, FOR SOLUTION INTRAVENOUS at 06:37

## 2022-03-27 RX ADMIN — Medication 200 MICROGRAM(S): at 07:22

## 2022-03-27 RX ADMIN — Medication 1: at 06:43

## 2022-03-27 RX ADMIN — AMLODIPINE BESYLATE 10 MILLIGRAM(S): 2.5 TABLET ORAL at 06:32

## 2022-03-27 RX ADMIN — HEPARIN SODIUM 5000 UNIT(S): 5000 INJECTION INTRAVENOUS; SUBCUTANEOUS at 06:31

## 2022-03-27 RX ADMIN — Medication 25 MILLIGRAM(S): at 06:31

## 2022-03-27 RX ADMIN — Medication 25 MILLIGRAM(S): at 00:55

## 2022-03-27 NOTE — DISCHARGE NOTE NURSING/CASE MANAGEMENT/SOCIAL WORK - NSDCPEFALRISK_GEN_ALL_CORE
For information on Fall & Injury Prevention, visit: [email or web address] OR  [email or web address] OR  https://www.cdc.gov/steadi/patient.html

## 2022-03-27 NOTE — PROGRESS NOTE ADULT - PROVIDER SPECIALTY LIST ADULT
Cardiology
Pain Medicine
Colorectal Surgery
Pain Medicine
SICU
Surgery
Surgery
SICU
Surgery

## 2022-03-27 NOTE — PROGRESS NOTE ADULT - SUBJECTIVE AND OBJECTIVE BOX
SICU Daily Progress Note  =====================================================  HPI: Patient is a 66 year old female with a PMHx of HTN, HLD, hypothyroidism, DM2, SOLOMON (not consistent with using CPAP), pernicious anemia and diverticulosis (S/P colon resection 10/2021) without outpatient primary care follow up who presented to pre-surgical testing for evaluation for Naeem reversal.  Patient is now S/P ex-lap, Naeem reversal and diverting loop ileostomy on 3/21/22. Patient is POD4 and has been increasingly hypertensive on the floor. Febrile to 101.6 on 3/24 UA-, CXR with RLL infiltrate and was started on Zosyn secondary to suspected aspiration PNA. Blood cultures sent 3/24 pending results. Patient then developed HTN emergency on 3/25 with SBP as high as 212/109 with chest pressure and headache and was treated with Lasix 40mg IVPx1, Hydralazine 5mg IVPx3, Labetalol 10mg IVPx2 with resolution of symptoms. Patient continued to be hypertensive to 180 systolic and was treated further with Hydralazine 20mg IVPx1 with improvement in hemodynamics and symptoms, and transferred to SICU for further monitoring. She was treated with hydralazine 25mg q6h IVP, transitioned to PO and tolerated well    Interval events:  - No acute issues overnight  - will likely d/c from home from SICU tomorrow     REVIEW OF SYSTEMS:   -HA, -CP, 10 point ROS otherwise negative    MEDICATIONS  (STANDING):  amLODIPine   Tablet 10 milliGRAM(s) Oral daily  dextrose 50% Injectable 25 Gram(s) IV Push once  dextrose 50% Injectable 12.5 Gram(s) IV Push once  dextrose 50% Injectable 25 Gram(s) IV Push once  heparin   Injectable 5000 Unit(s) SubCutaneous every 8 hours  hydrALAZINE 25 milliGRAM(s) Oral every 6 hours  insulin lispro (ADMELOG) corrective regimen sliding scale   SubCutaneous three times a day before meals  insulin lispro (ADMELOG) corrective regimen sliding scale   SubCutaneous at bedtime  levothyroxine 200 MICROGram(s) Oral daily  lisinopril 20 milliGRAM(s) Oral every 12 hours  piperacillin/tazobactam IVPB.. 3.375 Gram(s) IV Intermittent every 8 hours    MEDICATIONS  (PRN):  HYDROmorphone   Tablet 2 milliGRAM(s) Oral every 3 hours PRN Moderate Pain (4 - 6)  HYDROmorphone   Tablet 4 milliGRAM(s) Oral every 3 hours PRN Severe Pain (7 - 10)  naloxone Injectable 0.1 milliGRAM(s) IV Push every 3 minutes PRN For ANY of the following changes in patient status:  A. RR LESS THAN 10 breaths per minute, B. Oxygen saturation LESS THAN 90%, C. Sedation score of 6  ondansetron Injectable 4 milliGRAM(s) IV Push every 6 hours PRN Nausea  tetrahydrazoline Solution 1 Drop(s) Both EYES daily PRN Dry eyes    ICU Vital Signs Last 24 Hrs  T(C): 37 (27 Mar 2022 00:00), Max: 37.3 (26 Mar 2022 16:00)  T(F): 98.6 (27 Mar 2022 00:00), Max: 99.2 (26 Mar 2022 16:00)  HR: 86 (27 Mar 2022 00:00) (81 - 93)  BP: 156/71 (27 Mar 2022 00:00) (150/67 - 182/74)  BP(mean): 91 (27 Mar 2022 00:00) (86 - 109)  ABP: --  ABP(mean): --  RR: 22 (27 Mar 2022 00:00) (18 - 27)  SpO2: 95% (27 Mar 2022 00:00) (94% - 98%)    I&O's Detail    25 Mar 2022 07:01  -  26 Mar 2022 07:00  --------------------------------------------------------  IN:    IV PiggyBack: 350 mL    Oral Fluid: 600 mL  Total IN: 950 mL    OUT:    Ileostomy (mL): 500 mL    Voided (mL): 1650 mL  Total OUT: 2150 mL    Total NET: -1200 mL      26 Mar 2022 07:01  -  27 Mar 2022 04:34  --------------------------------------------------------  IN:    IV PiggyBack: 100 mL    Oral Fluid: 120 mL  Total IN: 220 mL    OUT:    Ileostomy (mL): 200 mL    Voided (mL): 1400 mL  Total OUT: 1600 mL    Total NET: -1380 mL    --------------------------------------------------------------------------------------    EXAM  NEUROLOGY  Exam: Normal, NAD, alert, oriented x 3, no focal deficits.     HEENT  Exam: Normocephalic, atraumatic.  EOMI    RESPIRATORY  Exam: Lungs clear to auscultation, Normal expansion/effort.    CARDIOVASCULAR  Exam: S1, S2.  Regular rate and rhythm.  Peripheral edema     GI/NUTRITION  Exam: Abdomen soft, mild tenderness to palpation, +ostomy (+stool/gas output)  Current Diet:  Diet, low fiber    VASCULAR  Exam: Extremities warm, pink, well-perfused.     MUSCULOSKELETAL  Exam: All extremities moving spontaneously without limitations    SKIN:  Exam: Good skin turgor, no skin breakdown.     METABOLIC/FLUIDS/ELECTROLYTES  dextrose 5%. 1000 milliLiter(s) IV Continuous <Continuous>  dextrose 5%. 1000 milliLiter(s) IV Continuous <Continuous>      HEMATOLOGIC  [x] DVT Prophylaxis: heparin   Injectable 5000 Unit(s) SubCutaneous every 8 hours    Transfusions:	[] PRBC	[] Platelets		[] FFP	[] Cryoprecipitate    INFECTIOUS DISEASE  Antimicrobials/Immunologic Medications:  piperacillin/tazobactam IVPB.. 3.375 Gram(s) IV Intermittent every 8 hours    Tubes/Lines/Drains    [x] Peripheral IV  [] Central Venous Line     	[] R	[] L	[] IJ	[] Fem	[] SC	Date Placed:   [] Arterial Line		[] R	[] L	[] Fem	[] Rad	[] Ax	Date Placed:   [] PICC:         	[] Midline		[] Mediport  [] Urinary Catheter		Date Placed:     LABS  --------------------------------------------------------------------------------------  CBC (03-27 @ 01:14)                              9.0<L>                         9.26    )----------------(  177        --    % Neutrophils, --    % Lymphocytes, ANC: --                                  28.1<L>  CBC (03-26 @ 01:43)                              8.7<L>                         7.37    )----------------(  248        --    % Neutrophils, --    % Lymphocytes, ANC: --                                  26.9<L>    BMP (03-27 @ 01:14)             133<L>  |  98      |  11    		Ca++ --      Ca 8.8                ---------------------------------( 150<H>		Mg 2.20               5.0     |  26      |  1.11  			Ph 3.0     BMP (03-26 @ 01:43)             137     |  98      |  12    		Ca++ --      Ca 8.8                ---------------------------------( 120<H>		Mg 1.90               3.4<L>  |  26      |  1.33<H>			Ph 3.0       LFTs (03-27 @ 01:14)      TPro 7.3 / Alb 3.4 / TBili 0.6 / DBili -- / AST 32 / ALT 20 / AlkPhos 153<H>  LFTs (03-26 @ 01:43)      TPro 7.1 / Alb 3.5 / TBili 0.6 / DBili -- / AST 13 / ALT 15 / AlkPhos 126<H>    Coags (03-26 @ 01:43)  aPTT 29.1 / INR 1.03 / PT 11.9    --------------------------------------------------------------------------------------      ASSESSMENT:  66y Female s/p ex-lap, Naeem reversal and diverting loop ileostomy on 3/21/22 course c/b HTN emergency requiring cleviprex drip, transitioned to PO antihypertensives tolerating it well    PLAN:   Neurologic: A&Ox3, dilaudid PRN  Respiratory: satting well on RA  Cardiovascular: c/w lisinopril increase to 40 as per cards, hydralazine for BP control; CP now resolved. Transitioned to PO meds. Amlodipine 10mg as per cards  Gastrointestinal/Nutrition: c/w diet, zofran PRN, functioning ileostomy  Renal/Genitourinary: voiding, replete electrolytes as needed. Tatiana COSBY. tov  Hematologic: SQH, trend CBC  Infectious Disease: Zosyn dc'd  Tubes/Lines/Drains: PIV  Endocrine: c/w synthyroid    Disposition: D/C home from [facility] tomorrow    --------------------------------------------------------------------------------------    Critical Care Diagnoses: Hypertensive emergency, chest pain

## 2022-03-27 NOTE — DISCHARGE NOTE NURSING/CASE MANAGEMENT/SOCIAL WORK - PATIENT PORTAL LINK FT
You can access the FollowMyHealth Patient Portal offered by [facility] by registering at the following website: [email or web address]. By joining [email or web address]’s FollowMyHealth portal, you will also be able to view your health information using other applications (apps) compatible with our system.

## 2022-03-27 NOTE — PROGRESS NOTE ADULT - ATTENDING COMMENTS
Hypertension  a.  On amlodipine, lisionpril  b.  Hydralazxine prn  c.  Appreciate cardiology note    At risk for malnutrition  a.  Diet as tolerated    Anemia   a.  Stable    Hyponatremia  a.  Drop to 133  b.  Observe
BP controlled  good ostomy function  d/c home with cards f/u
Hypertensive urgency  a.  On lisinopril, increased amlodipine  b.  Taper hydralazine  c,  No symptoms of HA/chest pressure    At risk for malnutrition  a.  May have clears  b.  Wean IVF    Pneumonia, respiratory abnormality  a.  On zosyn  b.  Encourage incentive spirometry    DM, controlled  a.  On ISS
susan diet, + ileostomy output, BP better controlled    aaox3  abd soft, incision c/d/i, ileostomy pink    diet as susan  BP meds per cards  xfer to floor when OK with SICU

## 2022-03-27 NOTE — PROGRESS NOTE ADULT - ASSESSMENT
Patient is a 66 year old female with a PMHx of HTN, HLD, hypothyroidism, DM2, SOLOMON (not consistent with using CPAP), pernicious anemia and diverticulosis (S/P colon resection 10/2021) who presented to pre-surgical testing for evaluation for Naeem reversal.  Patient is now S/P ex-lap, Naeem reversal and diverting loop ileostomy on 3/21/22.      PLAN:  - Low fiber diet  - D5 + 1/2NS @75cc/hr  - appreciate cards recs  - Out of bed  -pain control PRN  - VTE prophylaxis with Heparin subcutaneous  continue excellent care per SICU      #[id number]  A Team Surgery Patient is a 66 year old female with a PMHx of HTN, HLD, hypothyroidism, DM2, SOLOMON (not consistent with using CPAP), pernicious anemia and diverticulosis (S/P colon resection 10/2021) who presented to pre-surgical testing for evaluation for Naeem reversal.  Patient is now S/P ex-lap, Aneem reversal and diverting loop ileostomy on 3/21/22.    - LRD, D5 + 1/2NS @75cc/hr  - appreciate cards recs, f/u out pt  - OOB   - pain control PRN  - VTE prophylaxis with Heparin subcutaneous  - excellent care per SICU  - may d/c pt today    A Team   [id number]

## 2022-03-27 NOTE — PROGRESS NOTE ADULT - SUBJECTIVE AND OBJECTIVE BOX
A Team (General Surgery) Daily Progress Note    SUBJECTIVE:  Pt seen and examined in AM. No acute events overnight. Patient denies acute onset abdominal pain, NVD, fevers, chlls, lightheadedness, SOB, CP. tolerating LRD    OBJECTIVE:  Vital Signs Last 24 Hrs  T(C): 36.5 (26 Mar 2022 20:00), Max: 37.3 (26 Mar 2022 16:00)  T(F): 97.7 (26 Mar 2022 20:00), Max: 99.2 (26 Mar 2022 16:00)  HR: 92 (26 Mar 2022 20:00) (76 - 93)  BP: 150/67 (26 Mar 2022 20:00) (150/67 - 182/74)  BP(mean): 86 (26 Mar 2022 20:00) (86 - 109)  RR: 25 (26 Mar 2022 20:00) (17 - 27)  SpO2: 94% (26 Mar 2022 20:00) (94% - 98%)    I&O's Detail    25 Mar 2022 07:01  -  26 Mar 2022 07:00  --------------------------------------------------------  IN:    IV PiggyBack: 350 mL    Oral Fluid: 600 mL  Total IN: 950 mL    OUT:    Ileostomy (mL): 500 mL    Voided (mL): 1650 mL  Total OUT: 2150 mL    Total NET: -1200 mL      26 Mar 2022 07:01  -  27 Mar 2022 01:01  --------------------------------------------------------  IN:    IV PiggyBack: 100 mL    Oral Fluid: 120 mL  Total IN: 220 mL    OUT:    Ileostomy (mL): 200 mL    Voided (mL): 1400 mL  Total OUT: 1600 mL    Total NET: -1380 mL        Exam:  GEN: A&Ox3, NAD  HEENT: atraumatic, normocephalic  CV: no JVD  RESP: no increased work of breathing, no use of accessory muscles  GI/ABD: soft, appropriately tender, mildly distended, no rebound or guarding, incision is c/d/i, ostomy viable and pink with gas and stool in pouch  EXTREMITIES: warm, pink, well-perfused                        8.7    7.37  )-----------( 248      ( 26 Mar 2022 01:43 )             26.9       03-26    137  |  98  |  12  ----------------------------<  120<H>  3.4<L>   |  26  |  1.33<H>    Ca    8.8      26 Mar 2022 01:43  Phos  3.0     03-26  Mg     1.90     03-26    TPro  7.1  /  Alb  3.5  /  TBili  0.6  /  DBili  x   /  AST  13  /  ALT  15  /  AlkPhos  126<H>  03-26      PT/INR - ( 26 Mar 2022 01:43 )   PT: 11.9 sec;   INR: 1.03 ratio         PTT - ( 26 Mar 2022 01:43 )  PTT:29.1 sec           SUBJECTIVE:  Pt seen and examined in AM. No acute events overnight. Patient denies acute onset abdominal pain, NVD, fevers, chlls, lightheadedness, SOB, CP. tolerating LRD    OBJECTIVE:  Vital Signs Last 24 Hrs  T(C): 36.5 (26 Mar 2022 20:00), Max: 37.3 (26 Mar 2022 16:00)  T(F): 97.7 (26 Mar 2022 20:00), Max: 99.2 (26 Mar 2022 16:00)  HR: 92 (26 Mar 2022 20:00) (76 - 93)  BP: 150/67 (26 Mar 2022 20:00) (150/67 - 182/74)  BP(mean): 86 (26 Mar 2022 20:00) (86 - 109)  RR: 25 (26 Mar 2022 20:00) (17 - 27)  SpO2: 94% (26 Mar 2022 20:00) (94% - 98%)    I&O's Detail    25 Mar 2022 07:01  -  26 Mar 2022 07:00  --------------------------------------------------------  IN:    IV PiggyBack: 350 mL    Oral Fluid: 600 mL  Total IN: 950 mL    OUT:    Ileostomy (mL): 500 mL    Voided (mL): 1650 mL  Total OUT: 2150 mL    Total NET: -1200 mL      26 Mar 2022 07:01  -  27 Mar 2022 01:01  --------------------------------------------------------  IN:    IV PiggyBack: 100 mL    Oral Fluid: 120 mL  Total IN: 220 mL    OUT:    Ileostomy (mL): 200 mL    Voided (mL): 1400 mL  Total OUT: 1600 mL    Total NET: -1380 mL        Exam:  GEN: A&Ox3, NAD  HEENT: atraumatic, normocephalic  CV: no JVD  RESP: no increased work of breathing, no use of accessory muscles  GI/ABD: soft, appropriately tender, mildly distended, no rebound or guarding, incision is c/d/i, ostomy viable and pink with gas and stool in pouch  EXTREMITIES: warm, pink, well-perfused                        8.7    7.37  )-----------( 248      ( 26 Mar 2022 01:43 )             26.9       03-26    137  |  98  |  12  ----------------------------<  120<H>  3.4<L>   |  26  |  1.33<H>    Ca    8.8      26 Mar 2022 01:43  Phos  3.0     03-26  Mg     1.90     03-26    TPro  7.1  /  Alb  3.5  /  TBili  0.6  /  DBili  x   /  AST  13  /  ALT  15  /  AlkPhos  126<H>  03-26      PT/INR - ( 26 Mar 2022 01:43 )   PT: 11.9 sec;   INR: 1.03 ratio         PTT - ( 26 Mar 2022 01:43 )  PTT:29.1 sec

## 2022-03-28 ENCOUNTER — NON-APPOINTMENT (OUTPATIENT)
Age: 67
End: 2022-03-28

## 2022-03-28 RX ORDER — HYDROMORPHONE HYDROCHLORIDE 2 MG/ML
1 INJECTION INTRAMUSCULAR; INTRAVENOUS; SUBCUTANEOUS
Qty: 15 | Refills: 0
Start: 2022-03-28 | End: 2022-04-01

## 2022-03-29 LAB
CULTURE RESULTS: SIGNIFICANT CHANGE UP
CULTURE RESULTS: SIGNIFICANT CHANGE UP
SPECIMEN SOURCE: SIGNIFICANT CHANGE UP
SPECIMEN SOURCE: SIGNIFICANT CHANGE UP

## 2022-04-05 ENCOUNTER — APPOINTMENT (OUTPATIENT)
Dept: COLORECTAL SURGERY | Facility: CLINIC | Age: 67
End: 2022-04-05
Payer: MEDICARE

## 2022-04-05 VITALS
OXYGEN SATURATION: 99 % | HEART RATE: 81 BPM | TEMPERATURE: 97.8 F | RESPIRATION RATE: 16 BRPM | SYSTOLIC BLOOD PRESSURE: 162 MMHG | DIASTOLIC BLOOD PRESSURE: 85 MMHG

## 2022-04-05 LAB — SURGICAL PATHOLOGY STUDY: SIGNIFICANT CHANGE UP

## 2022-04-05 PROCEDURE — 99024 POSTOP FOLLOW-UP VISIT: CPT

## 2022-04-05 RX ORDER — LOPERAMIDE HYDROCHLORIDE 2 MG/1
2 CAPSULE ORAL 4 TIMES DAILY
Qty: 240 | Refills: 0 | Status: ACTIVE | COMMUNITY
Start: 2022-04-05 | End: 1900-01-01

## 2022-04-05 NOTE — ASSESSMENT
[FreeTextEntry1] : Status post Naeem's reversal with ileostomy\par -Patient to initiate Imodium daily\par -Patient will measure stoma output\par -Follow up in one week for final staple removal\par -Cardiology evaluation for hypertension

## 2022-04-05 NOTE — HISTORY OF PRESENT ILLNESS
[FreeTextEntry1] : Status post Naeem's reversal with ileostomy. Patient progressing well. Tolerating diet. Stoma output is noted to be likely, however patient has not been measuring the amount. She does report having clear urine. No fevers or chills no nausea or vomiting otherwise no complaints

## 2022-04-12 ENCOUNTER — APPOINTMENT (OUTPATIENT)
Dept: COLORECTAL SURGERY | Facility: CLINIC | Age: 67
End: 2022-04-12
Payer: MEDICARE

## 2022-04-12 ENCOUNTER — NON-APPOINTMENT (OUTPATIENT)
Age: 67
End: 2022-04-12

## 2022-04-12 ENCOUNTER — APPOINTMENT (OUTPATIENT)
Dept: CARDIOLOGY | Facility: CLINIC | Age: 67
End: 2022-04-12
Payer: MEDICARE

## 2022-04-12 VITALS
SYSTOLIC BLOOD PRESSURE: 196 MMHG | BODY MASS INDEX: 33.63 KG/M2 | OXYGEN SATURATION: 99 % | HEART RATE: 82 BPM | DIASTOLIC BLOOD PRESSURE: 100 MMHG | HEIGHT: 64 IN | WEIGHT: 197 LBS

## 2022-04-12 PROCEDURE — 99215 OFFICE O/P EST HI 40 MIN: CPT

## 2022-04-12 PROCEDURE — 99024 POSTOP FOLLOW-UP VISIT: CPT

## 2022-04-12 PROCEDURE — 93000 ELECTROCARDIOGRAM COMPLETE: CPT

## 2022-04-12 NOTE — HISTORY OF PRESENT ILLNESS
[FreeTextEntry1] : Status postStatus post Naeem's reversal with ileostomy creation. Patient progressing well. Tolerating diet. Decreased stoma output with Imodium

## 2022-04-12 NOTE — ASSESSMENT
[FreeTextEntry1] : Status post Naeem's reversal\par -Patient progressing well\par -Diet as tolerated\par -Follow up in 3 weeks for reevaluation. We'll schedule sigmoidoscopy at that time

## 2022-05-10 ENCOUNTER — APPOINTMENT (OUTPATIENT)
Dept: CARDIOLOGY | Facility: CLINIC | Age: 67
End: 2022-05-10
Payer: MEDICARE

## 2022-05-10 ENCOUNTER — APPOINTMENT (OUTPATIENT)
Dept: COLORECTAL SURGERY | Facility: CLINIC | Age: 67
End: 2022-05-10
Payer: MEDICARE

## 2022-05-10 VITALS
HEART RATE: 72 BPM | WEIGHT: 196 LBS | BODY MASS INDEX: 33.46 KG/M2 | HEIGHT: 64 IN | SYSTOLIC BLOOD PRESSURE: 168 MMHG | OXYGEN SATURATION: 99 % | DIASTOLIC BLOOD PRESSURE: 86 MMHG

## 2022-05-10 DIAGNOSIS — I10 ESSENTIAL (PRIMARY) HYPERTENSION: ICD-10-CM

## 2022-05-10 PROCEDURE — 99215 OFFICE O/P EST HI 40 MIN: CPT

## 2022-05-10 PROCEDURE — 93306 TTE W/DOPPLER COMPLETE: CPT

## 2022-05-10 PROCEDURE — 99024 POSTOP FOLLOW-UP VISIT: CPT

## 2022-05-10 RX ORDER — AMLODIPINE BESYLATE 10 MG/1
10 TABLET ORAL DAILY
Qty: 90 | Refills: 3 | Status: ACTIVE | COMMUNITY
Start: 2022-04-12 | End: 1900-01-01

## 2022-05-10 NOTE — ASSESSMENT
[FreeTextEntry1] : Status post Naeem's reversal with ileostomy\par -Patient progressing well\par -Follow up In 2 weeks for sigmoidoscopy to Evaluate anastomosis\par -Stoma reversal after a sigmoidoscopy and Hypaque enema

## 2022-05-10 NOTE — HISTORY OF PRESENT ILLNESS
[FreeTextEntry1] : Status post reversal of colostomy/Naeem procedure. Patient progressing well. Currently with ileostomy. Denies pain tolerating diet no fevers or chills

## 2022-05-24 ENCOUNTER — APPOINTMENT (OUTPATIENT)
Dept: COLORECTAL SURGERY | Facility: CLINIC | Age: 67
End: 2022-05-24
Payer: MEDICARE

## 2022-05-24 DIAGNOSIS — Z01.818 ENCOUNTER FOR OTHER PREPROCEDURAL EXAMINATION: ICD-10-CM

## 2022-05-24 PROCEDURE — 99024 POSTOP FOLLOW-UP VISIT: CPT

## 2022-05-24 PROCEDURE — 45330 DIAGNOSTIC SIGMOIDOSCOPY: CPT | Mod: 58

## 2022-05-24 NOTE — HISTORY OF PRESENT ILLNESS
[FreeTextEntry1] : Status post Naeem's reversal. Patient progressing well tolerating diet stoma functioning the patient presents for sigmoidoscopy to evaluate anastomosis today

## 2022-05-24 NOTE — ASSESSMENT
[FreeTextEntry1] : Severe diverticulosis status post subtotal colectomy with colostomy and reversal. Patient now with ileostomy\par -Anastomosis appears widely patent and intact\par -Will obtain Hypaque enema\par -Scheduling for ileostomy reversal

## 2022-05-26 ENCOUNTER — RESULT REVIEW (OUTPATIENT)
Age: 67
End: 2022-05-26

## 2022-05-26 ENCOUNTER — APPOINTMENT (OUTPATIENT)
Dept: UROLOGY | Facility: CLINIC | Age: 67
End: 2022-05-26
Payer: MEDICARE

## 2022-05-26 DIAGNOSIS — Z00.00 ENCOUNTER FOR GENERAL ADULT MEDICAL EXAMINATION W/OUT ABNORMAL FINDINGS: ICD-10-CM

## 2022-05-26 DIAGNOSIS — N20.0 CALCULUS OF KIDNEY: ICD-10-CM

## 2022-05-26 PROCEDURE — 99214 OFFICE O/P EST MOD 30 MIN: CPT

## 2022-05-26 NOTE — ADDENDUM
[FreeTextEntry1] : Entered by Natalie Martin, acting as scribe for Dr. Birdger Boucher.\par \par The documentation recorded by the scribe accurately reflects the service I personally performed and the decisions made by me.

## 2022-05-26 NOTE — LETTER BODY
[FreeTextEntry1] : Venice Ruggiero MD. \par [address]\[place] [address]\par [phone]\par \par Dear Dr. Ruggiero, \par \par Reason for Visit: Previous left ureteral stone. Urinary frequency. Urge incontinence. Abnormal creatinine. \par \par This is a 65 year-old woman with diverticulosis, chronic hematochezia requiring blood transfusions, previous left ureteral stone, urinary frequency and urge incontinence. She was previously found to have a 4 mm septic stone and underwent a left stent placement. Her renal ultrasound in January 2021 was otherwise unremarkable. She returns today for follow-up. Since her last visit, the patient notes she developed sigmoid diverticulitis. Her recent blood work showed abnormal creatinine levels, 1.27. She expresses concern about her creatinine levels. The patient notes she continues to wear incontinence pads. The patient denies any dysuria or hematuria. All other review of systems are negative. Past medical history, surgical history, family history and social history were unchanged. Medications and allergies were reviewed.\par \par On examination, the patient is a healthy-appearing woman in no acute distress. She is alert and oriented and follows commands. She has normal mood and affect. She is normocephalic. Oral no thrush. Neck is supple. Respirations are unlabored. Abdomen is soft and nontender. Liver is nonpalpable. Bladder is nonpalpable. No CVA tenderness. Neurologically she is grossly intact. No peripheral edema. Skin without gross abnormality.\par \par Her BMP demonstrated normal renal functions, creatinine 1.27. Her creatinine in 2019 was 1.26. \par \par Assessment: Abnormal creatinine. \par \par I counseled the patient. I discussed with the patient that her creatinine levels are stable, compared to her last BMP in 2019. I reassured the patient. I recommended the patient obtain a CT of abdomen and pelvis for further evaluation. The patient understands that if she develops gross hematuria or any urinary discomfort, she will contact me for further evaluation. Risks and alternatives were discussed. I answered the patient questions. The patient will follow-up as directed and will contact me with any questions or concerns. Thank you for the opportunity to participate in the care of this patient, I will keep you updated on her progress. \par \par Plan: CT abdomen and pelvis. Follow-up as directed.

## 2022-05-31 ENCOUNTER — APPOINTMENT (OUTPATIENT)
Dept: CT IMAGING | Facility: IMAGING CENTER | Age: 67
End: 2022-05-31
Payer: MEDICARE

## 2022-05-31 ENCOUNTER — OUTPATIENT (OUTPATIENT)
Dept: OUTPATIENT SERVICES | Facility: HOSPITAL | Age: 67
LOS: 1 days | End: 2022-05-31
Payer: COMMERCIAL

## 2022-05-31 DIAGNOSIS — Z00.00 ENCOUNTER FOR GENERAL ADULT MEDICAL EXAMINATION WITHOUT ABNORMAL FINDINGS: ICD-10-CM

## 2022-05-31 DIAGNOSIS — Z90.89 ACQUIRED ABSENCE OF OTHER ORGANS: Chronic | ICD-10-CM

## 2022-05-31 DIAGNOSIS — K92.2 GASTROINTESTINAL HEMORRHAGE, UNSPECIFIED: ICD-10-CM

## 2022-05-31 DIAGNOSIS — Z90.49 ACQUIRED ABSENCE OF OTHER SPECIFIED PARTS OF DIGESTIVE TRACT: Chronic | ICD-10-CM

## 2022-05-31 DIAGNOSIS — Z98.890 OTHER SPECIFIED POSTPROCEDURAL STATES: Chronic | ICD-10-CM

## 2022-05-31 DIAGNOSIS — Z90.710 ACQUIRED ABSENCE OF BOTH CERVIX AND UTERUS: Chronic | ICD-10-CM

## 2022-05-31 DIAGNOSIS — K57.31 DIVERTICULOSIS OF LARGE INTESTINE WITHOUT PERFORATION OR ABSCESS WITH BLEEDING: ICD-10-CM

## 2022-05-31 DIAGNOSIS — N20.0 CALCULUS OF KIDNEY: ICD-10-CM

## 2022-05-31 PROCEDURE — 74176 CT ABD & PELVIS W/O CONTRAST: CPT

## 2022-05-31 PROCEDURE — 74176 CT ABD & PELVIS W/O CONTRAST: CPT | Mod: 26

## 2022-06-06 ENCOUNTER — OUTPATIENT (OUTPATIENT)
Dept: OUTPATIENT SERVICES | Facility: HOSPITAL | Age: 67
LOS: 1 days | End: 2022-06-06

## 2022-06-06 VITALS
OXYGEN SATURATION: 99 % | DIASTOLIC BLOOD PRESSURE: 70 MMHG | HEIGHT: 65 IN | TEMPERATURE: 98 F | HEART RATE: 81 BPM | WEIGHT: 197.09 LBS | RESPIRATION RATE: 18 BRPM | SYSTOLIC BLOOD PRESSURE: 130 MMHG

## 2022-06-06 DIAGNOSIS — Z90.89 ACQUIRED ABSENCE OF OTHER ORGANS: Chronic | ICD-10-CM

## 2022-06-06 DIAGNOSIS — K57.30 DIVERTICULOSIS OF LARGE INTESTINE WITHOUT PERFORATION OR ABSCESS WITHOUT BLEEDING: ICD-10-CM

## 2022-06-06 DIAGNOSIS — K57.31 DIVERTICULOSIS OF LARGE INTESTINE WITHOUT PERFORATION OR ABSCESS WITH BLEEDING: ICD-10-CM

## 2022-06-06 DIAGNOSIS — Z90.49 ACQUIRED ABSENCE OF OTHER SPECIFIED PARTS OF DIGESTIVE TRACT: Chronic | ICD-10-CM

## 2022-06-06 DIAGNOSIS — Z01.812 ENCOUNTER FOR PREPROCEDURAL LABORATORY EXAMINATION: ICD-10-CM

## 2022-06-06 DIAGNOSIS — Z98.890 OTHER SPECIFIED POSTPROCEDURAL STATES: Chronic | ICD-10-CM

## 2022-06-06 DIAGNOSIS — G47.33 OBSTRUCTIVE SLEEP APNEA (ADULT) (PEDIATRIC): ICD-10-CM

## 2022-06-06 DIAGNOSIS — E03.9 HYPOTHYROIDISM, UNSPECIFIED: ICD-10-CM

## 2022-06-06 DIAGNOSIS — I10 ESSENTIAL (PRIMARY) HYPERTENSION: ICD-10-CM

## 2022-06-06 DIAGNOSIS — Z90.710 ACQUIRED ABSENCE OF BOTH CERVIX AND UTERUS: Chronic | ICD-10-CM

## 2022-06-06 LAB
A1C WITH ESTIMATED AVERAGE GLUCOSE RESULT: 6.2 % — HIGH (ref 4–5.6)
ANION GAP SERPL CALC-SCNC: 12 MMOL/L — SIGNIFICANT CHANGE UP (ref 7–14)
BLD GP AB SCN SERPL QL: NEGATIVE — SIGNIFICANT CHANGE UP
BUN SERPL-MCNC: 28 MG/DL — HIGH (ref 7–23)
CALCIUM SERPL-MCNC: 9.5 MG/DL — SIGNIFICANT CHANGE UP (ref 8.4–10.5)
CHLORIDE SERPL-SCNC: 105 MMOL/L — SIGNIFICANT CHANGE UP (ref 98–107)
CO2 SERPL-SCNC: 24 MMOL/L — SIGNIFICANT CHANGE UP (ref 22–31)
CREAT SERPL-MCNC: 1.09 MG/DL — SIGNIFICANT CHANGE UP (ref 0.5–1.3)
EGFR: 56 ML/MIN/1.73M2 — LOW
ESTIMATED AVERAGE GLUCOSE: 131 — SIGNIFICANT CHANGE UP
GLUCOSE SERPL-MCNC: 93 MG/DL — SIGNIFICANT CHANGE UP (ref 70–99)
HCT VFR BLD CALC: 30.8 % — LOW (ref 34.5–45)
HGB BLD-MCNC: 9.6 G/DL — LOW (ref 11.5–15.5)
MCHC RBC-ENTMCNC: 27.3 PG — SIGNIFICANT CHANGE UP (ref 27–34)
MCHC RBC-ENTMCNC: 31.2 GM/DL — LOW (ref 32–36)
MCV RBC AUTO: 87.5 FL — SIGNIFICANT CHANGE UP (ref 80–100)
NRBC # BLD: 0 /100 WBCS — SIGNIFICANT CHANGE UP
NRBC # FLD: 0 K/UL — SIGNIFICANT CHANGE UP
PLATELET # BLD AUTO: 246 K/UL — SIGNIFICANT CHANGE UP (ref 150–400)
POTASSIUM SERPL-MCNC: 4.3 MMOL/L — SIGNIFICANT CHANGE UP (ref 3.5–5.3)
POTASSIUM SERPL-SCNC: 4.3 MMOL/L — SIGNIFICANT CHANGE UP (ref 3.5–5.3)
RBC # BLD: 3.52 M/UL — LOW (ref 3.8–5.2)
RBC # FLD: 14.9 % — HIGH (ref 10.3–14.5)
RH IG SCN BLD-IMP: POSITIVE — SIGNIFICANT CHANGE UP
SODIUM SERPL-SCNC: 141 MMOL/L — SIGNIFICANT CHANGE UP (ref 135–145)
WBC # BLD: 6.61 K/UL — SIGNIFICANT CHANGE UP (ref 3.8–10.5)
WBC # FLD AUTO: 6.61 K/UL — SIGNIFICANT CHANGE UP (ref 3.8–10.5)

## 2022-06-06 RX ORDER — SODIUM CHLORIDE 9 MG/ML
1000 INJECTION, SOLUTION INTRAVENOUS
Refills: 0 | Status: DISCONTINUED | OUTPATIENT
Start: 2022-06-13 | End: 2022-06-14

## 2022-06-06 RX ORDER — PREGABALIN 225 MG/1
3000 CAPSULE ORAL
Qty: 0 | Refills: 0 | DISCHARGE

## 2022-06-06 RX ORDER — LEVOTHYROXINE SODIUM 125 MCG
1 TABLET ORAL
Qty: 0 | Refills: 0 | DISCHARGE

## 2022-06-06 RX ORDER — TETRAHYDROZOLINE/POLYETHYL GLY 0.05 %-1 %
0 DROPS OPHTHALMIC (EYE)
Qty: 0 | Refills: 0 | DISCHARGE

## 2022-06-06 RX ORDER — FERROUS SULFATE 325(65) MG
0 TABLET ORAL
Qty: 0 | Refills: 0 | DISCHARGE

## 2022-06-06 NOTE — H&P PST ADULT - PROBLEM SELECTOR PLAN 4
Scheduled for ileostomy reversal on 6/13/2022  Written & verbal preop instructions, gi prophylaxis & surgical soap given  Pt verbalized good understanding.  Teach back done on surgical soap instructions. Scheduled for ileostomy reversal on 6/13/2022  Written & verbal preop instructions, gi prophylaxis & surgical soap given  Pt verbalized good understanding.  Teach back done on surgical soap instructions.  copy of recent echo report in chart.

## 2022-06-06 NOTE — H&P PST ADULT - PROBLEM SELECTOR PLAN 2
Pt instructed to take  on morning of surgery. Pt instructed to take amlodipine & losartan  on morning of surgery.

## 2022-06-06 NOTE — H&P PST ADULT - ASSESSMENT
Preop dx diverticulosis  Preop dx diverticulosis large intestine without perforation/abscess with bleeding

## 2022-06-06 NOTE — H&P PST ADULT - NSICDXPASTSURGICALHX_GEN_ALL_CORE_FT
PAST SURGICAL HISTORY:  History of lithotripsy left kidney stone extraction; 02/2020    History of tonsillectomy age 14    S/P colon resection colostomy; 10/2021    S/P lumbar laminectomy ' 2010  with Fusion    S/P WESTLEY (total abdominal hysterectomy) ' 94  Laproscopic.  benign     PAST SURGICAL HISTORY:  History of lithotripsy left kidney stone extraction; 02/2020    History of tonsillectomy age 14    S/P colon resection colostomy; 10/2021, ludwig reversal & creation of ileostomy 3/2022    S/P lumbar laminectomy ' 2010  with Fusion    S/P WESTLEY (total abdominal hysterectomy) ' 94  Laproscopic.  benign

## 2022-06-06 NOTE — DISCHARGE NOTE ADULT - FUNCTIONAL SCREEN CURRENT LEVEL: AMBULATION, MLM
From: Hannah Chávez  To: Dr. Franceen Fothergill  Sent: 6/3/2022 8:03 PM EDT  Subject: Test Results Question    Dr Radha Hernandez has me going in for some blood work and a chest CT on June 9th so I was wondering if there are any labs or anything you would want me to get done while I am there too?
(2) assistive person

## 2022-06-07 ENCOUNTER — RESULT REVIEW (OUTPATIENT)
Age: 67
End: 2022-06-07

## 2022-06-07 ENCOUNTER — OUTPATIENT (OUTPATIENT)
Dept: OUTPATIENT SERVICES | Facility: HOSPITAL | Age: 67
LOS: 1 days | End: 2022-06-07

## 2022-06-07 ENCOUNTER — APPOINTMENT (OUTPATIENT)
Dept: RADIOLOGY | Facility: HOSPITAL | Age: 67
End: 2022-06-07
Payer: MEDICARE

## 2022-06-07 DIAGNOSIS — K57.31 DIVERTICULOSIS OF LARGE INTESTINE WITHOUT PERFORATION OR ABSCESS WITH BLEEDING: ICD-10-CM

## 2022-06-07 DIAGNOSIS — Z90.710 ACQUIRED ABSENCE OF BOTH CERVIX AND UTERUS: Chronic | ICD-10-CM

## 2022-06-07 DIAGNOSIS — Z98.890 OTHER SPECIFIED POSTPROCEDURAL STATES: Chronic | ICD-10-CM

## 2022-06-07 DIAGNOSIS — Z90.49 ACQUIRED ABSENCE OF OTHER SPECIFIED PARTS OF DIGESTIVE TRACT: Chronic | ICD-10-CM

## 2022-06-07 DIAGNOSIS — Z90.89 ACQUIRED ABSENCE OF OTHER ORGANS: Chronic | ICD-10-CM

## 2022-06-07 PROCEDURE — 74270 X-RAY XM COLON 1CNTRST STD: CPT | Mod: 26

## 2022-06-10 ENCOUNTER — OUTPATIENT (OUTPATIENT)
Dept: OUTPATIENT SERVICES | Facility: HOSPITAL | Age: 67
LOS: 1 days | End: 2022-06-10

## 2022-06-10 DIAGNOSIS — Z90.89 ACQUIRED ABSENCE OF OTHER ORGANS: Chronic | ICD-10-CM

## 2022-06-10 DIAGNOSIS — Z98.890 OTHER SPECIFIED POSTPROCEDURAL STATES: Chronic | ICD-10-CM

## 2022-06-10 DIAGNOSIS — Z90.710 ACQUIRED ABSENCE OF BOTH CERVIX AND UTERUS: Chronic | ICD-10-CM

## 2022-06-10 DIAGNOSIS — Z90.49 ACQUIRED ABSENCE OF OTHER SPECIFIED PARTS OF DIGESTIVE TRACT: Chronic | ICD-10-CM

## 2022-06-10 DIAGNOSIS — K57.31 DIVERTICULOSIS OF LARGE INTESTINE WITHOUT PERFORATION OR ABSCESS WITH BLEEDING: ICD-10-CM

## 2022-06-10 LAB
BLD GP AB SCN SERPL QL: NEGATIVE — SIGNIFICANT CHANGE UP
RH IG SCN BLD-IMP: POSITIVE — SIGNIFICANT CHANGE UP

## 2022-06-10 NOTE — ASU PATIENT PROFILE, ADULT - NSICDXPASTSURGICALHX_GEN_ALL_CORE_FT
PAST SURGICAL HISTORY:  History of lithotripsy left kidney stone extraction; 02/2020    History of tonsillectomy age 14    S/P colon resection colostomy; 10/2021, ludwig reversal & creation of ileostomy 3/2022    S/P lumbar laminectomy ' 2010  with Fusion    S/P WESTLEY (total abdominal hysterectomy) ' 94  Laproscopic.  benign

## 2022-06-10 NOTE — ASU PATIENT PROFILE, ADULT - FALL HARM RISK - UNIVERSAL INTERVENTIONS
Bed in lowest position, wheels locked, appropriate side rails in place/Call bell, personal items and telephone in reach/Instruct patient to call for assistance before getting out of bed or chair/Non-slip footwear when patient is out of bed/[place] to call system/Physically safe environment - no spills, clutter or unnecessary equipment/Purposeful Proactive Rounding/Room/bathroom lighting operational, light cord in reach

## 2022-06-10 NOTE — ASU PATIENT PROFILE, ADULT - DATE OF LAST VACCINATION
Please let her know her labs are stable except her creatinine has increased a little. We can monitor this now that she will not be taking Lasix (furosemide) every day.
27-Jan-2022

## 2022-06-12 ENCOUNTER — TRANSCRIPTION ENCOUNTER (OUTPATIENT)
Age: 67
End: 2022-06-12

## 2022-06-13 ENCOUNTER — RESULT REVIEW (OUTPATIENT)
Age: 67
End: 2022-06-13

## 2022-06-13 ENCOUNTER — INPATIENT (INPATIENT)
Facility: HOSPITAL | Age: 67
LOS: 1 days | Discharge: HOME | End: 2022-06-15
Attending: STUDENT IN AN ORGANIZED HEALTH CARE EDUCATION/TRAINING PROGRAM | Admitting: STUDENT IN AN ORGANIZED HEALTH CARE EDUCATION/TRAINING PROGRAM
Payer: MEDICARE

## 2022-06-13 ENCOUNTER — APPOINTMENT (OUTPATIENT)
Dept: COLORECTAL SURGERY | Facility: HOSPITAL | Age: 67
End: 2022-06-13
Payer: MEDICARE

## 2022-06-13 VITALS
TEMPERATURE: 98 F | OXYGEN SATURATION: 99 % | RESPIRATION RATE: 16 BRPM | DIASTOLIC BLOOD PRESSURE: 56 MMHG | WEIGHT: 197.09 LBS | HEART RATE: 74 BPM | HEIGHT: 65 IN | SYSTOLIC BLOOD PRESSURE: 121 MMHG

## 2022-06-13 DIAGNOSIS — Z98.890 OTHER SPECIFIED POSTPROCEDURAL STATES: Chronic | ICD-10-CM

## 2022-06-13 DIAGNOSIS — Z90.89 ACQUIRED ABSENCE OF OTHER ORGANS: Chronic | ICD-10-CM

## 2022-06-13 DIAGNOSIS — Z90.49 ACQUIRED ABSENCE OF OTHER SPECIFIED PARTS OF DIGESTIVE TRACT: Chronic | ICD-10-CM

## 2022-06-13 DIAGNOSIS — Z90.710 ACQUIRED ABSENCE OF BOTH CERVIX AND UTERUS: Chronic | ICD-10-CM

## 2022-06-13 DIAGNOSIS — K57.31 DIVERTICULOSIS OF LARGE INTESTINE WITHOUT PERFORATION OR ABSCESS WITH BLEEDING: ICD-10-CM

## 2022-06-13 LAB
ANION GAP SERPL CALC-SCNC: 10 MMOL/L — SIGNIFICANT CHANGE UP (ref 7–14)
BUN SERPL-MCNC: 22 MG/DL — SIGNIFICANT CHANGE UP (ref 7–23)
CALCIUM SERPL-MCNC: 8.9 MG/DL — SIGNIFICANT CHANGE UP (ref 8.4–10.5)
CHLORIDE SERPL-SCNC: 104 MMOL/L — SIGNIFICANT CHANGE UP (ref 98–107)
CO2 SERPL-SCNC: 23 MMOL/L — SIGNIFICANT CHANGE UP (ref 22–31)
CREAT SERPL-MCNC: 1.24 MG/DL — SIGNIFICANT CHANGE UP (ref 0.5–1.3)
EGFR: 48 ML/MIN/1.73M2 — LOW
GLUCOSE BLDC GLUCOMTR-MCNC: 117 MG/DL — HIGH (ref 70–99)
GLUCOSE BLDC GLUCOMTR-MCNC: 127 MG/DL — HIGH (ref 70–99)
GLUCOSE BLDC GLUCOMTR-MCNC: 200 MG/DL — HIGH (ref 70–99)
GLUCOSE SERPL-MCNC: 159 MG/DL — HIGH (ref 70–99)
HCT VFR BLD CALC: 29.8 % — LOW (ref 34.5–45)
HGB BLD-MCNC: 9.2 G/DL — LOW (ref 11.5–15.5)
MAGNESIUM SERPL-MCNC: 1.8 MG/DL — SIGNIFICANT CHANGE UP (ref 1.6–2.6)
MCHC RBC-ENTMCNC: 27.1 PG — SIGNIFICANT CHANGE UP (ref 27–34)
MCHC RBC-ENTMCNC: 30.9 GM/DL — LOW (ref 32–36)
MCV RBC AUTO: 87.6 FL — SIGNIFICANT CHANGE UP (ref 80–100)
NRBC # BLD: 0 /100 WBCS — SIGNIFICANT CHANGE UP
NRBC # FLD: 0 K/UL — SIGNIFICANT CHANGE UP
PHOSPHATE SERPL-MCNC: 4.8 MG/DL — HIGH (ref 2.5–4.5)
PLATELET # BLD AUTO: 202 K/UL — SIGNIFICANT CHANGE UP (ref 150–400)
POTASSIUM SERPL-MCNC: 4.6 MMOL/L — SIGNIFICANT CHANGE UP (ref 3.5–5.3)
POTASSIUM SERPL-SCNC: 4.6 MMOL/L — SIGNIFICANT CHANGE UP (ref 3.5–5.3)
RBC # BLD: 3.4 M/UL — LOW (ref 3.8–5.2)
RBC # FLD: 14.7 % — HIGH (ref 10.3–14.5)
SODIUM SERPL-SCNC: 137 MMOL/L — SIGNIFICANT CHANGE UP (ref 135–145)
WBC # BLD: 10.97 K/UL — HIGH (ref 3.8–10.5)
WBC # FLD AUTO: 10.97 K/UL — HIGH (ref 3.8–10.5)

## 2022-06-13 PROCEDURE — 99221 1ST HOSP IP/OBS SF/LOW 40: CPT

## 2022-06-13 PROCEDURE — 44625 REPAIR BOWEL OPENING: CPT | Mod: 58

## 2022-06-13 PROCEDURE — 88307 TISSUE EXAM BY PATHOLOGIST: CPT | Mod: 26

## 2022-06-13 DEVICE — LIGATING CLIPS WECK HORIZON MEDIUM (BLUE) 24: Type: IMPLANTABLE DEVICE | Status: FUNCTIONAL

## 2022-06-13 DEVICE — STAPLER COVIDIEN GIA 80-3.0MM PURPLE: Type: IMPLANTABLE DEVICE | Status: FUNCTIONAL

## 2022-06-13 DEVICE — STAPLER COVIDIEN GIA 80-3.0MM PURPLE RELOAD: Type: IMPLANTABLE DEVICE | Status: FUNCTIONAL

## 2022-06-13 DEVICE — STAPLER COVIDIEN TA 90 BLUE RELOAD: Type: IMPLANTABLE DEVICE | Status: FUNCTIONAL

## 2022-06-13 DEVICE — LIGATING CLIPS WECK HORIZON LARGE (ORANGE) 24: Type: IMPLANTABLE DEVICE | Status: FUNCTIONAL

## 2022-06-13 DEVICE — STAPLER COVIDIEN TA 90 BLUE: Type: IMPLANTABLE DEVICE | Status: FUNCTIONAL

## 2022-06-13 RX ORDER — CHLORHEXIDINE GLUCONATE 213 G/1000ML
1 SOLUTION TOPICAL ONCE
Refills: 0 | Status: COMPLETED | OUTPATIENT
Start: 2022-06-13 | End: 2022-06-13

## 2022-06-13 RX ORDER — SIMVASTATIN 20 MG/1
40 TABLET, FILM COATED ORAL AT BEDTIME
Refills: 0 | Status: DISCONTINUED | OUTPATIENT
Start: 2022-06-13 | End: 2022-06-15

## 2022-06-13 RX ORDER — AMLODIPINE BESYLATE 2.5 MG/1
10 TABLET ORAL DAILY
Refills: 0 | Status: DISCONTINUED | OUTPATIENT
Start: 2022-06-13 | End: 2022-06-15

## 2022-06-13 RX ORDER — INFLUENZA VIRUS VACCINE 15; 15; 15; 15 UG/.5ML; UG/.5ML; UG/.5ML; UG/.5ML
0.7 SUSPENSION INTRAMUSCULAR ONCE
Refills: 0 | Status: DISCONTINUED | OUTPATIENT
Start: 2022-06-13 | End: 2022-06-15

## 2022-06-13 RX ORDER — ONDANSETRON 8 MG/1
4 TABLET, FILM COATED ORAL EVERY 6 HOURS
Refills: 0 | Status: DISCONTINUED | OUTPATIENT
Start: 2022-06-13 | End: 2022-06-15

## 2022-06-13 RX ORDER — ENOXAPARIN SODIUM 100 MG/ML
40 INJECTION SUBCUTANEOUS EVERY 24 HOURS
Refills: 0 | Status: DISCONTINUED | OUTPATIENT
Start: 2022-06-14 | End: 2022-06-15

## 2022-06-13 RX ORDER — LEVOTHYROXINE SODIUM 125 MCG
175 TABLET ORAL DAILY
Refills: 0 | Status: DISCONTINUED | OUTPATIENT
Start: 2022-06-13 | End: 2022-06-15

## 2022-06-13 RX ORDER — OXYCODONE HYDROCHLORIDE 5 MG/1
10 TABLET ORAL
Refills: 0 | Status: DISCONTINUED | OUTPATIENT
Start: 2022-06-13 | End: 2022-06-14

## 2022-06-13 RX ORDER — NALOXONE HYDROCHLORIDE 4 MG/.1ML
0.1 SPRAY NASAL
Refills: 0 | Status: DISCONTINUED | OUTPATIENT
Start: 2022-06-13 | End: 2022-06-15

## 2022-06-13 RX ORDER — NALBUPHINE HYDROCHLORIDE 10 MG/ML
2.5 INJECTION, SOLUTION INTRAMUSCULAR; INTRAVENOUS; SUBCUTANEOUS EVERY 6 HOURS
Refills: 0 | Status: DISCONTINUED | OUTPATIENT
Start: 2022-06-13 | End: 2022-06-15

## 2022-06-13 RX ORDER — OXYCODONE HYDROCHLORIDE 5 MG/1
5 TABLET ORAL
Refills: 0 | Status: DISCONTINUED | OUTPATIENT
Start: 2022-06-13 | End: 2022-06-14

## 2022-06-13 RX ORDER — HYDROMORPHONE HYDROCHLORIDE 2 MG/ML
0.5 INJECTION INTRAMUSCULAR; INTRAVENOUS; SUBCUTANEOUS
Refills: 0 | Status: DISCONTINUED | OUTPATIENT
Start: 2022-06-13 | End: 2022-06-13

## 2022-06-13 RX ORDER — ACETAMINOPHEN 500 MG
1000 TABLET ORAL EVERY 6 HOURS
Refills: 0 | Status: DISCONTINUED | OUTPATIENT
Start: 2022-06-13 | End: 2022-06-14

## 2022-06-13 RX ORDER — HYDROMORPHONE HYDROCHLORIDE 2 MG/ML
1 INJECTION INTRAMUSCULAR; INTRAVENOUS; SUBCUTANEOUS
Refills: 0 | Status: DISCONTINUED | OUTPATIENT
Start: 2022-06-13 | End: 2022-06-13

## 2022-06-13 RX ORDER — SIMVASTATIN 20 MG/1
1 TABLET, FILM COATED ORAL
Qty: 0 | Refills: 0 | DISCHARGE

## 2022-06-13 RX ORDER — HYDROMORPHONE HYDROCHLORIDE 2 MG/ML
1 INJECTION INTRAMUSCULAR; INTRAVENOUS; SUBCUTANEOUS
Refills: 0 | Status: DISCONTINUED | OUTPATIENT
Start: 2022-06-13 | End: 2022-06-14

## 2022-06-13 RX ORDER — MORPHINE SULFATE 50 MG/1
0.2 CAPSULE, EXTENDED RELEASE ORAL ONCE
Refills: 0 | Status: DISCONTINUED | OUTPATIENT
Start: 2022-06-13 | End: 2022-06-14

## 2022-06-13 RX ORDER — GABAPENTIN 400 MG/1
600 CAPSULE ORAL ONCE
Refills: 0 | Status: COMPLETED | OUTPATIENT
Start: 2022-06-13 | End: 2022-06-13

## 2022-06-13 RX ORDER — ONDANSETRON 8 MG/1
4 TABLET, FILM COATED ORAL ONCE
Refills: 0 | Status: DISCONTINUED | OUTPATIENT
Start: 2022-06-13 | End: 2022-06-13

## 2022-06-13 RX ORDER — SODIUM CHLORIDE 9 MG/ML
500 INJECTION, SOLUTION INTRAVENOUS ONCE
Refills: 0 | Status: COMPLETED | OUTPATIENT
Start: 2022-06-13 | End: 2022-06-13

## 2022-06-13 RX ADMIN — ONDANSETRON 4 MILLIGRAM(S): 8 TABLET, FILM COATED ORAL at 20:01

## 2022-06-13 RX ADMIN — SODIUM CHLORIDE 40 MILLILITER(S): 9 INJECTION, SOLUTION INTRAVENOUS at 18:57

## 2022-06-13 RX ADMIN — Medication 400 MILLIGRAM(S): at 23:09

## 2022-06-13 RX ADMIN — SODIUM CHLORIDE 1000 MILLILITER(S): 9 INJECTION, SOLUTION INTRAVENOUS at 13:50

## 2022-06-13 RX ADMIN — HYDROMORPHONE HYDROCHLORIDE 1 MILLIGRAM(S): 2 INJECTION INTRAMUSCULAR; INTRAVENOUS; SUBCUTANEOUS at 10:30

## 2022-06-13 RX ADMIN — CHLORHEXIDINE GLUCONATE 1 APPLICATION(S): 213 SOLUTION TOPICAL at 06:25

## 2022-06-13 RX ADMIN — SODIUM CHLORIDE 40 MILLILITER(S): 9 INJECTION, SOLUTION INTRAVENOUS at 10:02

## 2022-06-13 RX ADMIN — HYDROMORPHONE HYDROCHLORIDE 1 MILLIGRAM(S): 2 INJECTION INTRAMUSCULAR; INTRAVENOUS; SUBCUTANEOUS at 09:44

## 2022-06-13 RX ADMIN — GABAPENTIN 600 MILLIGRAM(S): 400 CAPSULE ORAL at 07:07

## 2022-06-13 RX ADMIN — HYDROMORPHONE HYDROCHLORIDE 1 MILLIGRAM(S): 2 INJECTION INTRAMUSCULAR; INTRAVENOUS; SUBCUTANEOUS at 10:45

## 2022-06-13 RX ADMIN — Medication 1000 MILLIGRAM(S): at 23:35

## 2022-06-13 RX ADMIN — Medication 400 MILLIGRAM(S): at 18:55

## 2022-06-13 RX ADMIN — Medication 1 DROP(S): at 23:09

## 2022-06-13 RX ADMIN — SIMVASTATIN 40 MILLIGRAM(S): 20 TABLET, FILM COATED ORAL at 21:29

## 2022-06-13 RX ADMIN — HYDROMORPHONE HYDROCHLORIDE 1 MILLIGRAM(S): 2 INJECTION INTRAMUSCULAR; INTRAVENOUS; SUBCUTANEOUS at 10:00

## 2022-06-13 NOTE — CHART NOTE - NSCHARTNOTEFT_GEN_A_CORE
Post Operative Note  Patient: CRISTIAN CASTRO 66y (1955) Female   MRN: [id number]  Location: [facility] 23  Visit: 06-13-22 Inpatient  Date: 06-13-22 @ 14:17    Procedure: S/P ileostomy closure    Subjective: Patient seen and examined post operatively. Reports pain as controlled. Denies headache, nausea, vomiting, fever, chills, chest pain, SOB, cough.  Patient complains of bilateral blurry vision since waking up from surgery. Vision has not improved within the past 4 hours. Denies eye pain and hx of visual blurriness.       Objective:  Vitals: T(F): 97.5 (06-13-22 @ 09:35), Max: 98.1 (06-13-22 @ 05:51)  HR: 77 (06-13-22 @ 13:30)  BP: 103/56 (06-13-22 @ 13:30) (103/56 - 147/68)  RR: 12 (06-13-22 @ 13:30)  SpO2: 97% (06-13-22 @ 13:30)  Vent Settings:     In:   06-13-22 @ 07:01  -  06-13-22 @ 14:17  --------------------------------------------------------  IN: 710 mL      IV Fluids: lactated ringers. 1000 milliLiter(s) (40 mL/Hr) IV Continuous <Continuous>      Out:   06-13-22 @ 07:01  -  06-13-22 @ 14:17  --------------------------------------------------------  OUT: 255 mL      EBL:     Voided Urine:   06-13-22 @ 07:01  -  06-13-22 @ 14:17  --------------------------------------------------------  OUT: 255 mL      Simpson catheter      Physical Examination:  General: NAD, resting comfortably in bed  HEENT: Normocephalic atraumatic, EOMs intact, no erythema, no visual deficits noted  Respiratory: Nonlabored respirations, normal CW expansion.  Cardio: S1S2, regular rate and rhythm.  Abdomen: softly distended, appropriately tender, supraumbilical surgical incisions are c/d/i  Vascular: extremities are warm and well perfused.       Assessment:  66yFemale patient S/P ileostomy reversal from diverting loop ileostomy on March 2022 secondary to diverticulosis    Plan  - ERP protocol  - Pain control PRN  - Diet: clears  - IVF @40 cc/hr  - Simpson  - Activity: OOB   - DVT ppx resuming tomorrow AM  - Opthalmology consult       Date/Time: 06-13-22 @ 14:17 Post Operative Note  Patient: CRISTIAN CASTRO 66y (1955) Female   MRN: [id number]  Location: [facility] 23  Visit: 06-13-22 Inpatient  Date: 06-13-22 @ 14:17    Procedure: S/P ileostomy closure    Subjective: Patient seen and examined post operatively. Reports pain as controlled. Denies headache, nausea, vomiting, fever, chills, chest pain, SOB, cough.  Patient complains of bilateral blurry vision since waking up from surgery. Vision has not improved within the past 4 hours. Denies eye pain and hx of visual blurriness.       Objective:  Vitals: T(F): 97.5 (06-13-22 @ 09:35), Max: 98.1 (06-13-22 @ 05:51)  HR: 77 (06-13-22 @ 13:30)  BP: 103/56 (06-13-22 @ 13:30) (103/56 - 147/68)  RR: 12 (06-13-22 @ 13:30)  SpO2: 97% (06-13-22 @ 13:30)  Vent Settings:     In:   06-13-22 @ 07:01  -  06-13-22 @ 14:17  --------------------------------------------------------  IN: 710 mL      IV Fluids: lactated ringers. 1000 milliLiter(s) (40 mL/Hr) IV Continuous <Continuous>      Out:   06-13-22 @ 07:01  -  06-13-22 @ 14:17  --------------------------------------------------------  OUT: 255 mL      EBL:     Voided Urine:   06-13-22 @ 07:01  -  06-13-22 @ 14:17  --------------------------------------------------------  OUT: 255 mL      Simpson catheter      Physical Examination:  General: NAD, resting comfortably in bed  HEENT: Normocephalic atraumatic, EOMs intact, no erythema, no visual deficits noted  Respiratory: Nonlabored respirations, normal CW expansion.  Cardio: S1S2, regular rate and rhythm.  Abdomen: softly distended, appropriately tender, supraumbilical surgical incisions are c/d/i  Vascular: extremities are warm and well perfused.       Assessment:  66yFemale patient S/P ileostomy reversal from diverting loop ileostomy on March 2022 secondary to diverticulosis    Plan  - ERP protocol  - Pain control PRN  - Diet: clears  - IVF @40 cc/hr  - Simpson  - Activity: OOB   - DVT ppx resuming tomorrow AM  - Monitor visual changes      Date/Time: 06-13-22 @ 14:17

## 2022-06-13 NOTE — CONSULT NOTE ADULT - SUBJECTIVE AND OBJECTIVE BOX
[facility] DEPARTMENT OF OPHTHALMOLOGY - INITIAL ADULT CONSULT  -----------------------------------------------------------------------------  Earl Betancourt MD PGY-3  -----------------------------------------------------------------------------    HPI:  66 year old female with PMHx:  HTN, HLD, Hypothyroid, SOLOMON not consistent with using CPAP, Pernicious anemia, diverticulosis - S/P colon resection 10/2021 & ludwig reversal  & creation of ileostomy in March 2022.   Now scheduled for ileostomy reversal.   (06 Jun 2022 15:33)    Interval History: 65 YO F w/ PMHx:  HTN, HLD, Hypothyroid, SOLOMON not consistent with using CPAP, Pernicious anemia, diverticulosis and ophthalmology consulted for blurry vision in both eyes s/p surgery. Pt is lethargic s/p anesthesia for ileostomy reversal. PT states that her vision is constantly blurry in both eyes since the surgery but states it has been improving as she blinks. Pt endorses FBS OU however denies other vision changes such as flashes, floaters, curtains, decreased vision.     PMH: as above  POcHx: denies surg/laser  FH: denies glc/amd  Social History: denies etoh/tobacco  Ophthalmic Medications: none  Allergies: NKDA    Review of Systems:  Constitutional: No fever, chills  Eyes: +blurry vision OU. No, flashes, floaters, FBS, erythema, discharge, double vision, OU  Neuro: No tremors  Cardiovascular: No chest pain, palpitations  Respiratory: No SOB, no cough  GI: No nausea, vomiting, abdominal pain  : No dysuria  Skin: no rash  Psych: no depression  Endocrine: no polyuria, polydipsia  Heme/lymph: no swelling    VITALS: T(C): 36.3 (06-13-22 @ 17:40)  T(F): 97.4 (06-13-22 @ 17:40), Max: 98.1 (06-13-22 @ 05:51)  HR: 71 (06-13-22 @ 17:40) (70 - 86)  BP: 123/59 (06-13-22 @ 17:40) (103/56 - 147/68)  RR:  (11 - 21)  SpO2:  (95% - 99%)  Wt(kg): --  General: AAO x 3, appropriate mood and affect, lethargic    Ophthalmology Exam:  Visual acuity (cc): 20/30 OU  Pupils: PERRL OU, no APD  Ttono: 17, 14   Extraocular movements (EOMs): Full OU, no pain, no diplopia  Confrontational Visual Field (CVF): Full OU  Color Plates: 12/12 OU    Pen Light Exam (PLE)  External: Flat OU  Lids/Lashes/Lacrimal Ducts: Flat OU    Sclera/Conjunctiva: W+Q OU  Cornea: inferior PEEs and DTBUT OU  Anterior Chamber: D+F OU    Iris: Flat OU  Lens: NS OU

## 2022-06-13 NOTE — CONSULT NOTE ADULT - ASSESSMENT
Assessment and Recommendations:   67 YO F w/ PMHx:  HTN, HLD, Hypothyroid, SOLOMON not consistent with using CPAP, Pernicious anemia, diverticulosis and ophthalmology consulted for blurry vision in both eyes s/p surgery, found to have dry eye syndrome in both eyes    #dry eye syndrome in both eyes  - recommend artificial tears, one drop to both eyes, 4 times a day  - recommend artificial tear ointment (lacrilube), a small amount into both eyes, twice a day     Patient was seen and discussed with attending Dr. Heck     Outpatient follow-up: Patient should follow-up with his/her ophthalmologist or with [facility] Department of Ophthalmology within 1 week of after discharge at:    [address]  [place] [address]  [phone]    Earl Betancourt MD, PGY-3  Also available on [employer] Teams

## 2022-06-13 NOTE — BRIEF OPERATIVE NOTE - OPERATION/FINDINGS
skin incised circumferentially, ileostomy cleared circumferentially around fascia, side to side anastomosis created using endoGIA stapler, hemostasis achieved, fascia closed

## 2022-06-13 NOTE — PATIENT PROFILE ADULT - FALL HARM RISK - HARM RISK INTERVENTIONS

## 2022-06-14 LAB
ANION GAP SERPL CALC-SCNC: 11 MMOL/L — SIGNIFICANT CHANGE UP (ref 7–14)
BUN SERPL-MCNC: 26 MG/DL — HIGH (ref 7–23)
CALCIUM SERPL-MCNC: 8.4 MG/DL — SIGNIFICANT CHANGE UP (ref 8.4–10.5)
CHLORIDE SERPL-SCNC: 101 MMOL/L — SIGNIFICANT CHANGE UP (ref 98–107)
CO2 SERPL-SCNC: 22 MMOL/L — SIGNIFICANT CHANGE UP (ref 22–31)
CREAT SERPL-MCNC: 1.53 MG/DL — HIGH (ref 0.5–1.3)
EGFR: 37 ML/MIN/1.73M2 — LOW
GLUCOSE SERPL-MCNC: 116 MG/DL — HIGH (ref 70–99)
HCT VFR BLD CALC: 28.4 % — LOW (ref 34.5–45)
HGB BLD-MCNC: 8.8 G/DL — LOW (ref 11.5–15.5)
MAGNESIUM SERPL-MCNC: 1.7 MG/DL — SIGNIFICANT CHANGE UP (ref 1.6–2.6)
MCHC RBC-ENTMCNC: 26.9 PG — LOW (ref 27–34)
MCHC RBC-ENTMCNC: 31 GM/DL — LOW (ref 32–36)
MCV RBC AUTO: 86.9 FL — SIGNIFICANT CHANGE UP (ref 80–100)
NRBC # BLD: 0 /100 WBCS — SIGNIFICANT CHANGE UP
NRBC # FLD: 0 K/UL — SIGNIFICANT CHANGE UP
PHOSPHATE SERPL-MCNC: 4.3 MG/DL — SIGNIFICANT CHANGE UP (ref 2.5–4.5)
PLATELET # BLD AUTO: 188 K/UL — SIGNIFICANT CHANGE UP (ref 150–400)
POTASSIUM SERPL-MCNC: 4.4 MMOL/L — SIGNIFICANT CHANGE UP (ref 3.5–5.3)
POTASSIUM SERPL-SCNC: 4.4 MMOL/L — SIGNIFICANT CHANGE UP (ref 3.5–5.3)
RBC # BLD: 3.27 M/UL — LOW (ref 3.8–5.2)
RBC # FLD: 15 % — HIGH (ref 10.3–14.5)
SARS-COV-2 N GENE NPH QL NAA+PROBE: NOT DETECTED
SODIUM SERPL-SCNC: 134 MMOL/L — LOW (ref 135–145)
WBC # BLD: 7.59 K/UL — SIGNIFICANT CHANGE UP (ref 3.8–10.5)
WBC # FLD AUTO: 7.59 K/UL — SIGNIFICANT CHANGE UP (ref 3.8–10.5)

## 2022-06-14 RX ORDER — HYDROMORPHONE HYDROCHLORIDE 2 MG/ML
1 INJECTION INTRAMUSCULAR; INTRAVENOUS; SUBCUTANEOUS EVERY 4 HOURS
Refills: 0 | Status: DISCONTINUED | OUTPATIENT
Start: 2022-06-14 | End: 2022-06-14

## 2022-06-14 RX ORDER — HYDROMORPHONE HYDROCHLORIDE 2 MG/ML
2 INJECTION INTRAMUSCULAR; INTRAVENOUS; SUBCUTANEOUS EVERY 4 HOURS
Refills: 0 | Status: DISCONTINUED | OUTPATIENT
Start: 2022-06-14 | End: 2022-06-15

## 2022-06-14 RX ORDER — MAGNESIUM SULFATE 500 MG/ML
2 VIAL (ML) INJECTION ONCE
Refills: 0 | Status: COMPLETED | OUTPATIENT
Start: 2022-06-14 | End: 2022-06-14

## 2022-06-14 RX ORDER — SODIUM CHLORIDE 9 MG/ML
1000 INJECTION, SOLUTION INTRAVENOUS
Refills: 0 | Status: DISCONTINUED | OUTPATIENT
Start: 2022-06-14 | End: 2022-06-14

## 2022-06-14 RX ORDER — ACETAMINOPHEN 500 MG
1000 TABLET ORAL EVERY 6 HOURS
Refills: 0 | Status: COMPLETED | OUTPATIENT
Start: 2022-06-14 | End: 2022-06-15

## 2022-06-14 RX ORDER — LISINOPRIL 2.5 MG/1
40 TABLET ORAL AT BEDTIME
Refills: 0 | Status: DISCONTINUED | OUTPATIENT
Start: 2022-06-14 | End: 2022-06-15

## 2022-06-14 RX ORDER — SODIUM CHLORIDE 9 MG/ML
1000 INJECTION, SOLUTION INTRAVENOUS
Refills: 0 | Status: DISCONTINUED | OUTPATIENT
Start: 2022-06-14 | End: 2022-06-15

## 2022-06-14 RX ADMIN — Medication 400 MILLIGRAM(S): at 05:17

## 2022-06-14 RX ADMIN — Medication 400 MILLIGRAM(S): at 11:56

## 2022-06-14 RX ADMIN — LISINOPRIL 40 MILLIGRAM(S): 2.5 TABLET ORAL at 22:23

## 2022-06-14 RX ADMIN — Medication 1000 MILLIGRAM(S): at 05:47

## 2022-06-14 RX ADMIN — Medication 175 MICROGRAM(S): at 05:17

## 2022-06-14 RX ADMIN — HYDROMORPHONE HYDROCHLORIDE 2 MILLIGRAM(S): 2 INJECTION INTRAMUSCULAR; INTRAVENOUS; SUBCUTANEOUS at 23:17

## 2022-06-14 RX ADMIN — Medication 1000 MILLIGRAM(S): at 12:11

## 2022-06-14 RX ADMIN — Medication 1000 MILLIGRAM(S): at 17:43

## 2022-06-14 RX ADMIN — HYDROMORPHONE HYDROCHLORIDE 2 MILLIGRAM(S): 2 INJECTION INTRAMUSCULAR; INTRAVENOUS; SUBCUTANEOUS at 19:16

## 2022-06-14 RX ADMIN — Medication 400 MILLIGRAM(S): at 17:28

## 2022-06-14 RX ADMIN — ENOXAPARIN SODIUM 40 MILLIGRAM(S): 100 INJECTION SUBCUTANEOUS at 17:28

## 2022-06-14 RX ADMIN — Medication 400 MILLIGRAM(S): at 23:15

## 2022-06-14 RX ADMIN — Medication 25 GRAM(S): at 10:45

## 2022-06-14 RX ADMIN — HYDROMORPHONE HYDROCHLORIDE 2 MILLIGRAM(S): 2 INJECTION INTRAMUSCULAR; INTRAVENOUS; SUBCUTANEOUS at 14:14

## 2022-06-14 RX ADMIN — SODIUM CHLORIDE 40 MILLILITER(S): 9 INJECTION, SOLUTION INTRAVENOUS at 12:18

## 2022-06-14 RX ADMIN — Medication 1 DROP(S): at 05:24

## 2022-06-14 RX ADMIN — AMLODIPINE BESYLATE 10 MILLIGRAM(S): 2.5 TABLET ORAL at 05:17

## 2022-06-14 RX ADMIN — SIMVASTATIN 40 MILLIGRAM(S): 20 TABLET, FILM COATED ORAL at 21:56

## 2022-06-14 RX ADMIN — Medication 1 APPLICATION(S): at 19:16

## 2022-06-14 RX ADMIN — Medication 1 DROP(S): at 11:56

## 2022-06-14 RX ADMIN — HYDROMORPHONE HYDROCHLORIDE 2 MILLIGRAM(S): 2 INJECTION INTRAMUSCULAR; INTRAVENOUS; SUBCUTANEOUS at 20:16

## 2022-06-14 RX ADMIN — Medication 1 DROP(S): at 17:28

## 2022-06-14 RX ADMIN — HYDROMORPHONE HYDROCHLORIDE 2 MILLIGRAM(S): 2 INJECTION INTRAMUSCULAR; INTRAVENOUS; SUBCUTANEOUS at 14:44

## 2022-06-14 NOTE — PROGRESS NOTE ADULT - SUBJECTIVE AND OBJECTIVE BOX
SURGERY PROGRESS NOTE  POD#1 Closure, ileostomy        SUBJECTIVE  Pt seen and examined at bedside. No complaints.  No acute events overnight.         OBJECTIVE:    PHYSICAL EXAM   General Appearance: Appears well, NAD  Resp: Patent airway, non-labored breathing  CV: RRR  Abdomen: softly distended, appropriately tender, supraumbilical surgical incisions are c/d/i  Vascular: extremities are warm and well perfused.       Vital Signs Last 24 Hrs  T(C): 36.6 (13 Jun 2022 21:39), Max: 36.7 (13 Jun 2022 05:51)  T(F): 97.8 (13 Jun 2022 21:39), Max: 98.1 (13 Jun 2022 05:51)  HR: 72 (13 Jun 2022 21:39) (70 - 86)  BP: 117/58 (13 Jun 2022 21:39) (103/56 - 147/68)  BP(mean): 79 (13 Jun 2022 15:00) (57 - 116)  RR: 17 (13 Jun 2022 21:39) (11 - 21)  SpO2: 99% (13 Jun 2022 21:39) (95% - 99%)      I's & O's    06-13-22 @ 07:01  -  06-14-22 @ 00:56  --------------------------------------------------------  IN:    Lactated Ringers: 240 mL    Lactated Ringers Bolus: 500 mL    Oral Fluid: 50 mL  Total IN: 790 mL    OUT:    Indwelling Catheter - Urethral (mL): 285 mL    Voided (mL): 300 mL  Total OUT: 585 mL    Total NET: 205 mL        LAB/STUDIES                        9.2    10.97 )-----------( 202      ( 13 Jun 2022 15:10 )             29.8     137  |  104  |  22  ----------------------------<  159<H>  4.6   |  23  |  1.24    Ca    8.9      13 Jun 2022 15:10  Phos  4.8     06-13  Mg     1.80     06-13     SURGERY PROGRESS NOTE  POD#1 Closure, ileostomy        SUBJECTIVE  Pt seen and examined at bedside. Postoperatively, patient complained of bilateral visual blurriness without pain. Patient states blurriness improved overnight. Patient drank water overnight and had 2 episodes of emesis, measuring 2 cup fulls in total. - Gas / - BM. Denies fever, chills, chest pain, SOB    OBJECTIVE:    PHYSICAL EXAM   General Appearance: Appears well, NAD  Resp: Patent airway, non-labored breathing  CV: RRR  Abdomen: softly distended, appropriately tender, supraumbilical surgical incisions are c/d/i  Vascular: extremities are warm and well perfused.       Vital Signs Last 24 Hrs  ICU Vital Signs Last 24 Hrs  T(C): 36.8 (14 Jun 2022 05:06), Max: 36.8 (14 Jun 2022 05:06)  T(F): 98.3 (14 Jun 2022 05:06), Max: 98.3 (14 Jun 2022 05:06)  HR: 73 (14 Jun 2022 05:06) (70 - 86)  BP: 121/62 (14 Jun 2022 05:06) (103/56 - 147/68)  BP(mean): 79 (13 Jun 2022 15:00) (57 - 116)  ABP: --  ABP(mean): --  RR: 16 (14 Jun 2022 05:06) (11 - 21)  SpO2: 100% (14 Jun 2022 05:06) (95% - 100%)      I's & O's  I&O's Detail    13 Jun 2022 07:01  -  14 Jun 2022 07:00  --------------------------------------------------------  IN:    Lactated Ringers: 240 mL    Lactated Ringers Bolus: 500 mL    Oral Fluid: 50 mL  Total IN: 790 mL    OUT:    Indwelling Catheter - Urethral (mL): 885 mL    Voided (mL): 300 mL  Total OUT: 1185 mL    Total NET: -395 mL        LAB/STUDIES              SURGERY PROGRESS NOTE  POD#1 Closure, ileostomy        SUBJECTIVE  Pt seen and examined at bedside. Postoperatively, patient complained of bilateral visual blurriness without pain. Patient states blurriness improved overnight. Patient drank water overnight and had 2 episodes of emesis, measuring 2 cup fulls in total. - Gas / - BM. Denies fever, chills, chest pain, SOB    OBJECTIVE:    PHYSICAL EXAM   General Appearance: Appears well, NAD  Resp: Patent airway, non-labored breathing  CV: RRR  Abdomen: softly distended, appropriately tender, supraumbilical surgical incisions are c/d/i  Vascular: extremities are warm and well perfused.       Vital Signs Last 24 Hrs  ICU Vital Signs Last 24 Hrs  T(C): 36.8 (14 Jun 2022 05:06), Max: 36.8 (14 Jun 2022 05:06)  T(F): 98.3 (14 Jun 2022 05:06), Max: 98.3 (14 Jun 2022 05:06)  HR: 73 (14 Jun 2022 05:06) (70 - 86)  BP: 121/62 (14 Jun 2022 05:06) (103/56 - 147/68)  BP(mean): 79 (13 Jun 2022 15:00) (57 - 116)  ABP: --  ABP(mean): --  RR: 16 (14 Jun 2022 05:06) (11 - 21)  SpO2: 100% (14 Jun 2022 05:06) (95% - 100%)      I's & O's  I&O's Detail    13 Jun 2022 07:01  -  14 Jun 2022 07:00  --------------------------------------------------------  IN:    Lactated Ringers: 240 mL    Lactated Ringers Bolus: 500 mL    Oral Fluid: 50 mL  Total IN: 790 mL    OUT:    Indwelling Catheter - Urethral (mL): 885 mL    Voided (mL): 300 mL  Total OUT: 1185 mL    Total NET: -395 mL        LAB/STUDIES                                 8.8    7.59  )-----------( 188      ( 14 Jun 2022 07:05 )             28.4   06-14    134<L>  |  101  |  26<H>  ----------------------------<  116<H>  4.4   |  22  |  1.53<H>    Ca    8.4      14 Jun 2022 07:05  Phos  4.3     06-14  Mg     1.70     06-14

## 2022-06-14 NOTE — PROGRESS NOTE ADULT - SUBJECTIVE AND OBJECTIVE BOX
Anesthesia Pain Management Service    SUBJECTIVE: Patient s/p spinal morphine with pain managable and no problems.  Pain Scale Score:  Refer to charted pain scores    THERAPY:    s/p spinal PF morphine yesterday.      MEDICATIONS  (STANDING):  acetaminophen   IVPB .. 1000 milliGRAM(s) IV Intermittent every 6 hours  amLODIPine   Tablet 10 milliGRAM(s) Oral daily  artificial tears (preservative free) Ophthalmic Solution 1 Drop(s) Both EYES four times a day  enoxaparin Injectable 40 milliGRAM(s) SubCutaneous every 24 hours  influenza  Vaccine (HIGH DOSE) 0.7 milliLiter(s) IntraMuscular once  levothyroxine 175 MICROGram(s) Oral daily  magnesium sulfate  IVPB 2 Gram(s) IV Intermittent once  simvastatin 40 milliGRAM(s) Oral at bedtime    MEDICATIONS  (PRN):  nalbuphine Injectable 2.5 milliGRAM(s) IV Push every 6 hours PRN Pruritus  naloxone Injectable 0.1 milliGRAM(s) IV Push every 3 minutes PRN For ANY of the following changes in patient status:  A. RR LESS THAN 10 breaths per minute, B. Oxygen saturation LESS THAN 90%, C. Sedation score of 6  ondansetron Injectable 4 milliGRAM(s) IV Push every 6 hours PRN Nausea      OBJECTIVE:    Sedation Score:	[ x] Alert	[ ] Drowsy	[ ] Arousable	[ ] Asleep	[ ] Unresponsive    Side Effects:	[ x] None	[ ] Nausea	[ ] Vomiting	[ ] Pruritus  		  [ ] Weakness		[ ] Numbness	[ ] Other:    Vital Signs Last 24 Hrs  T(C): 36.8 (14 Jun 2022 10:00), Max: 36.8 (14 Jun 2022 05:06)  T(F): 98.3 (14 Jun 2022 10:00), Max: 98.3 (14 Jun 2022 05:06)  HR: 78 (14 Jun 2022 10:00) (70 - 78)  BP: 128/66 (14 Jun 2022 10:00) (103/56 - 136/71)  BP(mean): 79 (13 Jun 2022 15:00) (57 - 116)  RR: 17 (14 Jun 2022 10:00) (11 - 21)  SpO2: 98% (14 Jun 2022 10:00) (95% - 100%)    ASSESSMENT/ PLAN  [x ] Patient transitioned to prn analgesics  [x] Pain management per primary service, pain service to sign off   [x]Documentation and Verification of current medications     Comments: PRN opioids or adjuvant medication to be given.

## 2022-06-14 NOTE — PROGRESS NOTE ADULT - SUBJECTIVE AND OBJECTIVE BOX
Anesthesia Pain Management Service    SUBJECTIVE: Patient s/p spinal morphine with pain managable and no problems. Patient states the Tylenol helps manage pain. She also reports vomiting once last night but has been tolerating liquids since then. Denies headache, nausea, numbness and tingling in bilateral lower extremities.   Pain Scale Score: 4/10 Refer to charted pain scores    THERAPY:    s/p spinal PF morphine yesterday.      MEDICATIONS  (STANDING):  acetaminophen   IVPB .. 1000 milliGRAM(s) IV Intermittent every 6 hours  amLODIPine   Tablet 10 milliGRAM(s) Oral daily  artificial tears (preservative free) Ophthalmic Solution 1 Drop(s) Both EYES four times a day  enoxaparin Injectable 40 milliGRAM(s) SubCutaneous every 24 hours  influenza  Vaccine (HIGH DOSE) 0.7 milliLiter(s) IntraMuscular once  levothyroxine 175 MICROGram(s) Oral daily  magnesium sulfate  IVPB 2 Gram(s) IV Intermittent once  simvastatin 40 milliGRAM(s) Oral at bedtime    MEDICATIONS  (PRN):  nalbuphine Injectable 2.5 milliGRAM(s) IV Push every 6 hours PRN Pruritus  naloxone Injectable 0.1 milliGRAM(s) IV Push every 3 minutes PRN For ANY of the following changes in patient status:  A. RR LESS THAN 10 breaths per minute, B. Oxygen saturation LESS THAN 90%, C. Sedation score of 6  ondansetron Injectable 4 milliGRAM(s) IV Push every 6 hours PRN Nausea      OBJECTIVE: Patient sitting up in bed.    Sedation Score:	[ x] Alert	[ ] Drowsy	[ ] Arousable	[ ] Asleep	[ ] Unresponsive    Side Effects:	[ x] None	[ ] Nausea	[ ] Vomiting	[ ] Pruritus  		  [ ] Weakness		[ ] Numbness	[ ] Other:    Vital Signs Last 24 Hrs  T(C): 36.8 (14 Jun 2022 05:06), Max: 36.8 (14 Jun 2022 05:06)  T(F): 98.3 (14 Jun 2022 05:06), Max: 98.3 (14 Jun 2022 05:06)  HR: 73 (14 Jun 2022 05:06) (70 - 86)  BP: 121/62 (14 Jun 2022 05:06) (103/56 - 147/68)  BP(mean): 79 (13 Jun 2022 15:00) (57 - 116)  RR: 16 (14 Jun 2022 05:06) (11 - 21)  SpO2: 100% (14 Jun 2022 05:06) (95% - 100%)    ASSESSMENT/ PLAN  [x ] Patient transitioned to prn analgesics  [x] Pain management per primary service, pain service to sign off   [x]Documentation and Verification of current medications     Comments: PRN Oral/IV opioids and/or non-opioid Adjuvant analgesics to be used at this point.    Progress Note written now but Patient was seen earlier.

## 2022-06-14 NOTE — PROGRESS NOTE ADULT - ASSESSMENT
66yFemale patient S/P ileostomy reversal from diverting loop ileostomy on March 2022 secondary to diverticulosis      Plan  - ERP protocol  - Pain control PRN  - Diet: clears  - IVF @40 cc/hr  - Simpson  - Activity: OOB   - DVT ppx resuming today AM  - Monitor visual changes      A TEAM  [id number]  66yFemale patient S/P ileostomy reversal from diverting loop ileostomy on March 2022 secondary to diverticulosis      Plan  - DC pelra and f/u TOV  - ERP protocol  - Pain control PRN, no toradol (Cr uptrending)  - Diet: LRD  - Activity: OOB   - DVT ppx resuming today AM  - Monitor visual changes      A TEAM  [id number]

## 2022-06-14 NOTE — PROGRESS NOTE ADULT - SUBJECTIVE AND OBJECTIVE BOX
ANESTHESIA POSTOP CHECK    66y Female POSTOP DAY 1     Vital Signs Last 24 Hrs  T(C): 36.8 (14 Jun 2022 05:06), Max: 36.8 (14 Jun 2022 05:06)  T(F): 98.3 (14 Jun 2022 05:06), Max: 98.3 (14 Jun 2022 05:06)  HR: 73 (14 Jun 2022 05:06) (70 - 86)  BP: 121/62 (14 Jun 2022 05:06) (103/56 - 141/66)  BP(mean): 79 (13 Jun 2022 15:00) (57 - 116)  RR: 16 (14 Jun 2022 05:06) (11 - 21)  SpO2: 100% (14 Jun 2022 05:06) (95% - 100%)  I&O's Summary    13 Jun 2022 07:01  -  14 Jun 2022 07:00  --------------------------------------------------------  IN: 790 mL / OUT: 1185 mL / NET: -395 mL        [X ] NO APPARENT ANESTHESIA COMPLICATIONS      Comments:

## 2022-06-15 ENCOUNTER — TRANSCRIPTION ENCOUNTER (OUTPATIENT)
Age: 67
End: 2022-06-15

## 2022-06-15 VITALS
SYSTOLIC BLOOD PRESSURE: 140 MMHG | OXYGEN SATURATION: 98 % | DIASTOLIC BLOOD PRESSURE: 93 MMHG | HEART RATE: 87 BPM | RESPIRATION RATE: 17 BRPM | TEMPERATURE: 99 F

## 2022-06-15 LAB
ANION GAP SERPL CALC-SCNC: 13 MMOL/L — SIGNIFICANT CHANGE UP (ref 7–14)
BUN SERPL-MCNC: 11 MG/DL — SIGNIFICANT CHANGE UP (ref 7–23)
CALCIUM SERPL-MCNC: 8.9 MG/DL — SIGNIFICANT CHANGE UP (ref 8.4–10.5)
CHLORIDE SERPL-SCNC: 103 MMOL/L — SIGNIFICANT CHANGE UP (ref 98–107)
CO2 SERPL-SCNC: 21 MMOL/L — LOW (ref 22–31)
CREAT SERPL-MCNC: 0.87 MG/DL — SIGNIFICANT CHANGE UP (ref 0.5–1.3)
EGFR: 73 ML/MIN/1.73M2 — SIGNIFICANT CHANGE UP
GLUCOSE SERPL-MCNC: 137 MG/DL — HIGH (ref 70–99)
HCT VFR BLD CALC: 30.2 % — LOW (ref 34.5–45)
HGB BLD-MCNC: 9.4 G/DL — LOW (ref 11.5–15.5)
MAGNESIUM SERPL-MCNC: 2.1 MG/DL — SIGNIFICANT CHANGE UP (ref 1.6–2.6)
MCHC RBC-ENTMCNC: 26.8 PG — LOW (ref 27–34)
MCHC RBC-ENTMCNC: 31.1 GM/DL — LOW (ref 32–36)
MCV RBC AUTO: 86 FL — SIGNIFICANT CHANGE UP (ref 80–100)
NRBC # BLD: 0 /100 WBCS — SIGNIFICANT CHANGE UP
NRBC # FLD: 0 K/UL — SIGNIFICANT CHANGE UP
PHOSPHATE SERPL-MCNC: 2.8 MG/DL — SIGNIFICANT CHANGE UP (ref 2.5–4.5)
PLATELET # BLD AUTO: 202 K/UL — SIGNIFICANT CHANGE UP (ref 150–400)
POTASSIUM SERPL-MCNC: 4.3 MMOL/L — SIGNIFICANT CHANGE UP (ref 3.5–5.3)
POTASSIUM SERPL-SCNC: 4.3 MMOL/L — SIGNIFICANT CHANGE UP (ref 3.5–5.3)
RBC # BLD: 3.51 M/UL — LOW (ref 3.8–5.2)
RBC # FLD: 15 % — HIGH (ref 10.3–14.5)
SODIUM SERPL-SCNC: 137 MMOL/L — SIGNIFICANT CHANGE UP (ref 135–145)
WBC # BLD: 8.92 K/UL — SIGNIFICANT CHANGE UP (ref 3.8–10.5)
WBC # FLD AUTO: 8.92 K/UL — SIGNIFICANT CHANGE UP (ref 3.8–10.5)

## 2022-06-15 RX ORDER — ACETAMINOPHEN 500 MG
975 TABLET ORAL EVERY 6 HOURS
Refills: 0 | Status: DISCONTINUED | OUTPATIENT
Start: 2022-06-15 | End: 2022-06-15

## 2022-06-15 RX ORDER — ACETAMINOPHEN 500 MG
3 TABLET ORAL
Qty: 0 | Refills: 0 | DISCHARGE
Start: 2022-06-15

## 2022-06-15 RX ORDER — HYDROMORPHONE HYDROCHLORIDE 2 MG/ML
1 INJECTION INTRAMUSCULAR; INTRAVENOUS; SUBCUTANEOUS
Qty: 16 | Refills: 0
Start: 2022-06-15 | End: 2022-06-18

## 2022-06-15 RX ORDER — ACETAMINOPHEN 500 MG
650 TABLET ORAL EVERY 6 HOURS
Refills: 0 | Status: DISCONTINUED | OUTPATIENT
Start: 2022-06-15 | End: 2022-06-15

## 2022-06-15 RX ORDER — LOPERAMIDE HCL 2 MG
1 TABLET ORAL
Qty: 0 | Refills: 0 | DISCHARGE

## 2022-06-15 RX ADMIN — Medication 1 APPLICATION(S): at 06:14

## 2022-06-15 RX ADMIN — Medication 1000 MILLIGRAM(S): at 00:15

## 2022-06-15 RX ADMIN — Medication 650 MILLIGRAM(S): at 12:36

## 2022-06-15 RX ADMIN — HYDROMORPHONE HYDROCHLORIDE 2 MILLIGRAM(S): 2 INJECTION INTRAMUSCULAR; INTRAVENOUS; SUBCUTANEOUS at 10:22

## 2022-06-15 RX ADMIN — HYDROMORPHONE HYDROCHLORIDE 2 MILLIGRAM(S): 2 INJECTION INTRAMUSCULAR; INTRAVENOUS; SUBCUTANEOUS at 06:35

## 2022-06-15 RX ADMIN — Medication 1 DROP(S): at 06:14

## 2022-06-15 RX ADMIN — HYDROMORPHONE HYDROCHLORIDE 2 MILLIGRAM(S): 2 INJECTION INTRAMUSCULAR; INTRAVENOUS; SUBCUTANEOUS at 16:00

## 2022-06-15 RX ADMIN — AMLODIPINE BESYLATE 10 MILLIGRAM(S): 2.5 TABLET ORAL at 06:13

## 2022-06-15 RX ADMIN — HYDROMORPHONE HYDROCHLORIDE 2 MILLIGRAM(S): 2 INJECTION INTRAMUSCULAR; INTRAVENOUS; SUBCUTANEOUS at 06:14

## 2022-06-15 RX ADMIN — Medication 650 MILLIGRAM(S): at 11:52

## 2022-06-15 RX ADMIN — Medication 1 DROP(S): at 12:15

## 2022-06-15 RX ADMIN — Medication 1 APPLICATION(S): at 19:02

## 2022-06-15 RX ADMIN — Medication 400 MILLIGRAM(S): at 06:14

## 2022-06-15 RX ADMIN — HYDROMORPHONE HYDROCHLORIDE 2 MILLIGRAM(S): 2 INJECTION INTRAMUSCULAR; INTRAVENOUS; SUBCUTANEOUS at 00:17

## 2022-06-15 RX ADMIN — ENOXAPARIN SODIUM 40 MILLIGRAM(S): 100 INJECTION SUBCUTANEOUS at 18:16

## 2022-06-15 RX ADMIN — HYDROMORPHONE HYDROCHLORIDE 2 MILLIGRAM(S): 2 INJECTION INTRAMUSCULAR; INTRAVENOUS; SUBCUTANEOUS at 15:15

## 2022-06-15 RX ADMIN — Medication 1 DROP(S): at 18:16

## 2022-06-15 RX ADMIN — Medication 650 MILLIGRAM(S): at 17:02

## 2022-06-15 RX ADMIN — HYDROMORPHONE HYDROCHLORIDE 2 MILLIGRAM(S): 2 INJECTION INTRAMUSCULAR; INTRAVENOUS; SUBCUTANEOUS at 11:20

## 2022-06-15 RX ADMIN — Medication 1000 MILLIGRAM(S): at 06:35

## 2022-06-15 RX ADMIN — Medication 175 MICROGRAM(S): at 06:13

## 2022-06-15 NOTE — DISCHARGE NOTE PROVIDER - HOSPITAL COURSE
This patient is a 66 year old female with PMHx:  HTN, HLD, Hypothyroid, SOLOMON not consistent with using CPAP, Pernicious anemia, diverticulosis - S/P colon resection 10/2021 & Martin reversal  & creation of ileostomy in March 2022. Patient presented to [facility] on 6/13 for scheduled surgery. Patient taken to OR by Dr. Beach and underwent ileostomy reversal.  Patient tolerated operation well and there were no post-operative complications identified. Patient remained hemodynamically stable in the PACU and transferred to the surgical floor. Patient c/o blurry vision POD#1. Opthalmology consulted who diagnosed pt with dry eye syndrome in both eyes and recommended artificial tears, one drop to both eyes, 4 times a day as well as artificial tear ointment (lacrilube), a small amount into both eyes, twice a day. Diet was restarted and advanced as tolerated. Pain control was transitioned from IV to PO pain meds. At this time, patient is currently ambulating, voiding, tolerating a regular diet. Pain well controlled on PO pain meds. Patient has been deemed stable for discharge home with follow up as an outpatient.

## 2022-06-15 NOTE — DISCHARGE NOTE NURSING/CASE MANAGEMENT/SOCIAL WORK - NSDCPEFALRISK_GEN_ALL_CORE
For information on Fall & Injury Prevention, visit: [email or web address] OR  [email or web address] OR  https://www.cdc.gov/steadi/patient.html

## 2022-06-15 NOTE — PROGRESS NOTE ADULT - ASSESSMENT
66yFemale patient S/P ileostomy reversal from diverting loop ileostomy on March 2022 secondary to diverticulosis      Plan  - ERP protocol  - Pain control PRN, no toradol (Cr uptrending)  - Diet: LRD  - Activity: OOB   - DVT ppx resuming today AM  - Monitor visual changes      A TEAM  [id number]  66F POD #2 from ileostomy reversal (s/p diverting loop ileostomy on March 2022 secondary to diverticulosis)       Plan  - ERP protocol  - Pain control PRN, no toradol (Cr uptrending)  - Diet: LRD today   - Activity: OOB   - DVT ppx resuming today AM  - Monitor visual changes      A TEAM  [id number]

## 2022-06-15 NOTE — DISCHARGE NOTE PROVIDER - NSDCCPCAREPLAN_GEN_ALL_CORE_FT
PRINCIPAL DISCHARGE DIAGNOSIS  Diagnosis: S/P closure of ileostomy  Assessment and Plan of Treatment: ********WOUND CARE:  Please keep incisions clean and dry. Please do not Scrub or rub incisions. Do not use lotion or powder on incisions.   BATHING: You may shower and/or sponge bathe. You may use warm soapy water in the shower and rinse, pat dry.  ACTIVITY: No heavy lifting or straining. Otherwise, you may return to your usual level of physical activity. If you are taking narcotic pain medication DO NOT drive a car, operate machinery or make important decisions.  DIET: Return to your usual diet.  NOTIFY YOUR SURGEON IF YOU HAVE: any bleeding that does not stop, any pus draining from your wound(s), any fever (over 100.4 F) persistent nausea/vomiting, or if your pain is not controlled on your discharge pain medications, unable to urinate.  FOLLOW UP:  1. Please follow up with your primary care physician in one week regarding your hospitalization, bring copies of your discharge paperwork.  2. Please follow up with your surgeon, Dr. Beach. Call [phone] to make an appointment.

## 2022-06-15 NOTE — DISCHARGE NOTE PROVIDER - NSDCMRMEDTOKEN_GEN_ALL_CORE_FT
amLODIPine 10 mg oral tablet: 1 tab(s) orally once a day  hydrALAZINE 25 mg oral tablet: 1 tab(s) orally every 6 hours  levothyroxine 175 mcg (0.175 mg) oral tablet: 1 tab(s) orally once a day  lisinopril 40 mg oral tablet: 1 tab(s) orally once a day  loperamide 2 mg oral tablet: 1 tab(s) orally every 4 hours  simvastatin 40 mg oral tablet: 1 tab(s) orally once a day (at bedtime)  Vitamin B12: orally once a day  Vitamin D2 1.25 mg (50,000 intl units) oral capsule: 1 cap(s) orally once a week   acetaminophen 325 mg oral tablet: 3 tab(s) orally every 6 hours  amLODIPine 10 mg oral tablet: 1 tab(s) orally once a day  hydrALAZINE 25 mg oral tablet: 1 tab(s) orally every 6 hours  levothyroxine 175 mcg (0.175 mg) oral tablet: 1 tab(s) orally once a day  lisinopril 40 mg oral tablet: 1 tab(s) orally once a day  ocular lubricant ophthalmic ointment: 1 application to each affected eye 2 times a day  simvastatin 40 mg oral tablet: 1 tab(s) orally once a day (at bedtime)  Vitamin B12: orally once a day  Vitamin D2 1.25 mg (50,000 intl units) oral capsule: 1 cap(s) orally once a week   acetaminophen 325 mg oral tablet: 3 tab(s) orally every 6 hours  amLODIPine 10 mg oral tablet: 1 tab(s) orally once a day  Dilaudid 2 mg oral tablet: 1 tab(s) orally every 6 hours, As Needed -for severe pain MDD:4   hydrALAZINE 25 mg oral tablet: 1 tab(s) orally every 6 hours  levothyroxine 175 mcg (0.175 mg) oral tablet: 1 tab(s) orally once a day  lisinopril 40 mg oral tablet: 1 tab(s) orally once a day  ocular lubricant ophthalmic ointment: 1 application to each affected eye 2 times a day  simvastatin 40 mg oral tablet: 1 tab(s) orally once a day (at bedtime)  Vitamin B12: orally once a day  Vitamin D2 1.25 mg (50,000 intl units) oral capsule: 1 cap(s) orally once a week

## 2022-06-15 NOTE — DISCHARGE NOTE NURSING/CASE MANAGEMENT/SOCIAL WORK - NSSCTYPOFSERV_GEN_ALL_CORE
Nurse to visit day after discharge. Clear bilaterally, pupils equal, round and reactive to light. EOMI.

## 2022-06-15 NOTE — DISCHARGE NOTE PROVIDER - PROVIDER TOKENS
PROVIDER:[TOKEN:[[id number]],FOLLOWUP:[2 weeks]] PROVIDER:[TOKEN:[[id number]],FOLLOWUP:[2 weeks]]

## 2022-06-15 NOTE — PROGRESS NOTE ADULT - SUBJECTIVE AND OBJECTIVE BOX
SURGERY PROGRESS NOTE  POD#2 Closure, ileostomy      SUBJECTIVE  Pt seen and examined at bedside.   No acute events overnight.    OBJECTIVE:    PHYSICAL EXAM   General Appearance: Appears well, NAD  Resp: Patent airway, non-labored breathing  CV: RRR  Abdomen: softly distended, appropriately tender, supraumbilical surgical incisions are c/d/i  Vascular: extremities are warm and well perfused.       Vital Signs Last 24 Hrs  ICU Vital Signs Last 24 Hrs  T(C): 36.8 (14 Jun 2022 05:06), Max: 36.8 (14 Jun 2022 05:06)  T(F): 98.3 (14 Jun 2022 05:06), Max: 98.3 (14 Jun 2022 05:06)  HR: 73 (14 Jun 2022 05:06) (70 - 86)  BP: 121/62 (14 Jun 2022 05:06) (103/56 - 147/68)  BP(mean): 79 (13 Jun 2022 15:00) (57 - 116)  ABP: --  ABP(mean): --  RR: 16 (14 Jun 2022 05:06) (11 - 21)  SpO2: 100% (14 Jun 2022 05:06) (95% - 100%)      I's & O's  I&O's Detail    13 Jun 2022 07:01  -  14 Jun 2022 07:00  --------------------------------------------------------  IN:    Lactated Ringers: 240 mL    Lactated Ringers Bolus: 500 mL    Oral Fluid: 50 mL  Total IN: 790 mL    OUT:    Indwelling Catheter - Urethral (mL): 885 mL    Voided (mL): 300 mL  Total OUT: 1185 mL    Total NET: -395 mL        LAB/STUDIES                                 8.8    7.59  )-----------( 188      ( 14 Jun 2022 07:05 )             28.4     134<L>  |  101  |  26<H>  ----------------------------<  116<H>  4.4   |  22  |  1.53<H>    Ca    8.4      14 Jun 2022 07:05  Phos  4.3     06-14  Mg     1.70     06-14       SURGERY PROGRESS NOTE  POD#2 Closure, ileostomy      SUBJECTIVE  Pt seen and examined at bedside. Tolerating clears. +/+  No acute events overnight.     OBJECTIVE:    PHYSICAL EXAM   General Appearance: Appears well, NAD  Resp: Patent airway, non-labored breathing  Abdomen: softly distended, appropriately tender, supraumbilical surgical incisions are c/d/i. Packed with gauze   Vascular: extremities are warm and well perfused.       Vital Signs Last 24 Hrs  ICU Vital Signs Last 24 Hrs  T(C): 36.8 (14 Jun 2022 05:06), Max: 36.8 (14 Jun 2022 05:06)  T(F): 98.3 (14 Jun 2022 05:06), Max: 98.3 (14 Jun 2022 05:06)  HR: 73 (14 Jun 2022 05:06) (70 - 86)  BP: 121/62 (14 Jun 2022 05:06) (103/56 - 147/68)  BP(mean): 79 (13 Jun 2022 15:00) (57 - 116)  RR: 16 (14 Jun 2022 05:06) (11 - 21)  SpO2: 100% (14 Jun 2022 05:06) (95% - 100%)      I's & O's  I&O's Detail    13 Jun 2022 07:01  -  14 Jun 2022 07:00  --------------------------------------------------------  IN:    Lactated Ringers: 240 mL    Lactated Ringers Bolus: 500 mL    Oral Fluid: 50 mL  Total IN: 790 mL    OUT:    Indwelling Catheter - Urethral (mL): 885 mL    Voided (mL): 300 mL  Total OUT: 1185 mL    Total NET: -395 mL        LAB/STUDIES                        cret                        9.4    8.92  )-----------( 202      ( 15 Thee 2022 06:40 )             30.2     06-14    134<L>  |  101  |  26<H>  ----------------------------<  116<H>  4.4   |  22  |  1.53<H>    Ca    8.4      14 Jun 2022 07:05  Phos  4.3     06-14  Mg     1.70     06-14

## 2022-06-15 NOTE — DISCHARGE NOTE PROVIDER - CARE PROVIDER_API CALL
ANIA JOSÉ  Surgical Oncology  [address] [place] [id number]  Phone: [phone]  Fax: [phone]  Follow Up Time: 2 weeks   Breezy Beach)  ColonRectal Surgery; Surgery  [facility], [address] [place] [address]  Phone: [phone]  Fax: [phone]  Follow Up Time: 2 weeks

## 2022-06-15 NOTE — DISCHARGE NOTE NURSING/CASE MANAGEMENT/SOCIAL WORK - PATIENT PORTAL LINK FT
You can access the FollowMyHealth Patient Portal offered by [facility] by registering at the following website: [email or web address]. By joining [employer]’s FollowMyHealth portal, you will also be able to view your health information using other applications (apps) compatible with our system.

## 2022-06-16 ENCOUNTER — NON-APPOINTMENT (OUTPATIENT)
Age: 67
End: 2022-06-16

## 2022-06-17 ENCOUNTER — NON-APPOINTMENT (OUTPATIENT)
Age: 67
End: 2022-06-17

## 2022-06-17 LAB — SURGICAL PATHOLOGY STUDY: SIGNIFICANT CHANGE UP

## 2022-06-18 ENCOUNTER — NON-APPOINTMENT (OUTPATIENT)
Age: 67
End: 2022-06-18

## 2022-06-30 ENCOUNTER — APPOINTMENT (OUTPATIENT)
Dept: COLORECTAL SURGERY | Facility: CLINIC | Age: 67
End: 2022-06-30

## 2022-06-30 VITALS — SYSTOLIC BLOOD PRESSURE: 163 MMHG | DIASTOLIC BLOOD PRESSURE: 83 MMHG | HEART RATE: 74 BPM | OXYGEN SATURATION: 100 %

## 2022-06-30 DIAGNOSIS — K57.31 DIVERTICULOSIS OF LARGE INTESTINE W/OUT PERFORATION OR ABSCESS WITH BLEEDING: ICD-10-CM

## 2022-06-30 PROCEDURE — 99024 POSTOP FOLLOW-UP VISIT: CPT

## 2022-06-30 NOTE — ASSESSMENT
[FreeTextEntry1] : Status post ileostomy reversal\par -Patient progressing well\par -Low-residue diet for one more week followed by high-fiber diet\par -Followup in 4 weeks for wound check

## 2022-06-30 NOTE — HISTORY OF PRESENT ILLNESS
[FreeTextEntry1] : Status post ileostomy reversal. Patient progressed well. Tolerating diet. No fevers or chills no nausea or vomiting positive bowel movement

## 2022-07-03 ENCOUNTER — EMERGENCY (EMERGENCY)
Facility: HOSPITAL | Age: 67
LOS: 1 days | Discharge: HOME | End: 2022-07-03
Attending: STUDENT IN AN ORGANIZED HEALTH CARE EDUCATION/TRAINING PROGRAM | Admitting: STUDENT IN AN ORGANIZED HEALTH CARE EDUCATION/TRAINING PROGRAM

## 2022-07-03 VITALS
RESPIRATION RATE: 18 BRPM | OXYGEN SATURATION: 99 % | HEART RATE: 89 BPM | DIASTOLIC BLOOD PRESSURE: 58 MMHG | SYSTOLIC BLOOD PRESSURE: 126 MMHG

## 2022-07-03 VITALS
OXYGEN SATURATION: 99 % | HEIGHT: 65 IN | SYSTOLIC BLOOD PRESSURE: 149 MMHG | DIASTOLIC BLOOD PRESSURE: 97 MMHG | RESPIRATION RATE: 16 BRPM | TEMPERATURE: 98 F | HEART RATE: 80 BPM

## 2022-07-03 DIAGNOSIS — Z98.890 OTHER SPECIFIED POSTPROCEDURAL STATES: Chronic | ICD-10-CM

## 2022-07-03 DIAGNOSIS — Z90.89 ACQUIRED ABSENCE OF OTHER ORGANS: Chronic | ICD-10-CM

## 2022-07-03 DIAGNOSIS — Z90.49 ACQUIRED ABSENCE OF OTHER SPECIFIED PARTS OF DIGESTIVE TRACT: Chronic | ICD-10-CM

## 2022-07-03 DIAGNOSIS — Z90.710 ACQUIRED ABSENCE OF BOTH CERVIX AND UTERUS: Chronic | ICD-10-CM

## 2022-07-03 LAB
ALBUMIN SERPL ELPH-MCNC: 3.9 G/DL — SIGNIFICANT CHANGE UP (ref 3.3–5)
ALP SERPL-CCNC: 92 U/L — SIGNIFICANT CHANGE UP (ref 40–120)
ALT FLD-CCNC: 12 U/L — SIGNIFICANT CHANGE UP (ref 4–33)
ANION GAP SERPL CALC-SCNC: 15 MMOL/L — HIGH (ref 7–14)
APTT BLD: 30.5 SEC — SIGNIFICANT CHANGE UP (ref 27–36.3)
AST SERPL-CCNC: 18 U/L — SIGNIFICANT CHANGE UP (ref 4–32)
BASE EXCESS BLDV CALC-SCNC: 3.3 MMOL/L — HIGH (ref -2–3)
BASOPHILS # BLD AUTO: 0.03 K/UL — SIGNIFICANT CHANGE UP (ref 0–0.2)
BASOPHILS NFR BLD AUTO: 0.3 % — SIGNIFICANT CHANGE UP (ref 0–2)
BILIRUB SERPL-MCNC: 0.4 MG/DL — SIGNIFICANT CHANGE UP (ref 0.2–1.2)
BLOOD GAS VENOUS COMPREHENSIVE RESULT: SIGNIFICANT CHANGE UP
BUN SERPL-MCNC: 16 MG/DL — SIGNIFICANT CHANGE UP (ref 7–23)
CALCIUM SERPL-MCNC: 9 MG/DL — SIGNIFICANT CHANGE UP (ref 8.4–10.5)
CHLORIDE BLDV-SCNC: 103 MMOL/L — SIGNIFICANT CHANGE UP (ref 96–108)
CHLORIDE SERPL-SCNC: 103 MMOL/L — SIGNIFICANT CHANGE UP (ref 98–107)
CO2 BLDV-SCNC: 30.5 MMOL/L — HIGH (ref 22–26)
CO2 SERPL-SCNC: 19 MMOL/L — LOW (ref 22–31)
CREAT SERPL-MCNC: 0.84 MG/DL — SIGNIFICANT CHANGE UP (ref 0.5–1.3)
EGFR: 76 ML/MIN/1.73M2 — SIGNIFICANT CHANGE UP
EOSINOPHIL # BLD AUTO: 0.13 K/UL — SIGNIFICANT CHANGE UP (ref 0–0.5)
EOSINOPHIL NFR BLD AUTO: 1.5 % — SIGNIFICANT CHANGE UP (ref 0–6)
GAS PNL BLDV: 138 MMOL/L — SIGNIFICANT CHANGE UP (ref 136–145)
GAS PNL BLDV: SIGNIFICANT CHANGE UP
GLUCOSE BLDV-MCNC: 98 MG/DL — SIGNIFICANT CHANGE UP (ref 70–99)
GLUCOSE SERPL-MCNC: 100 MG/DL — HIGH (ref 70–99)
HCO3 BLDV-SCNC: 29 MMOL/L — SIGNIFICANT CHANGE UP (ref 22–29)
HCT VFR BLD CALC: 29.4 % — LOW (ref 34.5–45)
HCT VFR BLDA CALC: 29 % — LOW (ref 34.5–46.5)
HGB BLD CALC-MCNC: 9.7 G/DL — LOW (ref 11.5–15.5)
HGB BLD-MCNC: 9.3 G/DL — LOW (ref 11.5–15.5)
IANC: 5.89 K/UL — SIGNIFICANT CHANGE UP (ref 1.8–7.4)
IMM GRANULOCYTES NFR BLD AUTO: 0.1 % — SIGNIFICANT CHANGE UP (ref 0–1.5)
INR BLD: 1.08 RATIO — SIGNIFICANT CHANGE UP (ref 0.88–1.16)
LACTATE BLDV-MCNC: 0.7 MMOL/L — SIGNIFICANT CHANGE UP (ref 0.5–2)
LIDOCAIN IGE QN: 19 U/L — SIGNIFICANT CHANGE UP (ref 7–60)
LYMPHOCYTES # BLD AUTO: 2.13 K/UL — SIGNIFICANT CHANGE UP (ref 1–3.3)
LYMPHOCYTES # BLD AUTO: 24.1 % — SIGNIFICANT CHANGE UP (ref 13–44)
MCHC RBC-ENTMCNC: 26.3 PG — LOW (ref 27–34)
MCHC RBC-ENTMCNC: 31.6 GM/DL — LOW (ref 32–36)
MCV RBC AUTO: 83.3 FL — SIGNIFICANT CHANGE UP (ref 80–100)
MONOCYTES # BLD AUTO: 0.66 K/UL — SIGNIFICANT CHANGE UP (ref 0–0.9)
MONOCYTES NFR BLD AUTO: 7.5 % — SIGNIFICANT CHANGE UP (ref 2–14)
NEUTROPHILS # BLD AUTO: 5.89 K/UL — SIGNIFICANT CHANGE UP (ref 1.8–7.4)
NEUTROPHILS NFR BLD AUTO: 66.5 % — SIGNIFICANT CHANGE UP (ref 43–77)
NRBC # BLD: 0 /100 WBCS — SIGNIFICANT CHANGE UP
NRBC # FLD: 0 K/UL — SIGNIFICANT CHANGE UP
PCO2 BLDV: 49 MMHG — HIGH (ref 39–42)
PH BLDV: 7.38 — SIGNIFICANT CHANGE UP (ref 7.32–7.43)
PLATELET # BLD AUTO: 366 K/UL — SIGNIFICANT CHANGE UP (ref 150–400)
PO2 BLDV: 28 MMHG — SIGNIFICANT CHANGE UP
POTASSIUM BLDV-SCNC: 3.4 MMOL/L — LOW (ref 3.5–5.1)
POTASSIUM SERPL-MCNC: 4.6 MMOL/L — SIGNIFICANT CHANGE UP (ref 3.5–5.3)
POTASSIUM SERPL-SCNC: 4.6 MMOL/L — SIGNIFICANT CHANGE UP (ref 3.5–5.3)
PROT SERPL-MCNC: 8.7 G/DL — HIGH (ref 6–8.3)
PROTHROM AB SERPL-ACNC: 12.5 SEC — SIGNIFICANT CHANGE UP (ref 10.5–13.4)
RBC # BLD: 3.53 M/UL — LOW (ref 3.8–5.2)
RBC # FLD: 15.1 % — HIGH (ref 10.3–14.5)
SAO2 % BLDV: 38.9 % — SIGNIFICANT CHANGE UP
SARS-COV-2 RNA SPEC QL NAA+PROBE: SIGNIFICANT CHANGE UP
SODIUM SERPL-SCNC: 137 MMOL/L — SIGNIFICANT CHANGE UP (ref 135–145)
WBC # BLD: 8.85 K/UL — SIGNIFICANT CHANGE UP (ref 3.8–10.5)
WBC # FLD AUTO: 8.85 K/UL — SIGNIFICANT CHANGE UP (ref 3.8–10.5)

## 2022-07-03 PROCEDURE — 71045 X-RAY EXAM CHEST 1 VIEW: CPT | Mod: 26

## 2022-07-03 PROCEDURE — 74177 CT ABD & PELVIS W/CONTRAST: CPT | Mod: 26,MA

## 2022-07-03 PROCEDURE — 99285 EMERGENCY DEPT VISIT HI MDM: CPT

## 2022-07-03 RX ORDER — SODIUM CHLORIDE 9 MG/ML
1000 INJECTION INTRAMUSCULAR; INTRAVENOUS; SUBCUTANEOUS ONCE
Refills: 0 | Status: COMPLETED | OUTPATIENT
Start: 2022-07-03 | End: 2022-07-03

## 2022-07-03 RX ORDER — MORPHINE SULFATE 50 MG/1
4 CAPSULE, EXTENDED RELEASE ORAL ONCE
Refills: 0 | Status: DISCONTINUED | OUTPATIENT
Start: 2022-07-03 | End: 2022-07-03

## 2022-07-03 RX ADMIN — MORPHINE SULFATE 4 MILLIGRAM(S): 50 CAPSULE, EXTENDED RELEASE ORAL at 20:06

## 2022-07-03 RX ADMIN — MORPHINE SULFATE 4 MILLIGRAM(S): 50 CAPSULE, EXTENDED RELEASE ORAL at 16:34

## 2022-07-03 RX ADMIN — SODIUM CHLORIDE 1000 MILLILITER(S): 9 INJECTION INTRAMUSCULAR; INTRAVENOUS; SUBCUTANEOUS at 16:34

## 2022-07-03 NOTE — ED PROVIDER NOTE - OBJECTIVE STATEMENT
67 y/o F, PMH of HTN, HLD, Hypothyroidism, SOLOMON, Pernicious anemia, diverticulosis, Colon CA s/p resection/Martin reversal/ileostomy creation (03/2022), and recent ileostomy reversal (6/13/22) w/ Dr. Beach, presents to  ED c/o R-sided abd pain right above the surgical site x 1 day, as well as fever today. Pt spoke w/ Dr. Beach office and was told to present to ED. Pt state took tylenol and motrin at home w/o relief of pain.

## 2022-07-03 NOTE — ED ADULT NURSE REASSESSMENT NOTE - NS ED NURSE REASSESS COMMENT FT1
received report from  JESENIA rogers. Pt is a/o x 4. pt c/o abdominal pain consistent with chief complaint (7/10). no signs of redness or swelling to abdominal site of ileostomy reversal. no complaints of chest pain, headache, nausea, dizziness, vomiting, SOB, fever, chills   verbalized. Pt has 20g iv placed to LAC with no redness or swelling noted. pt respirations even and unlabored with no accessory muscle use. 13-Sep-2020 12:17

## 2022-07-03 NOTE — ED ADULT NURSE REASSESSMENT NOTE - NS ED NURSE REASSESS COMMENT FT1
Break RN Note- patient resting quietly in bed, breathing even and nonlabored. No acute distress. VS as documented awaiting CT results. Safety maintained. Patient stable upon exiting the room.

## 2022-07-03 NOTE — ED PROVIDER NOTE - NS ED ROS FT
Gen: Denies weight loss; +fever  HEENT: Denies vision changes, ear pain, epistaxis, sore throat  CV: Denies chest pain, palpitations  Skin: Denies rash, erythema, color changes  Resp: Denies SOB, cough  Endo: Denies sensitivity to heat, cold, increased urination  GI: Denies constipation, nausea, vomiting, diarrhea; +abdominal pain  Msk: Denies back pain, LE swelling, extremity pain  : Denies dysuria, increased frequency  Neuro: Denies LOC, weakness, numbness, tingling  Psych: Denies hx of psych, hallucinations  ROS statement: all other ROS negative except as per HPI

## 2022-07-03 NOTE — ED ADULT NURSE NOTE - CHIEF COMPLAINT QUOTE
ileostomy reversal 6/13. pain r   abd continuous since yesterday. pain increases inspiration. denies n,v.   fever today 100.1. sent from [facility] for eavl.  denies cough.

## 2022-07-03 NOTE — ED PROVIDER NOTE - PHYSICAL EXAMINATION
PHYSICAL EXAM:  GENERAL: uncomfortable appearing; in no respiratory distress  HEENT: Atraumatic, Normocephalic, PERRL, EOMs intact b/l w/out deficits, no conjunctival pallor, MMM  NECK: No JVD; FROM  CHEST/LUNG: CTAB no wheezes/rhonchi/rales  HEART: RRR no murmur/gallops/rubs  ABDOMEN: +BS, soft, ND, Rt mid/upper abd ttp w/o rebound/guarding; +surgical site on Rt abdomen is c/d/i; no signs of infection/abscess.  EXTREMITIES: No LE edema, +2 radial pulses b/l, +2 DP/PT pulses b/l  MUSCULOSKELETAL: FROM of all 4 extremities  NERVOUS SYSTEM:  A&Ox3, No motor deficits or sensory deficits; no focal neurologic deficits  SKIN:  No new rashes

## 2022-07-03 NOTE — ED PROVIDER NOTE - PATIENT PORTAL LINK FT
You can access the FollowMyHealth Patient Portal offered by [facility] by registering at the following website: [email or web address]. By joining [employer]’s FollowMyHealth portal, you will also be able to view your health information using other applications (apps) compatible with our system.

## 2022-07-03 NOTE — ED PROVIDER NOTE - NSFOLLOWUPINSTRUCTIONS_ED_ALL_ED_FT
(1) Follow up with your primary care physician and surgical team as discussed.    (2) Immediately seek care at your nearest emergency room if your symptoms worsen, persist, or do not resolve     (3) Take Tylenol and/or Motrin as needed for pain per the dosing instructions on the bottle.     (4) Take the prescribed medication as instructed:        Abdominal Pain, Adult      Pain in the abdomen (abdominal pain) can be caused by many things. Often, abdominal pain is not serious and it gets better with no treatment or by being treated at home. However, sometimes abdominal pain is serious.    Your health care provider will ask questions about your medical history and do a physical exam to try to determine the cause of your abdominal pain.      Follow these instructions at home:    Medicines     •Take over-the-counter and prescription medicines only as told by your health care provider.      • Do not take a laxative unless told by your health care provider.        General instructions      •Watch your condition for any changes.      •Drink enough fluid to keep your urine pale yellow.      •Keep all follow-up visits as told by your health care provider. This is important.        Contact a health care provider if:    •Your abdominal pain changes or gets worse.      •You are not hungry or you lose weight without trying.      •You are constipated or have diarrhea for more than 2–3 days.      •You have pain when you urinate or have a bowel movement.      •Your abdominal pain wakes you up at night.      •Your pain gets worse with meals, after eating, or with certain foods.  •You are vomiting and cannot keep anything down.  •You have a fever.  •You have blood in your urine.    Get help right away if:    •Your pain does not go away as soon as your health care provider told you to expect.  •You cannot stop vomiting.  •Your pain is only in areas of the abdomen, such as the right side or the left lower portion of the abdomen. Pain on the right side could be caused by appendicitis.  •You have bloody or black stools, or stools that look like tar.  •You have severe pain, cramping, or bloating in your abdomen.  •You have signs of dehydration, such as:  •Dark urine, very little urine, or no urine.  •Cracked lips.  •Dry mouth.  •Sunken eyes.  •Sleepiness.  •Weakness  •You have trouble breathing or chest pain.    Summary  •Often, abdominal pain is not serious and it gets better with no treatment or by being treated at home. However, sometimes abdominal pain is serious.  •Watch your condition for any changes.  •Take over-the-counter and prescription medicines only as told by your health care provider.  •Contact a health care provider if your abdominal pain changes or gets worse.  •Get help right away if you have severe pain, cramping, or bloating in your abdomen.

## 2022-07-03 NOTE — ED ADULT NURSE NOTE - OBJECTIVE STATEMENT
p/t is a 66y old female received awake and responsive, p/t s/p ileostomy reversal, c/o of diffuse abd pain for 2 days, p'/t denies any nausea or vomiting

## 2022-07-03 NOTE — ED PROVIDER NOTE - PROGRESS NOTE DETAILS
Eloy George PGY3: CT shows no bowel obstruction and small fluid collection within the subcutaneous fat at the level of the prior ileostomy. Surgery did bedside drainage of small amount of pus. Stating no need for acute intervention at this time. Recommended pt follow up in clinic and off antibiotics. However, upon reassessment, pt continues to have worsened pain. Will attempt to PO and reassess pain. However, if not improved will consider admission for pain control. Lilo DO PGY-3: offered CDU to pt. But pt states she wants to go home. Will PO chall. Pt states she still had diludid PO tablets at home that was sent to her pharmacy by surgical team from prior procedure O'Paula DO PGY-3: pt passed PO chall. Yash roca. Discussed return precautions.

## 2022-07-03 NOTE — ED ADULT TRIAGE NOTE - CHIEF COMPLAINT QUOTE
ileostomy reversal 6/13. pain r   abd continuous since yesterday. pain increases inspiration. denies n,v.   fever today 100.1. sent from [facility] for eavl.  denies cough.

## 2022-07-03 NOTE — CONSULT NOTE ADULT - SUBJECTIVE AND OBJECTIVE BOX
SURGERY CONSULT NOTE    HPI: 66y Female patient S/P ileostomy June 2022 reversal from diverting loop ileostomy on March 2022 secondary to diverticulosis. Patient recovering well but starting to feel worse yesterday morning. Reports going to urgent care, temperature of 100.1. Passing flatus and having bowel movements and able to tolerate diet. Describes pain in RUQ and slightly below ostomy closure.       PAST MEDICAL & SURGICAL HISTORY:  Hypothyroidism      Diverticulosis      HTN (hypertension)      Lower gastrointestinal bleeding  Multipel times since &#x27;2014: last episode 5/2018      Pernicious anemia      SOLOMON (obstructive sleep apnea)  non-compliant with CPAP      Type 2 diabetes mellitus      Left ureteral stone      Fibroid uterus  &#x27;94  surgically treated      Lumbar herniated disc  &#x27; 2010  surgically treated      Multiparity      Heart murmur  mild      History of tonsillectomy  age 14      S/P WESTLEY (total abdominal hysterectomy)  &#x27; 94  Laproscopic.  benign      S/P lumbar laminectomy  &#x27; 2010  with Fusion      S/P colon resection  colostomy; 10/2021, ludwig reversal &amp; creation of ileostomy 3/2022      History of lithotripsy  left kidney stone extraction; 02/2020          MEDICATIONS  (STANDING):    MEDICATIONS  (PRN):      Allergies    Valium (Other)    Intolerances    oxycodone (Other)      SOCIAL HISTORY:    FAMILY HISTORY:  Family history of gastric cancer  Mother    Family history of throat cancer  Sister        Physical Exam:  General: NAD, resting comfortably  HEENT: NC/AT, EOMI, normal hearing, no oral lesions, no LAD, neck supple  Pulmonary: normal resp effort, CTA-B  Cardiovascular: NSR, no murmurs  Abdominal: soft, mildly TTP in epigastric region and below ostomy closure site. Ostomy closure wound without erythema or induration no purulence or pus expressed    Vital Signs Last 24 Hrs  T(C): 36.8 (03 Jul 2022 17:21), Max: 36.9 (03 Jul 2022 14:24)  T(F): 98.3 (03 Jul 2022 17:21), Max: 98.4 (03 Jul 2022 14:24)  HR: 89 (03 Jul 2022 19:50) (80 - 89)  BP: 126/58 (03 Jul 2022 19:50) (126/58 - 149/97)  BP(mean): --  RR: 18 (03 Jul 2022 19:50) (16 - 18)  SpO2: 99% (03 Jul 2022 19:50) (99% - 100%)    I&O's Summary          LABS:                        9.3    8.85  )-----------( 366      ( 03 Jul 2022 16:31 )             29.4     07-03    137  |  103  |  16  ----------------------------<  100<H>  4.6   |  19<L>  |  0.84    Ca    9.0      03 Jul 2022 16:30    TPro  8.7<H>  /  Alb  3.9  /  TBili  0.4  /  DBili  x   /  AST  18  /  ALT  12  /  AlkPhos  92  07-03    PT/INR - ( 03 Jul 2022 16:30 )   PT: 12.5 sec;   INR: 1.08 ratio         PTT - ( 03 Jul 2022 16:30 )  PTT:30.5 sec    CAPILLARY BLOOD GLUCOSE        LIVER FUNCTIONS - ( 03 Jul 2022 16:30 )  Alb: 3.9 g/dL / Pro: 8.7 g/dL / ALK PHOS: 92 U/L / ALT: 12 U/L / AST: 18 U/L / GGT: x             Cultures:      RADIOLOGY & ADDITIONAL STUDIES:  < from: CT Abdomen and Pelvis w/ IV Cont (07.03.22 @ 17:06) >  IMPRESSION:  Status post reversal of the previously noted right sided ileostomy   without evidence of bowel obstruction. Postsurgical changes adjacent to   the right hemiabdomen small bowel anastomosis. No free air or drainable   fluid collections. Postsurgical changes in the anterior abdominal wall at   level of the prior ileostomy with phlegmonous changes and/or small fluid   collection within the subcutaneous fat.    < end of copied text >        Plan:  66y Female patient S/P ileostomy June 2022 reversal from diverting loop ileostomy on March 2022 secondary to diverticulosis presenting with abdominal pain. Afebrile, WBC wnl, and CT without any acute findings.     -wound closure site slightly opened at bedside with 3-4cc of ss content expressed.- per patient she had slight relief   - no acute surgical intervention at this time  - PO challenge in  - patient ok with d/c home and will f/u outpatient or return to ED if having increased pain  - dispo per ED    d/w Dr. Beach, colorectal surgery attending    [facility], [id number]

## 2022-07-03 NOTE — ED PROCEDURE NOTE - NS_EDPROVIDERDISPOUSERTYPE_ED_A_ED
Attending Attestation (For Attendings USE Only)...
Showering allowed/Walking-Indoors allowed/Do not drive or operate machinery/Walking-Outdoors allowed/No Heavy lifting/straining/Sex allowed/Stairs allowed

## 2022-07-03 NOTE — ED PROVIDER NOTE - ATTENDING CONTRIBUTION TO CARE
67yo F ho HTN, HLD, Hypothyroid, SOLOMON not consistent with using CPAP, Pernicious anemia, diverticulosis - S/P colon resection 10/2021 & Martin reversal  & creation of ileostomy in March 2022. Patient presented to [facility] on 6/13 for scheduled surgery. Patient taken to OR by Dr. Beach and underwent ileostomy reversal. now pw abdominal pain since last night above surgical site. found ot be febrile in urgent care sent to ed fo further eval  pt spoke with dr beach who sent her ot ed  took motrin and tyulenol earlier whch did not help  on exam tender in right mid and upper abdomne  will check labs and CT

## 2022-07-03 NOTE — ED ADULT NURSE NOTE - NSSEPSISSUSPECTED_ED_A_ED
Onset: 1100 pm   Location / description: urinating every 15 min, denies fever or chills or pain. Denies abdominal pain. Was started to help decrease freq urination. Dry mouth. Instructed to have eval within 24hrs. Patient is very troubled by this, will go to the ED for eval this evening and will call son to take him.      Pain Scale (1 - 10), 10 highest: denies   Symptom specific medications: oxybutynin (DITROPAN-XL) 10 MG 24 hr tablet, started on the 8/10/2017 , last dose  8/12/2017 @ 1800   Recent Care: OV 8/11/2017     Reason for Disposition  â¢ Urinating more frequently than usual (i.e., frequency)    Protocols used: [facility] No

## 2022-07-03 NOTE — ED PROVIDER NOTE - CLINICAL SUMMARY MEDICAL DECISION MAKING FREE TEXT BOX
65 y/o F, PMH of HTN, HLD, Hypothyroidism, SOLOMON, Pernicious anemia, diverticulosis, Colon CA s/p resection/Martin reversal/ileostomy creation (03/2022), and recent ileostomy reversal (6/13/22) w/ Dr. Beach, presents to  ED c/o R-sided abd pain right above the surgical site x 1 day, as well as fever today.   Concerned for postoperative abscess vs. obstruction. Plan to check labs, cultures, and CT A/P. Will consult surgery and f/u recs.

## 2022-07-03 NOTE — ED PROVIDER NOTE - DATE/TIME 2
[New Patient Visit] : a new patient visit [Referred By: _________] : Patient was referred by ZEHRA [FreeTextEntry1] : Previous cervical fusion- worsening pain/numbness 03-Jul-2022 20:52

## 2022-07-21 ENCOUNTER — APPOINTMENT (OUTPATIENT)
Dept: COLORECTAL SURGERY | Facility: CLINIC | Age: 67
End: 2022-07-21

## 2022-07-21 PROCEDURE — 99024 POSTOP FOLLOW-UP VISIT: CPT

## 2022-07-21 NOTE — ASSESSMENT
[FreeTextEntry1] : Status post ileostomy reversal\par -Patient progressing well\par -4-5 bowel movements per day\par -Patient may attempt Imodium as needed\par -High fiber diet followup in 6 weeks for final postop

## 2022-07-21 NOTE — HISTORY OF PRESENT ILLNESS
[FreeTextEntry1] : Status post ileostomy reversal patient progressing well tolerating diet no fevers or chills

## 2022-07-31 ENCOUNTER — INPATIENT (INPATIENT)
Facility: HOSPITAL | Age: 67
LOS: 4 days | Discharge: HOME | End: 2022-08-05
Attending: STUDENT IN AN ORGANIZED HEALTH CARE EDUCATION/TRAINING PROGRAM | Admitting: STUDENT IN AN ORGANIZED HEALTH CARE EDUCATION/TRAINING PROGRAM

## 2022-07-31 VITALS
OXYGEN SATURATION: 96 % | HEART RATE: 93 BPM | SYSTOLIC BLOOD PRESSURE: 136 MMHG | DIASTOLIC BLOOD PRESSURE: 75 MMHG | TEMPERATURE: 98 F | HEIGHT: 65 IN | RESPIRATION RATE: 24 BRPM

## 2022-07-31 DIAGNOSIS — Z98.890 OTHER SPECIFIED POSTPROCEDURAL STATES: Chronic | ICD-10-CM

## 2022-07-31 DIAGNOSIS — Z90.49 ACQUIRED ABSENCE OF OTHER SPECIFIED PARTS OF DIGESTIVE TRACT: Chronic | ICD-10-CM

## 2022-07-31 DIAGNOSIS — Z90.89 ACQUIRED ABSENCE OF OTHER ORGANS: Chronic | ICD-10-CM

## 2022-07-31 DIAGNOSIS — Z90.710 ACQUIRED ABSENCE OF BOTH CERVIX AND UTERUS: Chronic | ICD-10-CM

## 2022-07-31 LAB
ALBUMIN SERPL ELPH-MCNC: 3.3 G/DL — SIGNIFICANT CHANGE UP (ref 3.3–5)
ALP SERPL-CCNC: 84 U/L — SIGNIFICANT CHANGE UP (ref 40–120)
ALT FLD-CCNC: 12 U/L — SIGNIFICANT CHANGE UP (ref 4–33)
ANION GAP SERPL CALC-SCNC: 12 MMOL/L — SIGNIFICANT CHANGE UP (ref 7–14)
APTT BLD: 24.8 SEC — LOW (ref 27–36.3)
AST SERPL-CCNC: 10 U/L — SIGNIFICANT CHANGE UP (ref 4–32)
BASE EXCESS BLDV CALC-SCNC: 5.3 MMOL/L — HIGH (ref -2–3)
BASOPHILS # BLD AUTO: 0.02 K/UL — SIGNIFICANT CHANGE UP (ref 0–0.2)
BASOPHILS NFR BLD AUTO: 0.2 % — SIGNIFICANT CHANGE UP (ref 0–2)
BILIRUB SERPL-MCNC: 0.3 MG/DL — SIGNIFICANT CHANGE UP (ref 0.2–1.2)
BLD GP AB SCN SERPL QL: NEGATIVE — SIGNIFICANT CHANGE UP
BLOOD GAS VENOUS COMPREHENSIVE RESULT: SIGNIFICANT CHANGE UP
BUN SERPL-MCNC: 13 MG/DL — SIGNIFICANT CHANGE UP (ref 7–23)
CALCIUM SERPL-MCNC: 8 MG/DL — LOW (ref 8.4–10.5)
CHLORIDE BLDV-SCNC: 101 MMOL/L — SIGNIFICANT CHANGE UP (ref 96–108)
CHLORIDE SERPL-SCNC: 108 MMOL/L — HIGH (ref 98–107)
CO2 BLDV-SCNC: 32.5 MMOL/L — HIGH (ref 22–26)
CO2 SERPL-SCNC: 20 MMOL/L — LOW (ref 22–31)
CREAT SERPL-MCNC: 0.74 MG/DL — SIGNIFICANT CHANGE UP (ref 0.5–1.3)
EGFR: 89 ML/MIN/1.73M2 — SIGNIFICANT CHANGE UP
EOSINOPHIL # BLD AUTO: 0.01 K/UL — SIGNIFICANT CHANGE UP (ref 0–0.5)
EOSINOPHIL NFR BLD AUTO: 0.1 % — SIGNIFICANT CHANGE UP (ref 0–6)
FLUAV AG NPH QL: SIGNIFICANT CHANGE UP
FLUBV AG NPH QL: SIGNIFICANT CHANGE UP
GAS PNL BLDV: 137 MMOL/L — SIGNIFICANT CHANGE UP (ref 136–145)
GLUCOSE BLDV-MCNC: 159 MG/DL — HIGH (ref 70–99)
GLUCOSE SERPL-MCNC: 144 MG/DL — HIGH (ref 70–99)
HCO3 BLDV-SCNC: 31 MMOL/L — HIGH (ref 22–29)
HCT VFR BLD CALC: 36.4 % — SIGNIFICANT CHANGE UP (ref 34.5–45)
HCT VFR BLDA CALC: 32 % — LOW (ref 34.5–46.5)
HGB BLD CALC-MCNC: 10.5 G/DL — LOW (ref 11.5–15.5)
HGB BLD-MCNC: 10.8 G/DL — LOW (ref 11.5–15.5)
IANC: 6.66 K/UL — SIGNIFICANT CHANGE UP (ref 1.8–7.4)
IMM GRANULOCYTES NFR BLD AUTO: 0.1 % — SIGNIFICANT CHANGE UP (ref 0–1.5)
INR BLD: 0.97 RATIO — SIGNIFICANT CHANGE UP (ref 0.88–1.16)
LACTATE BLDV-MCNC: 1.5 MMOL/L — SIGNIFICANT CHANGE UP (ref 0.5–2)
LIDOCAIN IGE QN: 32 U/L — SIGNIFICANT CHANGE UP (ref 7–60)
LYMPHOCYTES # BLD AUTO: 1 K/UL — SIGNIFICANT CHANGE UP (ref 1–3.3)
LYMPHOCYTES # BLD AUTO: 12.5 % — LOW (ref 13–44)
MAGNESIUM SERPL-MCNC: 1.5 MG/DL — LOW (ref 1.6–2.6)
MCHC RBC-ENTMCNC: 25.1 PG — LOW (ref 27–34)
MCHC RBC-ENTMCNC: 29.7 GM/DL — LOW (ref 32–36)
MCV RBC AUTO: 84.7 FL — SIGNIFICANT CHANGE UP (ref 80–100)
MONOCYTES # BLD AUTO: 0.31 K/UL — SIGNIFICANT CHANGE UP (ref 0–0.9)
MONOCYTES NFR BLD AUTO: 3.9 % — SIGNIFICANT CHANGE UP (ref 2–14)
NEUTROPHILS # BLD AUTO: 6.66 K/UL — SIGNIFICANT CHANGE UP (ref 1.8–7.4)
NEUTROPHILS NFR BLD AUTO: 83.2 % — HIGH (ref 43–77)
NRBC # BLD: 0 /100 WBCS — SIGNIFICANT CHANGE UP
NRBC # FLD: 0 K/UL — SIGNIFICANT CHANGE UP
PCO2 BLDV: 50 MMHG — HIGH (ref 39–42)
PH BLDV: 7.4 — SIGNIFICANT CHANGE UP (ref 7.32–7.43)
PLATELET # BLD AUTO: 260 K/UL — SIGNIFICANT CHANGE UP (ref 150–400)
PO2 BLDV: 35 MMHG — SIGNIFICANT CHANGE UP
POTASSIUM BLDV-SCNC: 4.8 MMOL/L — SIGNIFICANT CHANGE UP (ref 3.5–5.1)
POTASSIUM SERPL-MCNC: 3.3 MMOL/L — LOW (ref 3.5–5.3)
POTASSIUM SERPL-SCNC: 3.3 MMOL/L — LOW (ref 3.5–5.3)
PROT SERPL-MCNC: 6.7 G/DL — SIGNIFICANT CHANGE UP (ref 6–8.3)
PROTHROM AB SERPL-ACNC: 11.3 SEC — SIGNIFICANT CHANGE UP (ref 10.5–13.4)
RBC # BLD: 4.3 M/UL — SIGNIFICANT CHANGE UP (ref 3.8–5.2)
RBC # FLD: 15.8 % — HIGH (ref 10.3–14.5)
RH IG SCN BLD-IMP: POSITIVE — SIGNIFICANT CHANGE UP
RSV RNA NPH QL NAA+NON-PROBE: SIGNIFICANT CHANGE UP
SAO2 % BLDV: 58.8 % — SIGNIFICANT CHANGE UP
SARS-COV-2 RNA SPEC QL NAA+PROBE: SIGNIFICANT CHANGE UP
SODIUM SERPL-SCNC: 140 MMOL/L — SIGNIFICANT CHANGE UP (ref 135–145)
WBC # BLD: 8.01 K/UL — SIGNIFICANT CHANGE UP (ref 3.8–10.5)
WBC # FLD AUTO: 8.01 K/UL — SIGNIFICANT CHANGE UP (ref 3.8–10.5)

## 2022-07-31 PROCEDURE — 99284 EMERGENCY DEPT VISIT MOD MDM: CPT | Mod: 25

## 2022-07-31 PROCEDURE — 36000 PLACE NEEDLE IN VEIN: CPT

## 2022-07-31 RX ORDER — SODIUM CHLORIDE 9 MG/ML
1000 INJECTION, SOLUTION INTRAVENOUS ONCE
Refills: 0 | Status: COMPLETED | OUTPATIENT
Start: 2022-07-31 | End: 2022-07-31

## 2022-07-31 RX ORDER — IOHEXOL 300 MG/ML
30 INJECTION, SOLUTION INTRAVENOUS ONCE
Refills: 0 | Status: COMPLETED | OUTPATIENT
Start: 2022-07-31 | End: 2022-07-31

## 2022-07-31 RX ADMIN — SODIUM CHLORIDE 1000 MILLILITER(S): 9 INJECTION, SOLUTION INTRAVENOUS at 20:02

## 2022-07-31 RX ADMIN — IOHEXOL 30 MILLILITER(S): 300 INJECTION, SOLUTION INTRAVENOUS at 22:21

## 2022-07-31 NOTE — ED PROVIDER NOTE - OBJECTIVE STATEMENT
66F, hx of ileostomy reversal one month prior, who presents with abdominal pain. Was in usual state of health until yesterday. Had a BM in the morning and since then has not passed any stool which is unusual for her (typically has multiple BMs daily). During this time, has had associated upper abdominal discomfort and multiple episode of nbnb emesis. Denies hx of prior SBO. No dysuria, hematuria. No headaches, fevers, chills, cough, sputum, cp, sob.

## 2022-07-31 NOTE — ED PROVIDER NOTE - CLINICAL SUMMARY MEDICAL DECISION MAKING FREE TEXT BOX
66F with a hx of recent ileostomy reversal who presents with abdominal pain, multiple episode of nbnb emesis, and decreased stooling over the past 24 hours. Physical significant for some mild epigastric ttp, however otherwise afebrile and well appearing. Will obtain labs, CT imaging to evaluate for SBO.

## 2022-07-31 NOTE — ED ADULT NURSE NOTE - OBJECTIVE STATEMENT
67 y/o F presents to ED room 5 A&O x 4, c/o abdominal pain and N/V. Pt had 2 episodes of vomiting yesterday and 3 episodes of vomiting today, along with diffuse abdominal pain. Denies diarrhea, unable to have a bowel movement or pass gas x 2 days. Bowel sounds noted in upper right and left quadrants, minimal bowel sounds noted in lower left and right quadrants. Nondistended abd, tender upon palpation in the upper 2 quadrants. Pt had colostomy in october 2021 s/p surgery d/t diverticulitis, transitioned to ileostomy in March 2022; ileostomy discontinued 6/13/22. PMhx of diverticulitis, HTN, HLD, hysterectomy, hypothyroid, and kidney stone. 22G in R hand placed by EMS, labs drawn by JESENIA Mckeon and sent. Awaiting results and CT. VS as noted in flowsheet. Daughter at bedside. Safety maintained, call bell within reach.

## 2022-07-31 NOTE — ED ADULT TRIAGE NOTE - CHIEF COMPLAINT QUOTE
pt s/p bowel resection on june 16 2/2 diverticulitis, c/o abd pain, n/v and inability to pass gas or produce a BM.  #22 R hand 5mg morphine and 8 zofran via EMS

## 2022-07-31 NOTE — ED PROVIDER NOTE - ATTENDING CONTRIBUTION TO CARE
Dr. Pozo, Attending Physician-  I performed a face to face bedside interview with patient regarding history of present illness, review of symptoms and past medical history. I completed an independent physical exam.  I have discussed patient's plan of care with the resident.    66F, hx of ileostomy reversal one month prior, who presents with abdominal pain. Was in usual state of health until yesterday. Had a BM in the morning and since then has not passed any stool which is unusual for her (typically has multiple BMs daily). During this time, has had associated upper abdominal discomfort and multiple episode of nbnb emesis. Denies hx of prior SBO. No dysuria, hematuria. No headaches, fevers, chills, cough, sputum, cp, sob. Physical: afebrile, non-toxic appearing, rrr, ctabl, epigastric ttp, no le edema. Plan: labs, CT imaging to rule out SBO.

## 2022-08-01 DIAGNOSIS — K92.2 GASTROINTESTINAL HEMORRHAGE, UNSPECIFIED: ICD-10-CM

## 2022-08-01 LAB
ANION GAP SERPL CALC-SCNC: 14 MMOL/L — SIGNIFICANT CHANGE UP (ref 7–14)
APPEARANCE UR: CLEAR — SIGNIFICANT CHANGE UP
BACTERIA # UR AUTO: NEGATIVE — SIGNIFICANT CHANGE UP
BILIRUB UR-MCNC: NEGATIVE — SIGNIFICANT CHANGE UP
BLD GP AB SCN SERPL QL: NEGATIVE — SIGNIFICANT CHANGE UP
BUN SERPL-MCNC: 14 MG/DL — SIGNIFICANT CHANGE UP (ref 7–23)
CALCIUM SERPL-MCNC: 8.7 MG/DL — SIGNIFICANT CHANGE UP (ref 8.4–10.5)
CHLORIDE SERPL-SCNC: 99 MMOL/L — SIGNIFICANT CHANGE UP (ref 98–107)
CO2 SERPL-SCNC: 25 MMOL/L — SIGNIFICANT CHANGE UP (ref 22–31)
COLOR SPEC: YELLOW — SIGNIFICANT CHANGE UP
CREAT SERPL-MCNC: 0.91 MG/DL — SIGNIFICANT CHANGE UP (ref 0.5–1.3)
DIFF PNL FLD: ABNORMAL
EGFR: 69 ML/MIN/1.73M2 — SIGNIFICANT CHANGE UP
EPI CELLS # UR: 7 /HPF — HIGH (ref 0–5)
GLUCOSE SERPL-MCNC: 114 MG/DL — HIGH (ref 70–99)
GLUCOSE UR QL: NEGATIVE — SIGNIFICANT CHANGE UP
HCT VFR BLD CALC: 28.1 % — LOW (ref 34.5–45)
HGB BLD-MCNC: 8.6 G/DL — LOW (ref 11.5–15.5)
HYALINE CASTS # UR AUTO: 5 /LPF — SIGNIFICANT CHANGE UP (ref 0–7)
KETONES UR-MCNC: NEGATIVE — SIGNIFICANT CHANGE UP
LEUKOCYTE ESTERASE UR-ACNC: NEGATIVE — SIGNIFICANT CHANGE UP
MAGNESIUM SERPL-MCNC: 1.6 MG/DL — SIGNIFICANT CHANGE UP (ref 1.6–2.6)
MCHC RBC-ENTMCNC: 25.7 PG — LOW (ref 27–34)
MCHC RBC-ENTMCNC: 30.6 GM/DL — LOW (ref 32–36)
MCV RBC AUTO: 83.9 FL — SIGNIFICANT CHANGE UP (ref 80–100)
NITRITE UR-MCNC: NEGATIVE — SIGNIFICANT CHANGE UP
NRBC # BLD: 0 /100 WBCS — SIGNIFICANT CHANGE UP
NRBC # FLD: 0 K/UL — SIGNIFICANT CHANGE UP
PH UR: 6.5 — SIGNIFICANT CHANGE UP (ref 5–8)
PHOSPHATE SERPL-MCNC: 3.2 MG/DL — SIGNIFICANT CHANGE UP (ref 2.5–4.5)
PLATELET # BLD AUTO: 239 K/UL — SIGNIFICANT CHANGE UP (ref 150–400)
POTASSIUM SERPL-MCNC: 3.4 MMOL/L — LOW (ref 3.5–5.3)
POTASSIUM SERPL-SCNC: 3.4 MMOL/L — LOW (ref 3.5–5.3)
PROT UR-MCNC: ABNORMAL
RBC # BLD: 3.35 M/UL — LOW (ref 3.8–5.2)
RBC # FLD: 15.9 % — HIGH (ref 10.3–14.5)
RBC CASTS # UR COMP ASSIST: 16 /HPF — HIGH (ref 0–4)
RH IG SCN BLD-IMP: POSITIVE — SIGNIFICANT CHANGE UP
SODIUM SERPL-SCNC: 138 MMOL/L — SIGNIFICANT CHANGE UP (ref 135–145)
SP GR SPEC: >1.05 (ref 1–1.05)
UROBILINOGEN FLD QL: SIGNIFICANT CHANGE UP
WBC # BLD: 5.8 K/UL — SIGNIFICANT CHANGE UP (ref 3.8–10.5)
WBC # FLD AUTO: 5.8 K/UL — SIGNIFICANT CHANGE UP (ref 3.8–10.5)
WBC UR QL: 5 /HPF — SIGNIFICANT CHANGE UP (ref 0–5)

## 2022-08-01 PROCEDURE — 71045 X-RAY EXAM CHEST 1 VIEW: CPT | Mod: 26

## 2022-08-01 PROCEDURE — 74177 CT ABD & PELVIS W/CONTRAST: CPT | Mod: 26,MG

## 2022-08-01 PROCEDURE — G1004: CPT

## 2022-08-01 RX ORDER — DEXTROSE MONOHYDRATE, SODIUM CHLORIDE, AND POTASSIUM CHLORIDE 50; .745; 4.5 G/1000ML; G/1000ML; G/1000ML
1000 INJECTION, SOLUTION INTRAVENOUS
Refills: 0 | Status: DISCONTINUED | OUTPATIENT
Start: 2022-08-01 | End: 2022-08-04

## 2022-08-01 RX ORDER — ENOXAPARIN SODIUM 100 MG/ML
40 INJECTION SUBCUTANEOUS EVERY 24 HOURS
Refills: 0 | Status: DISCONTINUED | OUTPATIENT
Start: 2022-08-01 | End: 2022-08-05

## 2022-08-01 RX ORDER — MAGNESIUM SULFATE 500 MG/ML
2 VIAL (ML) INJECTION ONCE
Refills: 0 | Status: COMPLETED | OUTPATIENT
Start: 2022-08-01 | End: 2022-08-01

## 2022-08-01 RX ORDER — SIMVASTATIN 20 MG/1
40 TABLET, FILM COATED ORAL AT BEDTIME
Refills: 0 | Status: DISCONTINUED | OUTPATIENT
Start: 2022-08-01 | End: 2022-08-05

## 2022-08-01 RX ORDER — SODIUM CHLORIDE 9 MG/ML
1000 INJECTION, SOLUTION INTRAVENOUS
Refills: 0 | Status: DISCONTINUED | OUTPATIENT
Start: 2022-08-01 | End: 2022-08-01

## 2022-08-01 RX ORDER — MORPHINE SULFATE 50 MG/1
4 CAPSULE, EXTENDED RELEASE ORAL ONCE
Refills: 0 | Status: DISCONTINUED | OUTPATIENT
Start: 2022-08-01 | End: 2022-08-01

## 2022-08-01 RX ORDER — POTASSIUM CHLORIDE 20 MEQ
10 PACKET (EA) ORAL
Refills: 0 | Status: COMPLETED | OUTPATIENT
Start: 2022-08-01 | End: 2022-08-01

## 2022-08-01 RX ORDER — LISINOPRIL 2.5 MG/1
40 TABLET ORAL DAILY
Refills: 0 | Status: DISCONTINUED | OUTPATIENT
Start: 2022-08-01 | End: 2022-08-01

## 2022-08-01 RX ORDER — METOPROLOL TARTRATE 50 MG
2.5 TABLET ORAL EVERY 6 HOURS
Refills: 0 | Status: DISCONTINUED | OUTPATIENT
Start: 2022-08-01 | End: 2022-08-04

## 2022-08-01 RX ORDER — LEVOTHYROXINE SODIUM 125 MCG
125 TABLET ORAL AT BEDTIME
Refills: 0 | Status: DISCONTINUED | OUTPATIENT
Start: 2022-08-01 | End: 2022-08-04

## 2022-08-01 RX ORDER — TETRACAINE/BENZOCAINE/BUTAMBEN 2%-14%-2%
1 OINTMENT (GRAM) TOPICAL EVERY 6 HOURS
Refills: 0 | Status: DISCONTINUED | OUTPATIENT
Start: 2022-08-01 | End: 2022-08-04

## 2022-08-01 RX ORDER — AMLODIPINE BESYLATE 2.5 MG/1
10 TABLET ORAL DAILY
Refills: 0 | Status: DISCONTINUED | OUTPATIENT
Start: 2022-08-01 | End: 2022-08-01

## 2022-08-01 RX ORDER — ONDANSETRON 8 MG/1
4 TABLET, FILM COATED ORAL ONCE
Refills: 0 | Status: COMPLETED | OUTPATIENT
Start: 2022-08-01 | End: 2022-08-01

## 2022-08-01 RX ADMIN — Medication 1 SPRAY(S): at 11:01

## 2022-08-01 RX ADMIN — SIMVASTATIN 40 MILLIGRAM(S): 20 TABLET, FILM COATED ORAL at 23:49

## 2022-08-01 RX ADMIN — MORPHINE SULFATE 4 MILLIGRAM(S): 50 CAPSULE, EXTENDED RELEASE ORAL at 00:22

## 2022-08-01 RX ADMIN — ENOXAPARIN SODIUM 40 MILLIGRAM(S): 100 INJECTION SUBCUTANEOUS at 11:01

## 2022-08-01 RX ADMIN — Medication 100 MILLIEQUIVALENT(S): at 14:45

## 2022-08-01 RX ADMIN — ONDANSETRON 4 MILLIGRAM(S): 8 TABLET, FILM COATED ORAL at 01:17

## 2022-08-01 RX ADMIN — Medication 2.5 MILLIGRAM(S): at 11:02

## 2022-08-01 RX ADMIN — Medication 125 MICROGRAM(S): at 23:49

## 2022-08-01 RX ADMIN — Medication 100 MILLIEQUIVALENT(S): at 11:00

## 2022-08-01 RX ADMIN — Medication 25 GRAM(S): at 11:00

## 2022-08-01 RX ADMIN — Medication 2.5 MILLIGRAM(S): at 05:57

## 2022-08-01 RX ADMIN — Medication 2.5 MILLIGRAM(S): at 23:49

## 2022-08-01 RX ADMIN — Medication 100 MILLIEQUIVALENT(S): at 12:09

## 2022-08-01 RX ADMIN — Medication 2.5 MILLIGRAM(S): at 17:05

## 2022-08-01 RX ADMIN — DEXTROSE MONOHYDRATE, SODIUM CHLORIDE, AND POTASSIUM CHLORIDE 120 MILLILITER(S): 50; .745; 4.5 INJECTION, SOLUTION INTRAVENOUS at 14:46

## 2022-08-01 NOTE — PATIENT PROFILE ADULT - FALL HARM RISK - RISK INTERVENTIONS

## 2022-08-01 NOTE — ED PROCEDURE NOTE - ATTENDING CONTRIBUTION TO CARE
DR. EL, ATTENDING MD-  I was in the exam room and observed and supervised the resident when they were completing the key portions of this procedure.

## 2022-08-01 NOTE — H&P ADULT - ASSESSMENT
ASSESSMENT/PLAN: 66y F PMH HTN, HLD, Hypothyroidism, SOLOMON, Pernicious anemia, diverticulosis, Colon CA s/p resection/Martin reversal/ileostomy creation (03/2022), and recent ileostomy reversal (6/13/22) presents with 1.5 days of nausea, vomiting, and abdominal pain, found to have lw grade SBO with TP near site of prior ileostomy.     - NPO/IVF  - NGT LCWS  - AROBF  - Trend labs  - VTE Ppx: Lovenox    Patient to be discussed with attending, Dr. Nettles.    Sheri Chance MD  PGY3 Consult Resident  [facility] (Colorectal Surgery)  [id number]   ASSESSMENT/PLAN: 66y F PMH HTN, HLD, Hypothyroidism, SOLOMON, Pernicious anemia, diverticulosis, Colon CA s/p resection and Naeem's (10/2021), Naeem's reversal and ileostomy creation (03/2022), and recent ileostomy reversal (6/13/22) presents with 1.5 days of nausea, vomiting, and abdominal pain, found to have low grade SBO with TP near site of prior ileostomy.     - NPO/IVF  - NGT LCWS  - AROBF  - Trend labs  - VTE Ppx: Lovenox    Patient discussed with attending, Dr. Nettles.    Sheri Chance MD  PGY3 Consult Resident  [facility] (Colorectal Surgery)  [id number]

## 2022-08-01 NOTE — H&P ADULT - NSHPPHYSICALEXAM_GEN_ALL_CORE
Physical Exam:  General: NAD, resting comfortably  HEENT: NC/AT, EOMI, normal hearing, no oral lesions, no LAD, neck supple  Pulmonary: normal resp effort on RA  Cardiovascular: RRR  Abdominal: soft, minimal tenderness to palpation in epigastrum, well-healing surgical scars  Extremities: WWP, normal strength, no clubbing/cyanosis/edema  Neuro: A/O x 3, CNs II-XII grossly intact, normal sensation, no focal deficits  Psych: Affect appropriate  Pulses: palpable distal pulses    Vital Signs Last 24 Hrs  T(C): 36.8 (01 Aug 2022 03:47), Max: 36.9 (31 Jul 2022 21:25)  T(F): 98.3 (01 Aug 2022 03:47), Max: 98.4 (31 Jul 2022 21:25)  HR: 78 (01 Aug 2022 03:47) (78 - 93)  BP: 120/68 (01 Aug 2022 03:47) (119/74 - 141/76)  BP(mean): --  RR: 18 (01 Aug 2022 03:47) (16 - 24)  SpO2: 100% (01 Aug 2022 03:47) (96% - 100%)    Parameters below as of 01 Aug 2022 03:47  Patient On (Oxygen Delivery Method): room air

## 2022-08-01 NOTE — H&P ADULT - HISTORY OF PRESENT ILLNESS
66y F PMH HTN, HLD, Hypothyroidism, SOLOMON, Pernicious anemia, diverticulosis, Colon CA s/p resection/Martin reversal/ileostomy creation (03/2022), and recent ileostomy reversal (6/13/22) w/ Dr. Beach, presents to  ED c/o abdominal pain, nausea, and vomiting. Patient reports pain started 1.5 days ago, crampy in upper abdomen, associated with nausea and several episodes of emesis when trying ot drink liquids. She also reports constipation. Normally she passes 5-6 BM per day and has not passed anything since yesterday morning. Denies prior episodes, fevers, headaches, chest pain, SOB, dysuria, recent sick contacts, melena, hematochezia.      66y F PMH HTN, HLD, Hypothyroidism, SOLOMON, Pernicious anemia, diverticulosis, Colon CA s/p resection and Naeem's (10/2021), Naeem's reversal and ileostomy creation (03/2022), and recent ileostomy reversal (6/13/22) w/ Dr. Beach, presents to  ED c/o abdominal pain, nausea, and vomiting. Patient reports pain started 1.5 days ago, crampy in upper abdomen, associated with nausea and several episodes of emesis when trying ot drink liquids. She also reports constipation. Normally she passes 5-6 BM per day and has not passed anything since yesterday morning. Denies prior episodes, fevers, headaches, chest pain, SOB, dysuria, recent sick contacts, melena, hematochezia.

## 2022-08-01 NOTE — H&P ADULT - NSHPLABSRESULTS_GEN_ALL_CORE
LABS:                        10.8   8.01  )-----------( 260      ( 31 Jul 2022 19:34 )             36.4     07-31    140  |  108<H>  |  13  ----------------------------<  144<H>  3.3<L>   |  20<L>  |  0.74    Ca    8.0<L>      31 Jul 2022 19:34  Mg     1.50     07-31    TPro  6.7  /  Alb  3.3  /  TBili  0.3  /  DBili  x   /  AST  10  /  ALT  12  /  AlkPhos  84  07-31    PT/INR - ( 31 Jul 2022 19:34 )   PT: 11.3 sec;   INR: 0.97 ratio         PTT - ( 31 Jul 2022 19:34 )  PTT:24.8 sec    LIVER FUNCTIONS - ( 31 Jul 2022 19:34 )  Alb: 3.3 g/dL / Pro: 6.7 g/dL / ALK PHOS: 84 U/L / ALT: 12 U/L / AST: 10 U/L / GGT: x             RADIOLOGY & ADDITIONAL STUDIES:  < from: CT Abdomen and Pelvis w/ Oral Cont and w/ IV Cont (08.01.22 @ 02:18) >    FINDINGS:  LOWER CHEST: Small pericardial effusion. Mild bibasilar atelectasis.    LIVER: Within normal limits.  BILE DUCTS: Normal caliber.  GALLBLADDER: Within normal limits.  SPLEEN: Within normal limits.  PANCREAS: Within normal limits.  ADRENALS: Within normal limits.  KIDNEYS/URETERS: Within normal limits.    BLADDER: Within normal limits.  REPRODUCTIVE ORGANS: Hysterectomy.    BOWEL: Multiple mildly distended loops of small bowel, with a transition   point in the right paramedian abdomen near site of previous ileostomy   (2:60-70). The distal bowel is decompressed. Multiple loops of small   bowel appear adherent to right anterior abdominal wall at site of prior   ileostomy. Patient is status post partial colectomy. Scattered colonic   diverticulosis.  PERITONEUM: Trace perihepatic ascites, bilateral paracolic gutter, and   dependent pelvic ascites.  VESSELS: Within normal limits.  RETROPERITONEUM/LYMPH NODES: No lymphadenopathy.  ABDOMINAL WALL: Postsurgical changes, with sutures noted at the site of   previous ileostomy.  BONES: Degenerative changes. Posterior fusion of L4-S1.    IMPRESSION:  Low-grade small bowel bowel obstruction with a transition point in the   right abdomen near the level of previous ileostomy.    < end of copied text >

## 2022-08-02 LAB
ANION GAP SERPL CALC-SCNC: 9 MMOL/L — SIGNIFICANT CHANGE UP (ref 7–14)
BUN SERPL-MCNC: 11 MG/DL — SIGNIFICANT CHANGE UP (ref 7–23)
CALCIUM SERPL-MCNC: 9 MG/DL — SIGNIFICANT CHANGE UP (ref 8.4–10.5)
CHLORIDE SERPL-SCNC: 101 MMOL/L — SIGNIFICANT CHANGE UP (ref 98–107)
CO2 SERPL-SCNC: 28 MMOL/L — SIGNIFICANT CHANGE UP (ref 22–31)
CREAT SERPL-MCNC: 0.94 MG/DL — SIGNIFICANT CHANGE UP (ref 0.5–1.3)
EGFR: 67 ML/MIN/1.73M2 — SIGNIFICANT CHANGE UP
GLUCOSE SERPL-MCNC: 158 MG/DL — HIGH (ref 70–99)
HCT VFR BLD CALC: 32.8 % — LOW (ref 34.5–45)
HGB BLD-MCNC: 9.7 G/DL — LOW (ref 11.5–15.5)
MAGNESIUM SERPL-MCNC: 2 MG/DL — SIGNIFICANT CHANGE UP (ref 1.6–2.6)
MCHC RBC-ENTMCNC: 25.4 PG — LOW (ref 27–34)
MCHC RBC-ENTMCNC: 29.6 GM/DL — LOW (ref 32–36)
MCV RBC AUTO: 85.9 FL — SIGNIFICANT CHANGE UP (ref 80–100)
NRBC # BLD: 0 /100 WBCS — SIGNIFICANT CHANGE UP
NRBC # FLD: 0 K/UL — SIGNIFICANT CHANGE UP
PHOSPHATE SERPL-MCNC: 2.6 MG/DL — SIGNIFICANT CHANGE UP (ref 2.5–4.5)
PLATELET # BLD AUTO: 260 K/UL — SIGNIFICANT CHANGE UP (ref 150–400)
POTASSIUM SERPL-MCNC: 3.8 MMOL/L — SIGNIFICANT CHANGE UP (ref 3.5–5.3)
POTASSIUM SERPL-SCNC: 3.8 MMOL/L — SIGNIFICANT CHANGE UP (ref 3.5–5.3)
RBC # BLD: 3.82 M/UL — SIGNIFICANT CHANGE UP (ref 3.8–5.2)
RBC # FLD: 15.5 % — HIGH (ref 10.3–14.5)
SODIUM SERPL-SCNC: 138 MMOL/L — SIGNIFICANT CHANGE UP (ref 135–145)
WBC # BLD: 5.83 K/UL — SIGNIFICANT CHANGE UP (ref 3.8–10.5)
WBC # FLD AUTO: 5.83 K/UL — SIGNIFICANT CHANGE UP (ref 3.8–10.5)

## 2022-08-02 RX ADMIN — DEXTROSE MONOHYDRATE, SODIUM CHLORIDE, AND POTASSIUM CHLORIDE 120 MILLILITER(S): 50; .745; 4.5 INJECTION, SOLUTION INTRAVENOUS at 05:36

## 2022-08-02 RX ADMIN — DEXTROSE MONOHYDRATE, SODIUM CHLORIDE, AND POTASSIUM CHLORIDE 120 MILLILITER(S): 50; .745; 4.5 INJECTION, SOLUTION INTRAVENOUS at 11:00

## 2022-08-02 RX ADMIN — Medication 2.5 MILLIGRAM(S): at 05:36

## 2022-08-02 RX ADMIN — Medication 125 MICROGRAM(S): at 22:44

## 2022-08-02 RX ADMIN — ENOXAPARIN SODIUM 40 MILLIGRAM(S): 100 INJECTION SUBCUTANEOUS at 11:56

## 2022-08-02 RX ADMIN — SIMVASTATIN 40 MILLIGRAM(S): 20 TABLET, FILM COATED ORAL at 22:44

## 2022-08-02 RX ADMIN — Medication 2.5 MILLIGRAM(S): at 11:57

## 2022-08-02 NOTE — PROGRESS NOTE ADULT - SUBJECTIVE AND OBJECTIVE BOX
Overnight events: Patient completed clamp trial at 8:30 pm. Residual output was 150 cc. Patient denies N/V. Passing gas, denies BM. NGT removed and patient started on sips and chips.  Overnight events: Patient completed clamp trial at 8:30 pm. Residual output was 150 cc. Patient denies N/V. Passing gas, denies BM. NGT removed and patient started on sips and chips.     Subjective:  No acute overnight events.   Patient seen and examined at bedside with surgical team during morning rounds.  Patient still c/o mild pain, improved from admission. Patient has an appetite. Passing gas, no bowel movements yet.        Exam:  General: Lying in bed in NAD  Skin: No pallor or jaundice noted.  Head: NCAT, no visible lesions   Chest: no visible deformity, nonlabored respirations   Abdomen: soft, mildly tender, nondistended.    T(C): 36.9 (08-02-22 @ 05:35), Max: 37.1 (08-01-22 @ 09:48)  HR: 72 (08-02-22 @ 05:50) (54 - 98)  BP: 132/70 (08-02-22 @ 05:50) (129/82 - 149/81)  RR: 18 (08-02-22 @ 05:35) (18 - 18)  SpO2: 100% (08-02-22 @ 05:35) (96% - 100%)      08-01-22 @ 07:01  -  08-02-22 @ 07:00  --------------------------------------------------------  IN: 2300 mL / OUT: 375 mL / NET: 1925 mL        LABS:  cret                        9.7    5.83  )-----------( 260      ( 02 Aug 2022 07:28 )             32.8     08-02    138  |  101  |  11  ----------------------------<  158<H>  3.8   |  28  |  0.94    Ca    9.0      02 Aug 2022 07:28  Phos  2.6     08-02  Mg     2.00     08-02    TPro  6.7  /  Alb  3.3  /  TBili  0.3  /  DBili  x   /  AST  10  /  ALT  12  /  AlkPhos  84  07-31    PT/INR - ( 31 Jul 2022 19:34 )   PT: 11.3 sec;   INR: 0.97 ratio         PTT - ( 31 Jul 2022 19:34 )  PTT:24.8 sec      dextrose 5% + sodium chloride 0.45% with potassium chloride 20 mEq/L 1000 milliLiter(s) IV Continuous <Continuous>  enoxaparin Injectable 40 milliGRAM(s) SubCutaneous every 24 hours  levothyroxine Injectable 125 MICROGram(s) IV Push at bedtime  metoprolol tartrate Injectable 2.5 milliGRAM(s) IV Push every 6 hours  simvastatin 40 milliGRAM(s) Oral at bedtime  tetracaine/benzocaine/butamben Spray 1 Spray(s) Topical every 6 hours PRN

## 2022-08-02 NOTE — PROGRESS NOTE ADULT - ASSESSMENT
ASSESSMENT/PLAN: 66y F PMH HTN, HLD, Hypothyroidism, SOLOMON, Pernicious anemia, diverticulosis, Colon CA s/p resection and Naeem's (10/2021), Naeem's reversal and ileostomy creation (03/2022), and recent ileostomy reversal (6/13/22) presents with 1.5 days of nausea, vomiting, and abdominal pain, found to have low grade SBO with TP near site of prior ileostomy.     - NPO/IVF  - AROBF  - Trend labs  - VTE Ppx: Lovenox    A Team Surgery (Colorectal Surgery)  [id number]   ASSESSMENT/PLAN: 66y F PMH HTN, HLD, Hypothyroidism, SOLOMON, Pernicious anemia, diverticulosis, Colon CA s/p resection and Naeem's (10/2021), Naeem's reversal and ileostomy creation (03/2022), and recent ileostomy reversal (6/13/22) presents with 1.5 days of nausea, vomiting, and abdominal pain, found to have low grade SBO with TP near site of prior ileostomy. NGT placed on admission and removed on 8/1 overnight.    Plan:  - NPO with sips & chips  - awaiting bowel movement to advance diet to clears.   - pain control PRN  - AROBF  - Trend labs  - VTE Ppx: Lovenox    [facility] Team Surgery (Colorectal Surgery)  [id number]

## 2022-08-03 LAB
ANION GAP SERPL CALC-SCNC: 12 MMOL/L — SIGNIFICANT CHANGE UP (ref 7–14)
BUN SERPL-MCNC: 7 MG/DL — SIGNIFICANT CHANGE UP (ref 7–23)
CALCIUM SERPL-MCNC: 9.1 MG/DL — SIGNIFICANT CHANGE UP (ref 8.4–10.5)
CHLORIDE SERPL-SCNC: 102 MMOL/L — SIGNIFICANT CHANGE UP (ref 98–107)
CO2 SERPL-SCNC: 25 MMOL/L — SIGNIFICANT CHANGE UP (ref 22–31)
CREAT SERPL-MCNC: 0.87 MG/DL — SIGNIFICANT CHANGE UP (ref 0.5–1.3)
EGFR: 73 ML/MIN/1.73M2 — SIGNIFICANT CHANGE UP
GLUCOSE SERPL-MCNC: 142 MG/DL — HIGH (ref 70–99)
HCT VFR BLD CALC: 33 % — LOW (ref 34.5–45)
HGB BLD-MCNC: 9.9 G/DL — LOW (ref 11.5–15.5)
MAGNESIUM SERPL-MCNC: 1.8 MG/DL — SIGNIFICANT CHANGE UP (ref 1.6–2.6)
MCHC RBC-ENTMCNC: 25.4 PG — LOW (ref 27–34)
MCHC RBC-ENTMCNC: 30 GM/DL — LOW (ref 32–36)
MCV RBC AUTO: 84.8 FL — SIGNIFICANT CHANGE UP (ref 80–100)
NRBC # BLD: 0 /100 WBCS — SIGNIFICANT CHANGE UP
NRBC # FLD: 0 K/UL — SIGNIFICANT CHANGE UP
PHOSPHATE SERPL-MCNC: 2.7 MG/DL — SIGNIFICANT CHANGE UP (ref 2.5–4.5)
PLATELET # BLD AUTO: 246 K/UL — SIGNIFICANT CHANGE UP (ref 150–400)
POTASSIUM SERPL-MCNC: 3.8 MMOL/L — SIGNIFICANT CHANGE UP (ref 3.5–5.3)
POTASSIUM SERPL-SCNC: 3.8 MMOL/L — SIGNIFICANT CHANGE UP (ref 3.5–5.3)
RBC # BLD: 3.89 M/UL — SIGNIFICANT CHANGE UP (ref 3.8–5.2)
RBC # FLD: 15.2 % — HIGH (ref 10.3–14.5)
SODIUM SERPL-SCNC: 139 MMOL/L — SIGNIFICANT CHANGE UP (ref 135–145)
WBC # BLD: 6.34 K/UL — SIGNIFICANT CHANGE UP (ref 3.8–10.5)
WBC # FLD AUTO: 6.34 K/UL — SIGNIFICANT CHANGE UP (ref 3.8–10.5)

## 2022-08-03 PROCEDURE — 74018 RADEX ABDOMEN 1 VIEW: CPT | Mod: 26,76

## 2022-08-03 RX ORDER — DIATRIZOATE MEGLUMINE 180 MG/ML
60 INJECTION, SOLUTION INTRAVESICAL ONCE
Refills: 0 | Status: COMPLETED | OUTPATIENT
Start: 2022-08-03 | End: 2022-08-03

## 2022-08-03 RX ADMIN — Medication 2.5 MILLIGRAM(S): at 17:19

## 2022-08-03 RX ADMIN — Medication 2.5 MILLIGRAM(S): at 12:41

## 2022-08-03 RX ADMIN — SIMVASTATIN 40 MILLIGRAM(S): 20 TABLET, FILM COATED ORAL at 21:53

## 2022-08-03 RX ADMIN — DIATRIZOATE MEGLUMINE 60 MILLILITER(S): 180 INJECTION, SOLUTION INTRAVESICAL at 14:55

## 2022-08-03 RX ADMIN — Medication 2.5 MILLIGRAM(S): at 00:16

## 2022-08-03 RX ADMIN — Medication 125 MICROGRAM(S): at 21:53

## 2022-08-03 RX ADMIN — Medication 2.5 MILLIGRAM(S): at 06:40

## 2022-08-03 RX ADMIN — ENOXAPARIN SODIUM 40 MILLIGRAM(S): 100 INJECTION SUBCUTANEOUS at 12:41

## 2022-08-03 RX ADMIN — DEXTROSE MONOHYDRATE, SODIUM CHLORIDE, AND POTASSIUM CHLORIDE 50 MILLILITER(S): 50; .745; 4.5 INJECTION, SOLUTION INTRAVENOUS at 10:00

## 2022-08-03 NOTE — PROGRESS NOTE ADULT - SUBJECTIVE AND OBJECTIVE BOX
Overnight Events: None GENERAL SURGERY PROGRESS NOTE    SUBJECTIVE  Patient resting in bed.    OVERNIGHT EVENTS:  NAEO.    10-point review of systems completed and negative except as noted above.      OBJECTIVE    MEDICATIONS  dextrose 5% + sodium chloride 0.45% with potassium chloride 20 mEq/L 1000 milliLiter(s) IV Continuous <Continuous>  enoxaparin Injectable 40 milliGRAM(s) SubCutaneous every 24 hours  levothyroxine Injectable 125 MICROGram(s) IV Push at bedtime  metoprolol tartrate Injectable 2.5 milliGRAM(s) IV Push every 6 hours  simvastatin 40 milliGRAM(s) Oral at bedtime  tetracaine/benzocaine/butamben Spray 1 Spray(s) Topical every 6 hours PRN      PHYSICAL EXAM  T(C): 37.3 (08-03-22 @ 00:00), Max: 37.3 (08-02-22 @ 14:30)  HR: 80 (08-03-22 @ 00:00) (69 - 80)  BP: 147/67 (08-03-22 @ 00:00) (126/74 - 147/67)  RR: 18 (08-03-22 @ 00:00) (16 - 18)  SpO2: 100% (08-03-22 @ 00:00) (98% - 100%)    08-01-22 @ 07:01  -  08-02-22 @ 07:00  --------------------------------------------------------  IN: 2300 mL / OUT: 375 mL / NET: 1925 mL    08-02-22 @ 07:01  -  08-03-22 @ 05:46  --------------------------------------------------------  IN: 1440 mL / OUT: 0 mL / NET: 1440 mL        General: Appears well, NAD  Neuro: AAOx3  CHEST: Non-labored breathing on room air  CV: Pulse present  Abdomen: soft, nontender  Extremities: Grossly symmetric    LABS                        9.7    5.83  )-----------( 260      ( 02 Aug 2022 07:28 )             32.8     08-02    138  |  101  |  11  ----------------------------<  158<H>  3.8   |  28  |  0.94    Ca    9.0      02 Aug 2022 07:28  Phos  2.6     08-02  Mg     2.00     08-02            RADIOLOGY & ADDITIONAL STUDIES GENERAL SURGERY PROGRESS NOTE    SUBJECTIVE  Patient resting in bed.    OVERNIGHT EVENTS:  NAEO. Patient had a BM, described as "regular."    10-point review of systems completed and negative except as noted above.      OBJECTIVE    MEDICATIONS  dextrose 5% + sodium chloride 0.45% with potassium chloride 20 mEq/L 1000 milliLiter(s) IV Continuous <Continuous>  enoxaparin Injectable 40 milliGRAM(s) SubCutaneous every 24 hours  levothyroxine Injectable 125 MICROGram(s) IV Push at bedtime  metoprolol tartrate Injectable 2.5 milliGRAM(s) IV Push every 6 hours  simvastatin 40 milliGRAM(s) Oral at bedtime  tetracaine/benzocaine/butamben Spray 1 Spray(s) Topical every 6 hours PRN      PHYSICAL EXAM  T(C): 37.3 (08-03-22 @ 00:00), Max: 37.3 (08-02-22 @ 14:30)  HR: 80 (08-03-22 @ 00:00) (69 - 80)  BP: 147/67 (08-03-22 @ 00:00) (126/74 - 147/67)  RR: 18 (08-03-22 @ 00:00) (16 - 18)  SpO2: 100% (08-03-22 @ 00:00) (98% - 100%)    08-01-22 @ 07:01  -  08-02-22 @ 07:00  --------------------------------------------------------  IN: 2300 mL / OUT: 375 mL / NET: 1925 mL    08-02-22 @ 07:01  -  08-03-22 @ 05:46  --------------------------------------------------------  IN: 1440 mL / OUT: 0 mL / NET: 1440 mL        General: Appears well, NAD  Neuro: AAOx3  CHEST: Non-labored breathing on room air  CV: Pulse present  Abdomen: soft, nontender  Extremities: Grossly symmetric    LABS                        9.7    5.83  )-----------( 260      ( 02 Aug 2022 07:28 )             32.8     08-02    138  |  101  |  11  ----------------------------<  158<H>  3.8   |  28  |  0.94    Ca    9.0      02 Aug 2022 07:28  Phos  2.6     08-02  Mg     2.00     08-02            RADIOLOGY & ADDITIONAL STUDIES

## 2022-08-03 NOTE — PROGRESS NOTE ADULT - ASSESSMENT
ASSESSMENT/PLAN: 66y F PMH HTN, HLD, Hypothyroidism, SOLOMON, Pernicious anemia, diverticulosis, Colon CA s/p resection and Naeem's (10/2021), Naeem's reversal and ileostomy creation (03/2022), and recent ileostomy reversal (6/13/22) presents with 1.5 days of nausea, vomiting, and abdominal pain, found to have low grade SBO with TP near site of prior ileostomy. NGT placed on admission and removed on 8/1 overnight.    Plan:  - NPO with sips & chips  - awaiting bowel movement to advance diet to clears.   - pain control PRN  - AROBF  - Trend labs  - VTE Ppx: Lovenox    [facility] Team Surgery (Colorectal Surgery)  [id number]   66y F PMH HTN, HLD, Hypothyroidism, SOLOMON, Pernicious anemia, diverticulosis, Colon CA s/p resection and Naeem's (10/2021), Naeem's reversal and ileostomy creation (03/2022), and recent ileostomy reversal (6/13/22), presented with 1.5 days of nausea, vomiting, and abdominal pain, found to have SBO with TP near site of prior ileostomy. NGT placed on admission and removed on 8/1 overnight.    Plan:  - NPO with sips & chips  - awaiting bowel movement to advance diet to clears.   - pain control PRN  - AROBF  - Trend labs  - VTE Ppx: Lovenox    A Team Surgery (Colorectal Surgery)  p. [phone]   66y F PMH HTN, HLD, Hypothyroidism, SOLOMON, Pernicious anemia, diverticulosis, Colon CA s/p resection and Naeem's (10/2021), Naeem's reversal and ileostomy creation (03/2022), and recent ileostomy reversal (6/13/22), presented with 1.5 days of nausea, vomiting, and abdominal pain, found to have SBO with TP near site of prior ileostomy. NGT placed on admission and removed on 8/1 overnight.    Plan:  - advanced to clear liquid diet following bowel movement overnight  - pain control PRN  - AROBF  - Trend labs  - VTE Ppx: Lovenox    A Team Surgery (Colorectal Surgery)  p. [phone]

## 2022-08-04 ENCOUNTER — TRANSCRIPTION ENCOUNTER (OUTPATIENT)
Age: 67
End: 2022-08-04

## 2022-08-04 LAB
ANION GAP SERPL CALC-SCNC: 12 MMOL/L — SIGNIFICANT CHANGE UP (ref 7–14)
ANION GAP SERPL CALC-SCNC: 13 MMOL/L — SIGNIFICANT CHANGE UP (ref 7–14)
APPEARANCE UR: CLEAR — SIGNIFICANT CHANGE UP
BILIRUB UR-MCNC: NEGATIVE — SIGNIFICANT CHANGE UP
BUN SERPL-MCNC: 11 MG/DL — SIGNIFICANT CHANGE UP (ref 7–23)
BUN SERPL-MCNC: 11 MG/DL — SIGNIFICANT CHANGE UP (ref 7–23)
CALCIUM SERPL-MCNC: 9.3 MG/DL — SIGNIFICANT CHANGE UP (ref 8.4–10.5)
CALCIUM SERPL-MCNC: 9.6 MG/DL — SIGNIFICANT CHANGE UP (ref 8.4–10.5)
CHLORIDE SERPL-SCNC: 102 MMOL/L — SIGNIFICANT CHANGE UP (ref 98–107)
CHLORIDE SERPL-SCNC: 103 MMOL/L — SIGNIFICANT CHANGE UP (ref 98–107)
CHLORIDE UR-SCNC: 38 MMOL/L — SIGNIFICANT CHANGE UP
CO2 SERPL-SCNC: 23 MMOL/L — SIGNIFICANT CHANGE UP (ref 22–31)
CO2 SERPL-SCNC: 24 MMOL/L — SIGNIFICANT CHANGE UP (ref 22–31)
COLOR SPEC: YELLOW — SIGNIFICANT CHANGE UP
CREAT ?TM UR-MCNC: 328 MG/DL — SIGNIFICANT CHANGE UP
CREAT SERPL-MCNC: 1.32 MG/DL — HIGH (ref 0.5–1.3)
CREAT SERPL-MCNC: 1.41 MG/DL — HIGH (ref 0.5–1.3)
DIFF PNL FLD: ABNORMAL
EGFR: 41 ML/MIN/1.73M2 — LOW
EGFR: 44 ML/MIN/1.73M2 — LOW
GLUCOSE SERPL-MCNC: 123 MG/DL — HIGH (ref 70–99)
GLUCOSE SERPL-MCNC: 125 MG/DL — HIGH (ref 70–99)
GLUCOSE UR QL: NEGATIVE — SIGNIFICANT CHANGE UP
HCT VFR BLD CALC: 30.8 % — LOW (ref 34.5–45)
HGB BLD-MCNC: 9.5 G/DL — LOW (ref 11.5–15.5)
KETONES UR-MCNC: NEGATIVE — SIGNIFICANT CHANGE UP
LEUKOCYTE ESTERASE UR-ACNC: NEGATIVE — SIGNIFICANT CHANGE UP
MAGNESIUM SERPL-MCNC: 1.9 MG/DL — SIGNIFICANT CHANGE UP (ref 1.6–2.6)
MAGNESIUM SERPL-MCNC: 2 MG/DL — SIGNIFICANT CHANGE UP (ref 1.6–2.6)
MCHC RBC-ENTMCNC: 25.8 PG — LOW (ref 27–34)
MCHC RBC-ENTMCNC: 30.8 GM/DL — LOW (ref 32–36)
MCV RBC AUTO: 83.7 FL — SIGNIFICANT CHANGE UP (ref 80–100)
NITRITE UR-MCNC: NEGATIVE — SIGNIFICANT CHANGE UP
NRBC # BLD: 0 /100 WBCS — SIGNIFICANT CHANGE UP
NRBC # FLD: 0 K/UL — SIGNIFICANT CHANGE UP
PH UR: 5.5 — SIGNIFICANT CHANGE UP (ref 5–8)
PHOSPHATE SERPL-MCNC: 4 MG/DL — SIGNIFICANT CHANGE UP (ref 2.5–4.5)
PHOSPHATE SERPL-MCNC: 4 MG/DL — SIGNIFICANT CHANGE UP (ref 2.5–4.5)
PLATELET # BLD AUTO: 229 K/UL — SIGNIFICANT CHANGE UP (ref 150–400)
POTASSIUM SERPL-MCNC: 3.8 MMOL/L — SIGNIFICANT CHANGE UP (ref 3.5–5.3)
POTASSIUM SERPL-MCNC: 3.8 MMOL/L — SIGNIFICANT CHANGE UP (ref 3.5–5.3)
POTASSIUM SERPL-SCNC: 3.8 MMOL/L — SIGNIFICANT CHANGE UP (ref 3.5–5.3)
POTASSIUM SERPL-SCNC: 3.8 MMOL/L — SIGNIFICANT CHANGE UP (ref 3.5–5.3)
POTASSIUM UR-SCNC: 64.5 MMOL/L — SIGNIFICANT CHANGE UP
PROT UR-MCNC: ABNORMAL
RBC # BLD: 3.68 M/UL — LOW (ref 3.8–5.2)
RBC # FLD: 15.3 % — HIGH (ref 10.3–14.5)
SODIUM SERPL-SCNC: 138 MMOL/L — SIGNIFICANT CHANGE UP (ref 135–145)
SODIUM SERPL-SCNC: 139 MMOL/L — SIGNIFICANT CHANGE UP (ref 135–145)
SODIUM UR-SCNC: 34 MMOL/L — SIGNIFICANT CHANGE UP
SP GR SPEC: 1.02 — SIGNIFICANT CHANGE UP (ref 1–1.05)
UROBILINOGEN FLD QL: SIGNIFICANT CHANGE UP
WBC # BLD: 5.18 K/UL — SIGNIFICANT CHANGE UP (ref 3.8–10.5)
WBC # FLD AUTO: 5.18 K/UL — SIGNIFICANT CHANGE UP (ref 3.8–10.5)

## 2022-08-04 PROCEDURE — 74018 RADEX ABDOMEN 1 VIEW: CPT | Mod: 26

## 2022-08-04 RX ORDER — SODIUM CHLORIDE 9 MG/ML
1000 INJECTION, SOLUTION INTRAVENOUS
Refills: 0 | Status: DISCONTINUED | OUTPATIENT
Start: 2022-08-04 | End: 2022-08-04

## 2022-08-04 RX ORDER — AMLODIPINE BESYLATE 2.5 MG/1
10 TABLET ORAL DAILY
Refills: 0 | Status: DISCONTINUED | OUTPATIENT
Start: 2022-08-04 | End: 2022-08-05

## 2022-08-04 RX ORDER — DEXTROSE MONOHYDRATE, SODIUM CHLORIDE, AND POTASSIUM CHLORIDE 50; .745; 4.5 G/1000ML; G/1000ML; G/1000ML
1000 INJECTION, SOLUTION INTRAVENOUS
Refills: 0 | Status: DISCONTINUED | OUTPATIENT
Start: 2022-08-04 | End: 2022-08-05

## 2022-08-04 RX ORDER — SODIUM CHLORIDE 9 MG/ML
500 INJECTION, SOLUTION INTRAVENOUS ONCE
Refills: 0 | Status: COMPLETED | OUTPATIENT
Start: 2022-08-04 | End: 2022-08-04

## 2022-08-04 RX ORDER — LEVOTHYROXINE SODIUM 125 MCG
175 TABLET ORAL DAILY
Refills: 0 | Status: DISCONTINUED | OUTPATIENT
Start: 2022-08-04 | End: 2022-08-05

## 2022-08-04 RX ORDER — LISINOPRIL 2.5 MG/1
40 TABLET ORAL DAILY
Refills: 0 | Status: DISCONTINUED | OUTPATIENT
Start: 2022-08-04 | End: 2022-08-04

## 2022-08-04 RX ADMIN — Medication 2.5 MILLIGRAM(S): at 06:38

## 2022-08-04 RX ADMIN — SIMVASTATIN 40 MILLIGRAM(S): 20 TABLET, FILM COATED ORAL at 21:34

## 2022-08-04 RX ADMIN — Medication 2.5 MILLIGRAM(S): at 00:26

## 2022-08-04 RX ADMIN — SODIUM CHLORIDE 500 MILLILITER(S): 9 INJECTION, SOLUTION INTRAVENOUS at 15:10

## 2022-08-04 NOTE — DISCHARGE NOTE PROVIDER - NSDCMRMEDTOKEN_GEN_ALL_CORE_FT
amLODIPine 10 mg oral tablet: 1 tab(s) orally once a day  levothyroxine 175 mcg (0.175 mg) oral tablet: 1 tab(s) orally once a day  lisinopril 40 mg oral tablet: 1 tab(s) orally once a day  simvastatin 40 mg oral tablet: 1 tab(s) orally once a day (at bedtime)  Vitamin B12: orally once a day  Vitamin D2 1.25 mg (50,000 intl units) oral capsule: 1 cap(s) orally once a week

## 2022-08-04 NOTE — PROGRESS NOTE ADULT - SUBJECTIVE AND OBJECTIVE BOX
No acute overnight events  GENERAL SURGERY PROGRESS NOTE    SUBJECTIVE  Patient resting in bed. Feels ready to begin solid diet.     OVERNIGHT EVENTS:  Patient had eight liquid bowel movements since yesterday at 3pm.    10-point review of systems completed and negative except as noted above.      OBJECTIVE    MEDICATIONS  dextrose 5% + sodium chloride 0.45% with potassium chloride 20 mEq/L 1000 milliLiter(s) IV Continuous <Continuous>  enoxaparin Injectable 40 milliGRAM(s) SubCutaneous every 24 hours  levothyroxine Injectable 125 MICROGram(s) IV Push at bedtime  metoprolol tartrate Injectable 2.5 milliGRAM(s) IV Push every 6 hours  simvastatin 40 milliGRAM(s) Oral at bedtime  tetracaine/benzocaine/butamben Spray 1 Spray(s) Topical every 6 hours PRN      PHYSICAL EXAM  T(C): 36.7 (08-04-22 @ 05:33), Max: 37.9 (08-03-22 @ 17:19)  HR: 72 (08-04-22 @ 05:33) (72 - 88)  BP: 144/86 (08-04-22 @ 05:33) (128/76 - 144/86)  RR: 17 (08-04-22 @ 05:33) (17 - 18)  SpO2: 96% (08-04-22 @ 05:33) (96% - 100%)    08-03-22 @ 07:01  -  08-04-22 @ 07:00  --------------------------------------------------------  IN: 1270 mL / OUT: 0 mL / NET: 1270 mL        General: Appears well, NAD  Neuro: AAOx3  CHEST: Clear to auscultation bilaterally  CV: Regular rate and rhythm  Abdomen: soft, nontender, nondistended, no rebound or guarding  Extremities: Grossly symmetric    LABS                        9.9    6.34  )-----------( 246      ( 03 Aug 2022 08:02 )             33.0     08-03    139  |  102  |  7   ----------------------------<  142<H>  3.8   |  25  |  0.87    Ca    9.1      03 Aug 2022 08:02  Phos  2.7     08-03  Mg     1.80     08-03            RADIOLOGY & ADDITIONAL STUDIES GENERAL SURGERY PROGRESS NOTE    SUBJECTIVE  Patient resting in bed. Feels ready to begin solid diet.     OVERNIGHT EVENTS:  Patient had eight liquid bowel movements since yesterday at 3pm.    10-point review of systems completed and negative except as noted above.      OBJECTIVE    MEDICATIONS  dextrose 5% + sodium chloride 0.45% with potassium chloride 20 mEq/L 1000 milliLiter(s) IV Continuous <Continuous>  enoxaparin Injectable 40 milliGRAM(s) SubCutaneous every 24 hours  levothyroxine Injectable 125 MICROGram(s) IV Push at bedtime  metoprolol tartrate Injectable 2.5 milliGRAM(s) IV Push every 6 hours  simvastatin 40 milliGRAM(s) Oral at bedtime  tetracaine/benzocaine/butamben Spray 1 Spray(s) Topical every 6 hours PRN      PHYSICAL EXAM  T(C): 36.7 (08-04-22 @ 05:33), Max: 37.9 (08-03-22 @ 17:19)  HR: 72 (08-04-22 @ 05:33) (72 - 88)  BP: 144/86 (08-04-22 @ 05:33) (128/76 - 144/86)  RR: 17 (08-04-22 @ 05:33) (17 - 18)  SpO2: 96% (08-04-22 @ 05:33) (96% - 100%)    08-03-22 @ 07:01  -  08-04-22 @ 07:00  --------------------------------------------------------  IN: 1270 mL / OUT: 0 mL / NET: 1270 mL        General: appears well, NAD  Neuro: AAOx3  CHEST: non-labored breathing on room air   CV: pulse present  Abdomen: soft, nontender, nondistended, no rebound or guarding  Extremities: grossly symmetric    LABS                        9.9    6.34  )-----------( 246      ( 03 Aug 2022 08:02 )             33.0     08-03    139  |  102  |  7   ----------------------------<  142<H>  3.8   |  25  |  0.87    Ca    9.1      03 Aug 2022 08:02  Phos  2.7     08-03  Mg     1.80     08-03            RADIOLOGY & ADDITIONAL STUDIES

## 2022-08-04 NOTE — DISCHARGE NOTE PROVIDER - NSDCACTIVITY_GEN_ALL_CORE
No restrictions No restrictions/Showering allowed/Stairs allowed/Walking - Indoors allowed/Walking - Outdoors allowed

## 2022-08-04 NOTE — DISCHARGE NOTE PROVIDER - HOSPITAL COURSE
66y F PMH HTN, HLD, Hypothyroidism, SOLOMON, Pernicious anemia, diverticulosis, Colon CA s/p resection and Naeem's (10/2021), Naeem's reversal and ileostomy creation (03/2022), and recent ileostomy reversal (6/13/22) w/ Dr. Beach, presents to  ED c/o abdominal pain, nausea, and vomiting. Patient reports pain started 1.5 days ago, crampy in upper abdomen, associated with nausea and several episodes of emesis when trying ot drink liquids. She also reports constipation. Normally she passes 5-6 BM per day and has not passed anything since yesterday morning. Denies prior episodes, fevers, headaches, chest pain, SOB, dysuria, recent sick contacts, melena, hematochezia.     CT scan abd/pelvis: Low-grade small bowel bowel obstruction with a transition point in the right abdomen near the level of previous ileostomy.    Patient admitted to the surgical service for an SBO and an NG tube was placed for bowel rest.  Patient given a gastrografin challenge.  Patient began having return of GI function and the NG tube was removed.  Diet advanced and tolerated and pain resolved. Patient OOB and ambulating.  Patient stable for discharge home to follow up as an outpatient

## 2022-08-04 NOTE — DISCHARGE NOTE PROVIDER - NSDCFUADDAPPT_GEN_ALL_CORE_FT
Follow up with your surgeon Dr. Beach, call for an appointment.  Please follow up with your primary care physician regarding your hospitalization

## 2022-08-04 NOTE — DISCHARGE NOTE PROVIDER - CARE PROVIDER_API CALL
Breezy Beach)  ColonRectal Surgery; Surgery  [facility], [address] [place] [address]  Phone: [phone]  Fax: [phone]  Follow Up Time: 2 weeks

## 2022-08-04 NOTE — PROGRESS NOTE ADULT - ASSESSMENT
66y F PMH HTN, HLD, Hypothyroidism, SOLOMON, Pernicious anemia, diverticulosis, Colon CA s/p resection and Naeem's (10/2021), Naeem's reversal and ileostomy creation (03/2022), and recent ileostomy reversal (6/13/22), presented with 1.5 days of nausea, vomiting, and abdominal pain, found to have SBO with TP near site of prior ileostomy. NGT placed on admission and removed on 8/1 overnight.    Plan:  - advanced to clear liquid diet following bowel movement overnight  - pain control PRN  - AROBF  - Trend labs  - VTE Ppx: Lovenox    A Team Surgery (Colorectal Surgery)  p. [phone]   66y F PMH HTN, HLD, Hypothyroidism, SOLOMON, Pernicious anemia, diverticulosis, Colon CA s/p resection and Naeem's (10/2021), Naeem's reversal and ileostomy creation (03/2022), and recent ileostomy reversal (6/13/22), presented with 1.5 days of nausea, vomiting, and abdominal pain, found to have SBO with TP near site of prior ileostomy. NGT placed on admission and removed on 8/1 overnight. Gastrograffin challenge 8/3 showed no obstruction. Pt is having BM and passing flatus.     Plan:  - possibly advance from clear liquid diet to solids  - pain control PRN  - AROBF  - Trend labs  - VTE Ppx: Lovenox    A Team Surgery (Colorectal Surgery)  p. [phone]

## 2022-08-04 NOTE — DISCHARGE NOTE PROVIDER - NSDCFUSCHEDAPPT_GEN_ALL_CORE_FT
Breezy Beach  [facility]  [address]  Scheduled Appointment: 08/30/2022

## 2022-08-04 NOTE — CHART NOTE - NSCHARTNOTEFT_GEN_A_CORE
Patient's repeat BMP showing downtrending creatinine from 1.41 --> 1.32.    Patient was evaluated bedside. In no acute distress. Playing solitary on computer. Reports voiding 3 times this morning without pain or noticeable hematuria.   She has known history of R non obstructing stone and baseline creatinine of 1.27 in outpatient labs. Followed by Dr. Bridger Boucher.   On focused exam she denies flank tenderness / suprapubic tenderness.    ICU Vital Signs Last 24 Hrs  T(C): 36.7 (04 Aug 2022 09:34), Max: 37.9 (03 Aug 2022 17:19)  T(F): 98 (04 Aug 2022 09:34), Max: 100.3 (03 Aug 2022 17:19)  HR: 90 (04 Aug 2022 09:34) (72 - 90)  BP: 137/78 (04 Aug 2022 09:34) (129/75 - 144/86)  BP(mean): --  ABP: --  ABP(mean): --  RR: 18 (04 Aug 2022 09:34) (17 - 18)  SpO2: 98% (04 Aug 2022 09:34) (96% - 100%)    O2 Parameters below as of 04 Aug 2022 05:33  Patient On (Oxygen Delivery Method): room air    Plan:  - f.u pending lab values to calculate FENa.

## 2022-08-05 ENCOUNTER — TRANSCRIPTION ENCOUNTER (OUTPATIENT)
Age: 67
End: 2022-08-05

## 2022-08-05 VITALS
DIASTOLIC BLOOD PRESSURE: 75 MMHG | OXYGEN SATURATION: 97 % | SYSTOLIC BLOOD PRESSURE: 134 MMHG | HEART RATE: 77 BPM | RESPIRATION RATE: 18 BRPM | TEMPERATURE: 98 F

## 2022-08-05 LAB
ANION GAP SERPL CALC-SCNC: 9 MMOL/L — SIGNIFICANT CHANGE UP (ref 7–14)
BUN SERPL-MCNC: 11 MG/DL — SIGNIFICANT CHANGE UP (ref 7–23)
CALCIUM SERPL-MCNC: 8.9 MG/DL — SIGNIFICANT CHANGE UP (ref 8.4–10.5)
CHLORIDE SERPL-SCNC: 108 MMOL/L — HIGH (ref 98–107)
CO2 SERPL-SCNC: 24 MMOL/L — SIGNIFICANT CHANGE UP (ref 22–31)
CREAT SERPL-MCNC: 0.93 MG/DL — SIGNIFICANT CHANGE UP (ref 0.5–1.3)
EGFR: 67 ML/MIN/1.73M2 — SIGNIFICANT CHANGE UP
GLUCOSE SERPL-MCNC: 139 MG/DL — HIGH (ref 70–99)
HCT VFR BLD CALC: 30.9 % — LOW (ref 34.5–45)
HGB BLD-MCNC: 9.3 G/DL — LOW (ref 11.5–15.5)
MAGNESIUM SERPL-MCNC: 1.8 MG/DL — SIGNIFICANT CHANGE UP (ref 1.6–2.6)
MCHC RBC-ENTMCNC: 25.5 PG — LOW (ref 27–34)
MCHC RBC-ENTMCNC: 30.1 GM/DL — LOW (ref 32–36)
MCV RBC AUTO: 84.9 FL — SIGNIFICANT CHANGE UP (ref 80–100)
NRBC # BLD: 0 /100 WBCS — SIGNIFICANT CHANGE UP
NRBC # FLD: 0 K/UL — SIGNIFICANT CHANGE UP
PHOSPHATE SERPL-MCNC: 3.2 MG/DL — SIGNIFICANT CHANGE UP (ref 2.5–4.5)
PLATELET # BLD AUTO: 208 K/UL — SIGNIFICANT CHANGE UP (ref 150–400)
POTASSIUM SERPL-MCNC: 4 MMOL/L — SIGNIFICANT CHANGE UP (ref 3.5–5.3)
POTASSIUM SERPL-SCNC: 4 MMOL/L — SIGNIFICANT CHANGE UP (ref 3.5–5.3)
RBC # BLD: 3.64 M/UL — LOW (ref 3.8–5.2)
RBC # FLD: 14.9 % — HIGH (ref 10.3–14.5)
SODIUM SERPL-SCNC: 141 MMOL/L — SIGNIFICANT CHANGE UP (ref 135–145)
WBC # BLD: 4.45 K/UL — SIGNIFICANT CHANGE UP (ref 3.8–10.5)
WBC # FLD AUTO: 4.45 K/UL — SIGNIFICANT CHANGE UP (ref 3.8–10.5)

## 2022-08-05 RX ADMIN — AMLODIPINE BESYLATE 10 MILLIGRAM(S): 2.5 TABLET ORAL at 06:00

## 2022-08-05 RX ADMIN — Medication 175 MICROGRAM(S): at 06:00

## 2022-08-05 NOTE — DISCHARGE NOTE NURSING/CASE MANAGEMENT/SOCIAL WORK - NSDCPEFALRISK_GEN_ALL_CORE
For information on Fall & Injury Prevention, visit: [email or web address] OR  [email or web address] OR  https://www.cdc.gov/steadi/patient.html

## 2022-08-05 NOTE — PROGRESS NOTE ADULT - ASSESSMENT
66y F PMH HTN, HLD, Hypothyroidism, SOLOMON, Pernicious anemia, diverticulosis, Colon CA s/p resection and Naeem's (10/2021), Naeem's reversal and ileostomy creation (03/2022), and recent ileostomy reversal (6/13/22), presented with 1.5 days of nausea, vomiting, and abdominal pain, found to have SBO with TP near site of prior ileostomy. NGT placed on admission and removed on 8/1 overnight. Gastrograffin challenge 8/3 showed no obstruction. Pt is having BM and passing flatus.     Plan:  - possibly advance from clear liquid diet to solids  - pain control PRN  - AROBF  - Trend labs  - VTE Ppx: Lovenox    A Team Surgery (Colorectal Surgery)  p. [phone]   66y F PMH HTN, HLD, Hypothyroidism, SOLOMON, Pernicious anemia, diverticulosis, Colon CA s/p resection and Naeem's (10/2021), Naeem's reversal and ileostomy creation (03/2022), and recent ileostomy reversal (6/13/22), presented with 1.5 days of nausea, vomiting, and abdominal pain, found to have SBO with TP near site of prior ileostomy. NGT placed on admission and removed on 8/1 overnight. Gastrograffin challenge 8/3 showed no obstruction. Pt is having BM and passing flatus. Found to have pre-renal HELEN on 8/4, likely contrast induced.     Plan:  - solid diet  - pain control PRN  - AROBF  - Trend labs  - VTE Ppx: Lovenox    A Team Surgery (Colorectal Surgery)  p. [phone]   66y F PMH HTN, HLD, Hypothyroidism, SOLOMON, Pernicious anemia, diverticulosis, Colon CA s/p resection and Naeem's (10/2021), Naeem's reversal and ileostomy creation (03/2022), and recent ileostomy reversal (6/13/22), presented with 1.5 days of nausea, vomiting, and abdominal pain, found to have SBO with TP near site of prior ileostomy. NGT placed on admission and removed on 8/1 overnight. Gastrograffin challenge 8/3 showed no obstruction. Pt is having BM and passing flatus. Found to have pre-renal HELEN on 8/4, likely contrast induced, bolused.      Plan:  - Advanced from CLD to low fiber diet on 8/4  - pain control PRN  - Trend labs  - VTE Ppx: Lovenox    A Team Surgery (Colorectal Surgery)  p. [phone]

## 2022-08-05 NOTE — PROGRESS NOTE ADULT - SUBJECTIVE AND OBJECTIVE BOX
No acute overnight events  GENERAL SURGERY PROGRESS NOTE    SUBJECTIVE  Resting in bed on AM rounds.     OVERNIGHT EVENTS:  NAEO.     10-point review of systems completed and negative except as noted above.      OBJECTIVE    MEDICATIONS  amLODIPine   Tablet 10 milliGRAM(s) Oral daily  dextrose 5% + sodium chloride 0.45% with potassium chloride 20 mEq/L 1000 milliLiter(s) IV Continuous <Continuous>  enoxaparin Injectable 40 milliGRAM(s) SubCutaneous every 24 hours  levothyroxine 175 MICROGram(s) Oral daily  simvastatin 40 milliGRAM(s) Oral at bedtime      PHYSICAL EXAM  T(C): 36.7 (22 @ 02:21), Max: 37.4 (22 @ 17:00)  HR: 77 (22 @ 02:21) (77 - 90)  BP: 133/67 (22 @ 02:21) (132/95 - 170/93)  RR: 18 (22 02:21) (18 - 20)  SpO2: 100% (22 02:21) (98% - 100%)    22 @ 07:01  -  22 @ 07:00  --------------------------------------------------------  IN: 1270 mL / OUT: 0 mL / NET: 1270 mL    22 @ 07:01  -  22 @ 06:00  --------------------------------------------------------  IN: 0 mL / OUT: 1350 mL / NET: -1350 mL        General: Appears well, NAD  Neuro: AAOx3  CHEST: non-labored breathing on room air  CV: pulse present  Abdomen: soft, nontender, nondistended, no rebound or guarding  Extremities: Grossly symmetric    LABS                        9.5    5.18  )-----------( 229      ( 04 Aug 2022 07:15 )             30.8     08-04    138  |  102  |  11  ----------------------------<  125<H>  3.8   |  23  |  1.32<H>    Ca    9.6      04 Aug 2022 09:30  Phos  4.0     08-  Mg     1.90     08-04        Urinalysis Basic - ( 04 Aug 2022 11:32 )    Color: Yellow / Appearance: Clear / S.019 / pH: x  Gluc: x / Ketone: Negative  / Bili: Negative / Urobili: <2 mg/dL   Blood: x / Protein: Trace / Nitrite: Negative   Leuk Esterase: Negative / RBC: 4 /HPF / WBC 2 /HPF   Sq Epi: x / Non Sq Epi: 2 /HPF / Bacteria: Negative        RADIOLOGY & ADDITIONAL STUDIES

## 2022-08-05 NOTE — DISCHARGE NOTE NURSING/CASE MANAGEMENT/SOCIAL WORK - PATIENT PORTAL LINK FT
You can access the FollowMyHealth Patient Portal offered by [facility] by registering at the following website: [email or web address]. By joining [employer]’s FollowMyHealth portal, you will also be able to view your health information using other applications (apps) compatible with our system.

## 2022-08-05 NOTE — DISCHARGE NOTE NURSING/CASE MANAGEMENT/SOCIAL WORK - NSDCPNINST_GEN_ALL_CORE
[facility], Monitor for signs/symptoms of infection such as pain, swelling, temp greater that 100.4. Notify MD

## 2022-08-29 NOTE — H&P PST ADULT - PATIENT'S GENDER IDENTITY
Total Knee Replacement  Discharge Instructions    To prevent Clot formation, you have been placed on the following medication:  Take aspirin 81 mg twice a day starting day after surgery   Surgical Site Care:  Keep incision clean and dry. May shower on post-op day #3 with waterproof dressing on. Change to new waterproof dressing between 5-7 days. You have a pain catheter inserted into your knee during surgery that you will go home with. This is intended to help control your pain postoperatively. The pain catheter should last between 3-4 days. When the pain ball is completely collapsed you can gently remove the clear waterproof dressing and the catheter should slide out easily. If there are any difficulties call the office. Pain catheter is set at 6 mL/h. If you feel your pain is greater than 7/10 you can increase it by 2 mL/h to a max of 14 mL/h. It is also common to have leakage around the catheter site. This is normal.  Please reinforce with waterproof dressing as needed. Physical Therapy:  Weight Bearing Status:  Weight bearing as tolerated  Outpatient therapy-should start within 2-3 days  Precautions  Per Physical Therapy Handout  Pain Medications  You were given oxycodone (Oxycontin, Oxyir)  Wean off pain medications as you deem appropriate as long as pain is under control  Be sure to drink plenty of fluids (recommend water) while taking narcotic pain medications to prevent constipation  You may take an over the counter laxative or stool softener as needed to prevent/treat constipation as well, we recommend Senokot S OTC. We recommend that you consider taking these medications the entire time you are taking pain medication.   Cold packs/Ice packs/Machine  May be used as much as necessary to control swelling/inflammation/soreness  Be sure to have a barrier (cloth, clothing, towel) between the site and the ice pack to prevent frostbite  Contact [facility] office if  Increased redness, swelling, drainage of any kind, and/or pain to surgery site. As well as new onset fevers and or chills. These could signify an infection. Calf or thigh tenderness to touch as well as increased swelling or redness. This could signify a clot formation. Numbness or tingling to an area around the incision site or below the incision site (toes). Any rash appears, increased  or new onset nausea/vomiting occur. This may indicate a reaction to a medication. Phone # [phone]  Follow up with Dr. Reyes or Katia Martinez PA-C at scheduled appointment time. Please continue to use your Incentive Spirometer every hour while awake.        Discharge Medications    Narcotic Pain Medications    ____ Hydrocodone/acetaminophen 5/325mg 1 tab every 4 hours as needed for pain  __x__ Oxycodone 5mg 1 tab every 6 hours as needed for pain  ____ Tramadol 50mg 1 tab every 4 hours as needed for pain    Anti-inflammatory/Pain Medication    __x__ Meloxicam 15mg 1 tab once a day  ____ Celebrex 200mg 1 tab once a day  __x__ Medrol Dosepak 1 kit as directed (start post-op day 1)    If Medrol Dosepak and either Meloxicam or Celebrex is prescribed complete the Medrol Dosepak before starting Meloxicam or Celebrex    Anti-coagulation Medication  __x__ Aspirin 81mg 1 tab twice per day  ____ Eliquis 2.5mg 1 tab twice per day  ____ Lovenox 40mg once per day  ____ Lovenox 30mg twice per day    Start anti-coagulation medications the day after surgery    Muscle relaxer     __x__ Cyclobenzaprine 10mg 1 tab up to 3 times a day as needed for muscle spasms and pain  ____ Methocarbamol 750mg 1 tab up to 3 times a day as needed for muscle spasms and pain          Nausea/Vomiting Medications    __x__ Ondansetron 4 mg 1 tab up to 4 times daily as needed  ____ Promethazine 25mg 1 tab up to 3 times daily as needed    Antibiotics    ____ Keflex 500mg 1 tab 4 times daily  ____ Sulfamethoxazole/trimethoprim 800/160mg 1 tab twice per day  __x__ Clindamycin 300mg 2 tabs twice per day      *** Please contact [address] with any questions or concerns after your discharge. *** Mon- Fri 9am- 5pm [phone]. If you have any issues or concerns after 5pm or on the weekend please call your surgeon's office. I will be contacting you in a few days to follow up. If you need a pain medication refill please contact your surgeon's office. [address] [place] [address] for Joint Replacement Model  Notification Letter    [address] Jagdish Olivares is participating in a [address] from [facility] [place] is participating in a Medicare initiative called the [address] [place] [address] for Joint Replacement (CJR) model. The CJR model aims to promote quality and financial accountability for care surrounding lower-extremity joint replacement (LEJR) procedures, commonly referred to as hip and knee replacements and/or other major leg procedures. [address] participation in the [address] [place] [address],[address] should not restrict your access to care for your medical condition or your freedom to choose your health care providers and services. All existing Medicare beneficiary protections continue to be available to you. These include the ability to report concerns of substandard care to Quality Improvement Organizations and 1-800-MEDICARE. The CJR model aims to help give you better care. The CJR model aims to support better and more efficient care for beneficiaries undergoing LEJR procedures.  A CJR episode of care is typically defined as an admission of an eligible Medicare beneficiary to a hospital participating in the [address] [place] [address],[address] that eventually results in a discharge paid under Medicare Severity-Diagnosis Related Groups (MS-DRG) 469 (major joint replacement or reattachment of lower extremity with major complications or comorbidities) or 470 (major joint replacement or reattachment of lower extremity without major complications or comorbidities). The CJR episode of care continues for 90 days following discharge. This model tests bundled payment and quality measurement for an episode of care associated with LEJR procedures to encourage hospitals, physicians, and post-acute care providers to work together to improve the quality and coordination of care from the initial hospitalization through recovery. Through this bundled payment model, Keira Olivares will receive additional payments if quality and spending performance are strong or, if not, potentially have to repay Medicare for a portion of the spending for care surrounding a lower extremity joint replacement procedure. Medicare is using the CJR model to encourage Keira Olivares to work more closely with your doctors and other health care providers that help patients recover after discharge from the hospital, including nursing homes (skilled nursing facilities), home health agencies, inpatient rehabilitation facilities, and long-term care hospitals. The goal of the model is to encourage these providers and suppliers to provide you with better, more coordinated care during and following your hospital stay. The model is expected to lower the cost of care to [facility] but your costs for covered care will not increase due to these changes. Keira Olivares is working closely with the doctors and other health care providers and suppliers who will care for you during and following your hospital stay and extending through the recovery period. By working together, your health care providers and suppliers are planning more efficient, high quality care as you undergo treatment. Medicare will monitor your care to ensure you and others are receiving high quality care. It's your choice which hospital, doctor, or other providers you use.     You have the right to choose which hospital, doctor, or other post-hospital stay health care provider you use. To find a different doctor, visit Medicare's Physician Compare website, [email or web address].Telepath, or call 1-800-MEDICARE (466 0824). TTY users should call 4-472.867.8232. To find a different hospital, visit AnalystExamPronota or call 1-800-MEDICARE (268 3140). TTY users should call 2-454.483.6131. To find a different skilled nursing facility, visit vzaar website, [email or web address]/, or call 1-800-MEDICARE (2-878.306.8010). TTY users should call 8-252.216.5229. To find a different home health agency, visit [employer] website, LaTherm.[employer], or call 1-800-MEDICARE (7-374.898.3632). TTY users should call 6-197.704.3844. If you believe that your care is adversely affected or have concerns about substandard care, you may call 1-800-MEDICARE or contact your state's Quality Improvement Organization by going to: SQMOS.Fabkids. For an explanation of how patients can access their health care records and beneficiary claims data, please visit [email or web address]. Get more information     If you have questions or want more information about the Comprehensive Care for Joint Replacement (CJR) model, call Roosevelt Christensen RN, Care Transitions, [facility] at [phone] or call 1-800-MEDICARE. You can also find additional information at https://innovation.cms.gov/initiatives/cjr. Care Transition Nurse for CCJR Bundle: Mary Rajput ([phone]) will follow your weekly progress by phone call. Female

## 2022-08-30 ENCOUNTER — APPOINTMENT (OUTPATIENT)
Dept: COLORECTAL SURGERY | Facility: CLINIC | Age: 67
End: 2022-08-30

## 2022-08-30 DIAGNOSIS — K92.2 GASTROINTESTINAL HEMORRHAGE, UNSPECIFIED: ICD-10-CM

## 2022-08-30 PROCEDURE — 99024 POSTOP FOLLOW-UP VISIT: CPT

## 2022-08-30 NOTE — ASSESSMENT
[FreeTextEntry1] : Status post ileostomy reversal\par -Patient progressing well\par -High fiber diet\par -We discussed the possibility of recurrent small bowel obstruction\par -Patient to followup as needed

## 2022-11-08 ENCOUNTER — APPOINTMENT (OUTPATIENT)
Dept: CARDIOLOGY | Facility: CLINIC | Age: 67
End: 2022-11-08

## 2023-01-01 NOTE — PATIENT PROFILE ADULT - LIVES WITH
Infant discharged per order. Infant VS stable and with no signs of distress. Discharge paperwork printed and reviewed with mother. Instructed on follow-up appointment with pediatrician. Footprint sheet reviewed and signed. Cord clamp and security tag removed. Mother voiced understanding of all. GUILLERMO   
adult child(rose)

## 2023-02-09 NOTE — ASU PATIENT PROFILE, ADULT - NS PRO LAST MENSTRUAL
[facility] [place] [facility]  [facility] EAR, NOSE AND THROAT  [facility]  Dept: [phone]  Dept Fax: [phone]  Loc: Livan Urbano is a 78 y.o. female who was referred byNo ref. provider found for:  Chief Complaint   Patient presents with    Follow-up     Patient is here for ventilation tube placement (ear rt). Patient said that the ear feels fine right now. She say that the tinnitus has stopped. Russell Ortiz HPI:     Juanita Watts is a 78 y.o. female who presents today for ventilation tube placement (Right ear). Patient states ear feels fine now and the tinnitus has stopped. She has a runny nose, trying not to sniff up. She blows nose. Her ear popped once when she was blowing nose, didn't do it when she yawned or swallowed. Doesn't feel like talking in barrel. She had issues with ear ringing, states ringing is gone. Use to ring quite a bit now it's intermittent. Doesn't keep her awake at night. History:      Allergies   Allergen Reactions    Amlodipine Hives    Benzoyl Peroxide Hives    Famotidine     Gluten Meal     Other      Plant oils    Sulfa Antibiotics     Trimethoprim Other (See Comments)    Vit E-Vit K-Safflower Oil     Peroxyl [Hydrogen Peroxide-Benzy Alc] Rash    Vitamin E Rash     topical       Current Outpatient Medications   Medication Sig Dispense Refill    NONFORMULARY Take 1 tablet by mouth daily (before lunch) [facility]      atorvastatin (LIPITOR) 20 MG tablet Take 1 tablet by mouth daily 90 tablet 1    losartan (COZAAR) 25 MG tablet Take 25 mg by mouth daily      vitamin D3 (CHOLECALCIFEROL) 10 MCG (400 UNIT) TABS tablet Take 400 Units by mouth every 30 days      sotalol (BETAPACE) 120 MG tablet TAKE 1 TABLET BY MOUTH TWICE DAILY      ibuprofen (ADVIL;MOTRIN) 400 MG tablet Take 400 mg by mouth every 6 hours as needed for Pain      metoprolol succinate (TOPROL XL) 50 MG extended release tablet Take 1 tablet by mouth 2 times daily (Patient taking differently: Take 25 mg by mouth in the morning and at bedtime) 60 tablet 2    NONFORMULARY Take 1 tablet by mouth 4 times daily (with meals and nightly) Magtein or NeuroMag      B Complex-Folic Acid (J-686 BALANCED TR PO) Take 1 tablet by mouth 3 times daily (before meals)       Lysine 500 MG TABS Take 1 tablet by mouth three times daily Cholesterol      ascorbic acid (VITAMIN C) 1000 MG tablet Take 1,000 mg by mouth 3 times daily Cholesterol      ELIQUIS 5 MG TABS tablet TAKE ONE TABLET BY MOUTH TWICE DAILY 60 tablet 11    CINNAMON PO Take 500 mg by mouth 3 times daily       Levomefolic Acid (5-MTHF PO) Take 5 mg by mouth every morning (before breakfast) Metabolic Maintenance at [address] [place] [address] Take 200 mg by mouth 4 times daily (after meals and at bedtime) Make sure it is a TABLET      Omega-3 Fatty Acids (FISH OIL) 1000 MG CPDR Take 2,000 mg by mouth 3 times daily [facility] # [id number]       No current facility-administered medications for this visit.      Past Medical History:   Diagnosis Date    Acute coronary syndrome [facility])     Atrial fibrillation ([facility] [address]     Chest pain     Essential hypertension 4/30/2021    Feeling tired     Hyperlipidemia 4/30/2021    Stress     Thyroid disease     hx of hypothyroid      Past Surgical History:   Procedure Laterality Date    ATRIAL ABLATION SURGERY  09/2021    CARDIAC CATHETERIZATION  02/22/2012    CARDIOVASCULAR STRESS TEST  02/21/2012    CARDIOVERSION      12/2021,1/2022    COLONOSCOPY  2007    EYE SURGERY Bilateral 2016    cataract    ADAN STEROTACTIC LOC BREAST BIOPSY RIGHT Right 12/28/2020    benign    ADAN US GUID NDL BIOPSY RIGHT Right 07/29/2021    ADAN US GUID NDL BIOPSY RIGHT 7/29/2021 Elliott Jmiénez MD [address] [place] [address] ECHOCARDIOGRAM  02/21/2012    US BREAST BIOPSY W LOC DEVICE 1ST LESION RIGHT Right 02/06/2015    benign    US BREAST BIOPSY W LOC DEVICE 1ST LESION RIGHT Right 02/15/2006    benign     Family History   Problem Relation Age of Onset    High Blood Pressure Mother         stroke    Breast Cancer Mother 79    Heart Disease Mother         CHF    Stroke Mother     Heart Disease Sister 48        CHF    Breast Cancer Sister 45    Heart Disease Sister 0        congenital valve     Heart Disease Brother     Diabetes Brother     Other Sister         bacteria     Stroke Brother     Hypertension Brother      Social History     Tobacco Use    Smoking status: Never     Passive exposure: Yes    Smokeless tobacco: Never   Substance Use Topics    Alcohol use: No       Subjective:       Review of Systems   Constitutional:  Negative for activity change, appetite change, chills, diaphoresis, fatigue, fever and unexpected weight change. HENT:  Negative for congestion, dental problem, ear discharge, ear pain, facial swelling, hearing loss, mouth sores, nosebleeds, postnasal drip, rhinorrhea, sinus pressure, sneezing, sore throat, tinnitus, trouble swallowing and voice change. Eyes:  Negative for visual disturbance. Respiratory:  Negative for apnea, cough, choking, chest tightness, shortness of breath, wheezing and stridor. Cardiovascular:  Negative for chest pain, palpitations and leg swelling. Gastrointestinal:  Negative for abdominal pain, diarrhea, nausea and vomiting. Endocrine: Negative for cold intolerance, heat intolerance, polydipsia and polyuria. Genitourinary:  Negative for dysuria, enuresis and hematuria. Musculoskeletal:  Negative for arthralgias, gait problem, neck pain and neck stiffness. Skin:  Negative for color change and rash. Allergic/Immunologic: Negative for environmental allergies, food allergies and immunocompromised state. Neurological:  Negative for dizziness, syncope, facial asymmetry, speech difficulty, light-headedness and headaches. Hematological:  Negative for adenopathy. Does not bruise/bleed easily.    Psychiatric/Behavioral: Negative for confusion and sleep disturbance. The patient is not nervous/anxious. Objective:     BP (!) 144/70   Pulse 60   Temp 97.2 °F (36.2 °C) (Infrared)   Resp 20   Ht 5' 5\" (1.651 m)   Wt 139 lb 4.8 oz (63.2 kg)   SpO2 96%   BMI 23.18 kg/m²     Physical Exam    Microscopic exam  Left ear still has Negative pressure but is not inflamed and has no middle ear effusion. Mild retraction. Clearly improved      Data:  All of the past medical history, past surgical history, family history,social history, allergies and current medications were reviewed with the patient. Assessment & Plan   Diagnoses and all orders for this visit:     Diagnosis Orders   1. Eustachian tube dysfunction, right  Audiometry with tympanometry    RI BINOCULAR MICROSCOPY SEPARATE DX PROCEDURE      2. Mixed conductive and sensorineural hearing loss of right ear with restricted hearing of left ear  Audiometry with tympanometry    RI BINOCULAR MICROSCOPY SEPARATE DX PROCEDURE      3. Nasal septal deviation        4. Retraction of tympanic membrane of right ear  Audiometry with tympanometry    RI BINOCULAR MICROSCOPY SEPARATE DX PROCEDURE      5. Tinnitus of right ear  Audiometry with tympanometry    RI BINOCULAR MICROSCOPY SEPARATE DX PROCEDURE          The findings were explained and her questions were answered. We jointly decided against a ventilation tube placement today options were discussed including ordering an audiogram in a year. She agreed. Follow up audiogram 1 year with return visit       IOsiel (JYOTI)swapna scribing for, and in the presence of Dr. Sebastian Ta Electronically signed by Danette Kapoor CMA ([facility]) on 2/9/23 at 10:10 AM EST. (Please note that portions of this note were completed with a voice recognition program. Efforts were made to edit the dictations butoccasionally words are mis-transcribed.)    I agree to the above documentation placed by my scribe.   I have personally evaluated this patient. Additional findings are as noted. I reviewed the scribe's note and agree with the documented findings and plan of care. Any areas of disagreement are corrected. I agree with the chief complaint, past medical history, past surgical history, allergies, medications, social and family history as documented unless otherwise noted below.      Electronically signed by Librado Ramirez MD on 2/28/2023 at 11:17 PM unknown

## 2023-04-11 ENCOUNTER — APPOINTMENT (OUTPATIENT)
Dept: COLORECTAL SURGERY | Facility: CLINIC | Age: 68
End: 2023-04-11
Payer: MEDICARE

## 2023-04-11 DIAGNOSIS — K43.2 INCISIONAL HERNIA W/OUT OBSTRUCTION OR GANGRENE: ICD-10-CM

## 2023-04-11 PROCEDURE — 99213 OFFICE O/P EST LOW 20 MIN: CPT

## 2023-04-11 NOTE — HISTORY OF PRESENT ILLNESS
[FreeTextEntry1] : Status post abdominal colectomy and colostomy reversal. Patient progressed well during about 6 bowel movements per day. Fevers or chills no nausea or vomiting. Patient reports a lump at colostomy site. No change in bowel habits.  The lump is reducible.No aggravating factors

## 2023-04-11 NOTE — ASSESSMENT
[FreeTextEntry1] : Incisional hernia\par -I recommended patient undergo CT scan to evaluate extensive hernia\par -We briefly discussed surgical repair of the will review further after imaging\par -I recommended patient initiated motor and decreased bowel movements as needed\par -All questions answered

## 2023-04-13 ENCOUNTER — APPOINTMENT (OUTPATIENT)
Dept: CT IMAGING | Facility: IMAGING CENTER | Age: 68
End: 2023-04-13
Payer: MEDICARE

## 2023-04-13 ENCOUNTER — OUTPATIENT (OUTPATIENT)
Dept: OUTPATIENT SERVICES | Facility: HOSPITAL | Age: 68
LOS: 1 days | End: 2023-04-13
Payer: COMMERCIAL

## 2023-04-13 ENCOUNTER — APPOINTMENT (OUTPATIENT)
Dept: CT IMAGING | Facility: IMAGING CENTER | Age: 68
End: 2023-04-13

## 2023-04-13 ENCOUNTER — RESULT REVIEW (OUTPATIENT)
Age: 68
End: 2023-04-13

## 2023-04-13 DIAGNOSIS — Z98.890 OTHER SPECIFIED POSTPROCEDURAL STATES: Chronic | ICD-10-CM

## 2023-04-13 DIAGNOSIS — K43.2 INCISIONAL HERNIA WITHOUT OBSTRUCTION OR GANGRENE: ICD-10-CM

## 2023-04-13 DIAGNOSIS — Z90.89 ACQUIRED ABSENCE OF OTHER ORGANS: Chronic | ICD-10-CM

## 2023-04-13 DIAGNOSIS — Z90.710 ACQUIRED ABSENCE OF BOTH CERVIX AND UTERUS: Chronic | ICD-10-CM

## 2023-04-13 DIAGNOSIS — Z90.49 ACQUIRED ABSENCE OF OTHER SPECIFIED PARTS OF DIGESTIVE TRACT: Chronic | ICD-10-CM

## 2023-04-13 PROCEDURE — 74176 CT ABD & PELVIS W/O CONTRAST: CPT

## 2023-04-13 PROCEDURE — 74176 CT ABD & PELVIS W/O CONTRAST: CPT | Mod: 26

## 2023-05-04 ENCOUNTER — RX RENEWAL (OUTPATIENT)
Age: 68
End: 2023-05-04

## 2023-05-04 RX ORDER — LISINOPRIL 40 MG/1
40 TABLET ORAL DAILY
Qty: 90 | Refills: 3 | Status: ACTIVE | COMMUNITY
Start: 2022-04-12 | End: 1900-01-01

## 2023-08-14 ENCOUNTER — APPOINTMENT (OUTPATIENT)
Dept: NEUROLOGY | Facility: CLINIC | Age: 68
End: 2023-08-14

## 2023-08-22 ENCOUNTER — APPOINTMENT (OUTPATIENT)
Dept: COLORECTAL SURGERY | Facility: CLINIC | Age: 68
End: 2023-08-22

## 2023-09-21 ENCOUNTER — APPOINTMENT (OUTPATIENT)
Dept: UROLOGY | Facility: CLINIC | Age: 68
End: 2023-09-21
Payer: MEDICARE

## 2023-09-21 DIAGNOSIS — N20.1 CALCULUS OF URETER: ICD-10-CM

## 2023-09-21 DIAGNOSIS — N20.0 CALCULUS OF KIDNEY: ICD-10-CM

## 2023-09-21 DIAGNOSIS — N39.46 MIXED INCONTINENCE: ICD-10-CM

## 2023-09-21 PROCEDURE — 99213 OFFICE O/P EST LOW 20 MIN: CPT

## 2023-09-21 RX ORDER — UNDERPADS 23" X 36"
EACH MISCELLANEOUS
Qty: 1 | Refills: 5 | Status: ACTIVE | OUTPATIENT
Start: 2023-09-21

## 2023-09-23 LAB
ANION GAP SERPL CALC-SCNC: 19 MMOL/L
APPEARANCE: CLEAR
BACTERIA: NEGATIVE /HPF
BILIRUBIN URINE: NEGATIVE
BLOOD URINE: ABNORMAL
BUN SERPL-MCNC: 16 MG/DL
CALCIUM SERPL-MCNC: 9.2 MG/DL
CAST: 0 /LPF
CHLORIDE SERPL-SCNC: 104 MMOL/L
CO2 SERPL-SCNC: 19 MMOL/L
COLOR: YELLOW
CREAT SERPL-MCNC: 0.88 MG/DL
EGFR: 71 ML/MIN/1.73M2
EPITHELIAL CELLS: 1 /HPF
GLUCOSE QUALITATIVE U: NEGATIVE MG/DL
GLUCOSE SERPL-MCNC: 133 MG/DL
KETONES URINE: NEGATIVE MG/DL
LEUKOCYTE ESTERASE URINE: NEGATIVE
MICROSCOPIC-UA: NORMAL
NITRITE URINE: NEGATIVE
PH URINE: 6.5
POTASSIUM SERPL-SCNC: 4.3 MMOL/L
PROTEIN URINE: NORMAL MG/DL
RED BLOOD CELLS URINE: 3 /HPF
SODIUM SERPL-SCNC: 141 MMOL/L
SPECIFIC GRAVITY URINE: 1.01
UROBILINOGEN URINE: 0.2 MG/DL
WHITE BLOOD CELLS URINE: 0 /HPF

## 2023-09-24 LAB — BACTERIA UR CULT: NORMAL

## 2023-09-26 NOTE — PATIENT PROFILE ADULT - NSTOBACCONEVERSMOKERY/N_GEN_A
----- Message from MEHDI Harrison sent at 9/26/2023  1:01 PM EDT -----  Stool studies negative for infection, inflammation and parasites.  Recommend trial of Colestid for treatment of diarrhea.  Rx sent to patient's pharmacy.    
Patient notified of results and verbalized understanding.  
No

## 2023-10-02 ENCOUNTER — APPOINTMENT (OUTPATIENT)
Dept: UROLOGY | Facility: CLINIC | Age: 68
End: 2023-10-02

## 2023-10-06 NOTE — DISCHARGE NOTE NURSING/CASE MANAGEMENT/SOCIAL WORK - NSDCFUADDAPPT_GEN_ALL_CORE_FT
Satisfactory Please call the office and schedule a follow up appointment with Dr. Beach in 1 week. Please call the office and schedule a follow up appointment with Dr. Pool in 1-2 weeks.

## 2023-12-06 ENCOUNTER — INPATIENT (INPATIENT)
Facility: HOSPITAL | Age: 68
LOS: 1 days | Discharge: HOME | End: 2023-12-08
Attending: HOSPITALIST | Admitting: HOSPITALIST
Payer: MEDICARE

## 2023-12-06 VITALS
RESPIRATION RATE: 15 BRPM | HEART RATE: 78 BPM | OXYGEN SATURATION: 100 % | DIASTOLIC BLOOD PRESSURE: 93 MMHG | SYSTOLIC BLOOD PRESSURE: 161 MMHG | TEMPERATURE: 98 F

## 2023-12-06 DIAGNOSIS — Z98.890 OTHER SPECIFIED POSTPROCEDURAL STATES: Chronic | ICD-10-CM

## 2023-12-06 DIAGNOSIS — I10 ESSENTIAL (PRIMARY) HYPERTENSION: ICD-10-CM

## 2023-12-06 DIAGNOSIS — Z90.710 ACQUIRED ABSENCE OF BOTH CERVIX AND UTERUS: Chronic | ICD-10-CM

## 2023-12-06 DIAGNOSIS — E03.9 HYPOTHYROIDISM, UNSPECIFIED: ICD-10-CM

## 2023-12-06 DIAGNOSIS — K57.90 DIVERTICULOSIS OF INTESTINE, PART UNSPECIFIED, WITHOUT PERFORATION OR ABSCESS WITHOUT BLEEDING: ICD-10-CM

## 2023-12-06 DIAGNOSIS — R19.7 DIARRHEA, UNSPECIFIED: ICD-10-CM

## 2023-12-06 DIAGNOSIS — Z90.49 ACQUIRED ABSENCE OF OTHER SPECIFIED PARTS OF DIGESTIVE TRACT: Chronic | ICD-10-CM

## 2023-12-06 DIAGNOSIS — K92.2 GASTROINTESTINAL HEMORRHAGE, UNSPECIFIED: ICD-10-CM

## 2023-12-06 DIAGNOSIS — Z90.89 ACQUIRED ABSENCE OF OTHER ORGANS: Chronic | ICD-10-CM

## 2023-12-06 DIAGNOSIS — Z29.9 ENCOUNTER FOR PROPHYLACTIC MEASURES, UNSPECIFIED: ICD-10-CM

## 2023-12-06 DIAGNOSIS — D51.0 VITAMIN B12 DEFICIENCY ANEMIA DUE TO INTRINSIC FACTOR DEFICIENCY: ICD-10-CM

## 2023-12-06 LAB
ALBUMIN SERPL ELPH-MCNC: 3.9 G/DL — SIGNIFICANT CHANGE UP (ref 3.3–5)
ALBUMIN SERPL ELPH-MCNC: 3.9 G/DL — SIGNIFICANT CHANGE UP (ref 3.3–5)
ALP SERPL-CCNC: 86 U/L — SIGNIFICANT CHANGE UP (ref 40–120)
ALP SERPL-CCNC: 86 U/L — SIGNIFICANT CHANGE UP (ref 40–120)
ALT FLD-CCNC: 11 U/L — SIGNIFICANT CHANGE UP (ref 4–33)
ALT FLD-CCNC: 11 U/L — SIGNIFICANT CHANGE UP (ref 4–33)
ANION GAP SERPL CALC-SCNC: 10 MMOL/L — SIGNIFICANT CHANGE UP (ref 7–14)
ANION GAP SERPL CALC-SCNC: 10 MMOL/L — SIGNIFICANT CHANGE UP (ref 7–14)
APTT BLD: 27.5 SEC — SIGNIFICANT CHANGE UP (ref 24.5–35.6)
APTT BLD: 27.5 SEC — SIGNIFICANT CHANGE UP (ref 24.5–35.6)
AST SERPL-CCNC: 21 U/L — SIGNIFICANT CHANGE UP (ref 4–32)
AST SERPL-CCNC: 21 U/L — SIGNIFICANT CHANGE UP (ref 4–32)
BASE EXCESS BLDV CALC-SCNC: 2 MMOL/L — SIGNIFICANT CHANGE UP (ref -2–3)
BASE EXCESS BLDV CALC-SCNC: 2 MMOL/L — SIGNIFICANT CHANGE UP (ref -2–3)
BASOPHILS # BLD AUTO: 0.03 K/UL — SIGNIFICANT CHANGE UP (ref 0–0.2)
BASOPHILS # BLD AUTO: 0.03 K/UL — SIGNIFICANT CHANGE UP (ref 0–0.2)
BASOPHILS NFR BLD AUTO: 0.5 % — SIGNIFICANT CHANGE UP (ref 0–2)
BASOPHILS NFR BLD AUTO: 0.5 % — SIGNIFICANT CHANGE UP (ref 0–2)
BILIRUB SERPL-MCNC: 0.4 MG/DL — SIGNIFICANT CHANGE UP (ref 0.2–1.2)
BILIRUB SERPL-MCNC: 0.4 MG/DL — SIGNIFICANT CHANGE UP (ref 0.2–1.2)
BLD GP AB SCN SERPL QL: NEGATIVE — SIGNIFICANT CHANGE UP
BLD GP AB SCN SERPL QL: NEGATIVE — SIGNIFICANT CHANGE UP
BLOOD GAS VENOUS COMPREHENSIVE RESULT: SIGNIFICANT CHANGE UP
BLOOD GAS VENOUS COMPREHENSIVE RESULT: SIGNIFICANT CHANGE UP
BUN SERPL-MCNC: 21 MG/DL — SIGNIFICANT CHANGE UP (ref 7–23)
BUN SERPL-MCNC: 21 MG/DL — SIGNIFICANT CHANGE UP (ref 7–23)
CALCIUM SERPL-MCNC: 8.7 MG/DL — SIGNIFICANT CHANGE UP (ref 8.4–10.5)
CALCIUM SERPL-MCNC: 8.7 MG/DL — SIGNIFICANT CHANGE UP (ref 8.4–10.5)
CHLORIDE BLDV-SCNC: 106 MMOL/L — SIGNIFICANT CHANGE UP (ref 96–108)
CHLORIDE BLDV-SCNC: 106 MMOL/L — SIGNIFICANT CHANGE UP (ref 96–108)
CHLORIDE SERPL-SCNC: 105 MMOL/L — SIGNIFICANT CHANGE UP (ref 98–107)
CHLORIDE SERPL-SCNC: 105 MMOL/L — SIGNIFICANT CHANGE UP (ref 98–107)
CO2 BLDV-SCNC: 27.7 MMOL/L — HIGH (ref 22–26)
CO2 BLDV-SCNC: 27.7 MMOL/L — HIGH (ref 22–26)
CO2 SERPL-SCNC: 26 MMOL/L — SIGNIFICANT CHANGE UP (ref 22–31)
CO2 SERPL-SCNC: 26 MMOL/L — SIGNIFICANT CHANGE UP (ref 22–31)
CREAT SERPL-MCNC: 0.95 MG/DL — SIGNIFICANT CHANGE UP (ref 0.5–1.3)
CREAT SERPL-MCNC: 0.95 MG/DL — SIGNIFICANT CHANGE UP (ref 0.5–1.3)
EGFR: 65 ML/MIN/1.73M2 — SIGNIFICANT CHANGE UP
EGFR: 65 ML/MIN/1.73M2 — SIGNIFICANT CHANGE UP
EOSINOPHIL # BLD AUTO: 0.12 K/UL — SIGNIFICANT CHANGE UP (ref 0–0.5)
EOSINOPHIL # BLD AUTO: 0.12 K/UL — SIGNIFICANT CHANGE UP (ref 0–0.5)
EOSINOPHIL NFR BLD AUTO: 2.2 % — SIGNIFICANT CHANGE UP (ref 0–6)
EOSINOPHIL NFR BLD AUTO: 2.2 % — SIGNIFICANT CHANGE UP (ref 0–6)
GAS PNL BLDV: 138 MMOL/L — SIGNIFICANT CHANGE UP (ref 136–145)
GAS PNL BLDV: 138 MMOL/L — SIGNIFICANT CHANGE UP (ref 136–145)
GLUCOSE BLDV-MCNC: 193 MG/DL — HIGH (ref 70–99)
GLUCOSE BLDV-MCNC: 193 MG/DL — HIGH (ref 70–99)
GLUCOSE SERPL-MCNC: 202 MG/DL — HIGH (ref 70–99)
GLUCOSE SERPL-MCNC: 202 MG/DL — HIGH (ref 70–99)
HCO3 BLDV-SCNC: 26 MMOL/L — SIGNIFICANT CHANGE UP (ref 22–29)
HCO3 BLDV-SCNC: 26 MMOL/L — SIGNIFICANT CHANGE UP (ref 22–29)
HCT VFR BLD CALC: 24.6 % — LOW (ref 34.5–45)
HCT VFR BLD CALC: 24.6 % — LOW (ref 34.5–45)
HCT VFR BLD CALC: 28.7 % — LOW (ref 34.5–45)
HCT VFR BLD CALC: 28.7 % — LOW (ref 34.5–45)
HCT VFR BLD CALC: 31 % — LOW (ref 34.5–45)
HCT VFR BLD CALC: 31 % — LOW (ref 34.5–45)
HCT VFR BLDA CALC: 29 % — LOW (ref 34.5–46.5)
HCT VFR BLDA CALC: 29 % — LOW (ref 34.5–46.5)
HGB BLD CALC-MCNC: 9.5 G/DL — LOW (ref 11.7–16.1)
HGB BLD CALC-MCNC: 9.5 G/DL — LOW (ref 11.7–16.1)
HGB BLD-MCNC: 7.7 G/DL — LOW (ref 11.5–15.5)
HGB BLD-MCNC: 7.7 G/DL — LOW (ref 11.5–15.5)
HGB BLD-MCNC: 8.8 G/DL — LOW (ref 11.5–15.5)
HGB BLD-MCNC: 8.8 G/DL — LOW (ref 11.5–15.5)
HGB BLD-MCNC: 9.7 G/DL — LOW (ref 11.5–15.5)
HGB BLD-MCNC: 9.7 G/DL — LOW (ref 11.5–15.5)
IANC: 3.32 K/UL — SIGNIFICANT CHANGE UP (ref 1.8–7.4)
IANC: 3.32 K/UL — SIGNIFICANT CHANGE UP (ref 1.8–7.4)
IMM GRANULOCYTES NFR BLD AUTO: 0.4 % — SIGNIFICANT CHANGE UP (ref 0–0.9)
IMM GRANULOCYTES NFR BLD AUTO: 0.4 % — SIGNIFICANT CHANGE UP (ref 0–0.9)
INR BLD: 0.96 RATIO — SIGNIFICANT CHANGE UP (ref 0.85–1.18)
INR BLD: 0.96 RATIO — SIGNIFICANT CHANGE UP (ref 0.85–1.18)
LACTATE BLDV-MCNC: 1.2 MMOL/L — SIGNIFICANT CHANGE UP (ref 0.5–2)
LACTATE BLDV-MCNC: 1.2 MMOL/L — SIGNIFICANT CHANGE UP (ref 0.5–2)
LYMPHOCYTES # BLD AUTO: 1.57 K/UL — SIGNIFICANT CHANGE UP (ref 1–3.3)
LYMPHOCYTES # BLD AUTO: 1.57 K/UL — SIGNIFICANT CHANGE UP (ref 1–3.3)
LYMPHOCYTES # BLD AUTO: 28.2 % — SIGNIFICANT CHANGE UP (ref 13–44)
LYMPHOCYTES # BLD AUTO: 28.2 % — SIGNIFICANT CHANGE UP (ref 13–44)
MCHC RBC-ENTMCNC: 26.6 PG — LOW (ref 27–34)
MCHC RBC-ENTMCNC: 26.6 PG — LOW (ref 27–34)
MCHC RBC-ENTMCNC: 27.1 PG — SIGNIFICANT CHANGE UP (ref 27–34)
MCHC RBC-ENTMCNC: 27.1 PG — SIGNIFICANT CHANGE UP (ref 27–34)
MCHC RBC-ENTMCNC: 27.5 PG — SIGNIFICANT CHANGE UP (ref 27–34)
MCHC RBC-ENTMCNC: 27.5 PG — SIGNIFICANT CHANGE UP (ref 27–34)
MCHC RBC-ENTMCNC: 30.7 GM/DL — LOW (ref 32–36)
MCHC RBC-ENTMCNC: 30.7 GM/DL — LOW (ref 32–36)
MCHC RBC-ENTMCNC: 31.3 GM/DL — LOW (ref 32–36)
MCV RBC AUTO: 86.6 FL — SIGNIFICANT CHANGE UP (ref 80–100)
MCV RBC AUTO: 86.6 FL — SIGNIFICANT CHANGE UP (ref 80–100)
MCV RBC AUTO: 86.7 FL — SIGNIFICANT CHANGE UP (ref 80–100)
MCV RBC AUTO: 86.7 FL — SIGNIFICANT CHANGE UP (ref 80–100)
MCV RBC AUTO: 87.9 FL — SIGNIFICANT CHANGE UP (ref 80–100)
MCV RBC AUTO: 87.9 FL — SIGNIFICANT CHANGE UP (ref 80–100)
MONOCYTES # BLD AUTO: 0.51 K/UL — SIGNIFICANT CHANGE UP (ref 0–0.9)
MONOCYTES # BLD AUTO: 0.51 K/UL — SIGNIFICANT CHANGE UP (ref 0–0.9)
MONOCYTES NFR BLD AUTO: 9.2 % — SIGNIFICANT CHANGE UP (ref 2–14)
MONOCYTES NFR BLD AUTO: 9.2 % — SIGNIFICANT CHANGE UP (ref 2–14)
NEUTROPHILS # BLD AUTO: 3.32 K/UL — SIGNIFICANT CHANGE UP (ref 1.8–7.4)
NEUTROPHILS # BLD AUTO: 3.32 K/UL — SIGNIFICANT CHANGE UP (ref 1.8–7.4)
NEUTROPHILS NFR BLD AUTO: 59.5 % — SIGNIFICANT CHANGE UP (ref 43–77)
NEUTROPHILS NFR BLD AUTO: 59.5 % — SIGNIFICANT CHANGE UP (ref 43–77)
NRBC # BLD: 0 /100 WBCS — SIGNIFICANT CHANGE UP (ref 0–0)
NRBC # FLD: 0 K/UL — SIGNIFICANT CHANGE UP (ref 0–0)
PCO2 BLDV: 40 MMHG — SIGNIFICANT CHANGE UP (ref 39–52)
PCO2 BLDV: 40 MMHG — SIGNIFICANT CHANGE UP (ref 39–52)
PH BLDV: 7.43 — SIGNIFICANT CHANGE UP (ref 7.32–7.43)
PH BLDV: 7.43 — SIGNIFICANT CHANGE UP (ref 7.32–7.43)
PLATELET # BLD AUTO: 199 K/UL — SIGNIFICANT CHANGE UP (ref 150–400)
PLATELET # BLD AUTO: 199 K/UL — SIGNIFICANT CHANGE UP (ref 150–400)
PLATELET # BLD AUTO: 238 K/UL — SIGNIFICANT CHANGE UP (ref 150–400)
PLATELET # BLD AUTO: 238 K/UL — SIGNIFICANT CHANGE UP (ref 150–400)
PLATELET # BLD AUTO: 240 K/UL — SIGNIFICANT CHANGE UP (ref 150–400)
PLATELET # BLD AUTO: 240 K/UL — SIGNIFICANT CHANGE UP (ref 150–400)
PO2 BLDV: 108 MMHG — HIGH (ref 25–45)
PO2 BLDV: 108 MMHG — HIGH (ref 25–45)
POTASSIUM BLDV-SCNC: 3.9 MMOL/L — SIGNIFICANT CHANGE UP (ref 3.5–5.1)
POTASSIUM BLDV-SCNC: 3.9 MMOL/L — SIGNIFICANT CHANGE UP (ref 3.5–5.1)
POTASSIUM SERPL-MCNC: 4.4 MMOL/L — SIGNIFICANT CHANGE UP (ref 3.5–5.3)
POTASSIUM SERPL-MCNC: 4.4 MMOL/L — SIGNIFICANT CHANGE UP (ref 3.5–5.3)
POTASSIUM SERPL-SCNC: 4.4 MMOL/L — SIGNIFICANT CHANGE UP (ref 3.5–5.3)
POTASSIUM SERPL-SCNC: 4.4 MMOL/L — SIGNIFICANT CHANGE UP (ref 3.5–5.3)
PROT SERPL-MCNC: 7.3 G/DL — SIGNIFICANT CHANGE UP (ref 6–8.3)
PROT SERPL-MCNC: 7.3 G/DL — SIGNIFICANT CHANGE UP (ref 6–8.3)
PROTHROM AB SERPL-ACNC: 10.8 SEC — SIGNIFICANT CHANGE UP (ref 9.5–13)
PROTHROM AB SERPL-ACNC: 10.8 SEC — SIGNIFICANT CHANGE UP (ref 9.5–13)
RBC # BLD: 2.8 M/UL — LOW (ref 3.8–5.2)
RBC # BLD: 2.8 M/UL — LOW (ref 3.8–5.2)
RBC # BLD: 3.31 M/UL — LOW (ref 3.8–5.2)
RBC # BLD: 3.31 M/UL — LOW (ref 3.8–5.2)
RBC # BLD: 3.58 M/UL — LOW (ref 3.8–5.2)
RBC # BLD: 3.58 M/UL — LOW (ref 3.8–5.2)
RBC # FLD: 14.2 % — SIGNIFICANT CHANGE UP (ref 10.3–14.5)
RBC # FLD: 14.2 % — SIGNIFICANT CHANGE UP (ref 10.3–14.5)
RBC # FLD: 14.3 % — SIGNIFICANT CHANGE UP (ref 10.3–14.5)
RBC # FLD: 14.3 % — SIGNIFICANT CHANGE UP (ref 10.3–14.5)
RBC # FLD: 14.4 % — SIGNIFICANT CHANGE UP (ref 10.3–14.5)
RBC # FLD: 14.4 % — SIGNIFICANT CHANGE UP (ref 10.3–14.5)
RH IG SCN BLD-IMP: POSITIVE — SIGNIFICANT CHANGE UP
RH IG SCN BLD-IMP: POSITIVE — SIGNIFICANT CHANGE UP
SAO2 % BLDV: 98.4 % — HIGH (ref 67–88)
SAO2 % BLDV: 98.4 % — HIGH (ref 67–88)
SODIUM SERPL-SCNC: 141 MMOL/L — SIGNIFICANT CHANGE UP (ref 135–145)
SODIUM SERPL-SCNC: 141 MMOL/L — SIGNIFICANT CHANGE UP (ref 135–145)
WBC # BLD: 5.1 K/UL — SIGNIFICANT CHANGE UP (ref 3.8–10.5)
WBC # BLD: 5.1 K/UL — SIGNIFICANT CHANGE UP (ref 3.8–10.5)
WBC # BLD: 5.48 K/UL — SIGNIFICANT CHANGE UP (ref 3.8–10.5)
WBC # BLD: 5.48 K/UL — SIGNIFICANT CHANGE UP (ref 3.8–10.5)
WBC # BLD: 5.57 K/UL — SIGNIFICANT CHANGE UP (ref 3.8–10.5)
WBC # BLD: 5.57 K/UL — SIGNIFICANT CHANGE UP (ref 3.8–10.5)
WBC # FLD AUTO: 5.1 K/UL — SIGNIFICANT CHANGE UP (ref 3.8–10.5)
WBC # FLD AUTO: 5.1 K/UL — SIGNIFICANT CHANGE UP (ref 3.8–10.5)
WBC # FLD AUTO: 5.48 K/UL — SIGNIFICANT CHANGE UP (ref 3.8–10.5)
WBC # FLD AUTO: 5.48 K/UL — SIGNIFICANT CHANGE UP (ref 3.8–10.5)
WBC # FLD AUTO: 5.57 K/UL — SIGNIFICANT CHANGE UP (ref 3.8–10.5)
WBC # FLD AUTO: 5.57 K/UL — SIGNIFICANT CHANGE UP (ref 3.8–10.5)

## 2023-12-06 PROCEDURE — 99285 EMERGENCY DEPT VISIT HI MDM: CPT

## 2023-12-06 PROCEDURE — 99223 1ST HOSP IP/OBS HIGH 75: CPT

## 2023-12-06 PROCEDURE — 99221 1ST HOSP IP/OBS SF/LOW 40: CPT | Mod: GC

## 2023-12-06 PROCEDURE — 99223 1ST HOSP IP/OBS HIGH 75: CPT | Mod: GC

## 2023-12-06 RX ORDER — ACETAMINOPHEN 500 MG
650 TABLET ORAL ONCE
Refills: 0 | Status: COMPLETED | OUTPATIENT
Start: 2023-12-06 | End: 2023-12-06

## 2023-12-06 RX ORDER — ERGOCALCIFEROL 1.25 MG/1
1 CAPSULE ORAL
Qty: 0 | Refills: 0 | DISCHARGE

## 2023-12-06 RX ORDER — LEVOTHYROXINE SODIUM 125 MCG
175 TABLET ORAL DAILY
Refills: 0 | Status: DISCONTINUED | OUTPATIENT
Start: 2023-12-06 | End: 2023-12-08

## 2023-12-06 RX ORDER — PREGABALIN 225 MG/1
0 CAPSULE ORAL
Qty: 0 | Refills: 0 | DISCHARGE

## 2023-12-06 RX ORDER — AMLODIPINE BESYLATE 2.5 MG/1
10 TABLET ORAL DAILY
Refills: 0 | Status: DISCONTINUED | OUTPATIENT
Start: 2023-12-06 | End: 2023-12-08

## 2023-12-06 RX ORDER — PREGABALIN 225 MG/1
1000 CAPSULE ORAL DAILY
Refills: 0 | Status: DISCONTINUED | OUTPATIENT
Start: 2023-12-06 | End: 2023-12-08

## 2023-12-06 RX ORDER — SODIUM CHLORIDE 9 MG/ML
1000 INJECTION, SOLUTION INTRAVENOUS
Refills: 0 | Status: DISCONTINUED | OUTPATIENT
Start: 2023-12-06 | End: 2023-12-08

## 2023-12-06 RX ORDER — LANOLIN ALCOHOL/MO/W.PET/CERES
3 CREAM (GRAM) TOPICAL AT BEDTIME
Refills: 0 | Status: DISCONTINUED | OUTPATIENT
Start: 2023-12-06 | End: 2023-12-08

## 2023-12-06 RX ORDER — SIMVASTATIN 20 MG/1
40 TABLET, FILM COATED ORAL AT BEDTIME
Refills: 0 | Status: DISCONTINUED | OUTPATIENT
Start: 2023-12-06 | End: 2023-12-08

## 2023-12-06 RX ADMIN — SODIUM CHLORIDE 100 MILLILITER(S): 9 INJECTION, SOLUTION INTRAVENOUS at 15:01

## 2023-12-06 RX ADMIN — Medication 650 MILLIGRAM(S): at 23:44

## 2023-12-06 RX ADMIN — SIMVASTATIN 40 MILLIGRAM(S): 20 TABLET, FILM COATED ORAL at 21:54

## 2023-12-06 NOTE — ED ADULT TRIAGE NOTE - NS ED NURSE AMBULANCES
[facility]

## 2023-12-06 NOTE — H&P ADULT - PROBLEM SELECTOR PLAN 4
BP borderline elevated in ED   - c/w home amlodipine 10mg   - hold lisinopril in setting of active GIB   - monitor BP, resume BP meds as able

## 2023-12-06 NOTE — ED ADULT NURSE NOTE - OBJECTIVE STATEMENT
Pt received to Rm 2, A&Ox4, ambulatory at baseline. Pt endorsing rectal bleeding starting earlier today. Pt describes bleeding as a "small amount". Pt denies use of blood thinners. Pt hx of diverticulosis. Pt denies chest pain, SOB, N/V/D, hematuria, dizziness, weakness.  Respirations even and unlabored. Abdomen soft, nontender, nondistended. Labs able to be drawn and sent, MD Rodríguez aware of need for US line to be placed. Safety maintained, bed locked in lowest position. Plan of care ongoing.

## 2023-12-06 NOTE — CONSULT NOTE ADULT - ATTENDING COMMENTS
Lower GI bleed s/p subtotal colectomy  -Monitor GI bleed  -trend hct  -diet as tolerated  -will continue to monitor while inpt  -no urgent surgical intervention at this time  -care per primary team
# Hematochezia: Patient reports history of recurrent hematochezia, previously attributed to diverticular bleeding. Most recently admitted at OSH in 10/2023 and reports similar presentation at which time she underwent colonoscopy; at time of colonoscopy, bleeding reportedly had ceased and no intervention was performed (per patient report, results not available to review). Suspect recurrent diverticular bleeding at this time. Currently HD stable.   # Pernicious anemia,  # Recurrent diverticular bleeding s/p L hemicolectomy and Naeem's (2021), Naeem's reversal and ileostomy creation (2022), and ileostomy reversal (2022)    --Clear liquid diet for now  --Suggest CTA to assist with localization of bleeding if patient has ongoing brisk bleeding. If positive, would benefit from IR consultation for possible embolization  --No immediate plans for repeat endoscopic evaluation at this time. Discussed with patient that colonoscopy is often of limited therapeutic yield with diverticular bleeding and, given recent colonoscopy at OSH, repeat procedure would likely be of limited diagnostic yield.   --If possible, please try to obtain recent OSH colonoscopy report  --Appreciate colorectal surgery input    Additional recommendations as above.

## 2023-12-06 NOTE — CONSULT NOTE ADULT - SUBJECTIVE AND OBJECTIVE BOX
HPI:  CRISTIAN CASTRO is a 68 year old female with a PMH of extensive diverticulosis with recurrent diverticular bleeding s/p ext L hemicolectomy (2021) and transverse and rectosigmoid resection with reversal of ileostomy (2022), and pernicious anemia on B12 supplementation presenting with 1 day of jarek red blood per rectum.   She developed diarrhea yesterday, and overnight she noticed jarek dark blood in the toilet with a bowel movement - originally appeared maroon but since then has become more red per pt in the ED. Denies clots or presence of black stool. She notes that she experienced similar symptoms in early October while traveling in [place], which resolved on its own, but driving back to [place] her sx returned and she was hospitalized for ~1wk in [place] during mid/late-October. Per pt, colonoscopy in [place] showed diverticulosis without obvious bleeding source and remainder of work-up otherwise non-revealing - her bleeding self-resolved prior to discharge. She has been asymptomatic since, until last night. Denies any abdominal pain, loss of appetite, or nausea/vomiting. Denies blood thinner use.    In the ED Hgb has gone from 9.7 (12/6/23 623am) to 8.8 (12/6/23 848am), VSS. Pt saved blood in container from early this AM, appears dark red/maroon with floating sediment.    Normal stooling habits: 4-6 small, soft well-formed brown stools/day, typically with urgency after eating.     Social hx: Denies alcohol use or smoking hx.   Fam hx: Mother - colon cancer, diagnosed at age 88; two daughters and a sister have diverticulosis as well;      Otherwise, patient denies fevers, chills, weight loss, dysphagia, odynophagia, early satiety, poor oral intake, abdominal pain, nausea, vomiting, melena, hematemesis, change in stool caliber.    ROS:   General:  No fevers, chills  CV:  No pain, palpitations  Pulm:  No dyspnea, cough  GI:  See HPI, otherwise negative  :  No  incontinence, nocturia  Muscle:  No pain, weakness  Neuro:  No memory problems  Skin:  No active rashes    PMHX/PSHX:    Hypothyroidism    Pernicious anemia    Diverticulosis    HTN (hypertension)    Lower gastrointestinal bleeding    Pernicious anemia    SOLOMON (obstructive sleep apnea)    Type 2 diabetes mellitus    Left ureteral stone    Fibroid uterus    Lumbar herniated disc    Multiparity    Heart murmur    No significant past surgical history    History of tonsillectomy    History of back surgery    H/O abdominal hysterectomy    S/P WESTLEY (total abdominal hysterectomy)    S/P lumbar laminectomy    S/P colon resection    History of lithotripsy      Allergies:  Valium (Other)  oxycodone (Other)      Home Medications: reviewed  Hospital Medications:      Social History:   Tobacco: denies  Alcohol: denies  Recreational drugs: denies    Family history:     Family history of colon cancer    Family history of throat cancer        PHYSICAL EXAM:   Vital Signs:  Vital Signs Last 24 Hrs  T(C): 36.8 (06 Dec 2023 09:08), Max: 36.9 (06 Dec 2023 05:19)  T(F): 98.2 (06 Dec 2023 09:08), Max: 98.5 (06 Dec 2023 05:19)  HR: 74 (06 Dec 2023 09:08) (74 - 78)  BP: 159/82 (06 Dec 2023 09:08) (159/82 - 161/93)  BP(mean): --  RR: 16 (06 Dec 2023 09:08) (15 - 16)  SpO2: 98% (06 Dec 2023 09:08) (98% - 100%)    Parameters below as of 06 Dec 2023 09:08  Patient On (Oxygen Delivery Method): room air      Daily       GENERAL: no acute distress  NEURO: alert and oriented x 3  HEENT: NCAT, anicteric sclera  CHEST: no respiratory distress, no accessory muscle use  CARDIAC: regular rate, +S1/S2  ABDOMEN: soft, nontender, no rebound or guarding; rectal exam performed with medical student chaperone: Normal rectal tone and coordination; No hard stool in the rectum; Pink blood smear on gloved finger.   EXTREMITIES: warm, well perfused  SKIN: no lesions noted    LABS: reviewed                        8.8    5.10  )-----------( 240      ( 06 Dec 2023 08:48 )             28.7     12-06    141  |  105  |  21  ----------------------------<  202<H>  4.4   |  26  |  0.95    Ca    8.7      06 Dec 2023 06:23    TPro  7.3  /  Alb  3.9  /  TBili  0.4  /  DBili  x   /  AST  21  /  ALT  11  /  AlkPhos  86  12-06    LIVER FUNCTIONS - ( 06 Dec 2023 06:23 )  Alb: 3.9 g/dL / Pro: 7.3 g/dL / ALK PHOS: 86 U/L / ALT: 11 U/L / AST: 21 U/L / GGT: x          HPI:  CRISTIAN CASTRO is a 68 year old female with a PMH of extensive diverticulosis with recurrent diverticular bleeding s/p ext L hemicolectomy (2021) and transverse and rectosigmoid resection with reversal of ileostomy (2022), and pernicious anemia on B12 supplementation presenting with 1 day of jarek red blood per rectum.   She developed diarrhea yesterday, and overnight she noticed jarek dark blood in the toilet with a bowel movement - originally appeared maroon but since then has become more red per pt in the ED. Denies clots or presence of black stool. She notes that she experienced similar symptoms in early October while traveling in [place], which resolved on its own, but driving back to [place] her sx returned and she was hospitalized for ~1wk in [place] during mid/late-October. Per pt, colonoscopy in [place] showed diverticulosis without obvious bleeding source and remainder of work-up otherwise non-revealing - her bleeding self-resolved prior to discharge. She has been asymptomatic since, until last night. Denies any abdominal pain, loss of appetite, or nausea/vomiting. Denies blood thinner use.    In the ED Hgb has gone from 9.7 (12/6/23 623am) to 8.8 (12/6/23 848am), VSS. Pt saved blood in container from early this AM, appears dark red/maroon with floating sediment.    Normal stooling habits: 4-6 small, soft well-formed brown stools/day, typically with urgency after eating.     Social hx: Denies alcohol use or smoking hx.   Fam hx: Mother - colon cancer, diagnosed at age 88; two daughters and a sister have diverticulosis as well;      Otherwise, patient denies fevers, chills, weight loss, dysphagia, odynophagia, early satiety, poor oral intake, abdominal pain, nausea, vomiting, melena, hematemesis, change in stool caliber.    ROS: As per HPI    PMHX/PSHX:    Hypothyroidism    Pernicious anemia    Diverticulosis    HTN (hypertension)    Lower gastrointestinal bleeding    Pernicious anemia    SOLOMON (obstructive sleep apnea)    Type 2 diabetes mellitus    Left ureteral stone    Fibroid uterus    Lumbar herniated disc    Multiparity    Heart murmur    No significant past surgical history    History of tonsillectomy    History of back surgery    H/O abdominal hysterectomy    S/P WESTLEY (total abdominal hysterectomy)    S/P lumbar laminectomy    S/P colon resection    History of lithotripsy      Allergies:  Valium (Other)  oxycodone (Other)      Home Medications: reviewed  Hospital Medications:    Family history:     Family history of colon cancer    Family history of throat cancer        PHYSICAL EXAM:   Vital Signs:  Vital Signs Last 24 Hrs  T(C): 36.8 (06 Dec 2023 09:08), Max: 36.9 (06 Dec 2023 05:19)  T(F): 98.2 (06 Dec 2023 09:08), Max: 98.5 (06 Dec 2023 05:19)  HR: 74 (06 Dec 2023 09:08) (74 - 78)  BP: 159/82 (06 Dec 2023 09:08) (159/82 - 161/93)  BP(mean): --  RR: 16 (06 Dec 2023 09:08) (15 - 16)  SpO2: 98% (06 Dec 2023 09:08) (98% - 100%)    Parameters below as of 06 Dec 2023 09:08  Patient On (Oxygen Delivery Method): room air      Daily       GENERAL: no acute distress  NEURO: alert and oriented x 3  HEENT: NCAT, anicteric sclera  CHEST: no respiratory distress, no accessory muscle use  CARDIAC: regular rate, +S1/S2  ABDOMEN: soft, nontender, no rebound or guarding; rectal exam performed with medical student chaperone: Normal rectal tone and coordination; No hard stool in the rectum; Pink blood smear on gloved finger.   EXTREMITIES: warm, well perfused  SKIN: no lesions noted    LABS: reviewed                        8.8    5.10  )-----------( 240      ( 06 Dec 2023 08:48 )             28.7     12-06    141  |  105  |  21  ----------------------------<  202<H>  4.4   |  26  |  0.95    Ca    8.7      06 Dec 2023 06:23    TPro  7.3  /  Alb  3.9  /  TBili  0.4  /  DBili  x   /  AST  21  /  ALT  11  /  AlkPhos  86  12-06    LIVER FUNCTIONS - ( 06 Dec 2023 06:23 )  Alb: 3.9 g/dL / Pro: 7.3 g/dL / ALK PHOS: 86 U/L / ALT: 11 U/L / AST: 21 U/L / GGT: x

## 2023-12-06 NOTE — H&P ADULT - NSHPLABSRESULTS_GEN_ALL_CORE
LABS:                        8.8    5.10  )-----------( 240      ( 06 Dec 2023 08:48 )             28.7     06 Dec 2023 06:23    141    |  105    |  21     ----------------------------<  202    4.4     |  26     |  0.95     Ca    8.7        06 Dec 2023 06:23    TPro  7.3    /  Alb  3.9    /  TBili  0.4    /  DBili  x      /  AST  21     /  ALT  11     /  AlkPhos  86     06 Dec 2023 06:23    PT/INR - ( 06 Dec 2023 06:23 )   PT: 10.8 sec;   INR: 0.96 ratio         PTT - ( 06 Dec 2023 06:23 )  PTT:27.5 sec    I reviewed the labs and imaging. I independently reviewed the EKG which showed no ADELA.

## 2023-12-06 NOTE — H&P ADULT - PROBLEM SELECTOR PLAN 1
presenting with multiple episodes of BRBPR presenting with multiple episodes of BRBPR  - Hgb on presentation 9.7, baseline ~9  - monitor CBCq8h   - no need for PPI given unlikely UGIB   - maintain 2 large bore IVs   - transfuse for Hgb <7  - CTA abdomen if ongoing bleeding  - appreciate GI/CRS consults, f/u recs presenting with multiple episodes of BRBPR  - Hgb on presentation 9.7, baseline ~9  - monitor CBCq8h   - no need for PPI given unlikely UGIB   - IVF with LR @100cc/hr  - maintain 2 large bore IVs   - transfuse for Hgb <7  - CTA abdomen if ongoing bleeding  - appreciate GI/CRS consults, f/u recs

## 2023-12-06 NOTE — H&P ADULT - PROBLEM SELECTOR PLAN 2
s/p ext L hemicolectomy (2021) and transverse and rectosigmoid resection with reversal of ileostomy (2022) due to diverticular bleeding   - colorectal surgery and GI consult appreciated likely in setting of bleed, lower suspicion for infection as afebrile, without leukocytosis   - consider sending stool studies if diarrhea continues

## 2023-12-06 NOTE — ED ADULT NURSE NOTE - NSFALLUNIVINTERV_ED_ALL_ED
Bed/Stretcher in lowest position, wheels locked, appropriate side rails in place/Call bell, personal items and telephone in reach/Instruct patient to call for assistance before getting out of bed/chair/stretcher/Non-slip footwear applied when patient is off stretcher/[place] to call system/Physically safe environment - no spills, clutter or unnecessary equipment/Purposeful proactive rounding/Room/bathroom lighting operational, light cord in reach Bed/Stretcher in lowest position, wheels locked, appropriate side rails in place/Call bell, personal items and telephone in reach/Instruct patient to call for assistance before getting out of bed/chair/stretcher/Non-slip footwear applied when patient is off stretcher/[place] to call system/Physically safe environment - no spills, clutter or unnecessary equipment/Purposeful proactive rounding/Room/bathroom lighting operational, light cord in reach

## 2023-12-06 NOTE — H&P ADULT - ASSESSMENT
68 W with PMH significant for but not limited to HTN, HLD, hypothyroid, Pernicious anemia, extensive diverticulosis with recurrent diverticular bleeding s/p hemicolectomy, rectosigmoid resection with ileostomy reversal (6/13/22) who is presenting from home with BRBPR most consistent with diverticular bleed.

## 2023-12-06 NOTE — ED PROVIDER NOTE - CLINICAL SUMMARY MEDICAL DECISION MAKING FREE TEXT BOX
The patient is a 68y Female who has a past medical and surgery history of SOLOMON HTN Hypothyroidism  Pernicious anemia DM2 Kidney Stones WESTLEY/BSO laminectomy Diverticulosis c/b recurrent diverticular bleed s/p resection iliostomy with Dr Beach and reanastomosis PTED with new bleeding noted after going to urinate this evening No dizziness chest pain weakness fever or chills in [facility] before    Vital Signs Last 24 Hrs  T(F): 98.5 HR: 78 BP: 161/93 RR: 15 SpO2: 100% (06 Dec 2023 05:19)   PE: as described;     DATA:  EKG: pending at time of evaluation  LAB: Pending at time of evaluation    IMPRESSION/RISK:  Dx= LGIBleed   Consideration include Will probably require surgery reconsult (notified 6:50am) given prior history etiology of bleeds final dispo determined by results of labs studies   Plan  as above  patient doesn't appear to need emergent   reassess

## 2023-12-06 NOTE — CONSULT NOTE ADULT - ASSESSMENT
ASSESSMENT:   SB is a 69yo woman with pmh extensive diverticulosis with recurrent diverticular bleeding presenting with 1d rectal bleeding and diarrhea.     #Hematochezia  #Diarrhea  #Hx of diverticular bleed s/p L hemicolectomy/Martin's procedure/colostomy reversal/ileostomy reversal ([id number])  - Given her history and presence of painless red rectal bleeding, this most likely represents another episode of diverticular bleeding. Recent colonoscopy <2mo ago makes malignancy, AVM, internal hemorrhoids much less likely, and the lack of abd pain/hx of clotting/clinical signs of hypotension makes ischemic colitis much less likely.  - Currently hemodynamically stable in setting of ongoing bleed, noted Hgb drop not requiring transfusion at this time  - Acute onset diarrhea, no associated fever.  - Seen by colorectal surgery, no acute surgical recommendations at this time.      Recommendations:  >Continue to monitor bleeding, CBC q12h  >Transfuse if Hgb < 7  >Maintain 2x large bore IVs  >Supportive care  >Diet: clears, can advance as tolerated once bleeding resolves  >Consider CTA if ongoing bleeding to identify source and consulting IR for possible embolization if source is ID'ed   >Low diagnostic yield for colonoscopy at this time given recent negative colonoscopy  >If diarrhea persists, send GI PCR and C diff testing      Note incomplete until finalized by attending signature/attestation.    Lior Palmer, MS3 in collaboration with Linette Umaña MD  GI/Hepatology Fellow    MONDAY-FRIDAY 8AM-5PM:  Pager# [phone] ([facility]) or [phone] ([facility])    NON-URGENT CONSULTS:  Please email [email or web address]  AT NIGHT AND ON WEEKENDS:  Contact on-call GI fellow via answering service ([phone]) from 5pm-8am and on weekends/holidays     ASSESSMENT:   SB is a 69yo woman with pmh extensive diverticulosis with recurrent diverticular bleeding presenting with 1d rectal bleeding and diarrhea.     #Hematochezia  #Diarrhea  #Hx of diverticular bleed s/p L hemicolectomy/Martin's procedure/colostomy reversal/ileostomy reversal ([id number])  - Given her history and presence of painless red rectal bleeding, this most likely represents another episode of diverticular bleeding. Recent colonoscopy <2mo ago makes malignancy, AVM, internal hemorrhoids much less likely, and the lack of abd pain/hx of clotting/clinical signs of hypotension makes ischemic colitis much less likely.  - Currently hemodynamically stable in setting of ongoing bleed, noted Hgb drop not requiring transfusion at this time  - Acute onset diarrhea, no associated fever.  - Seen by colorectal surgery, no acute surgical recommendations at this time.      Recommendations:  >Continue to monitor bleeding, CBC q12h  >Transfuse if Hgb < 7  >Maintain 2x large bore IVs  >Supportive care  >Diet: clears, can advance as tolerated once bleeding resolves  >Consider CTA if ongoing bleeding to identify source and consulting IR for possible embolization if source is ID'ed   >Low diagnostic yield for colonoscopy at this time given recent negative colonoscopy  >If diarrhea persists, send GI PCR and C diff testing      Note incomplete until finalized by attending signature/attestation.    Lior Palmer, MS3 in collaboration with Linette Umaña MD  GI/Hepatology Fellow    MONDAY-FRIDAY 8AM-5PM:  Pager# [phone] ([facility]) or [phone] ([facility])    NON-URGENT CONSULTS:  Please email [email or web address]  AT NIGHT AND ON WEEKENDS:  Contact on-call GI fellow via answering service ([phone]) from 5pm-8am and on weekends/holidays

## 2023-12-06 NOTE — ED ADULT NURSE REASSESSMENT NOTE - NS ED NURSE REASSESS COMMENT FT1
Patient remains at baseline mental status. Appears to be resting comfortably in stretcher, breathing even and unlabored on RA. Vital signs as charted. NAD noted at this time. IV site clean dry and intact. Repeat CBC drawn and sent. Pt denies having DM regardless of hx in clinical summary, refusing BG fingerstick at this time. Safety maintained, stretcher locked in lowest position with siderails up x2, call bell and personal items within reach. Admitted to Medicine for Lower GI bleed, awaiting bed placement.

## 2023-12-06 NOTE — H&P ADULT - HISTORY OF PRESENT ILLNESS
68 W with PMH significant for but not limited to HTN, HLD, hypothyroid, Pernicious anemia, diverticulosis, SBO (8/2022), Colon CA s/p resection and Naeem's (10/2021), Naeem's reversal and ileostomy creation (03/2022), and ileostomy reversal (6/13/22) w/ Dr. Beach presents w/ BRBPR.  Patient states that she woke up at 4AM had a BM and notiiced that there was bright red blood in toilet. Patient states that she also was recently visiting her daughter in [place] in October where she had similar complaints and was admitted with bleeding reportedly resolving spontaneously. While traveling back to [place] she was again admitted in [place] (oct4-9th) where they did colonoscopy where they could not identify source of bleeding. Patient states that she has had 7 episodes of bloody stools since yesterday, last was early this morning. She denies and fevers, chills, nausea, vomiting  68 W with PMH significant for but not limited to HTN, HLD, hypothyroid, Pernicious anemia, extensive diverticulosis with recurrent diverticular bleeding s/p hemicolectomy, rectosigmoid resection with ileostomy reversal (6/13/22) who is presenting from home with BRBPR.  Patient states that she woke up at 4AM had a BM and notiiced that there was bright red blood in toilet. Patient states that she also was recently visiting her daughter in [place] in October where she had similar complaints and was admitted with bleeding reportedly resolving spontaneously. While traveling back to [place] she was again admitted in [place] (oct4-9th) where they did colonoscopy where they could not identify source of bleeding. Patient states that she has had 7 episodes of bloody stools since yesterday, last was early this morning. She denies and fevers, chills, nausea, vomiting.

## 2023-12-06 NOTE — CONSULT NOTE ADULT - SUBJECTIVE AND OBJECTIVE BOX
COLORECTAL SURGERY CONSULT NOTE  --------------------------------------------------------------------------------------------  HPI:    68y F W/ PMHX HTN, HLD, Hypothyroidism, SOLOMON, Pernicious anemia, diverticulosis, SBO (8/2022), Colon CA s/p resection and Naeem's (10/2021), Naeem's reversal and ileostomy creation (03/2022), and ileostomy reversal (6/13/22) w/ Dr. Beach presents w/ BRBPR. Patient reports first episode of BRBPR at 4am this AM and 2 episodes since in the ED, reports medium volume with blood in the toilet bowl and when she wipes. Denies abdominal pain, rectal pain, nausea, vomiting, fevers, chills. Endorses mild lightheadedness. Also endorses b/l below the knee leg tingling that she reports happens when her potassium is low. Patient had 2 episodes of BRBPR 10/2023 in [place] and [place], has colonoscopies done at that time with findings of known diverticulosis per patient. Also had CT done but bleeding had stopped at that point so no additional findings were seen.    In the ED, patient is HDS. WBC 5.57, Hgb 9.7 (stable from last labs 8/2022).      PAST MEDICAL & SURGICAL HISTORY:  Hypothyroidism      Diverticulosis      HTN (hypertension)      Lower gastrointestinal bleeding  Multipel times since '2014: last episode 5/2018      Pernicious anemia      SOLOMON (obstructive sleep apnea)  non-compliant with CPAP      Type 2 diabetes mellitus      Left ureteral stone      Fibroid uterus  '94  surgically treated      Lumbar herniated disc  ' 2010  surgically treated      Multiparity      Heart murmur  mild      History of tonsillectomy  age 14      S/P WESTLEY (total abdominal hysterectomy)  ' 94  Laproscopic.  benign      S/P lumbar laminectomy  ' 2010  with Fusion      S/P colon resection  colostomy; 10/2021, ludwig reversal & creation of ileostomy 3/2022      History of lithotripsy  left kidney stone extraction; 02/2020        FAMILY HISTORY:  Family history of gastric cancer  Mother    Family history of throat cancer  Sister    [] Family history not pertinent as reviewed with the patient and family    SOCIAL HISTORY:     ALLERGIES: Valium (Other)  oxycodone (Other)      HOME MEDICATIONS:     CURRENT MEDICATIONS  MEDICATIONS (STANDING):   MEDICATIONS (PRN):  --------------------------------------------------------------------------------------------    Vitals:   T(C): 36.9 (12-06-23 @ 05:19), Max: 36.9 (12-06-23 @ 05:19)  HR: 78 (12-06-23 @ 05:19) (78 - 78)  BP: 161/93 (12-06-23 @ 05:19) (161/93 - 161/93)  RR: 15 (12-06-23 @ 05:19) (15 - 15)  SpO2: 100% (12-06-23 @ 05:19) (100% - 100%)  CAPILLARY BLOOD GLUCOSE                PHYSICAL EXAM:  General: NAD, Lying in bed comfortably  Neuro: Alert and answering questions appropriately   HEENT: Grossly normal, EOMI  Cardio: Regular rate  Resp: Good effort, no signs of respiratory distress  GI/Abd: Soft, nontender, nondistended, no rebound/guarding, well healed surgical scars  ARPIT: no visible skin tags, fissures or hemorrhoids, no blood or stool in vault, no masses, lesions, tracts or hemorrhoids palpated  Musculoskeletal: All 4 extremities moving spontaneously, no limitations  --------------------------------------------------------------------------------------------    LABS  CBC (12-06 @ 06:23)                              9.7<L>                         5.57    )----------------(  238        59.5  % Neutrophils, 28.2  % Lymphocytes, ANC: 3.32                                31.0<L>    BMP (12-06 @ 06:23)             141     |  105     |  21    		Ca++ --      Ca 8.7                ---------------------------------( 202<H>		Mg --                 4.4     |  26      |  0.95  			Ph --        LFTs (12-06 @ 06:23)      TPro 7.3 / Alb 3.9 / TBili 0.4 / DBili -- / AST 21 / ALT 11 / AlkPhos 86    Coags (12-06 @ 06:23)  aPTT 27.5 / INR 0.96 / PT 10.8        VBG (12-06 @ 07:39)     7.43 / 40 / 108<H> / 26 / 2.0 / 98.4<H>%     Lactate: 1.2    --------------------------------------------------------------------------------------------    MICROBIOLOGY  Urinalysis (12-06 @ 06:23):     Color:  / Appearance:  / SG:  / pH:  / Gluc: 202<H> / Ketones:  / Bili:  / Urobili:  / Protein : / Nitrites:  / Leuk.Est:  / RBC:  / WBC:  / Sq Epi:  / Non Sq Epi:  / Bacteria          --------------------------------------------------------------------------------------------    IMAGING    --------------------------------------------------------------------------------------------

## 2023-12-06 NOTE — ED ADULT NURSE REASSESSMENT NOTE - NS ED NURSE REASSESS COMMENT FT1
Patient received from night RN Briseida at 0810. Patient at baseline mental status. Appears to be resting comfortably in stretcher, breathing even and unlabored on RA. VS as charted. NAD noted at this time.

## 2023-12-06 NOTE — H&P ADULT - NSHPPHYSICALEXAM_GEN_ALL_CORE
PHYSICAL EXAM:  Vital Signs Last 24 Hrs  T(C): 36.8 (12-06-23 @ 09:08)  T(F): 98.2 (12-06-23 @ 09:08), Max: 98.5 (12-06-23 @ 05:19)  HR: 74 (12-06-23 @ 09:08) (74 - 78)  BP: 159/82 (12-06-23 @ 09:08)  BP(mean): --  RR: 16 (12-06-23 @ 09:08) (15 - 16)  SpO2: 98% (12-06-23 @ 09:08) (98% - 100%)  Wt(kg): --    General: woman sitting in bed appears comfortable in NAD, awake and alert  Eyes: PERRLA, nonicteric sclera  HENMT: NCAT, MMM, dentures in place, no oropharyngeal erythema or exudates   Neck: Supple, no thyromegaly, trachea midline   Respiratory: No respiratory distress, CTABL, No rales, rhonchi, wheezing.  Cardiovascular: Regular rate and rhythm; No m/g/r. 2+ peripheral pulses  Gastrointestinal: multiple abdominal scars, healed. Soft, Nontender, obese abdomen, +BS. No palpable organomegaly  Extremities: No c/c/e; warm to touch  Neurological: Speech fluent/face symmetric. Moving all 4 extremities; Sensation to LT grossly in tact in BLEs.   Skin: No rashes, No erythema   Psych: AAOx3; appropriate mood and affect

## 2023-12-06 NOTE — ED ADULT NURSE NOTE - CHIEF COMPLAINT QUOTE
Lex, Malika Velazquez presents C/O rectal bleeding. denies AC use. denies abd pain. No complaints of chest pain, headache, nausea, dizziness, vomiting  SOB, fever, chills verbalized. Phx diverticulitis hypothyroid

## 2023-12-06 NOTE — CONSULT NOTE ADULT - ASSESSMENT
ASSESSMENT: 68y F W/ PMHX HTN, HLD, Hypothyroidism, SOLOMON, Pernicious anemia, diverticulosis, SBO (8/2022), Colon CA s/p resection and Naeem's (10/2021), Naeem's reversal and ileostomy creation (03/2022), and ileostomy reversal (6/13/22) w/ Dr. Beach presents w/ 3 episodes of painless BRBPR since this AM.  Patient had 2 episodes of BRBPR 10/2023 in [place] and [place], has colonoscopies done at that time with findings of known diverticulosis per patient. Patient is HDS, H/H stable.    PLAN:   - No acute surgical intervention  - Recommend GI consult  - Recommend medicine admission given 2 episodes of BRBPR in ED  - If concern for ongoing bleeding would recommend CTA and IR consult  - Surgery will follow      Patient discussed with colorectal fellow, Dr. Peña, on behalf of Dr. Beach.    Yari Guerra, PGY-2  A Team Surgery  [id number] ASSESSMENT: 68y F W/ PMHX HTN, HLD, Hypothyroidism, SOLOMON, Pernicious anemia, diverticulosis, SBO (8/2022), Colon CA s/p resection and Naeem's (10/2021), Naeem's reversal and ileostomy creation (03/2022), and ileostomy reversal (6/13/22) w/ Dr. Beach presents w/ 3 episodes of painless BRBPR since this AM.  Patient had 2 episodes of BRBPR 10/2023 in [place] and [place], has colonoscopies done at that time with findings of known diverticulosis per patient. Patient is HDS, H/H stable.    PLAN:   - No acute surgical intervention  - Recommend GI consult  - Recommend medicine admission given 2 episodes of BRBPR in ED  - If concern for ongoing bleeding would recommend CTA and IR consult  - Surgery will follow      Patient discussed with colorectal fellow, Dr. Peña, on behalf of Dr. Beach.    Yari Guerra, PGY-2  A Team Surgery  [id number]

## 2023-12-06 NOTE — ED ADULT NURSE REASSESSMENT NOTE - NS ED NURSE REASSESS COMMENT FT1
Patient remains at baseline mental status. Appears to be resting comfortably in stretcher, breathing even and unlabored on RA. Vital signs as charted. NAD noted at this time. Medications administered as per eMAR. IV site clean dry and intact. Safety maintained, stretcher locked in lowest position with siderails up x2, call bell and personal items within reach. Admitted to Medicine, awaiting bed placement.

## 2023-12-06 NOTE — ED PROVIDER NOTE - OBJECTIVE STATEMENT
The patient is a 68y Female who has a past medical and surgery history of SOLOMON HTN Hypothyroidism  Pernicious anemia DM2 Kidney Stones WESTLEY/BSO laminectomy Diverticulosis c/b recurrent diverticular bleed s/p resection iliostomy with Dr Beach and reanastomosis PTED with new bleeding noted after going to urinate this evening No dizziness chest pain weakness fever or chills in [facility] before

## 2023-12-06 NOTE — ED ADULT TRIAGE NOTE - CHIEF COMPLAINT QUOTE
presents C/O rectal bleeding. denies AC use. denies abd pain. No complaints of chest pain, headache, nausea, dizziness, vomiting  SOB, fever, chills verbalized. Phx diverticulitis hypothyroid

## 2023-12-07 DIAGNOSIS — D62 ACUTE POSTHEMORRHAGIC ANEMIA: ICD-10-CM

## 2023-12-07 LAB
ANION GAP SERPL CALC-SCNC: 9 MMOL/L — SIGNIFICANT CHANGE UP (ref 7–14)
ANION GAP SERPL CALC-SCNC: 9 MMOL/L — SIGNIFICANT CHANGE UP (ref 7–14)
BUN SERPL-MCNC: 24 MG/DL — HIGH (ref 7–23)
BUN SERPL-MCNC: 24 MG/DL — HIGH (ref 7–23)
CALCIUM SERPL-MCNC: 8.5 MG/DL — SIGNIFICANT CHANGE UP (ref 8.4–10.5)
CALCIUM SERPL-MCNC: 8.5 MG/DL — SIGNIFICANT CHANGE UP (ref 8.4–10.5)
CHLORIDE SERPL-SCNC: 107 MMOL/L — SIGNIFICANT CHANGE UP (ref 98–107)
CHLORIDE SERPL-SCNC: 107 MMOL/L — SIGNIFICANT CHANGE UP (ref 98–107)
CO2 SERPL-SCNC: 26 MMOL/L — SIGNIFICANT CHANGE UP (ref 22–31)
CO2 SERPL-SCNC: 26 MMOL/L — SIGNIFICANT CHANGE UP (ref 22–31)
CREAT SERPL-MCNC: 1.24 MG/DL — SIGNIFICANT CHANGE UP (ref 0.5–1.3)
CREAT SERPL-MCNC: 1.24 MG/DL — SIGNIFICANT CHANGE UP (ref 0.5–1.3)
EGFR: 47 ML/MIN/1.73M2 — LOW
EGFR: 47 ML/MIN/1.73M2 — LOW
FERRITIN SERPL-MCNC: 58 NG/ML — SIGNIFICANT CHANGE UP (ref 13–330)
FERRITIN SERPL-MCNC: 58 NG/ML — SIGNIFICANT CHANGE UP (ref 13–330)
FOLATE SERPL-MCNC: >20 NG/ML — HIGH (ref 3.1–17.5)
FOLATE SERPL-MCNC: >20 NG/ML — HIGH (ref 3.1–17.5)
GLUCOSE SERPL-MCNC: 115 MG/DL — HIGH (ref 70–99)
GLUCOSE SERPL-MCNC: 115 MG/DL — HIGH (ref 70–99)
HCT VFR BLD CALC: 25.7 % — LOW (ref 34.5–45)
HCT VFR BLD CALC: 25.7 % — LOW (ref 34.5–45)
HCT VFR BLD CALC: 26.4 % — LOW (ref 34.5–45)
HCT VFR BLD CALC: 26.4 % — LOW (ref 34.5–45)
HGB BLD-MCNC: 7.9 G/DL — LOW (ref 11.5–15.5)
HGB BLD-MCNC: 7.9 G/DL — LOW (ref 11.5–15.5)
HGB BLD-MCNC: 8.2 G/DL — LOW (ref 11.5–15.5)
HGB BLD-MCNC: 8.2 G/DL — LOW (ref 11.5–15.5)
IRON SATN MFR SERPL: 14 % — SIGNIFICANT CHANGE UP (ref 14–50)
IRON SATN MFR SERPL: 14 % — SIGNIFICANT CHANGE UP (ref 14–50)
IRON SATN MFR SERPL: 34 UG/DL — SIGNIFICANT CHANGE UP (ref 30–160)
IRON SATN MFR SERPL: 34 UG/DL — SIGNIFICANT CHANGE UP (ref 30–160)
MCHC RBC-ENTMCNC: 27 PG — SIGNIFICANT CHANGE UP (ref 27–34)
MCHC RBC-ENTMCNC: 30.7 GM/DL — LOW (ref 32–36)
MCHC RBC-ENTMCNC: 30.7 GM/DL — LOW (ref 32–36)
MCHC RBC-ENTMCNC: 31.1 GM/DL — LOW (ref 32–36)
MCHC RBC-ENTMCNC: 31.1 GM/DL — LOW (ref 32–36)
MCV RBC AUTO: 86.8 FL — SIGNIFICANT CHANGE UP (ref 80–100)
MCV RBC AUTO: 86.8 FL — SIGNIFICANT CHANGE UP (ref 80–100)
MCV RBC AUTO: 87.7 FL — SIGNIFICANT CHANGE UP (ref 80–100)
MCV RBC AUTO: 87.7 FL — SIGNIFICANT CHANGE UP (ref 80–100)
NRBC # BLD: 0 /100 WBCS — SIGNIFICANT CHANGE UP (ref 0–0)
NRBC # FLD: 0 K/UL — SIGNIFICANT CHANGE UP (ref 0–0)
PLATELET # BLD AUTO: 199 K/UL — SIGNIFICANT CHANGE UP (ref 150–400)
PLATELET # BLD AUTO: 199 K/UL — SIGNIFICANT CHANGE UP (ref 150–400)
PLATELET # BLD AUTO: 209 K/UL — SIGNIFICANT CHANGE UP (ref 150–400)
PLATELET # BLD AUTO: 209 K/UL — SIGNIFICANT CHANGE UP (ref 150–400)
POTASSIUM SERPL-MCNC: 3.8 MMOL/L — SIGNIFICANT CHANGE UP (ref 3.5–5.3)
POTASSIUM SERPL-MCNC: 3.8 MMOL/L — SIGNIFICANT CHANGE UP (ref 3.5–5.3)
POTASSIUM SERPL-SCNC: 3.8 MMOL/L — SIGNIFICANT CHANGE UP (ref 3.5–5.3)
POTASSIUM SERPL-SCNC: 3.8 MMOL/L — SIGNIFICANT CHANGE UP (ref 3.5–5.3)
RBC # BLD: 2.93 M/UL — LOW (ref 3.8–5.2)
RBC # BLD: 3.04 M/UL — LOW (ref 3.8–5.2)
RBC # BLD: 3.04 M/UL — LOW (ref 3.8–5.2)
RBC # FLD: 14.5 % — SIGNIFICANT CHANGE UP (ref 10.3–14.5)
RETICS #: 29.9 K/UL — SIGNIFICANT CHANGE UP (ref 25–125)
RETICS #: 29.9 K/UL — SIGNIFICANT CHANGE UP (ref 25–125)
RETICS/RBC NFR: 1 % — SIGNIFICANT CHANGE UP (ref 0.5–2.5)
RETICS/RBC NFR: 1 % — SIGNIFICANT CHANGE UP (ref 0.5–2.5)
SODIUM SERPL-SCNC: 142 MMOL/L — SIGNIFICANT CHANGE UP (ref 135–145)
SODIUM SERPL-SCNC: 142 MMOL/L — SIGNIFICANT CHANGE UP (ref 135–145)
TIBC SERPL-MCNC: 246 UG/DL — SIGNIFICANT CHANGE UP (ref 220–430)
TIBC SERPL-MCNC: 246 UG/DL — SIGNIFICANT CHANGE UP (ref 220–430)
UIBC SERPL-MCNC: 212 UG/DL — SIGNIFICANT CHANGE UP (ref 110–370)
UIBC SERPL-MCNC: 212 UG/DL — SIGNIFICANT CHANGE UP (ref 110–370)
VIT B12 SERPL-MCNC: 1370 PG/ML — HIGH (ref 200–900)
VIT B12 SERPL-MCNC: 1370 PG/ML — HIGH (ref 200–900)
WBC # BLD: 4.81 K/UL — SIGNIFICANT CHANGE UP (ref 3.8–10.5)
WBC # BLD: 4.81 K/UL — SIGNIFICANT CHANGE UP (ref 3.8–10.5)
WBC # BLD: 5.78 K/UL — SIGNIFICANT CHANGE UP (ref 3.8–10.5)
WBC # BLD: 5.78 K/UL — SIGNIFICANT CHANGE UP (ref 3.8–10.5)
WBC # FLD AUTO: 4.81 K/UL — SIGNIFICANT CHANGE UP (ref 3.8–10.5)
WBC # FLD AUTO: 4.81 K/UL — SIGNIFICANT CHANGE UP (ref 3.8–10.5)
WBC # FLD AUTO: 5.78 K/UL — SIGNIFICANT CHANGE UP (ref 3.8–10.5)
WBC # FLD AUTO: 5.78 K/UL — SIGNIFICANT CHANGE UP (ref 3.8–10.5)

## 2023-12-07 PROCEDURE — 99232 SBSQ HOSP IP/OBS MODERATE 35: CPT

## 2023-12-07 PROCEDURE — 99231 SBSQ HOSP IP/OBS SF/LOW 25: CPT | Mod: GC

## 2023-12-07 RX ORDER — ACETAMINOPHEN 500 MG
1000 TABLET ORAL ONCE
Refills: 0 | Status: COMPLETED | OUTPATIENT
Start: 2023-12-07 | End: 2023-12-07

## 2023-12-07 RX ADMIN — Medication 400 MILLIGRAM(S): at 22:46

## 2023-12-07 RX ADMIN — PREGABALIN 1000 MICROGRAM(S): 225 CAPSULE ORAL at 12:12

## 2023-12-07 RX ADMIN — Medication 1000 MILLIGRAM(S): at 23:16

## 2023-12-07 RX ADMIN — Medication 175 MICROGRAM(S): at 06:03

## 2023-12-07 RX ADMIN — Medication 650 MILLIGRAM(S): at 00:14

## 2023-12-07 RX ADMIN — SIMVASTATIN 40 MILLIGRAM(S): 20 TABLET, FILM COATED ORAL at 22:19

## 2023-12-07 RX ADMIN — AMLODIPINE BESYLATE 10 MILLIGRAM(S): 2.5 TABLET ORAL at 06:03

## 2023-12-07 NOTE — PROGRESS NOTE ADULT - PROBLEM SELECTOR PLAN 1
presenting with multiple episodes of BRBPR, likely recurrent diverticular bleed   - s/p IVF   - monitor CBC  - transfuse for Hgb <7  - CTA abdomen if ongoing bleeding  - appreciate GI input, no plan for endoscopic evaluation  - advance diet

## 2023-12-07 NOTE — PROGRESS NOTE ADULT - PROBLEM SELECTOR PLAN 3
s/p ext L hemicolectomy (2021) and transverse and rectosigmoid resection with reversal of ileostomy (2022) due to diverticular bleeding   - colorectal surgery and GI consult appreciated

## 2023-12-07 NOTE — PATIENT PROFILE ADULT - MST SCORE
SAMMI Bazan patient needs a refill on 2 of his meds     Refill request for the following medication:  1) Cogentin 1 mg tablet take one tablet by mouth 2 times daily  2) Anafranil 50 mg capsules take 1 capsule by mouth nightly     Last prescribed  10/02/2019    Last appointment 10/02/2019    Requested return to clinic date:  3  Months     Patient's next appointment  02/05/2019     Routing to Dr. Bazan to advise on  Refill until next appointment         
0

## 2023-12-07 NOTE — PROGRESS NOTE ADULT - PROBLEM SELECTOR PLAN 4
- c/w home amlodipine 10mg   - hold lisinopril in setting of active GIB   - monitor BP, resume BP meds as able

## 2023-12-07 NOTE — PROGRESS NOTE ADULT - ATTENDING COMMENTS
No further overt bleeding. Hgb remains in 7-8s.    # Hematochezia: Patient reports history of recurrent hematochezia, previously attributed to diverticular bleeding. Most recently admitted at OSH in 10/2023 and reports similar presentation at which time she underwent colonoscopy; at time of colonoscopy, bleeding reportedly had ceased and no intervention was performed (per patient report, results not available to review). Suspect recurrent diverticular bleeding at this time. Currently HD stable.   # Pernicious anemia,  # Recurrent diverticular bleeding s/p L hemicolectomy and Naeem's (2021), Naeem's reversal and ileostomy creation (2022), and ileostomy reversal (2022)    --Advance diet as tolerated  --If recurrent bleeding, suggest CTA to assist with localization of bleeding. If positive, would benefit from IR consultation for possible embolization  --No immediate plans for repeat endoscopic evaluation at this time. Discussed with patient that colonoscopy is often of limited therapeutic yield with diverticular bleeding and, given recent colonoscopy at OSH, repeat procedure would likely be of limited diagnostic yield.   --If possible, please try to obtain recent OSH colonoscopy report  --Appreciate colorectal surgery input  --No further inpatient GI interventions planned at this time. No objection to discharge from GI perspective if no further bleeding. Patient aware of risk of recurrent bleeding due to underlying diverticular disease.     Additional recommendations as above.

## 2023-12-07 NOTE — PATIENT PROFILE ADULT - FALL HARM RISK - RISK INTERVENTIONS
Assistance OOB with selected safe patient handling equipment/Assistance with ambulation/Communicate Fall Risk and Risk Factors to all staff, patient, and family/Reinforce activity limits and safety measures with patient and family/Visual Cue: Yellow wristband/Bed in lowest position, wheels locked, appropriate side rails in place/Call bell, personal items and telephone in reach/Instruct patient to call for assistance before getting out of bed or chair/Non-slip footwear when patient is out of bed/[place] to call system/Physically safe environment - no spills, clutter or unnecessary equipment/Purposeful Proactive Rounding/Room/bathroom lighting operational, light cord in reach Assistance OOB with selected safe patient handling equipment/Assistance with ambulation/Communicate Fall Risk and Risk Factors to all staff, patient, and family/Reinforce activity limits and safety measures with patient and family/Visual Cue: Yellow wristband/Bed in lowest position, wheels locked, appropriate side rails in place/Call bell, personal items and telephone in reach/Instruct patient to call for assistance before getting out of bed or chair/Non-slip footwear when patient is out of bed/[facility] to call system/Physically safe environment - no spills, clutter or unnecessary equipment/Purposeful Proactive Rounding/Room/bathroom lighting operational, light cord in reach

## 2023-12-07 NOTE — PROGRESS NOTE ADULT - SUBJECTIVE AND OBJECTIVE BOX
Interval Events:   No acute events overnight.    Patient denied fever, chills, nausea, vomiting, abdominal pain, diarrhea, melena, hematochezia or hematemesis. Tolerating PO. Last BM was yesterday.     ROS:   12 point review of systems performed and negative except otherwise noted in HPI.    Hospital Medications:  amLODIPine   Tablet 10 milliGRAM(s) Oral daily  cyanocobalamin 1000 MICROGram(s) Oral daily  lactated ringers. 1000 milliLiter(s) IV Continuous <Continuous>  levothyroxine 175 MICROGram(s) Oral daily  melatonin 3 milliGRAM(s) Oral at bedtime PRN  simvastatin 40 milliGRAM(s) Oral at bedtime      PHYSICAL EXAM:   Vital Signs:  Vital Signs Last 24 Hrs  T(C): 36.6 (07 Dec 2023 06:03), Max: 36.9 (06 Dec 2023 23:44)  T(F): 97.9 (07 Dec 2023 06:03), Max: 98.4 (06 Dec 2023 23:44)  HR: 70 (07 Dec 2023 06:03) (70 - 87)  BP: 136/56 (07 Dec 2023 06:03) (117/68 - 159/82)  BP(mean): --  RR: 18 (07 Dec 2023 06:03) (16 - 20)  SpO2: 100% (07 Dec 2023 06:03) (98% - 100%)    Parameters below as of 07 Dec 2023 06:03  Patient On (Oxygen Delivery Method): room air      Daily Height in cm: 162.56 (06 Dec 2023 23:44)    Daily     GENERAL: no acute distress  NEURO: alert and oriented x 3  HEENT: NCAT, no conjunctival pallor appreciated; anicteric sclera  CHEST: no respiratory distress, no accessory muscle use  CARDIAC: regular rate, +S1/S2  ABDOMEN: soft, nontender, no rebound or guarding  EXTREMITIES: warm, well perfused  SKIN: no active lesions noted    LABS: reviewed                        8.2    5.78  )-----------( 199      ( 07 Dec 2023 00:34 )             26.4     12-06    141  |  105  |  21  ----------------------------<  202<H>  4.4   |  26  |  0.95    Ca    8.7      06 Dec 2023 06:23    TPro  7.3  /  Alb  3.9  /  TBili  0.4  /  DBili  x   /  AST  21  /  ALT  11  /  AlkPhos  86  12-06       Interval Events:   No acute events overnight. No further episodes of bleeding, and tolerating liquids without difficulty. Patient would like to go home.     Patient denied fever, chills, nausea, vomiting, abdominal pain.      Hospital Medications:  amLODIPine   Tablet 10 milliGRAM(s) Oral daily  cyanocobalamin 1000 MICROGram(s) Oral daily  lactated ringers. 1000 milliLiter(s) IV Continuous <Continuous>  levothyroxine 175 MICROGram(s) Oral daily  melatonin 3 milliGRAM(s) Oral at bedtime PRN  simvastatin 40 milliGRAM(s) Oral at bedtime      PHYSICAL EXAM:   Vital Signs:  Vital Signs Last 24 Hrs  T(C): 36.6 (07 Dec 2023 06:03), Max: 36.9 (06 Dec 2023 23:44)  T(F): 97.9 (07 Dec 2023 06:03), Max: 98.4 (06 Dec 2023 23:44)  HR: 70 (07 Dec 2023 06:03) (70 - 87)  BP: 136/56 (07 Dec 2023 06:03) (117/68 - 159/82)  BP(mean): --  RR: 18 (07 Dec 2023 06:03) (16 - 20)  SpO2: 100% (07 Dec 2023 06:03) (98% - 100%)    Parameters below as of 07 Dec 2023 06:03  Patient On (Oxygen Delivery Method): room air      Daily Height in cm: 162.56 (06 Dec 2023 23:44)    Daily     GENERAL: no acute distress  NEURO: alert and oriented x 3  HEENT: NCAT, no conjunctival pallor appreciated; anicteric sclera  CHEST: no respiratory distress, no accessory muscle use  CARDIAC: regular rate, +S1/S2  ABDOMEN: soft, nontender, no rebound or guarding  EXTREMITIES: warm, well perfused  SKIN: no active lesions noted    LABS: reviewed                        8.2    5.78  )-----------( 199      ( 07 Dec 2023 00:34 )             26.4     12-06    141  |  105  |  21  ----------------------------<  202<H>  4.4   |  26  |  0.95    Ca    8.7      06 Dec 2023 06:23    TPro  7.3  /  Alb  3.9  /  TBili  0.4  /  DBili  x   /  AST  21  /  ALT  11  /  AlkPhos  86  12-06

## 2023-12-07 NOTE — PROGRESS NOTE ADULT - SUBJECTIVE AND OBJECTIVE BOX
SURGERY DAILY PROGRESS NOTE:       SUBJECTIVE/ROS: Patient examined at bedside. No acute events overnight. Denies BM, no blood per rectum          MEDICATIONS  (STANDING):  amLODIPine   Tablet 10 milliGRAM(s) Oral daily  cyanocobalamin 1000 MICROGram(s) Oral daily  lactated ringers. 1000 milliLiter(s) (100 mL/Hr) IV Continuous <Continuous>  levothyroxine 175 MICROGram(s) Oral daily  simvastatin 40 milliGRAM(s) Oral at bedtime    MEDICATIONS  (PRN):  melatonin 3 milliGRAM(s) Oral at bedtime PRN Insomnia      OBJECTIVE:    Vital Signs Last 24 Hrs  T(C): 36.4 (07 Dec 2023 10:15), Max: 36.9 (06 Dec 2023 23:44)  T(F): 97.5 (07 Dec 2023 10:15), Max: 98.4 (06 Dec 2023 23:44)  HR: 74 (07 Dec 2023 10:15) (70 - 87)  BP: 132/68 (07 Dec 2023 10:15) (117/68 - 145/79)  BP(mean): --  RR: 18 (07 Dec 2023 10:15) (16 - 18)  SpO2: 98% (07 Dec 2023 10:15) (98% - 100%)    Parameters below as of 07 Dec 2023 06:03  Patient On (Oxygen Delivery Method): room air            I&O's Detail    06 Dec 2023 07:01  -  07 Dec 2023 07:00  --------------------------------------------------------  IN:    Oral Fluid: 480 mL  Total IN: 480 mL    OUT:  Total OUT: 0 mL    Total NET: 480 mL      07 Dec 2023 07:01  -  07 Dec 2023 16:02  --------------------------------------------------------  IN:  Total IN: 0 mL    OUT:    Voided (mL): 600 mL  Total OUT: 600 mL    Total NET: -600 mL          Daily Height in cm: 162.56 (06 Dec 2023 23:44)    Daily     LABS:                        7.9    4.81  )-----------( 209      ( 07 Dec 2023 05:59 )             25.7     12-07    142  |  107  |  24<H>  ----------------------------<  115<H>  3.8   |  26  |  1.24    Ca    8.5      07 Dec 2023 05:59    TPro  7.3  /  Alb  3.9  /  TBili  0.4  /  DBili  x   /  AST  21  /  ALT  11  /  AlkPhos  86  12-06    PT/INR - ( 06 Dec 2023 06:23 )   PT: 10.8 sec;   INR: 0.96 ratio         PTT - ( 06 Dec 2023 06:23 )  PTT:27.5 sec  Urinalysis Basic - ( 07 Dec 2023 05:59 )    Color: x / Appearance: x / SG: x / pH: x  Gluc: 115 mg/dL / Ketone: x  / Bili: x / Urobili: x   Blood: x / Protein: x / Nitrite: x   Leuk Esterase: x / RBC: x / WBC x   Sq Epi: x / Non Sq Epi: x / Bacteria: x                PHYSICAL EXAM:  GEN: NAD  Pulm: unlabored breathing on RA  CV: radial pulse 2+, cap refil <2sec  GI/Abd: Soft, nontender, nondistended, no rebound/guarding, well healed surgical scars

## 2023-12-07 NOTE — PROGRESS NOTE ADULT - ASSESSMENT
68y F W/ PMHX HTN, HLD, Hypothyroidism, SOLOMON, Pernicious anemia, diverticulosis, SBO (8/2022), Colon CA s/p resection and Naeem's (10/2021), Naeem's reversal and ileostomy creation (03/2022), and ileostomy reversal (6/13/22) w/ Dr. Beach presents w/ 3 episodes of painless BRBPR since this AM.  Patient had 2 episodes of BRBPR 10/2023 in [place] and [place], has colonoscopies done at that time with findings of known diverticulosis per patient. Patient is HDS, H/H stable.    PLAN:   - No blood per rectum since admission   - tolerated CLD, ADAT  - No acute surgical interventionED  - If concern for ongoing bleeding would recommend CTA and IR consult  - Surgery will follow      Patient discussed with Dr. Beach.      A Team Surgery  [id number]   68y F W/ PMHX HTN, HLD, Hypothyroidism, SOLOMON, Pernicious anemia, diverticulosis, SBO (8/2022), Colon CA s/p resection and Naeem's (10/2021), Naeem's reversal and ileostomy creation (03/2022), and ileostomy reversal (6/13/22) w/ Dr. Beach presents w/ 3 episodes of painless BRBPR since this AM.  Patient had 2 episodes of BRBPR 10/2023 in [place] and [place], has colonoscopies done at that time with findings of known diverticulosis per patient. Patient is HDS, H/H stable.    PLAN:   - No blood per rectum since admission   - tolerated CLD, ADAT  - No acute surgical interventionED  - If concern for ongoing bleeding would recommend CTA and IR consult  - Surgery will follow      Patient discussed with Dr. Beach.      A Team Surgery  [id number]

## 2023-12-07 NOTE — PROGRESS NOTE ADULT - SUBJECTIVE AND OBJECTIVE BOX
PROGRESS NOTE:     Patient is a 68y old  Female who presents with a chief complaint of GI Bleed (07 Dec 2023 06:52)      SUBJECTIVE / OVERNIGHT EVENTS: No bleeding today.     ADDITIONAL REVIEW OF SYSTEMS:    MEDICATIONS  (STANDING):  amLODIPine   Tablet 10 milliGRAM(s) Oral daily  cyanocobalamin 1000 MICROGram(s) Oral daily  lactated ringers. 1000 milliLiter(s) (100 mL/Hr) IV Continuous <Continuous>  levothyroxine 175 MICROGram(s) Oral daily  simvastatin 40 milliGRAM(s) Oral at bedtime    MEDICATIONS  (PRN):  melatonin 3 milliGRAM(s) Oral at bedtime PRN Insomnia      CAPILLARY BLOOD GLUCOSE      POCT Blood Glucose.: 109 mg/dL (06 Dec 2023 23:07)    I&O's Summary    06 Dec 2023 07:01  -  07 Dec 2023 07:00  --------------------------------------------------------  IN: 480 mL / OUT: 0 mL / NET: 480 mL    07 Dec 2023 07:01  -  07 Dec 2023 13:58  --------------------------------------------------------  IN: 0 mL / OUT: 300 mL / NET: -300 mL        PHYSICAL EXAM:  Vital Signs Last 24 Hrs  T(C): 36.4 (07 Dec 2023 10:15), Max: 36.9 (06 Dec 2023 23:44)  T(F): 97.5 (07 Dec 2023 10:15), Max: 98.4 (06 Dec 2023 23:44)  HR: 74 (07 Dec 2023 10:15) (70 - 87)  BP: 132/68 (07 Dec 2023 10:15) (117/68 - 145/79)  BP(mean): --  RR: 18 (07 Dec 2023 10:15) (16 - 18)  SpO2: 98% (07 Dec 2023 10:15) (98% - 100%)    Parameters below as of 07 Dec 2023 06:03  Patient On (Oxygen Delivery Method): room air      General: woman sitting in bed appears comfortable in NAD, awake and alert  Eyes: PERRLA, nonicteric sclera  HENMT: NCAT, MMM, dentures in place, no oropharyngeal erythema or exudates   Neck: Supple, no thyromegaly, trachea midline   Respiratory: No respiratory distress, CTABL, No rales, rhonchi, wheezing.  Cardiovascular: Regular rate and rhythm; No m/g/r. 2+ peripheral pulses  Gastrointestinal: multiple abdominal scars, healed. Soft, Nontender, obese abdomen, +BS. No palpable organomegaly  Extremities: No c/c/e; warm to touch  Neurological: Speech fluent/face symmetric. Moving all 4 extremities; Sensation to LT grossly in tact in BLEs.   Skin: No rashes, No erythema   Psych: AAOx3; appropriate mood and affect    LABS:                        7.9    4.81  )-----------( 209      ( 07 Dec 2023 05:59 )             25.7     12-07    142  |  107  |  24<H>  ----------------------------<  115<H>  3.8   |  26  |  1.24    Ca    8.5      07 Dec 2023 05:59    TPro  7.3  /  Alb  3.9  /  TBili  0.4  /  DBili  x   /  AST  21  /  ALT  11  /  AlkPhos  86  12-06    PT/INR - ( 06 Dec 2023 06:23 )   PT: 10.8 sec;   INR: 0.96 ratio         PTT - ( 06 Dec 2023 06:23 )  PTT:27.5 sec      Urinalysis Basic - ( 07 Dec 2023 05:59 )    Color: x / Appearance: x / SG: x / pH: x  Gluc: 115 mg/dL / Ketone: x  / Bili: x / Urobili: x   Blood: x / Protein: x / Nitrite: x   Leuk Esterase: x / RBC: x / WBC x   Sq Epi: x / Non Sq Epi: x / Bacteria: x          RADIOLOGY & ADDITIONAL TESTS:  Results Reviewed:   Imaging Personally Reviewed:  Electrocardiogram Personally Reviewed:    COORDINATION OF CARE:  Care Discussed with Consultants/Other Providers [Y/N]:  Prior or Outpatient Records Reviewed [Y/N]:

## 2023-12-07 NOTE — PROGRESS NOTE ADULT - ASSESSMENT
ASSESSMENT:   SB is a 69yo woman with pmh extensive diverticulosis with recurrent diverticular bleeding presenting with 1d rectal bleeding and diarrhea.     #Hematochezia  #Diarrhea  #Hx of diverticular bleed s/p L hemicolectomy/Martin's procedure/colostomy reversal/ileostomy reversal ([id number])  - Given her history and presence of painless red rectal bleeding, this most likely represents another episode of diverticular bleeding. Recent colonoscopy <2mo ago makes malignancy, AVM, internal hemorrhoids much less likely, and the lack of abd pain/hx of clotting/clinical signs of hypotension makes ischemic colitis much less likely.  - Currently hemodynamically stable; Hgb stable without further episodes of bleeding  - Seen by colorectal surgery, no acute surgical recommendations at this time.      Recommendations:  >Discussed with patient: may likely have recurrent bleeding given known hx of diverticular bleed, but low diagnostic yield for colonoscopy at this time given recent negative colonoscopy. Patient understood the risk of rebleeding, and felt comfortable managing current situation at home  >Diet: Advance as tolerated  >If remains inpatient, trend CBC daily and monitor for GIB; Transfuse if Hgb < 7 & maintain 2x large bore IVs  >Supportive care  >Consider CTA if ongoing bleeding to identify source and consulting IR for possible embolization if source is ID'ed   >If diarrhea persists, send GI PCR and C diff testing  >Would consider outpatient colorectal f/u with Dr. Mosher given recurrent bleeding  >No GI contraindication for discharge given resolution of bleeding      Note incomplete until finalized by attending signature/attestation.    Linette Umaña MD  GI/Hepatology Fellow    MONDAY-FRIDAY 8AM-5PM:  Pager# [phone] ([facility]) or [phone] ([facility])    NON-URGENT CONSULTS:  Please email [email or web address]  AT NIGHT AND ON WEEKENDS:  Contact on-call GI fellow via answering service ([phone]) from 5pm-8am and on weekends/holidays     ASSESSMENT:   SB is a 69yo woman with pmh extensive diverticulosis with recurrent diverticular bleeding presenting with 1d rectal bleeding and diarrhea.     #Hematochezia  #Diarrhea  #Hx of diverticular bleed s/p L hemicolectomy/Martin's procedure/colostomy reversal/ileostomy reversal ([id number])  - Given her history and presence of painless red rectal bleeding, this most likely represents another episode of diverticular bleeding. Recent colonoscopy <2mo ago makes malignancy, AVM, internal hemorrhoids much less likely, and the lack of abd pain/hx of clotting/clinical signs of hypotension makes ischemic colitis much less likely.  - Currently hemodynamically stable; Hgb stable without further episodes of bleeding  - Seen by colorectal surgery, no acute surgical recommendations at this time.      Recommendations:  >Discussed with patient: may likely have recurrent bleeding given known hx of diverticular bleed, but low diagnostic yield for colonoscopy at this time given recent negative colonoscopy. Patient understood the risk of rebleeding, and felt comfortable managing current situation at home  >Diet: Advance as tolerated  >If remains inpatient, trend CBC daily and monitor for GIB; Transfuse if Hgb < 7 & maintain 2x large bore IVs  >Supportive care  >Consider CTA if ongoing bleeding to identify source and consulting IR for possible embolization if source is ID'ed   >If diarrhea persists, send GI PCR and C diff testing  >Would consider outpatient colorectal f/u with Dr. Mosher given recurrent bleeding  >No GI contraindication for discharge given resolution of bleeding      Note incomplete until finalized by attending signature/attestation.    Linette Umaña MD  GI/Hepatology Fellow    MONDAY-FRIDAY 8AM-5PM:  Pager# [phone] ([facility]) or [phone] ([facility])    NON-URGENT CONSULTS:  Please email [email or web address]  AT NIGHT AND ON WEEKENDS:  Contact on-call GI fellow via answering service ([phone]) from 5pm-8am and on weekends/holidays     ASSESSMENT:   SB is a 67yo woman with pmh extensive diverticulosis with recurrent diverticular bleeding presenting with 1d rectal bleeding and diarrhea.     #Hematochezia  #Diarrhea  #Hx of diverticular bleed s/p L hemicolectomy/Martin's procedure/colostomy reversal/ileostomy reversal ([id number])  - Given her history and presence of painless red rectal bleeding, this most likely represents another episode of diverticular bleeding. Recent colonoscopy <2mo ago makes malignancy, AVM, internal hemorrhoids much less likely, and the lack of abd pain/hx of clotting/clinical signs of hypotension makes ischemic colitis much less likely.  - Currently hemodynamically stable; Hgb stable without further episodes of bleeding  - Seen by colorectal surgery, no acute surgical recommendations at this time.      Recommendations:  >Discussed with patient: may likely have recurrent bleeding given known hx of diverticular bleed, but low diagnostic yield for colonoscopy at this time given recent negative colonoscopy. Patient understood the risk of rebleeding, and felt comfortable managing current situation at home  >Diet: Advance as tolerated  >If remains inpatient, trend CBC daily and monitor for GIB; Transfuse if Hgb < 7 & maintain 2x large bore IVs  >Supportive care  >Consider CTA if ongoing bleeding to identify source and consulting IR for possible embolization if source is ID'ed  >If diarrhea persists, send GI PCR and C diff testing  >Would consider outpatient colorectal f/u with Dr. Beach given recurrent bleeding  >No GI contraindication for discharge given resolution of bleeding      Note incomplete until finalized by attending signature/attestation.    Linette Umaña MD  GI/Hepatology Fellow    MONDAY-FRIDAY 8AM-5PM:  Pager# [phone] ([facility]) or [phone] ([facility])    NON-URGENT CONSULTS:  Please email [email or web address]  AT NIGHT AND ON WEEKENDS:  Contact on-call GI fellow via answering service ([phone]) from 5pm-8am and on weekends/holidays     ASSESSMENT:   SB is a 69yo woman with pmh extensive diverticulosis with recurrent diverticular bleeding presenting with 1d rectal bleeding and diarrhea.     #Hematochezia  #Diarrhea  #Hx of diverticular bleed s/p L hemicolectomy/Martin's procedure/colostomy reversal/ileostomy reversal ([id number])  - Given her history and presence of painless red rectal bleeding, this most likely represents another episode of diverticular bleeding. Recent colonoscopy <2mo ago makes malignancy, AVM, internal hemorrhoids much less likely, and the lack of abd pain/hx of clotting/clinical signs of hypotension makes ischemic colitis much less likely.  - Currently hemodynamically stable; Hgb stable without further episodes of bleeding  - Seen by colorectal surgery, no acute surgical recommendations at this time.      Recommendations:  >Discussed with patient: may likely have recurrent bleeding given known hx of diverticular bleed, but low diagnostic yield for colonoscopy at this time given recent negative colonoscopy. Patient understood the risk of rebleeding, and felt comfortable managing current situation at home  >Diet: Advance as tolerated  >If remains inpatient, trend CBC daily and monitor for GIB; Transfuse if Hgb < 7 & maintain 2x large bore IVs  >Supportive care  >Consider CTA if ongoing bleeding to identify source and consulting IR for possible embolization if source is ID'ed  >If diarrhea persists, send GI PCR and C diff testing  >Would consider outpatient colorectal f/u with Dr. Beach given recurrent bleeding  >No GI contraindication for discharge given resolution of bleeding      Note incomplete until finalized by attending signature/attestation.    Linette Umaña MD  GI/Hepatology Fellow    MONDAY-FRIDAY 8AM-5PM:  Pager# [phone] ([facility]) or [phone] ([facility])    NON-URGENT CONSULTS:  Please email [email or web address]  AT NIGHT AND ON WEEKENDS:  Contact on-call GI fellow via answering service ([phone]) from 5pm-8am and on weekends/holidays

## 2023-12-08 ENCOUNTER — TRANSCRIPTION ENCOUNTER (OUTPATIENT)
Age: 68
End: 2023-12-08

## 2023-12-08 VITALS
SYSTOLIC BLOOD PRESSURE: 112 MMHG | HEART RATE: 59 BPM | RESPIRATION RATE: 16 BRPM | DIASTOLIC BLOOD PRESSURE: 50 MMHG | OXYGEN SATURATION: 100 % | TEMPERATURE: 99 F

## 2023-12-08 LAB
ANION GAP SERPL CALC-SCNC: 8 MMOL/L — SIGNIFICANT CHANGE UP (ref 7–14)
ANION GAP SERPL CALC-SCNC: 8 MMOL/L — SIGNIFICANT CHANGE UP (ref 7–14)
BUN SERPL-MCNC: 16 MG/DL — SIGNIFICANT CHANGE UP (ref 7–23)
BUN SERPL-MCNC: 16 MG/DL — SIGNIFICANT CHANGE UP (ref 7–23)
CALCIUM SERPL-MCNC: 8.5 MG/DL — SIGNIFICANT CHANGE UP (ref 8.4–10.5)
CALCIUM SERPL-MCNC: 8.5 MG/DL — SIGNIFICANT CHANGE UP (ref 8.4–10.5)
CHLORIDE SERPL-SCNC: 105 MMOL/L — SIGNIFICANT CHANGE UP (ref 98–107)
CHLORIDE SERPL-SCNC: 105 MMOL/L — SIGNIFICANT CHANGE UP (ref 98–107)
CO2 SERPL-SCNC: 27 MMOL/L — SIGNIFICANT CHANGE UP (ref 22–31)
CO2 SERPL-SCNC: 27 MMOL/L — SIGNIFICANT CHANGE UP (ref 22–31)
CREAT SERPL-MCNC: 0.97 MG/DL — SIGNIFICANT CHANGE UP (ref 0.5–1.3)
CREAT SERPL-MCNC: 0.97 MG/DL — SIGNIFICANT CHANGE UP (ref 0.5–1.3)
EGFR: 63 ML/MIN/1.73M2 — SIGNIFICANT CHANGE UP
EGFR: 63 ML/MIN/1.73M2 — SIGNIFICANT CHANGE UP
GLUCOSE BLDC GLUCOMTR-MCNC: 172 MG/DL — HIGH (ref 70–99)
GLUCOSE BLDC GLUCOMTR-MCNC: 172 MG/DL — HIGH (ref 70–99)
GLUCOSE SERPL-MCNC: 148 MG/DL — HIGH (ref 70–99)
GLUCOSE SERPL-MCNC: 148 MG/DL — HIGH (ref 70–99)
HCT VFR BLD CALC: 24.4 % — LOW (ref 34.5–45)
HCT VFR BLD CALC: 24.4 % — LOW (ref 34.5–45)
HGB BLD-MCNC: 7.5 G/DL — LOW (ref 11.5–15.5)
HGB BLD-MCNC: 7.5 G/DL — LOW (ref 11.5–15.5)
MCHC RBC-ENTMCNC: 26.8 PG — LOW (ref 27–34)
MCHC RBC-ENTMCNC: 26.8 PG — LOW (ref 27–34)
MCHC RBC-ENTMCNC: 30.7 GM/DL — LOW (ref 32–36)
MCHC RBC-ENTMCNC: 30.7 GM/DL — LOW (ref 32–36)
MCV RBC AUTO: 87.1 FL — SIGNIFICANT CHANGE UP (ref 80–100)
MCV RBC AUTO: 87.1 FL — SIGNIFICANT CHANGE UP (ref 80–100)
NRBC # BLD: 0 /100 WBCS — SIGNIFICANT CHANGE UP (ref 0–0)
NRBC # BLD: 0 /100 WBCS — SIGNIFICANT CHANGE UP (ref 0–0)
NRBC # FLD: 0 K/UL — SIGNIFICANT CHANGE UP (ref 0–0)
NRBC # FLD: 0 K/UL — SIGNIFICANT CHANGE UP (ref 0–0)
PLATELET # BLD AUTO: 219 K/UL — SIGNIFICANT CHANGE UP (ref 150–400)
PLATELET # BLD AUTO: 219 K/UL — SIGNIFICANT CHANGE UP (ref 150–400)
POTASSIUM SERPL-MCNC: 3.9 MMOL/L — SIGNIFICANT CHANGE UP (ref 3.5–5.3)
POTASSIUM SERPL-MCNC: 3.9 MMOL/L — SIGNIFICANT CHANGE UP (ref 3.5–5.3)
POTASSIUM SERPL-SCNC: 3.9 MMOL/L — SIGNIFICANT CHANGE UP (ref 3.5–5.3)
POTASSIUM SERPL-SCNC: 3.9 MMOL/L — SIGNIFICANT CHANGE UP (ref 3.5–5.3)
RBC # BLD: 2.8 M/UL — LOW (ref 3.8–5.2)
RBC # BLD: 2.8 M/UL — LOW (ref 3.8–5.2)
RBC # FLD: 13.9 % — SIGNIFICANT CHANGE UP (ref 10.3–14.5)
RBC # FLD: 13.9 % — SIGNIFICANT CHANGE UP (ref 10.3–14.5)
SODIUM SERPL-SCNC: 140 MMOL/L — SIGNIFICANT CHANGE UP (ref 135–145)
SODIUM SERPL-SCNC: 140 MMOL/L — SIGNIFICANT CHANGE UP (ref 135–145)
WBC # BLD: 4.82 K/UL — SIGNIFICANT CHANGE UP (ref 3.8–10.5)
WBC # BLD: 4.82 K/UL — SIGNIFICANT CHANGE UP (ref 3.8–10.5)
WBC # FLD AUTO: 4.82 K/UL — SIGNIFICANT CHANGE UP (ref 3.8–10.5)
WBC # FLD AUTO: 4.82 K/UL — SIGNIFICANT CHANGE UP (ref 3.8–10.5)

## 2023-12-08 PROCEDURE — 99239 HOSP IP/OBS DSCHRG MGMT >30: CPT

## 2023-12-08 RX ORDER — LEVOTHYROXINE SODIUM 125 MCG
1 TABLET ORAL
Qty: 0 | Refills: 0 | DISCHARGE

## 2023-12-08 RX ORDER — HYDRALAZINE HCL 50 MG
1 TABLET ORAL
Refills: 0 | DISCHARGE

## 2023-12-08 RX ORDER — LEVOTHYROXINE SODIUM 125 MCG
1 TABLET ORAL
Qty: 30 | Refills: 0
Start: 2023-12-08 | End: 2024-01-06

## 2023-12-08 RX ORDER — AMLODIPINE BESYLATE 2.5 MG/1
1 TABLET ORAL
Qty: 30 | Refills: 0
Start: 2023-12-08 | End: 2024-01-06

## 2023-12-08 RX ADMIN — Medication 175 MICROGRAM(S): at 06:13

## 2023-12-08 RX ADMIN — AMLODIPINE BESYLATE 10 MILLIGRAM(S): 2.5 TABLET ORAL at 06:13

## 2023-12-08 RX ADMIN — PREGABALIN 1000 MICROGRAM(S): 225 CAPSULE ORAL at 12:23

## 2023-12-08 NOTE — DISCHARGE NOTE PROVIDER - NSFOLLOWUPCLINICS_GEN_ALL_ED_FT
[facility] Gastroenter[facility]  Gastroenterology  [address] [place] [address], [facility] [address]  [place] [address]  Phone: [phone]  Fax:     [facility]  General Surgery  [address] [place] [address]  Phone: [phone]  Fax:      [facility]  Gastroenterology  [address] [place] [address], [facility] [address]  [place] [address]  Phone: [phone]  Fax:     [facility]  General Surgery  [address] [place] [address]  Phone: [phone]  Fax:

## 2023-12-08 NOTE — PROGRESS NOTE ADULT - SUBJECTIVE AND OBJECTIVE BOX
Surgery Progress Note     Subjective:  Patient seen and examined on AM rounds. Patient denies BRBPR for past 2 days. Denies abdominal pain, nausea, vomiting. Tolerating diet.      Vital Signs:  Vital Signs Last 24 Hrs  T(C): 36.8 (08 Dec 2023 06:13), Max: 37.3 (07 Dec 2023 22:00)  T(F): 98.2 (08 Dec 2023 06:13), Max: 99.2 (07 Dec 2023 22:00)  HR: 75 (08 Dec 2023 06:13) (71 - 89)  BP: 134/72 (08 Dec 2023 06:13) (129/67 - 150/70)  BP(mean): --  RR: 17 (08 Dec 2023 06:13) (16 - 18)  SpO2: 100% (08 Dec 2023 06:13) (98% - 100%)    Parameters below as of 08 Dec 2023 06:13  Patient On (Oxygen Delivery Method): room air        CAPILLARY BLOOD GLUCOSE          I&O's Detail    07 Dec 2023 07:01  -  08 Dec 2023 07:00  --------------------------------------------------------  IN:    Oral Fluid: 720 mL  Total IN: 720 mL    OUT:    Voided (mL): 1225 mL  Total OUT: 1225 mL    Total NET: -505 mL            Physical Exam:  General: NAD, resting comfortably in bed  HEENT: Normocephalic atraumatic  Respiratory: Nonlabored respirations  Cardio: regular rate  Abdomen: soft, nondistended, nontender  Vascular: extremities are warm and well perfused      Labs:    12-08    140  |  105  |  16  ----------------------------<  148<H>  3.9   |  27  |  0.97    Ca    8.5      08 Dec 2023 06:00                              7.5    4.82  )-----------( 219      ( 08 Dec 2023 06:00 )             24.4

## 2023-12-08 NOTE — DISCHARGE NOTE PROVIDER - HOSPITAL COURSE
68 W with PMH significant for but not limited to HTN, HLD, hypothyroid, Pernicious anemia, extensive diverticulosis with recurrent diverticular bleeding s/p hemicolectomy, rectosigmoid resection with ileostomy reversal (6/13/22) who is presenting from home with BRBPR most consistent with diverticular bleed.     ·  Problem: GI bleed.   ·  Plan: presenting with multiple episodes of BRBPR, likely recurrent diverticular bleed   - s/p IVF   - appreciate GI input, no plan for endoscopic evaluation at this time   - CTA abdomen if ongoing bleeding  - advanced diet.    ·  Problem: Anemia due to acute blood loss.   ·  Plan: Acute blood loss anemia d/t lower GI bleed   - Hgb down-trended and then stabilized   - monitor CBC, keep Hgb above 7.    ·  Problem: Diverticulosis.   ·  Plan: s/p ext L hemicolectomy (2021) and transverse and rectosigmoid resection with reversal of ileostomy (2022) due to diverticular bleeding   - colorectal surgery and GI consult appreciated.    ·  Problem: HTN (hypertension).   ·  Plan: - c/w home amlodipine 10mg   - resume lisinopril   - monitor BP    ·  Problem: Hypothyroidism.   ·  Plan: - c/w home LT4 175 mcgs.    ·  Problem: Pernicious anemia.   ·  Plan: - c/w home B12.

## 2023-12-08 NOTE — DISCHARGE NOTE PROVIDER - PROVIDER TOKENS
FREE:[LAST:[Dr.Naderi Page],PHONE:[phone]],FAX:[(   )    -],ADDRESS:[Your Primary Care Provider]],PROVIDER:[TOKEN:[[id number]]] FREE:[LAST:[Dr.Naderi Page],PHONE:[phone]],FAX:[(   )    -],ADDRESS:[Your Primary Care Provider]],PROVIDER:[TOKEN:[[id number]]]

## 2023-12-08 NOTE — DISCHARGE NOTE PROVIDER - NSFOLLOWUPCLINICSTOKEN_GEN_ALL_ED_FT
[id number]: || ||00\01||False;[id number]: || ||00\01||False; [id number]: || ||00\01||False;[id number]: || ||00\01||False;

## 2023-12-08 NOTE — PROGRESS NOTE ADULT - ASSESSMENT
68y F W/ PMHX HTN, HLD, Hypothyroidism, SOLOMON, Pernicious anemia, diverticulosis, SBO (8/2022), Colon CA s/p resection and Naeem's (10/2021), Naeem's reversal and ileostomy creation (03/2022), and ileostomy reversal (6/13/22) w/ Dr. Beach presents w/ 3 episodes of painless BRBPR since this AM.  Patient had 2 episodes of BRBPR 10/2023 in [place] and [place], has colonoscopies done at that time with findings of known diverticulosis per patient. Patient is HDS, H/H stable.    PLAN:   - No blood per rectum since admission   - Tolerating regular diet  - No acute surgical intervention  - If concern for ongoing bleeding would recommend CTA and IR consult  - Surgery will follow      A Team Surgery  [id number]   68y F W/ PMHX HTN, HLD, Hypothyroidism, SOLOMON, Pernicious anemia, diverticulosis, SBO (8/2022), Colon CA s/p resection and Naeem's (10/2021), Naeem's reversal and ileostomy creation (03/2022), and ileostomy reversal (6/13/22) w/ Dr. Beach presents w/ 3 episodes of painless BRBPR since this AM.  Patient had 2 episodes of BRBPR 10/2023 in [place] and [place], has colonoscopies done at that time with findings of known diverticulosis per patient. Patient is HDS, H/H stable.    PLAN:   - No blood per rectum since admission   - Tolerating regular diet  - No acute surgical intervention  - If concern for ongoing bleeding would recommend CTA and IR consult  - Surgery will follow      A Team Surgery  [id number]   68y F W/ PMHX HTN, HLD, Hypothyroidism, SOLOMON, Pernicious anemia, diverticulosis, SBO (8/2022), lap extended L hemicolectomy w/ colostomy 6/2021 for diverticular bleed, s/p colostomy reversal and ileostomy creation (03/2022), and ileostomy reversal (6/13/22) w/ Dr. Beach presents w/ 3 episodes of painless BRBPR since this AM.  Patient had 2 episodes of BRBPR 10/2023 in [place] and [place], has colonoscopies done at that time with findings of known diverticulosis per patient. Patient is HDS, H/H stable.    PLAN:   - No blood per rectum since admission   - Tolerating regular diet  - No acute surgical intervention  - If concern for ongoing bleeding would recommend CTA and IR consult  - Surgery will follow      A Team Surgery  [id number]   68y F W/ PMHX HTN, HLD, Hypothyroidism, SOLOMON, Pernicious anemia, diverticulosis, SBO (8/2022), lap extended L hemicolectomy w/ colostomy 6/2021 for diverticular bleed, s/p colostomy reversal and ileostomy creation (03/2022), and ileostomy reversal (6/13/22) w/ Dr. Beach presents w/ 3 episodes of painless BRBPR since this AM.  Patient had 2 episodes of BRBPR 10/2023 in [place] and [place], has colonoscopies done at that time with findings of known diverticulosis per patient. Patient is HDS, H/H stable.    PLAN:   - No blood per rectum since admission   - Tolerating regular diet  - No acute surgical intervention  - If concern for ongoing bleeding would recommend CTA and IR consult  - Surgery will follow      A Team Surgery  [id number]

## 2023-12-08 NOTE — DISCHARGE NOTE PROVIDER - NSDCFUADDAPPT_GEN_ALL_CORE_FT
Continue medications as prescribed. Follow up with your primary care doctor, Gastroenterology, and colorectal surgery for further evaluation and management. Please call to make an appointment within 1-2 weeks of discharge.

## 2023-12-08 NOTE — DISCHARGE NOTE PROVIDER - NSDCCPCAREPLAN_GEN_ALL_CORE_FT
PRINCIPAL DISCHARGE DIAGNOSIS  Diagnosis: Lower gastrointestinal bleed  Assessment and Plan of Treatment: Likely due to diverticulosis. Bleeding resolved spontaneously. Hemolgobin stabilized. No plan for repeat colonoscopy at this time. Follow up with GI and Colorectal surgery.      SECONDARY DISCHARGE DIAGNOSES  Diagnosis: HTN (hypertension)  Assessment and Plan of Treatment: Continue Amlodipine and Lisinopril. Follow up with PCP.    Diagnosis: Pernicious anemia  Assessment and Plan of Treatment: Continue with Vitamin B12.

## 2023-12-08 NOTE — DISCHARGE NOTE NURSING/CASE MANAGEMENT/SOCIAL WORK - NSDCPEFALRISK_GEN_ALL_CORE
For information on Fall & Injury Prevention, visit: [email or web address] OR  [email or web address] OR  https://www.cdc.gov/steadi/patient.html For information on Fall & Injury Prevention, visit: [email or web address] OR  [email or web address] OR  https://www.cdc.gov/steadi/patient.html

## 2023-12-08 NOTE — DISCHARGE NOTE PROVIDER - CARE PROVIDER_API CALL
Dr.Naderi Page,   Your Primary Care Provider  Phone: [phone]  Fax: (   )    -  Follow Up Time:     Linette Umaña  Internal Medicine  [address] [place] [id number]  Phone: [phone]  Fax: [phone]  Follow Up Time:    Dr.Naderi Page,   Your Primary Care Provider  Phone: [phone]  Fax: (   )    -  Follow Up Time:     Linette Umaña  Internal Medicine  [address] [place] [address]  Phone: [phone]  Fax: [phone]  Follow Up Time:

## 2023-12-08 NOTE — DISCHARGE NOTE PROVIDER - ATTENDING DISCHARGE PHYSICAL EXAMINATION:
General: woman sitting in bed appears comfortable in NAD, awake and alert  Eyes: PERRLA, nonicteric sclera  HENMT: NCAT, MMM, dentures in place, no oropharyngeal erythema or exudates   Neck: Supple, no thyromegaly, trachea midline   Respiratory: No respiratory distress, CTABL, No rales, rhonchi, wheezing.  Cardiovascular: Regular rate and rhythm; No m/g/r. 2+ peripheral pulses  Gastrointestinal: multiple abdominal scars, healed. Soft, Nontender, obese abdomen, +BS. No palpable organomegaly  Extremities: No c/c/e; warm to touch  Neurological: Speech fluent/face symmetric. Moving all 4 extremities; Sensation to LT grossly in tact in BLEs.   Skin: No rashes, No erythema   Psych: AAOx3; appropriate mood and affect

## 2023-12-08 NOTE — DISCHARGE NOTE NURSING/CASE MANAGEMENT/SOCIAL WORK - PATIENT PORTAL LINK FT
You can access the FollowMyHealth Patient Portal offered by [facility] by registering at the following website: [email or web address]. By joining [employer]’s FollowMyHealth portal, you will also be able to view your health information using other applications (apps) compatible with our system. You can access the FollowMyHealth Patient Portal offered by [facility] by registering at the following website: [email or web address]. By joining [email or web address]’s FollowMyHealth portal, you will also be able to view your health information using other applications (apps) compatible with our system.

## 2023-12-08 NOTE — DISCHARGE NOTE PROVIDER - NSDCMRMEDTOKEN_GEN_ALL_CORE_FT
amLODIPine 10 mg oral tablet: 1 tab(s) orally once a day  hydrALAZINE 25 mg oral tablet: 1 tab(s) orally 3 times a day as needed for  elevated BP given for takes for systolic BP &gt;160  levothyroxine 175 mcg (0.175 mg) oral tablet: 1 tab(s) orally once a day  lisinopril 40 mg oral tablet: 1 tab(s) orally once a day  simvastatin 40 mg oral tablet: 1 tab(s) orally once a day (at bedtime)  Vitamin B12: orally once a day   amLODIPine 10 mg oral tablet: 1 tab(s) orally once a day  levothyroxine 175 mcg (0.175 mg) oral tablet: 1 tab(s) orally once a day  simvastatin 40 mg oral tablet: 1 tab(s) orally once a day (at bedtime)  Vitamin B12: orally once a day

## 2024-01-02 NOTE — ASU PATIENT PROFILE, ADULT - PREOP PAIN SCORE
Patient has upcoming appt w/ Tiffanie CNP. Patient's last f/u with primary cardiologist was in 2011. Patient needs a work in appt w/D Efren or patient can establish care with any cardiologist that has any appointments available. Appt w/ Tiffanie needs to be canceled. Please assist. Thank you.   0

## 2024-01-03 NOTE — CHART NOTE - NSCHARTNOTESELECT_GEN_ALL_CORE
IR pre procedure note/Event Note
IR Event Note
IR Pre Procedure Note
Post Procedure
Surgery
Transfer Note
Transfer Note
Standing/Walking/Toileting/Moving from bed to chair

## 2024-01-25 NOTE — ASU PREOP CHECKLIST - SKIN PREP
4/8/2021  Chief Complaint   Patient presents with    6 Month Follow-Up     6 month CDS, bilateral LE arterial, mesenteric artery and office visit.      Pain Assessment  Nicolas Groves has a pain level on 0/10 scale:  8, but can get up to a 10.  Location:  bilateral LE's  Description:  hurts when walking  He is scheduled for an epidural 7/19/23.      HALLE Valenzuela is a 71 y.o. male who presents with a complaint of carotid stenosis follow up.  The patient has been previously diagnosed with Left carotid artery stenosis and Right carotid artery stenosis.  Date last scanned 7/13/23.  Patient denies numbness/weakness on any one side, speech disturbances or visual disturbances.  Since last visit patient has had left CDS to be review today and right CDS to be reviewed today.  The patient has not previously undergone surgical intervention for this problem.    The patient presents for FU on his mesenteric artery bypass graft.  Per vascular scan today, the bypass graft from aorta to SMA is patent.  No change from previous study of 12/30/22.    The patient is also here for FU of his lower extremities with an arterial scan and ALISTAIR's.  Date of last scan was 9/1/22 on the right and 6/30/22 on the left.  The symptoms first began year(s) ago.  The patient has not experienced any new symptoms since last visit.  The patient does have leg pain with walking, but believes it is related to his lower back.  He is scheduled for an epidural 7/19/23.  There have been no new developments in patient's medical status.  Relevant surgeries include atherectomy w/stent placement left popliteal artery & angioplasty of left PTA 7/7/16, atherectomy w/angioplasty right popliteal artery, PTA and right peroneal artery 7/26/16, atherectomy w/stent left popliteal artery & atherectomy w/angioplasty left tibioperoneal trunk 8/11/16, left fem-pop bypass 9/14/16, aorta to SMA bypass using 8 mm PTFE graft 11/29/16, atherectomy w/angioplasty right SFA 
done

## 2024-01-31 NOTE — PROVIDER CONTACT NOTE (OTHER) - NAME OF MD/NP/PA/DO NOTIFIED:
Cardiology consult called to Rina per perfect serv patient added to census.   
Rafiq Garza
Rafiq Garza

## 2024-03-21 NOTE — PATIENT PROFILE ADULT - DO YOU FEEL THREATENED BY OTHERS?
PRINCIPAL PROCEDURE  Procedure: Left heart cardiac catheterization  Findings and Treatment: Study Date:     03/21/2024   Cath Lab Report    Diagnostic Cardiologist:       Adryan Segovia MD    Referring Physician:           Rush Graff MD   Procedures Performed   1.    Arterial Access - Right Femoral   2.    Diagnostic Coronary Angiography   3.    PCI: COURT   Conclusions:   1. Occluded dLCx and pLAD   2. Patent SVG-OM and LIMA-LAD   3. Severe atherosclerosis of the OM1 after the SVG touchdown s/p PCI  with COURT  Recommendations:   Continue DAPT for at least 6 months.        no

## 2024-03-22 NOTE — DISCHARGE NOTE PROVIDER - NPI NUMBER (FOR SYSADMIN USE ONLY) :
Please notify patient  that the radiologist read the  low dose CT of lung as normal.  I recommend repeating test annually for lung cancer screening.  We can set up again next year at annual physical.  
[[id number]]

## 2024-03-23 NOTE — PATIENT PROFILE ADULT - FUNCTIONAL ASSESSMENT - BASIC MOBILITY 2.
4 = No assist / stand by assistance
9 y/o F with fever to 38 and associated vomiting   Otherwise, nontoxic appearing  Vaccinations UTD. Last received acetaminophen at 530P  Physical Exam suggestive of AOM  Antipyretics, Amox  Pediatrician follow up.

## 2024-05-03 ENCOUNTER — EMERGENCY (EMERGENCY)
Facility: HOSPITAL | Age: 69
LOS: 1 days | Discharge: HOME | End: 2024-05-03
Attending: EMERGENCY MEDICINE | Admitting: EMERGENCY MEDICINE
Payer: MEDICARE

## 2024-05-03 VITALS
OXYGEN SATURATION: 99 % | DIASTOLIC BLOOD PRESSURE: 53 MMHG | HEIGHT: 64 IN | SYSTOLIC BLOOD PRESSURE: 106 MMHG | TEMPERATURE: 98 F | WEIGHT: 205.03 LBS | RESPIRATION RATE: 17 BRPM | HEART RATE: 89 BPM

## 2024-05-03 DIAGNOSIS — Z98.890 OTHER SPECIFIED POSTPROCEDURAL STATES: Chronic | ICD-10-CM

## 2024-05-03 DIAGNOSIS — Z90.49 ACQUIRED ABSENCE OF OTHER SPECIFIED PARTS OF DIGESTIVE TRACT: Chronic | ICD-10-CM

## 2024-05-03 DIAGNOSIS — Z90.89 ACQUIRED ABSENCE OF OTHER ORGANS: Chronic | ICD-10-CM

## 2024-05-03 DIAGNOSIS — Z90.710 ACQUIRED ABSENCE OF BOTH CERVIX AND UTERUS: Chronic | ICD-10-CM

## 2024-05-03 PROCEDURE — 99285 EMERGENCY DEPT VISIT HI MDM: CPT

## 2024-05-03 RX ORDER — SODIUM CHLORIDE 9 MG/ML
1000 INJECTION INTRAMUSCULAR; INTRAVENOUS; SUBCUTANEOUS ONCE
Refills: 0 | Status: COMPLETED | OUTPATIENT
Start: 2024-05-03 | End: 2024-05-03

## 2024-05-03 NOTE — ED ADULT TRIAGE NOTE - CHIEF COMPLAINT QUOTE
Pt arrives to ED via EMS from home with report of rectal bleeding starting around 21:30.  Blood is bright red with clots.  Hx diverticulitis with bleeding, hypothyroidism, HN, HLD. newly diagnosed DM2.

## 2024-05-04 VITALS
OXYGEN SATURATION: 98 % | HEART RATE: 69 BPM | SYSTOLIC BLOOD PRESSURE: 114 MMHG | DIASTOLIC BLOOD PRESSURE: 75 MMHG | TEMPERATURE: 98 F | RESPIRATION RATE: 18 BRPM

## 2024-05-04 LAB
ALBUMIN SERPL ELPH-MCNC: 3.8 G/DL — SIGNIFICANT CHANGE UP (ref 3.3–5)
ALP SERPL-CCNC: 80 U/L — SIGNIFICANT CHANGE UP (ref 40–120)
ALT FLD-CCNC: 15 U/L — SIGNIFICANT CHANGE UP (ref 4–33)
ANION GAP SERPL CALC-SCNC: 14 MMOL/L — SIGNIFICANT CHANGE UP (ref 7–14)
APTT BLD: 23.9 SEC — LOW (ref 24.5–35.6)
AST SERPL-CCNC: 25 U/L — SIGNIFICANT CHANGE UP (ref 4–32)
BASE EXCESS BLDV CALC-SCNC: 3.9 MMOL/L — HIGH (ref -2–3)
BASOPHILS # BLD AUTO: 0.04 K/UL — SIGNIFICANT CHANGE UP (ref 0–0.2)
BASOPHILS NFR BLD AUTO: 0.6 % — SIGNIFICANT CHANGE UP (ref 0–2)
BILIRUB SERPL-MCNC: 0.4 MG/DL — SIGNIFICANT CHANGE UP (ref 0.2–1.2)
BLD GP AB SCN SERPL QL: NEGATIVE — SIGNIFICANT CHANGE UP
BLOOD GAS VENOUS COMPREHENSIVE RESULT: SIGNIFICANT CHANGE UP
BUN SERPL-MCNC: 30 MG/DL — HIGH (ref 7–23)
CALCIUM SERPL-MCNC: 8.5 MG/DL — SIGNIFICANT CHANGE UP (ref 8.4–10.5)
CHLORIDE BLDV-SCNC: 101 MMOL/L — SIGNIFICANT CHANGE UP (ref 96–108)
CHLORIDE SERPL-SCNC: 98 MMOL/L — SIGNIFICANT CHANGE UP (ref 98–107)
CO2 BLDV-SCNC: 29.8 MMOL/L — HIGH (ref 22–26)
CO2 SERPL-SCNC: 25 MMOL/L — SIGNIFICANT CHANGE UP (ref 22–31)
CREAT SERPL-MCNC: 1.27 MG/DL — SIGNIFICANT CHANGE UP (ref 0.5–1.3)
EGFR: 46 ML/MIN/1.73M2 — LOW
EOSINOPHIL # BLD AUTO: 0.04 K/UL — SIGNIFICANT CHANGE UP (ref 0–0.5)
EOSINOPHIL NFR BLD AUTO: 0.6 % — SIGNIFICANT CHANGE UP (ref 0–6)
GAS PNL BLDV: 130 MMOL/L — LOW (ref 136–145)
GLUCOSE BLDV-MCNC: 159 MG/DL — HIGH (ref 70–99)
GLUCOSE SERPL-MCNC: 174 MG/DL — HIGH (ref 70–99)
HCO3 BLDV-SCNC: 28 MMOL/L — SIGNIFICANT CHANGE UP (ref 22–29)
HCT VFR BLD CALC: 26.3 % — LOW (ref 34.5–45)
HCT VFR BLDA CALC: 28 % — LOW (ref 34.5–46.5)
HGB BLD CALC-MCNC: 9.3 G/DL — LOW (ref 11.7–16.1)
HGB BLD-MCNC: 8.4 G/DL — LOW (ref 11.5–15.5)
IANC: 3.91 K/UL — SIGNIFICANT CHANGE UP (ref 1.8–7.4)
IMM GRANULOCYTES NFR BLD AUTO: 0.3 % — SIGNIFICANT CHANGE UP (ref 0–0.9)
INR BLD: 0.98 RATIO — SIGNIFICANT CHANGE UP (ref 0.85–1.18)
LACTATE BLDV-MCNC: 1.3 MMOL/L — SIGNIFICANT CHANGE UP (ref 0.5–2)
LIDOCAIN IGE QN: 30 U/L — SIGNIFICANT CHANGE UP (ref 7–60)
LYMPHOCYTES # BLD AUTO: 1.9 K/UL — SIGNIFICANT CHANGE UP (ref 1–3.3)
LYMPHOCYTES # BLD AUTO: 29.6 % — SIGNIFICANT CHANGE UP (ref 13–44)
MAGNESIUM SERPL-MCNC: 1.8 MG/DL — SIGNIFICANT CHANGE UP (ref 1.6–2.6)
MCHC RBC-ENTMCNC: 26.8 PG — LOW (ref 27–34)
MCHC RBC-ENTMCNC: 31.9 GM/DL — LOW (ref 32–36)
MCV RBC AUTO: 84 FL — SIGNIFICANT CHANGE UP (ref 80–100)
MONOCYTES # BLD AUTO: 0.51 K/UL — SIGNIFICANT CHANGE UP (ref 0–0.9)
MONOCYTES NFR BLD AUTO: 7.9 % — SIGNIFICANT CHANGE UP (ref 2–14)
NEUTROPHILS # BLD AUTO: 3.91 K/UL — SIGNIFICANT CHANGE UP (ref 1.8–7.4)
NEUTROPHILS NFR BLD AUTO: 61 % — SIGNIFICANT CHANGE UP (ref 43–77)
NRBC # BLD: 0 /100 WBCS — SIGNIFICANT CHANGE UP (ref 0–0)
NRBC # FLD: 0 K/UL — SIGNIFICANT CHANGE UP (ref 0–0)
PCO2 BLDV: 42 MMHG — SIGNIFICANT CHANGE UP (ref 39–52)
PH BLDV: 7.44 — HIGH (ref 7.32–7.43)
PLATELET # BLD AUTO: 216 K/UL — SIGNIFICANT CHANGE UP (ref 150–400)
PO2 BLDV: 139 MMHG — HIGH (ref 25–45)
POTASSIUM BLDV-SCNC: 5.1 MMOL/L — SIGNIFICANT CHANGE UP (ref 3.5–5.1)
POTASSIUM SERPL-MCNC: 3.9 MMOL/L — SIGNIFICANT CHANGE UP (ref 3.5–5.3)
POTASSIUM SERPL-SCNC: 3.9 MMOL/L — SIGNIFICANT CHANGE UP (ref 3.5–5.3)
PROT SERPL-MCNC: 7.1 G/DL — SIGNIFICANT CHANGE UP (ref 6–8.3)
PROTHROM AB SERPL-ACNC: 11 SEC — SIGNIFICANT CHANGE UP (ref 9.5–13)
RBC # BLD: 3.13 M/UL — LOW (ref 3.8–5.2)
RBC # FLD: 16.9 % — HIGH (ref 10.3–14.5)
RH IG SCN BLD-IMP: POSITIVE — SIGNIFICANT CHANGE UP
SAO2 % BLDV: 99.2 % — HIGH (ref 67–88)
SODIUM SERPL-SCNC: 137 MMOL/L — SIGNIFICANT CHANGE UP (ref 135–145)
WBC # BLD: 6.42 K/UL — SIGNIFICANT CHANGE UP (ref 3.8–10.5)
WBC # FLD AUTO: 6.42 K/UL — SIGNIFICANT CHANGE UP (ref 3.8–10.5)

## 2024-05-04 PROCEDURE — 99223 1ST HOSP IP/OBS HIGH 75: CPT

## 2024-05-04 PROCEDURE — 74177 CT ABD & PELVIS W/CONTRAST: CPT | Mod: 26,MC

## 2024-05-04 RX ORDER — ONDANSETRON 8 MG/1
4 TABLET, FILM COATED ORAL ONCE
Refills: 0 | Status: COMPLETED | OUTPATIENT
Start: 2024-05-04 | End: 2024-05-04

## 2024-05-04 RX ORDER — MORPHINE SULFATE 50 MG/1
4 CAPSULE, EXTENDED RELEASE ORAL ONCE
Refills: 0 | Status: DISCONTINUED | OUTPATIENT
Start: 2024-05-04 | End: 2024-05-04

## 2024-05-04 RX ADMIN — SODIUM CHLORIDE 1000 MILLILITER(S): 9 INJECTION INTRAMUSCULAR; INTRAVENOUS; SUBCUTANEOUS at 01:49

## 2024-05-04 RX ADMIN — ONDANSETRON 4 MILLIGRAM(S): 8 TABLET, FILM COATED ORAL at 03:13

## 2024-05-04 RX ADMIN — MORPHINE SULFATE 4 MILLIGRAM(S): 50 CAPSULE, EXTENDED RELEASE ORAL at 03:12

## 2024-05-04 NOTE — ED PROVIDER NOTE - NSFOLLOWUPCLINICS_GEN_ALL_ED_FT
Gastroenterology at [facility]  Gastroenterology  [address] [place] [id number]  Phone: [phone]  Fax:

## 2024-05-04 NOTE — CONSULT NOTE ADULT - ASSESSMENT
Assessment: 68F w/ diverticulosis, SBO (8/2022), lap extended L hemicolectomy w/ colostomy 6/2021 for diverticular bleed, s/p colostomy reversal and ileostomy creation (03/2022), and ileostomy reversal (6/13/22) w/ Dr. Beach presents with 5 episodes of BRBPR.  Currently well appearing, no symptoms of anemia in ED, no active bleeding on exam.     Recs:  - obtain CBC   - workup of GI bleed per ED/medicine/GI  - no surgical intervention at this time   - d/w fellow on behalf of attending   - call with concerns     Low Myrick MD, PGY3  A Team Surgery   [id number]

## 2024-05-04 NOTE — CONSULT NOTE ADULT - SUBJECTIVE AND OBJECTIVE BOX
COLORECTAL SURGERY CONSULT NOTE  --------------------------------------------------------------------------------------------    Patient is a 68y old  Female who presents with a chief complaint of rectal bleed    HPI: 68F,       ROS: 10-system review is otherwise negative except HPI above.      PAST MEDICAL & SURGICAL HISTORY:  Hypothyroidism      Diverticulosis      HTN (hypertension)      Lower gastrointestinal bleeding  Multipel times since '2014: last episode 5/2018      Pernicious anemia      SOLOMON (obstructive sleep apnea)  non-compliant with CPAP      Type 2 diabetes mellitus      Left ureteral stone      Fibroid uterus  '94  surgically treated      Lumbar herniated disc  ' 2010  surgically treated      Multiparity      Heart murmur  mild      History of tonsillectomy  age 14      S/P WESTLEY (total abdominal hysterectomy)  ' 94  Laproscopic.  benign      S/P lumbar laminectomy  ' 2010  with Fusion      S/P colon resection  colostomy; 10/2021, ludwig reversal & creation of ileostomy 3/2022      History of lithotripsy  left kidney stone extraction; 02/2020        FAMILY HISTORY:  Family history of gastric cancer  Mother    Family history of throat cancer  Sister        SOCIAL HISTORY:      ALLERGIES: oxycodone (Other)  Valium (Other)      HOME MEDICATIONS:     CURRENT MEDICATIONS  MEDICATIONS (STANDING):   MEDICATIONS (PRN):  --------------------------------------------------------------------------------------------    Vitals:   T(C): 36.5 (05-03-24 @ 22:45), Max: 36.5 (05-03-24 @ 22:45)  HR: 89 (05-03-24 @ 22:45) (89 - 89)  BP: 106/53 (05-03-24 @ 22:45) (106/53 - 106/53)  RR: 17 (05-03-24 @ 22:45) (17 - 17)  SpO2: 99% (05-03-24 @ 22:45) (99% - 99%)  CAPILLARY BLOOD GLUCOSE      POCT Blood Glucose.: 208 mg/dL (03 May 2024 22:55)      Height (cm): 162.6 (05-03 @ 22:45)  Weight (kg): 93 (05-03 @ 22:45)  BMI (kg/m2): 35.2 (05-03 @ 22:45)  BSA (m2): 1.98 (05-03 @ 22:45)    PHYSICAL EXAM:   General: NAD, Lying in bed comfortably  Neuro: A+Ox3  HEENT: NC/AT, EOMI  Neck: Soft, supple  Cardio: RRR  GI/Abd: Soft, NT/ND, no rebound/guarding, right ventral hernia at stoma site, reducible, laparotomy well healed   Anorectal: no blood no hemorrhoid no fissure   --------------------------------------------------------------------------------------------    LABS    BMP (05-04 @ 01:19)             137     |  98      |  30<H> 		Ca++ --      Ca 8.5                ---------------------------------( 174<H>		Mg 1.80               3.9     |  25      |  1.27  			Ph --        LFTs (05-04 @ 01:19)      TPro 7.1 / Alb 3.8 / TBili 0.4 / DBili -- / AST 25 / ALT 15 / AlkPhos 80    Coags (05-04 @ 01:19)  aPTT 23.9<L> / INR 0.98 / PT 11.0        VBG (05-04 @ 01:13)     7.44<H> / 42 / 139<H> / 28 / 3.9<H> / 99.2<H>%     Lactate: 1.3    --------------------------------------------------------------------------------------------    MICROBIOLOGY  Urinalysis (05-04 @ 01:19):     Color:  / Appearance:  / SG:  / pH:  / Gluc: 174<H> / Ketones:  / Bili:  / Urobili:  / Protein : / Nitrites:  / Leuk.Est:  / RBC:  / WBC:  / Sq Epi:  / Non Sq Epi:  / Bacteria          --------------------------------------------------------------------------------------------    IMAGING    < from: CT Abdomen and Pelvis w/ IV Cont (05.04.24 @ 01:30) >    INTERPRETATION:  CLINICAL INFORMATION: Abdominal pain concern for   colitis, diverticulitis, and appendicitis.    COMPARISON: CT abdomen pelvis 4/13/2023.    CONTRAST/COMPLICATIONS:  IV Contrast: Omnipaque 350  90 cc administered   10 cc discarded  Oral Contrast: NONE  Complications: None reported at time of study completion    PROCEDURE:  CT of the Abdomen and Pelvis was performed.  Sagittal and coronal reformats were performed.    FINDINGS:  LOWER CHEST: Small pericardial effusion, decreased from prior exam.    LIVER: Within normal limits.  BILE DUCTS: Normal caliber.  GALLBLADDER: Within normal limits.  SPLEEN: Within normal limits.  PANCREAS: Within normal limits.  ADRENALS: Within normal limits.  KIDNEYS/URETERS: Within normal limits.    BLADDER: Within normal limits.  REPRODUCTIVE ORGANS: Hysterectomy.    BOWEL: No bowel obstruction. Appendix is normal.. Small bowel anastomosis   within the right upper abdominal quadrant. Partial colectomy. Colonic   diverticulosis. No colonic wall thickening or acute inflammatory changes.   Questionable gastric wall thickening versus underdistention.    PERITONEUM: No ascites.  VESSELS: Atherosclerotic changes.  RETROPERITONEUM/LYMPH NODES: No lymphadenopathy.  ABDOMINAL WALL: Multiple, incisional ventral hernias containing   nonobstructed bowel loops and fat. Midline postsurgical changes.  BONES: L5 laminectomy with [id number] spinal fusion. Degenerative   changes.    IMPRESSION:  No colitis or diverticulitis. No questionable gastric wall thickening   versus underdistention.    < end of copied text >   COLORECTAL SURGERY CONSULT NOTE  --------------------------------------------------------------------------------------------    Patient is a 68y old  Female who presents with a chief complaint of rectal bleed    HPI: 68F W/ PMHX HTN, HLD, Hypothyroidism, SOLOMON, Pernicious anemia, diverticulosis, SBO (8/2022), lap extended L hemicolectomy w/ colostomy 6/2021 for diverticular bleed, s/p colostomy reversal and ileostomy creation (03/2022), and ileostomy reversal (6/13/22) w/ Dr. Beach presents with 5 episodes of BRBPR.  Associated with crampy abd pain.  Not on antiplatelet or anticoagulation.  Prior c-scope was last admission for similar complaint in 2023.  No nausea or vomiting.  Says this is the lightest episode she has of bleeding of all her presentations.        ROS: 10-system review is otherwise negative except HPI above.      PAST MEDICAL & SURGICAL HISTORY:  Hypothyroidism      Diverticulosis      HTN (hypertension)      Lower gastrointestinal bleeding  Multipel times since '2014: last episode 5/2018      Pernicious anemia      SOLOMON (obstructive sleep apnea)  non-compliant with CPAP      Type 2 diabetes mellitus      Left ureteral stone      Fibroid uterus  '94  surgically treated      Lumbar herniated disc  ' 2010  surgically treated      Multiparity      Heart murmur  mild      History of tonsillectomy  age 14      S/P WESTLEY (total abdominal hysterectomy)  ' 94  Laproscopic.  benign      S/P lumbar laminectomy  ' 2010  with Fusion      S/P colon resection  colostomy; 10/2021, ludwig reversal & creation of ileostomy 3/2022      History of lithotripsy  left kidney stone extraction; 02/2020        FAMILY HISTORY:  Family history of gastric cancer  Mother    Family history of throat cancer  Sister        SOCIAL HISTORY:      ALLERGIES: oxycodone (Other)  Valium (Other)      HOME MEDICATIONS:     CURRENT MEDICATIONS  MEDICATIONS (STANDING):   MEDICATIONS (PRN):  --------------------------------------------------------------------------------------------    Vitals:   T(C): 36.5 (05-03-24 @ 22:45), Max: 36.5 (05-03-24 @ 22:45)  HR: 89 (05-03-24 @ 22:45) (89 - 89)  BP: 106/53 (05-03-24 @ 22:45) (106/53 - 106/53)  RR: 17 (05-03-24 @ 22:45) (17 - 17)  SpO2: 99% (05-03-24 @ 22:45) (99% - 99%)  CAPILLARY BLOOD GLUCOSE      POCT Blood Glucose.: 208 mg/dL (03 May 2024 22:55)      Height (cm): 162.6 (05-03 @ 22:45)  Weight (kg): 93 (05-03 @ 22:45)  BMI (kg/m2): 35.2 (05-03 @ 22:45)  BSA (m2): 1.98 (05-03 @ 22:45)    PHYSICAL EXAM:   General: NAD, Lying in bed comfortably  Neuro: A+Ox3  HEENT: NC/AT, EOMI  Neck: Soft, supple  Cardio: RRR  GI/Abd: Soft, NT/ND, no rebound/guarding, right ventral hernia at stoma site, reducible, laparotomy well healed   Anorectal: no blood no hemorrhoid no fissure   --------------------------------------------------------------------------------------------    LABS    BMP (05-04 @ 01:19)             137     |  98      |  30<H> 		Ca++ --      Ca 8.5                ---------------------------------( 174<H>		Mg 1.80               3.9     |  25      |  1.27  			Ph --        LFTs (05-04 @ 01:19)      TPro 7.1 / Alb 3.8 / TBili 0.4 / DBili -- / AST 25 / ALT 15 / AlkPhos 80    Coags (05-04 @ 01:19)  aPTT 23.9<L> / INR 0.98 / PT 11.0        VBG (05-04 @ 01:13)     7.44<H> / 42 / 139<H> / 28 / 3.9<H> / 99.2<H>%     Lactate: 1.3    --------------------------------------------------------------------------------------------    MICROBIOLOGY  Urinalysis (05-04 @ 01:19):     Color:  / Appearance:  / SG:  / pH:  / Gluc: 174<H> / Ketones:  / Bili:  / Urobili:  / Protein : / Nitrites:  / Leuk.Est:  / RBC:  / WBC:  / Sq Epi:  / Non Sq Epi:  / Bacteria          --------------------------------------------------------------------------------------------    IMAGING    < from: CT Abdomen and Pelvis w/ IV Cont (05.04.24 @ 01:30) >    INTERPRETATION:  CLINICAL INFORMATION: Abdominal pain concern for   colitis, diverticulitis, and appendicitis.    COMPARISON: CT abdomen pelvis 4/13/2023.    CONTRAST/COMPLICATIONS:  IV Contrast: Omnipaque 350  90 cc administered   10 cc discarded  Oral Contrast: NONE  Complications: None reported at time of study completion    PROCEDURE:  CT of the Abdomen and Pelvis was performed.  Sagittal and coronal reformats were performed.    FINDINGS:  LOWER CHEST: Small pericardial effusion, decreased from prior exam.    LIVER: Within normal limits.  BILE DUCTS: Normal caliber.  GALLBLADDER: Within normal limits.  SPLEEN: Within normal limits.  PANCREAS: Within normal limits.  ADRENALS: Within normal limits.  KIDNEYS/URETERS: Within normal limits.    BLADDER: Within normal limits.  REPRODUCTIVE ORGANS: Hysterectomy.    BOWEL: No bowel obstruction. Appendix is normal.. Small bowel anastomosis   within the right upper abdominal quadrant. Partial colectomy. Colonic   diverticulosis. No colonic wall thickening or acute inflammatory changes.   Questionable gastric wall thickening versus underdistention.    PERITONEUM: No ascites.  VESSELS: Atherosclerotic changes.  RETROPERITONEUM/LYMPH NODES: No lymphadenopathy.  ABDOMINAL WALL: Multiple, incisional ventral hernias containing   nonobstructed bowel loops and fat. Midline postsurgical changes.  BONES: L5 laminectomy with [id number] spinal fusion. Degenerative   changes.    IMPRESSION:  No colitis or diverticulitis. No questionable gastric wall thickening   versus underdistention.    < end of copied text >

## 2024-05-04 NOTE — ED ADULT NURSE NOTE - NSFALLHARMRISKINTERV_ED_ALL_ED
Assistance OOB with selected safe patient handling equipment if applicable/Assistance with ambulation/Communicate risk of Fall with Harm to all staff, patient, and family/Encourage patient to sit up slowly, dangle for a short time, stand at bedside before walking/Monitor gait and stability/Orthostatic vital signs/Provide visual cue: red socks, yellow wristband, yellow gown, etc/Reinforce activity limits and safety measures with patient and family/Bed in lowest position, wheels locked, appropriate side rails in place/Call bell, personal items and telephone in reach/Instruct patient to call for assistance before getting out of bed/chair/stretcher/Non-slip footwear applied when patient is off stretcher/[place] to call system/Physically safe environment - no spills, clutter or unnecessary equipment/Purposeful Proactive Rounding/Room/bathroom lighting operational, light cord in reach

## 2024-05-04 NOTE — ED PROVIDER NOTE - NSFOLLOWUPINSTRUCTIONS_ED_ALL_ED_FT
Today you were evaluated at [facility] ED for bloody stools.     While you were here we preformed blood work and obtained imaging, the results are attached to the discharge paperwork.    We recommend that you follow up with your GI physician.     At this time there is no evidence of infection, however, it is important to return to ED for fever, worsening constant abdominal pain, chills, night sweats    Additionally, return to ED if you are feeling lightheadedness, dizziness, palpitations, shortness of breath    *********************************************************************************************************************    Diverticulosis  Body outline showing the stomach and intestines, with a close-up of diverticula on the large intestine.  Diverticulosis is when small pouches called diverticula form in the wall of the colon. The colon is where water is absorbed. It is also where poop (stool) is formed. The pouches form when the inside layer of the colon pushes through weak spots in the outer layers of the colon. You may have a few pouches or many of them.    In most cases, the pouches do not cause problems. If they become inflamed or infected, you may have a condition called diverticulitis.    What are the causes?  The cause of this condition is not known.    What increases the risk?  You are more likely to get this condition if:  You are older than 60 years of age.  You do not eat enough fiber or you get constipated a lot.  You are overweight.  You do not get enough exercise.  You smoke.  You take over-the-counter pain medicines.  You have a family history of the condition.  What are the signs or symptoms?  In most people, there are no symptoms. If you do have symptoms, they may include:  Bloating.  Stomach cramps.  Constipation or diarrhea.  Pain in the lower left side of your abdomen.  How is this diagnosed?  This condition is often diagnosed during an exam for other colon problems. It may be diagnosed when you have:  A colonoscopy. This is when a tube with a camera on the end is used to look at your colon.  A barium enema. This is an X-ray exam that uses dye to look at your colon.  A CT scan.  How is this treated?  Pear, berries, artichoke, and beans.  You may not need treatment. Your health care provider will tell you what you can do at home to help prevent problems. You may need treatment if you have symptoms or if you have had diverticulitis before. You may be told to:  Eat a high-fiber diet.  Take medicine to relax your colon.  Lose weight.  Follow these instructions at home:  Medicines    Take over-the-counter and prescription medicines only as told by your provider.  If told, take a fiber supplement or probiotic.  Managing constipation    Your condition may cause constipation. To prevent or treat constipation, you may need to:  Drink enough fluid to keep your pee (urine) pale yellow.  Take over-the-counter or prescription medicines.  Eat foods that are high in fiber, such as beans, whole grains, and fresh fruits and vegetables.  Limit foods that are high in fat and processed sugars, such as fried or sweet foods.  Try not to strain when you poop.    Contact a health care provider if:  Your symptoms get worse all of a sudden.  You have pain in your abdomen that gets worse.  You have bloating or stomach cramps.  You continue to have frequent constipation.  You have a fever or chills.  You vomit.  Your poop is bloody, black, or tarry.

## 2024-05-04 NOTE — ED ADULT NURSE NOTE - PAIN RATING/NUMBER SCALE (0-10): ACTIVITY
Anesthesia Post Evaluation    Patient: Luba Sanchez    Procedure(s) Performed: Procedure(s) (LRB):  IMAGING-(MRI) (Bilateral)    Final Anesthesia Type: general  Patient location during evaluation: PACU  Patient participation: Yes- Able to Participate  Level of consciousness: awake  Post-procedure vital signs: reviewed and stable  Pain management: adequate  Airway patency: patent  PONV status at discharge: No PONV  Anesthetic complications: no      Cardiovascular status: hemodynamically stable  Respiratory status: unassisted, spontaneous ventilation and room air  Hydration status: euvolemic  Follow-up not needed.        Visit Vitals  /70   Pulse 83   Temp 37.1 °C (98.8 °F) (Axillary)   Resp 20   Wt 43.9 kg (96 lb 12.5 oz)   SpO2 100%       Pain/Sandra Score: Pain Assessment Performed: Yes (6/29/2017  8:45 PM)  Presence of Pain: non-verbal indicators absent (resting quietly w eyes closed) (6/29/2017  8:45 PM)      
0 (no pain/absence of nonverbal indicators of pain)

## 2024-05-04 NOTE — ED PROVIDER NOTE - OBJECTIVE STATEMENT
68-year-old female past medical history type 2 diabetes insulin-dependent, hyperlipidemia, hypothyroid, diverticulosis presents with rectal bleeding.  Patient Dors is for rectal stools today.  She endorses passage of bright red blood with dark clots, blood in the toilet bowl.  Patient states she is having abdominal pain with bowel movements.  Endorses lightheadedness.  Denies shortness of breath, chest pain, palpitations.  Patient's last colonoscopy was 10/2023.

## 2024-05-04 NOTE — ED PROVIDER NOTE - ATTENDING CONTRIBUTION TO CARE
68 F complaining of episodes of rectal bleeding.  Patient has this complaint recurrently thought to be due to diverticulosis.  Has had previous multiple imaging and colonoscopy, last one was last fall.  Patient saw clots and blood in toilet, crampy abdominal pain prior to needing to use bathroom.  No preceding illness, no fever/chills, no sick contacts.  Patient denies lightheadedness/dizziness.  States occasionally has a bleeding episode which resolves quickly but this has lasted longer.  MMM, clear lungs, heart reg, abd soft, NT to palp, no yon/guarding, no CVAT, no edema, NT calves.

## 2024-05-04 NOTE — ED ADULT NURSE REASSESSMENT NOTE - NSFALLHARMRISKINTERV_ED_ALL_ED

## 2024-05-04 NOTE — ED PROVIDER NOTE - PROGRESS NOTE DETAILS
Emmett BRAVO, PGY-1;  Patient with additional bloody bowel movement since first evaluated. CTAP indicating diverticulosis. Patient will require medical admission. Initial CBC clotted, will f/u repeat CBC.

## 2024-05-04 NOTE — ED PROVIDER NOTE - PATIENT PORTAL LINK FT
You can access the FollowMyHealth Patient Portal offered by [facility] by registering at the following website: [email or web address]. By joining [employer]’s FollowMyHealth portal, you will also be able to view your health information using other applications (apps) compatible with our system.

## 2024-05-04 NOTE — ED PROVIDER NOTE - CLINICAL SUMMARY MEDICAL DECISION MAKING FREE TEXT BOX
68-year-old female past medical history type 2 diabetes insulin-dependent, hyperlipidemia, hypothyroid, diverticulosis presents with rectal bleeding. Patient endorses bright red blood in bowl with passage of clots. Endorses lightheadness and abdominal pain with BM. Last colonoscopy 10/2023. On arrival patient is tachycardic, nonfebrile, normotensive. PE significant for soft/nd, RLQ TTP abdomen, rectal exam with no hemorrhoids, rectal fissures. ARPIT with bright red blood per rectum, no massess palpated. Differential includes diverticulitis, angiodysplasia, colitis, less likely appendicitis, however, patient has TTP RLQ. Will order cbc/cmp/type and screen/pt and ptt/CTAP w/ IV contrast, fluids and reassess.

## 2024-05-06 ENCOUNTER — INPATIENT (INPATIENT)
Facility: HOSPITAL | Age: 69
LOS: 3 days | Discharge: HOME | End: 2024-05-10
Attending: HOSPITALIST | Admitting: HOSPITALIST
Payer: MEDICARE

## 2024-05-06 VITALS
RESPIRATION RATE: 18 BRPM | HEART RATE: 82 BPM | OXYGEN SATURATION: 95 % | SYSTOLIC BLOOD PRESSURE: 147 MMHG | HEIGHT: 64 IN | DIASTOLIC BLOOD PRESSURE: 96 MMHG | TEMPERATURE: 98 F

## 2024-05-06 DIAGNOSIS — Z98.890 OTHER SPECIFIED POSTPROCEDURAL STATES: Chronic | ICD-10-CM

## 2024-05-06 DIAGNOSIS — Z90.89 ACQUIRED ABSENCE OF OTHER ORGANS: Chronic | ICD-10-CM

## 2024-05-06 DIAGNOSIS — Z90.710 ACQUIRED ABSENCE OF BOTH CERVIX AND UTERUS: Chronic | ICD-10-CM

## 2024-05-06 DIAGNOSIS — Z90.49 ACQUIRED ABSENCE OF OTHER SPECIFIED PARTS OF DIGESTIVE TRACT: Chronic | ICD-10-CM

## 2024-05-06 LAB
ALBUMIN SERPL ELPH-MCNC: 3.8 G/DL — SIGNIFICANT CHANGE UP (ref 3.3–5)
ALP SERPL-CCNC: 64 U/L — SIGNIFICANT CHANGE UP (ref 40–120)
ALT FLD-CCNC: 13 U/L — SIGNIFICANT CHANGE UP (ref 4–33)
ANION GAP SERPL CALC-SCNC: 13 MMOL/L — SIGNIFICANT CHANGE UP (ref 7–14)
APTT BLD: 28.2 SEC — SIGNIFICANT CHANGE UP (ref 24.5–35.6)
AST SERPL-CCNC: 17 U/L — SIGNIFICANT CHANGE UP (ref 4–32)
BASOPHILS # BLD AUTO: 0.02 K/UL — SIGNIFICANT CHANGE UP (ref 0–0.2)
BASOPHILS NFR BLD AUTO: 0.3 % — SIGNIFICANT CHANGE UP (ref 0–2)
BILIRUB SERPL-MCNC: 0.3 MG/DL — SIGNIFICANT CHANGE UP (ref 0.2–1.2)
BLD GP AB SCN SERPL QL: NEGATIVE — SIGNIFICANT CHANGE UP
BUN SERPL-MCNC: 22 MG/DL — SIGNIFICANT CHANGE UP (ref 7–23)
CALCIUM SERPL-MCNC: 8.3 MG/DL — LOW (ref 8.4–10.5)
CHLORIDE SERPL-SCNC: 99 MMOL/L — SIGNIFICANT CHANGE UP (ref 98–107)
CO2 SERPL-SCNC: 25 MMOL/L — SIGNIFICANT CHANGE UP (ref 22–31)
CREAT SERPL-MCNC: 1.29 MG/DL — SIGNIFICANT CHANGE UP (ref 0.5–1.3)
EGFR: 45 ML/MIN/1.73M2 — LOW
EOSINOPHIL # BLD AUTO: 0.04 K/UL — SIGNIFICANT CHANGE UP (ref 0–0.5)
EOSINOPHIL NFR BLD AUTO: 0.5 % — SIGNIFICANT CHANGE UP (ref 0–6)
GLUCOSE SERPL-MCNC: 125 MG/DL — HIGH (ref 70–99)
HCT VFR BLD CALC: 21.5 % — LOW (ref 34.5–45)
HGB BLD-MCNC: 6.9 G/DL — CRITICAL LOW (ref 11.5–15.5)
IANC: 4.88 K/UL — SIGNIFICANT CHANGE UP (ref 1.8–7.4)
IMM GRANULOCYTES NFR BLD AUTO: 0.4 % — SIGNIFICANT CHANGE UP (ref 0–0.9)
INR BLD: 1.04 RATIO — SIGNIFICANT CHANGE UP (ref 0.85–1.18)
LYMPHOCYTES # BLD AUTO: 2.02 K/UL — SIGNIFICANT CHANGE UP (ref 1–3.3)
LYMPHOCYTES # BLD AUTO: 26.8 % — SIGNIFICANT CHANGE UP (ref 13–44)
MCHC RBC-ENTMCNC: 27.2 PG — SIGNIFICANT CHANGE UP (ref 27–34)
MCHC RBC-ENTMCNC: 32.1 GM/DL — SIGNIFICANT CHANGE UP (ref 32–36)
MCV RBC AUTO: 84.6 FL — SIGNIFICANT CHANGE UP (ref 80–100)
MONOCYTES # BLD AUTO: 0.54 K/UL — SIGNIFICANT CHANGE UP (ref 0–0.9)
MONOCYTES NFR BLD AUTO: 7.2 % — SIGNIFICANT CHANGE UP (ref 2–14)
NEUTROPHILS # BLD AUTO: 4.88 K/UL — SIGNIFICANT CHANGE UP (ref 1.8–7.4)
NEUTROPHILS NFR BLD AUTO: 64.8 % — SIGNIFICANT CHANGE UP (ref 43–77)
NRBC # BLD: 0 /100 WBCS — SIGNIFICANT CHANGE UP (ref 0–0)
NRBC # FLD: 0 K/UL — SIGNIFICANT CHANGE UP (ref 0–0)
PLATELET # BLD AUTO: 202 K/UL — SIGNIFICANT CHANGE UP (ref 150–400)
POTASSIUM SERPL-MCNC: 3.5 MMOL/L — SIGNIFICANT CHANGE UP (ref 3.5–5.3)
POTASSIUM SERPL-SCNC: 3.5 MMOL/L — SIGNIFICANT CHANGE UP (ref 3.5–5.3)
PROT SERPL-MCNC: 6.8 G/DL — SIGNIFICANT CHANGE UP (ref 6–8.3)
PROTHROM AB SERPL-ACNC: 11.7 SEC — SIGNIFICANT CHANGE UP (ref 9.5–13)
RBC # BLD: 2.54 M/UL — LOW (ref 3.8–5.2)
RBC # FLD: 16.7 % — HIGH (ref 10.3–14.5)
RH IG SCN BLD-IMP: POSITIVE — SIGNIFICANT CHANGE UP
SODIUM SERPL-SCNC: 137 MMOL/L — SIGNIFICANT CHANGE UP (ref 135–145)
WBC # BLD: 7.53 K/UL — SIGNIFICANT CHANGE UP (ref 3.8–10.5)
WBC # FLD AUTO: 7.53 K/UL — SIGNIFICANT CHANGE UP (ref 3.8–10.5)

## 2024-05-06 PROCEDURE — 99285 EMERGENCY DEPT VISIT HI MDM: CPT

## 2024-05-06 RX ORDER — SODIUM CHLORIDE 9 MG/ML
1000 INJECTION INTRAMUSCULAR; INTRAVENOUS; SUBCUTANEOUS ONCE
Refills: 0 | Status: COMPLETED | OUTPATIENT
Start: 2024-05-06 | End: 2024-05-06

## 2024-05-06 RX ORDER — ACETAMINOPHEN 500 MG
1000 TABLET ORAL ONCE
Refills: 0 | Status: COMPLETED | OUTPATIENT
Start: 2024-05-06 | End: 2024-05-06

## 2024-05-06 RX ADMIN — SODIUM CHLORIDE 1000 MILLILITER(S): 9 INJECTION INTRAMUSCULAR; INTRAVENOUS; SUBCUTANEOUS at 16:32

## 2024-05-06 RX ADMIN — Medication 400 MILLIGRAM(S): at 23:17

## 2024-05-06 NOTE — ED ADULT TRIAGE NOTE - CHIEF COMPLAINT QUOTE
Patient brought to ER by EMS from home. Patient was discharged on Saturday with dx of diverticulosis. Patient has c/o clots and bright red blood per rectum this morning. Patient started to walk and felt dizzy. Patient has a 20 gauge IV lock to left antecubital. No blood thinners. Type 2 DM: FS: 164

## 2024-05-06 NOTE — ED ADULT NURSE NOTE - NSFALLHARMRISKINTERV_ED_ALL_ED

## 2024-05-06 NOTE — ED PROVIDER NOTE - ATTENDING CONTRIBUTION TO CARE
I performed a face-to-face evaluation of the patient and performed a history and physical examination. I agree with the history and physical examination. If this was a PA visit, I personally saw the patient with the PA and performed a substantive portion of the visit including all aspects of the medical decision making.    Diverticular LGIB. Recent Colorectal Surg consult: no surgical intervention. Check Hb. Give IVF. Re-assess.

## 2024-05-06 NOTE — ED ADULT NURSE NOTE - OBJECTIVE STATEMENT
Patient came in with the complaints of rectal bleeding with clots. As per patient she was discharged from here on Saturday with diverticulosis and today since 1 pm she is getting intermittent cramping in the stomach and had 4 episodes of rectal bleeding with clots. Patient also c/o dizziness while walking. No other complaints. No distress noted. Patient came in with Infiltrated I/V on left antecubital. I/V removed and bandage applied. Nursing care continues

## 2024-05-06 NOTE — CONSULT NOTE ADULT - SUBJECTIVE AND OBJECTIVE BOX
SURGERY CONSULT NOTE    HPI:  68F W/ PMHX HTN, HLD, Hypothyroidism, SOLOMON, Pernicious anemia, diverticulosis, SBO (8/2022), lap extended L hemicolectomy w/ colostomy 6/2021 for diverticular bleed, s/p colostomy reversal and ileostomy creation (03/2022), and ileostomy reversal (6/13/22) w/ Dr. Beach presents with 4 episodes of BRBPR today.  Associated with crampy abd pain.  Not on antiplatelet or anticoagulation.  Prior c-scope was last admission for similar complaint in 2023 October.  Reports nausea, but able to tolerate PO. Feeling tired today.     Was seen on Friday night, 5/4, for similar symptoms with plan for GI follow up, but has not been able to do so since then. H/H 6.9 in ED, was receiving 1 U pRBC during encounter.     PAST MEDICAL & SURGICAL HISTORY:  Hypothyroidism      Diverticulosis      HTN (hypertension)      Lower gastrointestinal bleeding  Multipel times since '2014: last episode 5/2018      Pernicious anemia      SOLOMON (obstructive sleep apnea)  non-compliant with CPAP      Type 2 diabetes mellitus      Left ureteral stone      Fibroid uterus  '94  surgically treated      Lumbar herniated disc  ' 2010  surgically treated      Multiparity      Heart murmur  mild      History of tonsillectomy  age 14      S/P WESTLEY (total abdominal hysterectomy)  ' 94  Laproscopic.  benign      S/P lumbar laminectomy  ' 2010  with Fusion      S/P colon resection  colostomy; 10/2021, ludwig reversal & creation of ileostomy 3/2022      History of lithotripsy  left kidney stone extraction; 02/2020          MEDICATIONS  (STANDING):  acetaminophen   IVPB .. 1000 milliGRAM(s) IV Intermittent once    MEDICATIONS  (PRN):      Allergies    Valium (Other)    Intolerances    oxycodone (Other)      SOCIAL HISTORY:    FAMILY HISTORY:  Family history of gastric cancer  Mother    Family history of throat cancer  Sister        Physical Exam:  General: NAD, resting comfortably  Pulmonary: normal resp effort, CTA-B  Cardiovascular: NSR, no murmurs  Abdominal: soft, ND/NT, no organomegaly    Vital Signs Last 24 Hrs  T(C): 36.9 (06 May 2024 20:00), Max: 37.2 (06 May 2024 15:38)  T(F): 98.5 (06 May 2024 20:00), Max: 98.9 (06 May 2024 15:38)  HR: 88 (06 May 2024 20:00) (82 - 95)  BP: 106/60 (06 May 2024 20:00) (103/60 - 147/96)  BP(mean): 75 (06 May 2024 16:08) (75 - 75)  RR: 18 (06 May 2024 20:00) (16 - 18)  SpO2: 100% (06 May 2024 20:00) (95% - 100%)    Parameters below as of 06 May 2024 20:00  Patient On (Oxygen Delivery Method): room air        I&O's Summary          LABS:                        6.9    7.53  )-----------( 202      ( 06 May 2024 16:15 )             21.5     05-06    137  |  99  |  22  ----------------------------<  125<H>  3.5   |  25  |  1.29    Ca    8.3<L>      06 May 2024 16:15    TPro  6.8  /  Alb  3.8  /  TBili  0.3  /  DBili  x   /  AST  17  /  ALT  13  /  AlkPhos  64  05-06    PT/INR - ( 06 May 2024 16:15 )   PT: 11.7 sec;   INR: 1.04 ratio         PTT - ( 06 May 2024 16:15 )  PTT:28.2 sec  Urinalysis Basic - ( 06 May 2024 16:15 )    Color: x / Appearance: x / SG: x / pH: x  Gluc: 125 mg/dL / Ketone: x  / Bili: x / Urobili: x   Blood: x / Protein: x / Nitrite: x   Leuk Esterase: x / RBC: x / WBC x   Sq Epi: x / Non Sq Epi: x / Bacteria: x      CAPILLARY BLOOD GLUCOSE      POCT Blood Glucose.: 164 mg/dL (06 May 2024 14:16)    LIVER FUNCTIONS - ( 06 May 2024 16:15 )  Alb: 3.8 g/dL / Pro: 6.8 g/dL / ALK PHOS: 64 U/L / ALT: 13 U/L / AST: 17 U/L / GGT: x             Cultures:      RADIOLOGY & ADDITIONAL STUDIES:        Plan:  68F w/ diverticulosis, SBO (8/2022), lap extended L hemicolectomy w/ colostomy 6/2021 for diverticular bleed, s/p colostomy reversal and ileostomy creation (03/2022), and ileostomy reversal (6/13/22) w/ Dr. Beach presents with 4pisodes of BRBR this evening. Recently seen in ED on 5/4 for similar symptoms with plan to follow up with GI, which she has not been able to do    - H/H 6.9- currently receiving 1 U pRBC; recommend serial CBC and post tranfusion CBC, transfuse PRN  - currently HDS  - monitor abdominal exam  - recommend GI consult for possible scope evaluation given second episode, last scope Oct 2023  - no acute surgical intervention at this time  - surgery to follow    to be d/w attending    PLAN NOT FINAL UNTIL DISCUSSION W ATTENDING    A Team, [id number]         SURGERY CONSULT NOTE    HPI:  68F W/ PMHX HTN, HLD, Hypothyroidism, SOLOMON, Pernicious anemia, diverticulosis, SBO (8/2022), lap extended L hemicolectomy w/ colostomy 6/2021 for diverticular bleed, s/p colostomy reversal and ileostomy creation (03/2022), and ileostomy reversal (6/13/22) w/ Dr. Beach presents with 4 episodes of BRBPR today.  Associated with crampy abd pain.  Not on antiplatelet or anticoagulation.  Prior c-scope was last admission for similar complaint in 2023 October.  Reports nausea, but able to tolerate PO. Feeling tired today.     Was seen on Friday night, 5/4, for similar symptoms with plan for GI follow up, but has not been able to do so since then. H/H 6.9 in ED, was receiving 1 U pRBC during encounter.     PAST MEDICAL & SURGICAL HISTORY:  Hypothyroidism      Diverticulosis      HTN (hypertension)      Lower gastrointestinal bleeding  Multipel times since '2014: last episode 5/2018      Pernicious anemia      SOLOMON (obstructive sleep apnea)  non-compliant with CPAP      Type 2 diabetes mellitus      Left ureteral stone      Fibroid uterus  '94  surgically treated      Lumbar herniated disc  ' 2010  surgically treated      Multiparity      Heart murmur  mild      History of tonsillectomy  age 14      S/P WESTLEY (total abdominal hysterectomy)  ' 94  Laproscopic.  benign      S/P lumbar laminectomy  ' 2010  with Fusion      S/P colon resection  colostomy; 10/2021, ludwig reversal & creation of ileostomy 3/2022      History of lithotripsy  left kidney stone extraction; 02/2020          MEDICATIONS  (STANDING):  acetaminophen   IVPB .. 1000 milliGRAM(s) IV Intermittent once    MEDICATIONS  (PRN):      Allergies    Valium (Other)    Intolerances    oxycodone (Other)      SOCIAL HISTORY:    FAMILY HISTORY:  Family history of gastric cancer  Mother    Family history of throat cancer  Sister        Physical Exam:  General: NAD, resting comfortably  Pulmonary: normal resp effort, CTA-B  Cardiovascular: NSR, no murmurs  Abdominal: soft, ND/NT, no organomegaly    Vital Signs Last 24 Hrs  T(C): 36.9 (06 May 2024 20:00), Max: 37.2 (06 May 2024 15:38)  T(F): 98.5 (06 May 2024 20:00), Max: 98.9 (06 May 2024 15:38)  HR: 88 (06 May 2024 20:00) (82 - 95)  BP: 106/60 (06 May 2024 20:00) (103/60 - 147/96)  BP(mean): 75 (06 May 2024 16:08) (75 - 75)  RR: 18 (06 May 2024 20:00) (16 - 18)  SpO2: 100% (06 May 2024 20:00) (95% - 100%)    Parameters below as of 06 May 2024 20:00  Patient On (Oxygen Delivery Method): room air        I&O's Summary          LABS:                        6.9    7.53  )-----------( 202      ( 06 May 2024 16:15 )             21.5     05-06    137  |  99  |  22  ----------------------------<  125<H>  3.5   |  25  |  1.29    Ca    8.3<L>      06 May 2024 16:15    TPro  6.8  /  Alb  3.8  /  TBili  0.3  /  DBili  x   /  AST  17  /  ALT  13  /  AlkPhos  64  05-06    PT/INR - ( 06 May 2024 16:15 )   PT: 11.7 sec;   INR: 1.04 ratio         PTT - ( 06 May 2024 16:15 )  PTT:28.2 sec  Urinalysis Basic - ( 06 May 2024 16:15 )    Color: x / Appearance: x / SG: x / pH: x  Gluc: 125 mg/dL / Ketone: x  / Bili: x / Urobili: x   Blood: x / Protein: x / Nitrite: x   Leuk Esterase: x / RBC: x / WBC x   Sq Epi: x / Non Sq Epi: x / Bacteria: x      CAPILLARY BLOOD GLUCOSE      POCT Blood Glucose.: 164 mg/dL (06 May 2024 14:16)    LIVER FUNCTIONS - ( 06 May 2024 16:15 )  Alb: 3.8 g/dL / Pro: 6.8 g/dL / ALK PHOS: 64 U/L / ALT: 13 U/L / AST: 17 U/L / GGT: x             Cultures:      RADIOLOGY & ADDITIONAL STUDIES:        Plan:  68F w/ diverticulosis, SBO (8/2022), lap extended L hemicolectomy w/ colostomy 6/2021 for diverticular bleed, s/p colostomy reversal and ileostomy creation (03/2022), and ileostomy reversal (6/13/22) w/ Dr. Beach presents with 4pisodes of BRBR this evening. Recently seen in ED on 5/4 for similar symptoms with plan to follow up with GI, which she has not been able to do    - recommend CTA to evaluate for bleed, would recommend hydration simultaneous to CT given Cr 1.29 (1.27 on Friday, 0.97 on prior admission)  - H/H 6.9- currently receiving 1 U pRBC; recommend serial CBC and post tranfusion CBC, transfuse PRN  - currently HDS  - monitor abdominal exam  - recommend GI consult for possible scope evaluation given second episode, last scope Oct 2023  - no acute surgical intervention at this time  - surgery to follow    d/w Dr. Nettles, surgery attending    A Team, [id number]

## 2024-05-06 NOTE — ED PROVIDER NOTE - PROGRESS NOTE DETAILS
JAKE Joy PGY1- evaluated by surgery, no surgical intervention at this time. admit to medicine. Patient stable at this time

## 2024-05-06 NOTE — ED PROVIDER NOTE - CLINICAL SUMMARY MEDICAL DECISION MAKING FREE TEXT BOX
Navi: Diverticular LGIB. Recent Colorectal Surg consult: no surgical intervention. Check Hb. Give IVF. Re-assess.

## 2024-05-07 DIAGNOSIS — K62.5 HEMORRHAGE OF ANUS AND RECTUM: ICD-10-CM

## 2024-05-07 DIAGNOSIS — Z29.9 ENCOUNTER FOR PROPHYLACTIC MEASURES, UNSPECIFIED: ICD-10-CM

## 2024-05-07 DIAGNOSIS — Z86.2 PERSONAL HISTORY OF DISEASES OF THE BLOOD AND BLOOD-FORMING ORGANS AND CERTAIN DISORDERS INVOLVING THE IMMUNE MECHANISM: ICD-10-CM

## 2024-05-07 DIAGNOSIS — E11.9 TYPE 2 DIABETES MELLITUS WITHOUT COMPLICATIONS: ICD-10-CM

## 2024-05-07 DIAGNOSIS — K92.2 GASTROINTESTINAL HEMORRHAGE, UNSPECIFIED: ICD-10-CM

## 2024-05-07 DIAGNOSIS — I10 ESSENTIAL (PRIMARY) HYPERTENSION: ICD-10-CM

## 2024-05-07 DIAGNOSIS — E03.9 HYPOTHYROIDISM, UNSPECIFIED: ICD-10-CM

## 2024-05-07 LAB
GLUCOSE BLDC GLUCOMTR-MCNC: 100 MG/DL — HIGH (ref 70–99)
GLUCOSE BLDC GLUCOMTR-MCNC: 105 MG/DL — HIGH (ref 70–99)
GLUCOSE BLDC GLUCOMTR-MCNC: 115 MG/DL — HIGH (ref 70–99)
GLUCOSE BLDC GLUCOMTR-MCNC: 118 MG/DL — HIGH (ref 70–99)
GLUCOSE BLDC GLUCOMTR-MCNC: 121 MG/DL — HIGH (ref 70–99)
GLUCOSE BLDC GLUCOMTR-MCNC: 96 MG/DL — SIGNIFICANT CHANGE UP (ref 70–99)
GLUCOSE BLDC GLUCOMTR-MCNC: 98 MG/DL — SIGNIFICANT CHANGE UP (ref 70–99)
HCT VFR BLD CALC: 20.8 % — CRITICAL LOW (ref 34.5–45)
HCT VFR BLD CALC: 24.1 % — LOW (ref 34.5–45)
HGB BLD-MCNC: 6.7 G/DL — CRITICAL LOW (ref 11.5–15.5)
HGB BLD-MCNC: 8.2 G/DL — LOW (ref 11.5–15.5)
MCHC RBC-ENTMCNC: 27.3 PG — SIGNIFICANT CHANGE UP (ref 27–34)
MCHC RBC-ENTMCNC: 28.7 PG — SIGNIFICANT CHANGE UP (ref 27–34)
MCHC RBC-ENTMCNC: 32.2 GM/DL — SIGNIFICANT CHANGE UP (ref 32–36)
MCHC RBC-ENTMCNC: 34 GM/DL — SIGNIFICANT CHANGE UP (ref 32–36)
MCV RBC AUTO: 84.3 FL — SIGNIFICANT CHANGE UP (ref 80–100)
MCV RBC AUTO: 84.9 FL — SIGNIFICANT CHANGE UP (ref 80–100)
NRBC # BLD: 0 /100 WBCS — SIGNIFICANT CHANGE UP (ref 0–0)
NRBC # BLD: 0 /100 WBCS — SIGNIFICANT CHANGE UP (ref 0–0)
NRBC # FLD: 0 K/UL — SIGNIFICANT CHANGE UP (ref 0–0)
NRBC # FLD: 0 K/UL — SIGNIFICANT CHANGE UP (ref 0–0)
PLATELET # BLD AUTO: 138 K/UL — LOW (ref 150–400)
PLATELET # BLD AUTO: 163 K/UL — SIGNIFICANT CHANGE UP (ref 150–400)
RBC # BLD: 2.45 M/UL — LOW (ref 3.8–5.2)
RBC # BLD: 2.86 M/UL — LOW (ref 3.8–5.2)
RBC # FLD: 15.7 % — HIGH (ref 10.3–14.5)
RBC # FLD: 16.4 % — HIGH (ref 10.3–14.5)
WBC # BLD: 6.07 K/UL — SIGNIFICANT CHANGE UP (ref 3.8–10.5)
WBC # BLD: 7.21 K/UL — SIGNIFICANT CHANGE UP (ref 3.8–10.5)
WBC # FLD AUTO: 6.07 K/UL — SIGNIFICANT CHANGE UP (ref 3.8–10.5)
WBC # FLD AUTO: 7.21 K/UL — SIGNIFICANT CHANGE UP (ref 3.8–10.5)

## 2024-05-07 PROCEDURE — 99222 1ST HOSP IP/OBS MODERATE 55: CPT

## 2024-05-07 PROCEDURE — 74174 CTA ABD&PLVS W/CONTRAST: CPT | Mod: 26

## 2024-05-07 PROCEDURE — 99223 1ST HOSP IP/OBS HIGH 75: CPT | Mod: GC

## 2024-05-07 RX ORDER — DEXTROSE 50 % IN WATER 50 %
15 SYRINGE (ML) INTRAVENOUS ONCE
Refills: 0 | Status: DISCONTINUED | OUTPATIENT
Start: 2024-05-07 | End: 2024-05-10

## 2024-05-07 RX ORDER — ESCITALOPRAM OXALATE 10 MG/1
10 TABLET, FILM COATED ORAL DAILY
Refills: 0 | Status: DISCONTINUED | OUTPATIENT
Start: 2024-05-07 | End: 2024-05-10

## 2024-05-07 RX ORDER — GLUCAGON INJECTION, SOLUTION 0.5 MG/.1ML
1 INJECTION, SOLUTION SUBCUTANEOUS ONCE
Refills: 0 | Status: DISCONTINUED | OUTPATIENT
Start: 2024-05-07 | End: 2024-05-10

## 2024-05-07 RX ORDER — DEXTROSE 50 % IN WATER 50 %
25 SYRINGE (ML) INTRAVENOUS ONCE
Refills: 0 | Status: DISCONTINUED | OUTPATIENT
Start: 2024-05-07 | End: 2024-05-10

## 2024-05-07 RX ORDER — INSULIN LISPRO 100/ML
VIAL (ML) SUBCUTANEOUS
Refills: 0 | Status: DISCONTINUED | OUTPATIENT
Start: 2024-05-07 | End: 2024-05-07

## 2024-05-07 RX ORDER — SIMVASTATIN 20 MG/1
1 TABLET, FILM COATED ORAL
Refills: 0 | DISCHARGE

## 2024-05-07 RX ORDER — ESCITALOPRAM OXALATE 10 MG/1
1 TABLET, FILM COATED ORAL
Refills: 0 | DISCHARGE

## 2024-05-07 RX ORDER — SEMAGLUTIDE 0.68 MG/ML
0.25 INJECTION, SOLUTION SUBCUTANEOUS
Refills: 0 | DISCHARGE

## 2024-05-07 RX ORDER — DEXTROSE 50 % IN WATER 50 %
12.5 SYRINGE (ML) INTRAVENOUS ONCE
Refills: 0 | Status: DISCONTINUED | OUTPATIENT
Start: 2024-05-07 | End: 2024-05-10

## 2024-05-07 RX ORDER — CHOLECALCIFEROL (VITAMIN D3) 125 MCG
1 CAPSULE ORAL
Refills: 0 | DISCHARGE

## 2024-05-07 RX ORDER — HYDROCHLOROTHIAZIDE 25 MG
1.5 TABLET ORAL
Refills: 0 | DISCHARGE

## 2024-05-07 RX ORDER — SODIUM CHLORIDE 9 MG/ML
1000 INJECTION INTRAMUSCULAR; INTRAVENOUS; SUBCUTANEOUS
Refills: 0 | Status: DISCONTINUED | OUTPATIENT
Start: 2024-05-07 | End: 2024-05-07

## 2024-05-07 RX ORDER — PANTOPRAZOLE SODIUM 20 MG/1
40 TABLET, DELAYED RELEASE ORAL DAILY
Refills: 0 | Status: DISCONTINUED | OUTPATIENT
Start: 2024-05-07 | End: 2024-05-10

## 2024-05-07 RX ORDER — INSULIN LISPRO 100/ML
VIAL (ML) SUBCUTANEOUS
Refills: 0 | Status: DISCONTINUED | OUTPATIENT
Start: 2024-05-07 | End: 2024-05-10

## 2024-05-07 RX ORDER — ACETAMINOPHEN 500 MG
650 TABLET ORAL EVERY 6 HOURS
Refills: 0 | Status: DISCONTINUED | OUTPATIENT
Start: 2024-05-07 | End: 2024-05-10

## 2024-05-07 RX ORDER — INSULIN LISPRO 100/ML
VIAL (ML) SUBCUTANEOUS EVERY 6 HOURS
Refills: 0 | Status: DISCONTINUED | OUTPATIENT
Start: 2024-05-07 | End: 2024-05-07

## 2024-05-07 RX ORDER — INSULIN LISPRO 100/ML
VIAL (ML) SUBCUTANEOUS AT BEDTIME
Refills: 0 | Status: DISCONTINUED | OUTPATIENT
Start: 2024-05-07 | End: 2024-05-10

## 2024-05-07 RX ORDER — SIMVASTATIN 20 MG/1
1 TABLET, FILM COATED ORAL
Qty: 0 | Refills: 0 | DISCHARGE

## 2024-05-07 RX ORDER — LISINOPRIL 2.5 MG/1
1 TABLET ORAL
Refills: 0 | DISCHARGE

## 2024-05-07 RX ORDER — PREGABALIN 225 MG/1
1000 CAPSULE ORAL DAILY
Refills: 0 | Status: DISCONTINUED | OUTPATIENT
Start: 2024-05-07 | End: 2024-05-10

## 2024-05-07 RX ORDER — LEVOTHYROXINE SODIUM 125 MCG
175 TABLET ORAL DAILY
Refills: 0 | Status: DISCONTINUED | OUTPATIENT
Start: 2024-05-07 | End: 2024-05-10

## 2024-05-07 RX ORDER — SIMVASTATIN 20 MG/1
20 TABLET, FILM COATED ORAL AT BEDTIME
Refills: 0 | Status: DISCONTINUED | OUTPATIENT
Start: 2024-05-07 | End: 2024-05-10

## 2024-05-07 RX ADMIN — ESCITALOPRAM OXALATE 10 MILLIGRAM(S): 10 TABLET, FILM COATED ORAL at 11:57

## 2024-05-07 RX ADMIN — SODIUM CHLORIDE 1000 MILLILITER(S): 9 INJECTION INTRAMUSCULAR; INTRAVENOUS; SUBCUTANEOUS at 04:08

## 2024-05-07 RX ADMIN — Medication 1000 MILLIGRAM(S): at 04:08

## 2024-05-07 RX ADMIN — SIMVASTATIN 20 MILLIGRAM(S): 20 TABLET, FILM COATED ORAL at 21:18

## 2024-05-07 RX ADMIN — SODIUM CHLORIDE 75 MILLILITER(S): 9 INJECTION INTRAMUSCULAR; INTRAVENOUS; SUBCUTANEOUS at 06:54

## 2024-05-07 RX ADMIN — Medication 1000 MILLIGRAM(S): at 04:07

## 2024-05-07 RX ADMIN — PANTOPRAZOLE SODIUM 40 MILLIGRAM(S): 20 TABLET, DELAYED RELEASE ORAL at 11:57

## 2024-05-07 RX ADMIN — PREGABALIN 1000 MICROGRAM(S): 225 CAPSULE ORAL at 11:57

## 2024-05-07 NOTE — H&P ADULT - PROBLEM SELECTOR PLAN 2
Home regimen Lisinopril 40mg, Hydralazine PRN, Amlodipine 10 mg. Can hold iso GIB, normotensive. History of DM on Ozempic 0.25mg / weekly, last dose Sunday 5/5. Previously well controlled A1C < 7 however in past several months A1C 11% (4/18/24). CGM.  FS ACHS /q6 when NPO  PATRICIA

## 2024-05-07 NOTE — H&P ADULT - HISTORY OF PRESENT ILLNESS
68F W/ PMHX HTN, HLD, Hypothyroidism, SOLOMON, Pernicious anemia, diverticulosis, SBO (8/2022), lap extended L hemicolectomy w/ colostomy 6/2021 for diverticular bleed, s/p colostomy reversal and ileostomy creation (03/2022), and ileostomy reversal (6/13/22) p/w 5d BRBPR associated with cramping abdominal pain. Not on AC. Recently admitted 12/23 for diverticular bleed, symptomatically managed without colonoscopy. Recent colonoscopy 2023 showed extensive diverticulosis.     ED hypotensive, H/H transfused 2u PRBC, IVF. Admitted to medicine for further management.  68F W/ PMHX HTN, HLD, Hypothyroidism, SOLOMON, Pernicious anemia, diverticulosis, SBO (8/2022), lap extended L hemicolectomy w/ colostomy 6/2021 for diverticular bleed, s/p colostomy reversal and ileostomy creation (03/2022), and ileostomy reversal (6/13/22) p/w 5d BRBPR with passage of large clots associated with cramping abdominal pain. Developed near syncope, palpitations, weakness following episodes. Not on AC. Endorsing mild nausea, headache which resolved. Denies constipation, averages 4-5 formed BMs per day. Recently admitted 12/23 for diverticular bleed, symptomatically managed without colonoscopy.     ED hypotensive, H/H transfused 2u PRBC, IVF. Admitted to medicine for further management.    Seen at bedside. Notes no further episodes of GIB since 2pm 5/6. Most recent endoscopy in 2022 wnl, colonoscopy performed 10/2023 showed extensive diverticulosis.

## 2024-05-07 NOTE — PATIENT PROFILE ADULT - FALL HARM RISK - HARM RISK INTERVENTIONS

## 2024-05-07 NOTE — ED ADULT NURSE REASSESSMENT NOTE - NS ED NURSE REASSESS COMMENT FT1
Break RN: Pt is A&Ox4, resting in stretcher with no complaints at this time. 2nd unit of PRBCs verified with JESENIA Hassan at bedside. Consent formed signed and in chart. 2nd unit of PRBCs initiated. Respirations even and unlabored, chest rise equal b/l. VS as noted in flow sheets. Pt denies chest pain, SOB, abdominal pain, N/V/D, h/a, dizziness, numbness/tingling or any urinary symptoms at this time. No acute distress noted. Safety maintained throughout.
report received from dayshift RN, pt respirations even and unlabored, appears in NAD. No complaints of chest pain, nausea, dizziness, vomiting  SOB, fever, chills verbalized. Post-Transfusion vital signs as per flowsheet at this time Pt tolerated entire unit as per order. Pt with no transfusion/reaction related symptoms at this time. Remains in no acute distress and offering no further complaints. Complaining of headache, medicated as per MAR. awaiting for admission.
Break RN: Pt is A&Ox4, resting in stretcher with no complaints at this time. Hemoglobin 6.7 and Hematocrit 20.8 as per hematology lab. Pt denies any symptoms or active bleeding at this time. SURINDER Islas made aware. Pt to receive 2nd unit of PRBCs. PRBCs not ready at this time due to antibody testing as per blood bank. Respirations even and unlabored, chest rise equal b/l. Pt denies chest pain, SOB, cough, chills, abdominal pain, N/V/D, h/a, dizziness, numbness/tingling or any urinary symptoms at this time. No acute distress noted. Safety maintained throughout.

## 2024-05-07 NOTE — PROGRESS NOTE ADULT - SUBJECTIVE AND OBJECTIVE BOX
24hr events:  - Received total 2 u PRBC  - CTA negative for bleed    SUBJECTIVE:  No abd pain, nausea or vomiting. Reports last bloody BM yesterday at 2pm but none overnight.    OBJECTIVE:  Vital Signs Last 24 Hrs  T(C): 37.1 (07 May 2024 12:10), Max: 37.2 (07 May 2024 01:27)  T(F): 98.7 (07 May 2024 12:10), Max: 99 (07 May 2024 03:25)  HR: 85 (07 May 2024 12:10) (80 - 91)  BP: 137/71 (07 May 2024 12:10) (100/60 - 137/71)  BP(mean): 70 (07 May 2024 03:25) (70 - 70)  RR: 17 (07 May 2024 12:10) (16 - 18)  SpO2: 100% (07 May 2024 12:10) (100% - 100%)    Parameters below as of 07 May 2024 12:10  Patient On (Oxygen Delivery Method): room air            Physical Examination:  GEN: NAD, resting quietly  PULM: symmetric chest rise bilaterally, no increased WOB  ABD: soft, nontender, nondistended, no rebound or guarding  EXTR: no LE erythema, moving all extremities      LABS:                        8.2    6.07  )-----------( 138      ( 07 May 2024 10:19 )             24.1       05-06    137  |  99  |  22  ----------------------------<  125<H>  3.5   |  25  |  1.29    Ca    8.3<L>      06 May 2024 16:15    TPro  6.8  /  Alb  3.8  /  TBili  0.3  /  DBili  x   /  AST  17  /  ALT  13  /  AlkPhos  64  05-06

## 2024-05-07 NOTE — H&P ADULT - NSHPPHYSICALEXAM_GEN_ALL_CORE
VITALS:   T(C): 36.8 (05-07-24 @ 06:15), Max: 37.2 (05-06-24 @ 15:38)  HR: 83 (05-07-24 @ 06:15) (80 - 95)  BP: 115/51 (05-07-24 @ 06:15) (100/60 - 147/96)  RR: 17 (05-07-24 @ 06:15) (16 - 18)  SpO2: 100% (05-07-24 @ 06:15) (95% - 100%)    GENERAL: NAD, lying in bed comfortably  HEAD:  Atraumatic, Normocephalic  EYES: EOMI, PERRLA, conjunctiva and sclera clear  ENT: Moist mucous membranes  NECK: Supple, No JVD  CHEST/LUNG: Clear to auscultation bilaterally; No rales, rhonchi, wheezing, or rubs. Unlabored respirations  HEART: Regular rate and rhythm; No murmurs, rubs, or gallops  ABDOMEN: BSx4; Soft, nontender, nondistended  EXTREMITIES:  2+ Peripheral Pulses, brisk capillary refill. No clubbing, cyanosis, or edema  NERVOUS SYSTEM:  A&Ox3, no focal deficits   SKIN: No rashes or lesions VITALS:   T(C): 36.8 (05-07-24 @ 06:15), Max: 37.2 (05-06-24 @ 15:38)  HR: 83 (05-07-24 @ 06:15) (80 - 95)  BP: 115/51 (05-07-24 @ 06:15) (100/60 - 147/96)  RR: 17 (05-07-24 @ 06:15) (16 - 18)  SpO2: 100% (05-07-24 @ 06:15) (95% - 100%)    GENERAL: NAD, lying in bed comfortably  HEAD:  Atraumatic, Normocephalic  EYES: EOMI, PERRLA, conjunctiva and sclera clear  ENT: Moist mucous membranes without pallor   NECK: Supple, No JVD  CHEST/LUNG: Clear to auscultation bilaterally; No rales, rhonchi, wheezing, or rubs. Unlabored respirations  HEART: Regular rate and rhythm; No murmurs, rubs, or gallops  ABDOMEN: BSx4; Soft, nontender, nondistended  EXTREMITIES:  2+ Peripheral Pulses, brisk capillary refill. No clubbing, cyanosis, or edema. CGM.   NERVOUS SYSTEM:  A&Ox3, no focal deficits   SKIN: No rashes or lesions

## 2024-05-07 NOTE — CONSULT NOTE ADULT - SUBJECTIVE AND OBJECTIVE BOX
Chief Complaint:  Patient is a 68y old  Female who presents with a chief complaint of Rectal Bleeding (07 May 2024 08:07)    HPI:  68F with hx of SOLOMON, Pernicious anemia, diverticulosis c/b diverticular bleeding s/p L-hemicolectomy w/ colostomy 6/2021, s/p colostomy reversal and ileostomy creation (03/2022), and ileostomy reversal (6/13/22), SBO (8/2022) p/w 5d of BRBPR with passage of large clots associated with cramping abdominal pain. Reports BRBPR started 5 days ago and spontaneously resolved within 1 day followed by brown stools; hematochezia recurred yesterday with 4 episodes of hematochezia associated with lower abdominal cramping pain. Last episode of hematochezia yesterday at 2PM. No further light-headedness or dizziness. Additionally notes intentional weight loss of 20lbs over past 1 month while taking Ozempic. Most recent endoscopy in 2022 wnl, colonoscopy performed 10/2023 at OSH showed extensive diverticulosis.    Allergies:  Valium (Other)  oxycodone (Other)      Home Medications:    Hospital Medications:  acetaminophen     Tablet .. 650 milliGRAM(s) Oral every 6 hours PRN  cyanocobalamin 1000 MICROGram(s) Oral daily  dextrose 50% Injectable 25 Gram(s) IV Push once  dextrose 50% Injectable 25 Gram(s) IV Push once  dextrose 50% Injectable 12.5 Gram(s) IV Push once  dextrose Oral Gel 15 Gram(s) Oral once  escitalopram 10 milliGRAM(s) Oral daily  glucagon  Injectable 1 milliGRAM(s) IntraMuscular once  insulin lispro (ADMELOG) corrective regimen sliding scale   SubCutaneous at bedtime  insulin lispro (ADMELOG) corrective regimen sliding scale   SubCutaneous every 6 hours  pantoprazole  Injectable 40 milliGRAM(s) IV Push daily  simvastatin 20 milliGRAM(s) Oral at bedtime  sodium chloride 0.9%. 1000 milliLiter(s) IV Continuous <Continuous>      PMHX/PSHX:  Hypothyroidism    Pernicious anemia    Diverticulosis    HTN (hypertension)    Lower gastrointestinal bleeding    Pernicious anemia    SOLOMON (obstructive sleep apnea)    Type 2 diabetes mellitus    Left ureteral stone    Fibroid uterus    Lumbar herniated disc    Multiparity    Heart murmur    No significant past surgical history    History of tonsillectomy    History of back surgery    H/O abdominal hysterectomy    S/P WESTLEY (total abdominal hysterectomy)    S/P lumbar laminectomy    S/P colon resection    History of lithotripsy        Family history:  No pertinent family history in first degree relatives    Family history of gastric cancer    Family history of throat cancer        Denies family history of colon cancer/polyps, stomach cancer/polyps, pancreatic cancer/masses, liver cancer/disease, ovarian cancer and endometrial cancer.    ROS:  General:  No wt loss, fevers, chills  ENT:  No dysphagia  CV:  No pain, palpitations  Pulm:  No dyspnea, cough  GI:  No pain, No nausea, No vomiting, No diarrhea, No constipation, (+) rectal bleeding, No tarry stools  Muscle:  No pain, weakness  Neuro:  No weakness  Heme:  No ecchymosis  Skin:  No rash    PHYSICAL EXAM:     GENERAL:  No acute distress  HEENT:  no scleral icterus  CHEST:  no accessory muscle use  HEART:  Regular rate and rhythm  ABDOMEN:  Soft, non-tender, non-distended  EXTREMITIES: No edema  SKIN:  No rash/ecchymoses  NEURO:  Alert and oriented x 3    Vital Signs:  Vital Signs Last 24 Hrs  T(C): 36.8 (07 May 2024 06:15), Max: 37.2 (06 May 2024 15:38)  T(F): 98.2 (07 May 2024 06:15), Max: 99 (07 May 2024 03:25)  HR: 83 (07 May 2024 06:15) (80 - 95)  BP: 115/51 (07 May 2024 06:15) (100/60 - 147/96)  BP(mean): 70 (07 May 2024 03:25) (70 - 75)  RR: 17 (07 May 2024 06:15) (16 - 18)  SpO2: 100% (07 May 2024 06:15) (95% - 100%)    Parameters below as of 07 May 2024 06:15  Patient On (Oxygen Delivery Method): room air      Daily Height in cm: 162.56 (06 May 2024 14:12)    Daily     LABS:                        8.2    6.07  )-----------( 138      ( 07 May 2024 10:19 )             24.1     Mean Cell Volume: 84.3 fL (05-07-24 @ 10:19)    05-06    137  |  99  |  22  ----------------------------<  125<H>  3.5   |  25  |  1.29    Ca    8.3<L>      06 May 2024 16:15    TPro  6.8  /  Alb  3.8  /  TBili  0.3  /  DBili  x   /  AST  17  /  ALT  13  /  AlkPhos  64  05-06    LIVER FUNCTIONS - ( 06 May 2024 16:15 )  Alb: 3.8 g/dL / Pro: 6.8 g/dL / ALK PHOS: 64 U/L / ALT: 13 U/L / AST: 17 U/L / GGT: x           PT/INR - ( 06 May 2024 16:15 )   PT: 11.7 sec;   INR: 1.04 ratio         PTT - ( 06 May 2024 16:15 )  PTT:28.2 sec  Urinalysis Basic - ( 06 May 2024 16:15 )    Color: x / Appearance: x / SG: x / pH: x  Gluc: 125 mg/dL / Ketone: x  / Bili: x / Urobili: x   Blood: x / Protein: x / Nitrite: x   Leuk Esterase: x / RBC: x / WBC x   Sq Epi: x / Non Sq Epi: x / Bacteria: x                              8.2    6.07  )-----------( 138      ( 07 May 2024 10:19 )             24.1                         6.7    7.21  )-----------( 163      ( 07 May 2024 01:24 )             20.8                         6.9    7.53  )-----------( 202      ( 06 May 2024 16:15 )             21.5       Imaging:      < from: CT Angio Abdomen and Pelvis w/ IV Cont (05.07.24 @ 05:42) >    FINDINGS:  LOWER CHEST: Within normal limits.    LIVER: Within normal limits.  BILE DUCTS: Normal caliber.  GALLBLADDER: Within normal limits.  SPLEEN: Within normal limits.  PANCREAS: Within normal limits.  ADRENALS: Within normal limits.  KIDNEYS/URETERS: Within normal limits.    BLADDER: Within normal limits.  REPRODUCTIVE ORGANS: Hysterectomy.    BOWEL: No bowel obstruction. Multiple bowel bowel containing ventral   supraumbilical hernias..Small bowel anastomosis in the upper abdomen.   Appendix is normal. Colorectal anastomosis. Uncomplicated diverticulosis.    No evidence of active GI bleed.  PERITONEUM: No ascites.  VESSELS: Within normal limits.  RETROPERITONEUM/LYMPH NODES: No lymphadenopathy.  ABDOMINAL WALL: Postsurgical changes.  BONES: Degenerative changes. Posterior fusion extending from L4 through   S1.    < end of copied text >

## 2024-05-07 NOTE — CONSULT NOTE ADULT - ATTENDING COMMENTS
68F with pernicious anemia, hx tic bleed s/p L hemicolectomy, s/p ileostomy and reversal, who presented with BRBPR on Friday, discharged from ED, recurred on Monday and patient returned. Now last bm yesterday morning. No abdominal pain. On exam, hemodynamically stable, abdomen with scars but benign. Labs reviewed. Would maintain on clears. If no evidence of recurrent bleeding, no need for repeat colonoscopy - would only scope for therapeutic intent. Had OP screening colon in 2023 and EGD in 2022.

## 2024-05-07 NOTE — H&P ADULT - PROBLEM SELECTOR PLAN 1
Prior hx extensive diverticulosis, recurrent diverticular bleeding s/s/p L hemicolectomy and Naeem's (2021), Naeem's reversal and ileostomy creation (2022), and ileostomy reversal (2022). Currently HDS, CTAP performed 2d prior to admission re demonstrated diverticulosis without active extravasation c/f active bleed, CRS surgery consulted without intervention at this time. S/p 2u PRBC in ED, IVF.   Prior Colon/EGD  Monitor CBC q12, transfuse > 7, platelets >/=50, maintain active T/S, coags   VS q4, monitor for clinical signs of bleeding   Patient has adequate IV access

## 2024-05-07 NOTE — H&P ADULT - PROBLEM SELECTOR PLAN 5
Fluids: none   Diet: CLD  DVT ppx: ambulatory     GoC: full code  PT: NR  Dispo: Pending clinical course F/u B12.

## 2024-05-07 NOTE — H&P ADULT - ATTENDING COMMENTS
Pt feeling well without complaint. No BM or BRBPR since 2pm yesterday. Fair response to PRBCs in ED. Monitor hgb closely, transfuse as above. If evidence of ongoing bleeding anticipate inpatient colonoscopy. Clear diet for now. Appreciate GI and CRS consults.

## 2024-05-07 NOTE — PATIENT PROFILE ADULT - NSPROPTRIGHTNOTIFY_GEN_A_NUR
[facility]    Infectious Disease Progress Note    Date of Service (when I saw the patient): 05/27/2022     Assessment & Plan   Parul Veliz is a 58 year old female who was admitted on 5/25/2022.   Patient awaiting bed and transfer to [facility]    1. Headache, fever intracranial hypertension  2. MRI with white matter changes, scattered  3. History of breast cancer status postmastectomy  4. History of hypertension  5. Admitted with progressively worsening headache with visual symptoms  6. LP done, CSF with 2 nucleated cells. [place] Meningoencephalitis panel negative  7. Patient has been out in the woods, does not recall any tick bites    Recommendations:     1. Neurology following, and plan for either  shunt or optic nerve fenestration for benign intracranial hypertension  2. Awaiting transfer to [facility]  3. Follow CSF cultures  4. Empiric broad-spectrum anti-infectives stopped- patient had been on acyclovir ampicillin and ceftriaxone and vancomycin  5. Tickborne illness work-up  6. Discussed with patient, patient's mother at bedside  7. Updated patient's nurse      Payal Caldwell MD  [facility]  [phone]        Interval History   Blurred vision    Physical Exam   Temp: 97.9  F (36.6  C) Temp src: Oral BP: (!) 143/75 Pulse: 63   Resp: 26 SpO2: 97 % O2 Device: Nasal cannula Oxygen Delivery: 1 LPM  Vitals:    05/25/22 1344 05/26/22 1209 05/27/22 0600   Weight: 117.9 kg (260 lb) 125.1 kg (275 lb 14.4 oz) 127.9 kg (281 lb 14.4 oz)     Vital Signs with Ranges  Temp:  [97.8  F (36.6  C)-98.2  F (36.8  C)] 97.9  F (36.6  C)  Pulse:  [61-97] 63  Resp:  [14-30] 26  BP: (100-155)/(50-87) 143/75  SpO2:  [89 %-98 %] 97 %    Constitutional: Awake  Vision changes  Lungs: Clear to auscultation bilaterally, no crackles or wheezing  Cardiovascular: Regular rate and rhythm, normal S1 and S2, and no murmur noted  Abdomen: Normal  bowel sounds, soft, non-distended, non-tender  Skin: warm  Neuro: see neuro note- visual deficits have not improved    Medications       acetaZOLAMIDE  500 mg Oral Q12H RAE     hydrochlorothiazide  25 mg Oral Daily     methylPREDNISolone  250 mg Intravenous Q6H     metoclopramide  10 mg Intravenous Once     senna-docusate  1 tablet Oral BID    Or     senna-docusate  2 tablet Oral BID     sodium chloride (PF)  3 mL Intracatheter Q8H     valsartan  80 mg Oral Daily       Data   All microbiology laboratory data reviewed.  Recent Labs   Lab Test 05/26/22  0832 05/25/22  1408   WBC 5.6 9.6   HGB 12.5 14.8   HCT 36.3 43.5   MCV 88 90    410     Recent Labs   Lab Test 05/26/22  0832 05/25/22  1408   CR 0.69 0.80     Recent Labs   Lab Test 05/25/22  1848   SED 62*     No lab results found.    Invalid input(s):     MICRO:    Reviewed    Radiology    MRA Brain ([place]) wo Contrast    Result Date: 5/25/2022  EXAM: MRV BRAIN W/O CONTRAST, MRA NECK (CAROTIDS) W/O and W CONTRAST, MRA BRAIN ([place]) W/O CONTRAST, MR BRAIN W/O and W CONTRAST LOCATION: [facility] DATE/TIME: 5/25/2022 3:09 PM INDICATION: COMPARISON: None. CONTRAST: 12mL gadavist TECHNIQUE: 1) Routine multiplanar multisequence head MRI without and with intravenous contrast. 2) 3D time-of-flight head MRA without intravenous contrast. 3) Neck MRA without and with IV contrast. Stenosis measurements made according to NASCET criteria unless otherwise specified. 4) Phase contrast and 2-D time-of-flight head MRV without intravenous contrast. FINDINGS: HEAD MRI: INTRACRANIAL CONTENTS: No acute or subacute infarct. No mass, acute hemorrhage, or extra-axial fluid collections. Scattered nonspecific foci of T2/FLAIR hyperintense signal in the cerebral white matter. Normal ventricles and sulci. Normal position of the  cerebellar tonsils. No pathologic contrast enhancement. SELLA: No abnormality accounting for technique. OSSEOUS  STRUCTURES/SOFT TISSUES: Normal marrow signal. The major intracranial vascular flow voids are maintained. ORBITS: No abnormality accounting for technique. SINUSES/MASTOIDS: No paranasal sinus mucosal disease. No middle ear or mastoid effusion. HEAD MRA: ANTERIOR CIRCULATION: No stenosis/occlusion, aneurysm, or high flow vascular malformation. Standard [place] of [place] anatomy. POSTERIOR CIRCULATION: No stenosis/occlusion, aneurysm, or high flow vascular malformation. Balanced vertebral arteries supply a normal basilar artery. NECK MRA: RIGHT CAROTID: No measurable stenosis or dissection. LEFT CAROTID: No measurable stenosis or dissection. VERTEBRAL ARTERIES: No focal stenosis or dissection. Balanced vertebral arteries. AORTIC ARCH: Classic aortic arch anatomy with no significant stenosis at the origin of the great vessels. HEAD MRV: DURAL SINUSES: Severe stenosis of the bilateral distal transverse sinuses. No thrombosis. No additional significant stenosis. Codominant transverse and sigmoid sinuses. INTERNAL CEREBRAL VEINS: No significant stenosis or occlusion.  MAJOR CORTICAL VENOUS BRANCHES: No significant stenosis or occlusion.     IMPRESSION: HEAD MRI: 1.  No acute intracranial abnormality 2.  Nonspecific scattered small foci of T2 FLAIR hyperintensity in the cerebral white matter can be seen in setting of chronic small vessel ischemic changes, prior trauma or insult, or migraines. Demyelination is not favored. HEAD MRA: 1.  Normal MRA [place] of [place]. NECK MRA: 1.  Normal neck MRA. HEAD MRV: 1.  Severe stenosis of the distal transverse sinuses bilaterally, which is nonspecific but can be seen in setting of idiopathic intracranial hypertension. 2.  No additional cervical stenosis or occlusion. No thrombosis.    MRA Neck (Carotids) wo & w Contrast    Result Date: 5/25/2022  EXAM: MRV BRAIN W/O CONTRAST, MRA NECK (CAROTIDS) W/O and W CONTRAST, MRA BRAIN ([place] OF [place]) W/O CONTRAST, MR BRAIN W/O and W CONTRAST  LOCATION: [facility] DATE/TIME: 5/25/2022 3:09 PM INDICATION: COMPARISON: None. CONTRAST: 12mL gadavist TECHNIQUE: 1) Routine multiplanar multisequence head MRI without and with intravenous contrast. 2) 3D time-of-flight head MRA without intravenous contrast. 3) Neck MRA without and with IV contrast. Stenosis measurements made according to NASCET criteria unless otherwise specified. 4) Phase contrast and 2-D time-of-flight head MRV without intravenous contrast. FINDINGS: HEAD MRI: INTRACRANIAL CONTENTS: No acute or subacute infarct. No mass, acute hemorrhage, or extra-axial fluid collections. Scattered nonspecific foci of T2/FLAIR hyperintense signal in the cerebral white matter. Normal ventricles and sulci. Normal position of the  cerebellar tonsils. No pathologic contrast enhancement. SELLA: No abnormality accounting for technique. OSSEOUS STRUCTURES/SOFT TISSUES: Normal marrow signal. The major intracranial vascular flow voids are maintained. ORBITS: No abnormality accounting for technique. SINUSES/MASTOIDS: No paranasal sinus mucosal disease. No middle ear or mastoid effusion. HEAD MRA: ANTERIOR CIRCULATION: No stenosis/occlusion, aneurysm, or high flow vascular malformation. Standard [place] of [place] anatomy. POSTERIOR CIRCULATION: No stenosis/occlusion, aneurysm, or high flow vascular malformation. Balanced vertebral arteries supply a normal basilar artery. NECK MRA: RIGHT CAROTID: No measurable stenosis or dissection. LEFT CAROTID: No measurable stenosis or dissection. VERTEBRAL ARTERIES: No focal stenosis or dissection. Balanced vertebral arteries. AORTIC ARCH: Classic aortic arch anatomy with no significant stenosis at the origin of the great vessels. HEAD MRV: DURAL SINUSES: Severe stenosis of the bilateral distal transverse sinuses. No thrombosis. No additional significant stenosis. Codominant transverse and sigmoid sinuses. INTERNAL CEREBRAL VEINS: No significant stenosis or  occlusion.  MAJOR CORTICAL VENOUS BRANCHES: No significant stenosis or occlusion.     IMPRESSION: HEAD MRI: 1.  No acute intracranial abnormality 2.  Nonspecific scattered small foci of T2 FLAIR hyperintensity in the cerebral white matter can be seen in setting of chronic small vessel ischemic changes, prior trauma or insult, or migraines. Demyelination is not favored. HEAD MRA: 1.  Normal MRA [place] of [place]. NECK MRA: 1.  Normal neck MRA. HEAD MRV: 1.  Severe stenosis of the distal transverse sinuses bilaterally, which is nonspecific but can be seen in setting of idiopathic intracranial hypertension. 2.  No additional cervical stenosis or occlusion. No thrombosis.    MR Brain w/o & w Contrast    Result Date: 5/25/2022  EXAM: MRV BRAIN W/O CONTRAST, MRA NECK (CAROTIDS) W/O and W CONTRAST, MRA BRAIN ([place] OF [place]) W/O CONTRAST, MR BRAIN W/O and W CONTRAST LOCATION: [facility] DATE/TIME: 5/25/2022 3:09 PM INDICATION: COMPARISON: None. CONTRAST: 12mL gadavist TECHNIQUE: 1) Routine multiplanar multisequence head MRI without and with intravenous contrast. 2) 3D time-of-flight head MRA without intravenous contrast. 3) Neck MRA without and with IV contrast. Stenosis measurements made according to NASCET criteria unless otherwise specified. 4) Phase contrast and 2-D time-of-flight head MRV without intravenous contrast. FINDINGS: HEAD MRI: INTRACRANIAL CONTENTS: No acute or subacute infarct. No mass, acute hemorrhage, or extra-axial fluid collections. Scattered nonspecific foci of T2/FLAIR hyperintense signal in the cerebral white matter. Normal ventricles and sulci. Normal position of the  cerebellar tonsils. No pathologic contrast enhancement. SELLA: No abnormality accounting for technique. OSSEOUS STRUCTURES/SOFT TISSUES: Normal marrow signal. The major intracranial vascular flow voids are maintained. ORBITS: No abnormality accounting for technique. SINUSES/MASTOIDS: No paranasal sinus  declines mucosal disease. No middle ear or mastoid effusion. HEAD MRA: ANTERIOR CIRCULATION: No stenosis/occlusion, aneurysm, or high flow vascular malformation. Standard [place] of [place] anatomy. POSTERIOR CIRCULATION: No stenosis/occlusion, aneurysm, or high flow vascular malformation. Balanced vertebral arteries supply a normal basilar artery. NECK MRA: RIGHT CAROTID: No measurable stenosis or dissection. LEFT CAROTID: No measurable stenosis or dissection. VERTEBRAL ARTERIES: No focal stenosis or dissection. Balanced vertebral arteries. AORTIC ARCH: Classic aortic arch anatomy with no significant stenosis at the origin of the great vessels. HEAD MRV: DURAL SINUSES: Severe stenosis of the bilateral distal transverse sinuses. No thrombosis. No additional significant stenosis. Codominant transverse and sigmoid sinuses. INTERNAL CEREBRAL VEINS: No significant stenosis or occlusion.  MAJOR CORTICAL VENOUS BRANCHES: No significant stenosis or occlusion.     IMPRESSION: HEAD MRI: 1.  No acute intracranial abnormality 2.  Nonspecific scattered small foci of T2 FLAIR hyperintensity in the cerebral white matter can be seen in setting of chronic small vessel ischemic changes, prior trauma or insult, or migraines. Demyelination is not favored. HEAD MRA: 1.  Normal MRA [place] of [place]. NECK MRA: 1.  Normal neck MRA. HEAD MRV: 1.  Severe stenosis of the distal transverse sinuses bilaterally, which is nonspecific but can be seen in setting of idiopathic intracranial hypertension. 2.  No additional cervical stenosis or occlusion. No thrombosis.    MRV Brain wo Contrast    Result Date: 5/25/2022  EXAM: MRV BRAIN W/O CONTRAST, MRA NECK (CAROTIDS) W/O and W CONTRAST, MRA BRAIN ([place] OF [place]RAM[place]) W/O CONTRAST, MR BRAIN W/O and W CONTRAST LOCATION: [facility] DATE/TIME: 5/25/2022 3:09 PM INDICATION: COMPARISON: None. CONTRAST: 12mL gadavist TECHNIQUE: 1) Routine multiplanar multisequence head MRI without and with  intravenous contrast. 2) 3D time-of-flight head MRA without intravenous contrast. 3) Neck MRA without and with IV contrast. Stenosis measurements made according to NASCET criteria unless otherwise specified. 4) Phase contrast and 2-D time-of-flight head MRV without intravenous contrast. FINDINGS: HEAD MRI: INTRACRANIAL CONTENTS: No acute or subacute infarct. No mass, acute hemorrhage, or extra-axial fluid collections. Scattered nonspecific foci of T2/FLAIR hyperintense signal in the cerebral white matter. Normal ventricles and sulci. Normal position of the  cerebellar tonsils. No pathologic contrast enhancement. SELLA: No abnormality accounting for technique. OSSEOUS STRUCTURES/SOFT TISSUES: Normal marrow signal. The major intracranial vascular flow voids are maintained. ORBITS: No abnormality accounting for technique. SINUSES/MASTOIDS: No paranasal sinus mucosal disease. No middle ear or mastoid effusion. HEAD MRA: ANTERIOR CIRCULATION: No stenosis/occlusion, aneurysm, or high flow vascular malformation. Standard [place] of [place] anatomy. POSTERIOR CIRCULATION: No stenosis/occlusion, aneurysm, or high flow vascular malformation. Balanced vertebral arteries supply a normal basilar artery. NECK MRA: RIGHT CAROTID: No measurable stenosis or dissection. LEFT CAROTID: No measurable stenosis or dissection. VERTEBRAL ARTERIES: No focal stenosis or dissection. Balanced vertebral arteries. AORTIC ARCH: Classic aortic arch anatomy with no significant stenosis at the origin of the great vessels. HEAD MRV: DURAL SINUSES: Severe stenosis of the bilateral distal transverse sinuses. No thrombosis. No additional significant stenosis. Codominant transverse and sigmoid sinuses. INTERNAL CEREBRAL VEINS: No significant stenosis or occlusion.  MAJOR CORTICAL VENOUS BRANCHES: No significant stenosis or occlusion.     IMPRESSION: HEAD MRI: 1.  No acute intracranial abnormality 2.  Nonspecific scattered small foci of T2 FLAIR  hyperintensity in the cerebral white matter can be seen in setting of chronic small vessel ischemic changes, prior trauma or insult, or migraines. Demyelination is not favored. HEAD MRA: 1.  Normal MRA [place] of [place]. NECK MRA: 1.  Normal neck MRA. HEAD MRV: 1.  Severe stenosis of the distal transverse sinuses bilaterally, which is nonspecific but can be seen in setting of idiopathic intracranial hypertension. 2.  No additional cervical stenosis or occlusion. No thrombosis.    IR Lumbar Drain Placement w Fluoro    Result Date: 5/26/2022  [facility] INTERVENTIONAL NEURORADIOLOGY 5/26/2022 10:28 AM FLUOROSCOPICALLY GUIDED PERCUTANEOUS LUMBAR DRAINAGE CATHETER PLACEMENT INDICATION: History of headache and visual field loss with clinical concern for pseudotumor cerebri. Lumbar drain has been requested for CSF drainage. CONSENT: The procedure and its indications, major risks, benefits, and alternatives were discussed. Risks including, but not limited to, pain, headache, hemorrhage, infection, nerve damage, spinal cord damage, and allergic reaction were discussed. Understanding was acknowledged, and a signed informed consent was obtained. MODERATE SEDATION: Versed 0.5 mg. Fentanyl 25 mcg. The procedure was performed with the administration of intravenous conscious sedation with appropriate preoperative, intraoperative, and postoperative evaluation. 25 minutes of supervised face to face conscious sedation time was provided by a radiology nurse under my direct supervision. During the time-out, immediately prior to administration of medications, the patient was re-assessed for adequacy to receive conscious sedation. MEDICATIONS: Versed 0.5 mg IV Fentanyl 25 mcg IV FLUOROSCOPIC TIME: 1.5 minutes (2 images) DOSE: Air Kerma: 166 mGy ESTIMATED BLOOD LOSS: Minimal PERFORMING PHYSICIAN(S): Zain Powell M.D. PROCEDURE: PROCEDURE: The patient was placed in the prone position upon the fluoroscopic table.  The skin of the back was prepped and draped in sterile fashion. Using fluoroscopic guidance, the L2-3 level was localized. The skin was infiltrated with 1% lidocaine. Using direct fluoroscopic visualization and a posterior interlaminar approach, a 14 gauge Tuohy needle was advanced into the thecal sac at the L2-3 level. A lumbar drainage catheter was then advanced through the needle until its tip was present in the thecal sac at the T11 level. The needle and stiffener wire were removed. Opening pressure measured 31 cm H2O. Clear spinal fluid return from the catheter was noted. A total of 32 cc of clear CSF was collected in 4 separate tubes and sent to the lab for analysis. The catheter was secured to the skin with a suture, and then connected to a drainage bag. The procedure appeared well-tolerated. There was no apparent complication.     CONCLUSION: 1. Technically successful fluoroscopically-guided percutaneous placement of a lumbar drainage catheter. _____________________________________________ Zain Powell M.D. Interventional Neuroradiologist [facility] Office: [phone] Pager: [phone] Cell: [phone] CPT codes included for physician reference only: [id number]

## 2024-05-07 NOTE — H&P ADULT - PROBLEM SELECTOR PLAN 6
Fluids: none   Diet: CLD  DVT ppx: ambulatory     GoC: full code  PT: NR  Dispo: Pending clinical course

## 2024-05-07 NOTE — H&P ADULT - NSHPLABSRESULTS_GEN_ALL_CORE
LABS:                          6.7    7.21  )-----------( 163      ( 07 May 2024 01:24 )             20.8     05-06    137  |  99  |  22  ----------------------------<  125<H>  3.5   |  25  |  1.29    Ca    8.3<L>      06 May 2024 16:15    TPro  6.8  /  Alb  3.8  /  TBili  0.3  /  DBili  x   /  AST  17  /  ALT  13  /  AlkPhos  64  05-06    LIVER FUNCTIONS - ( 06 May 2024 16:15 )  Alb: 3.8 g/dL / Pro: 6.8 g/dL / ALK PHOS: 64 U/L / ALT: 13 U/L / AST: 17 U/L / GGT: x           PT/INR - ( 06 May 2024 16:15 )   PT: 11.7 sec;   INR: 1.04 ratio         PTT - ( 06 May 2024 16:15 )  PTT:28.2 sec  Urinalysis Basic - ( 06 May 2024 16:15 )    Color: x / Appearance: x / SG: x / pH: x  Gluc: 125 mg/dL / Ketone: x  / Bili: x / Urobili: x   Blood: x / Protein: x / Nitrite: x   Leuk Esterase: x / RBC: x / WBC x   Sq Epi: x / Non Sq Epi: x / Bacteria: x

## 2024-05-07 NOTE — H&P ADULT - NSICDXPASTSURGICALHX_GEN_ALL_CORE_FT
Caregiver requesting an order to draw a CBC be sent to a provider in [place] for [place] to have completed.  
PAST SURGICAL HISTORY:  History of lithotripsy left kidney stone extraction; 02/2020    History of tonsillectomy age 14    S/P colon resection colostomy; 10/2021, ludwig reversal & creation of ileostomy 3/2022    S/P lumbar laminectomy ' 2010  with Fusion    S/P WESTLEY (total abdominal hysterectomy) ' 94  Laproscopic.  benign

## 2024-05-07 NOTE — CONSULT NOTE ADULT - ASSESSMENT
68F with hx of SOLOMON, Pernicious anemia, diverticulosis c/b diverticular bleeding s/p L-hemicolectomy w/ colostomy 6/2021, s/p colostomy reversal and ileostomy creation (03/2022), and ileostomy reversal (6/13/22), SBO (8/2022) p/w 5d of BRBPR with passage of large clots associated with cramping abdominal pain    Impression  #hematochezia; suspect related to diverticular bleeding; unlikely malignancy vs. AVM vs. rectal ulcer given recent colonoscopy 10/2023 reportedly normal  #diverticulosis c/b diverticular bleeding s/p L-hemicolectomy  - Hgb baseline ~10 (last week; on outpatient labs per patient); 6.9 on presentation s/p 1U -> 6.7 s/p 1U -> 8.2  - last episode of hematochezia 5/6 at 2PM  - CTA A/P without active bleeding  - intentional weight loss of 20lbs on Ozempic  - colonoscopy performed 10/2023 at OSH showed extensive diverticulosis.    #T2DM on Ozempic; last 5/5  #pernicious anemia; EGD 2022 (report not available for review; unsure if with atrophic gastritis)    Recommendations  - given last episode of hematochezia yesterday afternoon and now with appropriate response to pRBC today, diverticular bleeding is potentially resolved  - continue supportive care with trending H/H and pRBC as needed  - if recurrent bleeding, low threshold for repeat colonoscopy for hemostasis given hx of severe bleeding requiring hemicolectomy  - keep on CLD today    Note and recommendations are incomplete until reviewed and attested by attending.    Javier Hirsch MD  GI/Hepatology Fellow, PGY4  [place] Pager [phone] or [facility] Pager [phone]  Teams preferred (7AM to 5PM); after 5PM, call GI fellow on call    On Weekends/Holidays (All Day) and Weekdays after 5PM to 8AM  For non-urgent consults, please email [email or web address] and [email or web address]  For urgent consults, please contact on call GI team. See Amion schedule ([facility]), [employer] paging system ([facility]), or call hospital  ([facility])

## 2024-05-07 NOTE — H&P ADULT - PROBLEM SELECTOR PLAN 3
Check T4/TSH in AM. Home regimen Synthroid 175mcg qd Home regimen Lisinopril 40mg, Hydralazine PRN, Amlodipine 10 mg. Ambulatory SBPs 140s/80s. Can hold iso GIB, presently normotensive.

## 2024-05-07 NOTE — H&P ADULT - ASSESSMENT
66y F PMH HTN, HLD, Hypothyroidism, SOLOMON, Pernicious anemia, diverticulosis, Colon CA s/p resection and Naeem's (10/2021), Naeem's reversal and ileostomy creation (03/2022), and recent ileostomy reversal (6/13/22), recent adm 12/23 for diverticular bleed now presents with 5d rectal bleeding and anemia suspected diverticular source, without evidence of active extravasation on CTA.

## 2024-05-08 LAB
A1C WITH ESTIMATED AVERAGE GLUCOSE RESULT: 8.6 % — HIGH (ref 4–5.6)
ALBUMIN SERPL ELPH-MCNC: 3.7 G/DL — SIGNIFICANT CHANGE UP (ref 3.3–5)
ALP SERPL-CCNC: 67 U/L — SIGNIFICANT CHANGE UP (ref 40–120)
ALT FLD-CCNC: 11 U/L — SIGNIFICANT CHANGE UP (ref 4–33)
ANION GAP SERPL CALC-SCNC: 13 MMOL/L — SIGNIFICANT CHANGE UP (ref 7–14)
AST SERPL-CCNC: 15 U/L — SIGNIFICANT CHANGE UP (ref 4–32)
BILIRUB SERPL-MCNC: 0.7 MG/DL — SIGNIFICANT CHANGE UP (ref 0.2–1.2)
BUN SERPL-MCNC: 13 MG/DL — SIGNIFICANT CHANGE UP (ref 7–23)
CALCIUM SERPL-MCNC: 8 MG/DL — LOW (ref 8.4–10.5)
CHLORIDE SERPL-SCNC: 105 MMOL/L — SIGNIFICANT CHANGE UP (ref 98–107)
CHOLEST SERPL-MCNC: 82 MG/DL — SIGNIFICANT CHANGE UP
CO2 SERPL-SCNC: 22 MMOL/L — SIGNIFICANT CHANGE UP (ref 22–31)
CREAT SERPL-MCNC: 1.02 MG/DL — SIGNIFICANT CHANGE UP (ref 0.5–1.3)
EGFR: 60 ML/MIN/1.73M2 — SIGNIFICANT CHANGE UP
ESTIMATED AVERAGE GLUCOSE: 200 — SIGNIFICANT CHANGE UP
GLUCOSE BLDC GLUCOMTR-MCNC: 103 MG/DL — HIGH (ref 70–99)
GLUCOSE BLDC GLUCOMTR-MCNC: 105 MG/DL — HIGH (ref 70–99)
GLUCOSE BLDC GLUCOMTR-MCNC: 113 MG/DL — HIGH (ref 70–99)
GLUCOSE BLDC GLUCOMTR-MCNC: 97 MG/DL — SIGNIFICANT CHANGE UP (ref 70–99)
GLUCOSE SERPL-MCNC: 88 MG/DL — SIGNIFICANT CHANGE UP (ref 70–99)
HCT VFR BLD CALC: 24.1 % — LOW (ref 34.5–45)
HCT VFR BLD CALC: 25.3 % — LOW (ref 34.5–45)
HDLC SERPL-MCNC: 37 MG/DL — LOW
HGB BLD-MCNC: 8 G/DL — LOW (ref 11.5–15.5)
HGB BLD-MCNC: 8.5 G/DL — LOW (ref 11.5–15.5)
LIPID PNL WITH DIRECT LDL SERPL: 30 MG/DL — SIGNIFICANT CHANGE UP
MAGNESIUM SERPL-MCNC: 1.5 MG/DL — LOW (ref 1.6–2.6)
MCHC RBC-ENTMCNC: 28.3 PG — SIGNIFICANT CHANGE UP (ref 27–34)
MCHC RBC-ENTMCNC: 28.6 PG — SIGNIFICANT CHANGE UP (ref 27–34)
MCHC RBC-ENTMCNC: 33.2 GM/DL — SIGNIFICANT CHANGE UP (ref 32–36)
MCHC RBC-ENTMCNC: 33.6 GM/DL — SIGNIFICANT CHANGE UP (ref 32–36)
MCV RBC AUTO: 85.2 FL — SIGNIFICANT CHANGE UP (ref 80–100)
MCV RBC AUTO: 85.2 FL — SIGNIFICANT CHANGE UP (ref 80–100)
NON HDL CHOLESTEROL: 45 MG/DL — SIGNIFICANT CHANGE UP
NRBC # BLD: 0 /100 WBCS — SIGNIFICANT CHANGE UP (ref 0–0)
NRBC # BLD: 0 /100 WBCS — SIGNIFICANT CHANGE UP (ref 0–0)
NRBC # FLD: 0 K/UL — SIGNIFICANT CHANGE UP (ref 0–0)
NRBC # FLD: 0 K/UL — SIGNIFICANT CHANGE UP (ref 0–0)
PHOSPHATE SERPL-MCNC: 2.2 MG/DL — LOW (ref 2.5–4.5)
PLATELET # BLD AUTO: 172 K/UL — SIGNIFICANT CHANGE UP (ref 150–400)
PLATELET # BLD AUTO: 186 K/UL — SIGNIFICANT CHANGE UP (ref 150–400)
POTASSIUM SERPL-MCNC: 3.4 MMOL/L — LOW (ref 3.5–5.3)
POTASSIUM SERPL-SCNC: 3.4 MMOL/L — LOW (ref 3.5–5.3)
PROT SERPL-MCNC: 6.4 G/DL — SIGNIFICANT CHANGE UP (ref 6–8.3)
RBC # BLD: 2.83 M/UL — LOW (ref 3.8–5.2)
RBC # BLD: 2.97 M/UL — LOW (ref 3.8–5.2)
RBC # FLD: 16.1 % — HIGH (ref 10.3–14.5)
RBC # FLD: 16.1 % — HIGH (ref 10.3–14.5)
SODIUM SERPL-SCNC: 140 MMOL/L — SIGNIFICANT CHANGE UP (ref 135–145)
TRIGL SERPL-MCNC: 73 MG/DL — SIGNIFICANT CHANGE UP
WBC # BLD: 5.71 K/UL — SIGNIFICANT CHANGE UP (ref 3.8–10.5)
WBC # BLD: 7.44 K/UL — SIGNIFICANT CHANGE UP (ref 3.8–10.5)
WBC # FLD AUTO: 5.71 K/UL — SIGNIFICANT CHANGE UP (ref 3.8–10.5)
WBC # FLD AUTO: 7.44 K/UL — SIGNIFICANT CHANGE UP (ref 3.8–10.5)

## 2024-05-08 PROCEDURE — 99233 SBSQ HOSP IP/OBS HIGH 50: CPT | Mod: GC

## 2024-05-08 PROCEDURE — 93010 ELECTROCARDIOGRAM REPORT: CPT

## 2024-05-08 PROCEDURE — 99232 SBSQ HOSP IP/OBS MODERATE 35: CPT | Mod: GC

## 2024-05-08 RX ORDER — POTASSIUM CHLORIDE 20 MEQ
20 PACKET (EA) ORAL ONCE
Refills: 0 | Status: COMPLETED | OUTPATIENT
Start: 2024-05-08 | End: 2024-05-08

## 2024-05-08 RX ORDER — MAGNESIUM SULFATE 500 MG/ML
2 VIAL (ML) INJECTION ONCE
Refills: 0 | Status: COMPLETED | OUTPATIENT
Start: 2024-05-08 | End: 2024-05-08

## 2024-05-08 RX ORDER — SOD SULF/SODIUM/NAHCO3/KCL/PEG
4000 SOLUTION, RECONSTITUTED, ORAL ORAL ONCE
Refills: 0 | Status: COMPLETED | OUTPATIENT
Start: 2024-05-08 | End: 2024-05-08

## 2024-05-08 RX ORDER — SOD SULF/SODIUM/NAHCO3/KCL/PEG
1000 SOLUTION, RECONSTITUTED, ORAL ORAL ONCE
Refills: 0 | Status: DISCONTINUED | OUTPATIENT
Start: 2024-05-08 | End: 2024-05-08

## 2024-05-08 RX ORDER — SOD SULF/SODIUM/NAHCO3/KCL/PEG
4000 SOLUTION, RECONSTITUTED, ORAL ORAL ONCE
Refills: 0 | Status: DISCONTINUED | OUTPATIENT
Start: 2024-05-08 | End: 2024-05-08

## 2024-05-08 RX ORDER — SODIUM,POTASSIUM PHOSPHATES 278-250MG
1 POWDER IN PACKET (EA) ORAL ONCE
Refills: 0 | Status: COMPLETED | OUTPATIENT
Start: 2024-05-08 | End: 2024-05-08

## 2024-05-08 RX ORDER — SOD SULF/SODIUM/NAHCO3/KCL/PEG
1000 SOLUTION, RECONSTITUTED, ORAL ORAL ONCE
Refills: 0 | Status: COMPLETED | OUTPATIENT
Start: 2024-05-08 | End: 2024-05-08

## 2024-05-08 RX ORDER — ACETAMINOPHEN 500 MG
1000 TABLET ORAL ONCE
Refills: 0 | Status: COMPLETED | OUTPATIENT
Start: 2024-05-08 | End: 2024-05-08

## 2024-05-08 RX ORDER — LIDOCAINE 4 G/100G
1 CREAM TOPICAL DAILY
Refills: 0 | Status: DISCONTINUED | OUTPATIENT
Start: 2024-05-08 | End: 2024-05-10

## 2024-05-08 RX ADMIN — ESCITALOPRAM OXALATE 10 MILLIGRAM(S): 10 TABLET, FILM COATED ORAL at 12:38

## 2024-05-08 RX ADMIN — Medication 4000 MILLILITER(S): at 19:53

## 2024-05-08 RX ADMIN — SIMVASTATIN 20 MILLIGRAM(S): 20 TABLET, FILM COATED ORAL at 22:06

## 2024-05-08 RX ADMIN — PREGABALIN 1000 MICROGRAM(S): 225 CAPSULE ORAL at 12:38

## 2024-05-08 RX ADMIN — LIDOCAINE 1 PATCH: 4 CREAM TOPICAL at 22:38

## 2024-05-08 RX ADMIN — Medication 1000 MILLILITER(S): at 18:56

## 2024-05-08 RX ADMIN — Medication 1 PACKET(S): at 09:27

## 2024-05-08 RX ADMIN — Medication 1000 MILLIGRAM(S): at 23:09

## 2024-05-08 RX ADMIN — Medication 175 MICROGRAM(S): at 06:26

## 2024-05-08 RX ADMIN — PANTOPRAZOLE SODIUM 40 MILLIGRAM(S): 20 TABLET, DELAYED RELEASE ORAL at 12:38

## 2024-05-08 RX ADMIN — Medication 25 GRAM(S): at 09:28

## 2024-05-08 RX ADMIN — Medication 20 MILLIEQUIVALENT(S): at 09:27

## 2024-05-08 RX ADMIN — Medication 400 MILLIGRAM(S): at 22:39

## 2024-05-08 NOTE — PROGRESS NOTE ADULT - PROBLEM SELECTOR PLAN 5
F/u B12. Fluids: none   Diet: CLD, NPO @ [place]   DVT ppx: ambulatory     GoC: full code  PT: NR  Dispo: Pending clinical course

## 2024-05-08 NOTE — PROGRESS NOTE ADULT - SUBJECTIVE AND OBJECTIVE BOX
***************************************************************  Kayleeyin Belle, PGY 1   Internal Medicine   ***************************************************************    CRISTIAN CASTRO  68y  MRN: [id number]    Patient is a 68y old  Female who presents with a chief complaint of Rectal Bleeding (08 May 2024 05:43)      Subjective: no events ON. Denies fever, CP, SOB, abn pain, N/V, dysuria. Tolerating diet.      MEDICATIONS  (STANDING):  cyanocobalamin 1000 MICROGram(s) Oral daily  dextrose 50% Injectable 12.5 Gram(s) IV Push once  dextrose 50% Injectable 25 Gram(s) IV Push once  dextrose 50% Injectable 25 Gram(s) IV Push once  dextrose Oral Gel 15 Gram(s) Oral once  escitalopram 10 milliGRAM(s) Oral daily  glucagon  Injectable 1 milliGRAM(s) IntraMuscular once  insulin lispro (ADMELOG) corrective regimen sliding scale   SubCutaneous at bedtime  insulin lispro (ADMELOG) corrective regimen sliding scale   SubCutaneous three times a day before meals  levothyroxine 175 MICROGram(s) Oral daily  pantoprazole  Injectable 40 milliGRAM(s) IV Push daily  simvastatin 20 milliGRAM(s) Oral at bedtime    MEDICATIONS  (PRN):  acetaminophen     Tablet .. 650 milliGRAM(s) Oral every 6 hours PRN Temp greater or equal to 38C (100.4F), Mild Pain (1 - 3)      Objective:    Vitals: Vital Signs Last 24 Hrs  T(C): 36.9 (05-08-24 @ 06:02), Max: 37.1 (05-07-24 @ 12:10)  T(F): 98.4 (05-08-24 @ 06:02), Max: 98.7 (05-07-24 @ 12:10)  HR: 87 (05-08-24 @ 06:02) (85 - 100)  BP: 129/68 (05-08-24 @ 06:02) (107/84 - 145/80)  BP(mean): --  RR: 16 (05-08-24 @ 06:02) (16 - 17)  SpO2: 98% (05-08-24 @ 06:02) (98% - 100%)            I&O's Summary      PHYSICAL EXAM:  GENERAL: NAD  HEAD:  Atraumatic, Normocephalic  EYES: EOMI, conjunctiva and sclera clear  CHEST/LUNG: Clear to auscultation bilaterally; No rales, rhonchi, wheezing, or rubs  HEART: Regular rate and rhythm; No murmurs, rubs, or gallops  ABDOMEN: Soft, Nontender, Nondistended;   SKIN: No rashes or lesions  NERVOUS SYSTEM:  Alert & Oriented X3, no focal deficits    LABS:  05-06    137  |  99  |  22  ----------------------------<  125<H>  3.5   |  25  |  1.29    Ca    8.3<L>      06 May 2024 16:15    TPro  6.8  /  Alb  3.8  /  TBili  0.3  /  DBili  x   /  AST  17  /  ALT  13  /  AlkPhos  64  05-06      PT/INR - ( 06 May 2024 16:15 )   PT: 11.7 sec;   INR: 1.04 ratio         PTT - ( 06 May 2024 16:15 )  PTT:28.2 sec              Urinalysis Basic - ( 06 May 2024 16:15 )    Color: x / Appearance: x / SG: x / pH: x  Gluc: 125 mg/dL / Ketone: x  / Bili: x / Urobili: x   Blood: x / Protein: x / Nitrite: x   Leuk Esterase: x / RBC: x / WBC x   Sq Epi: x / Non Sq Epi: x / Bacteria: x                              8.2    6.07  )-----------( 138      ( 07 May 2024 10:19 )             24.1                         6.7    7.21  )-----------( 163      ( 07 May 2024 01:24 )             20.8                         6.9    7.53  )-----------( 202      ( 06 May 2024 16:15 )             21.5     CAPILLARY BLOOD GLUCOSE      POCT Blood Glucose.: 100 mg/dL (07 May 2024 21:11)  POCT Blood Glucose.: 105 mg/dL (07 May 2024 17:30)  POCT Blood Glucose.: 96 mg/dL (07 May 2024 13:20)  POCT Blood Glucose.: 98 mg/dL (07 May 2024 11:44)      RADIOLOGY & ADDITIONAL TESTS:       ***************************************************************  Kaylee Yoshi, PGY 1   Internal Medicine   ***************************************************************    CRISTIAN CASTRO  68y  MRN: [id number]    Patient is a 68y old  Female who presents with a chief complaint of Rectal Bleeding (08 May 2024 05:43)      Subjective: Large volume BRBPR overnight. Seen at bedside, well appearing denies fever, CP, SOB, abn pain, N/V/D. Intermittent episodes of rectal bleeding throughout day.      MEDICATIONS  (STANDING):  cyanocobalamin 1000 MICROGram(s) Oral daily  dextrose 50% Injectable 12.5 Gram(s) IV Push once  dextrose 50% Injectable 25 Gram(s) IV Push once  dextrose 50% Injectable 25 Gram(s) IV Push once  dextrose Oral Gel 15 Gram(s) Oral once  escitalopram 10 milliGRAM(s) Oral daily  glucagon  Injectable 1 milliGRAM(s) IntraMuscular once  insulin lispro (ADMELOG) corrective regimen sliding scale   SubCutaneous at bedtime  insulin lispro (ADMELOG) corrective regimen sliding scale   SubCutaneous three times a day before meals  levothyroxine 175 MICROGram(s) Oral daily  pantoprazole  Injectable 40 milliGRAM(s) IV Push daily  simvastatin 20 milliGRAM(s) Oral at bedtime    MEDICATIONS  (PRN):  acetaminophen     Tablet .. 650 milliGRAM(s) Oral every 6 hours PRN Temp greater or equal to 38C (100.4F), Mild Pain (1 - 3)      Objective:    Vitals: Vital Signs Last 24 Hrs  T(C): 36.9 (05-08-24 @ 06:02), Max: 37.1 (05-07-24 @ 12:10)  T(F): 98.4 (05-08-24 @ 06:02), Max: 98.7 (05-07-24 @ 12:10)  HR: 87 (05-08-24 @ 06:02) (85 - 100)  BP: 129/68 (05-08-24 @ 06:02) (107/84 - 145/80)  BP(mean): --  RR: 16 (05-08-24 @ 06:02) (16 - 17)  SpO2: 98% (05-08-24 @ 06:02) (98% - 100%)            I&O's Summary      PHYSICAL EXAM:  GENERAL: NAD  HEAD:  Atraumatic, Normocephalic  EYES: EOMI, conjunctiva and sclera clear  CHEST/LUNG: Clear to auscultation bilaterally; No rales, rhonchi, wheezing, or rubs  HEART: Regular rate and rhythm; No murmurs, rubs, or gallops  ABDOMEN: Soft, Nontender, Nondistended; postsurgical well healed scars.   SKIN: No rashes or lesions  NERVOUS SYSTEM:  Alert & Oriented X3, no focal deficits    LABS:  05-06    137  |  99  |  22  ----------------------------<  125<H>  3.5   |  25  |  1.29    Ca    8.3<L>      06 May 2024 16:15    TPro  6.8  /  Alb  3.8  /  TBili  0.3  /  DBili  x   /  AST  17  /  ALT  13  /  AlkPhos  64  05-06      PT/INR - ( 06 May 2024 16:15 )   PT: 11.7 sec;   INR: 1.04 ratio         PTT - ( 06 May 2024 16:15 )  PTT:28.2 sec              Urinalysis Basic - ( 06 May 2024 16:15 )    Color: x / Appearance: x / SG: x / pH: x  Gluc: 125 mg/dL / Ketone: x  / Bili: x / Urobili: x   Blood: x / Protein: x / Nitrite: x   Leuk Esterase: x / RBC: x / WBC x   Sq Epi: x / Non Sq Epi: x / Bacteria: x                              8.2    6.07  )-----------( 138      ( 07 May 2024 10:19 )             24.1                         6.7    7.21  )-----------( 163      ( 07 May 2024 01:24 )             20.8                         6.9    7.53  )-----------( 202      ( 06 May 2024 16:15 )             21.5     CAPILLARY BLOOD GLUCOSE      POCT Blood Glucose.: 100 mg/dL (07 May 2024 21:11)  POCT Blood Glucose.: 105 mg/dL (07 May 2024 17:30)  POCT Blood Glucose.: 96 mg/dL (07 May 2024 13:20)  POCT Blood Glucose.: 98 mg/dL (07 May 2024 11:44)      RADIOLOGY & ADDITIONAL TESTS:

## 2024-05-08 NOTE — PROGRESS NOTE ADULT - SUBJECTIVE AND OBJECTIVE BOX
Gastroenterology/Hepatology Progress Note      Interval Events:     - hgb stable around 8, but she had persistent bright red blood per rectum, multiple episodes overnight.   - VSS, denied dizziness or lightheadedness.   - No fevers, chills, abd pain, nausea or vomiting.     Allergies:  Valium (Other)  oxycodone (Other)      Hospital Medications:  acetaminophen     Tablet .. 650 milliGRAM(s) Oral every 6 hours PRN  cyanocobalamin 1000 MICROGram(s) Oral daily  dextrose 50% Injectable 25 Gram(s) IV Push once  dextrose 50% Injectable 25 Gram(s) IV Push once  dextrose 50% Injectable 12.5 Gram(s) IV Push once  dextrose Oral Gel 15 Gram(s) Oral once  escitalopram 10 milliGRAM(s) Oral daily  glucagon  Injectable 1 milliGRAM(s) IntraMuscular once  insulin lispro (ADMELOG) corrective regimen sliding scale   SubCutaneous at bedtime  insulin lispro (ADMELOG) corrective regimen sliding scale   SubCutaneous three times a day before meals  levothyroxine 175 MICROGram(s) Oral daily  pantoprazole  Injectable 40 milliGRAM(s) IV Push daily  simvastatin 20 milliGRAM(s) Oral at bedtime      ROS: 14 point ROS negative unless otherwise state in subjective    PHYSICAL EXAM:   Vital Signs:  Vital Signs Last 24 Hrs  T(C): 36.9 (08 May 2024 06:02), Max: 37.1 (07 May 2024 20:40)  T(F): 98.4 (08 May 2024 06:02), Max: 98.7 (07 May 2024 20:40)  HR: 87 (08 May 2024 06:02) (87 - 100)  BP: 129/68 (08 May 2024 06:02) (107/84 - 145/80)  BP(mean): --  RR: 16 (08 May 2024 06:02) (16 - 17)  SpO2: 98% (08 May 2024 06:02) (98% - 99%)    Parameters below as of 08 May 2024 06:02  Patient On (Oxygen Delivery Method): room air      Daily     Daily     PHYSICAL EXAM:     GENERAL:  No acute distress  HEENT:  no scleral icterus  CHEST:  no accessory muscle use  HEART:  Regular rate and rhythm  ABDOMEN:  Soft, non-tender, non-distended  EXTREMITIES: No edema  SKIN:  No rash/ecchymoses  NEURO:  Alert and oriented x 3    LABS:                        8.5    5.71  )-----------( 172      ( 08 May 2024 07:10 )             25.3     Mean Cell Volume: 85.2 fL (05-08-24 @ 07:10)    05-08    140  |  105  |  13  ----------------------------<  88  3.4<L>   |  22  |  1.02    Ca    8.0<L>      08 May 2024 07:10  Phos  2.2     05-08  Mg     1.50     05-08    TPro  6.4  /  Alb  3.7  /  TBili  0.7  /  DBili  x   /  AST  15  /  ALT  11  /  AlkPhos  67  05-08    LIVER FUNCTIONS - ( 08 May 2024 07:10 )  Alb: 3.7 g/dL / Pro: 6.4 g/dL / ALK PHOS: 67 U/L / ALT: 11 U/L / AST: 15 U/L / GGT: x           PT/INR - ( 06 May 2024 16:15 )   PT: 11.7 sec;   INR: 1.04 ratio         PTT - ( 06 May 2024 16:15 )  PTT:28.2 sec  Urinalysis Basic - ( 08 May 2024 07:10 )    Color: x / Appearance: x / SG: x / pH: x  Gluc: 88 mg/dL / Ketone: x  / Bili: x / Urobili: x   Blood: x / Protein: x / Nitrite: x   Leuk Esterase: x / RBC: x / WBC x   Sq Epi: x / Non Sq Epi: x / Bacteria: x            Imaging:          < from: CT Angio Abdomen and Pelvis w/ IV Cont (05.07.24 @ 05:42) >    FINDINGS:  LOWER CHEST: Within normal limits.    LIVER: Within normal limits.  BILE DUCTS: Normal caliber.  GALLBLADDER: Within normal limits.  SPLEEN: Within normal limits.  PANCREAS: Within normal limits.  ADRENALS: Within normal limits.  KIDNEYS/URETERS: Within normal limits.    BLADDER: Within normal limits.  REPRODUCTIVE ORGANS: Hysterectomy.    BOWEL: No bowel obstruction. Multiple bowel bowel containing ventral   supraumbilical hernias..Small bowel anastomosis in the upper abdomen.   Appendix is normal. Colorectal anastomosis. Uncomplicated diverticulosis.    No evidence of active GI bleed.  PERITONEUM: No ascites.  VESSELS: Within normal limits.  RETROPERITONEUM/LYMPH NODES: No lymphadenopathy.  ABDOMINAL WALL: Postsurgical changes.  BONES: Degenerative changes. Posterior fusion extending from L4 through   S1.    < end of copied text >

## 2024-05-08 NOTE — PROGRESS NOTE ADULT - SUBJECTIVE AND OBJECTIVE BOX
A TEAM SURGERY DAILY PROGRESS NOTE:     INTERVAL EVENTS:    SUBJECTIVE/ROS: No acute events overnight. Patient seen and examined at bedside by surgical team.     OBJECTIVE:  Vital Signs Last 24 Hrs  T(C): 37.1 (07 May 2024 20:40), Max: 37.1 (07 May 2024 12:10)  T(F): 98.7 (07 May 2024 20:40), Max: 98.7 (07 May 2024 12:10)  HR: 87 (08 May 2024 01:13) (83 - 100)  BP: 145/80 (08 May 2024 01:13) (107/84 - 145/80)  BP(mean): --  RR: 17 (08 May 2024 01:13) (17 - 17)  SpO2: 98% (08 May 2024 01:13) (98% - 100%)    Parameters below as of 08 May 2024 01:13  Patient On (Oxygen Delivery Method): room air                            8.2    6.07  )-----------( 138      ( 07 May 2024 10:19 )             24.1     05-06    137  |  99  |  22  ----------------------------<  125<H>  3.5   |  25  |  1.29    Ca    8.3<L>      06 May 2024 16:15    TPro  6.8  /  Alb  3.8  /  TBili  0.3  /  DBili  x   /  AST  17  /  ALT  13  /  AlkPhos  64  05-06   PT/INR - ( 06 May 2024 16:15 )   PT: 11.7 sec;   INR: 1.04 ratio         PTT - ( 06 May 2024 16:15 )  PTT:28.2 sec  I&O's Detail      IMAGING:      PHYSICAL EXAM:  Constitutional: NAD  Respiratory: non-labored breathing, patent airway  Gastrointestinal: abdomen soft, nontender, nondistended  Extremities: warm  Neurological: intact           A TEAM SURGERY DAILY PROGRESS NOTE:     INTERVAL EVENTS:    SUBJECTIVE/ROS: Endorses bloody BM overnight, bright red but denies lightheadedness or chest pain. Denies abd pain.     OBJECTIVE:  Vital Signs Last 24 Hrs  T(C): 37.1 (07 May 2024 20:40), Max: 37.1 (07 May 2024 12:10)  T(F): 98.7 (07 May 2024 20:40), Max: 98.7 (07 May 2024 12:10)  HR: 87 (08 May 2024 01:13) (83 - 100)  BP: 145/80 (08 May 2024 01:13) (107/84 - 145/80)  BP(mean): --  RR: 17 (08 May 2024 01:13) (17 - 17)  SpO2: 98% (08 May 2024 01:13) (98% - 100%)    Parameters below as of 08 May 2024 01:13  Patient On (Oxygen Delivery Method): room air                            8.2    6.07  )-----------( 138      ( 07 May 2024 10:19 )             24.1     05-06    137  |  99  |  22  ----------------------------<  125<H>  3.5   |  25  |  1.29    Ca    8.3<L>      06 May 2024 16:15    TPro  6.8  /  Alb  3.8  /  TBili  0.3  /  DBili  x   /  AST  17  /  ALT  13  /  AlkPhos  64  05-06   PT/INR - ( 06 May 2024 16:15 )   PT: 11.7 sec;   INR: 1.04 ratio         PTT - ( 06 May 2024 16:15 )  PTT:28.2 sec  I&O's Detail      IMAGING:      PHYSICAL EXAM:  Constitutional: NAD  Respiratory: non-labored breathing, patent airway  Gastrointestinal: abdomen soft, nontender, nondistended  Extremities: warm  Neurological: intact

## 2024-05-08 NOTE — PROGRESS NOTE ADULT - PROBLEM SELECTOR PLAN 1
Prior hx extensive diverticulosis, recurrent diverticular bleeding s/s/p L hemicolectomy and Naeem's (2021), Naeem's reversal and ileostomy creation (2022), and ileostomy reversal (2022). Currently HDS, CTAP performed 2d prior to admission re demonstrated diverticulosis without active extravasation c/f active bleed, CRS surgery consulted without intervention at this time. S/p 2u PRBC in ED, IVF.   Prior Colon/EGD  Monitor CBC q12, transfuse > 7, platelets >/=50, maintain active T/S, coags   VS q4, monitor for clinical signs of bleeding   Patient has adequate IV access Prior hx extensive diverticulosis, recurrent diverticular bleeding s/s/p L hemicolectomy and Naeem's (2021), Naeem's reversal and ileostomy creation (2022), and ileostomy reversal (2022). Currently HDS, CTAP performed 2d prior to admission re demonstrated diverticulosis without active extravasation c/f active bleed, CRS surgery consulted without intervention at this time. S/p 2u PRBC in ED, IVF.   Prior Colon/EGD 10/23   Monitor CBC q12, transfuse > 7, platelets >/=50, maintain active T/S, coags   VS q4, monitor for clinical signs of bleeding   Patient has adequate IV access  Plan for colonoscopy 5/10, NPO @ [facility], CLD in AM

## 2024-05-08 NOTE — PROVIDER CONTACT NOTE (OTHER) - ASSESSMENT
patient had bright red bloody BM. patient denied dizziness, weakness, SOB, palpitations. no tachycardia noted.

## 2024-05-09 LAB
ALBUMIN SERPL ELPH-MCNC: 3.8 G/DL — SIGNIFICANT CHANGE UP (ref 3.3–5)
ALP SERPL-CCNC: 63 U/L — SIGNIFICANT CHANGE UP (ref 40–120)
ALT FLD-CCNC: 13 U/L — SIGNIFICANT CHANGE UP (ref 4–33)
ANION GAP SERPL CALC-SCNC: 13 MMOL/L — SIGNIFICANT CHANGE UP (ref 7–14)
AST SERPL-CCNC: 18 U/L — SIGNIFICANT CHANGE UP (ref 4–32)
BASOPHILS # BLD AUTO: 0.04 K/UL — SIGNIFICANT CHANGE UP (ref 0–0.2)
BASOPHILS NFR BLD AUTO: 0.7 % — SIGNIFICANT CHANGE UP (ref 0–2)
BILIRUB SERPL-MCNC: 0.7 MG/DL — SIGNIFICANT CHANGE UP (ref 0.2–1.2)
BLD GP AB SCN SERPL QL: NEGATIVE — SIGNIFICANT CHANGE UP
BUN SERPL-MCNC: 11 MG/DL — SIGNIFICANT CHANGE UP (ref 7–23)
CALCIUM SERPL-MCNC: 8 MG/DL — LOW (ref 8.4–10.5)
CHLORIDE SERPL-SCNC: 104 MMOL/L — SIGNIFICANT CHANGE UP (ref 98–107)
CO2 SERPL-SCNC: 24 MMOL/L — SIGNIFICANT CHANGE UP (ref 22–31)
CREAT SERPL-MCNC: 1.06 MG/DL — SIGNIFICANT CHANGE UP (ref 0.5–1.3)
EGFR: 57 ML/MIN/1.73M2 — LOW
EOSINOPHIL # BLD AUTO: 0.08 K/UL — SIGNIFICANT CHANGE UP (ref 0–0.5)
EOSINOPHIL NFR BLD AUTO: 1.3 % — SIGNIFICANT CHANGE UP (ref 0–6)
GLUCOSE BLDC GLUCOMTR-MCNC: 102 MG/DL — HIGH (ref 70–99)
GLUCOSE BLDC GLUCOMTR-MCNC: 105 MG/DL — HIGH (ref 70–99)
GLUCOSE BLDC GLUCOMTR-MCNC: 115 MG/DL — HIGH (ref 70–99)
GLUCOSE BLDC GLUCOMTR-MCNC: 124 MG/DL — HIGH (ref 70–99)
GLUCOSE BLDC GLUCOMTR-MCNC: 132 MG/DL — HIGH (ref 70–99)
GLUCOSE BLDC GLUCOMTR-MCNC: 134 MG/DL — HIGH (ref 70–99)
GLUCOSE SERPL-MCNC: 122 MG/DL — HIGH (ref 70–99)
HCT VFR BLD CALC: 25.8 % — LOW (ref 34.5–45)
HGB BLD-MCNC: 8.2 G/DL — LOW (ref 11.5–15.5)
IANC: 3.79 K/UL — SIGNIFICANT CHANGE UP (ref 1.8–7.4)
IMM GRANULOCYTES NFR BLD AUTO: 0.3 % — SIGNIFICANT CHANGE UP (ref 0–0.9)
LYMPHOCYTES # BLD AUTO: 1.47 K/UL — SIGNIFICANT CHANGE UP (ref 1–3.3)
LYMPHOCYTES # BLD AUTO: 24.7 % — SIGNIFICANT CHANGE UP (ref 13–44)
MAGNESIUM SERPL-MCNC: 1.8 MG/DL — SIGNIFICANT CHANGE UP (ref 1.6–2.6)
MCHC RBC-ENTMCNC: 27.7 PG — SIGNIFICANT CHANGE UP (ref 27–34)
MCHC RBC-ENTMCNC: 31.8 GM/DL — LOW (ref 32–36)
MCV RBC AUTO: 87.2 FL — SIGNIFICANT CHANGE UP (ref 80–100)
MONOCYTES # BLD AUTO: 0.56 K/UL — SIGNIFICANT CHANGE UP (ref 0–0.9)
MONOCYTES NFR BLD AUTO: 9.4 % — SIGNIFICANT CHANGE UP (ref 2–14)
NEUTROPHILS # BLD AUTO: 3.79 K/UL — SIGNIFICANT CHANGE UP (ref 1.8–7.4)
NEUTROPHILS NFR BLD AUTO: 63.6 % — SIGNIFICANT CHANGE UP (ref 43–77)
NRBC # BLD: 0 /100 WBCS — SIGNIFICANT CHANGE UP (ref 0–0)
NRBC # FLD: 0 K/UL — SIGNIFICANT CHANGE UP (ref 0–0)
PHOSPHATE SERPL-MCNC: 2.1 MG/DL — LOW (ref 2.5–4.5)
PLATELET # BLD AUTO: 207 K/UL — SIGNIFICANT CHANGE UP (ref 150–400)
POTASSIUM SERPL-MCNC: 3.5 MMOL/L — SIGNIFICANT CHANGE UP (ref 3.5–5.3)
POTASSIUM SERPL-SCNC: 3.5 MMOL/L — SIGNIFICANT CHANGE UP (ref 3.5–5.3)
PROT SERPL-MCNC: 6.4 G/DL — SIGNIFICANT CHANGE UP (ref 6–8.3)
RBC # BLD: 2.96 M/UL — LOW (ref 3.8–5.2)
RBC # FLD: 15.9 % — HIGH (ref 10.3–14.5)
RH IG SCN BLD-IMP: POSITIVE — SIGNIFICANT CHANGE UP
SODIUM SERPL-SCNC: 141 MMOL/L — SIGNIFICANT CHANGE UP (ref 135–145)
WBC # BLD: 5.96 K/UL — SIGNIFICANT CHANGE UP (ref 3.8–10.5)
WBC # FLD AUTO: 5.96 K/UL — SIGNIFICANT CHANGE UP (ref 3.8–10.5)

## 2024-05-09 PROCEDURE — 99232 SBSQ HOSP IP/OBS MODERATE 35: CPT | Mod: GC

## 2024-05-09 RX ORDER — MAGNESIUM SULFATE 500 MG/ML
2 VIAL (ML) INJECTION ONCE
Refills: 0 | Status: COMPLETED | OUTPATIENT
Start: 2024-05-09 | End: 2024-05-09

## 2024-05-09 RX ORDER — LEVOTHYROXINE SODIUM 125 MCG
175 TABLET ORAL ONCE
Refills: 0 | Status: COMPLETED | OUTPATIENT
Start: 2024-05-09 | End: 2024-05-09

## 2024-05-09 RX ORDER — ACETAMINOPHEN 500 MG
1000 TABLET ORAL ONCE
Refills: 0 | Status: COMPLETED | OUTPATIENT
Start: 2024-05-09 | End: 2024-05-09

## 2024-05-09 RX ORDER — POTASSIUM CHLORIDE 20 MEQ
20 PACKET (EA) ORAL ONCE
Refills: 0 | Status: COMPLETED | OUTPATIENT
Start: 2024-05-09 | End: 2024-05-09

## 2024-05-09 RX ORDER — SODIUM,POTASSIUM PHOSPHATES 278-250MG
2 POWDER IN PACKET (EA) ORAL ONCE
Refills: 0 | Status: COMPLETED | OUTPATIENT
Start: 2024-05-09 | End: 2024-05-09

## 2024-05-09 RX ADMIN — Medication 1000 MILLIGRAM(S): at 12:43

## 2024-05-09 RX ADMIN — SIMVASTATIN 20 MILLIGRAM(S): 20 TABLET, FILM COATED ORAL at 21:19

## 2024-05-09 RX ADMIN — ESCITALOPRAM OXALATE 10 MILLIGRAM(S): 10 TABLET, FILM COATED ORAL at 11:30

## 2024-05-09 RX ADMIN — PREGABALIN 1000 MICROGRAM(S): 225 CAPSULE ORAL at 11:31

## 2024-05-09 RX ADMIN — LIDOCAINE 1 PATCH: 4 CREAM TOPICAL at 06:13

## 2024-05-09 RX ADMIN — Medication 25 GRAM(S): at 09:47

## 2024-05-09 RX ADMIN — Medication 20 MILLIEQUIVALENT(S): at 14:51

## 2024-05-09 RX ADMIN — Medication 175 MICROGRAM(S): at 12:24

## 2024-05-09 RX ADMIN — LIDOCAINE 1 PATCH: 4 CREAM TOPICAL at 11:35

## 2024-05-09 RX ADMIN — LIDOCAINE 1 PATCH: 4 CREAM TOPICAL at 12:24

## 2024-05-09 RX ADMIN — LIDOCAINE 1 PATCH: 4 CREAM TOPICAL at 20:43

## 2024-05-09 RX ADMIN — Medication 400 MILLIGRAM(S): at 12:23

## 2024-05-09 RX ADMIN — PANTOPRAZOLE SODIUM 40 MILLIGRAM(S): 20 TABLET, DELAYED RELEASE ORAL at 11:30

## 2024-05-09 RX ADMIN — Medication 2 PACKET(S): at 09:47

## 2024-05-09 NOTE — PROGRESS NOTE ADULT - SUBJECTIVE AND OBJECTIVE BOX
Gastroenterology/Hepatology Progress Note      Interval Events:   - No acute events overnight.   - Patient underwent prep overnight for colonoscopy today which was cancelled because patient took her last dose of Ozempic on 5/5.   - No rectal bleeding overnight, has clear mucus now.   - Hgb has been stable w/ no blood transfusion.     Allergies:  Valium (Other)  oxycodone (Other)      Hospital Medications:  acetaminophen     Tablet .. 650 milliGRAM(s) Oral every 6 hours PRN  cyanocobalamin 1000 MICROGram(s) Oral daily  dextrose 50% Injectable 12.5 Gram(s) IV Push once  dextrose 50% Injectable 25 Gram(s) IV Push once  dextrose 50% Injectable 25 Gram(s) IV Push once  dextrose Oral Gel 15 Gram(s) Oral once  escitalopram 10 milliGRAM(s) Oral daily  glucagon  Injectable 1 milliGRAM(s) IntraMuscular once  insulin lispro (ADMELOG) corrective regimen sliding scale   SubCutaneous three times a day before meals  insulin lispro (ADMELOG) corrective regimen sliding scale   SubCutaneous at bedtime  levothyroxine 175 MICROGram(s) Oral daily  lidocaine   4% Patch 1 Patch Transdermal daily  magnesium sulfate  IVPB 2 Gram(s) IV Intermittent once  pantoprazole  Injectable 40 milliGRAM(s) IV Push daily  potassium chloride   Powder 20 milliEquivalent(s) Oral once  potassium phosphate / sodium phosphate Powder (PHOS-NaK) 2 Packet(s) Oral once  simvastatin 20 milliGRAM(s) Oral at bedtime      ROS: 14 point ROS negative unless otherwise state in subjective    PHYSICAL EXAM:   Vital Signs:  Vital Signs Last 24 Hrs  T(C): 36.7 (09 May 2024 06:16), Max: 36.9 (08 May 2024 21:28)  T(F): 98.1 (09 May 2024 06:16), Max: 98.4 (08 May 2024 21:28)  HR: 76 (09 May 2024 06:16) (76 - 85)  BP: 154/57 (09 May 2024 06:16) (124/66 - 161/76)  BP(mean): --  RR: 18 (09 May 2024 06:16) (17 - 18)  SpO2: 99% (09 May 2024 06:16) (98% - 100%)    Parameters below as of 09 May 2024 06:16  Patient On (Oxygen Delivery Method): room air      Daily     Daily     PHYSICAL EXAM:     GENERAL:  No acute distress  HEENT:  no scleral icterus  CHEST:  no accessory muscle use  HEART:  Regular rate and rhythm  ABDOMEN:  Soft, non-tender, non-distended  EXTREMITIES: No edema  SKIN:  No rash/ecchymoses  NEURO:  Alert and oriented x 3    LABS:                        8.2    5.96  )-----------( 207      ( 09 May 2024 05:46 )             25.8     Mean Cell Volume: 87.2 fL (05-09-24 @ 05:46)    05-09    141  |  104  |  11  ----------------------------<  122<H>  3.5   |  24  |  1.06    Ca    8.0<L>      09 May 2024 05:46  Phos  2.1     05-09  Mg     1.80     05-09    TPro  6.4  /  Alb  3.8  /  TBili  0.7  /  DBili  x   /  AST  18  /  ALT  13  /  AlkPhos  63  05-09    LIVER FUNCTIONS - ( 09 May 2024 05:46 )  Alb: 3.8 g/dL / Pro: 6.4 g/dL / ALK PHOS: 63 U/L / ALT: 13 U/L / AST: 18 U/L / GGT: x             Urinalysis Basic - ( 09 May 2024 05:46 )    Color: x / Appearance: x / SG: x / pH: x  Gluc: 122 mg/dL / Ketone: x  / Bili: x / Urobili: x   Blood: x / Protein: x / Nitrite: x   Leuk Esterase: x / RBC: x / WBC x   Sq Epi: x / Non Sq Epi: x / Bacteria: x            Imaging:          < from: CT Angio Abdomen and Pelvis w/ IV Cont (05.07.24 @ 05:42) >    FINDINGS:  LOWER CHEST: Within normal limits.    LIVER: Within normal limits.  BILE DUCTS: Normal caliber.  GALLBLADDER: Within normal limits.  SPLEEN: Within normal limits.  PANCREAS: Within normal limits.  ADRENALS: Within normal limits.  KIDNEYS/URETERS: Within normal limits.    BLADDER: Within normal limits.  REPRODUCTIVE ORGANS: Hysterectomy.    BOWEL: No bowel obstruction. Multiple bowel bowel containing ventral   supraumbilical hernias..Small bowel anastomosis in the upper abdomen.   Appendix is normal. Colorectal anastomosis. Uncomplicated diverticulosis.    No evidence of active GI bleed.  PERITONEUM: No ascites.  VESSELS: Within normal limits.  RETROPERITONEUM/LYMPH NODES: No lymphadenopathy.  ABDOMINAL WALL: Postsurgical changes.  BONES: Degenerative changes. Posterior fusion extending from L4 through   S1.    < end of copied text >

## 2024-05-09 NOTE — PROGRESS NOTE ADULT - SUBJECTIVE AND OBJECTIVE BOX
************************  Endy Flores MD-PhD  Internal Medicine PGY-2  ************************    Patient is a 68y old  Female who presents with a chief complaint of Rectal Bleeding (08 May 2024 13:34)    No events overnight, no acute complaints this morning. Patient with appropriate PO intake and urinating well with BM(s). Denies CP, SOB, fevers/chills, N/V, or abdominal pain. Patient reminded of ongoing careplan.    MEDICATIONS  (STANDING):  cyanocobalamin 1000 MICROGram(s) Oral daily  dextrose 50% Injectable 25 Gram(s) IV Push once  dextrose 50% Injectable 25 Gram(s) IV Push once  dextrose 50% Injectable 12.5 Gram(s) IV Push once  dextrose Oral Gel 15 Gram(s) Oral once  escitalopram 10 milliGRAM(s) Oral daily  glucagon  Injectable 1 milliGRAM(s) IntraMuscular once  insulin lispro (ADMELOG) corrective regimen sliding scale   SubCutaneous at bedtime  insulin lispro (ADMELOG) corrective regimen sliding scale   SubCutaneous three times a day before meals  levothyroxine 175 MICROGram(s) Oral daily  lidocaine   4% Patch 1 Patch Transdermal daily  pantoprazole  Injectable 40 milliGRAM(s) IV Push daily  simvastatin 20 milliGRAM(s) Oral at bedtime    MEDICATIONS  (PRN):  acetaminophen     Tablet .. 650 milliGRAM(s) Oral every 6 hours PRN Temp greater or equal to 38C (100.4F), Mild Pain (1 - 3)      CAPILLARY BLOOD GLUCOSE      POCT Blood Glucose.: 132 mg/dL (09 May 2024 05:30)  POCT Blood Glucose.: 124 mg/dL (09 May 2024 00:47)  POCT Blood Glucose.: 105 mg/dL (08 May 2024 21:24)  POCT Blood Glucose.: 113 mg/dL (08 May 2024 17:42)  POCT Blood Glucose.: 103 mg/dL (08 May 2024 12:31)  POCT Blood Glucose.: 97 mg/dL (08 May 2024 08:48)    I&O's Summary      PHYSICAL EXAM:  Vital Signs Last 24 Hrs  T(C): 36.7 (09 May 2024 06:16), Max: 36.9 (08 May 2024 21:28)  T(F): 98.1 (09 May 2024 06:16), Max: 98.4 (08 May 2024 21:28)  HR: 76 (09 May 2024 06:16) (76 - 85)  BP: 154/57 (09 May 2024 06:16) (124/66 - 161/76)  BP(mean): --  RR: 18 (09 May 2024 06:16) (17 - 18)  SpO2: 99% (09 May 2024 06:16) (98% - 100%)    Parameters below as of 09 May 2024 06:16  Patient On (Oxygen Delivery Method): room air        T(C): 36.7 (05-09-24 @ 06:16), Max: 36.9 (05-08-24 @ 21:28)  HR: 76 (05-09-24 @ 06:16) (76 - 85)  BP: 154/57 (05-09-24 @ 06:16) (124/66 - 161/76)  RR: 18 (05-09-24 @ 06:16) (17 - 18)  SpO2: 99% (05-09-24 @ 06:16) (98% - 100%)    GENERAL: NAD  HEAD:  Atraumatic, Normocephalic  EYES: EOMI, conjunctiva and sclera clear  CHEST/LUNG: Clear to auscultation bilaterally; No rales, rhonchi, wheezing, or rubs  HEART: Regular rate and rhythm; No murmurs, rubs, or gallops  ABDOMEN: Soft, Nontender, Nondistended; postsurgical well healed scars.   SKIN: No rashes or lesions  NERVOUS SYSTEM:  Alert & Oriented X3, no focal deficits    LABS:                        8.2    5.96  )-----------( 207      ( 09 May 2024 05:46 )             25.8     05-09    141  |  104  |  11  ----------------------------<  122<H>  3.5   |  24  |  1.06    Ca    8.0<L>      09 May 2024 05:46  Phos  2.1     05-09  Mg     1.80     05-09    TPro  6.4  /  Alb  3.8  /  TBili  0.7  /  DBili  x   /  AST  18  /  ALT  13  /  AlkPhos  63  05-09          Urinalysis Basic - ( 09 May 2024 05:46 )    Color: x / Appearance: x / SG: x / pH: x  Gluc: 122 mg/dL / Ketone: x  / Bili: x / Urobili: x   Blood: x / Protein: x / Nitrite: x   Leuk Esterase: x / RBC: x / WBC x   Sq Epi: x / Non Sq Epi: x / Bacteria: x          IMAGING & OTHER TESTS:  NNI.   ************************  Endy Flores MD-PhD  Internal Medicine PGY-2  ************************    Patient is a 68y old  Female who presents with a chief complaint of Rectal Bleeding (08 May 2024 13:34)    No events overnight, no acute complaints this morning. Patient with golytely PO intake with clear BM(s). Denies CP, SOB, fevers/chills, N/V, or abdominal pain. Patient reminded of ongoing careplan.    MEDICATIONS  (STANDING):  cyanocobalamin 1000 MICROGram(s) Oral daily  dextrose 50% Injectable 25 Gram(s) IV Push once  dextrose 50% Injectable 25 Gram(s) IV Push once  dextrose 50% Injectable 12.5 Gram(s) IV Push once  dextrose Oral Gel 15 Gram(s) Oral once  escitalopram 10 milliGRAM(s) Oral daily  glucagon  Injectable 1 milliGRAM(s) IntraMuscular once  insulin lispro (ADMELOG) corrective regimen sliding scale   SubCutaneous at bedtime  insulin lispro (ADMELOG) corrective regimen sliding scale   SubCutaneous three times a day before meals  levothyroxine 175 MICROGram(s) Oral daily  lidocaine   4% Patch 1 Patch Transdermal daily  pantoprazole  Injectable 40 milliGRAM(s) IV Push daily  simvastatin 20 milliGRAM(s) Oral at bedtime    MEDICATIONS  (PRN):  acetaminophen     Tablet .. 650 milliGRAM(s) Oral every 6 hours PRN Temp greater or equal to 38C (100.4F), Mild Pain (1 - 3)      CAPILLARY BLOOD GLUCOSE      POCT Blood Glucose.: 132 mg/dL (09 May 2024 05:30)  POCT Blood Glucose.: 124 mg/dL (09 May 2024 00:47)  POCT Blood Glucose.: 105 mg/dL (08 May 2024 21:24)  POCT Blood Glucose.: 113 mg/dL (08 May 2024 17:42)  POCT Blood Glucose.: 103 mg/dL (08 May 2024 12:31)  POCT Blood Glucose.: 97 mg/dL (08 May 2024 08:48)    I&O's Summary      PHYSICAL EXAM:  Vital Signs Last 24 Hrs  T(C): 36.7 (09 May 2024 06:16), Max: 36.9 (08 May 2024 21:28)  T(F): 98.1 (09 May 2024 06:16), Max: 98.4 (08 May 2024 21:28)  HR: 76 (09 May 2024 06:16) (76 - 85)  BP: 154/57 (09 May 2024 06:16) (124/66 - 161/76)  BP(mean): --  RR: 18 (09 May 2024 06:16) (17 - 18)  SpO2: 99% (09 May 2024 06:16) (98% - 100%)    Parameters below as of 09 May 2024 06:16  Patient On (Oxygen Delivery Method): room air        T(C): 36.7 (05-09-24 @ 06:16), Max: 36.9 (05-08-24 @ 21:28)  HR: 76 (05-09-24 @ 06:16) (76 - 85)  BP: 154/57 (05-09-24 @ 06:16) (124/66 - 161/76)  RR: 18 (05-09-24 @ 06:16) (17 - 18)  SpO2: 99% (05-09-24 @ 06:16) (98% - 100%)    GENERAL: NAD  HEAD:  Atraumatic, Normocephalic  EYES: EOMI, conjunctiva and sclera clear  CHEST/LUNG: Clear to auscultation bilaterally; No rales, rhonchi, wheezing, or rubs  HEART: Regular rate and rhythm; No murmurs, rubs, or gallops  ABDOMEN: Soft, Nontender, Nondistended; postsurgical well healed scars.   SKIN: No rashes or lesions  NERVOUS SYSTEM:  Alert & Oriented X3, no focal deficits    LABS:                        8.2    5.96  )-----------( 207      ( 09 May 2024 05:46 )             25.8     05-09    141  |  104  |  11  ----------------------------<  122<H>  3.5   |  24  |  1.06    Ca    8.0<L>      09 May 2024 05:46  Phos  2.1     05-09  Mg     1.80     05-09    TPro  6.4  /  Alb  3.8  /  TBili  0.7  /  DBili  x   /  AST  18  /  ALT  13  /  AlkPhos  63  05-09          Urinalysis Basic - ( 09 May 2024 05:46 )    Color: x / Appearance: x / SG: x / pH: x  Gluc: 122 mg/dL / Ketone: x  / Bili: x / Urobili: x   Blood: x / Protein: x / Nitrite: x   Leuk Esterase: x / RBC: x / WBC x   Sq Epi: x / Non Sq Epi: x / Bacteria: x          IMAGING & OTHER TESTS:  NNI.

## 2024-05-09 NOTE — PROGRESS NOTE ADULT - SUBJECTIVE AND OBJECTIVE BOX
Overnight events:   - No acute events    SUBJECTIVE:  No bloody BMs overnight. Drank bowel prep and had clear liquid BMs.    OBJECTIVE:  Vital Signs Last 24 Hrs  T(C): 36.7 (09 May 2024 06:16), Max: 36.9 (08 May 2024 21:28)  T(F): 98.1 (09 May 2024 06:16), Max: 98.4 (08 May 2024 21:28)  HR: 76 (09 May 2024 06:16) (76 - 85)  BP: 154/57 (09 May 2024 06:16) (124/66 - 161/76)  BP(mean): --  RR: 18 (09 May 2024 06:16) (17 - 18)  SpO2: 99% (09 May 2024 06:16) (98% - 100%)    Parameters below as of 09 May 2024 06:16  Patient On (Oxygen Delivery Method): room air            Physical Examination:  GEN: NAD, resting quietly  PULM: symmetric chest rise bilaterally, no increased WOB  ABD: soft, nontender, nondistended, no rebound or guarding  EXTR: no LE erythema, moving all extremities      LABS:                        8.2    5.96  )-----------( 207      ( 09 May 2024 05:46 )             25.8       05-09    141  |  104  |  11  ----------------------------<  122<H>  3.5   |  24  |  1.06    Ca    8.0<L>      09 May 2024 05:46  Phos  2.1     05-09  Mg     1.80     05-09    TPro  6.4  /  Alb  3.8  /  TBili  0.7  /  DBili  x   /  AST  18  /  ALT  13  /  AlkPhos  63  05-09

## 2024-05-09 NOTE — PROGRESS NOTE ADULT - PROBLEM SELECTOR PLAN 5
Fluids: none   Diet: NPO  DVT ppx: ambulatory     GoC: full code  PT: NR  Dispo: Pending clinical course Fluids: none   Diet: CLD  DVT ppx: ambulatory     GoC: full code  PT: NR  Dispo: Pending clinical course

## 2024-05-09 NOTE — PROGRESS NOTE ADULT - PROBLEM SELECTOR PLAN 1
Prior hx extensive diverticulosis, recurrent diverticular bleeding s/s/p L hemicolectomy and Naeem's (2021), Naeem's reversal and ileostomy creation (2022), and ileostomy reversal (2022). Currently HDS, CTAP performed 2d prior to admission re demonstrated diverticulosis without active extravasation c/f active bleed, CRS surgery consulted without intervention at this time. S/p 2u PRBC in ED, IVF.   Prior Colon/EGD 10/23   Monitor CBC q12, transfuse > 7, platelets >/=50, maintain active T/S, coags   VS q4, monitor for clinical signs of bleeding   Patient has adequate IV access  > Plan for colonoscopy 5/9 Prior hx extensive diverticulosis, recurrent diverticular bleeding s/s/p L hemicolectomy and Naeem's (2021), Naeem's reversal and ileostomy creation (2022), and ileostomy reversal (2022). Currently HDS, CTAP performed 2d prior to admission re demonstrated diverticulosis without active extravasation c/f active bleed, CRS surgery consulted without intervention at this time. S/p 2u PRBC in ED, IVF.   Prior Colon/EGD 10/23   Monitor CBC q12, transfuse > 7, platelets >/=50, maintain active T/S, coags   VS q4, monitor for clinical signs of bleeding   Patient has adequate IV access  > Plan for colonoscopy 5/9 held due to ozempic, no plan for f/u colonoscopy if no further bleeding; GI wants to watch 24 hours through 5/10 and if no bleeding/symptoms then cleared for d/c w/ outpt f/u for colonoscopy

## 2024-05-10 ENCOUNTER — TRANSCRIPTION ENCOUNTER (OUTPATIENT)
Age: 69
End: 2024-05-10

## 2024-05-10 VITALS
SYSTOLIC BLOOD PRESSURE: 129 MMHG | RESPIRATION RATE: 18 BRPM | DIASTOLIC BLOOD PRESSURE: 62 MMHG | TEMPERATURE: 98 F | HEART RATE: 82 BPM | OXYGEN SATURATION: 100 %

## 2024-05-10 LAB
ANION GAP SERPL CALC-SCNC: 12 MMOL/L — SIGNIFICANT CHANGE UP (ref 7–14)
BASOPHILS # BLD AUTO: 0.04 K/UL — SIGNIFICANT CHANGE UP (ref 0–0.2)
BASOPHILS NFR BLD AUTO: 0.7 % — SIGNIFICANT CHANGE UP (ref 0–2)
BUN SERPL-MCNC: 8 MG/DL — SIGNIFICANT CHANGE UP (ref 7–23)
CALCIUM SERPL-MCNC: 8 MG/DL — LOW (ref 8.4–10.5)
CHLORIDE SERPL-SCNC: 102 MMOL/L — SIGNIFICANT CHANGE UP (ref 98–107)
CO2 SERPL-SCNC: 22 MMOL/L — SIGNIFICANT CHANGE UP (ref 22–31)
CREAT SERPL-MCNC: 0.92 MG/DL — SIGNIFICANT CHANGE UP (ref 0.5–1.3)
EGFR: 67 ML/MIN/1.73M2 — SIGNIFICANT CHANGE UP
EOSINOPHIL # BLD AUTO: 0.08 K/UL — SIGNIFICANT CHANGE UP (ref 0–0.5)
EOSINOPHIL NFR BLD AUTO: 1.5 % — SIGNIFICANT CHANGE UP (ref 0–6)
GLUCOSE BLDC GLUCOMTR-MCNC: 113 MG/DL — HIGH (ref 70–99)
GLUCOSE BLDC GLUCOMTR-MCNC: 144 MG/DL — HIGH (ref 70–99)
GLUCOSE BLDC GLUCOMTR-MCNC: <25 MG/DL — CRITICAL LOW (ref 70–99)
GLUCOSE BLDC GLUCOMTR-MCNC: <25 MG/DL — CRITICAL LOW (ref 70–99)
GLUCOSE SERPL-MCNC: 100 MG/DL — HIGH (ref 70–99)
HCT VFR BLD CALC: 24.4 % — LOW (ref 34.5–45)
HGB BLD-MCNC: 8.2 G/DL — LOW (ref 11.5–15.5)
IANC: 3.05 K/UL — SIGNIFICANT CHANGE UP (ref 1.8–7.4)
IMM GRANULOCYTES NFR BLD AUTO: 0.2 % — SIGNIFICANT CHANGE UP (ref 0–0.9)
LYMPHOCYTES # BLD AUTO: 1.76 K/UL — SIGNIFICANT CHANGE UP (ref 1–3.3)
LYMPHOCYTES # BLD AUTO: 32.3 % — SIGNIFICANT CHANGE UP (ref 13–44)
MAGNESIUM SERPL-MCNC: 2 MG/DL — SIGNIFICANT CHANGE UP (ref 1.6–2.6)
MCHC RBC-ENTMCNC: 29 PG — SIGNIFICANT CHANGE UP (ref 27–34)
MCHC RBC-ENTMCNC: 33.6 GM/DL — SIGNIFICANT CHANGE UP (ref 32–36)
MCV RBC AUTO: 86.2 FL — SIGNIFICANT CHANGE UP (ref 80–100)
MONOCYTES # BLD AUTO: 0.51 K/UL — SIGNIFICANT CHANGE UP (ref 0–0.9)
MONOCYTES NFR BLD AUTO: 9.4 % — SIGNIFICANT CHANGE UP (ref 2–14)
NEUTROPHILS # BLD AUTO: 3.05 K/UL — SIGNIFICANT CHANGE UP (ref 1.8–7.4)
NEUTROPHILS NFR BLD AUTO: 55.9 % — SIGNIFICANT CHANGE UP (ref 43–77)
NRBC # BLD: 0 /100 WBCS — SIGNIFICANT CHANGE UP (ref 0–0)
NRBC # FLD: 0 K/UL — SIGNIFICANT CHANGE UP (ref 0–0)
PHOSPHATE SERPL-MCNC: 2.4 MG/DL — LOW (ref 2.5–4.5)
PLATELET # BLD AUTO: 215 K/UL — SIGNIFICANT CHANGE UP (ref 150–400)
POTASSIUM SERPL-MCNC: 3.6 MMOL/L — SIGNIFICANT CHANGE UP (ref 3.5–5.3)
POTASSIUM SERPL-SCNC: 3.6 MMOL/L — SIGNIFICANT CHANGE UP (ref 3.5–5.3)
RBC # BLD: 2.83 M/UL — LOW (ref 3.8–5.2)
RBC # FLD: 15.9 % — HIGH (ref 10.3–14.5)
SODIUM SERPL-SCNC: 136 MMOL/L — SIGNIFICANT CHANGE UP (ref 135–145)
WBC # BLD: 5.45 K/UL — SIGNIFICANT CHANGE UP (ref 3.8–10.5)
WBC # FLD AUTO: 5.45 K/UL — SIGNIFICANT CHANGE UP (ref 3.8–10.5)

## 2024-05-10 PROCEDURE — 99239 HOSP IP/OBS DSCHRG MGMT >30: CPT | Mod: GC

## 2024-05-10 PROCEDURE — 99232 SBSQ HOSP IP/OBS MODERATE 35: CPT | Mod: GC

## 2024-05-10 RX ORDER — SODIUM,POTASSIUM PHOSPHATES 278-250MG
2 POWDER IN PACKET (EA) ORAL ONCE
Refills: 0 | Status: COMPLETED | OUTPATIENT
Start: 2024-05-10 | End: 2024-05-10

## 2024-05-10 RX ORDER — LEVOTHYROXINE SODIUM 125 MCG
1 TABLET ORAL
Refills: 0 | DISCHARGE

## 2024-05-10 RX ADMIN — LIDOCAINE 1 PATCH: 4 CREAM TOPICAL at 01:03

## 2024-05-10 RX ADMIN — Medication 2 PACKET(S): at 08:53

## 2024-05-10 RX ADMIN — Medication 175 MICROGRAM(S): at 05:21

## 2024-05-10 RX ADMIN — ESCITALOPRAM OXALATE 10 MILLIGRAM(S): 10 TABLET, FILM COATED ORAL at 12:20

## 2024-05-10 RX ADMIN — PANTOPRAZOLE SODIUM 40 MILLIGRAM(S): 20 TABLET, DELAYED RELEASE ORAL at 12:20

## 2024-05-10 RX ADMIN — Medication 650 MILLIGRAM(S): at 08:03

## 2024-05-10 RX ADMIN — LIDOCAINE 1 PATCH: 4 CREAM TOPICAL at 12:19

## 2024-05-10 RX ADMIN — PREGABALIN 1000 MICROGRAM(S): 225 CAPSULE ORAL at 12:20

## 2024-05-10 RX ADMIN — Medication 650 MILLIGRAM(S): at 08:35

## 2024-05-10 NOTE — PROGRESS NOTE ADULT - PROVIDER SPECIALTY LIST ADULT
Colorectal Surgery
Gastroenterology
Colorectal Surgery
Gastroenterology
Colorectal Surgery
Colorectal Surgery
Gastroenterology
Internal Medicine

## 2024-05-10 NOTE — DISCHARGE NOTE PROVIDER - PROVIDER TOKENS
PROVIDER:[TOKEN:[[id number]],FOLLOWUP:[1 week],ESTABLISHEDPATIENT:[T]] PROVIDER:[TOKEN:[[id number]],FOLLOWUP:[1 week],ESTABLISHEDPATIENT:[T]],PROVIDER:[TOKEN:[[id number]],FOLLOWUP:[1 week],ESTABLISHEDPATIENT:[T]]

## 2024-05-10 NOTE — PROGRESS NOTE ADULT - ATTENDING COMMENTS
No further bleeding  H/H stable  no further GI w/u at this time
Given hematochezia yesterday, had initially planned for colonoscopy today. however, patient cleared with golytely purge -- effluent non-bloody.   H/H stable.   Will defer colonoscopy for the moment as it seems that she has stopped bleeding. Given recent negative exam, really only needs colonoscopy if there is therapeutic intent. At this point, that is not clear.   Would monitor on clears for now. if the clinical scenario changes, and she re-bleeds, would rapid prep.
Re-bled overnight.   Will prep for colonoscopy tomorrow.
Pt feeling well without further blood per rectum. VS stable, hgb stable, and pt is well-appearing. After discussion with GI, will monitor pt on clear liquid diet today. If any evidence of rebleeding, will rapid prep for colonoscopy inpatient tomorrow. Otherwise, if bleeding resolved, pt may be able to d/c home tomorrow with close f/u. Pt and son at bedside acknowledge understanding and agree with plan.
Pt feeling well without abd pain or further blood per rectum. VS and hemoglobin are stable today. Appreciate GI and CRS recommendations. No indication for inpatient colonoscopy at this time. Pt to be discharged home with close f/u. Pt in agreement with d/c home today and notes her daughter will pick her up. Time planning discharge 35 minutes.
Pt had some BRBPR overnight. Currently feeling well. Afebrile, VS stable, exam and labs reassuring. Given extensive surgical history, pt may benefit from inpatient colonoscopy despite fairly good hemoglobin. Will discuss with GI.

## 2024-05-10 NOTE — PROGRESS NOTE ADULT - PROBLEM SELECTOR PLAN 3
Home regimen Lisinopril 40mg, Hydralazine PRN, Amlodipine 10 mg. Ambulatory SBPs 140s/80s. Can hold iso GIB, presently normotensive.

## 2024-05-10 NOTE — PROGRESS NOTE ADULT - PROBLEM SELECTOR PLAN 2
History of DM on Ozempic 0.25mg / weekly, last dose Sunday 5/5. Previously well controlled A1C < 7 however in past several months A1C 11% (4/18/24). CGM.  FS ACHS /q6 when NPO  PATRICIA

## 2024-05-10 NOTE — DISCHARGE NOTE PROVIDER - NSFOLLOWUPCLINICS_GEN_ALL_ED_FT
Gastroenterology at [facility]  Gastroenterology  [address] [place], [place] [address]  Phone: [phone]  Fax:   Follow Up Time: 1 week

## 2024-05-10 NOTE — PROGRESS NOTE ADULT - SUBJECTIVE AND OBJECTIVE BOX
Gastroenterology/Hepatology Progress Note      Interval Events:   - underwent GoLytely prep - on 5/8, colonoscopy was cancelled yesterday due to Ozempic use.   - Overnight, only had clear mucus with no bloody stools.   - Denied abd pain, nausea and vomiting.   - No fevers or chills.     Allergies:  Valium (Other)  oxycodone (Other)      Hospital Medications:  acetaminophen     Tablet .. 650 milliGRAM(s) Oral every 6 hours PRN  cyanocobalamin 1000 MICROGram(s) Oral daily  dextrose 50% Injectable 25 Gram(s) IV Push once  dextrose 50% Injectable 12.5 Gram(s) IV Push once  dextrose 50% Injectable 25 Gram(s) IV Push once  dextrose Oral Gel 15 Gram(s) Oral once  escitalopram 10 milliGRAM(s) Oral daily  glucagon  Injectable 1 milliGRAM(s) IntraMuscular once  insulin lispro (ADMELOG) corrective regimen sliding scale   SubCutaneous three times a day before meals  insulin lispro (ADMELOG) corrective regimen sliding scale   SubCutaneous at bedtime  levothyroxine 175 MICROGram(s) Oral daily  lidocaine   4% Patch 1 Patch Transdermal daily  pantoprazole  Injectable 40 milliGRAM(s) IV Push daily  potassium phosphate / sodium phosphate Powder (PHOS-NaK) 2 Packet(s) Oral once  simvastatin 20 milliGRAM(s) Oral at bedtime      ROS: 14 point ROS negative unless otherwise state in subjective    PHYSICAL EXAM:   Vital Signs:  Vital Signs Last 24 Hrs  T(C): 36.6 (10 May 2024 05:26), Max: 36.9 (09 May 2024 12:50)  T(F): 97.9 (10 May 2024 05:26), Max: 98.4 (09 May 2024 12:50)  HR: 84 (10 May 2024 05:26) (79 - 84)  BP: 126/81 (10 May 2024 05:26) (125/65 - 138/63)  BP(mean): --  RR: 17 (10 May 2024 05:26) (17 - 17)  SpO2: 98% (10 May 2024 05:26) (98% - 100%)    Parameters below as of 10 May 2024 05:26  Patient On (Oxygen Delivery Method): room air      Daily     Daily     PHYSICAL EXAM:     GENERAL:  No acute distress  HEENT:  no scleral icterus  CHEST:  no accessory muscle use  HEART:  Regular rate and rhythm  ABDOMEN:  Soft, non-tender, non-distended  EXTREMITIES: No edema  SKIN:  No rash/ecchymoses  NEURO:  Alert and oriented x 3    LABS:                        8.2    5.45  )-----------( 215      ( 10 May 2024 06:05 )             24.4     Mean Cell Volume: 86.2 fL (05-10-24 @ 06:05)    05-10    136  |  102  |  8   ----------------------------<  100<H>  3.6   |  22  |  0.92    Ca    8.0<L>      10 May 2024 06:05  Phos  2.4     05-10  Mg     2.00     05-10    TPro  6.4  /  Alb  3.8  /  TBili  0.7  /  DBili  x   /  AST  18  /  ALT  13  /  AlkPhos  63  05-09    LIVER FUNCTIONS - ( 09 May 2024 05:46 )  Alb: 3.8 g/dL / Pro: 6.4 g/dL / ALK PHOS: 63 U/L / ALT: 13 U/L / AST: 18 U/L / GGT: x             Urinalysis Basic - ( 10 May 2024 06:05 )    Color: x / Appearance: x / SG: x / pH: x  Gluc: 100 mg/dL / Ketone: x  / Bili: x / Urobili: x   Blood: x / Protein: x / Nitrite: x   Leuk Esterase: x / RBC: x / WBC x   Sq Epi: x / Non Sq Epi: x / Bacteria: x            Imaging:            < from: CT Angio Abdomen and Pelvis w/ IV Cont (05.07.24 @ 05:42) >    FINDINGS:  LOWER CHEST: Within normal limits.    LIVER: Within normal limits.  BILE DUCTS: Normal caliber.  GALLBLADDER: Within normal limits.  SPLEEN: Within normal limits.  PANCREAS: Within normal limits.  ADRENALS: Within normal limits.  KIDNEYS/URETERS: Within normal limits.    BLADDER: Within normal limits.  REPRODUCTIVE ORGANS: Hysterectomy.    BOWEL: No bowel obstruction. Multiple bowel bowel containing ventral   supraumbilical hernias..Small bowel anastomosis in the upper abdomen.   Appendix is normal. Colorectal anastomosis. Uncomplicated diverticulosis.    No evidence of active GI bleed.  PERITONEUM: No ascites.  VESSELS: Within normal limits.  RETROPERITONEUM/LYMPH NODES: No lymphadenopathy.  ABDOMINAL WALL: Postsurgical changes.  BONES: Degenerative changes. Posterior fusion extending from L4 through   S1.    < end of copied text >

## 2024-05-10 NOTE — PROGRESS NOTE ADULT - SUBJECTIVE AND OBJECTIVE BOX
A TEAM SURGERY DAILY PROGRESS NOTE:     INTERVAL EVENTS: No more BBM    SUBJECTIVE/ROS: No acute events overnight. Patient seen and examined at bedside by surgical team. Denies bloody BMs overnight, otherwise feels great.     OBJECTIVE:  Vital Signs Last 24 Hrs  T(C): 36.6 (10 May 2024 05:26), Max: 36.9 (09 May 2024 12:50)  T(F): 97.9 (10 May 2024 05:26), Max: 98.4 (09 May 2024 12:50)  HR: 84 (10 May 2024 05:26) (79 - 84)  BP: 126/81 (10 May 2024 05:26) (125/65 - 138/63)  BP(mean): --  RR: 17 (10 May 2024 05:26) (17 - 17)  SpO2: 98% (10 May 2024 05:26) (98% - 100%)    Parameters below as of 10 May 2024 05:26  Patient On (Oxygen Delivery Method): room air                            8.2    5.45  )-----------( 215      ( 10 May 2024 06:05 )             24.4     05-10    136  |  102  |  8   ----------------------------<  100<H>  3.6   |  22  |  0.92    Ca    8.0<L>      10 May 2024 06:05  Phos  2.4     05-10  Mg     2.00     05-10    TPro  6.4  /  Alb  3.8  /  TBili  0.7  /  DBili  x   /  AST  18  /  ALT  13  /  AlkPhos  63  05-09     I&O's Detail      IMAGING:      PHYSICAL EXAM:  Constitutional: NAD  Respiratory: non-labored breathing, patent airway  Gastrointestinal: abdomen soft, nontender, nondistended  Extremities: warm  Neurological: intact

## 2024-05-10 NOTE — DISCHARGE NOTE NURSING/CASE MANAGEMENT/SOCIAL WORK - PATIENT PORTAL LINK FT
You can access the FollowMyHealth Patient Portal offered by [facility] by registering at the following website: [email or web address]. By joining [employer]’s FollowMyHealth portal, you will also be able to view your health information using other applications (apps) compatible with our system.

## 2024-05-10 NOTE — DISCHARGE NOTE PROVIDER - NSDCCPCAREPLAN_GEN_ALL_CORE_FT
PRINCIPAL DISCHARGE DIAGNOSIS  Diagnosis: Lower GI bleed  Assessment and Plan of Treatment:       SECONDARY DISCHARGE DIAGNOSES  Diagnosis: Diverticulosis  Assessment and Plan of Treatment:      PRINCIPAL DISCHARGE DIAGNOSIS  Diagnosis: Lower GI bleed  Assessment and Plan of Treatment:       SECONDARY DISCHARGE DIAGNOSES  Diagnosis: Diverticulosis  Assessment and Plan of Treatment: Diverticulosis is a condition characterized by the presence of small pouches (diverticula) that bulge outward through weak spots in the colon wall. These pouches typically develop in the lower part of the colon. Diverticulosis itself often doesn't cause any symptoms and may be discovered incidentally during a colonoscopy or imaging tests for other conditions.  However diverticulosis can lead to a diverticular bleed, which  occurs when one of the blood vessels within a diverticulum ruptures and causes bleeding. This can lead to sudden and sometimes significant bleeding into the colon, which may result in passing bright red or maroon-colored blood through the rectum.  Diverticular bleeding can vary in severity. In some cases, the bleeding may stop on its own, but in others, it may require medical attention. Symptoms can include rectal bleeding, abdominal pain or discomfort, and possibly changes in bowel habits.  Please seek prompt medical attention if you develop a gastrointestinal bleed to investigate if the source of the bleeding can be stopped, and to transfuse or fluid resuscitate if necessary.     PRINCIPAL DISCHARGE DIAGNOSIS  Diagnosis: Lower GI bleed  Assessment and Plan of Treatment: A lower gastrointestinal (GI) bleed refers to bleeding that occurs within the lower part of the digestive tract, typically this includes bleeding from the colon, rectum, or anus.  Lower GI bleeding can present with symptoms such as passing bright red or maroon-colored blood through the rectum, black or tarry stools (melena), abdominal pain or cramping, weakness, dizziness, and fatigue. The bleeding can range from mild to severe and may resolve on its own or require medical intervention.  Treatment depends on the underlying cause and severity of the bleeding, ranging from conservative management with medications to endoscopic interventions or surgery in more severe cases. Prompt medical evaluation is essential for anyone experiencing symptoms of lower GI bleeding to determine the cause and initiate appropriate treatment.  You will have followup appointments with colorectal surgery, GI, and your PCP.      SECONDARY DISCHARGE DIAGNOSES  Diagnosis: Diverticulosis  Assessment and Plan of Treatment: Diverticulosis is a condition characterized by the presence of small pouches (diverticula) that bulge outward through weak spots in the colon wall. These pouches typically develop in the lower part of the colon. Diverticulosis itself often doesn't cause any symptoms and may be discovered incidentally during a colonoscopy or imaging tests for other conditions.  However diverticulosis can lead to a diverticular bleed, which  occurs when one of the blood vessels within a diverticulum ruptures and causes bleeding. This can lead to sudden and sometimes significant bleeding into the colon, which may result in passing bright red or maroon-colored blood through the rectum.  Diverticular bleeding can vary in severity. In some cases, the bleeding may stop on its own, but in others, it may require medical attention. Symptoms can include rectal bleeding, abdominal pain or discomfort, and possibly changes in bowel habits.  Please seek prompt medical attention if you develop a gastrointestinal bleed to investigate if the source of the bleeding can be stopped, and to transfuse or fluid resuscitate if necessary.

## 2024-05-10 NOTE — DISCHARGE NOTE PROVIDER - NSDCCPTREATMENT_GEN_ALL_CORE_FT
PRINCIPAL PROCEDURE  Procedure: CT abdomen & pelvis  Findings and Treatment:      PRINCIPAL PROCEDURE  Procedure: CT angio abdomen  Findings and Treatment: FINDINGS:  LOWER CHEST: Within normal limits.  LIVER: Within normal limits.  BILE DUCTS: Normal caliber.  GALLBLADDER: Within normal limits.  SPLEEN: Within normal limits.  PANCREAS: Within normal limits.  ADRENALS: Within normal limits.  KIDNEYS/URETERS: Within normal limits.  BLADDER: Within normal limits.  REPRODUCTIVE ORGANS: Hysterectomy.  BOWEL: No bowel obstruction. Multiple bowel bowel containing ventral   supraumbilical hernias..Small bowel anastomosis in the upper abdomen.   Appendix is normal. Colorectal anastomosis. Uncomplicated diverticulosis.    No evidence of active GI bleed.  PERITONEUM: No ascites.  VESSELS: Within normal limits.  RETROPERITONEUM/LYMPH NODES: No lymphadenopathy.  ABDOMINAL WALL: Postsurgical changes.  BONES: Degenerative changes. Posterior fusion extending from L4 through   S1.  IMPRESSION:  No evidence of active GI bleed.< from: CT Angio Abdomen and Pelvis w/ IV Cont (05.07.24 @ 05:42) >      SECONDARY PROCEDURE  Procedure: CT abdomen and pelvis  Findings and Treatment: INTERPRETATION:  CLINICAL INFORMATION: Abdominal pain concern for colitis, diverticulitis, and appendicitis.  COMPARISON: CT abdomen pelvis 4/13/2023.  CONTRAST/COMPLICATIONS:  IV Contrast: Omnipaque 350  90 cc administered   10 cc discarded  Oral Contrast: NONE  Complications: None reported at time of study completion  PROCEDURE:  CT of the Abdomen and Pelvis was performed.  Sagittal and coronal reformats were performed.  FINDINGS:  LOWER CHEST: Small pericardial effusion, decreased from prior exam.  LIVER: Within normal limits.  BILE DUCTS: Normal caliber.  GALLBLADDER: Within normal limits.  SPLEEN: Within normal limits.  PANCREAS: Within normal limits.  ADRENALS: Within normal limits.  KIDNEYS/URETERS: Within normal limits.  BLADDER: Within normal limits.  REPRODUCTIVE ORGANS: Hysterectomy.  BOWEL: No bowel obstruction. Appendix is normal.. Small bowel anastomosis   within the right upper abdominal quadrant. Partial colectomy. Colonic   diverticulosis. No colonic wall thickening or acute inflammatory changes.   Questionable gastric wall thickening versus underdistention.  PERITONEUM: No ascites.  VESSELS: Atherosclerotic changes.  RETROPERITONEUM/LYMPH NODES: No lymphadenopathy.  ABDOMINAL WALL: Multiple, incisional ventral hernias containing   nonobstructed bowel loops and fat. Midline postsurgical changes.  BONES: L5 laminectomy with L4-[id number] spinal fusion. Degenerative   changes.  IMPRESSION:  No colitis or diverticulitis. No questionable gastric wall thickening   versus underdistention.

## 2024-05-10 NOTE — PROGRESS NOTE ADULT - PROBLEM SELECTOR PLAN 1
Prior hx extensive diverticulosis, recurrent diverticular bleeding s/s/p L hemicolectomy and Naeem's (2021), Naeem's reversal and ileostomy creation (2022), and ileostomy reversal (2022). Currently HDS, CTAP performed 2d prior to admission re demonstrated diverticulosis without active extravasation c/f active bleed, CRS surgery consulted without intervention at this time. S/p 2u PRBC in ED, IVF.   Prior Colon/EGD 10/23   Monitor CBC q12, transfuse > 7, platelets >/=50, maintain active T/S, coags   VS q4, monitor for clinical signs of bleeding   Patient has adequate IV access  - No plan for f/u colonoscopy if no further bleeding; monitor 24 hours through 5/10 and if no bleeding/symptoms then cleared for d/c w/ outpt f/u for colonoscopy per GI Prior hx extensive diverticulosis, recurrent diverticular bleeding s/s/p L hemicolectomy and Naeem's (2021), Naeem's reversal and ileostomy creation (2022), and ileostomy reversal (2022). Currently HDS, CTAP performed 2d prior to admission re demonstrated diverticulosis without active extravasation c/f active bleed, CRS surgery consulted without intervention at this time. S/p 2u PRBC in ED, IVF.   Prior Colon/EGD 10/23   Monitor CBC qd, transfuse > 7, platelets >/=50, maintain active T/S, coags   VS q4, monitor for clinical signs of bleeding   Patient has adequate IV access  - No plan for f/u colonoscopy if no further bleeding; monitor 24 hours through 5/10 and if no bleeding/symptoms then cleared for d/c w/ outpt f/u for colonoscopy per GI, CRS f/u with Dr. Beach

## 2024-05-10 NOTE — PROGRESS NOTE ADULT - REASON FOR ADMISSION
Rectal Bleeding

## 2024-05-10 NOTE — DISCHARGE NOTE PROVIDER - NSDCMRMEDTOKEN_GEN_ALL_CORE_FT
amLODIPine 10 mg oral tablet: 1 tab(s) orally once a day  cholecalciferol 125 mcg (5000 intl units) oral tablet: 1 tab(s) orally  escitalopram 10 mg oral tablet: 1 tab(s) orally once a day  hydroCHLOROthiazide 25 mg oral tablet: 1.5 tab(s) orally once a day  levothyroxine 175 mcg (0.175 mg) oral tablet: 1 tab(s) orally once a day  lisinopril 40 mg oral tablet: 1 tab(s) orally once a day  Ozempic 2 mg/1.5 mL (0.25 mg or 0.5 mg dose) subcutaneous solution: 0.25 milligram(s) subcutaneously once a week  simvastatin 20 mg oral tablet: 1 tab(s) orally once a day (at bedtime)  Synthroid 175 mcg (0.175 mg) oral tablet: 1 tab(s) orally once a day  Vitamin B12: orally once a day   amLODIPine 10 mg oral tablet: 1 tab(s) orally once a day  cholecalciferol 125 mcg (5000 intl units) oral tablet: 1 tab(s) orally  escitalopram 10 mg oral tablet: 1 tab(s) orally once a day  hydroCHLOROthiazide 25 mg oral tablet: 1.5 tab(s) orally once a day  levothyroxine 175 mcg (0.175 mg) oral tablet: 1 tab(s) orally once a day  lisinopril 40 mg oral tablet: 1 tab(s) orally once a day  Ozempic 2 mg/1.5 mL (0.25 mg or 0.5 mg dose) subcutaneous solution: 0.25 milligram(s) subcutaneously once a week  simvastatin 20 mg oral tablet: 1 tab(s) orally once a day (at bedtime)  Vitamin B12: orally once a day

## 2024-05-10 NOTE — DISCHARGE NOTE PROVIDER - CARE PROVIDER_API CALL
ANA STARK  Established Patient  Follow Up Time: 1 week   ANA STARK  Established Patient  Follow Up Time: 1 week    [facility]  [address] [place] [address], [address]  [place] [address]  Phone: [phone]  Fax: [phone]  Established Patient  Follow Up Time: 1 week

## 2024-05-10 NOTE — PROGRESS NOTE ADULT - ASSESSMENT
68F w/ diverticulosis, SBO (8/2022), lap extended L hemicolectomy w/ colostomy (6/2021, Dr. Beach) for diverticular bleed, s/p colostomy reversal and ileostomy creation (03/2022, Dr. Beach), and ileostomy reversal (6/13/22, Dr. Beach) presents with 4pisodes of BRBR. Recently seen in ED on 5/4 for similar symptoms with plan to follow up with GI, which she has not been able to do. In the ED, patient required 2u PRBC with appropriate response in H&H, however with several episodes of BRBPR.    Plan/Recommendations:  - GI planning to scope today, will f/u results  - Surgical intervention is a last resort if colonoscopy and/or angiography fail to localize/treat the lower GI bleeding and patient continues to have hemodynamically significant bleeding, which is currently not the case    A Team Surgery   [id number]  
68F w/ diverticulosis, SBO (8/2022), lap extended L hemicolectomy w/ colostomy (6/2021, Dr. Beach) for diverticular bleed, s/p colostomy reversal and ileostomy creation (03/2022, Dr. Beach), and ileostomy reversal (6/13/22, Dr. Beach) presents with 4pisodes of BRBR. Recently seen in ED on 5/4 for similar symptoms with plan to follow up with GI, which she has not been able to do. In the ED, patient required 2u PRBC with appropriate response in H&H, however with several episodes of BRBPR.    Plan/Recommendations:  - No further bloody bowel movements  - No surgical intervention  - Clear for discharge from a surgical perspective    A Team Surgery   [id number]  
68F w/ diverticulosis, SBO (8/2022), lap extended L hemicolectomy w/ colostomy (6/2021, Dr. Beach) for diverticular bleed, s/p colostomy reversal and ileostomy creation (03/2022, Dr. Beach), and ileostomy reversal (6/13/22, Dr. Beach) presents with 4pisodes of BRBR. Recently seen in ED on 5/4 for similar symptoms with plan to follow up with GI, which she has not been able to do. In the ED, patient required 2u PRBC.    Plan/Recommendations:  - CTA reviewed - no evidence of active GI bleeding  - Appreciate GI eval - no plan for inpatient scope unless continues to bleed or deteriorates  - No acute surgical intervention indicated at this time. Likely diverticular bleed that should resolve spontaneously.  - Trend H&H    A Team Surgery   [id number]  
Impression:   68F with hx of SOLOMON, Pernicious anemia, diverticulosis c/b diverticular bleeding s/p L-hemicolectomy w/ colostomy 6/2021, s/p colostomy reversal and ileostomy creation (03/2022), and ileostomy reversal (6/13/22), SBO (8/2022) p/w 5d of BRBPR with passage of large clots associated with cramping abdominal pain    #hematochezia  #diverticulosis c/b diverticular bleeding s/p L-hemicolectomy  suspect related to diverticular bleeding; unlikely malignancy vs. AVM vs. rectal ulcer given recent colonoscopy 10/2023 reportedly normal. Could be related to anastomotic ulcerations from prior surgery site.   - Hgb baseline ~10 (last week; on outpatient labs per patient); 6.9 on presentation s/p 1U -> 6.7 s/p 1U -> 8.2  - CTA A/P without active bleeding  - intentional weight loss of 20lbs on Ozempic  - colonoscopy performed 10/2023 at OSH showed extensive diverticulosis. Continues to have persistent hematochezia.     #T2DM on Ozempic; last 5/5  #pernicious anemia; EGD 2022 (report not available for review; unsure if with atrophic gastritis)    Recommendations:   - Will proceed w/ colonoscopy tomorrow.   - Will order GoLytely prep for this evening.   - CLD For now.   - NPO after midnight.   - CBC Q12 hours and transfuse if hgb < 7.     Discussed the case with attending.     GI will continue to follow.     All recommendations are tentative until note is attested by an attending.     Aleisha Wood, PGY-5  Gastroenterology/Hepatology Fellow  Available on [employer] Teams  [phone] (Short Range Pager)  [phone] (Long Range Pager)    After 5pm, please contact the on-call GI fellow.
Impression:   68F with hx of SOLOMON, Pernicious anemia, diverticulosis c/b diverticular bleeding s/p L-hemicolectomy w/ colostomy 6/2021, s/p colostomy reversal and ileostomy creation (03/2022), and ileostomy reversal (6/13/22), SBO (8/2022) p/w 5d of BRBPR with passage of large clots associated with cramping abdominal pain    #hematochezia  #diverticulosis c/b diverticular bleeding s/p L-hemicolectomy  suspect related to diverticular bleeding; unlikely malignancy vs. AVM vs. rectal ulcer given recent colonoscopy 10/2023 reportedly normal. Could be related to anastomotic ulcerations from prior surgery site.   - Hgb baseline ~10 (last week; on outpatient labs per patient); 6.9 on presentation s/p 1U -> 6.7 s/p 1U -> 8.2  - CTA A/P without active bleeding  - intentional weight loss of 20lbs on Ozempic  - colonoscopy performed 10/2023 at OSH showed extensive diverticulosis. Continues to have persistent hematochezia.   - Colonoscopy cancelled on 5/9, due to her last dose of Ozempic on 5/5. She completed the prep, and no further rectal bleeding, therapeutic purge. - No BMs overnight, hgb has been stable.     #T2DM on Ozempic; last 5/5  #pernicious anemia; EGD 2022 (report not available for review; unsure if with atrophic gastritis)    Recommendations:   - Can advance diet as tolerated.   - No further endoscopic intervention for now.   - Safe to be discharged from GI perspective.   - Patient planning to follow up with Dr. Mosher, colorectal surgery as o/p.     GI will sign off.     Thank you for the consult. Please call us back if you have any questions or concerns.     All recommendations are tentative until the note is attested by an attending.     Aleisha Wood, PGY-5  Gastroenterology/Hepatology Fellow  Available on [employer] Teams  [phone] (Short Range Pager)  [phone] (Long Range Pager)    After 5pm, please contact the on-call GI fellow.         
66y F PMH HTN, HLD, Hypothyroidism, SOLOMON, Pernicious anemia, diverticulosis, Colon CA s/p resection and Naeem's (10/2021), Naeem's reversal and ileostomy creation (03/2022), and recent ileostomy reversal (6/13/22), recent adm 12/23 for diverticular bleed now presents with 5d rectal bleeding and anemia suspected diverticular source, without evidence of active extravasation on CTA. 
Impression:   68F with hx of SOLOMON, Pernicious anemia, diverticulosis c/b diverticular bleeding s/p L-hemicolectomy w/ colostomy 6/2021, s/p colostomy reversal and ileostomy creation (03/2022), and ileostomy reversal (6/13/22), SBO (8/2022) p/w 5d of BRBPR with passage of large clots associated with cramping abdominal pain    #hematochezia  #diverticulosis c/b diverticular bleeding s/p L-hemicolectomy  suspect related to diverticular bleeding; unlikely malignancy vs. AVM vs. rectal ulcer given recent colonoscopy 10/2023 reportedly normal. Could be related to anastomotic ulcerations from prior surgery site.   - Hgb baseline ~10 (last week; on outpatient labs per patient); 6.9 on presentation s/p 1U -> 6.7 s/p 1U -> 8.2  - CTA A/P without active bleeding  - intentional weight loss of 20lbs on Ozempic  - colonoscopy performed 10/2023 at OSH showed extensive diverticulosis. Continues to have persistent hematochezia.   - Colonoscopy cancelled on 5/9, due to her last dose of Ozempic on 5/5. She completed the prep, and no further rectal bleeding, therapeutic purge. Discussed extensively with the patient, if she is currently not bleeding, there would be low yield with colonoscopy for any therapeutic intervention at this time. So will monitor her for the next 24 hours.     #T2DM on Ozempic; last 5/5  #pernicious anemia; EGD 2022 (report not available for review; unsure if with atrophic gastritis)    Recommendations:   - Start her on CLD and will monitor her for the next 24 hours.   - If she has recurrent bleeding, will do an urgent colonoscopy tomorrow with rapid prep purge and enemas in the morning.   - NPO after midnight for possible intervention tomorrow.   - CBC Q12 hours and transfuse if hgb < 7.     GI will continue to follow.     All recommendations are tentative until note is attested by an attending.     Aleisha Wood, PGY-5  Gastroenterology/Hepatology Fellow  Available on Microsoft Teams  [phone] (Short Range Pager)  [phone] (Long Range Pager)    After 5pm, please contact the on-call GI fellow.
68F w/ diverticulosis, SBO (8/2022), lap extended L hemicolectomy w/ colostomy (6/2021, Dr. Beach) for diverticular bleed, s/p colostomy reversal and ileostomy creation (03/2022, Dr. Beach), and ileostomy reversal (6/13/22, Dr. Beach) presents with 4pisodes of BRBR. Recently seen in ED on 5/4 for similar symptoms with plan to follow up with GI, which she has not been able to do. In the ED, patient required 2u PRBC with appropriate response in H&H. Now with BRBPR again overnight but remains hemodynamically stable.    Plan/Recommendations:  - CTA reviewed - no evidence of active GI bleeding  - Medicine team to discuss with GI role for inpatient colonoscopy given repeated bleeding episode overnight  - Surgical intervention is a last resort if colonoscopy and/or angiography fail to localize/treat the lower GI bleeding and patient continues to have hemodynamically significant bleeding, which is currently not the case    A Team Surgery   [id number]    
66y F PMH HTN, HLD, Hypothyroidism, SOLOMON, Pernicious anemia, diverticulosis, Colon CA s/p resection and Naeem's (10/2021), Naeem's reversal and ileostomy creation (03/2022), and recent ileostomy reversal (6/13/22), recent adm 12/23 for diverticular bleed now presents with 5d rectal bleeding and anemia suspected diverticular source, without evidence of active extravasation on CTA. 
66y F PMH HTN, HLD, Hypothyroidism, SOLOMON, Pernicious anemia, diverticulosis, Colon CA s/p resection and Naeem's (10/2021), Naeem's reversal and ileostomy creation (03/2022), and recent ileostomy reversal (6/13/22), recent adm 12/23 for diverticular bleed now presents with 5d rectal bleeding and anemia suspected diverticular source, without evidence of active extravasation on CTA.

## 2024-05-10 NOTE — PROGRESS NOTE ADULT - PROBLEM SELECTOR PLAN 5
Fluids: none   Diet: CLD, can advance today   DVT ppx: ambulatory     GoC: full code  PT: NR  Dispo: Pending clinical course Fluids: none   Diet:  Advance today, CC regular diet    DVT ppx: ambulatory     GoC: full code  PT: NR  Dispo: Possible d/c today

## 2024-05-10 NOTE — DISCHARGE NOTE NURSING/CASE MANAGEMENT/SOCIAL WORK - NSDCFUADDAPPT_GEN_ALL_CORE_FT
APPTS ARE READY TO BE MADE: [X] YES    Best Family or Patient Contact (if needed): Patient (self)     Additional Information about above appointments (if needed):    1: PCP - post hospital discharge followup  2: Colorectal surgery - lower GI bleed  3: GI - lower GI bleed    Other comments or requests: None

## 2024-05-10 NOTE — PROGRESS NOTE ADULT - SUBJECTIVE AND OBJECTIVE BOX
***************************************************************  Kaylee Guzmanjan, PGY 1   Internal Medicine   ***************************************************************    CRISTIAN CASTRO  68y  MRN: [id number]    Patient is a 68y old  Female who presents with a chief complaint of Rectal Bleeding (09 May 2024 09:15)      Subjective: no events ON. Colonoscopy 5/9 cancelled given recent GLP-1 use.     MEDICATIONS  (STANDING):  cyanocobalamin 1000 MICROGram(s) Oral daily  dextrose 50% Injectable 25 Gram(s) IV Push once  dextrose 50% Injectable 12.5 Gram(s) IV Push once  dextrose 50% Injectable 25 Gram(s) IV Push once  dextrose Oral Gel 15 Gram(s) Oral once  escitalopram 10 milliGRAM(s) Oral daily  glucagon  Injectable 1 milliGRAM(s) IntraMuscular once  insulin lispro (ADMELOG) corrective regimen sliding scale   SubCutaneous three times a day before meals  insulin lispro (ADMELOG) corrective regimen sliding scale   SubCutaneous at bedtime  levothyroxine 175 MICROGram(s) Oral daily  lidocaine   4% Patch 1 Patch Transdermal daily  pantoprazole  Injectable 40 milliGRAM(s) IV Push daily  simvastatin 20 milliGRAM(s) Oral at bedtime    MEDICATIONS  (PRN):  acetaminophen     Tablet .. 650 milliGRAM(s) Oral every 6 hours PRN Temp greater or equal to 38C (100.4F), Mild Pain (1 - 3)      Objective:    Vitals: Vital Signs Last 24 Hrs  T(C): 36.6 (05-10-24 @ 05:26), Max: 36.9 (05-09-24 @ 12:50)  T(F): 97.9 (05-10-24 @ 05:26), Max: 98.4 (05-09-24 @ 12:50)  HR: 84 (05-10-24 @ 05:26) (79 - 84)  BP: 126/81 (05-10-24 @ 05:26) (125/65 - 138/63)  BP(mean): --  RR: 17 (05-10-24 @ 05:26) (17 - 17)  SpO2: 98% (05-10-24 @ 05:26) (98% - 100%)            I&O's Summary      PHYSICAL EXAM:  GENERAL: NAD  HEAD:  Atraumatic, Normocephalic  EYES: EOMI, conjunctiva and sclera clear  CHEST/LUNG: Clear to auscultation bilaterally; No rales, rhonchi, wheezing, or rubs  HEART: Regular rate and rhythm; No murmurs, rubs, or gallops  ABDOMEN: Soft, Nontender, Nondistended;   SKIN: No rashes or lesions  NERVOUS SYSTEM:  Alert & Oriented X3, no focal deficits    LABS:  05-09    141  |  104  |  11  ----------------------------<  122<H>  3.5   |  24  |  1.06  05-08    140  |  105  |  13  ----------------------------<  88  3.4<L>   |  22  |  1.02    Ca    8.0<L>      09 May 2024 05:46  Ca    8.0<L>      08 May 2024 07:10  Phos  2.1     05-09  Mg     1.80     05-09    TPro  6.4  /  Alb  3.8  /  TBili  0.7  /  DBili  x   /  AST  18  /  ALT  13  /  AlkPhos  63  05-09  TPro  6.4  /  Alb  3.7  /  TBili  0.7  /  DBili  x   /  AST  15  /  ALT  11  /  AlkPhos  67  05-08                    Urinalysis Basic - ( 09 May 2024 05:46 )    Color: x / Appearance: x / SG: x / pH: x  Gluc: 122 mg/dL / Ketone: x  / Bili: x / Urobili: x   Blood: x / Protein: x / Nitrite: x   Leuk Esterase: x / RBC: x / WBC x   Sq Epi: x / Non Sq Epi: x / Bacteria: x                              8.2    5.45  )-----------( 215      ( 10 May 2024 06:05 )             24.4                         8.2    5.96  )-----------( 207      ( 09 May 2024 05:46 )             25.8                         8.0    7.44  )-----------( 186      ( 08 May 2024 19:44 )             24.1     CAPILLARY BLOOD GLUCOSE      POCT Blood Glucose.: 115 mg/dL (09 May 2024 20:57)  POCT Blood Glucose.: 102 mg/dL (09 May 2024 17:43)  POCT Blood Glucose.: 134 mg/dL (09 May 2024 12:15)  POCT Blood Glucose.: 105 mg/dL (09 May 2024 09:26)      RADIOLOGY & ADDITIONAL TESTS:         ***************************************************************  Kayleeyin Belle, PGY 1   Internal Medicine   ***************************************************************    CRISTIAN CASTRO  68y  MRN: [id number]    Patient is a 68y old  Female who presents with a chief complaint of Rectal Bleeding (09 May 2024 09:15)      Subjective: no events ON. Colonoscopy 5/9 cancelled given recent GLP-1 use. Seen at bedside, endorses clear BMs night prior on CLD, has not had further episodes of BRPR. Denies fever, chills, n/v, abd pain, SOB, palpitations.     MEDICATIONS  (STANDING):  cyanocobalamin 1000 MICROGram(s) Oral daily  dextrose 50% Injectable 25 Gram(s) IV Push once  dextrose 50% Injectable 12.5 Gram(s) IV Push once  dextrose 50% Injectable 25 Gram(s) IV Push once  dextrose Oral Gel 15 Gram(s) Oral once  escitalopram 10 milliGRAM(s) Oral daily  glucagon  Injectable 1 milliGRAM(s) IntraMuscular once  insulin lispro (ADMELOG) corrective regimen sliding scale   SubCutaneous three times a day before meals  insulin lispro (ADMELOG) corrective regimen sliding scale   SubCutaneous at bedtime  levothyroxine 175 MICROGram(s) Oral daily  lidocaine   4% Patch 1 Patch Transdermal daily  pantoprazole  Injectable 40 milliGRAM(s) IV Push daily  simvastatin 20 milliGRAM(s) Oral at bedtime    MEDICATIONS  (PRN):  acetaminophen     Tablet .. 650 milliGRAM(s) Oral every 6 hours PRN Temp greater or equal to 38C (100.4F), Mild Pain (1 - 3)      Objective:    Vitals: Vital Signs Last 24 Hrs  T(C): 36.6 (05-10-24 @ 05:26), Max: 36.9 (05-09-24 @ 12:50)  T(F): 97.9 (05-10-24 @ 05:26), Max: 98.4 (05-09-24 @ 12:50)  HR: 84 (05-10-24 @ 05:26) (79 - 84)  BP: 126/81 (05-10-24 @ 05:26) (125/65 - 138/63)  BP(mean): --  RR: 17 (05-10-24 @ 05:26) (17 - 17)  SpO2: 98% (05-10-24 @ 05:26) (98% - 100%)            I&O's Summary      PHYSICAL EXAM:  GENERAL: NAD  HEAD:  Atraumatic, Normocephalic  EYES: EOMI, conjunctiva and sclera clear  CHEST/LUNG: Clear to auscultation bilaterally; No rales, rhonchi, wheezing, or rubs  HEART: Regular rate and rhythm; No murmurs, rubs, or gallops  ABDOMEN: Soft, Nontender, Nondistended;   SKIN: No rashes or lesions  NERVOUS SYSTEM:  Alert & Oriented X3, no focal deficits    LABS:  05-09    141  |  104  |  11  ----------------------------<  122<H>  3.5   |  24  |  1.06  05-08    140  |  105  |  13  ----------------------------<  88  3.4<L>   |  22  |  1.02    Ca    8.0<L>      09 May 2024 05:46  Ca    8.0<L>      08 May 2024 07:10  Phos  2.1     05-09  Mg     1.80     05-09    TPro  6.4  /  Alb  3.8  /  TBili  0.7  /  DBili  x   /  AST  18  /  ALT  13  /  AlkPhos  63  05-09  TPro  6.4  /  Alb  3.7  /  TBili  0.7  /  DBili  x   /  AST  15  /  ALT  11  /  AlkPhos  67  05-08                    Urinalysis Basic - ( 09 May 2024 05:46 )    Color: x / Appearance: x / SG: x / pH: x  Gluc: 122 mg/dL / Ketone: x  / Bili: x / Urobili: x   Blood: x / Protein: x / Nitrite: x   Leuk Esterase: x / RBC: x / WBC x   Sq Epi: x / Non Sq Epi: x / Bacteria: x                              8.2    5.45  )-----------( 215      ( 10 May 2024 06:05 )             24.4                         8.2    5.96  )-----------( 207      ( 09 May 2024 05:46 )             25.8                         8.0    7.44  )-----------( 186      ( 08 May 2024 19:44 )             24.1     CAPILLARY BLOOD GLUCOSE      POCT Blood Glucose.: 115 mg/dL (09 May 2024 20:57)  POCT Blood Glucose.: 102 mg/dL (09 May 2024 17:43)  POCT Blood Glucose.: 134 mg/dL (09 May 2024 12:15)  POCT Blood Glucose.: 105 mg/dL (09 May 2024 09:26)      RADIOLOGY & ADDITIONAL TESTS:

## 2024-05-10 NOTE — DISCHARGE NOTE PROVIDER - HOSPITAL COURSE
HPI:  68F W/ PMHX HTN, HLD, Hypothyroidism, SOLOMON, Pernicious anemia, diverticulosis, SBO (8/2022), lap extended L hemicolectomy w/ colostomy 6/2021 for diverticular bleed, s/p colostomy reversal and ileostomy creation (03/2022), and ileostomy reversal (6/13/22) p/w 5d BRBPR with passage of large clots associated with cramping abdominal pain. Developed near syncope, palpitations, weakness following episodes. Not on AC. Endorsing mild nausea, headache which resolved. Denies constipation, averages 4-5 formed BMs per day. Recently admitted 12/23 for diverticular bleed, symptomatically managed without colonoscopy.     ED hypotensive, H/H transfused 2u PRBC, IVF. Admitted to medicine for further management.    Seen at bedside. Notes no further episodes of GIB since 2pm 5/6. Most recent endoscopy in 2022 wnl, colonoscopy performed 10/2023 showed extensive diverticulosis.  (07 May 2024 08:07)    Hospital Course:  Admitted to medicine, underwent CT angio A/P that showed no evidence of an active bleed. Colorectal surgery and GI consulted on admission. Course complicated by persistent LGIB during hospitalization, with plan for Colonoscopy on 5/9. Given recent use of GLP-1 within past week procedure was deferred, however episodes also resolved. Diet advanced, H/H stable throughout admission. Deemed stable for discharge 5/10 with close followups as indicated below. Patient expressed understanding of plan.       Important Medication Changes and Reason: None    Active or Pending Issues Requiring Follow-up: Followup with PCP within 1 week of discharge.     Advanced Directives:   [X] Full code  [ ] DNR  [ ] Hospice    Discharge Diagnoses: Lower GI Bleed          HPI:  68F W/ PMHX HTN, HLD, Hypothyroidism, SOLOMON, Pernicious anemia, diverticulosis, SBO (8/2022), lap extended L hemicolectomy w/ colostomy 6/2021 for diverticular bleed, s/p colostomy reversal and ileostomy creation (03/2022), and ileostomy reversal (6/13/22) p/w 5d BRBPR with passage of large clots associated with cramping abdominal pain. Developed near syncope, palpitations, weakness following episodes. Not on AC. Endorsing mild nausea, headache which resolved. Denies constipation, averages 4-5 formed BMs per day. Recently admitted 12/23 for diverticular bleed, symptomatically managed without colonoscopy.     ED hypotensive, H/H transfused 2u PRBC, IVF. Admitted to medicine for further management.    Seen at bedside. Notes no further episodes of GIB since 2pm 5/6. Most recent endoscopy in 2022 wnl, colonoscopy performed 10/2023 showed extensive diverticulosis.  (07 May 2024 08:07)    Hospital Course:  Admitted to medicine, underwent CT angio A/P that showed no evidence of an active bleed. Colorectal surgery and GI consulted on admission. Course complicated by persistent LGIB during hospitalization, with plan for Colonoscopy on 5/9. Given recent use of GLP-1 within past week procedure was deferred, LGIB episodes resolved without intervention. Diet advanced, H/H stable for 48h at time of d/c. Deemed stable for discharge 5/10 with close followups as indicated below. Patient expressed understanding of plan.       Important Medication Changes and Reason: None    Active or Pending Issues Requiring Follow-up: Followup with PCP within 1 week of discharge.     Advanced Directives:   [X] Full code  [ ] DNR  [ ] Hospice    Discharge Diagnoses: Lower GI Bleed

## 2024-05-10 NOTE — DISCHARGE NOTE PROVIDER - NSDCFUADDAPPT_GEN_ALL_CORE_FT
APPTS ARE READY TO BE MADE: [X] YES    Best Family or Patient Contact (if needed): Patient (self)     Additional Information about above appointments (if needed):    1: PCP - post hospital discharge followup  2: Colorectal surgery - lower GI bleed  3: GI - lower GI bleed    Other comments or requests: None   APPTS ARE READY TO BE MADE: [X] YES    Best Family or Patient Contact (if needed): Patient (self)     Additional Information about above appointments (if needed):    1: PCP - post hospital discharge followup  2: Colorectal surgery - lower GI bleed  3: GI - lower GI bleed    Other comments or requests: None      Provided patient with provider referral information, however patient prefers to schedule the appointments on their own.

## 2024-05-13 ENCOUNTER — NON-APPOINTMENT (OUTPATIENT)
Age: 69
End: 2024-05-13

## 2024-06-25 ENCOUNTER — APPOINTMENT (OUTPATIENT)
Dept: ORTHOPEDIC SURGERY | Facility: CLINIC | Age: 69
End: 2024-06-25
Payer: MEDICARE

## 2024-06-25 VITALS — WEIGHT: 194 LBS | HEIGHT: 64 IN | BODY MASS INDEX: 33.12 KG/M2

## 2024-06-25 DIAGNOSIS — Z86.39 PERSONAL HISTORY OF OTHER ENDOCRINE, NUTRITIONAL AND METABOLIC DISEASE: ICD-10-CM

## 2024-06-25 DIAGNOSIS — E10.641 TYPE 1 DIABETES MELLITUS WITH HYPOGLYCEMIA WITH COMA: ICD-10-CM

## 2024-06-25 DIAGNOSIS — M17.11 UNILATERAL PRIMARY OSTEOARTHRITIS, RIGHT KNEE: ICD-10-CM

## 2024-06-25 DIAGNOSIS — Z87.19 PERSONAL HISTORY OF OTHER DISEASES OF THE DIGESTIVE SYSTEM: ICD-10-CM

## 2024-06-25 DIAGNOSIS — M17.12 UNILATERAL PRIMARY OSTEOARTHRITIS, LEFT KNEE: ICD-10-CM

## 2024-06-25 PROCEDURE — 73564 X-RAY EXAM KNEE 4 OR MORE: CPT | Mod: 50

## 2024-06-25 PROCEDURE — J3490M: CUSTOM

## 2024-06-25 PROCEDURE — 99204 OFFICE O/P NEW MOD 45 MIN: CPT | Mod: 25

## 2024-06-25 PROCEDURE — 20610 DRAIN/INJ JOINT/BURSA W/O US: CPT | Mod: LT

## 2024-06-25 RX ORDER — TRAZODONE HYDROCHLORIDE 50 MG/1
50 TABLET ORAL
Refills: 0 | Status: ACTIVE | COMMUNITY

## 2024-06-25 RX ORDER — ESCITALOPRAM OXALATE 10 MG/1
10 TABLET ORAL
Refills: 0 | Status: ACTIVE | COMMUNITY

## 2024-06-25 RX ORDER — HYDROCHLOROTHIAZIDE 25 MG/1
25 TABLET ORAL
Refills: 0 | Status: ACTIVE | COMMUNITY

## 2024-07-09 ENCOUNTER — APPOINTMENT (OUTPATIENT)
Dept: ORTHOPEDIC SURGERY | Facility: CLINIC | Age: 69
End: 2024-07-09
Payer: MEDICARE

## 2024-07-09 DIAGNOSIS — M17.11 UNILATERAL PRIMARY OSTEOARTHRITIS, RIGHT KNEE: ICD-10-CM

## 2024-07-09 DIAGNOSIS — M17.12 UNILATERAL PRIMARY OSTEOARTHRITIS, LEFT KNEE: ICD-10-CM

## 2024-07-09 PROCEDURE — 99214 OFFICE O/P EST MOD 30 MIN: CPT | Mod: 25

## 2024-07-09 PROCEDURE — 20610 DRAIN/INJ JOINT/BURSA W/O US: CPT | Mod: RT

## 2024-07-09 PROCEDURE — J3490M: CUSTOM

## 2024-07-09 RX ORDER — TRAMADOL HYDROCHLORIDE 50 MG/1
50 TABLET, COATED ORAL
Qty: 30 | Refills: 0 | Status: ACTIVE | COMMUNITY
Start: 2024-07-09 | End: 1900-01-01

## 2024-07-22 RX ORDER — HYALURONATE SODIUM 20 MG/2 ML
20 SYRINGE (ML) INTRAARTICULAR
Qty: 6 | Refills: 0 | Status: ACTIVE | COMMUNITY
Start: 2024-07-22 | End: 1900-01-01

## 2024-08-13 ENCOUNTER — APPOINTMENT (OUTPATIENT)
Dept: ORTHOPEDIC SURGERY | Facility: CLINIC | Age: 69
End: 2024-08-13
Payer: MEDICARE

## 2024-08-13 PROCEDURE — 99213 OFFICE O/P EST LOW 20 MIN: CPT | Mod: 25

## 2024-08-13 PROCEDURE — 20610 DRAIN/INJ JOINT/BURSA W/O US: CPT | Mod: 50

## 2024-08-13 NOTE — ASSESSMENT
[FreeTextEntry1] : 68 year F WITH MODERATE B/L KNEE PAIN, L>R. PAIN WORSENS WITH STAIRS AND WALKING PROLONGED DISTANCES. PAIN IS AFFECTING ADL AND FUNCTIONAL ACTIVITIES. XRAYS REVIEWED WITH TRICOMPARTMENTAL OA, MOST SEVERE PATELLOFEMORALLY. TREATMENT OPTIONS REVIEWED. QUESTIONS ANSWERED.   PMHx: DM (A1C: 6.6 AS OF MAY 2024), HTN, THYROID DISORDER  DISCUSSED TAKING ANTI-INFLAMMATORIES AND MEDICATION MANAGEMENT. BECAUSE THE PATIENT HAS DIVERTICULOSIS, I RECOMMENDED NOT TAKING ANTI-INFLAMMATORIES BUT WILL CONTINUE WITH TYLENOL AS NEEDED FOR PAIN.ADD TRAMADOL FOR SEVERE PAIN ONLY  NO RELIEF WITH LEFT KNEE CSI, PAIN SEVERE AT TIMES. UNABLE TO GET MRI DUE TO SCS.  B/L KNEE EUFLEXXA #1 TODAY. PATIENT TOLERATED INJECTION WELL.

## 2024-08-13 NOTE — PROCEDURE
[de-identified] : Procedure Name: Euflexxa (Large Joint)   Viscosupplementation Injection: X-ray evidence of Osteoarthritis on this or prior visit, Patient has tried OTC's including aspirin, Ibuprofen, Aleve etc or prescription NSAIDS, and/or exercises at home and/ or physical therapy without satisfactory response and Repeat series performed because patient had significant improvement in their pain and functional capacity from prior series which was given more than six months ago.   An injection of Euflexxa 2ml #1 was injected into the bilateral knee(s). after verbal consent using sterile technique. The risks, benefits, and alternatives to Viscosupplementation injection were explained in full to the patient. Risks outlined include but are not limited to infection, sepsis, bleeding, scarring, skin discoloration, temporary increase in pain, syncopal episode, failure to resolve symptoms, allergic reaction, and symptom recurrence. Signs and symptoms of infection reviewed and patient advised to call immediately for redness, fevers, and/or chills. Patient understood the risks. All questions were answered. After discussion of options, patient requested Viscosupplementation. The patient tolerated the procedure well. Ice tonight to the injection site.

## 2024-08-13 NOTE — HISTORY OF PRESENT ILLNESS
[10] : 10 [9] : 9 [Sharp] : sharp [Throbbing] : throbbing [Constant] : constant [Sleep] : sleep [Nothing helps with pain getting better] : Nothing helps with pain getting better [Walking] : walking [de-identified] : 6.25.24 NEW PATIENT HERE FOR BILATERAL KNEE PAIN. PAIN STARTED A WEEK AND A HALF AGO, NO INJURY.  LEFT KNEE IS WORSE THAN RIGHT STARTED WHEN STEPPING DOWN. NO PRIOR TREATMENT PAIN SEVERE IN KNEE. A FEW DAYS LATER PAIN UP AND DOWN LEG WITH PARESTHESIAS AT TIMES. HX LUMBAR FUSION IN 2009  PMHX NIDDM WITH A1C OF 6.6 HTN, HYPOTHYROID  7.9.24 PATIENT HERE FOR BILATERAL KNEE PAIN. PATIENT STATES THE CORTISONE INJECTION SHE HAD NO RELIEF FROM THE CORTISONE INJECTION. SHE STATES THE PAIN GOT WORSE TO THE LEFT KNEE RIGHT KNEE PAIN CONTINUES AS WELL  8.13.24 PATIENT HERE FOR BILATERAL KNEE PAIN. EUFLEXXA #1. PATIENT IS IN LOTS OF PAIN AND DISCOMFORT. PATIENT CAN BARLEY WALK AND DO ANYTHING SHE STATES.

## 2024-08-15 NOTE — ED PROVIDER NOTE - OBJECTIVE STATEMENT
Navi: 68F W/ PMHX HTN, HLD, Hypothyroidism, SOLOMON, Pernicious anemia, diverticulosis, SBO (8/2022), lap extended L hemicolectomy w/ colostomy 6/2021 for diverticular bleed, s/p colostomy reversal and ileostomy creation (03/2022), and ileostomy reversal (6/13/22) w/ Dr. Beach. Not on antiplatelet or anticoagulation.  Prior c-scope was last admission for similar complaint in 2023. Recent discharge from ED (had Colorectal Surg consult: no surgical intervention) for LGIB. Returns w/ BRBPR w/ clots today, a/w dizziness when standing. Crampy abd pain. Navi: 68F W/ PMHX HTN, HLD, Hypothyroidism, SOLOMON, Pernicious anemia, diverticulosis, SBO (8/2022), lap extended L hemicolectomy w/ colostomy 6/2021 for diverticular bleed, s/p colostomy reversal and ileostomy creation (03/2022), and ileostomy reversal (6/13/22) w/ Dr. Beach. Not on antiplatelet or anticoagulation.  Prior c-scope was last admission for similar complaint in 2023. Recent discharge from ED (had Colorectal Surg consult: no surgical intervention) for LGIB. CT AP 2 days ago showing colonic diverticulosis without diverticulitis, no questionable gastric wall thickening or acute inflammatory changes. Pt endorsing shortness of breath and lightheadedness, 2 near syncopal events but no chest pain, palpitations, dizziness, focal deficits, abdominal pain at rest, nausea, vomiting, dysuria, fever, chills. 15-Aug-2024 22:38

## 2024-08-21 ENCOUNTER — APPOINTMENT (OUTPATIENT)
Dept: ORTHOPEDIC SURGERY | Facility: CLINIC | Age: 69
End: 2024-08-21
Payer: MEDICARE

## 2024-08-21 PROCEDURE — 20610 DRAIN/INJ JOINT/BURSA W/O US: CPT | Mod: 50

## 2024-08-21 NOTE — HISTORY OF PRESENT ILLNESS
[10] : 10 [9] : 9 [Sharp] : sharp [Throbbing] : throbbing [Constant] : constant [Sleep] : sleep [Nothing helps with pain getting better] : Nothing helps with pain getting better [Walking] : walking [de-identified] : 08/14/2024: Dr. Graham's patient with B/L primary OA, Euflexxa #1 was administered on 08/13/2024. She is here today for Euflexxa #2.  Symptoms continue, denies complications with prior injections.

## 2024-08-21 NOTE — HISTORY OF PRESENT ILLNESS
[10] : 10 [9] : 9 [Sharp] : sharp [Throbbing] : throbbing [Constant] : constant [Sleep] : sleep [Nothing helps with pain getting better] : Nothing helps with pain getting better [Walking] : walking [de-identified] : 08/14/2024: Dr. Graham's patient with B/L primary OA, Euflexxa #1 was administered on 08/13/2024. She is here today for Euflexxa #2.  Symptoms continue, denies complications with prior injections.

## 2024-08-21 NOTE — PROCEDURE
[de-identified] : Procedure Name: Euflexxa (Large Joint)   Viscosupplementation Injection: X-ray evidence of Osteoarthritis on this or prior visit, Patient has tried OTC's including aspirin, Ibuprofen, Aleve etc or prescription NSAIDS, and/or exercises at home and/ or physical therapy without satisfactory response and Repeat series performed because patient had significant improvement in their pain and functional capacity from prior series which was given more than six months ago.   An injection of Euflexxa 2ml #1 was injected into the bilateral knee(s). after verbal consent using sterile technique. The risks, benefits, and alternatives to Viscosupplementation injection were explained in full to the patient. Risks outlined include but are not limited to infection, sepsis, bleeding, scarring, skin discoloration, temporary increase in pain, syncopal episode, failure to resolve symptoms, allergic reaction, and symptom recurrence. Signs and symptoms of infection reviewed and patient advised to call immediately for redness, fevers, and/or chills. Patient understood the risks. All questions were answered. After discussion of options, patient requested Viscosupplementation. The patient tolerated the procedure well. Ice tonight to the injection site.  [FreeTextEntry3] :  Viscosupplementation Injection: X-ray evidence of osteoarthritis on this or prior visit and patient has tried OTC's including aspirin, Ibuprofen, Aleve etc or prescription NSAIDS, and/or exercises at home and/ or physical therapy without satisfactory response.  An injection of Euflexxa 2.5ml #2 was injected into the bilateral knees after verbal consent obtained.  Patient has tried OTC's including aspirin, Ibuprofen, Aleve etc or prescription NSAIDS, and/or exercises at home and/ or physical therapy without satisfactory response and Patient has decreased mobility in the joint. The risks, benefits, and alternatives to cortisone injection were explained in full to the patient. Risks outlined include but are not limited to infection, sepsis, bleeding, scarring, skin discoloration, temporary increase in pain, syncopal episode, failure to resolve symptoms, allergic reaction, and symptom recurrence. Patient understands the risks. All questions were answered. After discussion of options, patient requested an injection. Oral informed consent was obtained. Sterile technique was utilized for the procedure including the preparation of the solutions used for the injection. Injection site was prepped with betadine and alcohol. Ethyl chloride sprayed topically. Sterile technique used. Patient tolerated procedure well.  Post Procedure Instructions: Patient was advised to call if redness, pain, or fever occur. Apply ice for 15 min. every 2 hours for the next 12-24 hours as tolerated. Patient was advised to rest the joint(s) for 1-2 days.

## 2024-08-21 NOTE — PROCEDURE
[de-identified] : Procedure Name: Euflexxa (Large Joint)   Viscosupplementation Injection: X-ray evidence of Osteoarthritis on this or prior visit, Patient has tried OTC's including aspirin, Ibuprofen, Aleve etc or prescription NSAIDS, and/or exercises at home and/ or physical therapy without satisfactory response and Repeat series performed because patient had significant improvement in their pain and functional capacity from prior series which was given more than six months ago.   An injection of Euflexxa 2ml #1 was injected into the bilateral knee(s). after verbal consent using sterile technique. The risks, benefits, and alternatives to Viscosupplementation injection were explained in full to the patient. Risks outlined include but are not limited to infection, sepsis, bleeding, scarring, skin discoloration, temporary increase in pain, syncopal episode, failure to resolve symptoms, allergic reaction, and symptom recurrence. Signs and symptoms of infection reviewed and patient advised to call immediately for redness, fevers, and/or chills. Patient understood the risks. All questions were answered. After discussion of options, patient requested Viscosupplementation. The patient tolerated the procedure well. Ice tonight to the injection site.  [FreeTextEntry3] :  Viscosupplementation Injection: X-ray evidence of osteoarthritis on this or prior visit and patient has tried OTC's including aspirin, Ibuprofen, Aleve etc or prescription NSAIDS, and/or exercises at home and/ or physical therapy without satisfactory response.  An injection of Euflexxa 2.5ml #2 was injected into the bilateral knees after verbal consent obtained.  Patient has tried OTC's including aspirin, Ibuprofen, Aleve etc or prescription NSAIDS, and/or exercises at home and/ or physical therapy without satisfactory response and Patient has decreased mobility in the joint. The risks, benefits, and alternatives to cortisone injection were explained in full to the patient. Risks outlined include but are not limited to infection, sepsis, bleeding, scarring, skin discoloration, temporary increase in pain, syncopal episode, failure to resolve symptoms, allergic reaction, and symptom recurrence. Patient understands the risks. All questions were answered. After discussion of options, patient requested an injection. Oral informed consent was obtained. Sterile technique was utilized for the procedure including the preparation of the solutions used for the injection. Injection site was prepped with betadine and alcohol. Ethyl chloride sprayed topically. Sterile technique used. Patient tolerated procedure well.  Post Procedure Instructions: Patient was advised to call if redness, pain, or fever occur. Apply ice for 15 min. every 2 hours for the next 12-24 hours as tolerated. Patient was advised to rest the joint(s) for 1-2 days.

## 2024-08-21 NOTE — ASSESSMENT
[FreeTextEntry1] : 08/21/2024: B/L knee Euflexxa #2- tolerated well.  Ice if sore. RTO in 1 week to continue.  Progress note completed by Odilia Goodson PA-C under the direct supervision of Zacarias Medina MD.

## 2024-08-28 ENCOUNTER — APPOINTMENT (OUTPATIENT)
Dept: ORTHOPEDIC SURGERY | Facility: CLINIC | Age: 69
End: 2024-08-28
Payer: MEDICARE

## 2024-08-28 DIAGNOSIS — M17.11 UNILATERAL PRIMARY OSTEOARTHRITIS, RIGHT KNEE: ICD-10-CM

## 2024-08-28 DIAGNOSIS — M17.12 UNILATERAL PRIMARY OSTEOARTHRITIS, LEFT KNEE: ICD-10-CM

## 2024-08-28 PROCEDURE — 20610 DRAIN/INJ JOINT/BURSA W/O US: CPT | Mod: 50

## 2024-08-28 NOTE — ASSESSMENT
[FreeTextEntry1] : 08/21/2024: B/L knee Euflexxa #2- tolerated well.  Ice if sore. RTO in 1 week to continue.  08/28/2024: B/L knee Euflexxa #3 - tolerated well. Ice if sore. RTO in 6 weeks if symptoms persist.   Progress note completed by Odilia Goodson PA-C under the direct supervision of Zacarias Medina MD.

## 2024-10-08 ENCOUNTER — APPOINTMENT (OUTPATIENT)
Dept: ORTHOPEDIC SURGERY | Facility: CLINIC | Age: 69
End: 2024-10-08
Payer: MEDICARE

## 2024-10-08 DIAGNOSIS — M17.12 UNILATERAL PRIMARY OSTEOARTHRITIS, LEFT KNEE: ICD-10-CM

## 2024-10-08 DIAGNOSIS — M25.462 EFFUSION, LEFT KNEE: ICD-10-CM

## 2024-10-08 PROCEDURE — 99214 OFFICE O/P EST MOD 30 MIN: CPT | Mod: 25

## 2024-10-08 PROCEDURE — 20610 DRAIN/INJ JOINT/BURSA W/O US: CPT | Mod: LT

## 2024-10-08 PROCEDURE — J3490M: CUSTOM | Mod: JZ

## 2024-10-22 ENCOUNTER — APPOINTMENT (OUTPATIENT)
Dept: ORTHOPEDIC SURGERY | Facility: CLINIC | Age: 69
End: 2024-10-22
Payer: MEDICARE

## 2024-10-22 DIAGNOSIS — M17.11 UNILATERAL PRIMARY OSTEOARTHRITIS, RIGHT KNEE: ICD-10-CM

## 2024-10-22 DIAGNOSIS — M17.12 UNILATERAL PRIMARY OSTEOARTHRITIS, LEFT KNEE: ICD-10-CM

## 2024-10-22 PROCEDURE — 20610 DRAIN/INJ JOINT/BURSA W/O US: CPT | Mod: RT

## 2024-10-22 PROCEDURE — 99214 OFFICE O/P EST MOD 30 MIN: CPT | Mod: 25

## 2024-10-22 PROCEDURE — J3490M: CUSTOM | Mod: JZ

## 2024-10-31 NOTE — DISCHARGE NOTE NURSING/CASE MANAGEMENT/SOCIAL WORK - NSSCCARECORD_GEN_ALL_CORE
[facility]
Interarytenoid/Arytenoid erythema/Baseline pooling of secretions/Baseline penetration of secretions

## 2024-12-11 NOTE — H&P PST ADULT - PRO ARRIVE FROM
I do not have a form associated with this encounter.  Was this form given to a different provider?  ANTHONY Hollis PA-C is listed in this encounter.  - Leobardo, CNP   home

## 2025-01-14 ENCOUNTER — APPOINTMENT (OUTPATIENT)
Dept: ORTHOPEDIC SURGERY | Facility: CLINIC | Age: 70
End: 2025-01-14
Payer: MEDICARE

## 2025-01-14 DIAGNOSIS — M17.12 UNILATERAL PRIMARY OSTEOARTHRITIS, LEFT KNEE: ICD-10-CM

## 2025-01-14 PROCEDURE — 99213 OFFICE O/P EST LOW 20 MIN: CPT | Mod: 25

## 2025-01-14 PROCEDURE — 20610 DRAIN/INJ JOINT/BURSA W/O US: CPT | Mod: LT

## 2025-01-14 PROCEDURE — J3490M: CUSTOM | Mod: JZ

## 2025-01-14 NOTE — PATIENT PROFILE ADULT - NSPROIMPLANTSMEDDEV_GEN_A_NUR
HEARING AID FITTING    Isaiah Dorsey Jr. was seen today for a hearing aid fitting.  All parts of the hearing aid, including battery, domes, wax filters, and microphones, were discussed with the patient.  Battery charging was also reviewed and practiced with the patient. Otoscopy revealed clear EAC's with visible TM's; patient has a small perforation in his left ear. Real ear and feedback measures were taken and hearing aid was fit to patient's satisfaction. Results of real ear were interpreted with caution due to perforation. Counseled patient on daily wear and maintenance of the hearing aid.  Mr. Dorsey acknowledged that he understood.  The hearing aids were connected to the patient's phone.  Bluetooth settings were tested successfully in office. The purchase agreement, 30-day trial period, and warranties were also reviewed with patient.  It is recommended Mr. Dorsey return to clinic in 2 weeks or as needed.      Hearing Aid Information  Service Type: Itemized  Service End Date: 04/14/25  Trial Period End Date: 02/13/25   and Model: Phonak [id number]  Color: sand beige  Left SN: [id number]  Battery: Li-Ion Rechargeable 312  Left  and Dome: 1M & large power dome  Repair Warranty Expiration Date: 01/21/28  L&D Warranty Expiration Date: 01/21/28   SN: [id number]        
None

## 2025-03-18 ENCOUNTER — APPOINTMENT (OUTPATIENT)
Dept: ORTHOPEDIC SURGERY | Facility: CLINIC | Age: 70
End: 2025-03-18
Payer: MEDICARE

## 2025-03-18 VITALS — WEIGHT: 194 LBS | HEIGHT: 64 IN | BODY MASS INDEX: 33.12 KG/M2

## 2025-03-18 DIAGNOSIS — M17.11 UNILATERAL PRIMARY OSTEOARTHRITIS, RIGHT KNEE: ICD-10-CM

## 2025-03-18 DIAGNOSIS — M17.12 UNILATERAL PRIMARY OSTEOARTHRITIS, LEFT KNEE: ICD-10-CM

## 2025-03-18 DIAGNOSIS — Z86.39 PERSONAL HISTORY OF OTHER ENDOCRINE, NUTRITIONAL AND METABOLIC DISEASE: ICD-10-CM

## 2025-03-18 DIAGNOSIS — Z87.19 PERSONAL HISTORY OF OTHER DISEASES OF THE DIGESTIVE SYSTEM: ICD-10-CM

## 2025-03-18 PROCEDURE — 99214 OFFICE O/P EST MOD 30 MIN: CPT | Mod: 25

## 2025-03-18 PROCEDURE — 20610 DRAIN/INJ JOINT/BURSA W/O US: CPT | Mod: LT

## 2025-03-18 PROCEDURE — 73564 X-RAY EXAM KNEE 4 OR MORE: CPT | Mod: LT

## 2025-03-18 PROCEDURE — J3490M: CUSTOM | Mod: JZ

## 2025-03-22 NOTE — DIETITIAN INITIAL EVALUATION ADULT. - OTHER INFO
Patient is a 66 year old female with a PMHx of HTN, HLD, hypothyroidism, DM2, SOLOMON (not consistent with using CPAP), pernicious anemia and diverticulosis (S/P colon resection 10/2021) who presented to pre-surgical testing for evaluation for Naeem reversal.  Patient is now S/P ex-lap, Naeem reversal and diverting loop ileostomy on 3/21/22.    Pt remains with nausea and poor po intakes, on IV zofran. Low fiber diet initiated 3/22. Daughter Michelle reports provided food from outside, took minimal amount. No reported difficulty with chewing/swallowing. Pt with Ileostomy. Encouraged small meal intakes as tolerated. Provided education handout on Ileostomy Nutrition Therapy. Not applicable

## 2025-04-08 RX ORDER — TRAMADOL HYDROCHLORIDE 50 MG/1
50 TABLET, COATED ORAL
Qty: 30 | Refills: 0 | Status: ACTIVE | COMMUNITY
Start: 2025-04-08 | End: 1900-01-01

## 2025-04-22 ENCOUNTER — APPOINTMENT (OUTPATIENT)
Dept: ORTHOPEDIC SURGERY | Facility: CLINIC | Age: 70
End: 2025-04-22

## 2025-04-22 NOTE — HISTORY OF PRESENT ILLNESS
GI Discharge Instructions Endoscopy      4/22/2025    During your exam, the physician:    Obtained biopsies from the colon    DIET INSTRUCTIONS:  Advance your diet as you tolerate it and High Fiber Diet in addition to your regular diet    PRESCRIPTIONS/MEDICATIONS  No new prescriptions given today   You may resume your Warfarin/ Coumadin on 4/23/25.     A RESPONSIBLE ADULT MUST ACCOMPANY YOU AND DRIVE YOU HOME    You had the following procedure(s) today:   Colonoscopy     Avoid anti-inflammatory drugs, (Nuprin, Ibuprofen, Motrin, Advil, etc.) for 5 days.     Following sedation, your judgement, perception and coordination are impaired for a minimum of       24 hours.      Therefore:  Do not drive.  Do not return to work today.  Do not operate appliances or machinery that require quick reaction time  Do not sign legal documents or be involved in work decisions  Do not smoke or drink alcoholic beverages for 24 hours  Plan to spend a few hours resting before resuming your normal routine    Please call your physician in the event that you experience any of the following or proceed to  the nearest hospital in the event of an emergency:     For Upper Endoscopy  Difficulty swallowing or breathing  Neck swelling  Excessive pain, you may have mild chest pain or discomfort which should pass in 1-2 hours with the passage of air.  Nausea or vomiting  Abdominal distention  Fever  Mild throat soreness may follow this procedure.  Warm salt-water gargle or lozenges may relieve your discomfort    For Colonoscopy / Sigmoidoscopy  Severe abdominal distention or pain. Some mild distention and/or cramping are normal after these procedures but should pass within an hour or two with the passage of air.  Rectal bleeding more than blood streaking on the toilet tissue  Nausea or vomiting  Fever    If you have any questions or concerns, contact Dr. Campo's office [place] [phone]    ADDITIONAL INSTRUCTIONS: none       [FreeTextEntry1] : Status post ileostomy reversal after subtotal colectomy for GI bleed. Patient readmitted to hospital 6 weeks postoperatively for small bowel obstruction that resolved spontaneously. Currently tolerating diet of fevers or chills no nausea or vomiting normal bowel movements

## 2025-05-01 ENCOUNTER — APPOINTMENT (OUTPATIENT)
Dept: ORTHOPEDIC SURGERY | Facility: CLINIC | Age: 70
End: 2025-05-01
Payer: MEDICARE

## 2025-05-01 DIAGNOSIS — M17.12 UNILATERAL PRIMARY OSTEOARTHRITIS, LEFT KNEE: ICD-10-CM

## 2025-05-01 PROCEDURE — L1833: CPT | Mod: LT

## 2025-05-01 PROCEDURE — 99214 OFFICE O/P EST MOD 30 MIN: CPT

## 2025-05-01 RX ORDER — TRAMADOL HYDROCHLORIDE 50 MG/1
50 TABLET, COATED ORAL EVERY 8 HOURS
Qty: 30 | Refills: 0 | Status: ACTIVE | COMMUNITY
Start: 2025-05-01 | End: 1900-01-01

## 2025-05-07 NOTE — H&P ADULT - NSICDXPASTMEDICALHX_GEN_ALL_CORE_FT
NEPHROLOGY FOLLOW UP NOTE    pt seen and examined  eating minimal   off IVF  no complaints       PAST MEDICAL & SURGICAL HISTORY:  HTN (hypertension)      HLD (hyperlipidemia)      Cataract      S/P liudmila      H/O lumpectomy  left      History of dilation and curettage      Status post cataract extraction and insertion of intraocular lens, left        Allergies:  latex (Rash)  penicillin (Unknown)    Home Medications Reviewed    SOCIAL HISTORY:  Denies ETOH,Smoking,   FAMILY HISTORY:        REVIEW OF SYSTEMS:  All other review of systems is negative unless indicated above.    PHYSICAL EXAM:  Constitutional: NAD, frail   HEENT: anicteric sclera, oropharynx clear, dry MM  Neck: No JVD  Respiratory: CTAB, no wheezes, rales or rhonchi  Cardiovascular: S1, S2, RRR  Gastrointestinal: BS+, soft, NT/ND  Extremities: No cyanosis or clubbing. trace peripheral edema  Neurological: pleasantly confused   : No CVA tenderness. No jo.   Skin: No rashes     Hospital Medications:   MEDICATIONS  (STANDING):  amLODIPine   Tablet 5 milliGRAM(s) Oral daily  heparin   Injectable 5000 Unit(s) SubCutaneous every 8 hours  polyethylene glycol 3350 17 Gram(s) Oral daily  rosuvastatin 5 milliGRAM(s) Oral at bedtime  sodium bicarbonate 325 milliGRAM(s) Oral every 8 hours        VITALS:  T(F): 98 (05-07-25 @ 04:47), Max: 99.5 (05-06-25 @ 20:17)  HR: 80 (05-07-25 @ 04:47)  BP: 154/70 (05-07-25 @ 04:47)  RR: 18 (05-07-25 @ 04:47)  SpO2: 94% (05-07-25 @ 04:47)  Wt(kg): --        LABS:  05-07    139  |  108  |  39[H]  ----------------------------<  96  5.0   |  20  |  1.6[H]    Ca    8.4      07 May 2025 06:57    TPro  6.2  /  Alb  2.8[L]  /  TBili  0.2  /  DBili      /  AST  55[H]  /  ALT  25  /  AlkPhos  114  05-05                          7.0    8.58  )-----------( 362      ( 07 May 2025 06:57 )             22.7       Urine Studies:  Urinalysis Basic - ( 07 May 2025 06:57 )    Color:  / Appearance:  / SG:  / pH:   Gluc: 96 mg/dL / Ketone:   / Bili:  / Urobili:    Blood:  / Protein:  / Nitrite:    Leuk Esterase:  / RBC:  / WBC    Sq Epi:  / Non Sq Epi:  / Bacteria:           RADIOLOGY & ADDITIONAL STUDIES:   PAST MEDICAL HISTORY:  Diverticulosis     HTN (hypertension)     Hypothyroidism     Lower gastrointestinal bleeding 4x 2014- 2016

## 2025-05-28 ENCOUNTER — NON-APPOINTMENT (OUTPATIENT)
Age: 70
End: 2025-05-28

## 2025-06-09 ENCOUNTER — APPOINTMENT (OUTPATIENT)
Dept: ORTHOPEDIC SURGERY | Facility: HOSPITAL | Age: 70
End: 2025-06-09

## 2025-06-19 ENCOUNTER — APPOINTMENT (OUTPATIENT)
Dept: ORTHOPEDIC SURGERY | Facility: CLINIC | Age: 70
End: 2025-06-19
Payer: MEDICARE

## 2025-06-19 PROCEDURE — 20610 DRAIN/INJ JOINT/BURSA W/O US: CPT | Mod: LT

## 2025-06-19 PROCEDURE — J3490M: CUSTOM | Mod: JZ

## 2025-06-19 PROCEDURE — 99214 OFFICE O/P EST MOD 30 MIN: CPT | Mod: 25

## 2025-06-19 RX ORDER — METHYLPREDNISOLONE 4 MG/1
4 TABLET ORAL
Qty: 1 | Refills: 1 | Status: ACTIVE | COMMUNITY
Start: 2025-06-19 | End: 1900-01-01

## 2025-06-24 ENCOUNTER — APPOINTMENT (OUTPATIENT)
Dept: ORTHOPEDIC SURGERY | Facility: CLINIC | Age: 70
End: 2025-06-24

## 2025-07-08 ENCOUNTER — APPOINTMENT (OUTPATIENT)
Dept: ORTHOPEDIC SURGERY | Facility: CLINIC | Age: 70
End: 2025-07-08
Payer: MEDICARE

## 2025-07-08 PROCEDURE — 20610 DRAIN/INJ JOINT/BURSA W/O US: CPT | Mod: LT

## 2025-07-08 PROCEDURE — J3490M: CUSTOM | Mod: JZ

## 2025-07-08 PROCEDURE — 99214 OFFICE O/P EST MOD 30 MIN: CPT | Mod: 25

## 2025-07-22 NOTE — DISCHARGE NOTE PROVIDER - CARE PROVIDERS DIRECT ADDRESSES
Update History & Physical    The patient's History and Physical of July 9, 2025 was reviewed with the patient and I examined the patient. There was no change. The surgical site was confirmed by the patient and me.     Plan: The risks, benefits, expected outcome, and alternative to the recommended procedure have been discussed with the patient. Patient understands and wants to proceed with the procedure.     Electronically signed by Em Tirado MD on 7/22/2025 at 6:53 AM       [email or web address]

## 2025-08-27 ENCOUNTER — NON-APPOINTMENT (OUTPATIENT)
Age: 70
End: 2025-08-27

## 2025-09-09 ENCOUNTER — APPOINTMENT (OUTPATIENT)
Dept: ORTHOPEDIC SURGERY | Facility: CLINIC | Age: 70
End: 2025-09-09

## (undated) DEVICE — DRAPE GENERAL ENDOSCOPY

## (undated) DEVICE — WARMING BLANKET FULL ADULT

## (undated) DEVICE — DRAPE 3/4 SHEET 52X76"

## (undated) DEVICE — PACK ABDOMINAL CLOSURE

## (undated) DEVICE — ELCTR GROUNDING PAD ADULT COVIDIEN

## (undated) DEVICE — DRAPE MAYO STAND 23"

## (undated) DEVICE — TUBING SUCTION NONCONDUCTIVE 6MM X 12FT

## (undated) DEVICE — SUT MAXON 1 36" GS-24

## (undated) DEVICE — VENODYNE/SCD SLEEVE CALF MEDIUM

## (undated) DEVICE — PROTECTOR HEEL / ELBOW FLUFFY

## (undated) DEVICE — URETERAL CATH RED RUBBER 12FR (WHITE)

## (undated) DEVICE — SOL IRR POUR H2O 1500ML

## (undated) DEVICE — DRAPE IOBAN 23" X 23"

## (undated) DEVICE — SUT VICRYL 3-0 18" SH (POP-OFF)

## (undated) DEVICE — CONTAINER FORMALIN 80ML YELLOW

## (undated) DEVICE — DRAPE FLUID WARMER 44 X 44"

## (undated) DEVICE — FOLEY CATH 2-WAY 16FR 5CC SILICONE

## (undated) DEVICE — DRSG TEGADERM 4X4.75"

## (undated) DEVICE — CANISTER DISPOSABLE THIN WALL 3000CC

## (undated) DEVICE — POSITIONER STRAP ARMBOARD VELCRO TS-30

## (undated) DEVICE — PACK MAJOR ABDOMINAL WITH LAP

## (undated) DEVICE — SUT PROLENE 0 24" XLH

## (undated) DEVICE — TUBING IV SET GRAVITY 3Y 100" MACRO

## (undated) DEVICE — DRSG BANDAID 0.75X3"

## (undated) DEVICE — BIOPSY FORCEP COLD DISP

## (undated) DEVICE — SALIVA EJECTOR (BLUE)

## (undated) DEVICE — Device

## (undated) DEVICE — VISITEC 4X4

## (undated) DEVICE — SUT VICRYL 3-0 18" SH UNDYED (POP-OFF)

## (undated) DEVICE — DRSG CURITY GAUZE SPONGE 4 X 4" 12-PLY NON-STERILE

## (undated) DEVICE — LIGASURE IMPACT

## (undated) DEVICE — STAPLER COVIDIEN ENDO GIA STANDARD HANDLE

## (undated) DEVICE — SUT VICRYL 2-0 18" TIES UNDYED

## (undated) DEVICE — LABELS BLANK W PEN

## (undated) DEVICE — BIOPSY FORCEP RADIAL JAW 4 STANDARD WITH NEEDLE

## (undated) DEVICE — POSITIONER PINK PAD PIGAZZI SYSTEM

## (undated) DEVICE — GOWN LG

## (undated) DEVICE — ELCTR BOVIE BLADE 3/4" EXTENDED LENGTH 6"

## (undated) DEVICE — DRSG TELFA 3 X 8

## (undated) DEVICE — DRSG TAPE TRANSPORE 3"

## (undated) DEVICE — SUT VICRYL 2-0 27" SH UNDYED

## (undated) DEVICE — STAPLER SKIN VISI-STAT 35 WIDE

## (undated) DEVICE — TUBING MEDI-VAC W MAXIGRIP CONNECTORS 1/4"X6'

## (undated) DEVICE — LUBRICATING JELLY HR ONE SHOT 3G

## (undated) DEVICE — SUT VICRYL 3-0 27" SH UNDYED

## (undated) DEVICE — PACK IV START WITH CHG

## (undated) DEVICE — DRAIN PENROSE .25" X 18" LATEX

## (undated) DEVICE — DRSG 2X2

## (undated) DEVICE — SUT VICRYL 0 27" CT-1 UNDYED

## (undated) DEVICE — PACK PERI GYN

## (undated) DEVICE — BASIN EMESIS 10IN GRADUATED MAUVE

## (undated) DEVICE — FOLEY TRAY 16FR 5CC LF UMETER CLOSED

## (undated) DEVICE — CATH IV SAFE BC 22G X 1" (BLUE)

## (undated) DEVICE — POOLE SUCTION TIP

## (undated) DEVICE — SOL IRR POUR NS 0.9% 1500ML

## (undated) DEVICE — POSITIONER FOAM EGG CRATE ULNAR 2PCS (PINK)

## (undated) DEVICE — SUT PDS II 1 27" CT

## (undated) DEVICE — ELCTR ECG CONDUCTIVE ADHESIVE